# Patient Record
Sex: FEMALE | Race: WHITE | NOT HISPANIC OR LATINO | Employment: OTHER | URBAN - METROPOLITAN AREA
[De-identification: names, ages, dates, MRNs, and addresses within clinical notes are randomized per-mention and may not be internally consistent; named-entity substitution may affect disease eponyms.]

---

## 2017-02-20 DIAGNOSIS — I10 HYPERTENSION GOAL BP (BLOOD PRESSURE) < 140/90: ICD-10-CM

## 2017-02-20 NOTE — TELEPHONE ENCOUNTER
atenolol (TENORMIN) 25 MG tablet      Last Written Prescription Date: 11/17/16  Last Fill Quantity: 30, # refills: 0    Last Office Visit with [facility], [facility] or [facility] prescribing provider:  12/16/15   Future Office Visit:        BP Readings from Last 3 Encounters:   03/18/16 144/88   12/16/15 130/72   11/13/15 134/82

## 2017-02-22 RX ORDER — ATENOLOL 25 MG/1
25 TABLET ORAL DAILY
Qty: 30 TABLET | Refills: 0 | Status: CANCELLED | OUTPATIENT
Start: 2017-02-22

## 2017-02-22 NOTE — TELEPHONE ENCOUNTER
,    Pt must be seen in clinic. Has not been seen for over one year and was reminded of this when last atenolol was refilled on 11/17/16. Please call her and have her make appt.    Dawn Diaz, RN, BSN

## 2017-04-07 ENCOUNTER — OFFICE VISIT (OUTPATIENT)
Dept: FAMILY MEDICINE | Facility: CLINIC | Age: 57
End: 2017-04-07

## 2017-04-07 VITALS
HEART RATE: 85 BPM | HEIGHT: 62 IN | DIASTOLIC BLOOD PRESSURE: 90 MMHG | SYSTOLIC BLOOD PRESSURE: 142 MMHG | WEIGHT: 103 LBS | OXYGEN SATURATION: 97 % | BODY MASS INDEX: 18.95 KG/M2

## 2017-04-07 DIAGNOSIS — F17.200 CURRENT EVERY DAY SMOKER: ICD-10-CM

## 2017-04-07 DIAGNOSIS — R73.09 ELEVATED GLUCOSE LEVEL: ICD-10-CM

## 2017-04-07 DIAGNOSIS — I10 HYPERTENSION GOAL BP (BLOOD PRESSURE) < 140/90: ICD-10-CM

## 2017-04-07 DIAGNOSIS — Z86.2 HISTORY OF ANEMIA: ICD-10-CM

## 2017-04-07 DIAGNOSIS — M51.379 DEGENERATION OF LUMBAR OR LUMBOSACRAL INTERVERTEBRAL DISC: ICD-10-CM

## 2017-04-07 DIAGNOSIS — M47.812 OSTEOARTHRITIS OF CERVICAL SPINE, UNSPECIFIED SPINAL OSTEOARTHRITIS COMPLICATION STATUS: Primary | ICD-10-CM

## 2017-04-07 LAB
% GRANULOCYTES: 64 % (ref 42.2–75.2)
HCT VFR BLD AUTO: 41.1 % (ref 35–46)
HEMOGLOBIN: 13.8 G/DL (ref 11.8–15.5)
LYMPHOCYTES NFR BLD AUTO: 28.7 % (ref 20.5–51.1)
MCH RBC QN AUTO: 31.3 PG (ref 27–31)
MCHC RBC AUTO-ENTMCNC: 33.8 G/DL (ref 33–37)
MCV RBC AUTO: 92.7 FL (ref 80–100)
MONOCYTES NFR BLD AUTO: 7.3 % (ref 1.7–9.3)
PLATELET # BLD AUTO: 337 K/UL (ref 140–450)
RBC # BLD AUTO: 4.43 X10/CMM (ref 3.7–5.2)
WBC # BLD AUTO: 6.1 X10/CMM (ref 3.8–11)

## 2017-04-07 PROCEDURE — 85025 COMPLETE CBC W/AUTO DIFF WBC: CPT | Performed by: FAMILY MEDICINE

## 2017-04-07 PROCEDURE — 36415 COLL VENOUS BLD VENIPUNCTURE: CPT | Performed by: FAMILY MEDICINE

## 2017-04-07 PROCEDURE — 99204 OFFICE O/P NEW MOD 45 MIN: CPT | Performed by: FAMILY MEDICINE

## 2017-04-07 RX ORDER — ATENOLOL 25 MG/1
25 TABLET ORAL DAILY
Qty: 90 TABLET | Refills: 3 | Status: SHIPPED | OUTPATIENT
Start: 2017-04-07 | End: 2017-08-07

## 2017-04-07 RX ORDER — CARBAMAZEPINE 200 MG/1
TABLET ORAL
Refills: 10 | COMMUNITY
Start: 2017-02-07 | End: 2017-07-13

## 2017-04-07 NOTE — PROGRESS NOTES
"Problem(s) Oriented visit        SUBJECTIVE:                                                    Muriel Diaz is a 56 year old female who presents to clinic today for blood pressure evaluation. She takes tenormin 25 mg daily, but has been off this for about a month.  She denies chest pain or pressure. No AGUILAR. No ankle edema. No desire to quit smoking.    She takes carbamazepine for back and neck disease related pain (history of fusions in both areas). This does not work well and causes a lot of side effects. She also will take Soma and gets some relief typically taking it 2 tabs TID. She smokes 1/2-3/4 PPD. She has done \"years and years\" of physical therapy for her back without any relief. She has also tried Cymbalta, Lyrica, and Neurontin without any relief.     She uses amitriptyline for insomnia, not needing it every night. No am grogginess.       Problem list, Medication list, Allergies, and Medical/Social/Surgical histories reviewed in EPIC and updated as appropriate.   Additional history: as documented    ROS:  5 point ROS completed and negative except noted above, including Gen, CV, Resp, GI, MS      Histories:   Patient Active Problem List   Diagnosis     Sprain of thoracic region     Vertigo     CARDIOVASCULAR SCREENING; LDL GOAL LESS THAN 160     Hypertension goal BP (blood pressure) < 140/90     Strain of hand and finger, right     Polysubstance abuse     Degeneration of lumbar or lumbosacral intervertebral disc     DJD (degenerative joint disease) of cervical spine     Dystonia     Anxiety     Past Surgical History:   Procedure Laterality Date     BACK SURGERY  2001    lumbar fusion, cervical discectomy     FUSION CERVICAL ANTERIOR THREE+ LEVELS  5/1/2012    Procedure:FUSION CERVICAL ANTERIOR THREE+ LEVELS; Surgeon:SARAH FRANCO; Location:[facility] OR     FUSION CERVICAL POSTERIOR THREE+ LEVELS  5/1/2012    Procedure:FUSION CERVICAL POSTERIOR THREE+ LEVELS; Posterior Cervical decompression and fusion " "C4-7, Anterior Cervical decompression and fusion C4-7 (c-arm), (herb table with abraham, yina oasis, yina aviator, Vitoss foam packing strip, impulse monitoring,); Surgeon:SARAH FRANCO; Location:[facility] OR     GYN SURGERY       HYSTERECTOMY VAGINAL  1990's    Yaakov Acevesa OB Gyn specilist     HYSTERECTOMY, PAP NO LONGER INDICATED         Social History   Substance Use Topics     Smoking status: Current Every Day Smoker     Packs/day: 0.50     Types: Cigarettes     Smokeless tobacco: Never Used     Alcohol use Yes      Comment: once a week     Family History   Problem Relation Age of Onset     Pancreatitis Mother      Substance Abuse Mother      Unknown/Adopted Father            OBJECTIVE:                                                    /90  Pulse 85  Ht 1.581 m (5' 2.25\")  Wt 46.7 kg (103 lb)  SpO2 97%  BMI 18.69 kg/m2  Body mass index is 18.69 kg/(m^2).   APPEARANCE: = Relaxed and in no distress  Conj/Eyelids = noninjected and lids and lashes are without inflammation  PERRLA/Irises = Pupils Round Reactive to Light and Irisis without inflammation  Neck = No anterior or posterior adenopathy appreciated.  Thyroid = Not enlarged and no masses felt  Resp effort = Calm regular breathing  Breath Sounds = Good air movement with no rales or rhonchi in any lung fields  Heart Rate, Rythym, & sounds (no Murm)  = Regular rate and rythym with no S3, S4, or murmer appreciated.  Carotid Art's = Pulses full and equal and no bruits appreciated  Ext (edema) = No pretibial edema noted or elsewhere  SKIN = absent significant rashes or lesions   Recent/Remote Memory = Alert and Oriented x 3  Mood/Affect = Cooperative and interested   Labs Resulted Today:   Results for orders placed or performed in visit on 04/07/17   CBC with Diff/Plt ([facility])   Result Value Ref Range    WBC x10/cmm 6.1 3.8 - 11.0 x10/cmm    % Lymphocytes 28.7 20.5 - 51.1 %    % Monocytes 7.3 1.7 - 9.3 %    % Granulocytes 64.0 42.2 - " 75.2 %    RBC x10/cmm 4.43 3.7 - 5.2 x10/cmm    Hemoglobin 13.8 11.8 - 15.5 g/dl    Hematocrit 41.1 35 - 46 %    MCV 92.7 80 - 100 fL    MCH 31.3 (A) 27.0 - 31.0 pg    MCHC 33.8 33.0 - 37.0 g/dL    Platelet Count 337 140 - 450 K/uL   Basic Metabolic Panel (8) ([facility])   Result Value Ref Range    Glucose 127 (H) 65 - 99 mg/dL    Urea Nitrogen 22 6 - 24 mg/dL    Creatinine 0.62 0.57 - 1.00 mg/dL    eGFR If NonAfricn Am 102 >59 mL/min/1.73    eGFR If Africn Am 117 >59 mL/min/1.73    BUN/Creatinine Ratio 35 (H) 9 - 23    Sodium 144 134 - 144 mmol/L    Potassium 4.2 3.5 - 5.2 mmol/L    Chloride 103 96 - 106 mmol/L    Total CO2 24 18 - 28 mmol/L    Calcium 9.7 8.7 - 10.2 mg/dL    Narrative    Performed at:  01 - [facility] [address], [place], [place]  [id number]  : Trey Yu MD, Phone:  [phone]   Vitamin D  25-Hydroxy ([facility])   Result Value Ref Range    Vitamin D,25-Hydroxy 27.2 (L) 30.0 - 100.0 ng/mL    Narrative    Performed at:  01 - [facility] [address], [place], [place]  [id number]  : Trey Yu MD, Phone:  [phone]   Lipid Panel ([facility])   Result Value Ref Range    Cholesterol 274 (H) 100 - 199 mg/dL    Triglycerides 120 0 - 149 mg/dL    HDL Cholesterol 117 >39 mg/dL    VLDL Cholesterol Majnit 24 5 - 40 mg/dL    LDL Cholesterol Calculated 133 (H) 0 - 99 mg/dL    LDL/HDL Ratio 1.1 0.0 - 3.2 ratio units    Narrative    Performed at:  01 - [facility] [address], [place], [place]  [id number]  : Trey Yu MD, Phone:  [phone]     ASSESSMENT/PLAN:                                                    Tobacco Cessation:   reports that she has been smoking Cigarettes.  She has been smoking about 0.50 packs per day. She has never used smokeless tobacco.  Tobacco Cessation Action Plan: Information offered: Patient not interested at this time    BMI:   Estimated body mass index is 18.69 kg/(m^2) as calculated from the following:    Height as of this  "encounter: 1.581 m (5' 2.25\").    Weight as of this encounter: 46.7 kg (103 lb).         Mruiel was seen today for establish care and recheck medication.    Diagnoses and all orders for this visit:    Osteoarthritis of cervical spine, unspecified spinal osteoarthritis complication status  -     Vitamin D  25-Hydroxy ([facility])    Hypertension goal BP (blood pressure) < 140/90  -     atenolol (TENORMIN) 25 MG tablet; Take 1 tablet (25 mg) by mouth daily  -     Basic Metabolic Panel (8) ([facility])  -     Lipid Panel ([facility])  Restart medication. Return in 4-6 weeks to recheck blood pressure.    Degeneration of lumbar or lumbosacral intervertebral disc  -     Vitamin D  25-Hydroxy ([facility])    History of anemia  -     CBC with Diff/Plt ([facility])    Current every day smoker  Strongly recommend quitting, patient is not ready at this time.    There are no Patient Instructions on file for this visit.    The following health maintenance items are reviewed in Epic and correct as of today:  Health Maintenance   Topic Date Due     LIPID MONITORING Q1 YEAR( NO INBASKET)  11/30/1962     MICROALBUMIN Q1 YEAR( NO INBASKET)  11/30/1962     ADVANCE DIRECTIVE PLANNING Q5 YRS (NO INBASKET)  11/30/1979     HEPATITIS C SCREENING  11/30/1979     MAMMO SCREEN Q2 YR (SYSTEM ASSIGNED)  11/30/2001     BMP Q1 YR (NO INBASKET)  04/09/2013     INFLUENZA VACCINE (SYSTEM ASSIGNED)  09/01/2017     TETANUS IMMUNIZATION (SYSTEM ASSIGNED)  07/19/2020     COLON CANCER SCREEN (SYSTEM ASSIGNED)  03/18/2026       Isabel Landin MD  [facility]  Family Practice  [facility]  [phone]    For any issues my office # is [phone]        "

## 2017-04-07 NOTE — MR AVS SNAPSHOT
"              After Visit Summary   4/7/2017    Muriel Diaz    MRN: [id number]           Patient Information     Date Of Birth          1960        Visit Information        Provider Department      4/7/2017 1:15 PM Isabel Landin MD [facility]        Today's Diagnoses     Osteoarthritis of cervical spine, unspecified spinal osteoarthritis complication status    -  1    Hypertension goal BP (blood pressure) < 140/90        Degeneration of lumbar or lumbosacral intervertebral disc        History of anemia        Current every day smoker           Follow-ups after your visit        Who to contact     If you have questions or need follow up information about today's clinic visit or your schedule please contact [facility] directly at [phone].  Normal or non-critical lab and imaging results will be communicated to you by [employer], letter or phone within 4 business days after the clinic has received the results. If you do not hear from us within 7 days, please contact the clinic through [employer] or phone. If you have a critical or abnormal lab result, we will notify you by phone as soon as possible.  Submit refill requests through [employer] or call your pharmacy and they will forward the refill request to us. Please allow 3 business days for your refill to be completed.          Additional Information About Your Visit        MyChart Information     [employer] lets you send messages to your doctor, view your test results, renew your prescriptions, schedule appointments and more. To sign up, go to [email or web address] . Click on \"Log in\" on the left side of the screen, which will take you to the Welcome page. Then click on \"Sign up Now\" on the right side of the page.     You will be asked to enter the access code listed below, as well as some personal information. Please follow the directions to create your username and password.     Your access code is: [email or web address]  Expires: " "2017  1:00 PM     Your access code will  in 90 days. If you need help or a new code, please call your [facility] or [phone].        Care EveryWhere ID     This is your Care EveryWhere ID. This could be used by other organizations to access your [facility] medical records  [id number]        Your Vitals Were     Pulse Height Pulse Oximetry BMI (Body Mass Index)          85 1.581 m (5' 2.25\") 97% 18.69 kg/m2         Blood Pressure from Last 3 Encounters:   17 142/90   16 144/88   12/16/15 130/72    Weight from Last 3 Encounters:   17 46.7 kg (103 lb)   16 47.2 kg (104 lb)   12/16/15 49.4 kg (109 lb)              We Performed the Following     Basic Metabolic Panel (8) ([facility])     CBC with Diff/Plt ([facility])     Lipid Panel ([facility])     Vitamin D  25-Hydroxy ([facility])          Where to get your medicines      These medications were sent to [facility] [address] [place] [address] & [address], [place] [address]     Phone:  [phone]     atenolol 25 MG tablet          Primary Care Provider Office Phone # Fax #    Isabel Landin [phone]       [facility] [facility] [address] [place] [address]        Thank you!     Thank you for choosing [facility]  for your care. Our goal is always to provide you with excellent care. Hearing back from our patients is one way we can continue to improve our services. Please take a few minutes to complete the written survey that you may receive in the mail after your visit with us. Thank you!             Your Updated Medication List - Protect others around you: Learn how to safely use, store and throw away your medicines at www.disposemymeds.org.          This list is accurate as of: 17 11:59 PM.  Always use your most recent med list.                   Brand Name Dispense Instructions for use    amitriptyline 10 MG tablet    ELAVIL    " 90 tablet    Start at 1 tab at bedtime for 3 days, then 2 tabs at bedtime for 3 days, then 3 tabs at bedtime.       atenolol 25 MG tablet    TENORMIN    90 tablet    Take 1 tablet (25 mg) by mouth daily       carBAMazepine 200 MG tablet    TEGretol         carisoprodol 350 MG tablet    SOMA    120 tablet    Take 1 tablet (350 mg) by mouth 4 times daily as needed for muscle spasms OK to use outside pharmacy. Future prescriptions by in-person visit only. Please only use Qualitest brand

## 2017-04-08 LAB
BUN SERPL-MCNC: 22 MG/DL (ref 6–24)
BUN/CREATININE RATIO: 35 (ref 9–23)
CALCIUM SERPL-MCNC: 9.7 MG/DL (ref 8.7–10.2)
CHLORIDE SERPLBLD-SCNC: 103 MMOL/L (ref 96–106)
CHOLEST SERPL-MCNC: 274 MG/DL (ref 100–199)
CREAT SERPL-MCNC: 0.62 MG/DL (ref 0.57–1)
EGFR IF AFRICN AM: 117 ML/MIN/1.73
EGFR IF NONAFRICN AM: 102 ML/MIN/1.73
GLUCOSE SERPL-MCNC: 127 MG/DL (ref 65–99)
HDLC SERPL-MCNC: 117 MG/DL
LDL/HDL RATIO: 1.1 RATIO UNITS (ref 0–3.2)
LDLC SERPL CALC-MCNC: 133 MG/DL (ref 0–99)
POTASSIUM SERPL-SCNC: 4.2 MMOL/L (ref 3.5–5.2)
SODIUM SERPL-SCNC: 144 MMOL/L (ref 134–144)
TOTAL CO2: 24 MMOL/L (ref 18–28)
TRIGL SERPL-MCNC: 120 MG/DL (ref 0–149)
VITAMIN D, 25-HYDROXY: 27.2 NG/ML (ref 30–100)
VLDLC SERPL CALC-MCNC: 24 MG/DL (ref 5–40)

## 2017-04-14 LAB — HBA1C MFR BLD: 5.7 % (ref 4.8–5.6)

## 2017-04-25 ENCOUNTER — TELEPHONE (OUTPATIENT)
Dept: FAMILY MEDICINE | Facility: CLINIC | Age: 57
End: 2017-04-25

## 2017-04-25 DIAGNOSIS — E55.9 VITAMIN D DEFICIENCY: Primary | ICD-10-CM

## 2017-04-25 RX ORDER — CHOLECALCIFEROL (VITAMIN D3) 50 MCG
2000 TABLET ORAL DAILY
Qty: 90 TABLET | Refills: 1 | Status: SHIPPED | OUTPATIENT
Start: 2017-04-25 | End: 2018-03-26

## 2017-04-25 NOTE — TELEPHONE ENCOUNTER
----- Message from Isabel Landin MD sent at 4/12/2017  7:38 PM CDT -----  Vitamin D level is low, recommend adding (or increasing current supplement if already taking) Vitamin D3 2,000 IU daily, recheck in 6-8 weeks.  Total cholesterol is high, but the ratio of the good and bad cholesterol is excellent.  BLood counts are fine.   Glucose is elevated. Please add Hemoglobin A1c on to blood draw to r/o diabetes.

## 2017-04-25 NOTE — TELEPHONE ENCOUNTER
Patient notified of lab results per Dr. Landin directions.  Pre diabetic diet info mailed to patient.  Patient requested prescription for Vitamin D-this is sent to her pharmacy.  She is advised to return to clinic for repeat vit d level in 6-8 weeks.  Wendy Das

## 2017-04-27 ENCOUNTER — TELEPHONE (OUTPATIENT)
Dept: FAMILY MEDICINE | Facility: CLINIC | Age: 57
End: 2017-04-27

## 2017-04-27 NOTE — TELEPHONE ENCOUNTER
"4/27/2017      PHS call not required per specialty note    Sonh \"Alexia\" David  Central Scheduler    "

## 2017-07-13 ENCOUNTER — OFFICE VISIT (OUTPATIENT)
Dept: FAMILY MEDICINE | Facility: CLINIC | Age: 57
End: 2017-07-13

## 2017-07-13 VITALS
DIASTOLIC BLOOD PRESSURE: 60 MMHG | HEART RATE: 70 BPM | WEIGHT: 100 LBS | OXYGEN SATURATION: 98 % | SYSTOLIC BLOOD PRESSURE: 138 MMHG | BODY MASS INDEX: 18.14 KG/M2

## 2017-07-13 DIAGNOSIS — M53.3 SI (SACROILIAC) JOINT DYSFUNCTION: Primary | ICD-10-CM

## 2017-07-13 DIAGNOSIS — M54.2 NECK PAIN: ICD-10-CM

## 2017-07-13 DIAGNOSIS — G50.0 TRIGEMINAL NEURALGIA: ICD-10-CM

## 2017-07-13 PROCEDURE — 99213 OFFICE O/P EST LOW 20 MIN: CPT | Performed by: FAMILY MEDICINE

## 2017-07-13 RX ORDER — CARISOPRODOL 350 MG/1
350 TABLET ORAL 4 TIMES DAILY PRN
Qty: 120 TABLET | Refills: 0 | Status: SHIPPED | OUTPATIENT
Start: 2017-07-13 | End: 2018-04-13

## 2017-07-13 NOTE — PROGRESS NOTES
Problem(s) Oriented visit        SUBJECTIVE:                                                    Muriel Diaz is a 56 year old female who presents to clinic today for low back pain that occurred on 7/3/17. No particular injury. She feels it broadly across her low back. She denies any radiation of the pain into the legs. She cannot roll over in bed. Certain movements make her buckle with pain. She has been icing without any relief. No bladder or bowel issues.       Problem list, Medication list, Allergies, and Medical/Social/Surgical histories reviewed in [facility] and updated as appropriate.   Additional history: as documented    ROS:  5 point ROS completed and negative except noted above, including Gen, CV, Resp, GI, MS      Histories:   Patient Active Problem List   Diagnosis     Sprain of thoracic region     Vertigo     CARDIOVASCULAR SCREENING; LDL GOAL LESS THAN 160     Hypertension goal BP (blood pressure) < 140/90     Strain of hand and finger, right     Polysubstance abuse     Degeneration of lumbar or lumbosacral intervertebral disc     DJD (degenerative joint disease) of cervical spine     Dystonia     Anxiety     Past Surgical History:   Procedure Laterality Date     BACK SURGERY  2001    lumbar fusion, cervical discectomy     FUSION CERVICAL ANTERIOR THREE+ LEVELS  5/1/2012    Procedure:FUSION CERVICAL ANTERIOR THREE+ LEVELS; Surgeon:SARAH FRANCO; Location:[facility] OR     FUSION CERVICAL POSTERIOR THREE+ LEVELS  5/1/2012    Procedure:FUSION CERVICAL POSTERIOR THREE+ LEVELS; Posterior Cervical decompression and fusion C4-7, Anterior Cervical decompression and fusion C4-7 (c-arm), (herb table with abraham, yina oasis, yina aviator, Vitoss foam packing strip, impulse monitoring,); Surgeon:SARAH FRANCO; Location:[facility] OR     GYN SURGERY       HYSTERECTOMY VAGINAL  1990's    Judy Aceves OB Gyn specilist     HYSTERECTOMY, PAP NO LONGER INDICATED         Social History   Substance Use  Topics     Smoking status: Current Every Day Smoker     Packs/day: 0.50     Types: Cigarettes     Smokeless tobacco: Never Used     Alcohol use Yes      Comment: once a week     Family History   Problem Relation Age of Onset     Pancreatitis Mother      Substance Abuse Mother      Unknown/Adopted Father            OBJECTIVE:                                                    /60  Pulse 70  Wt 45.4 kg (100 lb)  SpO2 98%  BMI 18.14 kg/m2  Body mass index is 18.14 kg/(m^2).   GENERAL APPEARANCE ADULT: alert, uncomfortable appearing  MS: extremities normal, no peripheral edema  THere is point tenderness over the SI joints bilaterally. There is mild leg length discrepancy appreciated.She has severe pain moving from sitting to standing and getting to a laying down position.    NEURO: Alert, oriented, speech and mentation normal  PSYCH: mentation appears normal, affect and mood normal   Labs Resulted Today:   Results for orders placed or performed in visit on 04/07/17   CBC with Diff/Plt ([facility])   Result Value Ref Range    WBC x10/cmm 6.1 3.8 - 11.0 x10/cmm    % Lymphocytes 28.7 20.5 - 51.1 %    % Monocytes 7.3 1.7 - 9.3 %    % Granulocytes 64.0 42.2 - 75.2 %    RBC x10/cmm 4.43 3.7 - 5.2 x10/cmm    Hemoglobin 13.8 11.8 - 15.5 g/dl    Hematocrit 41.1 35 - 46 %    MCV 92.7 80 - 100 fL    MCH 31.3 (A) 27.0 - 31.0 pg    MCHC 33.8 33.0 - 37.0 g/dL    Platelet Count 337 140 - 450 K/uL   Basic Metabolic Panel (8) ([facility])   Result Value Ref Range    Glucose 127 (H) 65 - 99 mg/dL    Urea Nitrogen 22 6 - 24 mg/dL    Creatinine 0.62 0.57 - 1.00 mg/dL    eGFR If NonAfricn Am 102 >59 mL/min/1.73    eGFR If Africn Am 117 >59 mL/min/1.73    BUN/Creatinine Ratio 35 (H) 9 - 23    Sodium 144 134 - 144 mmol/L    Potassium 4.2 3.5 - 5.2 mmol/L    Chloride 103 96 - 106 mmol/L    Total CO2 24 18 - 28 mmol/L    Calcium 9.7 8.7 - 10.2 mg/dL    Narrative    Performed at:  01 - [facility] [address], [place], [place]   [id number]  : Trey Yu MD, Phone:  [phone]   Vitamin D  25-Hydroxy ([facility])   Result Value Ref Range    Vitamin D,25-Hydroxy 27.2 (L) 30.0 - 100.0 ng/mL    Narrative    Performed at:  01 - [facility] [address], [place], [place]  [id number]  : Trey Yu MD, Phone:  [phone]   Lipid Panel ([facility])   Result Value Ref Range    Cholesterol 274 (H) 100 - 199 mg/dL    Triglycerides 120 0 - 149 mg/dL    HDL Cholesterol 117 >39 mg/dL    VLDL Cholesterol Manjit 24 5 - 40 mg/dL    LDL Cholesterol Calculated 133 (H) 0 - 99 mg/dL    LDL/HDL Ratio 1.1 0.0 - 3.2 ratio units    Narrative    Performed at:  01 - [facility] [address], [place], [place]  [id number]  : Trey Yu MD, Phone:  [phone]   Hemoglobin A1C ([facility])   Result Value Ref Range    Hemoglobin A1C 5.7 (H) 4.8 - 5.6 %    Narrative    Performed at:  01 - [facility] [address], [place], [place]  [id number]  : Trey Yu MD, Phone:  [phone]     ASSESSMENT/PLAN:                                                        Mureil was seen today for recheck and back pain.    Diagnoses and all orders for this visit:    SI (sacroiliac) joint dysfunction  -     carisoprodol (SOMA) 350 MG tablet; Take 1 tablet (350 mg) by mouth 4 times daily as needed for muscle spasms OK to use outside pharmacy. Future prescriptions by in-person visit only.  Please only use Qualitest brand  Exercises for pelvic realignment demonstrated. She will ice and Rx for Soma is sent in to her pharmacy.    Offered referral to PT, she will call if she decides she would like to pursue this.      There are no Patient Instructions on file for this visit.    The following health maintenance items are reviewed in Epic and correct as of today:  Health Maintenance   Topic Date Due     MICROALBUMIN Q1 YEAR  11/30/1962     ADVANCE DIRECTIVE PLANNING Q5 YRS  11/30/1979     HEPATITIS C SCREENING  11/30/1979     MAMMO SCREEN  Q2 YR (SYSTEM ASSIGNED)  11/30/2001     INFLUENZA VACCINE (SYSTEM ASSIGNED)  09/01/2017     BMP Q1 YR  04/07/2018     LIPID MONITORING Q1 YEAR  04/07/2018     TETANUS IMMUNIZATION (SYSTEM ASSIGNED)  07/19/2020     COLON CANCER SCREEN (SYSTEM ASSIGNED)  03/18/2026       Isabel Landin MD  [facility]  [phone]    For any issues my office # is [phone]

## 2017-07-13 NOTE — MR AVS SNAPSHOT
"              After Visit Summary   2017    Muriel Diaz    MRN: [id number]           Patient Information     Date Of Birth          1960        Visit Information        Provider Department      2017 3:30 PM Isabel Landin MD [facility]        Today's Diagnoses     SI (sacroiliac) joint dysfunction    -  1    Neck pain        Trigeminal neuralgia           Follow-ups after your visit        Who to contact     If you have questions or need follow up information about today's clinic visit or your schedule please contact [facility] directly at [phone].  Normal or non-critical lab and imaging results will be communicated to you by Addashophart, letter or phone within 4 business days after the clinic has received the results. If you do not hear from us within 7 days, please contact the clinic through Addashophart or phone. If you have a critical or abnormal lab result, we will notify you by phone as soon as possible.  Submit refill requests through OneMorePallet or call your pharmacy and they will forward the refill request to us. Please allow 3 business days for your refill to be completed.          Additional Information About Your Visit        MyChart Information     OneMorePallet lets you send messages to your doctor, view your test results, renew your prescriptions, schedule appointments and more. To sign up, go to [email or web address] . Click on \"Log in\" on the left side of the screen, which will take you to the Welcome page. Then click on \"Sign up Now\" on the right side of the page.     You will be asked to enter the access code listed below, as well as some personal information. Please follow the directions to create your username and password.     Your access code is: [email or web address]  Expires: 10/11/2017  7:13 PM     Your access code will  in 90 days. If you need help or a new code, please call your [place] clinic or [phone].        Care EveryWhere ID     This is " your Care EveryWhere ID. This could be used by other organizations to access your [place] medical records  [id number]        Your Vitals Were     Pulse Pulse Oximetry BMI (Body Mass Index)             70 98% 18.14 kg/m2          Blood Pressure from Last 3 Encounters:   07/13/17 138/60   04/07/17 142/90   03/18/16 144/88    Weight from Last 3 Encounters:   07/13/17 45.4 kg (100 lb)   04/07/17 46.7 kg (103 lb)   03/18/16 47.2 kg (104 lb)              Today, you had the following     No orders found for display         Where to get your medicines      Some of these will need a paper prescription and others can be bought over the counter.  Ask your nurse if you have questions.     Bring a paper prescription for each of these medications     carisoprodol 350 MG tablet          Primary Care Provider Office Phone # Fax #    Isabel Vickie Landin [phone]       [facility] [address] [place] [address]        Equal Access to Services     RENETTA [facility] : Hadii aad ku hadasho Soomaali, waaxda luqadaha, qaybta kaalmada adeegyada, waxay yarelis haydaniel malcolm . So [facility] [phone].    ATENCIÓN: Si habla español, tiene a chowdary disposición servicios gratuitos de asistencia lingüística. Llame al [phone].    We comply with applicable federal civil rights laws and [place] laws. We do not discriminate on the basis of race, color, national origin, age, disability sex, sexual orientation or gender identity.            Thank you!     Thank you for choosing [facility]  for your care. Our goal is always to provide you with excellent care. Hearing back from our patients is one way we can continue to improve our services. Please take a few minutes to complete the written survey that you may receive in the mail after your visit with us. Thank you!             Your Updated Medication List - Protect others around you: Learn how to safely use, store and throw away your  medicines at www.disposemymeds.org.          This list is accurate as of: 7/13/17  7:13 PM.  Always use your most recent med list.                   Brand Name Dispense Instructions for use Diagnosis    amitriptyline 10 MG tablet    ELAVIL    90 tablet    Start at 1 tab at bedtime for 3 days, then 2 tabs at bedtime for 3 days, then 3 tabs at bedtime.    Insomnia due to medical condition       atenolol 25 MG tablet    TENORMIN    90 tablet    Take 1 tablet (25 mg) by mouth daily    Hypertension goal BP (blood pressure) < 140/90       carisoprodol 350 MG tablet    SOMA    120 tablet    Take 1 tablet (350 mg) by mouth 4 times daily as needed for muscle spasms OK to use outside pharmacy. Future prescriptions by in-person visit only. Please only use Qualitest brand    SI (sacroiliac) joint dysfunction       Vitamin D3 2000 UNITS Tabs     90 tablet    Take 2,000 Units by mouth daily    Vitamin D deficiency

## 2017-08-07 ENCOUNTER — TELEPHONE (OUTPATIENT)
Dept: FAMILY MEDICINE | Facility: CLINIC | Age: 57
End: 2017-08-07

## 2017-08-07 DIAGNOSIS — I10 BENIGN ESSENTIAL HYPERTENSION: Primary | ICD-10-CM

## 2017-08-07 RX ORDER — METOPROLOL SUCCINATE 50 MG/1
50 TABLET, EXTENDED RELEASE ORAL DAILY
Qty: 90 TABLET | Refills: 1 | Status: SHIPPED | OUTPATIENT
Start: 2017-08-07 | End: 2018-03-29

## 2018-03-01 ENCOUNTER — TELEPHONE (OUTPATIENT)
Dept: FAMILY MEDICINE | Facility: CLINIC | Age: 58
End: 2018-03-01

## 2018-03-01 DIAGNOSIS — I10 HYPERTENSION GOAL BP (BLOOD PRESSURE) < 140/90: Primary | ICD-10-CM

## 2018-03-01 RX ORDER — ATENOLOL 25 MG/1
TABLET ORAL
Qty: 30 TABLET | Refills: 1 | Status: SHIPPED | OUTPATIENT
Start: 2018-03-01 | End: 2018-05-05

## 2018-03-01 NOTE — TELEPHONE ENCOUNTER
atenolol (TENORMIN) 25 MG   LAST  Med check 4/7/17   last labs(for RX) 4/13/17  Next  appt scheduled =  none  Jailene Winter MA    BP Readings from Last 3 Encounters:   07/13/17 138/60   04/07/17 142/90   03/18/16 144/88     Last Basic Metabolic Panel:  Lab Results   Component Value Date     04/07/2017      Lab Results   Component Value Date    POTASSIUM 4.2 04/07/2017     Lab Results   Component Value Date    CHLORIDE 103 04/07/2017     Lab Results   Component Value Date    HATTIE 9.7 04/07/2017     Lab Results   Component Value Date    CO2 25 04/09/2012     Lab Results   Component Value Date    BUN 22 04/07/2017    BUN 35 04/07/2017     Lab Results   Component Value Date    CR 0.62 04/07/2017     Lab Results   Component Value Date     04/07/2017

## 2018-03-21 ENCOUNTER — OFFICE VISIT (OUTPATIENT)
Dept: FAMILY MEDICINE | Facility: CLINIC | Age: 58
End: 2018-03-21

## 2018-03-21 VITALS
WEIGHT: 104.4 LBS | HEART RATE: 60 BPM | HEIGHT: 63 IN | BODY MASS INDEX: 18.5 KG/M2 | SYSTOLIC BLOOD PRESSURE: 160 MMHG | RESPIRATION RATE: 12 BRPM | DIASTOLIC BLOOD PRESSURE: 82 MMHG

## 2018-03-21 DIAGNOSIS — I10 HYPERTENSION GOAL BP (BLOOD PRESSURE) < 140/90: Primary | ICD-10-CM

## 2018-03-21 DIAGNOSIS — Z11.59 ENCOUNTER FOR HCV SCREENING TEST FOR LOW RISK PATIENT: ICD-10-CM

## 2018-03-21 DIAGNOSIS — R73.02 GLUCOSE INTOLERANCE (IMPAIRED GLUCOSE TOLERANCE): ICD-10-CM

## 2018-03-21 DIAGNOSIS — N63.0 BREAST MASS: ICD-10-CM

## 2018-03-21 DIAGNOSIS — E55.9 VITAMIN D DEFICIENCY: ICD-10-CM

## 2018-03-21 LAB
% GRANULOCYTES: 74.4 % (ref 42.2–75.2)
HCT VFR BLD AUTO: 38.7 % (ref 35–46)
HEMOGLOBIN: 12.7 G/DL (ref 11.8–15.5)
LYMPHOCYTES NFR BLD AUTO: 19.6 % (ref 20.5–51.1)
MCH RBC QN AUTO: 31.1 PG (ref 27–31)
MCHC RBC AUTO-ENTMCNC: 33 G/DL (ref 33–37)
MCV RBC AUTO: 94.1 FL (ref 80–100)
MONOCYTES NFR BLD AUTO: 6 % (ref 1.7–9.3)
PLATELET # BLD AUTO: 329 K/UL (ref 140–450)
RBC # BLD AUTO: 4.11 X10/CMM (ref 3.7–5.2)
WBC # BLD AUTO: 8.8 X10/CMM (ref 3.8–11)

## 2018-03-21 PROCEDURE — 85025 COMPLETE CBC W/AUTO DIFF WBC: CPT | Performed by: FAMILY MEDICINE

## 2018-03-21 PROCEDURE — 36415 COLL VENOUS BLD VENIPUNCTURE: CPT | Performed by: FAMILY MEDICINE

## 2018-03-21 PROCEDURE — 99214 OFFICE O/P EST MOD 30 MIN: CPT | Performed by: FAMILY MEDICINE

## 2018-03-21 RX ORDER — PREDNISONE 20 MG/1
TABLET ORAL
Refills: 1 | COMMUNITY
Start: 2018-03-14 | End: 2018-03-29

## 2018-03-21 RX ORDER — HYDROCHLOROTHIAZIDE 12.5 MG/1
12.5 TABLET ORAL DAILY
Qty: 90 TABLET | Refills: 1 | Status: ON HOLD | OUTPATIENT
Start: 2018-03-21 | End: 2019-03-22

## 2018-03-21 NOTE — MR AVS SNAPSHOT
"              After Visit Summary   3/21/2018    Muriel Diaz    MRN: [id number]           Patient Information     Date Of Birth          1960        Visit Information        Provider Department      3/21/2018 4:15 PM Isabel Landin MD [facility]        Today's Diagnoses     Hypertension goal BP (blood pressure) < 140/90    -  1    Glucose intolerance (impaired glucose tolerance)        Vitamin D deficiency        Encounter for HCV screening test for low risk patient        Breast mass           Follow-ups after your visit        Future tests that were ordered for you today     Open Future Orders        Priority Expected Expires Ordered    US Breast Right Routine  3/22/2019 3/22/2018            Who to contact     If you have questions or need follow up information about today's clinic visit or your schedule please contact [facility] directly at [phone].  Normal or non-critical lab and imaging results will be communicated to you by Sharingforcehart, letter or phone within 4 business days after the clinic has received the results. If you do not hear from us within 7 days, please contact the clinic through Sharingforcehart or phone. If you have a critical or abnormal lab result, we will notify you by phone as soon as possible.  Submit refill requests through Materialise or call your pharmacy and they will forward the refill request to us. Please allow 3 business days for your refill to be completed.          Additional Information About Your Visit        Sharingforcehart Information     Materialise lets you send messages to your doctor, view your test results, renew your prescriptions, schedule appointments and more. To sign up, go to [email or web address] . Click on \"Log in\" on the left side of the screen, which will take you to the Welcome page. Then click on \"Sign up Now\" on the right side of the page.     You will be asked to enter the access code listed below, as well as some personal " "information. Please follow the directions to create your username and password.     Your access code is: [email or web address]  Expires: 2018  9:48 AM     Your access code will  in 90 days. If you need help or a new code, please call your [place] clinic or [phone].        Care EveryWhere ID     This is your Care EveryWhere ID. This could be used by other organizations to access your [place] medical records  [id number]        Your Vitals Were     Pulse Respirations Height BMI (Body Mass Index)          60 12 1.6 m (5' 3\") 18.49 kg/m2         Blood Pressure from Last 3 Encounters:   18 160/82   17 138/60   17 142/90    Weight from Last 3 Encounters:   18 47.4 kg (104 lb 6.4 oz)   17 45.4 kg (100 lb)   17 46.7 kg (103 lb)              We Performed the Following     Basic Metabolic Panel (8) ([facility])     CBC with Diff/Plt ([facility])     HCV Antibody ([facility])     Hemoglobin A1C ([facility])     Vitamin D  25-Hydroxy ([facility])          Today's Medication Changes          These changes are accurate as of 3/21/18 11:59 PM.  If you have any questions, ask your nurse or doctor.               Start taking these medicines.        Dose/Directions    hydrochlorothiazide 12.5 MG Tabs tablet   Used for:  Hypertension goal BP (blood pressure) < 140/90   Started by:  Isabel Landin MD        Dose:  12.5 mg   Take 1 tablet (12.5 mg) by mouth daily   Quantity:  90 tablet   Refills:  1         Stop taking these medicines if you haven't already. Please contact your care team if you have questions.     amitriptyline 10 MG tablet   Commonly known as:  ELAVIL   Stopped by:  Isabel Landin MD                Where to get your medicines      These medications were sent to [id number] [place], [place] - [address], [place] [address]     Phone:  [phone]     hydrochlorothiazide 12.5 MG Tabs tablet                Primary Care Provider Office " Phone # Fax #    Isabel Vickie Landin [phone]       [facility] [address]  [facility] [address]        Equal Access to Services     FRANCISCA HUNT : Hadii lupe ku hadsommero Soomaali, waaxda luqadaha, qaybta kaalmada adeegyada, kvng thomasdaniel gupta. So [facility] [phone].    ATENCIÓN: Si habla español, tiene a chowdary disposición servicios gratuitos de asistencia lingüística. Llame al [phone].    We comply with applicable federal civil rights laws and [place] laws. We do not discriminate on the basis of race, color, national origin, age, disability, sex, sexual orientation, or gender identity.            Thank you!     Thank you for choosing [facility]  for your care. Our goal is always to provide you with excellent care. Hearing back from our patients is one way we can continue to improve our services. Please take a few minutes to complete the written survey that you may receive in the mail after your visit with us. Thank you!             Your Updated Medication List - Protect others around you: Learn how to safely use, store and throw away your medicines at www.disposemymeds.org.          This list is accurate as of 3/21/18 11:59 PM.  Always use your most recent med list.                   Brand Name Dispense Instructions for use Diagnosis    atenolol 25 MG tablet    TENORMIN    30 tablet    TAKE ONE TABLET BY MOUTH ONE TIME DAILY    Hypertension goal BP (blood pressure) < 140/90       carisoprodol 350 MG tablet    SOMA    120 tablet    Take 1 tablet (350 mg) by mouth 4 times daily as needed for muscle spasms OK to use outside pharmacy. Future prescriptions by in-person visit only. Please only use Qualitest brand    SI (sacroiliac) joint dysfunction       hydrochlorothiazide 12.5 MG Tabs tablet     90 tablet    Take 1 tablet (12.5 mg) by mouth daily    Hypertension goal BP (blood pressure) < 140/90       metoprolol succinate 50 MG 24 hr tablet     TOPROL-XL    90 tablet    Take 1 tablet (50 mg) by mouth daily    Benign essential hypertension       omeprazole 20 MG CR capsule    priLOSEC     Take by mouth daily        predniSONE 20 MG tablet    DELTASONE          Vitamin D3 2000 UNITS Tabs     90 tablet    Take 2,000 Units by mouth daily    Vitamin D deficiency

## 2018-03-21 NOTE — PROGRESS NOTES
"Problem(s) Oriented visit        SUBJECTIVE:                                                    Muriel Diaz is a 57 year old female who presents to clinic today for medication check. She takes medication for blood pressure. She does not check home BPs. She had taken atenolol and then due to the shortage she was changed to metoprolol. She felt terrible on this and felt her heart pound on this, but went away quickly when she changed back to the atenolol. She denies chest pain or pressure. She is \"active\" but no particular exercise. She had history of vitamin D supplement, took prescription for this and then stopped when the Rx ran out. She had slightly elevated hemoglobin A1c at her last visit. She continues to smoke and is not interested in quitting.    Muriel reports having an abnormal shaped breast, noticed for \"some time\" but unable to say specifically, but estimating a year. She initially attributed it to losing weight after cervical spine surgery and then regaining weight. She has never had a mammogram. No apparent family history of breast disease.    Problem list, Medication list, Allergies, and Medical/Social/Surgical histories reviewed in EPIC and updated as appropriate.   Additional history: as documented    ROS:  5 point ROS completed and negative except noted above, including Gen, CV, Resp, GI, MS      Histories:   Patient Active Problem List   Diagnosis     Sprain of thoracic region     Vertigo     CARDIOVASCULAR SCREENING; LDL GOAL LESS THAN 160     Hypertension goal BP (blood pressure) < 140/90     Strain of hand and finger, right     Polysubstance abuse     Degeneration of lumbar or lumbosacral intervertebral disc     DJD (degenerative joint disease) of cervical spine     Dystonia     Anxiety     Past Surgical History:   Procedure Laterality Date     BACK SURGERY  2001    lumbar fusion, cervical discectomy     FUSION CERVICAL ANTERIOR THREE+ LEVELS  5/1/2012    Procedure:FUSION CERVICAL ANTERIOR " "THREE+ LEVELS; Surgeon:SARAH FRANCO; Location:[facility] OR     FUSION CERVICAL POSTERIOR THREE+ LEVELS  5/1/2012    Procedure:FUSION CERVICAL POSTERIOR THREE+ LEVELS; Posterior Cervical decompression and fusion C4-7, Anterior Cervical decompression and fusion C4-7 (c-arm), (herb table with abraham, yina oasis, yina aviator, Vitoss foam packing strip, impulse monitoring,); Surgeon:SARAH FRANCO; Location:[facility] OR     GYN SURGERY       HYSTERECTOMY VAGINAL  1990's    Judy Aceves OB Gyn specilist     HYSTERECTOMY, PAP NO LONGER INDICATED         Social History   Substance Use Topics     Smoking status: Current Every Day Smoker     Packs/day: 0.50     Types: Cigarettes     Smokeless tobacco: Never Used     Alcohol use 0.6 oz/week     1 Standard drinks or equivalent per week      Comment: once a week     Family History   Problem Relation Age of Onset     Pancreatitis Mother      Substance Abuse Mother      Unknown/Adopted Father            OBJECTIVE:                                                    /82 (BP Location: Left arm, Patient Position: Sitting, Cuff Size: Adult Regular)  Pulse 60  Resp 12  Ht 1.6 m (5' 3\")  Wt 47.4 kg (104 lb 6.4 oz)  BMI 18.49 kg/m2  Body mass index is 18.49 kg/(m^2).   GENERAL APPEARANCE: Alert, no acute distress  EYES: PERRL, EOM normal, conjunctiva and lids normal  HENT: Ears and TMs normal, oral mucosa and posterior oropharynx normal  NECK: No adenopathy,masses or thyromegaly  RESP: lungs clear to auscultation   BREAST: right breast is normal without any masses or skin changes, no appreciable axillary LAD. Left breast is abnormal to inspection with puckering of skin over obvious breast mass in the upper half of the breast. Mass is irregular in shape and is adherent to the overlying tissue, occupying the upper half of the breast. I do not appreciate axillary LAD  CV: normal rate, regular rhythm, no murmur or gallop  NEURO: Alert, oriented, speech and " mentation normal  PSYCH: mentation appears normal, affect and mood normal   Labs Resulted Today:   Results for orders placed or performed in visit on 03/21/18   Hemoglobin A1C ([facility])   Result Value Ref Range    Hemoglobin A1C 5.8 (H) 4.8 - 5.6 %    Narrative    Performed at:  01 - [facility]  [address] [place], [place]  [id number]  : Trey Yu MD, Phone:  [phone]   CBC with Diff/Plt ([facility])   Result Value Ref Range    WBC x10/cmm 8.8 3.8 - 11.0 x10/cmm    % Lymphocytes 19.6 (A) 20.5 - 51.1 %    % Monocytes 6.0 1.7 - 9.3 %    % Granulocytes 74.4 42.2 - 75.2 %    RBC x10/cmm 4.11 3.7 - 5.2 x10/cmm    Hemoglobin 12.7 11.8 - 15.5 g/dl    Hematocrit 38.7 35 - 46 %    MCV 94.1 80 - 100 fL    MCH 31.1 (A) 27.0 - 31.0 pg    MCHC 33.0 33.0 - 37.0 g/dL    Platelet Count 329 140 - 450 K/uL     ASSESSMENT/PLAN:                                                        Muriel was seen today for hypertension and refill request.    Diagnoses and all orders for this visit:    Hypertension goal BP (blood pressure) < 140/90  -     Basic Metabolic Panel (8) ([facility])  -     Microalbumin ([facility]); Future  -     hydrochlorothiazide 12.5 MG TABS tablet; Take 1 tablet (12.5 mg) by mouth daily  Continue atenolol and add HCTZ. Recheck blood pressure in one month.   Strongly recommend she stop smoking.     Glucose intolerance (impaired glucose tolerance)  -     Basic Metabolic Panel (8) ([facility])  -     Hemoglobin A1C ([facility])  -     Microalbumin ([facility]); Future  Recheck A1c today, recommend low simple sugar diet.    Vitamin D deficiency  -     Verify adequate replacement dose.   -     Vitamin D  25-Hydroxy ([facility])      Encounter for HCV screening test for low risk patient  -     HCV Antibody ([facility])    Breast mass, highly suspicious for breast cancer  -     CBC with Diff/Plt ([facility])  -     MAMMO - Diagnostic Digital Bilateral (FUTURE/[facility]); Future  Anticipate biopsy at time of imaging.       There are no  Patient Instructions on file for this visit.    The following health maintenance items are reviewed in Epic and correct as of today:  Health Maintenance   Topic Date Due     MICROALBUMIN Q1 YEAR  [birth year]30/1961     HEPATITIS C SCREENING  11/30/1979     MAMMO SCREEN Q2 YR (SYSTEM ASSIGNED)  11/30/2011     ADVANCE DIRECTIVE PLANNING Q5 YRS  11/30/2016     BMP Q1 YR  04/07/2018     LIPID MONITORING Q1 YEAR  04/07/2018     INFLUENZA VACCINE (SYSTEM ASSIGNED)  09/01/2018     TETANUS IMMUNIZATION (SYSTEM ASSIGNED)  07/19/2020     COLON CANCER SCREEN (SYSTEM ASSIGNED)  03/18/2026       Isabel Landin MD  [facility]  Family Practice  [facility]  [phone]    For any issues my office # is [phone]

## 2018-03-23 LAB
BUN SERPL-MCNC: 21 MG/DL (ref 6–24)
BUN/CREATININE RATIO: 24 (ref 9–23)
CALCIUM SERPL-MCNC: 9.5 MG/DL (ref 8.7–10.2)
CHLORIDE SERPLBLD-SCNC: 100 MMOL/L (ref 96–106)
CREAT SERPL-MCNC: 0.88 MG/DL (ref 0.57–1)
EGFR IF AFRICN AM: 85 ML/MIN/1.73
EGFR IF NONAFRICN AM: 74 ML/MIN/1.73
GLUCOSE SERPL-MCNC: 103 MG/DL (ref 65–99)
HBA1C MFR BLD: 5.8 % (ref 4.8–5.6)
HCV AB SERPL QL IA: <0.1 S/CO RATIO (ref 0–0.9)
POTASSIUM SERPL-SCNC: 4.8 MMOL/L (ref 3.5–5.2)
SODIUM SERPL-SCNC: 139 MMOL/L (ref 134–144)
TOTAL CO2: 25 MMOL/L (ref 18–28)
VITAMIN D, 25-HYDROXY: 30.3 NG/ML (ref 30–100)

## 2018-03-26 ENCOUNTER — TELEPHONE (OUTPATIENT)
Dept: FAMILY MEDICINE | Facility: CLINIC | Age: 58
End: 2018-03-26

## 2018-03-26 DIAGNOSIS — R79.89 LOW VITAMIN D LEVEL: Primary | ICD-10-CM

## 2018-03-26 RX ORDER — CHOLECALCIFEROL (VITAMIN D3) 50 MCG
2000 TABLET ORAL DAILY
Qty: 90 TABLET | Refills: 3 | Status: ON HOLD | OUTPATIENT
Start: 2018-03-26 | End: 2018-04-16

## 2018-03-26 NOTE — TELEPHONE ENCOUNTER
"Called patient with lab results.  Informed her of normal Hep C and normal blood counts.  Patient still has very mild glucose intolerance and told to watch her sugars.  Vit D level is low and patient says she has not taken Vit  D for quite a while since she ran out.  She wants called to her pharmacy as it is cheaper than OTC for her.  Sent in Rx to [facility].   Patient also needs to make an appointment for a diagnostic mammo and US.  Per Dr Landin I asked patient if she had scheduled yet and she says they did call her to schedule and she will call back to schedule. She states she is \"on top of it\".     "

## 2018-03-27 NOTE — TELEPHONE ENCOUNTER
Called  Breast center to see when patient is scheduled for diagnostic mammo and US.  Appointment is Wed March 28th at 1:00 pm.

## 2018-03-28 ENCOUNTER — HOSPITAL ENCOUNTER (OUTPATIENT)
Dept: MAMMOGRAPHY | Facility: CLINIC | Age: 58
Discharge: HOME | End: 2018-03-28
Attending: FAMILY MEDICINE | Admitting: FAMILY MEDICINE
Payer: COMMERCIAL

## 2018-03-28 ENCOUNTER — HOSPITAL ENCOUNTER (OUTPATIENT)
Dept: MAMMOGRAPHY | Facility: CLINIC | Age: 58
End: 2018-03-28
Attending: FAMILY MEDICINE
Payer: COMMERCIAL

## 2018-03-28 DIAGNOSIS — N63.0 BREAST MASS: ICD-10-CM

## 2018-03-28 PROCEDURE — 76642 ULTRASOUND BREAST LIMITED: CPT | Mod: LT

## 2018-03-28 PROCEDURE — 77066 DX MAMMO INCL CAD BI: CPT

## 2018-03-29 ENCOUNTER — HOSPITAL ENCOUNTER (OUTPATIENT)
Dept: MAMMOGRAPHY | Facility: CLINIC | Age: 58
End: 2018-03-29
Attending: FAMILY MEDICINE
Payer: COMMERCIAL

## 2018-03-29 ENCOUNTER — HOSPITAL ENCOUNTER (OUTPATIENT)
Dept: MAMMOGRAPHY | Facility: CLINIC | Age: 58
Discharge: HOME | End: 2018-03-29
Attending: FAMILY MEDICINE | Admitting: FAMILY MEDICINE
Payer: COMMERCIAL

## 2018-03-29 DIAGNOSIS — N63.0 BREAST MASS: ICD-10-CM

## 2018-03-29 PROCEDURE — 88360 TUMOR IMMUNOHISTOCHEM/MANUAL: CPT | Performed by: FAMILY MEDICINE

## 2018-03-29 PROCEDURE — 88341 IMHCHEM/IMCYTCHM EA ADD ANTB: CPT | Performed by: FAMILY MEDICINE

## 2018-03-29 PROCEDURE — 25000125 ZZHC RX 250: Performed by: FAMILY MEDICINE

## 2018-03-29 PROCEDURE — 88305 TISSUE EXAM BY PATHOLOGIST: CPT | Mod: 26 | Performed by: FAMILY MEDICINE

## 2018-03-29 PROCEDURE — 88377 M/PHMTRC ALYS ISHQUANT/SEMIQ: CPT | Performed by: PATHOLOGY

## 2018-03-29 PROCEDURE — 88360 TUMOR IMMUNOHISTOCHEM/MANUAL: CPT | Mod: 26 | Performed by: FAMILY MEDICINE

## 2018-03-29 PROCEDURE — 27210206 US BREAST BIOPSY CORE NEEDLE LEFT

## 2018-03-29 PROCEDURE — 27210206 US BIOPSY CORE LYMPH NODE ADDITIONAL NODE

## 2018-03-29 PROCEDURE — 00000158 ZZHCL STATISTIC H-FISH PROCESS B/S: Performed by: FAMILY MEDICINE

## 2018-03-29 PROCEDURE — 00000159 ZZHCL STATISTIC H-SEND OUTS PREP: Performed by: FAMILY MEDICINE

## 2018-03-29 PROCEDURE — 88342 IMHCHEM/IMCYTCHM 1ST ANTB: CPT | Mod: 26 | Performed by: FAMILY MEDICINE

## 2018-03-29 PROCEDURE — 88305 TISSUE EXAM BY PATHOLOGIST: CPT | Performed by: FAMILY MEDICINE

## 2018-03-29 PROCEDURE — 40000986 MA POST PROCEDURE LEFT

## 2018-03-29 PROCEDURE — 88341 IMHCHEM/IMCYTCHM EA ADD ANTB: CPT | Mod: 26 | Performed by: FAMILY MEDICINE

## 2018-03-29 PROCEDURE — 88342 IMHCHEM/IMCYTCHM 1ST ANTB: CPT | Performed by: FAMILY MEDICINE

## 2018-03-29 RX ADMIN — LIDOCAINE HYDROCHLORIDE 5 ML: 10 INJECTION, SOLUTION INFILTRATION; PERINEURAL at 14:15

## 2018-04-02 ENCOUNTER — TELEPHONE (OUTPATIENT)
Dept: ONCOLOGY | Facility: CLINIC | Age: 58
End: 2018-04-02

## 2018-04-02 ENCOUNTER — TELEPHONE (OUTPATIENT)
Dept: MAMMOGRAPHY | Facility: CLINIC | Age: 58
End: 2018-04-02

## 2018-04-02 LAB
COPATH REPORT: NORMAL
COPATH REPORT: NORMAL

## 2018-04-02 NOTE — TELEPHONE ENCOUNTER
Patient called regarding her appointment with Dr. Mckeon tomorrow. Patient is area of clinic location and clinic parking policy. Jessica Garcia RN,BSN,OCN

## 2018-04-02 NOTE — TELEPHONE ENCOUNTER
MALIGNANT path  Pathology report reviewed with our breast radiologist Dr. Víctor Hurd.  I phoned and informed patient of results showing Left Breast Invasive Mammary Carcinoma, grade 1 and Metastatic Carcinoma of the Left Axilla Lymph Node.  Patient states no problems with biopsy site.  Recommended follow up is Surgical Consult.  Surgical Consult has been arranged with Dr Rodney Umana on 4/5/18 at 2:45p.m., with a check in at 2:15p.m.  I also have patient scheduled to see Medical Oncologist- Dr. Patience Mckeon @ [facility] on 4/3/18 @ 1:00 p.m. For consultation.   Patient has directions and phone numbers.  Questions were answered and I explained my role as Nurse Navigator in assisting her with appointments, resources and social support. New diagnosis information packet will be available at consult. I will follow up with the patient. She has my phone number if she has further questions. Ordering provider notified.  Patient verbalized understanding and agrees with the plan of care.  RHONA StoreyN, RN

## 2018-04-03 ENCOUNTER — HOSPITAL ENCOUNTER (OUTPATIENT)
Facility: CLINIC | Age: 58
Setting detail: SPECIMEN
Discharge: HOME | End: 2018-04-03
Attending: INTERNAL MEDICINE | Admitting: INTERNAL MEDICINE
Payer: COMMERCIAL

## 2018-04-03 ENCOUNTER — ONCOLOGY VISIT (OUTPATIENT)
Dept: ONCOLOGY | Facility: CLINIC | Age: 58
End: 2018-04-03
Attending: INTERNAL MEDICINE
Payer: COMMERCIAL

## 2018-04-03 VITALS
HEART RATE: 77 BPM | BODY MASS INDEX: 18.42 KG/M2 | WEIGHT: 104 LBS | TEMPERATURE: 98.5 F | RESPIRATION RATE: 16 BRPM | DIASTOLIC BLOOD PRESSURE: 83 MMHG | OXYGEN SATURATION: 99 % | SYSTOLIC BLOOD PRESSURE: 170 MMHG

## 2018-04-03 DIAGNOSIS — C50.212 MALIGNANT NEOPLASM OF UPPER-INNER QUADRANT OF LEFT BREAST IN FEMALE, ESTROGEN RECEPTOR POSITIVE (H): Primary | ICD-10-CM

## 2018-04-03 DIAGNOSIS — Z17.0 MALIGNANT NEOPLASM OF UPPER-INNER QUADRANT OF LEFT BREAST IN FEMALE, ESTROGEN RECEPTOR POSITIVE (H): Primary | ICD-10-CM

## 2018-04-03 LAB
ALBUMIN SERPL-MCNC: 4.3 G/DL (ref 3.4–5)
ALP SERPL-CCNC: 82 U/L (ref 40–150)
ALT SERPL W P-5'-P-CCNC: 23 U/L (ref 0–50)
ANION GAP SERPL CALCULATED.3IONS-SCNC: 6 MMOL/L (ref 3–14)
AST SERPL W P-5'-P-CCNC: 22 U/L (ref 0–45)
BASOPHILS # BLD AUTO: 0 10E9/L (ref 0–0.2)
BASOPHILS NFR BLD AUTO: 0.5 %
BILIRUB SERPL-MCNC: 0.3 MG/DL (ref 0.2–1.3)
BUN SERPL-MCNC: 25 MG/DL (ref 7–30)
CALCIUM SERPL-MCNC: 9.2 MG/DL (ref 8.5–10.1)
CHLORIDE SERPL-SCNC: 103 MMOL/L (ref 94–109)
CO2 SERPL-SCNC: 27 MMOL/L (ref 20–32)
CREAT SERPL-MCNC: 0.67 MG/DL (ref 0.52–1.04)
DIFFERENTIAL METHOD BLD: NORMAL
EOSINOPHIL # BLD AUTO: 0.1 10E9/L (ref 0–0.7)
EOSINOPHIL NFR BLD AUTO: 1.2 %
ERYTHROCYTE [DISTWIDTH] IN BLOOD BY AUTOMATED COUNT: 13.4 % (ref 10–15)
GFR SERPL CREATININE-BSD FRML MDRD: >90 ML/MIN/1.7M2
GLUCOSE SERPL-MCNC: 116 MG/DL (ref 70–99)
HCT VFR BLD AUTO: 40.7 % (ref 35–47)
HGB BLD-MCNC: 14 G/DL (ref 11.7–15.7)
IMM GRANULOCYTES # BLD: 0 10E9/L (ref 0–0.4)
IMM GRANULOCYTES NFR BLD: 0.3 %
LYMPHOCYTES # BLD AUTO: 1.9 10E9/L (ref 0.8–5.3)
LYMPHOCYTES NFR BLD AUTO: 24.9 %
MCH RBC QN AUTO: 32 PG (ref 26.5–33)
MCHC RBC AUTO-ENTMCNC: 34.4 G/DL (ref 31.5–36.5)
MCV RBC AUTO: 93 FL (ref 78–100)
MONOCYTES # BLD AUTO: 0.5 10E9/L (ref 0–1.3)
MONOCYTES NFR BLD AUTO: 6.2 %
NEUTROPHILS # BLD AUTO: 5.2 10E9/L (ref 1.6–8.3)
NEUTROPHILS NFR BLD AUTO: 66.9 %
NRBC # BLD AUTO: 0 10*3/UL
NRBC BLD AUTO-RTO: 0 /100
PLATELET # BLD AUTO: 338 10E9/L (ref 150–450)
POTASSIUM SERPL-SCNC: 3.6 MMOL/L (ref 3.4–5.3)
PROT SERPL-MCNC: 7.7 G/DL (ref 6.8–8.8)
RBC # BLD AUTO: 4.38 10E12/L (ref 3.8–5.2)
SODIUM SERPL-SCNC: 136 MMOL/L (ref 133–144)
WBC # BLD AUTO: 7.8 10E9/L (ref 4–11)

## 2018-04-03 PROCEDURE — 99205 OFFICE O/P NEW HI 60 MIN: CPT | Performed by: INTERNAL MEDICINE

## 2018-04-03 PROCEDURE — G0463 HOSPITAL OUTPT CLINIC VISIT: HCPCS

## 2018-04-03 PROCEDURE — 85025 COMPLETE CBC W/AUTO DIFF WBC: CPT | Performed by: INTERNAL MEDICINE

## 2018-04-03 PROCEDURE — 36415 COLL VENOUS BLD VENIPUNCTURE: CPT

## 2018-04-03 PROCEDURE — 80053 COMPREHEN METABOLIC PANEL: CPT | Performed by: INTERNAL MEDICINE

## 2018-04-03 ASSESSMENT — PAIN SCALES - GENERAL: PAINLEVEL: NO PAIN (0)

## 2018-04-03 NOTE — PROGRESS NOTES
"Oncology Rooming Note    April 3, 2018 12:54 PM   Muriel Diaz is a 57 year old female who presents for:    Chief Complaint   Patient presents with     Oncology Clinic Visit     Initial Vitals: /83 (BP Location: Right arm, Patient Position: Chair, Cuff Size: Adult Regular)  Pulse 77  Temp 98.5  F (36.9  C) (Oral)  Resp 16  Wt 47.2 kg (104 lb)  SpO2 99%  BMI 18.42 kg/m2 Estimated body mass index is 18.42 kg/(m^2) as calculated from the following:    Height as of 3/21/18: 1.6 m (5' 3\").    Weight as of this encounter: 47.2 kg (104 lb). Body surface area is 1.45 meters squared.  No Pain (0) Comment: Data Unavailable   No LMP recorded. Patient has had a hysterectomy.  Allergies reviewed: Yes  Medications reviewed: Yes    Medications: Medication refills not needed today.  Pharmacy name entered into [employer]:    [facility] [address]  [id number] - [place], [place] - [address]    Clinical concerns: None     5 minutes for nursing intake (face to face time)     Mabel Geronimo CMA            Medical Assistant Note:  Muriel Diaz presents today for labs.    Patient seen by provider today: Yes.   present during visit today: Not Applicable.    Concerns: No Concerns.    Procedure:  Lab draw site: RAC, Needle type: BF, Gauge: 21.    Post Assessment:  Labs drawn without difficulty: Yes.    Discharge Plan:  Departure Mode: Ambulatory.    Face to Face Time: 5 minutes.    Mabel Geronimo MA            "

## 2018-04-03 NOTE — LETTER
4/3/2018         RE: Muriel Diaz  [address]  [facility] [address]        Dear Colleague,    Thank you for referring your patient, Muriel Diaz, to the [facility]. Please see a copy of my visit note below.    [facility]    Hematology/Oncology New Patient Note      Today's Date: 04/03/18    Reason for Consult: left sided breast cancer      HISTORY OF PRESENT ILLNESS: Muriel Diaz is a 57 year old female with PMHx of HTN, degenerative joint disease, history of cervical spine surgery, who presents with left-sided breast cancer.  She says that she has felt her left breast was abnormal for some time.  She is unable to quantitate how long.  She says that after her back surgery in 2012, she had lost weight and then gained weight, so she thought the breast changes were just a part of that.  She saw her PCP and was sent for mammogram and ultrasound, which found a lesion at the 11:00 position of the left breast, measuring 1.9 x 2.9 x 1.0 cm, with left nipple retraction.  Left axilla showed an enlarged lymph node of 0.7 x 0.6 cm.  Biopsy of the left breast mass showed invasive mammary carcinoma with lobular features (favor invasive lobular carcinoma), grade 1 of 3, ER positive (95%, strong), MS positive (50%, strong), HER-2 negative.  The left axilla biopsy was also positive for carcinoma, also ER positive (95%, strong) and MS positive (50%, moderate), HER-2 negative.    Muriel has never had a mammogram in the past.  She does not know much about her family history and is unaware of any cancers in her family.  She has never been pregnant.  She had a hysterectomy in the 1990's.  She still has her ovaries.  She says that she has undergone menopause but is unsure when, likely years ago.      She smokes cigarettes, half a pack a day for many years; she is unable to quantify how many.  She drinks alcohol occasionally.  She denies illicit drug use.  She is retired.  She  used to to work in  at large companies.  She lives in [place] with her boyfriend.      She says that otherwise has been feeling fine.  She has had anxiety and hard to sleep at night due to her new diagnosis.  She denies nausea/vomiting or diarrhea/constipation or pain.  She says that she stays active.  She has chronic back pain, but she doesn't feel that it is any different than her usual chronic pain.        REVIEW OF SYSTEMS:   14 point ROS was reviewed and is negative other than as noted above in HPI.       HOME MEDICATIONS:  Current Outpatient Prescriptions   Medication Sig Dispense Refill     Cholecalciferol (VITAMIN D) 2000 UNITS tablet Take 2,000 Units by mouth daily 90 tablet 3     hydrochlorothiazide 12.5 MG TABS tablet Take 1 tablet (12.5 mg) by mouth daily 90 tablet 1     atenolol (TENORMIN) 25 MG tablet TAKE ONE TABLET BY MOUTH ONE TIME DAILY  30 tablet 1     carisoprodol (SOMA) 350 MG tablet Take 1 tablet (350 mg) by mouth 4 times daily as needed for muscle spasms OK to use outside pharmacy. Future prescriptions by in-person visit only.  Please only use [employer] brand 120 tablet 0         ALLERGIES:  Allergies   Allergen Reactions     Amitriptyline Other (See Comments)     nightmares         PAST MEDICAL HISTORY:  Past Medical History:   Diagnosis Date     Degeneration of cervical intervertebral disc      Degeneration of lumbar or lumbosacral intervertebral disc      Hypertension      PONV (postoperative nausea and vomiting)          PAST SURGICAL HISTORY:  Past Surgical History:   Procedure Laterality Date     BACK SURGERY  2001    lumbar fusion, cervical discectomy     FUSION CERVICAL ANTERIOR THREE+ LEVELS  5/1/2012    Procedure:FUSION CERVICAL ANTERIOR THREE+ LEVELS; Surgeon:SARAH FRANCO; Location:[facility] OR     FUSION CERVICAL POSTERIOR THREE+ LEVELS  5/1/2012    Procedure:FUSION CERVICAL POSTERIOR THREE+ LEVELS; Posterior Cervical decompression and fusion C4-7, Anterior  Cervical decompression and fusion C4-7 (c-arm), (herb table with abraham, yina oasis, yina aviator, Vitoss foam packing strip, impulse monitoring,); Surgeon:SARAH FRANCO; Location:[facility] OR     GYN SURGERY       HYSTERECTOMY VAGINAL      Judy Aceves OB Gyn specilist     HYSTERECTOMY, PAP NO LONGER INDICATED           SOCIAL HISTORY:  Social History     Social History     Marital status: Single     Spouse name: N/A     Number of children: 0     Years of education: N/A     Occupational History      [employer]      Social History Main Topics     Smoking status: Current Every Day Smoker     Packs/day: 0.50     Types: Cigarettes     Smokeless tobacco: Never Used     Alcohol use 0.6 oz/week     1 Standard drinks or equivalent per week      Comment: once a week     Drug use: No     Sexual activity: Not Currently     Partners: Male     Other Topics Concern     Parent/Sibling W/ Cabg, Mi Or Angioplasty Before 65f 55m? No     Social History Narrative         FAMILY HISTORY:  Family History   Problem Relation Age of Onset     Pancreatitis Mother      Substance Abuse Mother      Unknown/Adopted Father          PHYSICAL EXAM:  Vital signs:  /83 (BP Location: Right arm, Patient Position: Chair, Cuff Size: Adult Regular)  Pulse 77  Temp 98.5  F (36.9  C) (Oral)  Resp 16  Wt 47.2 kg (104 lb)  SpO2 99%  BMI 18.42 kg/m2   ECO  GENERAL/CONSTITUTIONAL: No acute distress.  EYES: No scleral icterus.  LYMPH: No anterior cervical, posterior cervical, supraclavicular, or axillary adenopathy.   RESPIRATORY: Clear to auscultation bilaterally. No crackles or wheezing.   CARDIOVASCULAR: Regular rate and rhythm without murmurs, gallops, or rubs.  GASTROINTESTINAL: No tenderness. The patient has normal bowel sounds. No guarding.  No distention.  BREAST: Right-no palpable mass, discharge, rash, or axillary lymphadenopathy.  Left-palpable mass at the left upper inner quadrant, with surround bruising and edema  from recent biopsy.   MUSCULOSKELETAL: Warm and well-perfused, no cyanosis, clubbing, or edema.  NEUROLOGIC: Alert, oriented, answers questions appropriately.  INTEGUMENTARY: No rashes or jaundice.  GAIT: Steady, does not use assistive device      LABS:  Pending.      PATHOLOGY:  3/29/18:  FINAL DIAGNOSIS:   A: Breast, left, 11:00, 2.0 cm from nipple, ultrasound guided needle core   biopsy: Invasive mammary carcinoma   with lobular features (favor invasive lobular carcinoma), Lisa grade    1 (of 3).     ER: Positive; 95% nuclear staining with strong intensity.   RI: Positive; 50% nuclear staining with strong intensity.   Fluorescence in situ hybridization (FISH) studies for HER2 are pending and    will be reported separately when   available.     B: Breast, left, axilla, ultrasound guided needle core biopsy: Lymph node   positive for metastatic carcinoma.     ER: Positive; 95% nuclear staining with strong intensity.   RI: Positive; 50% nuclear staining with moderate intensity.   Fluorescence in situ hybridization (FISH) studies for HER2 are pending and    will be reported separately when   available.     INTERPRETATION:   No evidence of amplification of the HER2 gene was detected in either Block    A1 (HER2/EFREM-17 ratio 1.5) or Block   B1 (HER2/EFREM-17 ratio 1.6).      IMAGING:  Mammogram and ultrasound 3/28/18:  FINDINGS:  Standard bilateral diagnostic views were obtained,  including tomosynthesis. Marker was placed on the left upper breast to  denote the site of the palpable lump; deep to the marker there is a  mass measuring approximately 2 cm. The mass is associated with  retraction of the left nipple. There are no associated  microcalcifications. No suspicious findings in the contralateral right  breast.     Further evaluation with targeted left breast ultrasound shows a  corresponding hypoechoic lesion at the 11:00 position, 2 cm from the  nipple, measuring 1.9 x 2.9 x 1.0 cm. Survey of the axilla shows  an  enlarged lymph node measuring 0.7 x 0.6 cm.         ASSESSMENT/PLAN:  Muriel Diaz is a 57 year old female with:    1) Left breast cancer of the upper inner quadrant: Mammogram and ultrasound found a lesion measuring 1.9 x 2.9 x 1.0 cm, with left nipple retraction.  Left axilla showed an enlarged lymph node of 0.7 x 0.6 cm.  Biopsy of the left breast mass showed invasive mammary carcinoma with lobular features (favor invasive lobular carcinoma), grade 1 of 3, ER positive, CO positive, HER-2 negative.  The left axilla biopsy was also positive for carcinoma, also ER positive and CO positive, HER-2 negative.    I reviewed the imaging and pathology results with Muriel.  With the lobular pathology, and mass seems larger on exam than the ultrasound, I will order MRI breast for further evaluation.  In addition, with the lymph node involvement and history of back pain (although this is chronic and feels like her usual back pain), I will also order a PET-CT for staging.      If there is no evidence of distant metastases, she will likely go forward with surgery.  With this pathology, the tumor likely wouldn't respond well to neoadjuvant chemotherapy.  She is meeting with surgery later this week and she will discuss surgical options then.    -labs today: CBC, CMP  -schedule MRI breast  -schedule PET-CT  -will call her with results  -she has surgery consult on 4/5/18  -follow-up depending on her staging evaluation and surgery evaluation    2) Smoking: She smokes half a pack a day.    -I advised smoking cessation  -she will see her PCP    3) Hypertension: She is on medications for this.  She says that she has been anxious and nervous and has had difficulty sleeping at night.  -follows with PCP    4) Chronic back pain: s/p history of back surgeries  -she takes soma  -follows with PCP      I spent a total of 60 minutes with the patient, with over >50% of the time in counseling and/or coordination of care.       Patience  "MD Mike  Hematology/Oncology  [facility]      Oncology Rooming Note    April 3, 2018 12:54 PM   Muriel Diaz is a 57 year old female who presents for:    Chief Complaint   Patient presents with     Oncology Clinic Visit     Initial Vitals: /83 (BP Location: Right arm, Patient Position: Chair, Cuff Size: Adult Regular)  Pulse 77  Temp 98.5  F (36.9  C) (Oral)  Resp 16  Wt 47.2 kg (104 lb)  SpO2 99%  BMI 18.42 kg/m2 Estimated body mass index is 18.42 kg/(m^2) as calculated from the following:    Height as of 3/21/18: 1.6 m (5' 3\").    Weight as of this encounter: 47.2 kg (104 lb). Body surface area is 1.45 meters squared.  No Pain (0) Comment: Data Unavailable   No LMP recorded. Patient has had a hysterectomy.  Allergies reviewed: Yes  Medications reviewed: Yes    Medications: Medication refills not needed today.  Pharmacy name entered into [employer]:    [id number] - [place], [place] - [address]    Clinical concerns: None     5 minutes for nursing intake (face to face time)     Mabel Geronimo CMA            Medical Assistant Note:  Muriel Diaz presents today for labs.    Patient seen by provider today: Yes.   present during visit today: Not Applicable.    Concerns: No Concerns.    Procedure:  Lab draw site: RAC, Needle type: BF, Gauge: 21.    Post Assessment:  Labs drawn without difficulty: Yes.    Discharge Plan:  Departure Mode: Ambulatory.    Face to Face Time: 5 minutes.    Mabel Geronimo MA              Again, thank you for allowing me to participate in the care of your patient.        Sincerely,        Patience Mckeon MD    "

## 2018-04-03 NOTE — MR AVS SNAPSHOT
After Visit Summary   4/3/2018    Muriel Diaz    MRN: [id number]           Patient Information     Date Of Birth          1960        Visit Information        Provider Department      4/3/2018 1:00 PM Patience Mckeon MD [facility]        Today's Diagnoses     Malignant neoplasm of upper-inner quadrant of left breast in female, estrogen receptor positive (H)    -  1      Care Instructions    -labs today  -schedule MRI breast this week  -schedule PET-CT this week or early next week          Follow-ups after your visit        Your next 10 appointments already scheduled     Apr 05, 2018  2:45 PM CDT   New Visit with Rodney Umana MD   [facility] ([facility])    [address] [id number] [phone]            Apr 09, 2018  9:15 AM CDT   (Arrive by 9:00 AM)   PE NPET ONCOLOGY (EYES TO THIGHS) with SHPETM1   [facility] PET CT ([facility] [place] [facility])    [address] [facility] [id number] [phone]           Tell your doctor:   If there is any chance you may be pregnant or if you are breastfeeding.   If you have problems lying in small spaces (claustrophobia). If you do, your doctor may give you medicine to help you relax. If you have diabetes:   Have your exam early in the morning. Your blood glucose will go up as the day goes by.   Your glucose level must be 180 or less at the start of the exam. Please take any medicines you need to ensure this blood glucose level. 24 hours before your scan: Don t do any heavy exercise. (No jogging, aerobics or other workouts.) Exercise will make your pictures less accurate. 6 hours before your scan:   Stop all food and liquids (except water).   Do not chew gum or suck on mints.   If you need to take medicine with food, you may take it with a few crackers.  Please call your Imaging Department at your exam  site with any questions.            Apr 09, 2018 12:30 PM CDT   (Arrive by 12:15 PM)   MR BREAST BILATERAL W/O & W CONTRAST with SHMRP1   [facility] ([facility])    [address] [place] [id number] [phone]           Take your medicines as usual, unless your doctor tells you not to. Bring a list of your current medicines to your exam (including vitamins, minerals and over-the-counter drugs).  The timing of your exam may depend on the start of your last period. If you re in menopause, you may have the exam anytime.  Please bring any previous mammograms or breast MRIs from other facilities to the MRI dept. Do not mail these items to us.   You will have IV contrast for this exam.  You do not need to do anything special to prepare.  The MRI machine uses a strong magnet. Please wear clothes without metal (snaps, zippers). A sweatsuit works well, or we may give you a hospital gown.  Please remove any body piercings and hair extensions before you arrive. You will also remove watches, jewelry, hairpins, wallets, dentures, partial dental plates and hearing aids. You may wear contact lenses, and you may be able to wear your rings. We have a safe place to keep your personal items, but it is safer to leave them at home.  **IMPORTANT** THE INSTRUCTIONS BELOW ARE ONLY FOR THOSE PATIENTS WHO HAVE BEEN PRESCRIBED SEDATION OR GENERAL ANESTHESIA DURING THEIR MRI PROCEDURE:  IF YOUR DOCTOR PRESCRIBED ORAL SEDATION (take medicine to help you relax during your exam):   You must get the medicine from your doctor (oral medication) before you arrive. Bring the medicine to the exam. Do not take it at home. You ll be told when to take it upon arriving for your exam.   Arrive one hour early. Bring someone who can take you home after the test. Your medicine will make you sleepy. After the exam, you may not drive, take a bus or take a taxi by yourself.  IF YOUR DOCTOR PRESCRIBED IV SEDATION:    Arrive one hour early. Bring someone who can take you home after the test. Your medicine will make you sleepy. After the exam, you may not drive, take a bus or take a taxi by yourself.   No eating 6 hours before your exam. You may have clear liquids up until 4 hours before your exam. (Clear liquids include water, clear tea, black coffee and fruit juice without pulp.)  IF YOUR DOCTOR PRESCRIBED ANESTHESIA (be asleep for your exam):   Arrive 1 1/2 hours early. Bring someone who can take you home after the test. You may not drive, take a bus or take a taxi by yourself.   No eating 8 hours before your exam. You may have clear liquids up until 4 hours before your exam. (Clear liquids include water, clear tea, black coffee and fruit juice without pulp.)   You will spend four to five hours in the recovery room.  If you have any questions, please contact your Imaging Department exam site.              Future tests that were ordered for you today     Open Future Orders        Priority Expected Expires Ordered    PET Oncology (Eyes to Thighs) Routine  4/3/2019 4/3/2018    MR Breast Bilateral w/o & w Contrast Routine  4/3/2019 4/3/2018            Who to contact     If you have questions or need follow up information about today's clinic visit or your schedule please contact [facility] directly at [phone].  Normal or non-critical lab and imaging results will be communicated to you by MyChart, letter or phone within 4 business days after the clinic has received the results. If you do not hear from us within 7 days, please contact the clinic through [employer] or phone. If you have a critical or abnormal lab result, we will notify you by phone as soon as possible.  Submit refill requests through [employer] or call your pharmacy and they will forward the refill request to us. Please allow 3 business days for your refill to be completed.          Additional Information About Your Visit        [employer] Information      "phorus lets you send messages to your doctor, view your test results, renew your prescriptions, schedule appointments and more. To sign up, go to [email or web address] . Click on \"Log in\" on the left side of the screen, which will take you to the Welcome page. Then click on \"Sign up Now\" on the right side of the page.     You will be asked to enter the access code listed below, as well as some personal information. Please follow the directions to create your username and password.     Your access code is: [email or web address]  Expires: 2018  9:48 AM     Your access code will  in 90 days. If you need help or a new code, please call your [place] clinic or [phone].        Care EveryWhere ID     This is your Care EveryWhere ID. This could be used by other organizations to access your [place] medical records  [id number]        Your Vitals Were     Pulse Temperature Respirations Pulse Oximetry BMI (Body Mass Index)       77 98.5  F (36.9  C) (Oral) 16 99% 18.42 kg/m2        Blood Pressure from Last 3 Encounters:   18 170/83   18 160/82   17 138/60    Weight from Last 3 Encounters:   18 47.2 kg (104 lb)   18 47.4 kg (104 lb 6.4 oz)   17 45.4 kg (100 lb)              We Performed the Following     CBC with platelets differential     Comprehensive metabolic panel          Today's Medication Changes          These changes are accurate as of 4/3/18  1:47 PM.  If you have any questions, ask your nurse or doctor.               Stop taking these medicines if you haven't already. Please contact your care team if you have questions.     omeprazole 20 MG CR capsule   Commonly known as:  priLOSEC                    Primary Care Provider Office Phone # Fax #    Isabel Landin [phone] [address] [place] [address]        Equal Access to Services     FRANCISCA HUNT AH: jaclyn Higuera, ryanne aranda " kvng abernathydarlene ravinderkarla thomasaaroberto ah. [facility] [phone].    ATENCIÓN: Si ivette marshall, tiene a chowdary disposición servicios gratuitos de asistencia lingüística. Concha al [phone].    We comply with applicable federal civil rights laws and [place] laws. We do not discriminate on the basis of race, color, national origin, age, disability, sex, sexual orientation, or gender identity.            Thank you!     Thank you for choosing [facility]  for your care. Our goal is always to provide you with excellent care. Hearing back from our patients is one way we can continue to improve our services. Please take a few minutes to complete the written survey that you may receive in the mail after your visit with us. Thank you!             Your Updated Medication List - Protect others around you: Learn how to safely use, store and throw away your medicines at www.disposemymeds.org.          This list is accurate as of 4/3/18  1:47 PM.  Always use your most recent med list.                   Brand Name Dispense Instructions for use Diagnosis    atenolol 25 MG tablet    TENORMIN    30 tablet    TAKE ONE TABLET BY MOUTH ONE TIME DAILY    Hypertension goal BP (blood pressure) < 140/90       carisoprodol 350 MG tablet    SOMA    120 tablet    Take 1 tablet (350 mg) by mouth 4 times daily as needed for muscle spasms OK to use outside pharmacy. Future prescriptions by in-person visit only. Please only use [employer] brand    SI (sacroiliac) joint dysfunction       hydrochlorothiazide 12.5 MG Tabs tablet     90 tablet    Take 1 tablet (12.5 mg) by mouth daily    Hypertension goal BP (blood pressure) < 140/90       vitamin D 2000 UNITS tablet     90 tablet    Take 2,000 Units by mouth daily    Low vitamin D level

## 2018-04-03 NOTE — PATIENT INSTRUCTIONS
-labs today  -schedule MRI breast this week  Scheduled/Janice  -schedule PET-CT this week or early next week  Scheduled/janice      AVS printed & given to patient/tomas

## 2018-04-03 NOTE — PROGRESS NOTES
[facility]    Hematology/Oncology New Patient Note      Today's Date: 04/03/18    Reason for Consult: left sided breast cancer      HISTORY OF PRESENT ILLNESS: Muriel Diaz is a 57 year old female with PMHx of HTN, degenerative joint disease, history of cervical spine surgery, who presents with left-sided breast cancer.  She says that she has felt her left breast was abnormal for some time.  She is unable to quantitate how long.  She says that after her back surgery in 2012, she had lost weight and then gained weight, so she thought the breast changes were just a part of that.  She saw her PCP and was sent for mammogram and ultrasound, which found a lesion at the 11:00 position of the left breast, measuring 1.9 x 2.9 x 1.0 cm, with left nipple retraction.  Left axilla showed an enlarged lymph node of 0.7 x 0.6 cm.  Biopsy of the left breast mass showed invasive mammary carcinoma with lobular features (favor invasive lobular carcinoma), grade 1 of 3, ER positive (95%, strong), OR positive (50%, strong), HER-2 negative.  The left axilla biopsy was also positive for carcinoma, also ER positive (95%, strong) and OR positive (50%, moderate), HER-2 negative.    Muriel has never had a mammogram in the past.  She does not know much about her family history and is unaware of any cancers in her family.  She has never been pregnant.  She had a hysterectomy in the 1990's.  She still has her ovaries.  She says that she has undergone menopause but is unsure when, likely years ago.      She smokes cigarettes, half a pack a day for many years; she is unable to quantify how many.  She drinks alcohol occasionally.  She denies illicit drug use.  She is retired.  She used to to work in  at large companies.  She lives in [place] with her boyfriend.      She says that otherwise has been feeling fine.  She has had anxiety and hard to sleep at night due to her new diagnosis.  She denies  nausea/vomiting or diarrhea/constipation or pain.  She says that she stays active.  She has chronic back pain, but she doesn't feel that it is any different than her usual chronic pain.        REVIEW OF SYSTEMS:   14 point ROS was reviewed and is negative other than as noted above in HPI.       HOME MEDICATIONS:  Current Outpatient Prescriptions   Medication Sig Dispense Refill     Cholecalciferol (VITAMIN D) 2000 UNITS tablet Take 2,000 Units by mouth daily 90 tablet 3     hydrochlorothiazide 12.5 MG TABS tablet Take 1 tablet (12.5 mg) by mouth daily 90 tablet 1     atenolol (TENORMIN) 25 MG tablet TAKE ONE TABLET BY MOUTH ONE TIME DAILY  30 tablet 1     carisoprodol (SOMA) 350 MG tablet Take 1 tablet (350 mg) by mouth 4 times daily as needed for muscle spasms OK to use outside pharmacy. Future prescriptions by in-person visit only.  Please only use popchips brand 120 tablet 0         ALLERGIES:  Allergies   Allergen Reactions     Amitriptyline Other (See Comments)     nightmares         PAST MEDICAL HISTORY:  Past Medical History:   Diagnosis Date     Degeneration of cervical intervertebral disc      Degeneration of lumbar or lumbosacral intervertebral disc      Hypertension      PONV (postoperative nausea and vomiting)          PAST SURGICAL HISTORY:  Past Surgical History:   Procedure Laterality Date     BACK SURGERY  2001    lumbar fusion, cervical discectomy     FUSION CERVICAL ANTERIOR THREE+ LEVELS  5/1/2012    Procedure:FUSION CERVICAL ANTERIOR THREE+ LEVELS; Surgeon:SARAH FRANCO; Location:[facility] OR     FUSION CERVICAL POSTERIOR THREE+ LEVELS  5/1/2012    Procedure:FUSION CERVICAL POSTERIOR THREE+ LEVELS; Posterior Cervical decompression and fusion C4-7, Anterior Cervical decompression and fusion C4-7 (c-arm), (herb table with jarad, yina oasis, yina aviator, Vitoss foam packing strip, impulse monitoring,); Surgeon:SARAH FRANCO; Location: OR     GYN SURGERY       HYSTERECTOMY  VAGINAL      Judy Aceves OB Gyn specilist     HYSTERECTOMY, PAP NO LONGER INDICATED           SOCIAL HISTORY:  Social History     Social History     Marital status: Single     Spouse name: N/A     Number of children: 0     Years of education: N/A     Occupational History      [employer]      Social History Main Topics     Smoking status: Current Every Day Smoker     Packs/day: 0.50     Types: Cigarettes     Smokeless tobacco: Never Used     Alcohol use 0.6 oz/week     1 Standard drinks or equivalent per week      Comment: once a week     Drug use: No     Sexual activity: Not Currently     Partners: Male     Other Topics Concern     Parent/Sibling W/ Cabg, Mi Or Angioplasty Before 65f 55m? No     Social History Narrative         FAMILY HISTORY:  Family History   Problem Relation Age of Onset     Pancreatitis Mother      Substance Abuse Mother      Unknown/Adopted Father          PHYSICAL EXAM:  Vital signs:  /83 (BP Location: Right arm, Patient Position: Chair, Cuff Size: Adult Regular)  Pulse 77  Temp 98.5  F (36.9  C) (Oral)  Resp 16  Wt 47.2 kg (104 lb)  SpO2 99%  BMI 18.42 kg/m2   ECO  GENERAL/CONSTITUTIONAL: No acute distress.  EYES: No scleral icterus.  LYMPH: No anterior cervical, posterior cervical, supraclavicular, or axillary adenopathy.   RESPIRATORY: Clear to auscultation bilaterally. No crackles or wheezing.   CARDIOVASCULAR: Regular rate and rhythm without murmurs, gallops, or rubs.  GASTROINTESTINAL: No tenderness. The patient has normal bowel sounds. No guarding.  No distention.  BREAST: Right-no palpable mass, discharge, rash, or axillary lymphadenopathy.  Left-palpable mass at the left upper inner quadrant, with surround bruising and edema from recent biopsy.   MUSCULOSKELETAL: Warm and well-perfused, no cyanosis, clubbing, or edema.  NEUROLOGIC: Alert, oriented, answers questions appropriately.  INTEGUMENTARY: No rashes or jaundice.  GAIT: Steady, does not use assistive  device      LABS:  Pending.      PATHOLOGY:  3/29/18:  FINAL DIAGNOSIS:   A: Breast, left, 11:00, 2.0 cm from nipple, ultrasound guided needle core   biopsy: Invasive mammary carcinoma   with lobular features (favor invasive lobular carcinoma), Orange grade    1 (of 3).     ER: Positive; 95% nuclear staining with strong intensity.   NC: Positive; 50% nuclear staining with strong intensity.   Fluorescence in situ hybridization (FISH) studies for HER2 are pending and    will be reported separately when   available.     B: Breast, left, axilla, ultrasound guided needle core biopsy: Lymph node   positive for metastatic carcinoma.     ER: Positive; 95% nuclear staining with strong intensity.   NC: Positive; 50% nuclear staining with moderate intensity.   Fluorescence in situ hybridization (FISH) studies for HER2 are pending and    will be reported separately when   available.     INTERPRETATION:   No evidence of amplification of the HER2 gene was detected in either Block    A1 (HER2/EFREM-17 ratio 1.5) or Block   B1 (HER2/EFREM-17 ratio 1.6).      IMAGING:  Mammogram and ultrasound 3/28/18:  FINDINGS:  Standard bilateral diagnostic views were obtained,  including tomosynthesis. Marker was placed on the left upper breast to  denote the site of the palpable lump; deep to the marker there is a  mass measuring approximately 2 cm. The mass is associated with  retraction of the left nipple. There are no associated  microcalcifications. No suspicious findings in the contralateral right  breast.     Further evaluation with targeted left breast ultrasound shows a  corresponding hypoechoic lesion at the 11:00 position, 2 cm from the  nipple, measuring 1.9 x 2.9 x 1.0 cm. Survey of the axilla shows an  enlarged lymph node measuring 0.7 x 0.6 cm.         ASSESSMENT/PLAN:  Muriel Diaz is a 57 year old female with:    1) Left breast cancer of the upper inner quadrant: Mammogram and ultrasound found a lesion measuring 1.9 x 2.9 x  1.0 cm, with left nipple retraction.  Left axilla showed an enlarged lymph node of 0.7 x 0.6 cm.  Biopsy of the left breast mass showed invasive mammary carcinoma with lobular features (favor invasive lobular carcinoma), grade 1 of 3, ER positive, WV positive, HER-2 negative.  The left axilla biopsy was also positive for carcinoma, also ER positive and WV positive, HER-2 negative.    I reviewed the imaging and pathology results with Muriel.  With the lobular pathology, and mass seems larger on exam than the ultrasound, I will order MRI breast for further evaluation.  In addition, with the lymph node involvement and history of back pain (although this is chronic and feels like her usual back pain), I will also order a PET-CT for staging.      If there is no evidence of distant metastases, she will likely go forward with surgery.  With this pathology, the tumor likely wouldn't respond well to neoadjuvant chemotherapy.  She is meeting with surgery later this week and she will discuss surgical options then.    -labs today: CBC, CMP  -schedule MRI breast  -schedule PET-CT  -will call her with results  -she has surgery consult on 4/5/18  -follow-up depending on her staging evaluation and surgery evaluation    2) Smoking: She smokes half a pack a day.    -I advised smoking cessation  -she will see her PCP    3) Hypertension: She is on medications for this.  She says that she has been anxious and nervous and has had difficulty sleeping at night.  -follows with PCP    4) Chronic back pain: s/p history of back surgeries  -she takes soma  -follows with PCP      I spent a total of 60 minutes with the patient, with over >50% of the time in counseling and/or coordination of care.       Patience Mckeon MD  Hematology/Oncology  [facility]

## 2018-04-04 ENCOUNTER — TELEPHONE (OUTPATIENT)
Dept: FAMILY MEDICINE | Facility: CLINIC | Age: 58
End: 2018-04-04

## 2018-04-04 DIAGNOSIS — G47.00 INSOMNIA: Primary | ICD-10-CM

## 2018-04-04 RX ORDER — HYDROXYZINE PAMOATE 50 MG/1
50 CAPSULE ORAL
Qty: 30 CAPSULE | Refills: 0 | Status: SHIPPED | OUTPATIENT
Start: 2018-04-04 | End: 2018-04-06 | Stop reason: ALTCHOICE

## 2018-04-04 NOTE — TELEPHONE ENCOUNTER
Patient calls reporting anxiety regarding recent dx of breast cancer causing difficulty sleeping. Asks if Dr. Landin might prescribe something to help her sleep.   Plan: per Dr. Landin try vistaril 50mg q hs prn #30. Follow up with Dr. Landin in 1-2 weeks or sooner if med not helpful. Patient agrees and rx sent to pharmacy.  Rosalinda Constantino RN

## 2018-04-05 ENCOUNTER — OFFICE VISIT (OUTPATIENT)
Dept: SURGERY | Facility: CLINIC | Age: 58
End: 2018-04-05
Payer: COMMERCIAL

## 2018-04-05 ENCOUNTER — DOCUMENTATION ONLY (OUTPATIENT)
Dept: MAMMOGRAPHY | Facility: CLINIC | Age: 58
End: 2018-04-05

## 2018-04-05 VITALS
HEART RATE: 81 BPM | WEIGHT: 104 LBS | DIASTOLIC BLOOD PRESSURE: 96 MMHG | SYSTOLIC BLOOD PRESSURE: 135 MMHG | HEIGHT: 63 IN | BODY MASS INDEX: 18.43 KG/M2

## 2018-04-05 DIAGNOSIS — Z17.0 MALIGNANT NEOPLASM OF CENTRAL PORTION OF LEFT BREAST IN FEMALE, ESTROGEN RECEPTOR POSITIVE (H): Primary | ICD-10-CM

## 2018-04-05 DIAGNOSIS — C50.112 MALIGNANT NEOPLASM OF CENTRAL PORTION OF LEFT BREAST IN FEMALE, ESTROGEN RECEPTOR POSITIVE (H): Primary | ICD-10-CM

## 2018-04-05 PROCEDURE — 99204 OFFICE O/P NEW MOD 45 MIN: CPT | Performed by: SURGERY

## 2018-04-05 NOTE — PROGRESS NOTES
[facility]  Surgery Consultation    HPI: Patient is a 57 year old female who is here for consultation requested by Isabel Landin [phone] for left breast cancer The lesion was discovered on the patients by the patient. She noticed a mass and states it had likely been there for some time but was too nervous to get evaluation. The mammogram showed a 3 cm lesion with axillary adenopathy. A biopsy showed invasive lobular carcinoma with ER/SD positive. HER-2 negative. The patients as never had a mammogram or abnormality in the past. Her menarche was at age 15. Patients last menstrual cycle was in hysterectomy date. She denies taking hormone replacement in the past. She has had 0 pregnancies.. Her family history is significant for no breast or ovarian cancer. She denies all other complaints today. Patient denies fevers, chills, nausea, vomiting, SOB, chest pain, abdominal pain.    PMH:  Muriel Diaz  has a past medical history of Degeneration of cervical intervertebral disc; Degeneration of lumbar or lumbosacral intervertebral disc; Hypertension; and PONV (postoperative nausea and vomiting).  PSH:  Muriel Diaz  has a past surgical history that includes GYN surgery; Hysterectomy vaginal (1990's); back surgery (2001); Fusion cervical posterior three+ levels (5/1/2012); Fusion cervical anterior three+ levels (5/1/2012); and hysterectomy, pap no longer indicated.  Social History:  Muriel Diaz  reports that she has been smoking Cigarettes.  She has been smoking about 0.50 packs per day. She has never used smokeless tobacco. She reports that she drinks about 0.6 oz of alcohol per week  She reports that she does not use illicit drugs.  Family History:  Muriel Diaz family history includes Pancreatitis in her mother; Substance Abuse in her mother; Unknown/Adopted in her father.  Medications/Allergies: Home medications and allergies reviewed.    ROS:  The 10 point Review of  "Systems is negative other than noted in the HPI.    Physical Exam:  BP (!) 135/96  Pulse 81  Ht 5' 3\" (1.6 m)  Wt 104 lb (47.2 kg)  BMI 18.42 kg/m2  GENERAL: Generally appears well.  Psych: Alert and Oriented.  Normal affect  Eyes: Sclera clear  Respiratory:  Lungs clear to ausculation bilaterally with good air excursion  Cardiovascular:  Regular Rate and Rhythm with no murmurs gallops or rubs, normal peripheral pulses  Breast: A bilateral breast exam was performed in the sitting and supine position. The hands were placed at this side and above the head. Bilateral breasts were palpated in a circumferential clockwise fashion including the supraclavicular and axillary areas. Right breast-no masses palpated.  No axillary adenopathy.. Left breast-a large mass in the central portion of the breast. Nipple inversion with some fixation. Tumor is movable around the edges. Feels much larger at approximately at at least 4-5 cm. Axilla-multiple enlarged but mobile lymph nodes.  GI: Abdomen Soft Non-Tender   Lymphatic/Hematologic/Immune:  No femoral or cervical lymphadenopathy.  Integumentary:  No rashes  Neurological: grossly intact  Chaperoned by Lela WILLIAMSON    All new lab and imaging data was reviewed.     Impression and Plan:  Patient is a 57 year old female with advanced left breast cancer with axillary metastasis    PLAN:   I discussed the pathophysiology of breast cancer and surgical options. She has a invasive lobular carcinoma with axillary metastasis. This measured approximately 3 cm but feels larger. She has met with oncology and is going forward with a breast MRI and PET scan on Monday. Given her tumor dynamic she would likely not respond favorably to neoadjuvant chemotherapy. The patient also has no interest in breast conservation and has requested a mastectomy anyway. I will review her MRI and PET scan but would recommend going forward with mastectomy and axillary node dissection. We briefly discussed " reconstruction but since she is a smoker and will also likely require radiation therapy, she would likely not be a great candidate for immediate reconstruction. We also discussed bilateral mastectomies. I would only recommend a right-sided mastectomy if there are abnormalities on her MRI. I would otherwise plan to perform a left side and would like her to get started with her post-therapy. We could always come back at a later date to perform a prophylactic mastectomy. The patient is very comfortable with the above-stated plan. She will like to schedule surgery in the very near future.     I also discussed the risks, benefits, complications, including but not limited to, bleeding, infection, wound breakdown or skin necrosis, lymphedema, need for re excision, as well as postoperative MI, PE, and other anesthetic complications. The patient thoroughly understood the conversation and asked appropriate questions. She will sign consent and would like to proceed with surgery in the very near future.  Thank you very much for this consult.    Rodney Umana M.D.  [facility]  [phone]    Please route or send letter to:  Primary Care Provider (PCP) and Referring Provider

## 2018-04-05 NOTE — LETTER
2018    RE: Muriel Diaz, : 1960        [facility]  Surgery Consultation     HPI: Patient is a 57 year old female who is here for consultation requested by Isabel Landin [phone] for left breast cancer The lesion was discovered on the patients by the patient. She noticed a mass and states it had likely been there for some time but was too nervous to get evaluation. The mammogram showed a 3 cm lesion with axillary adenopathy. A biopsy showed invasive lobular carcinoma with ER/SD positive. HER-2 negative. The patients as never had a mammogram or abnormality in the past. Her menarche was at age 15. Patients last menstrual cycle was in hysterectomy date. She denies taking hormone replacement in the past. She has had 0 pregnancies.. Her family history is significant for no breast or ovarian cancer. She denies all other complaints today. Patient denies fevers, chills, nausea, vomiting, SOB, chest pain, abdominal pain.     PMH:  Muriel Diaz  has a past medical history of Degeneration of cervical intervertebral disc; Degeneration of lumbar or lumbosacral intervertebral disc; Hypertension; and PONV (postoperative nausea and vomiting).  PSH:  Muriel Diaz  has a past surgical history that includes GYN surgery; Hysterectomy vaginal (); back surgery (); Fusion cervical posterior three+ levels (2012); Fusion cervical anterior three+ levels (2012); and hysterectomy, pap no longer indicated.  Social History:  Muriel Diaz  reports that she has been smoking Cigarettes.  She has been smoking about 0.50 packs per day. She has never used smokeless tobacco. She reports that she drinks about 0.6 oz of alcohol per week  She reports that she does not use illicit drugs.  Family History:  Muriel Diaz family history includes Pancreatitis in her mother; Substance Abuse in her mother; Unknown/Adopted in her father.  Medications/Allergies: Home  "medications and allergies reviewed.     ROS:  The 10 point Review of Systems is negative other than noted in the HPI.     Physical Exam:  BP (!) 135/96  Pulse 81  Ht 5' 3\" (1.6 m)  Wt 104 lb (47.2 kg)  BMI 18.42 kg/m2  GENERAL: Generally appears well.  Psych: Alert and Oriented.  Normal affect  Eyes: Sclera clear  Respiratory:  Lungs clear to ausculation bilaterally with good air excursion  Cardiovascular:  Regular Rate and Rhythm with no murmurs gallops or rubs, normal peripheral pulses  Breast: A bilateral breast exam was performed in the sitting and supine position. The hands were placed at this side and above the head. Bilateral breasts were palpated in a circumferential clockwise fashion including the supraclavicular and axillary areas. Right breast-no masses palpated.  No axillary adenopathy.. Left breast-a large mass in the central portion of the breast. Nipple inversion with some fixation. Tumor is movable around the edges. Feels much larger at approximately at at least 4-5 cm. Axilla-multiple enlarged but mobile lymph nodes.  GI: Abdomen Soft Non-Tender   Lymphatic/Hematologic/Immune:  No femoral or cervical lymphadenopathy.  Integumentary:  No rashes  Neurological: grossly intact  Chaperoned by Lela WILLIAMSON     All new lab and imaging data was reviewed.      Impression and Plan:  Patient is a 57 year old female with advanced left breast cancer with axillary metastasis     PLAN:   I discussed the pathophysiology of breast cancer and surgical options. She has a invasive lobular carcinoma with axillary metastasis. This measured approximately 3 cm but feels larger. She has met with oncology and is going forward with a breast MRI and PET scan on Monday. Given her tumor dynamic she would likely not respond favorably to neoadjuvant chemotherapy. The patient also has no interest in breast conservation and has requested a mastectomy anyway. I will review her MRI and PET scan but would recommend going forward with " mastectomy and axillary node dissection. We briefly discussed reconstruction but since she is a smoker and will also likely require radiation therapy, she would likely not be a great candidate for immediate reconstruction. We also discussed bilateral mastectomies. I would only recommend a right-sided mastectomy if there are abnormalities on her MRI. I would otherwise plan to perform a left side and would like her to get started with her post-therapy. We could always come back at a later date to perform a prophylactic mastectomy. The patient is very comfortable with the above-stated plan. She will like to schedule surgery in the very near future.      I also discussed the risks, benefits, complications, including but not limited to, bleeding, infection, wound breakdown or skin necrosis, lymphedema, need for re excision, as well as postoperative MI, PE, and other anesthetic complications. The patient thoroughly understood the conversation and asked appropriate questions. She will sign consent and would like to proceed with surgery in the very near future.    Thank you very much for this consult.     Rodney Umana M.D.  [facility]  [phone]

## 2018-04-05 NOTE — NURSING NOTE
Breast Patients    BREAST PATIENTS (ALL)    1-Do you have any of the following symptoms? Lump(s) or Mass(es)  2-In which breast are you having the symptoms? left  3-Do you use hormones?  No  4-Have you had a Mammogram? Yes  Where: [facility]  Date: 4/2018  5-Have you ever had a breast cyst drained? No  6-Have you ever had a breast biopsy? Yes  Side: Left  Date: 4/2018  7-Have you ever had a Breast Cancer? No   8-Is there a history of Breast Cancer in your family? No  9-Have you ever had Ovarian Cancer? No  10-Is there a history of Ovarian Cancer in your family? No  11-Summarize your caffeine intake (i.e. coffee, tea, chocolate, soda etc.): A.M. coffee only    BREAST PATIENTS (FEMALE)    12-What age did your periods begin? 15  13-Date your last menstrual period began? N/A Hysterectomy  14-Number of full-term pregnancies: 0  15-Your age when your first child was born? N/A  16-Did you nurse your children? N/A  17-Are you pregnant now? No  18-Have you begun menopause? Yes  Age Menopause began:  56  19-Have you had either ovary removed?No  20-Do you have breast implants? No

## 2018-04-05 NOTE — TELEPHONE ENCOUNTER
Introduced self to patient and explained role my role of breast nurse navigator to patient. I accompanied Muriel to her surgical consultation today with Dr. Rodney Umana.  Muriel has recently been diagnosed with Left Breast Cancer.     Ade was given the new patient packet which includes educational material and support resources such as [employer], [employer], [employer], [facility] Cancer Support Group, [facility] Breast Cancer Support Group, and [employer].     Patient is scheduled to have a Breast MRI, and Pet Scan on 4/9/18, and Dr. Umana informed patient he would follow up with her results on the MRI, and discuss plans for surgery.     At the end of the consultation, we reviewed the plan of care and education. We agreed to review the preop teaching in detail by phone once her surgery plan is finalized.     Muriel has my contact information and knows to contact me in the future with any questions/concerns.  Lela Arana, RHONAN, RN

## 2018-04-05 NOTE — MR AVS SNAPSHOT
After Visit Summary   4/5/2018    Muriel Diaz    MRN: [id number]           Patient Information     Date Of Birth          1960        Visit Information        Provider Department      4/5/2018 2:45 PM Rodney Umana MD [facility] General Surgery      Today's Diagnoses     Malignant neoplasm of central portion of left breast in female, estrogen receptor positive (H)    -  1      Care Instructions    Your surgery is scheduled for 4/16/18 12:25pm at [facility]          Follow-ups after your visit        Your next 10 appointments already scheduled     Apr 09, 2018  9:15 AM CDT   (Arrive by 9:00 AM)   PE NPET ONCOLOGY (EYES TO THIGHS) with SHPETM1   [facility] PET CT ([facility])    [address] [place] [address] [phone]           Tell your doctor:   If there is any chance you may be pregnant or if you are breastfeeding.   If you have problems lying in small spaces (claustrophobia). If you do, your doctor may give you medicine to help you relax. If you have diabetes:   Have your exam early in the morning. Your blood glucose will go up as the day goes by.   Your glucose level must be 180 or less at the start of the exam. Please take any medicines you need to ensure this blood glucose level. 24 hours before your scan: Don t do any heavy exercise. (No jogging, aerobics or other workouts.) Exercise will make your pictures less accurate. 6 hours before your scan:   Stop all food and liquids (except water).   Do not chew gum or suck on mints.   If you need to take medicine with food, you may take it with a few crackers.  Please call your Imaging Department at your exam site with any questions.            Apr 09, 2018 12:30 PM CDT   (Arrive by 12:15 PM)   MR BREAST BILATERAL W/O & W CONTRAST with SHMRP1   [facility] MRI ([facility])    [address]  [facility] [address] [phone]           Take your medicines as usual, unless your doctor tells you not to. Bring a list of your current medicines to your exam (including vitamins, minerals and over-the-counter drugs).  The timing of your exam may depend on the start of your last period. If you re in menopause, you may have the exam anytime.  Please bring any previous mammograms or breast MRIs from other facilities to the MRI dept. Do not mail these items to us.   You will have IV contrast for this exam.  You do not need to do anything special to prepare.  The MRI machine uses a strong magnet. Please wear clothes without metal (snaps, zippers). A sweatsuit works well, or we may give you a hospital gown.  Please remove any body piercings and hair extensions before you arrive. You will also remove watches, jewelry, hairpins, wallets, dentures, partial dental plates and hearing aids. You may wear contact lenses, and you may be able to wear your rings. We have a safe place to keep your personal items, but it is safer to leave them at home.  **IMPORTANT** THE INSTRUCTIONS BELOW ARE ONLY FOR THOSE PATIENTS WHO HAVE BEEN PRESCRIBED SEDATION OR GENERAL ANESTHESIA DURING THEIR MRI PROCEDURE:  IF YOUR DOCTOR PRESCRIBED ORAL SEDATION (take medicine to help you relax during your exam):   You must get the medicine from your doctor (oral medication) before you arrive. Bring the medicine to the exam. Do not take it at home. You ll be told when to take it upon arriving for your exam.   Arrive one hour early. Bring someone who can take you home after the test. Your medicine will make you sleepy. After the exam, you may not drive, take a bus or take a taxi by yourself.  IF YOUR DOCTOR PRESCRIBED IV SEDATION:   Arrive one hour early. Bring someone who can take you home after the test. Your medicine will make you sleepy. After the exam, you may not drive, take a bus or take a taxi by yourself.   No eating 6 hours  "before your exam. You may have clear liquids up until 4 hours before your exam. (Clear liquids include water, clear tea, black coffee and fruit juice without pulp.)  IF YOUR DOCTOR PRESCRIBED ANESTHESIA (be asleep for your exam):   Arrive 1 1/2 hours early. Bring someone who can take you home after the test. You may not drive, take a bus or take a taxi by yourself.   No eating 8 hours before your exam. You may have clear liquids up until 4 hours before your exam. (Clear liquids include water, clear tea, black coffee and fruit juice without pulp.)   You will spend four to five hours in the recovery room.  If you have any questions, please contact your Imaging Department exam site.            Apr 16, 2018   Procedure with Rodney Umana MD   [facility] (--)    [address]  [facility] [address] [phone]              Who to contact     If you have questions or need follow up information about today's clinic visit or your schedule please contact [facility] directly at [phone].  Normal or non-critical lab and imaging results will be communicated to you by MyChart, letter or phone within 4 business days after the clinic has received the results. If you do not hear from us within 7 days, please contact the clinic through H[employer]Artt or phone. If you have a critical or abnormal lab result, we will notify you by phone as soon as possible.  Submit refill requests through Needle HR or call your pharmacy and they will forward the refill request to us. Please allow 3 business days for your refill to be completed.          Additional Information About Your Visit        [employer] Information     Needle [employer] lets you send messages to your doctor, view your test results, renew your prescriptions, schedule appointments and more. To sign up, go to [email or web address] . Click on \"Log in\" on the left side of the screen, which will take you to the Welcome " "page. Then click on \"Sign up Now\" on the right side of the page.     You will be asked to enter the access code listed below, as well as some personal information. Please follow the directions to create your username and password.     Your access code is: [email or web address]  Expires: 2018  9:48 AM     Your access code will  in 90 days. If you need help or a new code, please call your [place] clinic or [phone].        Care EveryWhere ID     This is your Care EveryWhere ID. This could be used by other organizations to access your [place] medical records  [id number]        Your Vitals Were     Pulse Height BMI (Body Mass Index)             81 5' 3\" (1.6 m) 18.42 kg/m2          Blood Pressure from Last 3 Encounters:   18 (!) 135/96   18 170/83   18 160/82    Weight from Last 3 Encounters:   18 104 lb (47.2 kg)   18 104 lb (47.2 kg)   18 104 lb 6.4 oz (47.4 kg)              Today, you had the following     No orders found for display       Primary Care Provider Office Phone # Fax #    Isabel Vickie Landin [phone] [address] [place] [address]        Equal Access to Services     RENETTA HUNT AH: Hadii lupe dennison hadasho Soestebanali, waaxda luqadaha, qaybta kaalmada josemanuel, kvng gupta. So [facility] [phone].    ATENCIÓN: Si habla español, tiene a chowdary disposición servicios gratuitos de asistencia lingüística. Concha al [phone].    We comply with applicable federal civil rights laws and [place] laws. We do not discriminate on the basis of race, color, national origin, age, disability, sex, sexual orientation, or gender identity.            Thank you!     Thank you for choosing [facility]  for your care. Our goal is always to provide you with excellent care. Hearing back from our patients is one way we can continue to improve our services. Please take a few minutes to " complete the written survey that you may receive in the mail after your visit with us. Thank you!             Your Updated Medication List - Protect others around you: Learn how to safely use, store and throw away your medicines at www.disposemymeds.org.          This list is accurate as of 4/5/18  3:14 PM.  Always use your most recent med list.                   Brand Name Dispense Instructions for use Diagnosis    atenolol 25 MG tablet    TENORMIN    30 tablet    TAKE ONE TABLET BY MOUTH ONE TIME DAILY    Hypertension goal BP (blood pressure) < 140/90       carisoprodol 350 MG tablet    SOMA    120 tablet    Take 1 tablet (350 mg) by mouth 4 times daily as needed for muscle spasms OK to use outside pharmacy. Future prescriptions by in-person visit only. Please only use Qualitest brand    SI (sacroiliac) joint dysfunction       hydrochlorothiazide 12.5 MG Tabs tablet     90 tablet    Take 1 tablet (12.5 mg) by mouth daily    Hypertension goal BP (blood pressure) < 140/90       hydrOXYzine 50 MG capsule    VISTARIL    30 capsule    Take 1 capsule (50 mg) by mouth nightly as needed    Insomnia       vitamin D 2000 UNITS tablet     90 tablet    Take 2,000 Units by mouth daily    Low vitamin D level

## 2018-04-06 ENCOUNTER — TELEPHONE (OUTPATIENT)
Dept: SURGERY | Facility: CLINIC | Age: 58
End: 2018-04-06

## 2018-04-06 ENCOUNTER — TELEPHONE (OUTPATIENT)
Dept: FAMILY MEDICINE | Facility: CLINIC | Age: 58
End: 2018-04-06

## 2018-04-06 DIAGNOSIS — F41.9 ANXIETY: Primary | ICD-10-CM

## 2018-04-06 RX ORDER — ZOLPIDEM TARTRATE 5 MG/1
5 TABLET ORAL
Qty: 30 TABLET | Refills: 1 | Status: ON HOLD | COMMUNITY
Start: 2018-04-06 | End: 2018-04-16

## 2018-04-06 NOTE — TELEPHONE ENCOUNTER
Type of surgery: left simple mastectomy and left axillary dissection  Location of surgery: [facility]  Date and time of surgery: 04/16/18 12:25 pm  Surgeon: Dr Umana  Pre-Op Appt Date: pt to schedule  Post-Op Appt Date: pt to schedule   Packet sent out: Yes  Pre-cert/Authorization completed:  Not Applicable  Date: 04/05/18    Outpatient in a bed  General anesthesia   no

## 2018-04-06 NOTE — TELEPHONE ENCOUNTER
Patient called clinic with update-hydroxyzine not effective to help with sleep, she is requesting alternative medication.  She reports that Breannaien has worked in the past.  Per Dr. Landin prescription for this called into pharmacy #30 with 1 RF.  Patient is notified.  Wendy Das

## 2018-04-09 ENCOUNTER — HOSPITAL ENCOUNTER (OUTPATIENT)
Dept: PET IMAGING | Facility: CLINIC | Age: 58
End: 2018-04-09
Attending: INTERNAL MEDICINE
Payer: COMMERCIAL

## 2018-04-09 ENCOUNTER — HOSPITAL ENCOUNTER (OUTPATIENT)
Dept: PET IMAGING | Facility: CLINIC | Age: 58
Discharge: HOME | End: 2018-04-09
Attending: INTERNAL MEDICINE | Admitting: INTERNAL MEDICINE
Payer: COMMERCIAL

## 2018-04-09 ENCOUNTER — HOSPITAL ENCOUNTER (OUTPATIENT)
Dept: MRI IMAGING | Facility: CLINIC | Age: 58
End: 2018-04-09
Attending: INTERNAL MEDICINE
Payer: COMMERCIAL

## 2018-04-09 DIAGNOSIS — C50.212 MALIGNANT NEOPLASM OF UPPER-INNER QUADRANT OF LEFT BREAST IN FEMALE, ESTROGEN RECEPTOR POSITIVE (H): ICD-10-CM

## 2018-04-09 DIAGNOSIS — Z17.0 MALIGNANT NEOPLASM OF UPPER-INNER QUADRANT OF LEFT BREAST IN FEMALE, ESTROGEN RECEPTOR POSITIVE (H): ICD-10-CM

## 2018-04-09 LAB — GLUCOSE BLDC GLUCOMTR-MCNC: 102 MG/DL (ref 70–99)

## 2018-04-09 PROCEDURE — 0159T MR BREAST BILATERAL W/O & W CONTRAST: CPT

## 2018-04-09 PROCEDURE — 25000128 H RX IP 250 OP 636: Performed by: INTERNAL MEDICINE

## 2018-04-09 PROCEDURE — 70491 CT SOFT TISSUE NECK W/DYE: CPT

## 2018-04-09 PROCEDURE — A9552 F18 FDG: HCPCS | Performed by: INTERNAL MEDICINE

## 2018-04-09 PROCEDURE — 82962 GLUCOSE BLOOD TEST: CPT

## 2018-04-09 PROCEDURE — 74177 CT ABD & PELVIS W/CONTRAST: CPT

## 2018-04-09 PROCEDURE — A9585 GADOBUTROL INJECTION: HCPCS | Performed by: INTERNAL MEDICINE

## 2018-04-09 PROCEDURE — 34300033 ZZH RX 343: Performed by: INTERNAL MEDICINE

## 2018-04-09 RX ORDER — GADOBUTROL 604.72 MG/ML
5 INJECTION INTRAVENOUS ONCE
Status: COMPLETED | OUTPATIENT
Start: 2018-04-09 | End: 2018-04-09

## 2018-04-09 RX ORDER — IOPAMIDOL 755 MG/ML
100 INJECTION, SOLUTION INTRAVASCULAR ONCE
Status: COMPLETED | OUTPATIENT
Start: 2018-04-09 | End: 2018-04-09

## 2018-04-09 RX ADMIN — FLUDEOXYGLUCOSE F-18 13.68 MCI.: 500 INJECTION, SOLUTION INTRAVENOUS at 09:28

## 2018-04-09 RX ADMIN — GADOBUTROL 5 ML: 604.72 INJECTION INTRAVENOUS at 13:31

## 2018-04-09 RX ADMIN — IOPAMIDOL 100 ML: 755 INJECTION, SOLUTION INTRAVENOUS at 09:28

## 2018-04-10 ENCOUNTER — TELEPHONE (OUTPATIENT)
Dept: MAMMOGRAPHY | Facility: CLINIC | Age: 58
End: 2018-04-10

## 2018-04-10 NOTE — TELEPHONE ENCOUNTER
I called patient this afternoon to assess her needs and check in if she has any questions after her recent surgical consult with Dr Rodney Umana on 4/5/2018.  Patient was recently diagnosed with Left Breast Cancer. Patient informed me that she was notified by Dr. Mckeon, and Dr. Umana of her Breast MRI, and PET Scan results, and is now scheduled to have a Left Simple Mastectomy with Left Axillary Node Dissection on 4/16/18.  Patient states she has her preop physical scheduled this week, and feels ready to get her surgery over with.     I answered general questions about the mastectomy & axillary node dissection surgery and the expected recovery information.  Offer to mail to patient education materials including, Mastectomy, A Patient's Guide and Exercises after Breast Surgery.  Patient accepted the offer.  I informed patient that she should not begin any exercised until she is cleared to do so by Dr. Umana.  Patient verbalized understanding.   Encouraged patient to call nurse navigator at [phone] as questions arise.   RHONA StoreyN, RN

## 2018-04-12 ENCOUNTER — TELEPHONE (OUTPATIENT)
Dept: ONCOLOGY | Facility: CLINIC | Age: 58
End: 2018-04-12

## 2018-04-12 NOTE — TELEPHONE ENCOUNTER
Called patient, she is scheduled for follow up post mastectomy 5/1/18 to discuss her pathology result. Jessica Garcia RN,BSN,OCN

## 2018-04-13 ENCOUNTER — OFFICE VISIT (OUTPATIENT)
Dept: FAMILY MEDICINE | Facility: CLINIC | Age: 58
End: 2018-04-13

## 2018-04-13 VITALS
BODY MASS INDEX: 18.21 KG/M2 | OXYGEN SATURATION: 99 % | WEIGHT: 102.8 LBS | HEART RATE: 61 BPM | SYSTOLIC BLOOD PRESSURE: 130 MMHG | DIASTOLIC BLOOD PRESSURE: 86 MMHG | RESPIRATION RATE: 16 BRPM

## 2018-04-13 DIAGNOSIS — I10 BENIGN ESSENTIAL HYPERTENSION: ICD-10-CM

## 2018-04-13 DIAGNOSIS — Z01.818 PREOP GENERAL PHYSICAL EXAM: Primary | ICD-10-CM

## 2018-04-13 DIAGNOSIS — C50.212 MALIGNANT NEOPLASM OF UPPER-INNER QUADRANT OF LEFT BREAST IN FEMALE, ESTROGEN RECEPTOR POSITIVE (H): ICD-10-CM

## 2018-04-13 DIAGNOSIS — Z17.0 MALIGNANT NEOPLASM OF UPPER-INNER QUADRANT OF LEFT BREAST IN FEMALE, ESTROGEN RECEPTOR POSITIVE (H): ICD-10-CM

## 2018-04-13 DIAGNOSIS — F17.200 SMOKER: ICD-10-CM

## 2018-04-13 LAB
% GRANULOCYTES: 62.9 % (ref 42.2–75.2)
HCT VFR BLD AUTO: 37 % (ref 35–46)
HEMOGLOBIN: 12.8 G/DL (ref 11.8–15.5)
LYMPHOCYTES NFR BLD AUTO: 29.9 % (ref 20.5–51.1)
MCH RBC QN AUTO: 32 PG (ref 27–31)
MCHC RBC AUTO-ENTMCNC: 34.6 G/DL (ref 33–37)
MCV RBC AUTO: 92.4 FL (ref 80–100)
MONOCYTES NFR BLD AUTO: 7.2 % (ref 1.7–9.3)
PLATELET # BLD AUTO: 368 K/UL (ref 140–450)
RBC # BLD AUTO: 4 X10/CMM (ref 3.7–5.2)
WBC # BLD AUTO: 7.4 X10/CMM (ref 3.8–11)

## 2018-04-13 PROCEDURE — 99214 OFFICE O/P EST MOD 30 MIN: CPT | Performed by: FAMILY MEDICINE

## 2018-04-13 PROCEDURE — 93000 ELECTROCARDIOGRAM COMPLETE: CPT | Performed by: FAMILY MEDICINE

## 2018-04-13 PROCEDURE — 85025 COMPLETE CBC W/AUTO DIFF WBC: CPT | Performed by: FAMILY MEDICINE

## 2018-04-13 PROCEDURE — 36415 COLL VENOUS BLD VENIPUNCTURE: CPT | Performed by: FAMILY MEDICINE

## 2018-04-13 NOTE — MR AVS SNAPSHOT
After Visit Summary   4/13/2018    Muriel Diaz    MRN: [id number]           Patient Information     Date Of Birth          1960        Visit Information        Provider Department      4/13/2018 9:45 AM Boy Rivera MD [facility]        Today's Diagnoses     Preop general physical exam    -  1    Malignant neoplasm of upper-inner quadrant of left breast in female, estrogen receptor positive (H)        Benign essential hypertension        Smoker          Care Instructions      Before Your Surgery      Call your surgeon if there is any change in your health. This includes signs of a cold or flu (such as a sore throat, runny nose, cough, rash or fever).    Do not smoke, drink alcohol or take over the counter medicine (unless your surgeon or primary care doctor tells you to) for the 24 hours before and after surgery.    If you take prescribed drugs: Follow your doctor s orders about which medicines to take and which to stop until after surgery.    Eating and drinking prior to surgery: follow the instructions from your surgeon    Take a shower or bath the night before surgery. Use the soap your surgeon gave you to gently clean your skin. If you do not have soap from your surgeon, use your regular soap. Do not shave or scrub the surgery site.  Wear clean pajamas and have clean sheets on your bed.           Follow-ups after your visit        Your next 10 appointments already scheduled     Apr 16, 2018 12:15 PM CDT   [facility] Same Day Surgery with Rodney Umana MD   [facility]    [facility]   [phone]            Apr 16, 2018   Procedure with Rodney Umana MD   [facility] (--)    [address]  [facility] [address]   [phone]            May 01, 2018 11:30 AM CDT   Return Visit with Patience Marlow  "MD Mike   [facility] ([facility])    [facility] [place] [facility]  [address]  [place] [address] [phone]              Who to contact     If you have questions or need follow up information about today's clinic visit or your schedule please contact [facility] directly at [phone].  Normal or non-critical lab and imaging results will be communicated to you by [place] [employer], letter or phone within 4 business days after the clinic has received the results. If you do not hear from us within 7 days, please contact the clinic through [employer] [place] [employer] or phone. If you have a critical or abnormal lab result, we will notify you by phone as soon as possible.  Submit refill requests through Intrakr or call your pharmacy and they will forward the refill request to us. Please allow 3 business days for your refill to be completed.          Additional Information About Your Visit        [place] [employer] Information     [employer] lets you send messages to your doctor, view your test results, renew your prescriptions, schedule appointments and more. To sign up, go to [email or web address] . Click on \"Log in\" on the left side of the screen, which will take you to the Welcome page. Then click on \"Sign up Now\" on the right side of the page.     You will be asked to enter the access code listed below, as well as some personal information. Please follow the directions to create your username and password.     Your access code is: [email or web address]  Expires: 2018  9:48 AM     Your access code will  in 90 days. If you need help or a new code, please call your [place] clinic or [phone].        Care EveryWhere ID     This is your Care EveryWhere ID. This could be used by other organizations to access your [place] medical records  [id number]        Your Vitals Were     Pulse Respirations Pulse Oximetry BMI (Body Mass Index)          61 16 99% 18.21 kg/m2         Blood Pressure " from Last 3 Encounters:   04/13/18 130/86   04/05/18 (!) 135/96   04/03/18 170/83    Weight from Last 3 Encounters:   04/13/18 46.6 kg (102 lb 12.8 oz)   04/05/18 47.2 kg (104 lb)   04/03/18 47.2 kg (104 lb)              We Performed the Following     CBC with Diff/Plt ([facility])     EKG 12-lead complete w/read - Clinics        Primary Care Provider Office Phone # Fax #    Isabel Vickie Landin [phone] [address] [place] [address]        Equal Access to Services     [facility] : Hadii aad ku hadasho Soomaali, waaxda luqadaha, qaybta kaalmada adeegyada, waxevaristo gupta. So [facility] [phone].    ATENCIÓN: Si habla español, tiene a chowdary disposición servicios gratuitos de asistencia lingüística. Llame al [phone].    We comply with applicable federal civil rights laws and [place] laws. We do not discriminate on the basis of race, color, national origin, age, disability, sex, sexual orientation, or gender identity.            Thank you!     Thank you for choosing [facility]  for your care. Our goal is always to provide you with excellent care. Hearing back from our patients is one way we can continue to improve our services. Please take a few minutes to complete the written survey that you may receive in the mail after your visit with us. Thank you!             Your Updated Medication List - Protect others around you: Learn how to safely use, store and throw away your medicines at www.disposemymeds.org.          This list is accurate as of 4/13/18  2:30 PM.  Always use your most recent med list.                   Brand Name Dispense Instructions for use Diagnosis    AMBIEN 5 MG tablet   Generic drug:  zolpidem     30 tablet    Take 1 tablet (5 mg) by mouth nightly as needed for sleep    Anxiety       atenolol 25 MG tablet    TENORMIN    30 tablet    TAKE ONE TABLET BY MOUTH ONE TIME DAILY    Hypertension goal BP (blood pressure) < 140/90        hydrochlorothiazide 12.5 MG Tabs tablet     90 tablet    Take 1 tablet (12.5 mg) by mouth daily    Hypertension goal BP (blood pressure) < 140/90       vitamin D 2000 units tablet     90 tablet    Take 2,000 Units by mouth daily    Low vitamin D level

## 2018-04-13 NOTE — PROGRESS NOTES
[facility]  [address] [place] [id number] [phone]  Dept: [phone]    PRE-OP EVALUATION:  Today's date: 2018    Muriel Diaz (: 1960) presents for pre-operative evaluation assessment as requested by Dr. Umana.  She requires evaluation and anesthesia risk assessment prior to undergoing surgery/procedure for treatment of Left Mastectomy .    Proposed Surgery/ Procedure: Left Simple Mastectomy, Left Axillary Dissection  Date of Surgery/ Procedure: 18  Time of Surgery/ Procedure: 1230  Hospital/Surgical Facility: [facility]  Fax number for surgical facility: [phone]  Primary Physician: Isabel Landin  Type of Anesthesia Anticipated: General    Patient has a Health Care Directive or Living Will:  NO    1. NO - Do you have a history of heart attack, stroke, stent, bypass or surgery on an artery in the head, neck, heart or legs?  2. NO - Do you ever have any pain or discomfort in your chest?  3. NO - Do you have a history of  Heart Failure?  4. NO - Are you troubled by shortness of breath when: walking on the level, up a slight hill or at night?  5. NO - Do you currently have a cold, bronchitis or other respiratory infection?  6. NO - Do you have a cough, shortness of breath or wheezing?  7. NO - Do you sometimes get pains in the calves of your legs when you walk?  8. NO - Do you or anyone in your family have previous history of blood clots?  9. NO - Do you or does anyone in your family have a serious bleeding problem such as prolonged bleeding following surgeries or cuts?  10. NO - Have you ever had problems with anemia or been told to take iron pills?  11. NO - Have you had any abnormal blood loss such as black, tarry or bloody stools, or abnormal vaginal bleeding?  12. NO - Have you ever had a blood transfusion?  13. NO - Have you or any of your relatives ever had problems with anesthesia?  14. NO - Do you have sleep apnea, excessive snoring or  daytime drowsiness?  15. NO - Do you have any prosthetic heart valves?  16. NO - Do you have prosthetic joints?        HPI:     HPI related to upcoming procedure: left breast cancer      56 yo female with history of smoking, HTN, and low back pains    MEDICAL HISTORY:     Patient Active Problem List    Diagnosis Date Noted     Smoker 04/13/2018     Priority: Medium     Malignant neoplasm of upper-inner quadrant of left breast in female, estrogen receptor positive (H) 04/03/2018     Priority: Medium     Anxiety 12/22/2015     Priority: Medium     DJD (degenerative joint disease) of cervical spine 05/30/2014     Priority: Medium     Dystonia 05/30/2014     Priority: Medium     Degeneration of lumbar or lumbosacral intervertebral disc      Priority: Medium     Polysubstance abuse 11/29/2013     Priority: Medium     See phone note 11-29-13       Strain of hand and finger, right 12/05/2011     Priority: Medium     CARDIOVASCULAR SCREENING; LDL GOAL LESS THAN 160 11/09/2011     Priority: Medium     Per provider         Hypertension goal BP (blood pressure) < 140/90 11/09/2011     Priority: Medium     Per provider         Vertigo 07/19/2010     Priority: Medium     Sprain of thoracic region 11/21/2005     Priority: Medium      Past Medical History:   Diagnosis Date     Degeneration of cervical intervertebral disc      Degeneration of lumbar or lumbosacral intervertebral disc      Hypertension      PONV (postoperative nausea and vomiting)      Past Surgical History:   Procedure Laterality Date     BACK SURGERY  2001    lumbar fusion, cervical discectomy     FUSION CERVICAL ANTERIOR THREE+ LEVELS  5/1/2012    Procedure:FUSION CERVICAL ANTERIOR THREE+ LEVELS; Surgeon:SARAH FRANCO; Location:[facility] OR     FUSION CERVICAL POSTERIOR THREE+ LEVELS  5/1/2012    Procedure:FUSION CERVICAL POSTERIOR THREE+ LEVELS; Posterior Cervical decompression and fusion C4-7, Anterior Cervical decompression and fusion C4-7 (c-arm), (herb  table with jarad, yina oasis, yina aviator, Vitoss foam packing strip, impulse monitoring,); Surgeon:SARAH FRANCO; Location:[facility] OR     GYN SURGERY       HYSTERECTOMY VAGINAL  1990's    Judy Aceves OB Gyn specilist     HYSTERECTOMY, PAP NO LONGER INDICATED       Current Outpatient Prescriptions   Medication Sig Dispense Refill     zolpidem (AMBIEN) 5 MG tablet Take 1 tablet (5 mg) by mouth nightly as needed for sleep 30 tablet 1     Cholecalciferol (VITAMIN D) 2000 UNITS tablet Take 2,000 Units by mouth daily 90 tablet 3     hydrochlorothiazide 12.5 MG TABS tablet Take 1 tablet (12.5 mg) by mouth daily 90 tablet 1     atenolol (TENORMIN) 25 MG tablet TAKE ONE TABLET BY MOUTH ONE TIME DAILY  30 tablet 1     carisoprodol (SOMA) 350 MG tablet Take 1 tablet (350 mg) by mouth 4 times daily as needed for muscle spasms OK to use outside pharmacy. Future prescriptions by in-person visit only.  Please only use Qualitest brand 120 tablet 0     OTC products: None, except as noted above    Allergies   Allergen Reactions     Amitriptyline Other (See Comments)     nightmares      Latex Allergy: NO    Social History   Substance Use Topics     Smoking status: Current Every Day Smoker     Packs/day: 0.50     Types: Cigarettes     Smokeless tobacco: Never Used     Alcohol use 0.6 oz/week     1 Standard drinks or equivalent per week      Comment: 2-3 drinks per week     History   Drug Use No       REVIEW OF SYSTEMS:   CONSTITUTIONAL: NEGATIVE for fever, chills, change in weight  INTEGUMENTARY/SKIN: NEGATIVE for worrisome rashes, moles or lesions  EYES: NEGATIVE for vision changes or irritation  ENT/MOUTH: NEGATIVE for ear, mouth and throat problems  RESP: NEGATIVE for significant cough or SOB  CV: NEGATIVE for chest pain, palpitations or peripheral edema  GI: NEGATIVE for nausea, abdominal pain, heartburn, or change in bowel habits  : NEGATIVE for frequency, dysuria, or hematuria  MUSCULOSKELETAL: low back  and neck stiffness and achiness  NEURO: NEGATIVE for weakness, dizziness or paresthesias  ENDOCRINE: NEGATIVE for temperature intolerance, skin/hair changes  HEME: NEGATIVE for bleeding problems  PSYCHIATRIC: worried about current condition    EXAM:   /86  Pulse 61  Resp 16  Wt 46.6 kg (102 lb 12.8 oz)  SpO2 99%  BMI 18.21 kg/m2    GENERAL APPEARANCE: healthy, alert and no distress     EYES: EOMI, PERRL     HENT: ear canals and TM's normal and nose and mouth without ulcers or lesions     NECK: no adenopathy, no asymmetry, masses, or scars and thyroid normal to palpation     RESP: lungs clear to auscultation - no rales, rhonchi or wheezes     CV: regular rates and rhythm, normal S1 S2, no S3 or S4 and no murmur, click or rub     ABDOMEN:  soft, nontender, no HSM or masses and bowel sounds normal     MS: extremities normal- no gross deformities noted, no evidence of inflammation in joints, FROM in all extremities.     SKIN: no suspicious lesions or rashes     NEURO: Normal strength and tone, sensory exam grossly normal, mentation intact and speech normal     PSYCH: mentation appears normal. and affect normal/bright     LYMPHATICS: No cervical adenopathy  Lab Results   Component Value Date    WBC 7.4 04/13/2018    WBC 7.8 04/03/2018     Lab Results   Component Value Date    RBC 4.00 04/13/2018    RBC 4.38 04/03/2018     Lab Results   Component Value Date    HGB 12.8 04/13/2018     Lab Results   Component Value Date    HCT 37.0 04/13/2018     No components found for: MCT  Lab Results   Component Value Date    MCV 92.4 04/13/2018     Lab Results   Component Value Date    MCH 32.0 04/13/2018     Lab Results   Component Value Date    MCHC 34.6 04/13/2018     Lab Results   Component Value Date    RDW 13.4 04/03/2018     Lab Results   Component Value Date     04/13/2018       DIAGNOSTICS:   EKG: appears normal, NSR, normal axis, normal intervals, no acute ST/T changes c/w ischemia, no LVH by voltage  criteria, unchanged from previous tracings    Recent Labs   Lab Test  04/03/18   1328  03/21/18   1715  03/21/18   1704  04/13/17   0830   05/01/12   1050   HGB  14.0   --   12.7   --    < >  14.6   PLT  338   --   329   --    < >   --    INR   --    --    --    --    --   0.83*   NA  136  139   --    --    < >   --    POTASSIUM  3.6  4.8   --    --    < >  3.8   CR  0.67  0.88   --    --    < >   --    A1C   --   5.8*   --   5.7*   --    --     < > = values in this interval not displayed.        IMPRESSION:   Reason for surgery/procedure: left breast cancer  Diagnosis/reason for consult: 56 yo female with history of HTN, smoking.    The proposed surgical procedure is considered LOW risk.    REVISED CARDIAC RISK INDEX  The patient has the following serious cardiovascular risks for perioperative complications such as (MI, PE, VFib and 3  AV Block):  No serious cardiac risks  INTERPRETATION: 0 risks: Class I (very low risk - 0.4% complication rate)    The patient has the following additional risks for perioperative complications:  No identified additional risks      ICD-10-CM    1. Preop general physical exam Z01.818 EKG 12-lead complete w/read - Clinics     CBC with Diff/Plt ([facility])   2. Malignant neoplasm of upper-inner quadrant of left breast in female, estrogen receptor positive (H) C50.212     Z17.0    3. Benign essential hypertension I10    4. Smoker F17.200        RECOMMENDATIONS:         --Patient is to take all scheduled medications on the day of surgery EXCEPT for modifications listed below.    APPROVAL GIVEN to proceed with proposed procedure, without further diagnostic evaluation       Signed Electronically by: Boy Rivera MD    Copy of this evaluation report is provided to requesting physician.

## 2018-04-16 ENCOUNTER — ANESTHESIA EVENT (OUTPATIENT)
Dept: SURGERY | Facility: CLINIC | Age: 58
End: 2018-04-16
Payer: COMMERCIAL

## 2018-04-16 ENCOUNTER — APPOINTMENT (OUTPATIENT)
Dept: SURGERY | Facility: PHYSICIAN GROUP | Age: 58
End: 2018-04-16
Payer: COMMERCIAL

## 2018-04-16 ENCOUNTER — SURGERY (OUTPATIENT)
Age: 58
End: 2018-04-16

## 2018-04-16 ENCOUNTER — HOSPITAL ENCOUNTER (OUTPATIENT)
Facility: CLINIC | Age: 58
Discharge: HOME | End: 2018-04-17
Attending: SURGERY | Admitting: SURGERY
Payer: COMMERCIAL

## 2018-04-16 ENCOUNTER — ANESTHESIA (OUTPATIENT)
Dept: SURGERY | Facility: CLINIC | Age: 58
End: 2018-04-16
Payer: COMMERCIAL

## 2018-04-16 DIAGNOSIS — Z90.12 S/P LEFT MASTECTOMY: Primary | ICD-10-CM

## 2018-04-16 PROCEDURE — 27210995 ZZH RX 272: Performed by: SURGERY

## 2018-04-16 PROCEDURE — 27210794 ZZH OR GENERAL SUPPLY STERILE: Performed by: SURGERY

## 2018-04-16 PROCEDURE — 25000125 ZZHC RX 250: Performed by: SURGERY

## 2018-04-16 PROCEDURE — 25000128 H RX IP 250 OP 636: Performed by: ANESTHESIOLOGY

## 2018-04-16 PROCEDURE — 88307 TISSUE EXAM BY PATHOLOGIST: CPT | Performed by: SURGERY

## 2018-04-16 PROCEDURE — 25000125 ZZHC RX 250: Performed by: PHYSICIAN ASSISTANT

## 2018-04-16 PROCEDURE — 19301 PARTIAL MASTECTOMY: CPT | Performed by: SURGERY

## 2018-04-16 PROCEDURE — 40000936 ZZH STATISTIC OUTPATIENT (NON-OBS) NIGHT

## 2018-04-16 PROCEDURE — 37000008 ZZH ANESTHESIA TECHNICAL FEE, 1ST 30 MIN: Performed by: SURGERY

## 2018-04-16 PROCEDURE — 71000012 ZZH RECOVERY PHASE 1 LEVEL 1 FIRST HR: Performed by: SURGERY

## 2018-04-16 PROCEDURE — 37000009 ZZH ANESTHESIA TECHNICAL FEE, EACH ADDTL 15 MIN: Performed by: SURGERY

## 2018-04-16 PROCEDURE — 25000566 ZZH SEVOFLURANE, EA 15 MIN: Performed by: SURGERY

## 2018-04-16 PROCEDURE — 25000132 ZZH RX MED GY IP 250 OP 250 PS 637: Performed by: PHYSICIAN ASSISTANT

## 2018-04-16 PROCEDURE — 40000934 ZZH STATISTIC OUTPATIENT (NON-OBS) DAY

## 2018-04-16 PROCEDURE — 36000056 ZZH SURGERY LEVEL 3 1ST 30 MIN: Performed by: SURGERY

## 2018-04-16 PROCEDURE — 36000058 ZZH SURGERY LEVEL 3 EA 15 ADDTL MIN: Performed by: SURGERY

## 2018-04-16 PROCEDURE — 38525 BIOPSY/REMOVAL LYMPH NODES: CPT | Mod: 51 | Performed by: SURGERY

## 2018-04-16 PROCEDURE — 25000128 H RX IP 250 OP 636: Performed by: NURSE ANESTHETIST, CERTIFIED REGISTERED

## 2018-04-16 PROCEDURE — 88307 TISSUE EXAM BY PATHOLOGIST: CPT | Mod: 26 | Performed by: SURGERY

## 2018-04-16 PROCEDURE — 40000170 ZZH STATISTIC PRE-PROCEDURE ASSESSMENT II: Performed by: SURGERY

## 2018-04-16 PROCEDURE — 40000935 ZZH STATISTIC OUTPATIENT (NON-OBS) EVE

## 2018-04-16 PROCEDURE — 25000125 ZZHC RX 250: Performed by: NURSE ANESTHETIST, CERTIFIED REGISTERED

## 2018-04-16 PROCEDURE — 25000128 H RX IP 250 OP 636: Performed by: SURGERY

## 2018-04-16 RX ORDER — BUPIVACAINE HYDROCHLORIDE 2.5 MG/ML
INJECTION, SOLUTION EPIDURAL; INFILTRATION; INTRACAUDAL
Status: DISCONTINUED
Start: 2018-04-16 | End: 2018-04-16 | Stop reason: HOSPADM

## 2018-04-16 RX ORDER — DEXAMETHASONE SODIUM PHOSPHATE 4 MG/ML
INJECTION, SOLUTION INTRA-ARTICULAR; INTRALESIONAL; INTRAMUSCULAR; INTRAVENOUS; SOFT TISSUE PRN
Status: DISCONTINUED | OUTPATIENT
Start: 2018-04-16 | End: 2018-04-16

## 2018-04-16 RX ORDER — HYDROMORPHONE HYDROCHLORIDE 1 MG/ML
.3-.5 INJECTION, SOLUTION INTRAMUSCULAR; INTRAVENOUS; SUBCUTANEOUS EVERY 10 MIN PRN
Status: DISCONTINUED | OUTPATIENT
Start: 2018-04-16 | End: 2018-04-16 | Stop reason: HOSPADM

## 2018-04-16 RX ORDER — METOCLOPRAMIDE HYDROCHLORIDE 5 MG/ML
10 INJECTION INTRAMUSCULAR; INTRAVENOUS EVERY 6 HOURS PRN
Status: DISCONTINUED | OUTPATIENT
Start: 2018-04-16 | End: 2018-04-17 | Stop reason: HOSPADM

## 2018-04-16 RX ORDER — FENTANYL CITRATE 50 UG/ML
INJECTION, SOLUTION INTRAMUSCULAR; INTRAVENOUS PRN
Status: DISCONTINUED | OUTPATIENT
Start: 2018-04-16 | End: 2018-04-16

## 2018-04-16 RX ORDER — ONDANSETRON 4 MG/1
4 TABLET, ORALLY DISINTEGRATING ORAL EVERY 30 MIN PRN
Status: DISCONTINUED | OUTPATIENT
Start: 2018-04-16 | End: 2018-04-16 | Stop reason: HOSPADM

## 2018-04-16 RX ORDER — PROPOFOL 10 MG/ML
INJECTION, EMULSION INTRAVENOUS CONTINUOUS PRN
Status: DISCONTINUED | OUTPATIENT
Start: 2018-04-16 | End: 2018-04-16

## 2018-04-16 RX ORDER — PROPOFOL 10 MG/ML
INJECTION, EMULSION INTRAVENOUS PRN
Status: DISCONTINUED | OUTPATIENT
Start: 2018-04-16 | End: 2018-04-16

## 2018-04-16 RX ORDER — METOCLOPRAMIDE 10 MG/1
10 TABLET ORAL EVERY 6 HOURS PRN
Status: DISCONTINUED | OUTPATIENT
Start: 2018-04-16 | End: 2018-04-17 | Stop reason: HOSPADM

## 2018-04-16 RX ORDER — MEPERIDINE HYDROCHLORIDE 25 MG/ML
12.5 INJECTION INTRAMUSCULAR; INTRAVENOUS; SUBCUTANEOUS
Status: DISCONTINUED | OUTPATIENT
Start: 2018-04-16 | End: 2018-04-16 | Stop reason: HOSPADM

## 2018-04-16 RX ORDER — LIDOCAINE HYDROCHLORIDE 20 MG/ML
INJECTION, SOLUTION INFILTRATION; PERINEURAL PRN
Status: DISCONTINUED | OUTPATIENT
Start: 2018-04-16 | End: 2018-04-16

## 2018-04-16 RX ORDER — NALOXONE HYDROCHLORIDE 0.4 MG/ML
.1-.4 INJECTION, SOLUTION INTRAMUSCULAR; INTRAVENOUS; SUBCUTANEOUS
Status: DISCONTINUED | OUTPATIENT
Start: 2018-04-16 | End: 2018-04-16 | Stop reason: HOSPADM

## 2018-04-16 RX ORDER — LIDOCAINE HYDROCHLORIDE 10 MG/ML
INJECTION, SOLUTION EPIDURAL; INFILTRATION; INTRACAUDAL; PERINEURAL
Status: DISCONTINUED
Start: 2018-04-16 | End: 2018-04-16 | Stop reason: HOSPADM

## 2018-04-16 RX ORDER — ONDANSETRON 4 MG/1
4 TABLET, ORALLY DISINTEGRATING ORAL EVERY 6 HOURS PRN
Status: DISCONTINUED | OUTPATIENT
Start: 2018-04-16 | End: 2018-04-17 | Stop reason: HOSPADM

## 2018-04-16 RX ORDER — ONDANSETRON 2 MG/ML
INJECTION INTRAMUSCULAR; INTRAVENOUS PRN
Status: DISCONTINUED | OUTPATIENT
Start: 2018-04-16 | End: 2018-04-16

## 2018-04-16 RX ORDER — LIDOCAINE 40 MG/G
CREAM TOPICAL
Status: DISCONTINUED | OUTPATIENT
Start: 2018-04-16 | End: 2018-04-17 | Stop reason: HOSPADM

## 2018-04-16 RX ORDER — ONDANSETRON 2 MG/ML
4 INJECTION INTRAMUSCULAR; INTRAVENOUS EVERY 6 HOURS PRN
Status: DISCONTINUED | OUTPATIENT
Start: 2018-04-16 | End: 2018-04-17 | Stop reason: HOSPADM

## 2018-04-16 RX ORDER — HYDROMORPHONE HYDROCHLORIDE 2 MG/1
2-4 TABLET ORAL
Status: DISCONTINUED | OUTPATIENT
Start: 2018-04-16 | End: 2018-04-17 | Stop reason: HOSPADM

## 2018-04-16 RX ORDER — PROCHLORPERAZINE MALEATE 5 MG
10 TABLET ORAL EVERY 6 HOURS PRN
Status: DISCONTINUED | OUTPATIENT
Start: 2018-04-16 | End: 2018-04-17 | Stop reason: HOSPADM

## 2018-04-16 RX ORDER — NALOXONE HYDROCHLORIDE 0.4 MG/ML
.1-.4 INJECTION, SOLUTION INTRAMUSCULAR; INTRAVENOUS; SUBCUTANEOUS
Status: DISCONTINUED | OUTPATIENT
Start: 2018-04-16 | End: 2018-04-17 | Stop reason: HOSPADM

## 2018-04-16 RX ORDER — PHYSOSTIGMINE SALICYLATE 1 MG/ML
1.2 INJECTION INTRAVENOUS
Status: DISCONTINUED | OUTPATIENT
Start: 2018-04-16 | End: 2018-04-16 | Stop reason: HOSPADM

## 2018-04-16 RX ORDER — IBUPROFEN 600 MG/1
600 TABLET, FILM COATED ORAL EVERY 6 HOURS PRN
Status: DISCONTINUED | OUTPATIENT
Start: 2018-04-16 | End: 2018-04-17 | Stop reason: HOSPADM

## 2018-04-16 RX ORDER — EPHEDRINE SULFATE 50 MG/ML
INJECTION, SOLUTION INTRAMUSCULAR; INTRAVENOUS; SUBCUTANEOUS PRN
Status: DISCONTINUED | OUTPATIENT
Start: 2018-04-16 | End: 2018-04-16

## 2018-04-16 RX ORDER — HYDROCHLOROTHIAZIDE 12.5 MG/1
12.5 TABLET ORAL DAILY
Status: DISCONTINUED | OUTPATIENT
Start: 2018-04-16 | End: 2018-04-17 | Stop reason: HOSPADM

## 2018-04-16 RX ORDER — SODIUM CHLORIDE, SODIUM LACTATE, POTASSIUM CHLORIDE, CALCIUM CHLORIDE 600; 310; 30; 20 MG/100ML; MG/100ML; MG/100ML; MG/100ML
INJECTION, SOLUTION INTRAVENOUS CONTINUOUS
Status: DISCONTINUED | OUTPATIENT
Start: 2018-04-16 | End: 2018-04-16 | Stop reason: HOSPADM

## 2018-04-16 RX ORDER — CEFAZOLIN SODIUM 1 G/3ML
1 INJECTION, POWDER, FOR SOLUTION INTRAMUSCULAR; INTRAVENOUS SEE ADMIN INSTRUCTIONS
Status: DISCONTINUED | OUTPATIENT
Start: 2018-04-16 | End: 2018-04-16 | Stop reason: HOSPADM

## 2018-04-16 RX ORDER — ACETAMINOPHEN 325 MG/1
650 TABLET ORAL EVERY 6 HOURS PRN
Status: DISCONTINUED | OUTPATIENT
Start: 2018-04-16 | End: 2018-04-17 | Stop reason: HOSPADM

## 2018-04-16 RX ORDER — DEXTROSE MONOHYDRATE, SODIUM CHLORIDE, AND POTASSIUM CHLORIDE 50; 1.49; 4.5 G/1000ML; G/1000ML; G/1000ML
INJECTION, SOLUTION INTRAVENOUS CONTINUOUS
Status: DISCONTINUED | OUTPATIENT
Start: 2018-04-16 | End: 2018-04-17 | Stop reason: HOSPADM

## 2018-04-16 RX ORDER — ONDANSETRON 2 MG/ML
4 INJECTION INTRAMUSCULAR; INTRAVENOUS EVERY 30 MIN PRN
Status: DISCONTINUED | OUTPATIENT
Start: 2018-04-16 | End: 2018-04-16 | Stop reason: HOSPADM

## 2018-04-16 RX ORDER — FENTANYL CITRATE 50 UG/ML
25-50 INJECTION, SOLUTION INTRAMUSCULAR; INTRAVENOUS
Status: DISCONTINUED | OUTPATIENT
Start: 2018-04-16 | End: 2018-04-16 | Stop reason: HOSPADM

## 2018-04-16 RX ORDER — FENTANYL CITRATE 50 UG/ML
25-50 INJECTION, SOLUTION INTRAMUSCULAR; INTRAVENOUS EVERY 5 MIN PRN
Status: DISCONTINUED | OUTPATIENT
Start: 2018-04-16 | End: 2018-04-16 | Stop reason: HOSPADM

## 2018-04-16 RX ORDER — MAGNESIUM HYDROXIDE 1200 MG/15ML
LIQUID ORAL PRN
Status: DISCONTINUED | OUTPATIENT
Start: 2018-04-16 | End: 2018-04-16 | Stop reason: HOSPADM

## 2018-04-16 RX ORDER — SODIUM CHLORIDE, SODIUM LACTATE, POTASSIUM CHLORIDE, CALCIUM CHLORIDE 600; 310; 30; 20 MG/100ML; MG/100ML; MG/100ML; MG/100ML
INJECTION, SOLUTION INTRAVENOUS CONTINUOUS PRN
Status: DISCONTINUED | OUTPATIENT
Start: 2018-04-16 | End: 2018-04-16

## 2018-04-16 RX ORDER — CEFAZOLIN SODIUM 2 G/100ML
2 INJECTION, SOLUTION INTRAVENOUS
Status: COMPLETED | OUTPATIENT
Start: 2018-04-16 | End: 2018-04-16

## 2018-04-16 RX ORDER — ATENOLOL 25 MG/1
25 TABLET ORAL DAILY
Status: DISCONTINUED | OUTPATIENT
Start: 2018-04-17 | End: 2018-04-17 | Stop reason: HOSPADM

## 2018-04-16 RX ADMIN — FENTANYL CITRATE 25 MCG: 50 INJECTION, SOLUTION INTRAMUSCULAR; INTRAVENOUS at 12:26

## 2018-04-16 RX ADMIN — ONDANSETRON 4 MG: 4 TABLET, ORALLY DISINTEGRATING ORAL at 19:20

## 2018-04-16 RX ADMIN — PROPOFOL 150 MCG/KG/MIN: 10 INJECTION, EMULSION INTRAVENOUS at 11:45

## 2018-04-16 RX ADMIN — PROCHLORPERAZINE MALEATE 10 MG: 5 TABLET, FILM COATED ORAL at 22:29

## 2018-04-16 RX ADMIN — HYDROCHLOROTHIAZIDE 12.5 MG: 12.5 TABLET ORAL at 16:08

## 2018-04-16 RX ADMIN — Medication 5 MG: at 13:15

## 2018-04-16 RX ADMIN — FENTANYL CITRATE 50 MCG: 50 INJECTION, SOLUTION INTRAMUSCULAR; INTRAVENOUS at 12:17

## 2018-04-16 RX ADMIN — Medication 5 MG: at 11:58

## 2018-04-16 RX ADMIN — HYDROMORPHONE HYDROCHLORIDE 2 MG: 2 TABLET ORAL at 16:08

## 2018-04-16 RX ADMIN — Medication 5 MG: at 12:03

## 2018-04-16 RX ADMIN — BUPIVACAINE HYDROCHLORIDE 20 ML: 2.5 INJECTION, SOLUTION EPIDURAL; INFILTRATION; INTRACAUDAL; PERINEURAL at 13:00

## 2018-04-16 RX ADMIN — SODIUM CHLORIDE, POTASSIUM CHLORIDE, SODIUM LACTATE AND CALCIUM CHLORIDE: 600; 310; 30; 20 INJECTION, SOLUTION INTRAVENOUS at 12:50

## 2018-04-16 RX ADMIN — MIDAZOLAM 2 MG: 1 INJECTION INTRAMUSCULAR; INTRAVENOUS at 11:45

## 2018-04-16 RX ADMIN — SODIUM CHLORIDE 1000 ML: 900 IRRIGANT IRRIGATION at 12:04

## 2018-04-16 RX ADMIN — HYDROMORPHONE HYDROCHLORIDE 0.5 MG: 1 INJECTION, SOLUTION INTRAMUSCULAR; INTRAVENOUS; SUBCUTANEOUS at 14:31

## 2018-04-16 RX ADMIN — CEFAZOLIN SODIUM 2 G: 2 INJECTION, SOLUTION INTRAVENOUS at 11:50

## 2018-04-16 RX ADMIN — FENTANYL CITRATE 25 MCG: 50 INJECTION, SOLUTION INTRAMUSCULAR; INTRAVENOUS at 12:22

## 2018-04-16 RX ADMIN — ONDANSETRON 4 MG: 2 INJECTION INTRAMUSCULAR; INTRAVENOUS at 12:33

## 2018-04-16 RX ADMIN — DEXAMETHASONE SODIUM PHOSPHATE 4 MG: 4 INJECTION, SOLUTION INTRA-ARTICULAR; INTRALESIONAL; INTRAMUSCULAR; INTRAVENOUS; SOFT TISSUE at 11:49

## 2018-04-16 RX ADMIN — PROPOFOL 200 MG: 10 INJECTION, EMULSION INTRAVENOUS at 11:45

## 2018-04-16 RX ADMIN — FENTANYL CITRATE 50 MCG: 50 INJECTION, SOLUTION INTRAMUSCULAR; INTRAVENOUS at 12:00

## 2018-04-16 RX ADMIN — HYDROMORPHONE HYDROCHLORIDE 2 MG: 2 TABLET ORAL at 17:52

## 2018-04-16 RX ADMIN — SODIUM CHLORIDE, POTASSIUM CHLORIDE, SODIUM LACTATE AND CALCIUM CHLORIDE: 600; 310; 30; 20 INJECTION, SOLUTION INTRAVENOUS at 11:45

## 2018-04-16 RX ADMIN — LIDOCAINE HYDROCHLORIDE 60 MG: 20 INJECTION, SOLUTION INFILTRATION; PERINEURAL at 11:45

## 2018-04-16 RX ADMIN — FENTANYL CITRATE 50 MCG: 50 INJECTION, SOLUTION INTRAMUSCULAR; INTRAVENOUS at 11:45

## 2018-04-16 ASSESSMENT — LIFESTYLE VARIABLES: TOBACCO_USE: 1

## 2018-04-16 ASSESSMENT — PAIN DESCRIPTION - DESCRIPTORS
DESCRIPTORS: ACHING
DESCRIPTORS: PRESSURE

## 2018-04-16 NOTE — H&P (VIEW-ONLY)
[facility]  [address] [place] [id number] [phone]  Dept: [phone]    PRE-OP EVALUATION:  Today's date: 2018    Muriel Diza (: 1960) presents for pre-operative evaluation assessment as requested by Dr. Umana.  She requires evaluation and anesthesia risk assessment prior to undergoing surgery/procedure for treatment of Left Mastectomy .    Proposed Surgery/ Procedure: Left Simple Mastectomy, Left Axillary Dissection  Date of Surgery/ Procedure: 18  Time of Surgery/ Procedure: 1230  Hospital/Surgical Facility: [facility]  Fax number for surgical facility: [phone]  Primary Physician: Isabel Landin  Type of Anesthesia Anticipated: General    Patient has a Health Care Directive or Living Will:  NO    1. NO - Do you have a history of heart attack, stroke, stent, bypass or surgery on an artery in the head, neck, heart or legs?  2. NO - Do you ever have any pain or discomfort in your chest?  3. NO - Do you have a history of  Heart Failure?  4. NO - Are you troubled by shortness of breath when: walking on the level, up a slight hill or at night?  5. NO - Do you currently have a cold, bronchitis or other respiratory infection?  6. NO - Do you have a cough, shortness of breath or wheezing?  7. NO - Do you sometimes get pains in the calves of your legs when you walk?  8. NO - Do you or anyone in your family have previous history of blood clots?  9. NO - Do you or does anyone in your family have a serious bleeding problem such as prolonged bleeding following surgeries or cuts?  10. NO - Have you ever had problems with anemia or been told to take iron pills?  11. NO - Have you had any abnormal blood loss such as black, tarry or bloody stools, or abnormal vaginal bleeding?  12. NO - Have you ever had a blood transfusion?  13. NO - Have you or any of your relatives ever had problems with anesthesia?  14. NO - Do you have sleep apnea, excessive snoring or  daytime drowsiness?  15. NO - Do you have any prosthetic heart valves?  16. NO - Do you have prosthetic joints?        HPI:     HPI related to upcoming procedure: left breast cancer      56 yo female with history of smoking, HTN, and low back pains    MEDICAL HISTORY:     Patient Active Problem List    Diagnosis Date Noted     Smoker 04/13/2018     Priority: Medium     Malignant neoplasm of upper-inner quadrant of left breast in female, estrogen receptor positive (H) 04/03/2018     Priority: Medium     Anxiety 12/22/2015     Priority: Medium     DJD (degenerative joint disease) of cervical spine 05/30/2014     Priority: Medium     Dystonia 05/30/2014     Priority: Medium     Degeneration of lumbar or lumbosacral intervertebral disc      Priority: Medium     Polysubstance abuse 11/29/2013     Priority: Medium     See phone note 11-29-13       Strain of hand and finger, right 12/05/2011     Priority: Medium     CARDIOVASCULAR SCREENING; LDL GOAL LESS THAN 160 11/09/2011     Priority: Medium     Per provider         Hypertension goal BP (blood pressure) < 140/90 11/09/2011     Priority: Medium     Per provider         Vertigo 07/19/2010     Priority: Medium     Sprain of thoracic region 11/21/2005     Priority: Medium      Past Medical History:   Diagnosis Date     Degeneration of cervical intervertebral disc      Degeneration of lumbar or lumbosacral intervertebral disc      Hypertension      PONV (postoperative nausea and vomiting)      Past Surgical History:   Procedure Laterality Date     BACK SURGERY  2001    lumbar fusion, cervical discectomy     FUSION CERVICAL ANTERIOR THREE+ LEVELS  5/1/2012    Procedure:FUSION CERVICAL ANTERIOR THREE+ LEVELS; Surgeon:SARAH FRANCO; Location:[facility]     FUSION CERVICAL POSTERIOR THREE+ LEVELS  5/1/2012    Procedure:FUSION CERVICAL POSTERIOR THREE+ LEVELS; Posterior Cervical decompression and fusion C4-7, Anterior Cervical decompression and fusion C4-7 (c-arm), (herb  table with jarad, yina oasis, yina aviator, Vitoss foam packing strip, impulse monitoring,); Surgeon:SARAH FRANCO; Location:[facility] OR     GYN SURGERY       HYSTERECTOMY VAGINAL  1990's    Judy Aceves OB Gyn specilist     HYSTERECTOMY, PAP NO LONGER INDICATED       Current Outpatient Prescriptions   Medication Sig Dispense Refill     zolpidem (AMBIEN) 5 MG tablet Take 1 tablet (5 mg) by mouth nightly as needed for sleep 30 tablet 1     Cholecalciferol (VITAMIN D) 2000 UNITS tablet Take 2,000 Units by mouth daily 90 tablet 3     hydrochlorothiazide 12.5 MG TABS tablet Take 1 tablet (12.5 mg) by mouth daily 90 tablet 1     atenolol (TENORMIN) 25 MG tablet TAKE ONE TABLET BY MOUTH ONE TIME DAILY  30 tablet 1     carisoprodol (SOMA) 350 MG tablet Take 1 tablet (350 mg) by mouth 4 times daily as needed for muscle spasms OK to use outside pharmacy. Future prescriptions by in-person visit only.  Please only use Qualitest brand 120 tablet 0     OTC products: None, except as noted above    Allergies   Allergen Reactions     Amitriptyline Other (See Comments)     nightmares      Latex Allergy: NO    Social History   Substance Use Topics     Smoking status: Current Every Day Smoker     Packs/day: 0.50     Types: Cigarettes     Smokeless tobacco: Never Used     Alcohol use 0.6 oz/week     1 Standard drinks or equivalent per week      Comment: 2-3 drinks per week     History   Drug Use No       REVIEW OF SYSTEMS:   CONSTITUTIONAL: NEGATIVE for fever, chills, change in weight  INTEGUMENTARY/SKIN: NEGATIVE for worrisome rashes, moles or lesions  EYES: NEGATIVE for vision changes or irritation  ENT/MOUTH: NEGATIVE for ear, mouth and throat problems  RESP: NEGATIVE for significant cough or SOB  CV: NEGATIVE for chest pain, palpitations or peripheral edema  GI: NEGATIVE for nausea, abdominal pain, heartburn, or change in bowel habits  : NEGATIVE for frequency, dysuria, or hematuria  MUSCULOSKELETAL: low back  and neck stiffness and achiness  NEURO: NEGATIVE for weakness, dizziness or paresthesias  ENDOCRINE: NEGATIVE for temperature intolerance, skin/hair changes  HEME: NEGATIVE for bleeding problems  PSYCHIATRIC: worried about current condition    EXAM:   /86  Pulse 61  Resp 16  Wt 46.6 kg (102 lb 12.8 oz)  SpO2 99%  BMI 18.21 kg/m2    GENERAL APPEARANCE: healthy, alert and no distress     EYES: EOMI, PERRL     HENT: ear canals and TM's normal and nose and mouth without ulcers or lesions     NECK: no adenopathy, no asymmetry, masses, or scars and thyroid normal to palpation     RESP: lungs clear to auscultation - no rales, rhonchi or wheezes     CV: regular rates and rhythm, normal S1 S2, no S3 or S4 and no murmur, click or rub     ABDOMEN:  soft, nontender, no HSM or masses and bowel sounds normal     MS: extremities normal- no gross deformities noted, no evidence of inflammation in joints, FROM in all extremities.     SKIN: no suspicious lesions or rashes     NEURO: Normal strength and tone, sensory exam grossly normal, mentation intact and speech normal     PSYCH: mentation appears normal. and affect normal/bright     LYMPHATICS: No cervical adenopathy  Lab Results   Component Value Date    WBC 7.4 04/13/2018    WBC 7.8 04/03/2018     Lab Results   Component Value Date    RBC 4.00 04/13/2018    RBC 4.38 04/03/2018     Lab Results   Component Value Date    HGB 12.8 04/13/2018     Lab Results   Component Value Date    HCT 37.0 04/13/2018     No components found for: MCT  Lab Results   Component Value Date    MCV 92.4 04/13/2018     Lab Results   Component Value Date    MCH 32.0 04/13/2018     Lab Results   Component Value Date    MCHC 34.6 04/13/2018     Lab Results   Component Value Date    RDW 13.4 04/03/2018     Lab Results   Component Value Date     04/13/2018       DIAGNOSTICS:   EKG: appears normal, NSR, normal axis, normal intervals, no acute ST/T changes c/w ischemia, no LVH by voltage  criteria, unchanged from previous tracings    Recent Labs   Lab Test  04/03/18   1328  03/21/18   1715  03/21/18   1704  04/13/17   0830   05/01/12   1050   HGB  14.0   --   12.7   --    < >  14.6   PLT  338   --   329   --    < >   --    INR   --    --    --    --    --   0.83*   NA  136  139   --    --    < >   --    POTASSIUM  3.6  4.8   --    --    < >  3.8   CR  0.67  0.88   --    --    < >   --    A1C   --   5.8*   --   5.7*   --    --     < > = values in this interval not displayed.        IMPRESSION:   Reason for surgery/procedure: left breast cancer  Diagnosis/reason for consult: 56 yo female with history of HTN, smoking.    The proposed surgical procedure is considered LOW risk.    REVISED CARDIAC RISK INDEX  The patient has the following serious cardiovascular risks for perioperative complications such as (MI, PE, VFib and 3  AV Block):  No serious cardiac risks  INTERPRETATION: 0 risks: Class I (very low risk - 0.4% complication rate)    The patient has the following additional risks for perioperative complications:  No identified additional risks      ICD-10-CM    1. Preop general physical exam Z01.818 EKG 12-lead complete w/read - Clinics     CBC with Diff/Plt ([facility])   2. Malignant neoplasm of upper-inner quadrant of left breast in female, estrogen receptor positive (H) C50.212     Z17.0    3. Benign essential hypertension I10    4. Smoker F17.200        RECOMMENDATIONS:         --Patient is to take all scheduled medications on the day of surgery EXCEPT for modifications listed below.    APPROVAL GIVEN to proceed with proposed procedure, without further diagnostic evaluation       Signed Electronically by: Boy Rivera MD    Copy of this evaluation report is provided to requesting physician.

## 2018-04-16 NOTE — ANESTHESIA CARE TRANSFER NOTE
Patient: Muriel Diaz    Procedure(s):  LEFT SIMPLE MASTECTOMY,LEFT AXILLARY DISSECTION - Wound Class: I-Clean   - Wound Class: I-Clean    Diagnosis: LEFT BREAST CANCER  Diagnosis Additional Information: No value filed.    Anesthesia Type:   General     Note:  Airway :Face Mask  Patient transferred to:PACU  Comments: Pt exhibits spontaneous respirations, follows commands, suctioned, LMA removed, exchanging well, transferred to pacu with O2 @ 10L via mask, all monitors and alarms on, report to RN, VSS.Handoff Report: Identifed the Patient, Identified the Reponsible Provider, Reviewed the pertinent medical history, Discussed the surgical course, Reviewed Intra-OP anesthesia mangement and issues during anesthesia, Set expectations for post-procedure period and Allowed opportunity for questions and acknowledgement of understanding      Vitals: (Last set prior to Anesthesia Care Transfer)    CRNA VITALS  4/16/2018 1316 - 4/16/2018 1352      4/16/2018             Pulse: 82    SpO2: 99 %    Resp Rate (observed): (!)  1    Resp Rate (set): 10                Electronically Signed By: CAROLYN Whittington CRNA  April 16, 2018  1:52 PM

## 2018-04-16 NOTE — PLAN OF CARE
Problem: Patient Care Overview  Goal: Plan of Care/Patient Progress Review  Outcome: Improving  Pt doing well post mastectomy. Pt is a/o. VSS. Pain controlled. Tolerating reg diet. Up SBA. Voiding. IV TKO. PCD on. Denies capno. continous pulse ox. ADE drain X2. Clamped. Will monitor.

## 2018-04-16 NOTE — IP AVS SNAPSHOT
MRN:[id number]                      After Visit Summary   4/16/2018    Muriel Diaz    MRN: [id number]           Thank you!     Thank you for choosing [facility] for your care. Our goal is always to provide you with excellent care. Hearing back from our patients is one way we can continue to improve our services. Please take a few minutes to complete the written survey that you may receive in the mail after you visit with us. Thank you!        Patient Information     Date Of Birth          1960        Designated Caregiver       Most Recent Value    Caregiver    Will someone help with your care after discharge? yes    Name of designated caregiver Pipo Matt/s.o.    Phone number of caregiver [phone]    Caregiver address [address] [place] [address]      About your hospital stay     You were admitted on:  April 16, 2018 [facility]    You were discharged on:  April 17, 2018        Reason for your hospital stay       Left mastectomy                  Who to Call     For medical emergencies, please call 911.  For non-urgent questions about your medical care, please call your primary care provider or clinic, [phone]  For questions related to your surgery, please call your surgery clinic        Attending Provider     Provider Specialty    Rodney Umana MD Surgery       Primary Care Provider Office Phone # Fax #    Isabel Vickie Landin [phone]      After Care Instructions     Supplies       List the supplies the pt needs to go home: gauze and tape            Tubes and drains       You are going home with the following tubes or drains: ADE.  Tube cares per hospital or home care instructions                  Follow-up Appointments     Follow-up and recommended labs and tests        See discharge instruction sheet.                  Your next 10 appointments already scheduled     May 01, 2018 11:30 AM CDT    Return Visit with Patience Mckeon MD   [facility] ([facility])    [facility]  [address]  [place] [address] [phone]              Further instructions from your care team       [facility]  Discharge Instructions: Post-Operative Mastectomy    ACTIVITY    Take frequent, short walks and increase your activity gradually.      Avoid strenuous physical activity or heavy lifting greater than 15-20 lbs. for 2 weeks on the side of surgery.  You may climb stairs.     Gentle rotation and stretching of your arms and shoulders will prevent joint stiffness.    You may drive without restrictions when you are not using any prescription pain medication and feel comfortable in a car.    You may return to work/school when you are comfortable without any prescription pain medication.    WOUND CARE    You may remove your outer dressing and shower 48 hours after the surgery.  Pat your incisions dry and leave them open to air.  Re-apply dressing (gauze/tape) as needed for comfort or drainage.    You may have steri-strips (looks like white tape) or staples at your incisions.  You may peel off the steri-strips 2 weeks after your surgery if they have not peeled off on their own.  If you have staples, they will be removed at your next office visit.    Do not soak your incisions in a tub or pool for 2 weeks.     Do not apply any lotions, creams, or ointments to your incisions.    A ridge under your incisions is normal and will gradually resolve.    Wear the mastectomy camisole for comfort.    You may have 1-2 drains at your surgical site, refer to your drain care instructions.  Record output totals daily.  You will need to schedule an appointment for drain removal when your output is less than 30 mL per day for 1-2 days or 2 weeks, whichever comes first.    DIET    Start with liquids, then gradually resume your regular  diet as tolerated.     Drink plenty of fluids to stay hydrated.    PAIN    Expect some tenderness and discomfort at the incision site(s).  Use the prescribed pain medication at your discretion.  Expect gradual resolution of your pain over several days.    You may take ibuprofen with food (unless you have been told not to) instead of or in addition to your prescribed pain medication.  If you are taking Norco or Percocet, do not take any additional acetaminophen/APAP/Tylenol.    Do not drink alcohol or drive while you are taking pain medications.    You may apply ice to your incisions in 20 minute intervals as needed for the next 48 hours.  After that time, consider switching to heat if you prefer.    EXPECTATIONS    Pain medications can cause constipation.  Limit use when possible.  Take over the counter stool softener/stimulant, such as Colace or Senna, 1-2 times a day with plenty of water.  You may take a mild over the counter laxative, such as Miralax or a suppository, as needed.      You may discontinue these medications once you are having regular bowel movements and/or are no longer taking your narcotic pain medication.    RETURN APPOINTMENT    Follow up with our office when your drain is ready to be removed.  You will also follow up with your surgeon in 2 weeks.  Please call our office at [phone] to schedule your appointment.    CALL OUR OFFICE -[phone] IF YOU HAVE:     Chills or fever above 101 F.    Increased redness, warmth, or drainage at your incisions.    Significant bleeding or swelling.    Pain not relieved by your pain medication or rest.    Increasing pain after the first 48 hours.    Any other concerns or questions.    Revised January 2018      Pending Results     Date and Time Order Name Status Description    4/16/2018 1228 Surgical pathology exam In process             Statement of Approval     Ordered          04/17/18 0835  I have reviewed and agree with all the recommendations and  "orders detailed in this document.  EFFECTIVE NOW     Approved and electronically signed by:  Joan Teresa PA-C             Admission Information     Date & Time Provider Department Dept. Phone    2018 Rodney Umana MD [facility] [phone]      Your Vitals Were     Blood Pressure Temperature Respirations Height Weight Pulse Oximetry    138/83 (BP Location: Right arm) 95.4  F (35.2  C) (Oral) 16 1.6 m (5' 3\") 46.4 kg (102 lb 4.8 oz) 92%    BMI (Body Mass Index)                   18.12 kg/m2           MyChart Information     [facility] lets you send messages to your doctor, view your test results, renew your prescriptions, schedule appointments and more. To sign up, go to [email or web address] . Click on \"Log in\" on the left side of the screen, which will take you to the Welcome page. Then click on \"Sign up Now\" on the right side of the page.     You will be asked to enter the access code listed below, as well as some personal information. Please follow the directions to create your username and password.     Your access code is: [email or web address]  Expires: 2018  9:48 AM     Your access code will  in 90 days. If you need help or a new code, please call your [facility] clinic or [phone].        Care EveryWhere ID     This is your Care EveryWhere ID. This could be used by other organizations to access your [facility] medical records  [id number]        Equal Access to Services     [facility] AH: Hadii lupe dennison hadasho Soomaali, waaxda luqadaha, qaybta kaalmada adeegyada, kvng gupta. So [facility] [phone].    ATENCIÓN: Si habla español, tiene a chowdary disposición servicios gratuitos de asistencia lingüística. Llame al [phone].    We comply with applicable federal civil rights laws and [place] laws. We do not discriminate on the basis of race, color, national origin, age, disability, sex, sexual orientation, or gender identity.               Review " of your medicines      START taking        Dose / Directions    HYDROcodone-acetaminophen 5-325 MG per tablet   Commonly known as:  NORCO        Dose:  1-2 tablet   Take 1-2 tablets by mouth every 6 hours as needed for moderate to severe pain   Quantity:  25 tablet   Refills:  0         CONTINUE these medicines which have NOT CHANGED        Dose / Directions    atenolol 25 MG tablet   Commonly known as:  TENORMIN   Used for:  Hypertension goal BP (blood pressure) < 140/90        TAKE ONE TABLET BY MOUTH ONE TIME DAILY   Quantity:  30 tablet   Refills:  1       hydrochlorothiazide 12.5 MG Tabs tablet   Used for:  Hypertension goal BP (blood pressure) < 140/90        Dose:  12.5 mg   Take 1 tablet (12.5 mg) by mouth daily   Quantity:  90 tablet   Refills:  1       VITAMIN D (CHOLECALCIFEROL) PO        Dose:  2000 Units   Take 2,000 Units by mouth daily (2 x 1000 units = 2000 unit dose)   Refills:  0         STOP taking     AMBIEN 5 MG tablet   Generic drug:  zolpidem                Where to get your medicines      Some of these will need a paper prescription and others can be bought over the counter. Ask your nurse if you have questions.     Bring a paper prescription for each of these medications     HYDROcodone-acetaminophen 5-325 MG per tablet                Protect others around you: Learn how to safely use, store and throw away your medicines at www.disposemymeds.org.        Information about OPIOIDS     PRESCRIPTION OPIOIDS: WHAT YOU NEED TO KNOW    Prescription opioids can be used to help relieve moderate to severe pain and are often prescribed following a surgery or injury, or for certain health conditions. These medications can be an important part of treatment but also come with serious risks. It is important to work with your health care provider to make sure you are getting the safest, most effective care.    WHAT ARE THE RISKS AND SIDE EFFECTS OF OPIOID USE?  Prescription opioids carry serious risks of  addiction and overdose, especially with prolonged use. An opioid overdose, often marked by slowed breathing can cause sudden death. The use of prescription opioids can have a number of side effects as well, even when taken as directed:      Tolerance - meaning you might need to take more of a medication for the same pain relief    Physical dependence - meaning you have symptoms of withdrawal when a medication is stopped    Increased sensitivity to pain    Constipation    Nausea, vomiting, and dry mouth    Sleepiness and dizziness    Confusion    Depression    Low levels of testosterone that can result in lower sex drive, energy, and strength    Itching and sweating    RISKS ARE GREATER WITH:    History of drug misuse, substance use disorder, or overdose    Mental health conditions (such as depression or anxiety)    Sleep apnea    Older age (65 years or older)    Pregnancy    Avoid alcohol while taking prescription opioids.   Also, unless specifically advised by your health care provider, medications to avoid include:    Benzodiazepines (such as Xanax or Valium)    Muscle relaxants (such as Soma or Flexeril)    Hypnotics (such as Ambien or Lunesta)    Other prescription opioids    KNOW YOUR OPTIONS:  Talk to your health care provider about ways to manage your pain that do not involve prescription opioids. Some of these options may actually work better and have fewer risks and side effects:    Pain relievers such as acetaminophen, ibuprofen, and naproxen    Some medications that are also used for depression or seizures    Physical therapy and exercise    Cognitive behavioral therapy, a psychological, goal-directed approach, in which patients learn how to modify physical, behavioral, and emotional triggers of pain and stress    IF YOU ARE PRESCRIBED OPIOIDS FOR PAIN:    Never take opioids in greater amounts or more often than prescribed    Follow up with your primary health care provider and work together to create a  plan on how to manage your pain.    Talk about ways to help manage your pain that do not involve prescription opioids    Talk about all concerns and side effects    Help prevent misuse and abuse    Never sell or share prescription opioids    Never use another person's prescription opioids    Store prescription opioids in a secure place and out of reach of others (this may include visitors, children, friends, and family)    Visit www.cdc.gov/drugoverdose to learn about risks of opioid abuse and overdose    If you believe you may be struggling with addiction, tell your health care provider and ask for guidance or call [facility]'s National Helpline at 4-606-149-HELP    LEARN MORE / www.cdc.gov/drugoverdose/prescribing/guideline.html    Safely dispose of unused prescription opioids: Find your local drug take-back programs and more information about the importance of safe disposal at www.doseofreality.[place].gov             Medication List: This is a list of all your medications and when to take them. Check marks below indicate your daily home schedule. Keep this list as a reference.      Medications           Morning Afternoon Evening Bedtime As Needed    atenolol 25 MG tablet   Commonly known as:  TENORMIN   TAKE ONE TABLET BY MOUTH ONE TIME DAILY   Last time this was given:  25 mg on 4/17/2018  7:38 AM                                hydrochlorothiazide 12.5 MG Tabs tablet   Take 1 tablet (12.5 mg) by mouth daily   Last time this was given:  12.5 mg on 4/16/2018  4:08 PM                                HYDROcodone-acetaminophen 5-325 MG per tablet   Commonly known as:  NORCO   Take 1-2 tablets by mouth every 6 hours as needed for moderate to severe pain   Last time this was given:  2 tablets on 4/17/2018  6:22 AM                                VITAMIN D (CHOLECALCIFEROL) PO   Take 2,000 Units by mouth daily (2 x 1000 units = 2000 unit dose)

## 2018-04-16 NOTE — OP NOTE
PREOPERATIVE DIAGNOSIS: Left-sided breast cancer.       POSTOPERATIVE DIAGNOSIS: Left-sided breast cancer.       PROCEDURE:   1. Left mastectomy.   2. Left axillary lymph node dissection      ASSISTANT: Joan Teresa PA-C      ANESTHESIA: LMA.       COMPLICATIONS: None.       BLOOD LOSS: 10 cc.       FINDINGS: Multiple enlarged axillary nodes      INDICATIONS: Mrs. Diaz presented with an advanced left sided breast cancer and metastatic axillary lymphadenopathy. She has elected to go forward with simple mastectomy and axillary dissection I explained the risks, benefits, complications including but not limited to bleeding, infection, possible postop hematoma, seroma, skin necrosis, lymphedema, axillary nerve injury. If any of these occurred, she would require additional procedures. Patient did agree and did sign consent.       DETAILS OF PROCEDURE: The patient was brought to the operating room per anesthesia. She had a preop sentinel node injection, and 5 mL of blue dye was also injected. She was then prepped and draped in the usual fashion using ChloraPrep. A surgical timeout was then performed, verifying the correct surgeon, site, procedure and patient, and all in the room were in agreement. The boundaries were marked with a pen and an elliptical incision was made.  The patient had relatively small breast so all skin that could be preserved was preserved.  I made a small incision around her nipple and spared all skin.  I then created the flaps with cautery going up to the sternal border, the clavicle, the inframammary ridge and the lateral chest wall.  There was a clear plane between the skin and the tumor did not appear to be eroding through any portions of the skin. Cautery was used for hemostasis when needed. I then took the breast off with the clavipectoral fascia. This was done without significant bleeding or other findings. The breast was marked and sent to pathology. It was then copiously irrigated with  saline until it was cleared. There was no bleeding whatsoever.  Attention was turned to the left axillary dissection.  I used the same incision and focused my dissection into the axillary fat pad.  Blunt dissection was used to get proximally up to the axillary vein.  I then located the long thoracic and thoracic dorsal nerves which were in clear view given her lack of significant fatty tissue.  Once all landmarks were located, the axillary tissue was meticulously swept down preserving all nerve and vascular structures.  There were multiple firm nodes that were adamantly adhered to the nerve structures but these were sharply dissect free without nerve injury.  Eventually all axillary contents were removed.  The nerves were left undamaged.  I palpated the area and did not palpate any additional enlarged lymph nodes.  There was no bleeding.  A drain was placed in the axilla and breast cavity.  Both were anchored with 3-0 nylon suture. The skin was approximated with multiple 3-0 Vicryls and a 4-0 Monocryl in subcuticular fashion. Steri-Strips were applied. The patient was awoken from anesthesia. She transferred to PACU in stable condition.  A physician assitant was medically necessary for prepping, patient positioning, to aid in retraction, control of bleeding,and complex closure.           NATASHA OLIVER MD     Please CC to referring provider and PCP

## 2018-04-16 NOTE — IP AVS SNAPSHOT
[facility]    [address] [place] [address]    Phone:  [phone]                                       After Visit Summary   4/16/2018    Muriel Diaz    MRN: [id number]           After Visit Summary Signature Page     I have received my discharge instructions, and my questions have been answered. I have discussed any challenges I see with this plan with the nurse or doctor.    ..........................................................................................................................................  Patient/Patient Representative Signature      ..........................................................................................................................................  Patient Representative Print Name and Relationship to Patient    ..................................................               ................................................  Date                                            Time    ..........................................................................................................................................  Reviewed by Signature/Title    ...................................................              ..............................................  Date                                                            Time

## 2018-04-16 NOTE — ANESTHESIA POSTPROCEDURE EVALUATION
Patient: Muriel Diaz    Procedure(s):  LEFT SIMPLE MASTECTOMY,LEFT AXILLARY DISSECTION - Wound Class: I-Clean   - Wound Class: I-Clean    Diagnosis:LEFT BREAST CANCER  Diagnosis Additional Information: No value filed.    Anesthesia Type:  General    Note:  Anesthesia Post Evaluation    Patient location during evaluation: PACU  Patient participation: Able to fully participate in evaluation  Level of consciousness: awake  Pain management: adequate  Airway patency: patent  Cardiovascular status: acceptable  Respiratory status: acceptable  Hydration status: acceptable  PONV: none     Anesthetic complications: None          Last vitals:  Vitals:    04/16/18 1101   BP: 178/86   Resp: 16   Temp: 36.5  C (97.7  F)   SpO2: 98%         Electronically Signed By: Beau Holman MD  April 16, 2018  1:57 PM

## 2018-04-16 NOTE — PROGRESS NOTES
Admission medication history interview status for the 4/16/2018  admission is complete. See EPIC admission navigator for prior to admission medications     Medication history source reliability:Good    Medication history interview source(s):Patient    Medication history resources (including written lists, pill bottles, clinic record):None    Primary pharmacy.[facility]    Additional medication history information not noted on PTA med list :None    Time spent in this activity: 40 minutes    Prior to Admission medications    Medication Sig Last Dose Taking? Auth Provider   VITAMIN D, CHOLECALCIFEROL, PO Take 2,000 Units by mouth daily (2 x 1000 units = 2000 unit dose) Past Week at AM Yes Reported, Patient   zolpidem (AMBIEN) 5 MG tablet Take 1 tablet (5 mg) by mouth nightly as needed for sleep 4/15/2018 at HS Yes Isabel Landin MD   hydrochlorothiazide 12.5 MG TABS tablet Take 1 tablet (12.5 mg) by mouth daily 4/15/2018 at AM Yes Isabel Landin MD   atenolol (TENORMIN) 25 MG tablet TAKE ONE TABLET BY MOUTH ONE TIME DAILY  4/16/2018 at 0700 Yes Christiano Rosas MD

## 2018-04-16 NOTE — BRIEF OP NOTE
General Surgery Brief Operative Note    Pre-operative diagnosis: LEFT BREAST CANCER   Post-operative diagnosis Same   Procedure: Procedure(s):  LEFT SIMPLE MASTECTOMY, LEFT AXILLARY DISSECTION - Wound Class: I-Clean   - Wound Class: I-Clean    Surgeon(s), Assistant(s): Surgeon(s) and Role:     * Rodney Umana MD - Primary     * Joan Teresa PA-C - Assisting   Estimated blood loss: 10 mL   Drains: Jonah-Myers x 2   Specimens:   ID Type Source Tests Collected by Time Destination   A : Left breast simple mastectomy Tissue Breast, Left SURGICAL PATHOLOGY EXAM Rodney Umana MD 4/16/2018 12:25 PM    B : Left axillary lymph nodes Tissue Axilla, Left SURGICAL PATHOLOGY EXAM Rodney Umana MD 4/16/2018  1:17 PM       Findings: See postop diagnosis   Condition: Stable   Comments:      Joan Teresa PA-C See dictated operative report for full details

## 2018-04-16 NOTE — ANESTHESIA PREPROCEDURE EVALUATION
Anesthesia Evaluation     . Pt has had prior anesthetic. Type: General    History of anesthetic complications   - PONV        ROS/MED HX    ENT/Pulmonary:     (+)tobacco use, Current use , . .    Neurologic:       Cardiovascular:     (+) hypertension----. : . . . :. .       METS/Exercise Tolerance:     Hematologic:         Musculoskeletal:   (+) , , other musculoskeletal- degenerative disc disease      GI/Hepatic:         Renal/Genitourinary:         Endo:         Psychiatric:     (+) psychiatric history anxiety      Infectious Disease:         Malignancy:   (+) Malignancy History of Breast          Other:                                    Anesthesia Plan      History & Physical Review  History and physical reviewed and following examination; no interval change.    ASA Status:  2 .        Plan for General with Intravenous induction. Maintenance will be TIVA.    PONV prophylaxis:  Ondansetron (or other 5HT-3) and Dexamethasone or Solumedrol  Propofol, Zofran, and decadron      Postoperative Care  Postoperative pain management:  IV analgesics and Oral pain medications.      Consents  Anesthetic plan, risks, benefits and alternatives discussed with:  Patient..                          .

## 2018-04-17 VITALS
RESPIRATION RATE: 16 BRPM | BODY MASS INDEX: 18.12 KG/M2 | TEMPERATURE: 95.4 F | HEIGHT: 63 IN | OXYGEN SATURATION: 92 % | WEIGHT: 102.3 LBS | DIASTOLIC BLOOD PRESSURE: 83 MMHG | SYSTOLIC BLOOD PRESSURE: 138 MMHG

## 2018-04-17 LAB — GLUCOSE BLDC GLUCOMTR-MCNC: 111 MG/DL (ref 70–99)

## 2018-04-17 PROCEDURE — 25000132 ZZH RX MED GY IP 250 OP 250 PS 637: Performed by: SURGERY

## 2018-04-17 PROCEDURE — 82962 GLUCOSE BLOOD TEST: CPT

## 2018-04-17 PROCEDURE — 40000934 ZZH STATISTIC OUTPATIENT (NON-OBS) DAY

## 2018-04-17 PROCEDURE — 25000132 ZZH RX MED GY IP 250 OP 250 PS 637: Performed by: PHYSICIAN ASSISTANT

## 2018-04-17 RX ORDER — HYDROCODONE BITARTRATE AND ACETAMINOPHEN 5; 325 MG/1; MG/1
1-2 TABLET ORAL EVERY 6 HOURS PRN
Qty: 25 TABLET | Refills: 0 | Status: SHIPPED | OUTPATIENT
Start: 2018-04-17 | End: 2018-04-20

## 2018-04-17 RX ORDER — HYDROCODONE BITARTRATE AND ACETAMINOPHEN 5; 325 MG/1; MG/1
1-2 TABLET ORAL EVERY 6 HOURS PRN
Status: DISCONTINUED | OUTPATIENT
Start: 2018-04-17 | End: 2018-04-17 | Stop reason: HOSPADM

## 2018-04-17 RX ADMIN — HYDROCODONE BITARTRATE AND ACETAMINOPHEN 2 TABLET: 5; 325 TABLET ORAL at 06:22

## 2018-04-17 RX ADMIN — HYDROCODONE BITARTRATE AND ACETAMINOPHEN 2 TABLET: 5; 325 TABLET ORAL at 00:35

## 2018-04-17 RX ADMIN — ATENOLOL 25 MG: 25 TABLET ORAL at 07:38

## 2018-04-17 NOTE — PROGRESS NOTES
"[facility]  General Surgery Progress Note    Admission Date: 2018         Assessment and Plan:   Muriel Diaz is a 57 year old female S/P Procedure(s):  Left mastectomy with axillary node dissection, POD 1    - Pain controlled with Norco  - Ambulate 4x day and encourage IS  - Home today  - ADE drain teaching  - DC instructions discussed              Interval History:   Pain controlled, tolerating diet, UO adequate, ambulating. Meds reviewed.                    Physical Exam:   Blood pressure 138/83, temperature 95.4  F (35.2  C), temperature source Oral, resp. rate 16, height 5' 3\" (1.6 m), weight 102 lb 4.8 oz (46.4 kg), SpO2 92 %.  Temperature Temp  Av.1  F (36.7  C)  Min: 95.4  F (35.2  C)  Max: 98.8  F (37.1  C)   I/O last 3 completed shifts:  In: 1200 [I.V.:1200]  Out: 2270 [Urine:0; Drains:210; Blood:10]  Constitutional:  Awake, alert, oriented, and in no apparent distress.   Lungs: No increased work of breathing, good air exchange, clear to auscultation bilaterally, and no crackles or wheezing.   Cardiovascular: Regular rate and rhythm, normal S1 and S2, and no murmur noted.   Chest Wound(s): Appropriately tender. Clean, dry, and intact. No erythema or drainage. No palpable seroma/hematoma. ADE drain intact without leakage, serosanguinous.  Minimal ecchymosis superior flap.   Extremities: Non-tender and without significant swelling to upper extremities. No edema or calf tenderness.          Data:   No new labs/imaging.     Joan Teresa PA-C  [facility]  [phone]    "

## 2018-04-17 NOTE — PLAN OF CARE
Problem: Patient Care Overview  Goal: Discharge Needs Assessment  Outcome: No Change  LEFT SIMPLE MASTECTOMY,LEFT AXILLARY DISSECTION   Patient is A & O x 4.  Dressing to Left breast is CDI.  Dressings to both ADE's are CDI/unclamped.  Taking dilaudid for pain with relief.  Plan for discharge to[place]orro and teaching of ADE drain care at home.

## 2018-04-17 NOTE — PROVIDER NOTIFICATION
MD Notification    Notified Person: MD    Notified Person Name: Bj Ludwig    Notification Date/Time: 4/17/18 0020    Purpose of Notification: currently ordered PO dilaudid making pt. Nauseous, pt. Reports PO norco has worked well in the past    Orders Received: 1-2 tabs PO Norco (5-325mg) q 6 hrs PRN for pain

## 2018-04-17 NOTE — DISCHARGE INSTRUCTIONS
[facility]  Discharge Instructions: Post-Operative Mastectomy    ACTIVITY    Take frequent, short walks and increase your activity gradually.      Avoid strenuous physical activity or heavy lifting greater than 15-20 lbs. for 2 weeks on the side of surgery.  You may climb stairs.     Gentle rotation and stretching of your arms and shoulders will prevent joint stiffness.    You may drive without restrictions when you are not using any prescription pain medication and feel comfortable in a car.    You may return to work/school when you are comfortable without any prescription pain medication.    WOUND CARE    You may remove your outer dressing and shower 48 hours after the surgery.  Pat your incisions dry and leave them open to air.  Re-apply dressing (gauze/tape) as needed for comfort or drainage.    You may have steri-strips (looks like white tape) or staples at your incisions.  You may peel off the steri-strips 2 weeks after your surgery if they have not peeled off on their own.  If you have staples, they will be removed at your next office visit.    Do not soak your incisions in a tub or pool for 2 weeks.     Do not apply any lotions, creams, or ointments to your incisions.    A ridge under your incisions is normal and will gradually resolve.    Wear the mastectomy camisole for comfort.    You may have 1-2 drains at your surgical site, refer to your drain care instructions.  Record output totals daily.  You will need to schedule an appointment for drain removal when your output is less than 30 mL per day for 1-2 days or 2 weeks, whichever comes first.    DIET    Start with liquids, then gradually resume your regular diet as tolerated.     Drink plenty of fluids to stay hydrated.    PAIN    Expect some tenderness and discomfort at the incision site(s).  Use the prescribed pain medication at your discretion.  Expect gradual resolution of your pain over several days.    You may  take ibuprofen with food (unless you have been told not to) instead of or in addition to your prescribed pain medication.  If you are taking Norco or Percocet, do not take any additional acetaminophen/APAP/Tylenol.    Do not drink alcohol or drive while you are taking pain medications.    You may apply ice to your incisions in 20 minute intervals as needed for the next 48 hours.  After that time, consider switching to heat if you prefer.    EXPECTATIONS    Pain medications can cause constipation.  Limit use when possible.  Take over the counter stool softener/stimulant, such as Colace or Senna, 1-2 times a day with plenty of water.  You may take a mild over the counter laxative, such as Miralax or a suppository, as needed.      You may discontinue these medications once you are having regular bowel movements and/or are no longer taking your narcotic pain medication.    RETURN APPOINTMENT    Follow up with our office when your drain is ready to be removed.  You will also follow up with your surgeon in 2 weeks.  Please call our office at [phone] to schedule your appointment.    CALL OUR OFFICE [phone] IF YOU HAVE:     Chills or fever above 101 F.    Increased redness, warmth, or drainage at your incisions.    Significant bleeding or swelling.    Pain not relieved by your pain medication or rest.    Increasing pain after the first 48 hours.    Any other concerns or questions.    Revised January 2018

## 2018-04-17 NOTE — PLAN OF CARE
Problem: Patient Care Overview  Goal: Plan of Care/Patient Progress Review  Outcome: Adequate for Discharge Date Met: 04/17/18  Pt a/o. VSS. Medicated for pain. Adlib. Tolerating reg diet. drsg CDI. ADE drain X2. Discharge instructions reviewed with pt. ADE drain instructions given. Handout given. Verbalizes understanding. PIV removed. Take home meds given. Verified 5 rights of med. All questions answered.

## 2018-04-17 NOTE — PLAN OF CARE
Problem: Patient Care Overview  Goal: Plan of Care/Patient Progress Review  Outcome: Improving  A&Ox4. VSS on RA. SBA. ADE sites x2 CDI. Drainage moderate amount serosanguinous. Pain controlled w/ norco. Voiding w/o difficulty. Refused capno, continuous O2 monitoring. Probable DC today.

## 2018-04-19 ENCOUNTER — TELEPHONE (OUTPATIENT)
Dept: SURGERY | Facility: CLINIC | Age: 58
End: 2018-04-19

## 2018-04-19 NOTE — TELEPHONE ENCOUNTER
Doing well. Pain controlled. Drains stable. Path showed 4.3 cm tumor with 3/5 nodes positive. Patient will return in 1 week for drain removal.

## 2018-04-20 ENCOUNTER — TELEPHONE (OUTPATIENT)
Dept: SURGERY | Facility: CLINIC | Age: 58
End: 2018-04-20

## 2018-04-20 DIAGNOSIS — Z90.12 S/P LEFT MASTECTOMY: ICD-10-CM

## 2018-04-20 DIAGNOSIS — Z90.12 STATUS POST LEFT MASTECTOMY: Primary | ICD-10-CM

## 2018-04-20 RX ORDER — HYDROCODONE BITARTRATE AND ACETAMINOPHEN 5; 325 MG/1; MG/1
1-2 TABLET ORAL EVERY 6 HOURS PRN
Qty: 25 TABLET | Refills: 0 | Status: SHIPPED | OUTPATIENT
Start: 2018-04-20 | End: 2018-04-20

## 2018-04-20 RX ORDER — HYDROCODONE BITARTRATE AND ACETAMINOPHEN 5; 325 MG/1; MG/1
1-2 TABLET ORAL EVERY 6 HOURS PRN
Qty: 25 TABLET | Refills: 0 | Status: SHIPPED | OUTPATIENT
Start: 2018-04-20 | End: 2018-04-24

## 2018-04-20 NOTE — TELEPHONE ENCOUNTER
Refill approved by covering physician, Dr. Barbosa. Patient notified paper prescription can be picked up at her convenience at [facility].    Amanda Swift RN, BSN  Nurse Navigator   [facility]  [phone]

## 2018-04-20 NOTE — TELEPHONE ENCOUNTER
Name of caller: Patient    Reason for Call:  Would like a refill of [place], please call patient  When ready    Surgeon:  Dr. Umana    Recent Surgery:  Yes.    If yes, when & what type:  4/16/18 mastectomy       Best phone number to reach pt at is: [phone]  Ok to leave a message with medical info? Yes.    Pharmacy preferred (if calling for a refill):    [id number] - [place], [place] - [address]

## 2018-04-20 NOTE — TELEPHONE ENCOUNTER
Muriel is s/p left mastectomy, left axillary LND on 4/16/2017. She was discharged to home on 4/17/2018 with Norco 5/325 mg #25 for pain relief.    Currently she takes Norco 5/325 mg, 2 tabs, every 6-8 hours continuously. She is icing the site. She is not taking ibuprofen at this time. She reports adequate pain relief with the Norco. Patient requesting refill.     Will review refill request with JDS and call patient back. Patient verbalized understanding.    Amanda Swift RN, BSN  Nurse Navigator   [facility]  [phone]

## 2018-04-24 ENCOUNTER — OFFICE VISIT (OUTPATIENT)
Dept: SURGERY | Facility: CLINIC | Age: 58
End: 2018-04-24
Payer: COMMERCIAL

## 2018-04-24 VITALS
OXYGEN SATURATION: 97 % | DIASTOLIC BLOOD PRESSURE: 80 MMHG | HEART RATE: 64 BPM | BODY MASS INDEX: 18.25 KG/M2 | WEIGHT: 103 LBS | HEIGHT: 63 IN | SYSTOLIC BLOOD PRESSURE: 130 MMHG | TEMPERATURE: 98 F

## 2018-04-24 DIAGNOSIS — Z09 FOLLOW-UP EXAMINATION: Primary | ICD-10-CM

## 2018-04-24 PROCEDURE — 99024 POSTOP FOLLOW-UP VISIT: CPT | Performed by: SURGERY

## 2018-04-24 RX ORDER — HYDROCODONE BITARTRATE AND ACETAMINOPHEN 5; 325 MG/1; MG/1
1-2 TABLET ORAL EVERY 4 HOURS PRN
Qty: 15 TABLET | Refills: 0 | Status: SHIPPED | OUTPATIENT
Start: 2018-04-24 | End: 2018-05-01

## 2018-04-24 NOTE — PROGRESS NOTES
Surgery    Doing well.  Pain mostly at night.  Drains with scant output.  No other complaints.    Breast-incision completely healed.  No ecchymosis or erythema.  Drains were removed with minimal output.    A/P  Adriana is recovering well from her mastectomy and axillary dissection.  We discussed her pathology in detail and she will follow-up with oncology next week for further management.  Her incisions have healed well without signs of infection.  I will provide her with 1 additional narcotic prescription to help her sleep at night.  I will see her in 2-4 weeks or sooner if she desires a port placed.  She can call the office to schedule at any time.    Rodney Umana M.D.  [facility]  [phone]

## 2018-04-24 NOTE — LETTER
2018    Re: Muriel Diaz, : 1960    Surgery     Doing well.  Pain mostly at night.  Drains with scant output.  No other complaints.     Breast-incision completely healed.  No ecchymosis or erythema.  Drains were removed with minimal output.     A/P  Adriana is recovering well from her mastectomy and axillary dissection.  We discussed her pathology in detail and she will follow-up with oncology next week for further management.  Her incisions have healed well without signs of infection.  I will provide her with 1 additional narcotic prescription to help her sleep at night.  I will see her in 2-4 weeks or sooner if she desires a port placed.  She can call the office to schedule at any time.     Rodney Umana M.D.  [facility]  [phone]

## 2018-04-24 NOTE — MR AVS SNAPSHOT
"              After Visit Summary   4/24/2018    Muriel Diaz    MRN: [id number]           Patient Information     Date Of Birth          1960        Visit Information        Provider Department      4/24/2018 2:15 PM Rodney Umana MD [facility] [place] [facility] General Surgery      Today's Diagnoses     Follow-up examination    -  1       Follow-ups after your visit        Your next 10 appointments already scheduled     May 01, 2018 11:30 AM CDT   Return Visit with Patience Mckeon MD   [facility] ([facility])    [facility] [place]  [address]  [place] [address] [phone]              Who to contact     If you have questions or need follow up information about today's clinic visit or your schedule please contact [facility] directly at [phone].  Normal or non-critical lab and imaging results will be communicated to you by MyChart, letter or phone within 4 business days after the clinic has received the results. If you do not hear from us within 7 days, please contact the clinic through MyChart or phone. If you have a critical or abnormal lab result, we will notify you by phone as soon as possible.  Submit refill requests through [employer] or call your pharmacy and they will forward the refill request to us. Please allow 3 business days for your refill to be completed.          Additional Information About Your Visit        [employer] Information     [employer] lets you send messages to your doctor, view your test results, renew your prescriptions, schedule appointments and more. To sign up, go to [email or web address] . Click on \"Log in\" on the left side of the screen, which will take you to the Welcome page. Then click on \"Sign up Now\" on the right side of the page.     You will be asked to enter the access code listed below, as well as some personal information. Please follow the " "directions to create your username and password.     Your access code is: [email or web address]  Expires: 2018  9:48 AM     Your access code will  in 90 days. If you need help or a new code, please call your [facility] clinic or [phone].        Care EveryWhere ID     This is your Care EveryWhere ID. This could be used by other organizations to access your [facility] medical records  [id number]        Your Vitals Were     Pulse Temperature Height Pulse Oximetry BMI (Body Mass Index)       64 98  F (36.7  C) (Oral) 5' 3\" (1.6 m) 97% 18.25 kg/m2        Blood Pressure from Last 3 Encounters:   18 130/80   18 138/83   18 130/86    Weight from Last 3 Encounters:   18 103 lb (46.7 kg)   18 102 lb 4.8 oz (46.4 kg)   18 102 lb 12.8 oz (46.6 kg)              Today, you had the following     No orders found for display         Today's Medication Changes          These changes are accurate as of 18  2:54 PM.  If you have any questions, ask your nurse or doctor.               Start taking these medicines.        Dose/Directions    HYDROcodone-acetaminophen 5-325 MG per tablet   Commonly known as:  NORCO   Used for:  Follow-up examination   Started by:  Rodney Umana MD        Dose:  1-2 tablet   Take 1-2 tablets by mouth every 4 hours as needed for pain maximum 10 tablet(s) per day   Quantity:  15 tablet   Refills:  0            Where to get your medicines      Some of these will need a paper prescription and others can be bought over the counter.  Ask your nurse if you have questions.     Bring a paper prescription for each of these medications     HYDROcodone-acetaminophen 5-325 MG per tablet               Information about OPIOIDS     PRESCRIPTION OPIOIDS: WHAT YOU NEED TO KNOW   You have a prescription for an opioid (narcotic) pain medicine. Opioids can cause addiction. If you have a history of chemical dependency of any type, you are at a higher risk of becoming addicted " to opioids. Only take this medicine after all other options have been tried. Take it for as short a time and as few doses as possible.     Do not:    Drive. If you drive while taking these medicines, you could be arrested for driving under the influence (DUI).    Operate heavy machinery    Do any other dangerous activities while taking these medicines.     Drink any alcohol while taking these medicines.      Take with any other medicines that contain acetaminophen. Read all labels carefully. Look for the word  acetaminophen  or  Tylenol.  Ask your pharmacist if you have questions or are unsure.    Store your pills in a secure place, locked if possible. We will not replace any lost or stolen medicine. If you don t finish your medicine, please throw away (dispose) as directed by your pharmacist. The [place] Pollution Control Agency has more information about safe disposal: https://www.pca.[facility].[place].us/living-green/managing-unwanted-medications    All opioids tend to cause constipation. Drink plenty of water and eat foods that have a lot of fiber, such as fruits, vegetables, prune juice, apple juice and high-fiber cereal. Take a laxative (Miralax, milk of magnesia, Colace, Senna) if you don t move your bowels at least every other day.          Primary Care Provider Office Phone # Fax #    Isabel Vickie Landin [phone] [address] [place] [address]        Equal Access to Services     FRANCISCA HUNT AH: Hadii lupe dennison hadasho Socriss, waaxda luqadaha, qaybta kaalmada adehank, kvng gupta. So [facility] [phone].    ATENCIÓN: Si habla español, tiene a chowdary disposición servicios gratuitos de asistencia lingüística. Llame al [phone].    We comply with applicable federal civil rights laws and [place] laws. We do not discriminate on the basis of race, color, national origin, age, disability, sex, sexual orientation, or gender identity.            Thank  you!     Thank you for choosing [facility]  for your care. Our goal is always to provide you with excellent care. Hearing back from our patients is one way we can continue to improve our services. Please take a few minutes to complete the written survey that you may receive in the mail after your visit with us. Thank you!             Your Updated Medication List - Protect others around you: Learn how to safely use, store and throw away your medicines at www.disposemymeds.org.          This list is accurate as of 4/24/18  2:54 PM.  Always use your most recent med list.                   Brand Name Dispense Instructions for use Diagnosis    atenolol 25 MG tablet    TENORMIN    30 tablet    TAKE ONE TABLET BY MOUTH ONE TIME DAILY    Hypertension goal BP (blood pressure) < 140/90       hydrochlorothiazide 12.5 MG Tabs tablet     90 tablet    Take 1 tablet (12.5 mg) by mouth daily    Hypertension goal BP (blood pressure) < 140/90       HYDROcodone-acetaminophen 5-325 MG per tablet    NORCO    15 tablet    Take 1-2 tablets by mouth every 4 hours as needed for pain maximum 10 tablet(s) per day    Follow-up examination       VITAMIN D (CHOLECALCIFEROL) PO      Take 2,000 Units by mouth daily (2 x 1000 units = 2000 unit dose)

## 2018-05-01 ENCOUNTER — ONCOLOGY VISIT (OUTPATIENT)
Dept: ONCOLOGY | Facility: CLINIC | Age: 58
End: 2018-05-01
Attending: INTERNAL MEDICINE
Payer: COMMERCIAL

## 2018-05-01 VITALS
HEART RATE: 67 BPM | SYSTOLIC BLOOD PRESSURE: 142 MMHG | WEIGHT: 104 LBS | RESPIRATION RATE: 16 BRPM | OXYGEN SATURATION: 97 % | DIASTOLIC BLOOD PRESSURE: 89 MMHG | HEIGHT: 63 IN | TEMPERATURE: 98.3 F | BODY MASS INDEX: 18.43 KG/M2

## 2018-05-01 DIAGNOSIS — Z17.0 MALIGNANT NEOPLASM OF UPPER-INNER QUADRANT OF LEFT BREAST IN FEMALE, ESTROGEN RECEPTOR POSITIVE (H): Primary | ICD-10-CM

## 2018-05-01 DIAGNOSIS — C50.212 MALIGNANT NEOPLASM OF UPPER-INNER QUADRANT OF LEFT BREAST IN FEMALE, ESTROGEN RECEPTOR POSITIVE (H): Primary | ICD-10-CM

## 2018-05-01 DIAGNOSIS — Z09 FOLLOW-UP EXAMINATION: ICD-10-CM

## 2018-05-01 PROCEDURE — G0463 HOSPITAL OUTPT CLINIC VISIT: HCPCS

## 2018-05-01 PROCEDURE — 99215 OFFICE O/P EST HI 40 MIN: CPT | Performed by: INTERNAL MEDICINE

## 2018-05-01 RX ORDER — ALBUTEROL SULFATE 90 UG/1
1-2 AEROSOL, METERED RESPIRATORY (INHALATION)
Status: CANCELLED
Start: 2018-05-22

## 2018-05-01 RX ORDER — PALONOSETRON 0.05 MG/ML
0.25 INJECTION, SOLUTION INTRAVENOUS ONCE
Status: CANCELLED
Start: 2018-05-22

## 2018-05-01 RX ORDER — DIPHENHYDRAMINE HYDROCHLORIDE 50 MG/ML
50 INJECTION INTRAMUSCULAR; INTRAVENOUS
Status: CANCELLED
Start: 2018-05-22

## 2018-05-01 RX ORDER — EPINEPHRINE 1 MG/ML
0.3 INJECTION, SOLUTION INTRAMUSCULAR; SUBCUTANEOUS EVERY 5 MIN PRN
Status: CANCELLED | OUTPATIENT
Start: 2018-05-22

## 2018-05-01 RX ORDER — ALBUTEROL SULFATE 0.83 MG/ML
2.5 SOLUTION RESPIRATORY (INHALATION)
Status: CANCELLED | OUTPATIENT
Start: 2018-05-22

## 2018-05-01 RX ORDER — HYDROCODONE BITARTRATE AND ACETAMINOPHEN 5; 325 MG/1; MG/1
1-2 TABLET ORAL EVERY 4 HOURS PRN
Qty: 15 TABLET | Refills: 0 | Status: ON HOLD | OUTPATIENT
Start: 2018-05-01 | End: 2018-05-14

## 2018-05-01 RX ORDER — EPINEPHRINE 0.3 MG/.3ML
0.3 INJECTION SUBCUTANEOUS EVERY 5 MIN PRN
Status: CANCELLED | OUTPATIENT
Start: 2018-05-22

## 2018-05-01 RX ORDER — SODIUM CHLORIDE 9 MG/ML
1000 INJECTION, SOLUTION INTRAVENOUS CONTINUOUS PRN
Status: CANCELLED
Start: 2018-05-22

## 2018-05-01 RX ORDER — DOXORUBICIN HYDROCHLORIDE 2 MG/ML
60 INJECTION, SOLUTION INTRAVENOUS ONCE
Status: CANCELLED | OUTPATIENT
Start: 2018-05-22

## 2018-05-01 RX ORDER — METHYLPREDNISOLONE SODIUM SUCCINATE 125 MG/2ML
125 INJECTION, POWDER, LYOPHILIZED, FOR SOLUTION INTRAMUSCULAR; INTRAVENOUS
Status: CANCELLED
Start: 2018-05-22

## 2018-05-01 RX ORDER — MEPERIDINE HYDROCHLORIDE 25 MG/ML
25 INJECTION INTRAMUSCULAR; INTRAVENOUS; SUBCUTANEOUS EVERY 30 MIN PRN
Status: CANCELLED | OUTPATIENT
Start: 2018-05-22

## 2018-05-01 RX ORDER — LORAZEPAM 2 MG/ML
0.5 INJECTION INTRAMUSCULAR EVERY 4 HOURS PRN
Status: CANCELLED
Start: 2018-05-22

## 2018-05-01 ASSESSMENT — PAIN SCALES - GENERAL: PAINLEVEL: NO PAIN (0)

## 2018-05-01 NOTE — PATIENT INSTRUCTIONS
-schedule echo in the next 1-2 weeks  Scheduled/deysi  -schedule port placement with Dr. Umana in the next 1-3 weeks  GLADIS [phone]  VM message left for Gladis/Deysi  -lymphedema therapy referral  -chemotherapy teaching on Adriamycin, Cytoxan, Taxol  -schedule chemotherapy start on 5/22/18  Scheduled/deysi  -return to clinic with Dr. Mckeon on 5/22/18 Scheduled/janice      AVS printed & given to patient/deysi

## 2018-05-01 NOTE — PROGRESS NOTES
[facility]    Hematology/Oncology New Patient Note      Today's Date: 05/01/18    Reason for Follow-up: left sided breast cancer      HISTORY OF PRESENT ILLNESS: Muriel Diaz is a 57 year old post-menopausal female with PMHx of HTN, degenerative joint disease, history of cervical spine surgery, who presents with left-sided breast cancer.  She underwent left mastectomy on 4/16/18 by Dr. Umana.  Pathology showed invasive mammary carcinoma, with lobular and ductal features, grade 2, tumor size 4.3 x 4.0 x 2.1 cm, positive margin for invasive carcinoma in the central and lateral posterior surface, DCIS and LCIS present, ER positive (95%), MN positive (50%), HER-2 negative.  Left axillary dissection showed 3 of 5 lymph nodes were positive for metastatic carcinoma (lobular type), also ER positive, MN positive, HER-2 negative. Stage [id number] (stage IIA).          INTERIM HISTORY: Muriel says that she is recovering from surgery okay.  Her incision sites have healed.  She still feels pain and tightness at the surgical site.  She is still taking Norco for the pain and asks for another prescription today.        REVIEW OF SYSTEMS:   14 point ROS was reviewed and is negative other than as noted above in HPI.       HOME MEDICATIONS:  Current Outpatient Prescriptions   Medication Sig Dispense Refill     atenolol (TENORMIN) 25 MG tablet TAKE ONE TABLET BY MOUTH ONE TIME DAILY  30 tablet 1     hydrochlorothiazide 12.5 MG TABS tablet Take 1 tablet (12.5 mg) by mouth daily 90 tablet 1     HYDROcodone-acetaminophen (NORCO) 5-325 MG per tablet Take 1-2 tablets by mouth every 4 hours as needed for pain maximum 10 tablet(s) per day 15 tablet 0     VITAMIN D, CHOLECALCIFEROL, PO Take 2,000 Units by mouth daily (2 x 1000 units = 2000 unit dose)           ALLERGIES:  Allergies   Allergen Reactions     Amitriptyline Other (See Comments)     nightmares     Oxycontin [Oxycodone] Nausea     Severe headaches          PAST MEDICAL HISTORY:  Past Medical History:   Diagnosis Date     Degeneration of cervical intervertebral disc      Degeneration of lumbar or lumbosacral intervertebral disc      Hypertension      PONV (postoperative nausea and vomiting)          PAST SURGICAL HISTORY:  Past Surgical History:   Procedure Laterality Date     BACK SURGERY  2001    lumbar fusion, cervical discectomy     DISSECT LYMPH NODE AXILLA Left 4/16/2018    Procedure: DISSECT LYMPH NODE AXILLA;;  Surgeon: Rodney Umana MD;  Location: [facility]     FUSION CERVICAL ANTERIOR THREE+ LEVELS  5/1/2012    Procedure:FUSION CERVICAL ANTERIOR THREE+ LEVELS; Surgeon:SARAH FRANCO; Location:[facility] OR     FUSION CERVICAL POSTERIOR THREE+ LEVELS  5/1/2012    Procedure:FUSION CERVICAL POSTERIOR THREE+ LEVELS; Posterior Cervical decompression and fusion C4-7, Anterior Cervical decompression and fusion C4-7 (c-arm), (herb table with abraham, yina oasis, yina aviator, Vitoss foam packing strip, impulse monitoring,); Surgeon:SARAH FRANCO; Location: OR     GYN SURGERY       HYSTERECTOMY VAGINAL  1990's    Judy Aceves OB Gyn specilist     HYSTERECTOMY, PAP NO LONGER INDICATED       MASTECTOMY SIMPLE Left 4/16/2018    Procedure: MASTECTOMY SIMPLE;  LEFT SIMPLE MASTECTOMY,LEFT AXILLARY DISSECTION;  Surgeon: Rodney Umana MD;  Location: [facility]         SOCIAL HISTORY:  Social History     Social History     Marital status: Single     Spouse name: N/A     Number of children: 0     Years of education: N/A     Occupational History      [employer]      Social History Main Topics     Smoking status: Current Every Day Smoker     Packs/day: 0.50     Types: Cigarettes     Smokeless tobacco: Never Used      Comment: 4 cigarettes per day     Alcohol use 0.6 oz/week     1 Standard drinks or equivalent per week      Comment: 2-3 drinks per week     Drug use: No     Sexual activity: Not Currently     Partners: Male     Other Topics Concern  "    Parent/Sibling W/ Cabg, Mi Or Angioplasty Before 65f 55m? No     Social History Narrative   She smokes cigarettes, half a pack a day for many years; she is unable to quantify how many.  She drinks alcohol occasionally.  She denies illicit drug use.  She is retired.  She used to to work in  at large companies.  She lives in [place] with her boyfriend.        FAMILY HISTORY:  Family History   Problem Relation Age of Onset     Pancreatitis Mother      Substance Abuse Mother      Unknown/Adopted Father      She does not know much about her family history and is unaware of any cancers in her family.  She has never been pregnant.  She had a hysterectomy in the .  She still has her ovaries.  She says that she has undergone menopause but is unsure when, likely years ago.        PHYSICAL EXAM:  Vital signs:  /89 (BP Location: Left arm, Patient Position: Chair, Cuff Size: Adult Regular)  Pulse 67  Temp 98.3  F (36.8  C)  Resp 16  Ht 1.6 m (5' 3\")  Wt 47.2 kg (104 lb)  SpO2 97%  BMI 18.42 kg/m2   ECO  GENERAL/CONSTITUTIONAL: No acute distress.  EYES: No scleral icterus.  LYMPH: No anterior cervical, posterior cervical, supraclavicular, or axillary adenopathy.   RESPIRATORY: Clear to auscultation bilaterally. No crackles or wheezing.   CARDIOVASCULAR: Regular rate and rhythm without murmurs, gallops, or rubs.  GASTROINTESTINAL: No tenderness. The patient has normal bowel sounds. No guarding.  No distention.  BREAST: Right-no palpable mass, discharge, rash, or axillary lymphadenopathy.  Left-s/p mastectomy, steri-strips still in place, wound healed well.  MUSCULOSKELETAL: Warm and well-perfused, no cyanosis, clubbing, or edema.  NEUROLOGIC: Alert, oriented, answers questions appropriately.  INTEGUMENTARY: No rashes or jaundice.  GAIT: Steady, does not use assistive device      LABS:  None today.      PATHOLOGY:  18:  FINAL DIAGNOSIS:     A: Left breast, total mastectomy   - " "Invasive mammary carcinoma, with lobular and ductal features   - Grade 2   - Tumor size: 4.3 x 4.0 x 2.1 cm   - Margins: Focally positive for invasive carcinoma in the central and   lateral posterior surface   - In situ ductal and in situ lobular carcinoma   - In situ carcinoma 1.5 mm from the deep margin   - Prior core biopsy case [id number]: Estrogen receptor positive, 95%,   progesterone receptor, 50% and HER2 non   amplified (1.5)     B: Left axillary lymph node dissection   - Three of five lymph nodes positive for metastatic carcinoma (lobular   type )   - Prior left axillary lymph node core biopsy: Estrogen receptor positive,   95%, progesterone receptor positive,   50% and HER-2 non amplified (1.6)   - Largest positive lymph node 1.2 cm   - Extranodal tumor extension present       A: Simple mastectomy, left breast     Synoptic Report:     SPECIMEN     Procedure:         - Total mastectomy     Specimen Laterality:         - Left     TUMOR     Histologic Type:         - Invasive carcinoma with ductal and lobular features (\"mixed type         carcinoma\")     Histologic Grade (Lisa Histologic Score)       Glandular (Acinar) / Tubular Differentiation:           - Score 3       Nuclear Pleomorphism:           - Score 2       Mitotic Rate:           - Score 1 (<=3 mitoses per mm2)       Overall Grade:           - Grade 2 (scores of 6 or 7)     Tumor Size: 4.3 Millimeters (mm)     Ductal Carcinoma In Situ (DCIS):         - DCIS is present in specimen     Tumor Extent       Skin Invasion:           - Invasive carcinoma directly invades into the dermis or epidermis           without skin ulceration (this does not change the T stage)     Accessory Findings       Treatment Effect:           - No known presurgical therapy     MARGINS     Invasive Carcinoma Margins:         - Positive for invasive carcinoma       Positive Margin(s):           - Posterior     DCIS Margins:         - Uninvolved by DCIS       Distance of " DCIS from Closest Margin: 1.5 Millimeters (mm)       Closest Margin:           - Posterior     LYMPH NODES     Number of Lymph Nodes with Macrometastases (> 2 mm): 3     Number of Lymph Nodes with Micrometastases (> 0.2 mm to 2 mm and / or >   200     cells): 0     Number of Lymph Nodes with Isolated Tumor Cells (<= 0.2 mm and <= 200   cells): 0     Number of Lymph Nodes Examined: 5     PATHOLOGIC STAGE CLASSIFICATION (PTNM, AJCC 8TH EDITION)     Primary Tumor (Invasive Carcinoma) (pT):         - pT1a     Regional Lymph Nodes (pN)       Category (pN):           - pN1a       IMAGING:  Mammogram and ultrasound 3/28/18:  FINDINGS:  Standard bilateral diagnostic views were obtained,  including tomosynthesis. Marker was placed on the left upper breast to  denote the site of the palpable lump; deep to the marker there is a  mass measuring approximately 2 cm. The mass is associated with  retraction of the left nipple. There are no associated  microcalcifications. No suspicious findings in the contralateral right  breast.     Further evaluation with targeted left breast ultrasound shows a  corresponding hypoechoic lesion at the 11:00 position, 2 cm from the  nipple, measuring 1.9 x 2.9 x 1.0 cm. Survey of the axilla shows an  enlarged lymph node measuring 0.7 x 0.6 cm.     MRI breast 4/9/18:  1. The known left breast malignancy measures 2.5 x 2.0 x 2.6 cm on  MRI. There is additional nonmass enhancement surrounding the mass in  the upper left breast.  2. No suspicious MRI finding in the right breast.      PET-CT 4/9/18:  1. Hypermetabolic left breast mass representing the primary  malignancy.  2. A few mildly prominent left axillary lymph nodes that are  associated with only very mild metabolism. These are felt to be  indeterminant. There is a subcentimeter lymph node with mild  hypermetabolism at the right anterior upper mediastinum between the  innominate artery and superior vena cava that is felt to also  be  indeterminant.  3. No other pathologic activity.  4. Small areas of sclerosis within the thoracic and lumbar spine.    Normal soft tissue neck CT.        ASSESSMENT/PLAN:  Muriel Diaz is a 57 year old post-menopausal female with:    1) Left breast cancer of the upper inner quadrant: s/p left mastectomy on 4/16/18; pathology showed invasive mammary carcinoma, with lobular and ductal features, grade 2, tumor size 4.3 x 4.0 x 2.1 cm, positive margin for invasive carcinoma in the central and lateral posterior surface, DCIS and LCIS present, ER positive (95%), CA positive (50%), HER-2 negative.  Left axillary dissection showed 3 of 5 lymph nodes were positive for metastatic carcinoma (lobular type), also ER positive, CA positive, HER-2 negative. Stage [id number] (stage IIA).      I discussed with Dr. Umana regarding the positive posterior margin.  He took the posterior margin, including the fascia, and there is nothing left to take.  She can proceed with chemotherapy.      I discussed with Muriel that the the 3 positive lymph nodes, she is at high risk for recurrence, and so chemotherapy would be recommended in the adjuvant setting.  After chemotherapy, I will refer her to see radiation oncology for consideration of radiation, and then plan on starting hormonal therapy after that.  Muriel is agreeable with this plan.    -chemotherapy regimen as follows:    Doxorubicin 60 mg/m2 IV on day 1  Cyclophosphamide 600 mg/m2 IV on day 1  Neulasta 6 mg SQ on day 2  Every 2 week cycles x 4 cycles    Followed by:    Paclitaxel 80 mg/m2 IV once a week x 12 weeks    -side effects may include, but not limited to: fever/chills, infection, fatigue, hair loss, pain, bleeding, anemia, myelosuppression, nausea/vomiting, diarrhea/constipation, organ failure, neuropathy, edema, rash, swelling, shortness of breath, chest pain  -I discussed the schedule, regimen, dose, possible side effects, the benefits and risks, and patient agrees  to treatment plan.  -chemotherapy teaching today  -refer back to Dr. Umana for port placement prior to starting chemotherapy  -schedule echo prior to starting chemotherapy  -refer for lymphedema therapy  -pain: she is still having post-op pain.  Surgery prescribed her Norco.  I agreed to renew her prescription one more time, but then she should stop the narcotic pain medications.  She agrees.  -plan to start C1D1 of chemotherapy on 5/22/18  -RTC on 5/22/18    2) Smoking: She smokes half a pack a day.  She says that she is trying to cut back.  -I again advised smoking cessation  -she will see her PCP for tobacco cessation.    3) Hypertension: She is on medications for this.  She says that she has been anxious and nervous and has had difficulty sleeping at night.  -follows with PCP    4) Chronic back pain: s/p history of back surgeries  -she takes soma  -follows with PCP      I spent a total of 40 minutes with the patient, with over >50% of the time in counseling and/or coordination of care.       Patience Mckeon MD  Hematology/Oncology  [facility]

## 2018-05-01 NOTE — LETTER
"    5/1/2018         RE: Muriel Diaz  [address]  [facility] [address]        Dear Colleague,    Thank you for referring your patient, Muriel Diaz, to the [facility]. Please see a copy of my visit note below.    Oncology Rooming Note    May 1, 2018 11:36 AM   Muriel Diaz is a 57 year old female who presents for:    Chief Complaint   Patient presents with     Oncology Clinic Visit     Return-follow up-Malignant neoplasm of upper-inner quadrant of left breast in female, estrogen receptor positive      Initial Vitals: /89 (BP Location: Left arm, Patient Position: Chair, Cuff Size: Adult Regular)  Pulse 67  Temp 98.3  F (36.8  C)  Resp 16  Ht 1.6 m (5' 3\")  Wt 47.2 kg (104 lb)  SpO2 97%  BMI 18.42 kg/m2 Estimated body mass index is 18.42 kg/(m^2) as calculated from the following:    Height as of this encounter: 1.6 m (5' 3\").    Weight as of this encounter: 47.2 kg (104 lb). Body surface area is 1.45 meters squared.  No Pain (0) Comment: Data Unavailable   No LMP recorded. Patient has had a hysterectomy.  Allergies reviewed: Yes  Medications reviewed: Yes    Medications: Medication refills not needed today.  Pharmacy name entered into [facility]:    [facility] [address]  [id number] [place] [phone]    Clinical concerns: none     5 minutes for nursing intake (face to face time)     Felisa Rick CMA              [facility]    Hematology/Oncology New Patient Note      Today's Date: 05/01/18    Reason for Follow-up: left sided breast cancer      HISTORY OF PRESENT ILLNESS: Muriel Diaz is a 57 year old post-menopausal female with PMHx of HTN, degenerative joint disease, history of cervical spine surgery, who presents with left-sided breast cancer.  She underwent left mastectomy on 4/16/18 by Dr. Umana.  Pathology showed invasive mammary carcinoma, with lobular and ductal features, grade 2, tumor " size 4.3 x 4.0 x 2.1 cm, positive margin for invasive carcinoma in the central and lateral posterior surface, DCIS and LCIS present, ER positive (95%), DC positive (50%), HER-2 negative.  Left axillary dissection showed 3 of 5 lymph nodes were positive for metastatic carcinoma (lobular type), also ER positive, DC positive, HER-2 negative. Stage [id number] (stage IIA).          INTERIM HISTORY: Muriel says that she is recovering from surgery okay.  Her incision sites have healed.  She still feels pain and tightness at the surgical site.  She is still taking Norco for the pain and asks for another prescription today.        REVIEW OF SYSTEMS:   14 point ROS was reviewed and is negative other than as noted above in HPI.       HOME MEDICATIONS:  Current Outpatient Prescriptions   Medication Sig Dispense Refill     atenolol (TENORMIN) 25 MG tablet TAKE ONE TABLET BY MOUTH ONE TIME DAILY  30 tablet 1     hydrochlorothiazide 12.5 MG TABS tablet Take 1 tablet (12.5 mg) by mouth daily 90 tablet 1     HYDROcodone-acetaminophen (NORCO) 5-325 MG per tablet Take 1-2 tablets by mouth every 4 hours as needed for pain maximum 10 tablet(s) per day 15 tablet 0     VITAMIN D, CHOLECALCIFEROL, PO Take 2,000 Units by mouth daily (2 x 1000 units = 2000 unit dose)           ALLERGIES:  Allergies   Allergen Reactions     Amitriptyline Other (See Comments)     nightmares     Oxycontin [Oxycodone] Nausea     Severe headaches         PAST MEDICAL HISTORY:  Past Medical History:   Diagnosis Date     Degeneration of cervical intervertebral disc      Degeneration of lumbar or lumbosacral intervertebral disc      Hypertension      PONV (postoperative nausea and vomiting)          PAST SURGICAL HISTORY:  Past Surgical History:   Procedure Laterality Date     BACK SURGERY  2001    lumbar fusion, cervical discectomy     DISSECT LYMPH NODE AXILLA Left 4/16/2018    Procedure: DISSECT LYMPH NODE AXILLA;;  Surgeon: Rodney Umana MD;  Location:   [facility]     FUSION CERVICAL ANTERIOR THREE+ LEVELS  5/1/2012    Procedure:FUSION CERVICAL ANTERIOR THREE+ LEVELS; Surgeon:SARAH FRANCO; Location:[facility]     FUSION CERVICAL POSTERIOR THREE+ LEVELS  5/1/2012    Procedure:FUSION CERVICAL POSTERIOR THREE+ LEVELS; Posterior Cervical decompression and fusion C4-7, Anterior Cervical decompression and fusion C4-7 (c-arm), (herb table with abraham, yina oasis, yina aviator, Vitoss foam packing strip, impulse monitoring,); Surgeon:SARAH FRANCO; Location: OR     GYN SURGERY       HYSTERECTOMY VAGINAL  1990's    Judy Aceves OB Gyn specilist     HYSTERECTOMY, PAP NO LONGER INDICATED       MASTECTOMY SIMPLE Left 4/16/2018    Procedure: MASTECTOMY SIMPLE;  LEFT SIMPLE MASTECTOMY,LEFT AXILLARY DISSECTION;  Surgeon: Rodney Umana MD;  Location: [facility]         SOCIAL HISTORY:  Social History     Social History     Marital status: Single     Spouse name: N/A     Number of children: 0     Years of education: N/A     Occupational History      [employer]      Social History Main Topics     Smoking status: Current Every Day Smoker     Packs/day: 0.50     Types: Cigarettes     Smokeless tobacco: Never Used      Comment: 4 cigarettes per day     Alcohol use 0.6 oz/week     1 Standard drinks or equivalent per week      Comment: 2-3 drinks per week     Drug use: No     Sexual activity: Not Currently     Partners: Male     Other Topics Concern     Parent/Sibling W/ Cabg, Mi Or Angioplasty Before 65f 55m? No     Social History Narrative   She smokes cigarettes, half a pack a day for many years; she is unable to quantify how many.  She drinks alcohol occasionally.  She denies illicit drug use.  She is retired.  She used to to work in  at large companies.  She lives in [place] with her boyfriend.        FAMILY HISTORY:  Family History   Problem Relation Age of Onset     Pancreatitis Mother      Substance Abuse Mother      Unknown/Adopted Father   "    She does not know much about her family history and is unaware of any cancers in her family.  She has never been pregnant.  She had a hysterectomy in the .  She still has her ovaries.  She says that she has undergone menopause but is unsure when, likely years ago.        PHYSICAL EXAM:  Vital signs:  /89 (BP Location: Left arm, Patient Position: Chair, Cuff Size: Adult Regular)  Pulse 67  Temp 98.3  F (36.8  C)  Resp 16  Ht 1.6 m (5' 3\")  Wt 47.2 kg (104 lb)  SpO2 97%  BMI 18.42 kg/m2   ECO  GENERAL/CONSTITUTIONAL: No acute distress.  EYES: No scleral icterus.  LYMPH: No anterior cervical, posterior cervical, supraclavicular, or axillary adenopathy.   RESPIRATORY: Clear to auscultation bilaterally. No crackles or wheezing.   CARDIOVASCULAR: Regular rate and rhythm without murmurs, gallops, or rubs.  GASTROINTESTINAL: No tenderness. The patient has normal bowel sounds. No guarding.  No distention.  BREAST: Right-no palpable mass, discharge, rash, or axillary lymphadenopathy.  Left-s/p mastectomy, steri-strips still in place, wound healed well.  MUSCULOSKELETAL: Warm and well-perfused, no cyanosis, clubbing, or edema.  NEUROLOGIC: Alert, oriented, answers questions appropriately.  INTEGUMENTARY: No rashes or jaundice.  GAIT: Steady, does not use assistive device      LABS:  None today.      PATHOLOGY:  18:  FINAL DIAGNOSIS:     A: Left breast, total mastectomy   - Invasive mammary carcinoma, with lobular and ductal features   - Grade 2   - Tumor size: 4.3 x 4.0 x 2.1 cm   - Margins: Focally positive for invasive carcinoma in the central and   lateral posterior surface   - In situ ductal and in situ lobular carcinoma   - In situ carcinoma 1.5 mm from the deep margin   - Prior core biopsy case [id number]: Estrogen receptor positive, 95%,   progesterone receptor, 50% and HER2 non   amplified (1.5)     B: Left axillary lymph node dissection   - Three of five lymph nodes positive for " "metastatic carcinoma (lobular   type )   - Prior left axillary lymph node core biopsy: Estrogen receptor positive,   95%, progesterone receptor positive,   50% and HER-2 non amplified (1.6)   - Largest positive lymph node 1.2 cm   - Extranodal tumor extension present       A: Simple mastectomy, left breast     Synoptic Report:     SPECIMEN     Procedure:         - Total mastectomy     Specimen Laterality:         - Left     TUMOR     Histologic Type:         - Invasive carcinoma with ductal and lobular features (\"mixed type         carcinoma\")     Histologic Grade ([place] Histologic Score)       Glandular (Acinar) / Tubular Differentiation:           - Score 3       Nuclear Pleomorphism:           - Score 2       Mitotic Rate:           - Score 1 (<=3 mitoses per mm2)       Overall Grade:           - Grade 2 (scores of 6 or 7)     Tumor Size: 4.3 Millimeters (mm)     Ductal Carcinoma In Situ (DCIS):         - DCIS is present in specimen     Tumor Extent       Skin Invasion:           - Invasive carcinoma directly invades into the dermis or epidermis           without skin ulceration (this does not change the T stage)     Accessory Findings       Treatment Effect:           - No known presurgical therapy     MARGINS     Invasive Carcinoma Margins:         - Positive for invasive carcinoma       Positive Margin(s):           - Posterior     DCIS Margins:         - Uninvolved by DCIS       Distance of DCIS from Closest Margin: 1.5 Millimeters (mm)       Closest Margin:           - Posterior     LYMPH NODES     Number of Lymph Nodes with Macrometastases (> 2 mm): 3     Number of Lymph Nodes with Micrometastases (> 0.2 mm to 2 mm and / or >   200     cells): 0     Number of Lymph Nodes with Isolated Tumor Cells (<= 0.2 mm and <= 200   cells): 0     Number of Lymph Nodes Examined: 5     PATHOLOGIC STAGE CLASSIFICATION (PTNM, AJCC 8TH EDITION)     Primary Tumor (Invasive Carcinoma) (pT):         - pT1a     Regional " Lymph Nodes (pN)       Category (pN):           - pN1a       IMAGING:  Mammogram and ultrasound 3/28/18:  FINDINGS:  Standard bilateral diagnostic views were obtained,  including tomosynthesis. Marker was placed on the left upper breast to  denote the site of the palpable lump; deep to the marker there is a  mass measuring approximately 2 cm. The mass is associated with  retraction of the left nipple. There are no associated  microcalcifications. No suspicious findings in the contralateral right  breast.     Further evaluation with targeted left breast ultrasound shows a  corresponding hypoechoic lesion at the 11:00 position, 2 cm from the  nipple, measuring 1.9 x 2.9 x 1.0 cm. Survey of the axilla shows an  enlarged lymph node measuring 0.7 x 0.6 cm.     MRI breast 4/9/18:  1. The known left breast malignancy measures 2.5 x 2.0 x 2.6 cm on  MRI. There is additional nonmass enhancement surrounding the mass in  the upper left breast.  2. No suspicious MRI finding in the right breast.      PET-CT 4/9/18:  1. Hypermetabolic left breast mass representing the primary  malignancy.  2. A few mildly prominent left axillary lymph nodes that are  associated with only very mild metabolism. These are felt to be  indeterminant. There is a subcentimeter lymph node with mild  hypermetabolism at the right anterior upper mediastinum between the  innominate artery and superior vena cava that is felt to also be  indeterminant.  3. No other pathologic activity.  4. Small areas of sclerosis within the thoracic and lumbar spine.    Normal soft tissue neck CT.        ASSESSMENT/PLAN:  Muriel Diaz is a 57 year old post-menopausal female with:    1) Left breast cancer of the upper inner quadrant: s/p left mastectomy on 4/16/18; pathology showed invasive mammary carcinoma, with lobular and ductal features, grade 2, tumor size 4.3 x 4.0 x 2.1 cm, positive margin for invasive carcinoma in the central and lateral posterior surface, DCIS  and LCIS present, ER positive (95%), IA positive (50%), HER-2 negative.  Left axillary dissection showed 3 of 5 lymph nodes were positive for metastatic carcinoma (lobular type), also ER positive, IA positive, HER-2 negative. Stage [id number] (stage IIA).      I discussed with Dr. Umana regarding the positive posterior margin.  He took the posterior margin, including the fascia, and there is nothing left to take.  She can proceed with chemotherapy.      I discussed with Muriel that the the 3 positive lymph nodes, she is at high risk for recurrence, and so chemotherapy would be recommended in the adjuvant setting.  After chemotherapy, I will refer her to see radiation oncology for consideration of radiation, and then plan on starting hormonal therapy after that.  Muriel is agreeable with this plan.    -chemotherapy regimen as follows:    Doxorubicin 60 mg/m2 IV on day 1  Cyclophosphamide 600 mg/m2 IV on day 1  Neulasta 6 mg SQ on day 2  Every 2 week cycles x 4 cycles    Followed by:    Paclitaxel 80 mg/m2 IV once a week x 12 weeks    -side effects may include, but not limited to: fever/chills, infection, fatigue, hair loss, pain, bleeding, anemia, myelosuppression, nausea/vomiting, diarrhea/constipation, organ failure, neuropathy, edema, rash, swelling, shortness of breath, chest pain  -I discussed the schedule, regimen, dose, possible side effects, the benefits and risks, and patient agrees to treatment plan.  -chemotherapy teaching today  -refer back to Dr. Umana for port placement prior to starting chemotherapy  -schedule echo prior to starting chemotherapy  -refer for lymphedema therapy  -pain: she is still having post-op pain.  Surgery prescribed her Norco.  I agreed to renew her prescription one more time, but then she should stop the narcotic pain medications.  She agrees.  -plan to start C1D1 of chemotherapy on 5/22/18  -RTC on 5/22/18    2) Smoking: She smokes half a pack a day.  She says that she is  trying to cut back.  -I again advised smoking cessation  -she will see her PCP for tobacco cessation.    3) Hypertension: She is on medications for this.  She says that she has been anxious and nervous and has had difficulty sleeping at night.  -follows with PCP    4) Chronic back pain: s/p history of back surgeries  -she takes soma  -follows with PCP      I spent a total of 40 minutes with the patient, with over >50% of the time in counseling and/or coordination of care.       Patience Mckeon MD  Hematology/Oncology  [facility]      Again, thank you for allowing me to participate in the care of your patient.        Sincerely,        Patience Mckeon MD

## 2018-05-01 NOTE — PROGRESS NOTES
"Oncology Rooming Note    May 1, 2018 11:36 AM   Muriel Diaz is a 57 year old female who presents for:    Chief Complaint   Patient presents with     Oncology Clinic Visit     Return-follow up-Malignant neoplasm of upper-inner quadrant of left breast in female, estrogen receptor positive      Initial Vitals: /89 (BP Location: Left arm, Patient Position: Chair, Cuff Size: Adult Regular)  Pulse 67  Temp 98.3  F (36.8  C)  Resp 16  Ht 1.6 m (5' 3\")  Wt 47.2 kg (104 lb)  SpO2 97%  BMI 18.42 kg/m2 Estimated body mass index is 18.42 kg/(m^2) as calculated from the following:    Height as of this encounter: 1.6 m (5' 3\").    Weight as of this encounter: 47.2 kg (104 lb). Body surface area is 1.45 meters squared.  No Pain (0) Comment: Data Unavailable   No LMP recorded. Patient has had a hysterectomy.  Allergies reviewed: Yes  Medications reviewed: Yes    Medications: Medication refills not needed today.  Pharmacy name entered into Buck:    [facility]  [id number] - [place], [place] - [address]    Clinical concerns: none     5 minutes for nursing intake (face to face time)     Felisa Rick CMA    Chemo Teach  Re:Adriamycin/Cytoxan/Neulasta/Taxol treatment plan for diagnosis: Breast Cancer  -See Pt Education documentation - Chemo Effects (Adult)  -Reviewed treatment schedule including cycle length, lab monitoring and take home medications.    Verbal and written instruction provided using:     [facility] Adriamycin/Cytoxan/Neulasta/Taxol Drug Information   -Reviewed What  Chemotherapy Is Used For, How  Chemotherapy is Given, Side Effects, When to Contact Your Doctor of Health Care Provider and Self Care Tips sections.      [place] literature:   -Reviewed Getting Ready for Chemotherapy: What to Expect Before, During and After Your Treatment   -Reviewed Tips for Increasing Protein and Calories  -Reviewed Vascular Access Port Implantation with Power Port teaching " book/model     -Patient given My cancer guidebook Symptom management reviewed with patient.   -Patient stated that she was not interested in support groups including ACS,[employer]'s club, patient also stated that she is not interested in a Wig or Look Good Feel Better classes    -Patient given information on [facility], and writer will have  contact patient for [facility] information.

## 2018-05-01 NOTE — MR AVS SNAPSHOT
After Visit Summary   5/1/2018    Muriel Diaz    MRN: [id number]           Patient Information     Date Of Birth          1960        Visit Information        Provider Department      5/1/2018 11:30 AM Patience Mckeon MD [facility]        Today's Diagnoses     Malignant neoplasm of upper-inner quadrant of left breast in female, estrogen receptor positive (H)    -  1    Follow-up examination          Care Instructions    -schedule echo in the next 1-2 weeks  -schedule port placement with Dr. Umana in the next 1-3 weeks  [facility] [phone]  -lymphedema therapy referral  -chemotherapy teaching on Adriamycin, Cytoxan, Taxol  -schedule chemotherapy start on 5/22/18  -return to clinic with Dr. Mckeon on 5/22/18          Follow-ups after your visit        Additional Services     GENERAL SURG ADULT REFERRAL       Your provider has referred you to: [facility]: [facility] [phone]   [email or web address]    Port placement by Dr. Umana    Please be aware that coverage of these services is subject to the terms and limitations of your health insurance plan.  Call member services at your health plan with any benefit or coverage questions.      Please bring the following with you to your appointment:    (1) Any X-Rays, CTs or MRIs which have been performed.  Contact the facility where they were done to arrange for  prior to your scheduled appointment.   (2) List of current medications   (3) This referral request   (4) Any documents/labs given to you for this referral            LYMPHEDEMA THERAPY REFERRAL       *This therapy referral will be filtered to a centralized scheduling office at [facility] and the patient will receive a call to schedule an appointment at a [place] location most convenient for them. *   If you have not heard from the scheduling office within 2  "business days, please call [phone] for all locations, with the exception of [place], please call [phone].     Treatment: PT or OT Evaluation & Treatment  Special Instructions: lymphedema therapy, s/p mastectomy and axillary node dissection  PT/OT Treatment Diagnosis: Edema, Impaired Range of Motion, Impaired Strength and Lymphedema    Please be aware that coverage of these services is subject to the terms and limitations of your health insurance plan.  Call member services at your health plan with any benefit or coverage questions.      **Note to Provider:  If you are referring outside of [facility] for the therapy appointment, please list the name of the location in the \"special instructions\" above, print the referral and give to the patient to schedule the appointment.                  Your next 10 appointments already scheduled     May 07, 2018  3:00 PM CDT   Ech Limited with [id number]   [facility] CV Echocardiography ([facility])    [address] [place] [id number] [phone]           1.  Please bring or wear a comfortable two-piece outfit. 2.  You may eat, drink and take your normal medicines. 3.  For any questions that cannot be answered, please contact the ordering physician            May 22, 2018  8:00 AM CDT   Level 4 with  INFUSION CHAIR 7   [facility] ([facility])    [facility]  [address]  [facility] [address]   [phone]            May 22, 2018  9:00 AM CDT   Return Visit with Patience Mckeon MD   [facility] ([facility])    [facility]  [address]  [facility] [address]   [phone]              Future tests that were ordered for you today     Open Future Orders        Priority Expected Expires Ordered    Echocardiogram Limited Routine  5/1/2019 5/1/2018          " "  Who to contact     If you have questions or need follow up information about today's clinic visit or your schedule please contact [facility] directly at [phone].  Normal or non-critical lab and imaging results will be communicated to you by MyChart, letter or phone within 4 business days after the clinic has received the results. If you do not hear from us within 7 days, please contact the clinic through [employer] or phone. If you have a critical or abnormal lab result, we will notify you by phone as soon as possible.  Submit refill requests through [employer] or call your pharmacy and they will forward the refill request to us. Please allow 3 business days for your refill to be completed.          Additional Information About Your Visit        [employer] Information     [employer] lets you send messages to your doctor, view your test results, renew your prescriptions, schedule appointments and more. To sign up, go to [email or web address] . Click on \"Log in\" on the left side of the screen, which will take you to the Welcome page. Then click on \"Sign up Now\" on the right side of the page.     You will be asked to enter the access code listed below, as well as some personal information. Please follow the directions to create your username and password.     Your access code is: [email or web address]  Expires: 2018  9:48 AM     Your access code will  in 90 days. If you need help or a new code, please call your [place] clinic or [phone].        Care EveryWhere ID     This is your Care EveryWhere ID. This could be used by other organizations to access your [place] medical records  [id number]        Your Vitals Were     Pulse Temperature Respirations Height Pulse Oximetry BMI (Body Mass Index)    67 98.3  F (36.8  C) 16 1.6 m (5' 3\") 97% 18.42 kg/m2       Blood Pressure from Last 3 Encounters:   18 142/89   18 130/80   18 138/83    Weight from Last 3 Encounters:   18 47.2 kg " (104 lb)   04/24/18 46.7 kg (103 lb)   04/16/18 46.4 kg (102 lb 4.8 oz)              We Performed the Following     CRANIAL PROSTHESIS     GENERAL SURG ADULT REFERRAL     LYMPHEDEMA THERAPY REFERRAL          Where to get your medicines      Some of these will need a paper prescription and others can be bought over the counter.  Ask your nurse if you have questions.     Bring a paper prescription for each of these medications     HYDROcodone-acetaminophen 5-325 MG per tablet         Information about OPIOIDS     PRESCRIPTION OPIOIDS: WHAT YOU NEED TO KNOW   You have a prescription for an opioid (narcotic) pain medicine. Opioids can cause addiction. If you have a history of chemical dependency of any type, you are at a higher risk of becoming addicted to opioids. Only take this medicine after all other options have been tried. Take it for as short a time and as few doses as possible.     Do not:    Drive. If you drive while taking these medicines, you could be arrested for driving under the influence (DUI).    Operate heavy machinery    Do any other dangerous activities while taking these medicines.     Drink any alcohol while taking these medicines.      Take with any other medicines that contain acetaminophen. Read all labels carefully. Look for the word  acetaminophen  or  Tylenol.  Ask your pharmacist if you have questions or are unsure.    Store your pills in a secure place, locked if possible. We will not replace any lost or stolen medicine. If you don t finish your medicine, please throw away (dispose) as directed by your pharmacist. The [place] Pollution Control Agency has more information about safe disposal: https://www.pca.[facility].[place]/living-green/managing-unwanted-medications    All opioids tend to cause constipation. Drink plenty of water and eat foods that have a lot of fiber, such as fruits, vegetables, prune juice, apple juice and high-fiber cereal. Take a laxative (Miralax, milk of magnesia, Colace,  Senna) if you don t move your bowels at least every other day.          Primary Care Provider Office Phone # Fax #    Isabel Landin [phone] [address] [place] [address]        Equal Access to Services     FRANCISCA HUNT : Hadii lupe dennison hadasho Soomaali, waaxda luqadaha, qaybta kaalmada adeegyada, waxevaristo santoyo radharoberto castellonlesliemadina gupta. So [place] [phone].    ATENCIÓN: Si habla español, tiene a chowdary disposición servicios gratuitos de asistencia lingüística. Llame al [phone].    We comply with applicable federal civil rights laws and [place] laws. We do not discriminate on the basis of race, color, national origin, age, disability, sex, sexual orientation, or gender identity.            Thank you!     Thank you for choosing [facility]  for your care. Our goal is always to provide you with excellent care. Hearing back from our patients is one way we can continue to improve our services. Please take a few minutes to complete the written survey that you may receive in the mail after your visit with us. Thank you!             Your Updated Medication List - Protect others around you: Learn how to safely use, store and throw away your medicines at www.disposemymeds.org.          This list is accurate as of 5/1/18 12:38 PM.  Always use your most recent med list.                   Brand Name Dispense Instructions for use Diagnosis    atenolol 25 MG tablet    TENORMIN    30 tablet    TAKE ONE TABLET BY MOUTH ONE TIME DAILY    Hypertension goal BP (blood pressure) < 140/90       hydrochlorothiazide 12.5 MG Tabs tablet     90 tablet    Take 1 tablet (12.5 mg) by mouth daily    Hypertension goal BP (blood pressure) < 140/90       HYDROcodone-acetaminophen 5-325 MG per tablet    NORCO    15 tablet    Take 1-2 tablets by mouth every 4 hours as needed for pain maximum 10 tablet(s) per day    Follow-up examination       VITAMIN D (CHOLECALCIFEROL) PO      Take  2,000 Units by mouth daily (2 x 1000 units = 2000 unit dose)

## 2018-05-02 ENCOUNTER — TELEPHONE (OUTPATIENT)
Dept: SURGERY | Facility: CLINIC | Age: 58
End: 2018-05-02

## 2018-05-02 NOTE — TELEPHONE ENCOUNTER
Type of surgery: port placement  Location of surgery: [facility]  Date and time of surgery: 05/14/18 8:40 am  Surgeon: Dr Umana  Pre-Op Appt Date: 5/1/18 Dr Mckeon  Post-Op Appt Date: pt to schedule   Packet sent out: Yes  Pre-cert/Authorization completed:  Not Applicable  Date: 05/01/18    MAC anesthesia

## 2018-05-03 ENCOUNTER — TELEPHONE (OUTPATIENT)
Dept: ONCOLOGY | Facility: CLINIC | Age: 58
End: 2018-05-03

## 2018-05-03 NOTE — TELEPHONE ENCOUNTER
"Social Work Progress Note      Data/Intervention:  Patient Name:  Muriel Diaz  /Age:  1960 (57 year old)    Reason for Follow-Up:  Muriel is a [place] 57-year-old woman who has a new diagnosis of left sided breast cancer s/p mastectomy. This clinician received referral from [facility] for emotional support and introduction of psychosocial services.      Intervention:   Muriel is a [place] woman who lives with boyfriend \"Angy,\" she is the middle of two sisters who reside in [place]. Muriel reports that her greatest joys are spending time outdoors gardening, cooking for , and catching bass fish in [facility].     Muriel reports today that she feels that she has been recovering well from surgery and feels well prepared for chemotherapy, \"I know what I got to do, now I just got to do it.\" Muriel reports that she has always been a very independent woman, and has elected to not discuss diagnosis and treatment with sisters yet as she worries about having unwanted attention from them. Muriel acknowledges that she is a private person and a \"homebody.\"     Oriented Muriel to  services and support, offering both emotional support and practical assistance. Pt acknowledged that she had no concerns at present, but is pleased to know of  support available as needed.     Resources Provided:  [id number]  SW contact information    Plan:  This clinician will plan to contact pt after 1st treatment for psychosocial check-in and support as this clinician is not in [facility] on pt's clinic day. Pt knows to reach out to this clinician prior in the case of psychosocial distress or need.     Please call or page if needs or concerns arise.     LAUREN Frances, [facility] CenterSW  Direct Phone: [phone]  Pager: [phone]  "

## 2018-05-04 DIAGNOSIS — C50.912 MALIGNANT NEOPLASM OF LEFT BREAST (H): Primary | ICD-10-CM

## 2018-05-04 RX ORDER — LIDOCAINE/PRILOCAINE 2.5 %-2.5%
CREAM (GRAM) TOPICAL PRN
Qty: 30 G | Refills: 3 | Status: ON HOLD | OUTPATIENT
Start: 2018-05-04 | End: 2019-03-22

## 2018-05-05 DIAGNOSIS — I10 HYPERTENSION GOAL BP (BLOOD PRESSURE) < 140/90: ICD-10-CM

## 2018-05-05 RX ORDER — ATENOLOL 25 MG/1
TABLET ORAL
Qty: 30 TABLET | Refills: 0 | Status: SHIPPED | OUTPATIENT
Start: 2018-05-05 | End: 2018-06-04

## 2018-05-07 ENCOUNTER — HOSPITAL ENCOUNTER (OUTPATIENT)
Dept: CARDIOLOGY | Facility: CLINIC | Age: 58
Discharge: HOME | End: 2018-05-07
Attending: INTERNAL MEDICINE | Admitting: INTERNAL MEDICINE
Payer: COMMERCIAL

## 2018-05-07 DIAGNOSIS — C50.212 MALIGNANT NEOPLASM OF UPPER-INNER QUADRANT OF LEFT BREAST IN FEMALE, ESTROGEN RECEPTOR POSITIVE (H): ICD-10-CM

## 2018-05-07 DIAGNOSIS — Z17.0 MALIGNANT NEOPLASM OF UPPER-INNER QUADRANT OF LEFT BREAST IN FEMALE, ESTROGEN RECEPTOR POSITIVE (H): ICD-10-CM

## 2018-05-07 PROCEDURE — 93306 TTE W/DOPPLER COMPLETE: CPT

## 2018-05-07 PROCEDURE — 93306 TTE W/DOPPLER COMPLETE: CPT | Mod: 26 | Performed by: INTERNAL MEDICINE

## 2018-05-10 NOTE — H&P (VIEW-ONLY)
[facility]    Hematology/Oncology New Patient Note      Today's Date: 05/01/18    Reason for Follow-up: left sided breast cancer      HISTORY OF PRESENT ILLNESS: Muriel Diaz is a 57 year old post-menopausal female with PMHx of HTN, degenerative joint disease, history of cervical spine surgery, who presents with left-sided breast cancer.  She underwent left mastectomy on 4/16/18 by Dr. Umana.  Pathology showed invasive mammary carcinoma, with lobular and ductal features, grade 2, tumor size 4.3 x 4.0 x 2.1 cm, positive margin for invasive carcinoma in the central and lateral posterior surface, DCIS and LCIS present, ER positive (95%), DE positive (50%), HER-2 negative.  Left axillary dissection showed 3 of 5 lymph nodes were positive for metastatic carcinoma (lobular type), also ER positive, DE positive, HER-2 negative. Stage [id number] (stage IIA).          INTERIM HISTORY: Muriel says that she is recovering from surgery okay.  Her incision sites have healed.  She still feels pain and tightness at the surgical site.  She is still taking Norco for the pain and asks for another prescription today.        REVIEW OF SYSTEMS:   14 point ROS was reviewed and is negative other than as noted above in HPI.       HOME MEDICATIONS:  Current Outpatient Prescriptions   Medication Sig Dispense Refill     atenolol (TENORMIN) 25 MG tablet TAKE ONE TABLET BY MOUTH ONE TIME DAILY  30 tablet 1     hydrochlorothiazide 12.5 MG TABS tablet Take 1 tablet (12.5 mg) by mouth daily 90 tablet 1     HYDROcodone-acetaminophen (NORCO) 5-325 MG per tablet Take 1-2 tablets by mouth every 4 hours as needed for pain maximum 10 tablet(s) per day 15 tablet 0     VITAMIN D, CHOLECALCIFEROL, PO Take 2,000 Units by mouth daily (2 x 1000 units = 2000 unit dose)           ALLERGIES:  Allergies   Allergen Reactions     Amitriptyline Other (See Comments)     nightmares     Oxycontin [Oxycodone] Nausea     Severe headaches          PAST MEDICAL HISTORY:  Past Medical History:   Diagnosis Date     Degeneration of cervical intervertebral disc      Degeneration of lumbar or lumbosacral intervertebral disc      Hypertension      PONV (postoperative nausea and vomiting)          PAST SURGICAL HISTORY:  Past Surgical History:   Procedure Laterality Date     BACK SURGERY  2001    lumbar fusion, cervical discectomy     DISSECT LYMPH NODE AXILLA Left 4/16/2018    Procedure: DISSECT LYMPH NODE AXILLA;;  Surgeon: Rodney Umana MD;  Location: [facility]     FUSION CERVICAL ANTERIOR THREE+ LEVELS  5/1/2012    Procedure:FUSION CERVICAL ANTERIOR THREE+ LEVELS; Surgeon:SARAH FRANCO; Location:[facility] OR     FUSION CERVICAL POSTERIOR THREE+ LEVELS  5/1/2012    Procedure:FUSION CERVICAL POSTERIOR THREE+ LEVELS; Posterior Cervical decompression and fusion C4-7, Anterior Cervical decompression and fusion C4-7 (c-arm), (herb table with abraham, yina oasis, yina aviator, Vitoss foam packing strip, impulse monitoring,); Surgeon:SARAH FRANCO; Location: OR     GYN SURGERY       HYSTERECTOMY VAGINAL  1990's    Judy Aceves OB Gyn specilist     HYSTERECTOMY, PAP NO LONGER INDICATED       MASTECTOMY SIMPLE Left 4/16/2018    Procedure: MASTECTOMY SIMPLE;  LEFT SIMPLE MASTECTOMY,LEFT AXILLARY DISSECTION;  Surgeon: Rodney Umana MD;  Location: [facility]         SOCIAL HISTORY:  Social History     Social History     Marital status: Single     Spouse name: N/A     Number of children: 0     Years of education: N/A     Occupational History      [employer]      Social History Main Topics     Smoking status: Current Every Day Smoker     Packs/day: 0.50     Types: Cigarettes     Smokeless tobacco: Never Used      Comment: 4 cigarettes per day     Alcohol use 0.6 oz/week     1 Standard drinks or equivalent per week      Comment: 2-3 drinks per week     Drug use: No     Sexual activity: Not Currently     Partners: Male     Other Topics Concern  "    Parent/Sibling W/ Cabg, Mi Or Angioplasty Before 65f 55m? No     Social History Narrative   She smokes cigarettes, half a pack a day for many years; she is unable to quantify how many.  She drinks alcohol occasionally.  She denies illicit drug use.  She is retired.  She used to to work in  at large companies.  She lives in [place] with her boyfriend.        FAMILY HISTORY:  Family History   Problem Relation Age of Onset     Pancreatitis Mother      Substance Abuse Mother      Unknown/Adopted Father      She does not know much about her family history and is unaware of any cancers in her family.  She has never been pregnant.  She had a hysterectomy in the .  She still has her ovaries.  She says that she has undergone menopause but is unsure when, likely years ago.        PHYSICAL EXAM:  Vital signs:  /89 (BP Location: Left arm, Patient Position: Chair, Cuff Size: Adult Regular)  Pulse 67  Temp 98.3  F (36.8  C)  Resp 16  Ht 1.6 m (5' 3\")  Wt 47.2 kg (104 lb)  SpO2 97%  BMI 18.42 kg/m2   ECO  GENERAL/CONSTITUTIONAL: No acute distress.  EYES: No scleral icterus.  LYMPH: No anterior cervical, posterior cervical, supraclavicular, or axillary adenopathy.   RESPIRATORY: Clear to auscultation bilaterally. No crackles or wheezing.   CARDIOVASCULAR: Regular rate and rhythm without murmurs, gallops, or rubs.  GASTROINTESTINAL: No tenderness. The patient has normal bowel sounds. No guarding.  No distention.  BREAST: Right-no palpable mass, discharge, rash, or axillary lymphadenopathy.  Left-s/p mastectomy, steri-strips still in place, wound healed well.  MUSCULOSKELETAL: Warm and well-perfused, no cyanosis, clubbing, or edema.  NEUROLOGIC: Alert, oriented, answers questions appropriately.  INTEGUMENTARY: No rashes or jaundice.  GAIT: Steady, does not use assistive device      LABS:  None today.      PATHOLOGY:  18:  FINAL DIAGNOSIS:     A: Left breast, total mastectomy   - " "Invasive mammary carcinoma, with lobular and ductal features   - Grade 2   - Tumor size: 4.3 x 4.0 x 2.1 cm   - Margins: Focally positive for invasive carcinoma in the central and   lateral posterior surface   - In situ ductal and in situ lobular carcinoma   - In situ carcinoma 1.5 mm from the deep margin   - Prior core biopsy case [id number]: Estrogen receptor positive, 95%,   progesterone receptor, 50% and HER2 non   amplified (1.5)     B: Left axillary lymph node dissection   - Three of five lymph nodes positive for metastatic carcinoma (lobular   type )   - Prior left axillary lymph node core biopsy: Estrogen receptor positive,   95%, progesterone receptor positive,   50% and HER-2 non amplified (1.6)   - Largest positive lymph node 1.2 cm   - Extranodal tumor extension present       A: Simple mastectomy, left breast     Synoptic Report:     SPECIMEN     Procedure:         - Total mastectomy     Specimen Laterality:         - Left     TUMOR     Histologic Type:         - Invasive carcinoma with ductal and lobular features (\"mixed type         carcinoma\")     Histologic Grade (Lisa Histologic Score)       Glandular (Acinar) / Tubular Differentiation:           - Score 3       Nuclear Pleomorphism:           - Score 2       Mitotic Rate:           - Score 1 (<=3 mitoses per mm2)       Overall Grade:           - Grade 2 (scores of 6 or 7)     Tumor Size: 4.3 Millimeters (mm)     Ductal Carcinoma In Situ (DCIS):         - DCIS is present in specimen     Tumor Extent       Skin Invasion:           - Invasive carcinoma directly invades into the dermis or epidermis           without skin ulceration (this does not change the T stage)     Accessory Findings       Treatment Effect:           - No known presurgical therapy     MARGINS     Invasive Carcinoma Margins:         - Positive for invasive carcinoma       Positive Margin(s):           - Posterior     DCIS Margins:         - Uninvolved by DCIS       Distance of " DCIS from Closest Margin: 1.5 Millimeters (mm)       Closest Margin:           - Posterior     LYMPH NODES     Number of Lymph Nodes with Macrometastases (> 2 mm): 3     Number of Lymph Nodes with Micrometastases (> 0.2 mm to 2 mm and / or >   200     cells): 0     Number of Lymph Nodes with Isolated Tumor Cells (<= 0.2 mm and <= 200   cells): 0     Number of Lymph Nodes Examined: 5     PATHOLOGIC STAGE CLASSIFICATION (PTNM, AJCC 8TH EDITION)     Primary Tumor (Invasive Carcinoma) (pT):         - pT1a     Regional Lymph Nodes (pN)       Category (pN):           - pN1a       IMAGING:  Mammogram and ultrasound 3/28/18:  FINDINGS:  Standard bilateral diagnostic views were obtained,  including tomosynthesis. Marker was placed on the left upper breast to  denote the site of the palpable lump; deep to the marker there is a  mass measuring approximately 2 cm. The mass is associated with  retraction of the left nipple. There are no associated  microcalcifications. No suspicious findings in the contralateral right  breast.     Further evaluation with targeted left breast ultrasound shows a  corresponding hypoechoic lesion at the 11:00 position, 2 cm from the  nipple, measuring 1.9 x 2.9 x 1.0 cm. Survey of the axilla shows an  enlarged lymph node measuring 0.7 x 0.6 cm.     MRI breast 4/9/18:  1. The known left breast malignancy measures 2.5 x 2.0 x 2.6 cm on  MRI. There is additional nonmass enhancement surrounding the mass in  the upper left breast.  2. No suspicious MRI finding in the right breast.      PET-CT 4/9/18:  1. Hypermetabolic left breast mass representing the primary  malignancy.  2. A few mildly prominent left axillary lymph nodes that are  associated with only very mild metabolism. These are felt to be  indeterminant. There is a subcentimeter lymph node with mild  hypermetabolism at the right anterior upper mediastinum between the  innominate artery and superior vena cava that is felt to also  be  indeterminant.  3. No other pathologic activity.  4. Small areas of sclerosis within the thoracic and lumbar spine.    Normal soft tissue neck CT.        ASSESSMENT/PLAN:  Muriel Diaz is a 57 year old post-menopausal female with:    1) Left breast cancer of the upper inner quadrant: s/p left mastectomy on 4/16/18; pathology showed invasive mammary carcinoma, with lobular and ductal features, grade 2, tumor size 4.3 x 4.0 x 2.1 cm, positive margin for invasive carcinoma in the central and lateral posterior surface, DCIS and LCIS present, ER positive (95%), HI positive (50%), HER-2 negative.  Left axillary dissection showed 3 of 5 lymph nodes were positive for metastatic carcinoma (lobular type), also ER positive, HI positive, HER-2 negative. Stage [id number] (stage IIA).      I discussed with Dr. Umana regarding the positive posterior margin.  He took the posterior margin, including the fascia, and there is nothing left to take.  She can proceed with chemotherapy.      I discussed with Muriel that the the 3 positive lymph nodes, she is at high risk for recurrence, and so chemotherapy would be recommended in the adjuvant setting.  After chemotherapy, I will refer her to see radiation oncology for consideration of radiation, and then plan on starting hormonal therapy after that.  Muriel is agreeable with this plan.    -chemotherapy regimen as follows:    Doxorubicin 60 mg/m2 IV on day 1  Cyclophosphamide 600 mg/m2 IV on day 1  Neulasta 6 mg SQ on day 2  Every 2 week cycles x 4 cycles    Followed by:    Paclitaxel 80 mg/m2 IV once a week x 12 weeks    -side effects may include, but not limited to: fever/chills, infection, fatigue, hair loss, pain, bleeding, anemia, myelosuppression, nausea/vomiting, diarrhea/constipation, organ failure, neuropathy, edema, rash, swelling, shortness of breath, chest pain  -I discussed the schedule, regimen, dose, possible side effects, the benefits and risks, and patient agrees  to treatment plan.  -chemotherapy teaching today  -refer back to Dr. Umana for port placement prior to starting chemotherapy  -schedule echo prior to starting chemotherapy  -refer for lymphedema therapy  -pain: she is still having post-op pain.  Surgery prescribed her Norco.  I agreed to renew her prescription one more time, but then she should stop the narcotic pain medications.  She agrees.  -plan to start C1D1 of chemotherapy on 5/22/18  -RTC on 5/22/18    2) Smoking: She smokes half a pack a day.  She says that she is trying to cut back.  -I again advised smoking cessation  -she will see her PCP for tobacco cessation.    3) Hypertension: She is on medications for this.  She says that she has been anxious and nervous and has had difficulty sleeping at night.  -follows with PCP    4) Chronic back pain: s/p history of back surgeries  -she takes soma  -follows with PCP      I spent a total of 40 minutes with the patient, with over >50% of the time in counseling and/or coordination of care.       Patience Mckeon MD  Hematology/Oncology  [facility]

## 2018-05-14 ENCOUNTER — ANESTHESIA EVENT (OUTPATIENT)
Dept: SURGERY | Facility: CLINIC | Age: 58
End: 2018-05-14
Payer: COMMERCIAL

## 2018-05-14 ENCOUNTER — HOSPITAL ENCOUNTER (OUTPATIENT)
Facility: CLINIC | Age: 58
Discharge: HOME | End: 2018-05-14
Attending: SURGERY | Admitting: SURGERY
Payer: COMMERCIAL

## 2018-05-14 ENCOUNTER — APPOINTMENT (OUTPATIENT)
Dept: GENERAL RADIOLOGY | Facility: CLINIC | Age: 58
End: 2018-05-14
Attending: SURGERY
Payer: COMMERCIAL

## 2018-05-14 ENCOUNTER — SURGERY (OUTPATIENT)
Age: 58
End: 2018-05-14

## 2018-05-14 ENCOUNTER — APPOINTMENT (OUTPATIENT)
Dept: SURGERY | Facility: PHYSICIAN GROUP | Age: 58
End: 2018-05-14
Payer: COMMERCIAL

## 2018-05-14 ENCOUNTER — ANESTHESIA (OUTPATIENT)
Dept: SURGERY | Facility: CLINIC | Age: 58
End: 2018-05-14
Payer: COMMERCIAL

## 2018-05-14 VITALS
SYSTOLIC BLOOD PRESSURE: 123 MMHG | BODY MASS INDEX: 18 KG/M2 | TEMPERATURE: 97.5 F | HEIGHT: 63 IN | WEIGHT: 101.6 LBS | OXYGEN SATURATION: 95 % | HEART RATE: 56 BPM | DIASTOLIC BLOOD PRESSURE: 79 MMHG | RESPIRATION RATE: 14 BRPM

## 2018-05-14 DIAGNOSIS — G89.18 POSTOPERATIVE PAIN: Primary | ICD-10-CM

## 2018-05-14 PROCEDURE — 25000128 H RX IP 250 OP 636: Performed by: SURGERY

## 2018-05-14 PROCEDURE — 71000013 ZZH RECOVERY PHASE 1 LEVEL 1 EA ADDTL HR: Performed by: SURGERY

## 2018-05-14 PROCEDURE — 37000009 ZZH ANESTHESIA TECHNICAL FEE, EACH ADDTL 15 MIN: Performed by: SURGERY

## 2018-05-14 PROCEDURE — 36000052 ZZH SURGERY LEVEL 2 EA 15 ADDTL MIN: Performed by: SURGERY

## 2018-05-14 PROCEDURE — 76499 UNLISTED DX RADIOGRAPHIC PX: CPT

## 2018-05-14 PROCEDURE — 27210794 ZZH OR GENERAL SUPPLY STERILE: Performed by: SURGERY

## 2018-05-14 PROCEDURE — 40000169 ZZH STATISTIC PRE-PROCEDURE ASSESSMENT I: Performed by: SURGERY

## 2018-05-14 PROCEDURE — 77001 FLUOROGUIDE FOR VEIN DEVICE: CPT | Performed by: SURGERY

## 2018-05-14 PROCEDURE — 25000125 ZZHC RX 250: Performed by: SURGERY

## 2018-05-14 PROCEDURE — 25000128 H RX IP 250 OP 636: Performed by: NURSE ANESTHETIST, CERTIFIED REGISTERED

## 2018-05-14 PROCEDURE — 76937 US GUIDE VASCULAR ACCESS: CPT | Performed by: SURGERY

## 2018-05-14 PROCEDURE — 37000008 ZZH ANESTHESIA TECHNICAL FEE, 1ST 30 MIN: Performed by: SURGERY

## 2018-05-14 PROCEDURE — 25000128 H RX IP 250 OP 636: Performed by: ANESTHESIOLOGY

## 2018-05-14 PROCEDURE — 36000050 ZZH SURGERY LEVEL 2 1ST 30 MIN: Performed by: SURGERY

## 2018-05-14 PROCEDURE — C1788 PORT, INDWELLING, IMP: HCPCS | Performed by: SURGERY

## 2018-05-14 PROCEDURE — 40000986 XR CHEST PORT 1 VW

## 2018-05-14 PROCEDURE — 25000132 ZZH RX MED GY IP 250 OP 250 PS 637: Performed by: PHYSICIAN ASSISTANT

## 2018-05-14 PROCEDURE — 36561 INSERT TUNNELED CV CATH: CPT | Performed by: SURGERY

## 2018-05-14 PROCEDURE — 71000012 ZZH RECOVERY PHASE 1 LEVEL 1 FIRST HR: Performed by: SURGERY

## 2018-05-14 PROCEDURE — 71000027 ZZH RECOVERY PHASE 2 EACH 15 MINS: Performed by: SURGERY

## 2018-05-14 PROCEDURE — 40000277 XR SURGERY CARM FLUORO LESS THAN 5 MIN W STILLS

## 2018-05-14 DEVICE — CATH PORT POWERPORT CLEARVUE ISP 8FR 5608062
Type: IMPLANTABLE DEVICE | Site: CHEST | Status: NON-FUNCTIONAL
Removed: 2019-03-22

## 2018-05-14 RX ORDER — SODIUM CHLORIDE, SODIUM LACTATE, POTASSIUM CHLORIDE, CALCIUM CHLORIDE 600; 310; 30; 20 MG/100ML; MG/100ML; MG/100ML; MG/100ML
INJECTION, SOLUTION INTRAVENOUS CONTINUOUS
Status: DISCONTINUED | OUTPATIENT
Start: 2018-05-14 | End: 2018-05-14 | Stop reason: HOSPADM

## 2018-05-14 RX ORDER — FENTANYL CITRATE 50 UG/ML
INJECTION, SOLUTION INTRAMUSCULAR; INTRAVENOUS PRN
Status: DISCONTINUED | OUTPATIENT
Start: 2018-05-14 | End: 2018-05-14

## 2018-05-14 RX ORDER — HEPARIN SODIUM (PORCINE) LOCK FLUSH IV SOLN 100 UNIT/ML 100 UNIT/ML
SOLUTION INTRAVENOUS
Status: DISCONTINUED
Start: 2018-05-14 | End: 2018-05-14 | Stop reason: HOSPADM

## 2018-05-14 RX ORDER — DEXAMETHASONE SODIUM PHOSPHATE 4 MG/ML
INJECTION, SOLUTION INTRA-ARTICULAR; INTRALESIONAL; INTRAMUSCULAR; INTRAVENOUS; SOFT TISSUE PRN
Status: DISCONTINUED | OUTPATIENT
Start: 2018-05-14 | End: 2018-05-14

## 2018-05-14 RX ORDER — HYDROCODONE BITARTRATE AND ACETAMINOPHEN 5; 325 MG/1; MG/1
1 TABLET ORAL
Status: COMPLETED | OUTPATIENT
Start: 2018-05-14 | End: 2018-05-14

## 2018-05-14 RX ORDER — ONDANSETRON 2 MG/ML
4 INJECTION INTRAMUSCULAR; INTRAVENOUS EVERY 30 MIN PRN
Status: DISCONTINUED | OUTPATIENT
Start: 2018-05-14 | End: 2018-05-14 | Stop reason: HOSPADM

## 2018-05-14 RX ORDER — NALOXONE HYDROCHLORIDE 0.4 MG/ML
.1-.4 INJECTION, SOLUTION INTRAMUSCULAR; INTRAVENOUS; SUBCUTANEOUS
Status: DISCONTINUED | OUTPATIENT
Start: 2018-05-14 | End: 2018-05-14 | Stop reason: HOSPADM

## 2018-05-14 RX ORDER — HYDROMORPHONE HYDROCHLORIDE 1 MG/ML
.3-.5 INJECTION, SOLUTION INTRAMUSCULAR; INTRAVENOUS; SUBCUTANEOUS EVERY 10 MIN PRN
Status: DISCONTINUED | OUTPATIENT
Start: 2018-05-14 | End: 2018-05-14 | Stop reason: HOSPADM

## 2018-05-14 RX ORDER — FENTANYL CITRATE 50 UG/ML
25-50 INJECTION, SOLUTION INTRAMUSCULAR; INTRAVENOUS EVERY 5 MIN PRN
Status: DISCONTINUED | OUTPATIENT
Start: 2018-05-14 | End: 2018-05-14 | Stop reason: HOSPADM

## 2018-05-14 RX ORDER — HEPARIN SODIUM 1000 [USP'U]/ML
INJECTION, SOLUTION INTRAVENOUS; SUBCUTANEOUS
Status: DISCONTINUED
Start: 2018-05-14 | End: 2018-05-14 | Stop reason: HOSPADM

## 2018-05-14 RX ORDER — HEPARIN SODIUM (PORCINE) LOCK FLUSH IV SOLN 100 UNIT/ML 100 UNIT/ML
SOLUTION INTRAVENOUS PRN
Status: DISCONTINUED | OUTPATIENT
Start: 2018-05-14 | End: 2018-05-14 | Stop reason: HOSPADM

## 2018-05-14 RX ORDER — BUPIVACAINE HYDROCHLORIDE 2.5 MG/ML
INJECTION, SOLUTION EPIDURAL; INFILTRATION; INTRACAUDAL
Status: DISCONTINUED
Start: 2018-05-14 | End: 2018-05-14 | Stop reason: HOSPADM

## 2018-05-14 RX ORDER — PROPOFOL 10 MG/ML
INJECTION, EMULSION INTRAVENOUS CONTINUOUS PRN
Status: DISCONTINUED | OUTPATIENT
Start: 2018-05-14 | End: 2018-05-14

## 2018-05-14 RX ORDER — ONDANSETRON 2 MG/ML
INJECTION INTRAMUSCULAR; INTRAVENOUS PRN
Status: DISCONTINUED | OUTPATIENT
Start: 2018-05-14 | End: 2018-05-14

## 2018-05-14 RX ORDER — EPINEPHRINE 1 MG/ML
INJECTION, SOLUTION INTRAMUSCULAR; SUBCUTANEOUS
Status: DISCONTINUED
Start: 2018-05-14 | End: 2018-05-14 | Stop reason: HOSPADM

## 2018-05-14 RX ORDER — HYDROCODONE BITARTRATE AND ACETAMINOPHEN 5; 325 MG/1; MG/1
1 TABLET ORAL EVERY 4 HOURS PRN
Qty: 10 TABLET | Refills: 0 | Status: SHIPPED | OUTPATIENT
Start: 2018-05-14 | End: 2018-05-22

## 2018-05-14 RX ORDER — BUPIVACAINE HYDROCHLORIDE AND EPINEPHRINE 5; 5 MG/ML; UG/ML
INJECTION, SOLUTION PERINEURAL PRN
Status: DISCONTINUED | OUTPATIENT
Start: 2018-05-14 | End: 2018-05-14 | Stop reason: HOSPADM

## 2018-05-14 RX ORDER — MEPERIDINE HYDROCHLORIDE 25 MG/ML
12.5 INJECTION INTRAMUSCULAR; INTRAVENOUS; SUBCUTANEOUS
Status: DISCONTINUED | OUTPATIENT
Start: 2018-05-14 | End: 2018-05-14 | Stop reason: HOSPADM

## 2018-05-14 RX ORDER — CEFAZOLIN SODIUM 2 G/100ML
2 INJECTION, SOLUTION INTRAVENOUS
Status: COMPLETED | OUTPATIENT
Start: 2018-05-14 | End: 2018-05-14

## 2018-05-14 RX ORDER — SODIUM CHLORIDE, SODIUM LACTATE, POTASSIUM CHLORIDE, CALCIUM CHLORIDE 600; 310; 30; 20 MG/100ML; MG/100ML; MG/100ML; MG/100ML
INJECTION, SOLUTION INTRAVENOUS CONTINUOUS PRN
Status: DISCONTINUED | OUTPATIENT
Start: 2018-05-14 | End: 2018-05-14

## 2018-05-14 RX ORDER — CEFAZOLIN SODIUM 1 G/3ML
1 INJECTION, POWDER, FOR SOLUTION INTRAMUSCULAR; INTRAVENOUS SEE ADMIN INSTRUCTIONS
Status: DISCONTINUED | OUTPATIENT
Start: 2018-05-14 | End: 2018-05-14 | Stop reason: HOSPADM

## 2018-05-14 RX ORDER — ONDANSETRON 4 MG/1
4 TABLET, ORALLY DISINTEGRATING ORAL EVERY 30 MIN PRN
Status: DISCONTINUED | OUTPATIENT
Start: 2018-05-14 | End: 2018-05-14 | Stop reason: HOSPADM

## 2018-05-14 RX ORDER — FENTANYL CITRATE 50 UG/ML
25-50 INJECTION, SOLUTION INTRAMUSCULAR; INTRAVENOUS
Status: DISCONTINUED | OUTPATIENT
Start: 2018-05-14 | End: 2018-05-14 | Stop reason: HOSPADM

## 2018-05-14 RX ADMIN — SODIUM CHLORIDE, POTASSIUM CHLORIDE, SODIUM LACTATE AND CALCIUM CHLORIDE: 600; 310; 30; 20 INJECTION, SOLUTION INTRAVENOUS at 08:52

## 2018-05-14 RX ADMIN — FENTANYL CITRATE 25 MCG: 50 INJECTION, SOLUTION INTRAMUSCULAR; INTRAVENOUS at 09:17

## 2018-05-14 RX ADMIN — MIDAZOLAM 2 MG: 1 INJECTION INTRAMUSCULAR; INTRAVENOUS at 08:51

## 2018-05-14 RX ADMIN — PROPOFOL 150 MCG/KG/MIN: 10 INJECTION, EMULSION INTRAVENOUS at 08:51

## 2018-05-14 RX ADMIN — SODIUM CHLORIDE, PRESERVATIVE FREE 10 ML: 5 INJECTION INTRAVENOUS at 09:54

## 2018-05-14 RX ADMIN — Medication 16 MCG: at 08:59

## 2018-05-14 RX ADMIN — Medication 4 MCG: at 09:10

## 2018-05-14 RX ADMIN — Medication 20 MCG: at 09:16

## 2018-05-14 RX ADMIN — FENTANYL CITRATE 50 MCG: 50 INJECTION, SOLUTION INTRAMUSCULAR; INTRAVENOUS at 08:51

## 2018-05-14 RX ADMIN — HYDROCODONE BITARTRATE AND ACETAMINOPHEN 1 TABLET: 5; 325 TABLET ORAL at 11:53

## 2018-05-14 RX ADMIN — Medication 20 MCG: at 09:13

## 2018-05-14 RX ADMIN — DEXAMETHASONE SODIUM PHOSPHATE 4 MG: 4 INJECTION, SOLUTION INTRA-ARTICULAR; INTRALESIONAL; INTRAMUSCULAR; INTRAVENOUS; SOFT TISSUE at 08:57

## 2018-05-14 RX ADMIN — HYDROMORPHONE HYDROCHLORIDE 0.5 MG: 1 INJECTION, SOLUTION INTRAMUSCULAR; INTRAVENOUS; SUBCUTANEOUS at 11:18

## 2018-05-14 RX ADMIN — HEPARIN SODIUM 50 ML: 1000 INJECTION, SOLUTION INTRAVENOUS; SUBCUTANEOUS at 09:54

## 2018-05-14 RX ADMIN — ONDANSETRON 4 MG: 2 INJECTION INTRAMUSCULAR; INTRAVENOUS at 09:15

## 2018-05-14 RX ADMIN — CEFAZOLIN SODIUM 2 G: 2 INJECTION, SOLUTION INTRAVENOUS at 08:55

## 2018-05-14 RX ADMIN — FENTANYL CITRATE 50 MCG: 50 INJECTION, SOLUTION INTRAMUSCULAR; INTRAVENOUS at 10:07

## 2018-05-14 RX ADMIN — FENTANYL CITRATE 25 MCG: 50 INJECTION, SOLUTION INTRAMUSCULAR; INTRAVENOUS at 09:18

## 2018-05-14 RX ADMIN — FENTANYL CITRATE 50 MCG: 50 INJECTION, SOLUTION INTRAMUSCULAR; INTRAVENOUS at 10:08

## 2018-05-14 RX ADMIN — BUPIVACAINE HYDROCHLORIDE AND EPINEPHRINE BITARTRATE 30 ML: 5; .005 INJECTION, SOLUTION PERINEURAL at 09:58

## 2018-05-14 ASSESSMENT — LIFESTYLE VARIABLES: TOBACCO_USE: 1

## 2018-05-14 NOTE — OP NOTE
PREOPERATIVE DIAGNOSIS:  Breast cancer      POSTOPERATIVE DIAGNOSIS:  Same.        PROCEDURE:  Ultrasound-guided right internal jugular Power Port-A-Cath placement with fluoroscopy and interpretation of imaging      SURGEON:  Rodney Umana MD.        ASSISTANT:   Adri Chowdhury PA-C       ANESTHESIA:  Local with MAC      COMPLICATIONS:  None.        BLOOD LOSS:  Minimal.        FINDINGS:  Difficult placement due to previous neck surgery      INDICATIONS: Mrs. Diaz Has a history of breast cancer and is presenting today for Port-A-Cath placement. I explained the risks, benefits, complications, including but not limited to bleeding, infection, postop hematoma, seroma, port malposition, pneumothorax and need for additional procedures.  The patient agreed and did sign consent.        DETAILS OF PROCEDURE:  The patient was brought to the operating per Anesthesia, placed in supine position and MAC was administered without difficulty.  A surgical timeout was then performed, verifying the correct surgeon, site, procedure, patient, and all in the room were in agreement.  The patient was prepped in the usual fashion using ChloraPrep.  1% lidocaine and 0.25% Marcaine were injected to the right neck area.  The ultrasound was used to locate the internal jugular vein. Under direct visualization the needle was attempted to be placed into the internal jugular vein but was very difficult due to her previous neck surgeries. Multiple attempts were performed but I was unable to thread the guide wire. Eventually we were able to manipulate her neck briefly to allow needle placement.  An immediate flash of venous blood was then apsirated. The guidewire was then placed through the needle using the Seldinger technique. Fluoroscopy confirmed that the guidewire was in through the SVC junction.  A port pocket was then created with cautery. The dilator was then placed over the guidewire without difficulty. An 8 [place] catheter was then  brought from the port pocket up through the neck incision. It was then placed through the dilator with ease.  Fluoroscopy was brought into the room, and confirmed the catheter was located in the SVC junction. I interpreted the fluoroscopy imaging confirming the placement and confirming no obvious pneumothorax.  I aspirated and flushed the catheter multiple times with ease.  I then placed the Power Port on the catheter and anchored it on the chest wall with Prolene suture. The final fluoroscopy revealed that it was at the SVC junction. 7 cc of concentrated heparin was then injected into the catheter. Once this was done, the incision was closed with 3-0 Vicryl and a 4-0 Monocryl in subcuticular fashion.  Dermabond was applied to the wounds.  Pateint was awoken from anesthesia, transferred to PACU in stable condition.  All sponge, instrument and needle counts were correct. The Physician Assistant was medically necessary for their expertise in retraction/exposure, suctioning, and suturing.           NATASHA OLIVER MD     Please cc note to Referring provider and PCP

## 2018-05-14 NOTE — IP AVS SNAPSHOT
[facility]    [address] [place] [address]    Phone:  [phone]    Fax:  [phone]                                       After Visit Summary   5/14/2018    Muriel Diaz    MRN: [id number]           After Visit Summary Signature Page     I have received my discharge instructions, and my questions have been answered. I have discussed any challenges I see with this plan with the nurse or doctor.    ..........................................................................................................................................  Patient/Patient Representative Signature      ..........................................................................................................................................  Patient Representative Print Name and Relationship to Patient    ..................................................               ................................................  Date                                            Time    ..........................................................................................................................................  Reviewed by Signature/Title    ...................................................              ..............................................  Date                                                            Time

## 2018-05-14 NOTE — BRIEF OP NOTE
[facility] Brief Operative Note    Pre-operative diagnosis: LEFT BREAST CANCER   Post-operative diagnosis Same   Procedure: Procedure(s):  PORT PLACEMENT - Wound Class: I-Clean   Surgeon(s): Surgeon(s) and Role:     * Rodney Umana MD - Primary     * Adri Chowdhury PA-C - Assisting   Estimated blood loss: 5ml    Specimens: * No specimens in log *   Findings: Right IJ port placement. No immediate complications. See operative report for full details.       Adri Chowdhury PA-C  [facility]  [phone]

## 2018-05-14 NOTE — ANESTHESIA PREPROCEDURE EVALUATION
Anesthesia Evaluation     . Pt has had prior anesthetic. Type: General    History of anesthetic complications   - PONV        ROS/MED HX    ENT/Pulmonary:     (+)tobacco use, Current use , . .    Neurologic:       Cardiovascular:     (+) hypertension----. : . . . :. .       METS/Exercise Tolerance:     Hematologic:         Musculoskeletal:   (+) , , other musculoskeletal- DDD of neck      GI/Hepatic:  - neg GI/hepatic ROS       Renal/Genitourinary:         Endo:         Psychiatric: Comment: Polysubstance abuse    (+) psychiatric history anxiety      Infectious Disease:         Malignancy:   (+) Malignancy History of Breast  Breast CA status post Surgery.         Other:                     Physical Exam  Normal systems: cardiovascular and pulmonary    Airway   Mallampati: II  TM distance: >3 FB  Neck ROM: full    Dental     Cardiovascular   Rhythm and rate: regular and normal      Pulmonary    breath sounds clear to auscultation                    Anesthesia Plan      History & Physical Review  History and physical reviewed and following examination; no interval change.    ASA Status:  2 .        Plan for MAC with Intravenous induction.   PONV prophylaxis:  Ondansetron (or other 5HT-3) and Dexamethasone or Solumedrol       Postoperative Care      Consents  Anesthetic plan, risks, benefits and alternatives discussed with:  Patient..                          .

## 2018-05-14 NOTE — IP AVS SNAPSHOT
MRN:[id number]                      After Visit Summary   5/14/2018    Muriel Diaz    MRN: [id number]           Thank you!     Thank you for choosing [facility] for your care. Our goal is always to provide you with excellent care. Hearing back from our patients is one way we can continue to improve our services. Please take a few minutes to complete the written survey that you may receive in the mail after you visit with us. Thank you!        Patient Information     Date Of Birth          1960        About your hospital stay     You were admitted on:  May 14, 2018 You last received care in the:  [facility]    You were discharged on:  May 14, 2018       Who to Call     For medical emergencies, please call 911.  For non-urgent questions about your medical care, please call your primary care provider or clinic, [phone]  For questions related to your surgery, please call your surgery clinic        Attending Provider     Provider Rodney Mantilla MD Surgery       Primary Care Provider Office Phone # Fax #    Isabel Vickie Landin [phone]      After Care Instructions     Diet Instructions       Resume pre-procedure diet            Discharge Instructions       You do not need to follow-up with Dr. Umana unless you have questions or concerns. If you have questions or to schedule an appointment please call our office at [phone]. Our clinic's name is [facility]. The address is [address] [place] [address], [address], [place], [address]            Discharge Instructions       CONSTIPATION PREVENTION  We recommend that you go to a pharmacy and purchase ONE of the following stool softeners/laxatives and start taking today to prevent constipation. You can stop this bowel regimen once you are no longer taking your narcotic pain medication or are having regular bowel movements:    - Senokot oral once daily  - Milk of magnesium once  daily  - Docusate Sodium 1 pill twice daily (stool softener)  - Miralax 1 scoop/packet 1-2 times daily (laxative)    In addition to the above options, if constipation continues, you can add:   - 4 oz Prune juice or Plum juice daily for constipation            Dressing       Dermabond General Care:  You have dermabond, a skin glue, over your incisions. Keep the wound clean and dry. You may shower or bathe tomorrow as usual, but do not allow your incisions to soak in water. Do not use soaps, lotions, or ointments directly on the wound area. Do not scrub the wound. After bathing, pat the wound dry with a soft towel.  Do not scratch, rub, or pick at the strips or film. Do not place tape directly over the strips or film. The skin glue will fall off naturally in 1-2 weeks.  Do not apply liquids (such as peroxide), ointments, or creams to the wound while the glue is in place.    Most  wounds heal without any problems. However, an infection sometimes occurs despite proper treatment. Therefore, watch for the following signs of infection:  - Increasing warmth or heat around incisions  - Increasing redness around incisions  - Drainage from incisions  - Increasing or uncontrolled pain around incisions  - Fever >101F with chills            No Alcohol       For 24 hours post procedure            No driving or operating machinery        until the day after procedure            Weight bearing status - As tolerated                 Your next 10 appointments already scheduled     May 22, 2018  8:00 AM CDT   Level 4 with  INFUSION CHAIR 7   [facility] ([facility])    [facility]  [address]  [facility] [id number] [phone]            May 22, 2018  9:00 AM CDT   Return Visit with Patience Mckeon MD   [facility] ([facility]    [facility]  [address]  [facility] [address]    [phone]              Further instructions from your care team       Same Day Surgery Discharge Instructions for  Sedation and General Anesthesia       It's not unusual to feel dizzy, light-headed or faint for up to 24 hours after surgery or while taking pain medication.  If you have these symptoms: sit for a few minutes before standing and have someone assist you when you get up to walk or use the bathroom.      You should rest and relax for the next 24 hours. We recommend you make arrangements to have an adult stay with you for at least 24 hours after your discharge.  Avoid hazardous and strenuous activity.      DO NOT DRIVE any vehicle or operate mechanical equipment for 24 hours following the end of your surgery.  Even though you may feel normal, your reactions may be affected by the medication you have received.      Do not drink alcoholic beverages for 24 hours following surgery.       Slowly progress to your regular diet as you feel able. It's not unusual to feel nauseated and/or vomit after receiving anesthesia.  If you develop these symptoms, drink clear liquids (apple juice, ginger ale, broth, 7-up, etc. ) until you feel better.  If your nausea and vomiting persists for 24 hours, please notify your surgeon.        All narcotic pain medications, along with inactivity and anesthesia, can cause constipation. Drinking plenty of liquids and increasing fiber intake will help.      For any questions of a medical nature, call your surgeon.      Do not make important decisions for 24 hours.      If you had general anesthesia, you may have a sore throat for a couple of days related to the breathing tube used during surgery.  You may use Cepacol lozenges to help with this discomfort.  If it worsens or if you develop a fever, contact your surgeon.       If you feel your pain is not well managed with the pain medications prescribed by your surgeon, please contact your surgeon's office to let them know so they can  "address your concerns.           **If you have questions or concerns about your procedure,   call Dr. Umana at [phone]**          [employer]aning [employer]     Fighting cancer is difficult enough, but living with it is even tougher and that's where [employer] steps in.  As a nonprofit serving the entire [place] and [place], we partner with maid services to offer professional house cleanings to help women undergoing treatment for cancer, any type of cancer. They have provided thousands of cleanings with more that 1,100 maid services.  To find out more about this organization visit their website at:   [email or web address]     Pending Results     Date and Time Order Name Status Description    2018 1014 XR Chest Port 1 View In process     2018 1002 XR Surgery PADMINI L/T 5 Min Fluoro w Stills In process             Admission Information     Date & Time Provider Department Dept. Phone    2018 Rodney Umana MD [facility] Same Day Surgery [phone]      Your Vitals Were     Blood Pressure Pulse Temperature Respirations Height Weight    118/77 56 98.1  F (36.7  C) 16 1.6 m (5' 3\") 46.1 kg (101 lb 9.6 oz)    Pulse Oximetry BMI (Body Mass Index)                93% 18 kg/m2          [employer] Information     [employer] lets you send messages to your doctor, view your test results, renew your prescriptions, schedule appointments and more. To sign up, go to [email or web address] . Click on \"Log in\" on the left side of the screen, which will take you to the Welcome page. Then click on \"Sign up Now\" on the right side of the page.     You will be asked to enter the access code listed below, as well as some personal information. Please follow the directions to create your username and password.     Your access code is: [email or web address]  Expires: 2018  9:48 AM     Your access code will  in 90 days. If you need help or a new code, please call your [facility] clinic or " [phone].        Care EveryWhere ID     This is your Care EveryWhere ID. This could be used by other organizations to access your [facility] medical records  [id number]        Equal Access to Services     FRANCISCA HUNT : Hadii aad ku hadsommerandrews Mariana, wanegritoda luqadaha, qaybta kaalmada josemanuel, kvng garciaroberto ivorydarlene moralez yun gupta. So [facility] [phone].    ATENCIÓN: Si habla español, tiene a chowdary disposición servicios gratuitos de asistencia lingüística. Llame al [phone].    We comply with applicable federal civil rights laws and [place] laws. We do not discriminate on the basis of race, color, national origin, age, disability, sex, sexual orientation, or gender identity.               Review of your medicines      START taking        Dose / Directions    HYDROcodone-acetaminophen 5-325 MG per tablet   Commonly known as:  NORCO   Used for:  Postoperative pain        Dose:  1 tablet   Take 1 tablet by mouth every 4 hours as needed for other (Moderate to Severe Pain)   Quantity:  10 tablet   Refills:  0         CONTINUE these medicines which have NOT CHANGED        Dose / Directions    atenolol 25 MG tablet   Commonly known as:  TENORMIN   Used for:  Hypertension goal BP (blood pressure) < 140/90        TAKE ONE TABLET BY MOUTH ONE TIME DAILY   Quantity:  30 tablet   Refills:  0       hydrochlorothiazide 12.5 MG Tabs tablet   Used for:  Hypertension goal BP (blood pressure) < 140/90        Dose:  12.5 mg   Take 1 tablet (12.5 mg) by mouth daily   Quantity:  90 tablet   Refills:  1       lidocaine-prilocaine cream   Commonly known as:  EMLA   Used for:  Malignant neoplasm of left breast (H)        Apply topically as needed for moderate pain Apply to port site 1 hour prior to accessing   Quantity:  30 g   Refills:  3       SOMA PO        Dose:  350 mg   Take 350 mg by mouth 3 times daily   Refills:  0       VITAMIN D (CHOLECALCIFEROL) PO        Dose:  2000 Units   Take 2,000 Units by mouth daily (2 x 1000  units = 2000 unit dose)   Refills:  0            Where to get your medicines      Some of these will need a paper prescription and others can be bought over the counter. Ask your nurse if you have questions.     Bring a paper prescription for each of these medications     HYDROcodone-acetaminophen 5-325 MG per tablet                Protect others around you: Learn how to safely use, store and throw away your medicines at www.disposemymeds.org.        Information about OPIOIDS     PRESCRIPTION OPIOIDS: WHAT YOU NEED TO KNOW   You have a prescription for an opioid (narcotic) pain medicine. Opioids can cause addiction. If you have a history of chemical dependency of any type, you are at a higher risk of becoming addicted to opioids. Only take this medicine after all other options have been tried. Take it for as short a time and as few doses as possible.     Do not:    Drive. If you drive while taking these medicines, you could be arrested for driving under the influence (DUI).    Operate heavy machinery    Do any other dangerous activities while taking these medicines.     Drink any alcohol while taking these medicines.      Take with any other medicines that contain acetaminophen. Read all labels carefully. Look for the word  acetaminophen  or  Tylenol.  Ask your pharmacist if you have questions or are unsure.    Store your pills in a secure place, locked if possible. We will not replace any lost or stolen medicine. If you don t finish your medicine, please throw away (dispose) as directed by your pharmacist. The [place] Pollution Control Agency has more information about safe disposal: https://www.pca.[facility].[place].us/living-green/managing-unwanted-medications    All opioids tend to cause constipation. Drink plenty of water and eat foods that have a lot of fiber, such as fruits, vegetables, prune juice, apple juice and high-fiber cereal. Take a laxative (Miralax, milk of magnesia, Colace, Senna) if you don t move your  bowels at least every other day.              Medication List: This is a list of all your medications and when to take them. Check marks below indicate your daily home schedule. Keep this list as a reference.      Medications           Morning Afternoon Evening Bedtime As Needed    atenolol 25 MG tablet   Commonly known as:  TENORMIN   TAKE ONE TABLET BY MOUTH ONE TIME DAILY                                hydrochlorothiazide 12.5 MG Tabs tablet   Take 1 tablet (12.5 mg) by mouth daily                                HYDROcodone-acetaminophen 5-325 MG per tablet   Commonly known as:  NORCO   Take 1 tablet by mouth every 4 hours as needed for other (Moderate to Severe Pain)                                lidocaine-prilocaine cream   Commonly known as:  EMLA   Apply topically as needed for moderate pain Apply to port site 1 hour prior to accessing                                SOMA PO   Take 350 mg by mouth 3 times daily                                VITAMIN D (CHOLECALCIFEROL) PO   Take 2,000 Units by mouth daily (2 x 1000 units = 2000 unit dose)

## 2018-05-14 NOTE — ANESTHESIA CARE TRANSFER NOTE
Patient: Muriel Diaz    Procedure(s):  PORT PLACEMENT - Wound Class: I-Clean    Diagnosis: LEFT BREAST CANCER  Diagnosis Additional Information: No value filed.    Anesthesia Type:   MAC     Note:  Airway :Face Mask  Patient transferred to:PACU  Handoff Report: Identifed the Patient, Identified the Reponsible Provider, Reviewed the pertinent medical history, Discussed the surgical course, Reviewed Intra-OP anesthesia mangement and issues during anesthesia, Set expectations for post-procedure period and Allowed opportunity for questions and acknowledgement of understanding      Vitals: (Last set prior to Anesthesia Care Transfer)    CRNA VITALS  5/14/2018 0938 - 5/14/2018 1014      5/14/2018             Resp Rate (set): 10                Electronically Signed By: CAROLYN Lim CRNA  May 14, 2018  10:14 AM

## 2018-05-14 NOTE — DISCHARGE INSTRUCTIONS
Same Day Surgery Discharge Instructions for  Sedation and General Anesthesia       It's not unusual to feel dizzy, light-headed or faint for up to 24 hours after surgery or while taking pain medication.  If you have these symptoms: sit for a few minutes before standing and have someone assist you when you get up to walk or use the bathroom.      You should rest and relax for the next 24 hours. We recommend you make arrangements to have an adult stay with you for at least 24 hours after your discharge.  Avoid hazardous and strenuous activity.      DO NOT DRIVE any vehicle or operate mechanical equipment for 24 hours following the end of your surgery.  Even though you may feel normal, your reactions may be affected by the medication you have received.      Do not drink alcoholic beverages for 24 hours following surgery.       Slowly progress to your regular diet as you feel able. It's not unusual to feel nauseated and/or vomit after receiving anesthesia.  If you develop these symptoms, drink clear liquids (apple juice, ginger ale, broth, 7-up, etc. ) until you feel better.  If your nausea and vomiting persists for 24 hours, please notify your surgeon.        All narcotic pain medications, along with inactivity and anesthesia, can cause constipation. Drinking plenty of liquids and increasing fiber intake will help.      For any questions of a medical nature, call your surgeon.      Do not make important decisions for 24 hours.      If you had general anesthesia, you may have a sore throat for a couple of days related to the breathing tube used during surgery.  You may use Cepacol lozenges to help with this discomfort.  If it worsens or if you develop a fever, contact your surgeon.       If you feel your pain is not well managed with the pain medications prescribed by your surgeon, please contact your surgeon's office to let them know so they can address your concerns.           **If you have questions or concerns about  your procedure,   call Dr. Umana at [phone]**          Cleaning for a Reason     Fighting cancer is difficult enough, but living with it is even tougher and that's where [employer] steps in.  As a nonprofit serving the entire [place] and [place], we partner with [employer] services to offer professional house cleanings to help women undergoing treatment for cancer, any type of cancer. They have provided thousands of cleanings with more that 1,100 maid services.  To find out more about this organization visit their website at:   www.[email or web address].org

## 2018-05-14 NOTE — ANESTHESIA POSTPROCEDURE EVALUATION
Patient: Muriel Diaz    Procedure(s):  PORT PLACEMENT - Wound Class: I-Clean    Diagnosis:LEFT BREAST CANCER  Diagnosis Additional Information: No value filed.    Anesthesia Type:  MAC    Note:  Anesthesia Post Evaluation    Last vitals:  Vitals:    05/14/18 1118 05/14/18 1130 05/14/18 1145   BP:  136/76 123/79   Pulse:      Resp: 16 16 14   Temp:  36.8  C (98.2  F) 36.4  C (97.5  F)   SpO2: 94% 92% 95%         Electronically Signed By: Gatito Sue MD  May 14, 2018  2:04 PM

## 2018-05-22 ENCOUNTER — INFUSION THERAPY VISIT (OUTPATIENT)
Dept: INFUSION THERAPY | Facility: CLINIC | Age: 58
End: 2018-05-22
Attending: INTERNAL MEDICINE
Payer: COMMERCIAL

## 2018-05-22 ENCOUNTER — ONCOLOGY VISIT (OUTPATIENT)
Dept: ONCOLOGY | Facility: CLINIC | Age: 58
End: 2018-05-22
Attending: INTERNAL MEDICINE
Payer: COMMERCIAL

## 2018-05-22 ENCOUNTER — HOSPITAL ENCOUNTER (OUTPATIENT)
Facility: CLINIC | Age: 58
Setting detail: SPECIMEN
Discharge: HOME | End: 2018-05-22
Attending: INTERNAL MEDICINE | Admitting: INTERNAL MEDICINE
Payer: COMMERCIAL

## 2018-05-22 VITALS
HEIGHT: 63 IN | TEMPERATURE: 98.5 F | SYSTOLIC BLOOD PRESSURE: 175 MMHG | RESPIRATION RATE: 18 BRPM | WEIGHT: 102.8 LBS | DIASTOLIC BLOOD PRESSURE: 85 MMHG | OXYGEN SATURATION: 100 % | BODY MASS INDEX: 18.21 KG/M2 | HEART RATE: 56 BPM

## 2018-05-22 VITALS
BODY MASS INDEX: 18.21 KG/M2 | HEIGHT: 63 IN | HEART RATE: 57 BPM | DIASTOLIC BLOOD PRESSURE: 88 MMHG | SYSTOLIC BLOOD PRESSURE: 172 MMHG | OXYGEN SATURATION: 100 % | TEMPERATURE: 98.5 F | WEIGHT: 102.8 LBS | RESPIRATION RATE: 20 BRPM

## 2018-05-22 DIAGNOSIS — C50.212 MALIGNANT NEOPLASM OF UPPER-INNER QUADRANT OF LEFT BREAST IN FEMALE, ESTROGEN RECEPTOR POSITIVE (H): Primary | ICD-10-CM

## 2018-05-22 DIAGNOSIS — Z17.0 MALIGNANT NEOPLASM OF UPPER-INNER QUADRANT OF LEFT BREAST IN FEMALE, ESTROGEN RECEPTOR POSITIVE (H): Primary | ICD-10-CM

## 2018-05-22 LAB
ALBUMIN SERPL-MCNC: 3.7 G/DL (ref 3.4–5)
ALP SERPL-CCNC: 99 U/L (ref 40–150)
ALT SERPL W P-5'-P-CCNC: 22 U/L (ref 0–50)
ANION GAP SERPL CALCULATED.3IONS-SCNC: 9 MMOL/L (ref 3–14)
AST SERPL W P-5'-P-CCNC: 20 U/L (ref 0–45)
BASOPHILS # BLD AUTO: 0.1 10E9/L (ref 0–0.2)
BASOPHILS NFR BLD AUTO: 0.7 %
BILIRUB SERPL-MCNC: 0.2 MG/DL (ref 0.2–1.3)
BUN SERPL-MCNC: 24 MG/DL (ref 7–30)
CALCIUM SERPL-MCNC: 8.9 MG/DL (ref 8.5–10.1)
CHLORIDE SERPL-SCNC: 105 MMOL/L (ref 94–109)
CO2 SERPL-SCNC: 28 MMOL/L (ref 20–32)
CREAT SERPL-MCNC: 0.7 MG/DL (ref 0.52–1.04)
DIFFERENTIAL METHOD BLD: NORMAL
EOSINOPHIL # BLD AUTO: 0.5 10E9/L (ref 0–0.7)
EOSINOPHIL NFR BLD AUTO: 6.8 %
ERYTHROCYTE [DISTWIDTH] IN BLOOD BY AUTOMATED COUNT: 13.4 % (ref 10–15)
GFR SERPL CREATININE-BSD FRML MDRD: 86 ML/MIN/1.7M2
GLUCOSE SERPL-MCNC: 102 MG/DL (ref 70–99)
HCT VFR BLD AUTO: 36.5 % (ref 35–47)
HGB BLD-MCNC: 12 G/DL (ref 11.7–15.7)
IMM GRANULOCYTES # BLD: 0 10E9/L (ref 0–0.4)
IMM GRANULOCYTES NFR BLD: 0.1 %
LYMPHOCYTES # BLD AUTO: 1.7 10E9/L (ref 0.8–5.3)
LYMPHOCYTES NFR BLD AUTO: 23.5 %
MCH RBC QN AUTO: 31.1 PG (ref 26.5–33)
MCHC RBC AUTO-ENTMCNC: 32.9 G/DL (ref 31.5–36.5)
MCV RBC AUTO: 95 FL (ref 78–100)
MONOCYTES # BLD AUTO: 0.4 10E9/L (ref 0–1.3)
MONOCYTES NFR BLD AUTO: 6.1 %
NEUTROPHILS # BLD AUTO: 4.6 10E9/L (ref 1.6–8.3)
NEUTROPHILS NFR BLD AUTO: 62.8 %
NRBC # BLD AUTO: 0 10*3/UL
NRBC BLD AUTO-RTO: 0 /100
PLATELET # BLD AUTO: 317 10E9/L (ref 150–450)
POTASSIUM SERPL-SCNC: 3.6 MMOL/L (ref 3.4–5.3)
PROT SERPL-MCNC: 7 G/DL (ref 6.8–8.8)
RBC # BLD AUTO: 3.86 10E12/L (ref 3.8–5.2)
SODIUM SERPL-SCNC: 142 MMOL/L (ref 133–144)
WBC # BLD AUTO: 7.2 10E9/L (ref 4–11)

## 2018-05-22 PROCEDURE — 96377 APPLICATON ON-BODY INJECTOR: CPT | Mod: 59

## 2018-05-22 PROCEDURE — 85025 COMPLETE CBC W/AUTO DIFF WBC: CPT | Performed by: INTERNAL MEDICINE

## 2018-05-22 PROCEDURE — G0463 HOSPITAL OUTPT CLINIC VISIT: HCPCS

## 2018-05-22 PROCEDURE — 25000128 H RX IP 250 OP 636: Performed by: INTERNAL MEDICINE

## 2018-05-22 PROCEDURE — 96413 CHEMO IV INFUSION 1 HR: CPT

## 2018-05-22 PROCEDURE — 80053 COMPREHEN METABOLIC PANEL: CPT | Performed by: INTERNAL MEDICINE

## 2018-05-22 PROCEDURE — 99214 OFFICE O/P EST MOD 30 MIN: CPT | Performed by: INTERNAL MEDICINE

## 2018-05-22 PROCEDURE — G0463 HOSPITAL OUTPT CLINIC VISIT: HCPCS | Mod: 25

## 2018-05-22 PROCEDURE — 96375 TX/PRO/DX INJ NEW DRUG ADDON: CPT

## 2018-05-22 PROCEDURE — 96367 TX/PROPH/DG ADDL SEQ IV INF: CPT

## 2018-05-22 RX ORDER — DEXAMETHASONE 4 MG/1
8 TABLET ORAL DAILY
Qty: 6 TABLET | Refills: 3 | Status: SHIPPED | OUTPATIENT
Start: 2018-05-22 | End: 2018-08-07

## 2018-05-22 RX ORDER — HEPARIN SODIUM (PORCINE) LOCK FLUSH IV SOLN 100 UNIT/ML 100 UNIT/ML
5 SOLUTION INTRAVENOUS EVERY 8 HOURS
Status: DISCONTINUED | OUTPATIENT
Start: 2018-05-22 | End: 2018-05-22 | Stop reason: HOSPADM

## 2018-05-22 RX ORDER — PALONOSETRON 0.05 MG/ML
0.25 INJECTION, SOLUTION INTRAVENOUS ONCE
Status: COMPLETED | OUTPATIENT
Start: 2018-05-22 | End: 2018-05-22

## 2018-05-22 RX ORDER — LORAZEPAM 0.5 MG/1
0.5 TABLET ORAL EVERY 4 HOURS PRN
Qty: 30 TABLET | Refills: 5 | Status: SHIPPED | OUTPATIENT
Start: 2018-05-22 | End: 2018-07-17

## 2018-05-22 RX ORDER — DOXORUBICIN HYDROCHLORIDE 2 MG/ML
90 INJECTION, SOLUTION INTRAVENOUS ONCE
Status: COMPLETED | OUTPATIENT
Start: 2018-05-22 | End: 2018-05-22

## 2018-05-22 RX ORDER — PROCHLORPERAZINE MALEATE 10 MG
10 TABLET ORAL EVERY 6 HOURS PRN
Qty: 30 TABLET | Refills: 5 | Status: ON HOLD | OUTPATIENT
Start: 2018-05-22 | End: 2019-03-22

## 2018-05-22 RX ADMIN — PALONOSETRON HYDROCHLORIDE 0.25 MG: 0.25 INJECTION INTRAVENOUS at 09:32

## 2018-05-22 RX ADMIN — DOXORUBICIN HYDROCHLORIDE 90 MG: 2 INJECTION, SOLUTION INTRAVENOUS at 10:31

## 2018-05-22 RX ADMIN — DEXAMETHASONE SODIUM PHOSPHATE 12 MG: 10 INJECTION, SOLUTION INTRAMUSCULAR; INTRAVENOUS at 09:34

## 2018-05-22 RX ADMIN — CYCLOPHOSPHAMIDE 900 MG: 1 INJECTION, POWDER, FOR SOLUTION INTRAVENOUS; ORAL at 10:45

## 2018-05-22 RX ADMIN — SODIUM CHLORIDE 250 ML: 9 INJECTION, SOLUTION INTRAVENOUS at 09:32

## 2018-05-22 RX ADMIN — HEPARIN 5 ML: 100 SYRINGE at 11:30

## 2018-05-22 RX ADMIN — PEGFILGRASTIM 6 MG: KIT SUBCUTANEOUS at 11:28

## 2018-05-22 RX ADMIN — SODIUM CHLORIDE 150 MG: 0.9 INJECTION, SOLUTION INTRAVENOUS at 09:47

## 2018-05-22 ASSESSMENT — PAIN SCALES - GENERAL: PAINLEVEL: NO PAIN (0)

## 2018-05-22 NOTE — PATIENT INSTRUCTIONS
Your On-body Neulasta Injector was applied to your abdomen at 11:30.  At approximately 2:30 on 5/23/18, your On-body Injector will beep to let you know your dose delivery will begin in 2 minutes.  Your medication will be delivered over the next 45 minutes.  You can remove your Injector at 3:30.  Please make sure your Injector has a solid green light or has turned off prior to removing the device.  Please contact your provider at [phone] with questions or concerns.

## 2018-05-22 NOTE — PATIENT INSTRUCTIONS
-chemotherapy today (Adriamycin+Cytoxan)  -schedule chemotherapy on 6/5/18 and 6/19/18  Scheduled/tomas  -return to clinic with Dr. Mckeon on 6/5/18 and 6/19/18  Scheduled/janice      AVS printed & given to patient/tomas

## 2018-05-22 NOTE — PROGRESS NOTES
Patient was educated on the following oral medications: Dexamethasone, Compazine and Ativan on May 22, 2018. Teaching provided to patient included indication, dose, administration, adverse effects and side effect management. Patient is still taking carisoprodol three times daily and reports that it does not cause drowsiness. I told her that using ativan and carisoprodol might cause drowsiness and should only use ativan sparingly. She will use compazine first for nausea and will let us know if it is not effective. Written materials were provided and patient was given the opportunity to ask questions. Patient verbalized understanding of the information presented.     Valerio EscaleraD.

## 2018-05-22 NOTE — MR AVS SNAPSHOT
After Visit Summary   5/22/2018    Muriel Diaz    MRN: [id number]           Patient Information     Date Of Birth          1960        Visit Information        Provider Department      5/22/2018 8:00 AM  INFUSION CHAIR 7 [facility]        Today's Diagnoses     Malignant neoplasm of upper-inner quadrant of left breast in female, estrogen receptor positive (H)    -  1      Care Instructions    Your On-body Neulasta Injector was applied to your abdomen at 11:30.  At approximately 2:30 on 5/23/18, your On-body Injector will beep to let you know your dose delivery will begin in 2 minutes.  Your medication will be delivered over the next 45 minutes.  You can remove your Injector at 3:30.  Please make sure your Injector has a solid green light or has turned off prior to removing the device.  Please contact your provider at [phone] with questions or concerns.              Follow-ups after your visit        Your next 10 appointments already scheduled     Jun 05, 2018 10:30 AM CDT   Level 4 with  INFUSION CHAIR 5   [facility] ([facility])    [facility]  [address]  [place] [address]   [phone]            Jun 05, 2018 11:30 AM CDT   Return Visit with Patience Mckeon MD   [facility] ([facility])    [facility]  [address]  [place] [address]   [phone]            Jun 19, 2018 11:30 AM CDT   Level 4 with  INFUSION CHAIR 9   [facility] ([facility])    [facility]  [address]  [place] [address]   [phone]            Jun 19, 2018  2:00 PM CDT   Return Visit with Patience Mckeon MD   [facility] ([facility])    [facility]  [address]  "[place] [id number] [phone]              Who to contact     If you have questions or need follow up information about today's clinic visit or your schedule please contact [facility] directly at [phone].  Normal or non-critical lab and imaging results will be communicated to you by MyChart, letter or phone within 4 business days after the clinic has received the results. If you do not hear from us within 7 days, please contact the clinic through MyChart or phone. If you have a critical or abnormal lab result, we will notify you by phone as soon as possible.  Submit refill requests through [employer] or call your pharmacy and they will forward the refill request to us. Please allow 3 business days for your refill to be completed.          Additional Information About Your Visit        MyChart Information     [employer] lets you send messages to your doctor, view your test results, renew your prescriptions, schedule appointments and more. To sign up, go to [email or web address] . Click on \"Log in\" on the left side of the screen, which will take you to the Welcome page. Then click on \"Sign up Now\" on the right side of the page.     You will be asked to enter the access code listed below, as well as some personal information. Please follow the directions to create your username and password.     Your access code is: [email or web address]  Expires: 2018  9:48 AM     Your access code will  in 90 days. If you need help or a new code, please call your [facility] or [phone].        Care EveryWhere ID     This is your Care EveryWhere ID. This could be used by other organizations to access your [facility] medical records  [id number]        Your Vitals Were     Pulse Temperature Respirations Height Pulse Oximetry BMI (Body Mass Index)    57 98.5  F (36.9  C) (Oral) 20 1.6 m (5' 2.99\") 100% 18.21 kg/m2       Blood Pressure from Last 3 Encounters:   18 172/88 "   05/22/18 170/88   05/14/18 123/79    Weight from Last 3 Encounters:   05/22/18 46.6 kg (102 lb 12.8 oz)   05/22/18 46.6 kg (102 lb 12.8 oz)   05/14/18 46.1 kg (101 lb 9.6 oz)              We Performed the Following     CBC with platelets differential     Comprehensive metabolic panel          Today's Medication Changes          These changes are accurate as of 5/22/18 11:33 AM.  If you have any questions, ask your nurse or doctor.               Start taking these medicines.        Dose/Directions    dexamethasone 4 MG tablet   Commonly known as:  DECADRON   Used for:  Malignant neoplasm of upper-inner quadrant of left breast in female, estrogen receptor positive (H)        Dose:  8 mg   Take 2 tablets (8 mg) by mouth daily for 3 days Start on Day 2 of Cycles 1 through 4.   Quantity:  6 tablet   Refills:  3       LORazepam 0.5 MG tablet   Commonly known as:  ATIVAN   Used for:  Malignant neoplasm of upper-inner quadrant of left breast in female, estrogen receptor positive (H)        Dose:  0.5 mg   Take 1 tablet (0.5 mg) by mouth every 4 hours as needed (Anxiety, Nausea/Vomiting or Sleep)   Quantity:  30 tablet   Refills:  5       prochlorperazine 10 MG tablet   Commonly known as:  COMPAZINE   Used for:  Malignant neoplasm of upper-inner quadrant of left breast in female, estrogen receptor positive (H)        Dose:  10 mg   Take 1 tablet (10 mg) by mouth every 6 hours as needed (Nausea/Vomiting)   Quantity:  30 tablet   Refills:  5            Where to get your medicines      These medications were sent to [facility] - [address]  [address] [place] [address] [address]     Phone:  [phone]     dexamethasone 4 MG tablet    prochlorperazine 10 MG tablet         Some of these will need a paper prescription and others can be bought over the counter.  Ask your nurse if you have questions.     Bring a paper prescription for each of these medications     LORazepam 0.5 MG tablet                 Primary Care Provider Office Phone # Fax #    Isabel Vickie Landin [phone] [address] [place] [address]        Equal Access to Services     FRANCISCA HUNT : Hadneil lupe dennison lidiao Soestebanali, waaxda luqadaha, qaybta kaalmada adehank, kvng moralez laMaribeldaniel gupta. So [facility] [phone].    ATENCIÓN: Si habla español, tiene a chowdary disposición servicios gratuitos de asistencia lingüística. Llame al [phone].    We comply with applicable federal civil rights laws and [place] laws. We do not discriminate on the basis of race, color, national origin, age, disability, sex, sexual orientation, or gender identity.            Thank you!     Thank you for choosing [facility]  for your care. Our goal is always to provide you with excellent care. Hearing back from our patients is one way we can continue to improve our services. Please take a few minutes to complete the written survey that you may receive in the mail after your visit with us. Thank you!             Your Updated Medication List - Protect others around you: Learn how to safely use, store and throw away your medicines at www.disposemymeds.org.          This list is accurate as of 5/22/18 11:33 AM.  Always use your most recent med list.                   Brand Name Dispense Instructions for use Diagnosis    atenolol 25 MG tablet    TENORMIN    30 tablet    TAKE ONE TABLET BY MOUTH ONE TIME DAILY    Hypertension goal BP (blood pressure) < 140/90       dexamethasone 4 MG tablet    DECADRON    6 tablet    Take 2 tablets (8 mg) by mouth daily for 3 days Start on Day 2 of Cycles 1 through 4.    Malignant neoplasm of upper-inner quadrant of left breast in female, estrogen receptor positive (H)       hydrochlorothiazide 12.5 MG Tabs tablet     90 tablet    Take 1 tablet (12.5 mg) by mouth daily    Hypertension goal BP (blood pressure) < 140/90       lidocaine-prilocaine  cream    EMLA    30 g    Apply topically as needed for moderate pain Apply to port site 1 hour prior to accessing    Malignant neoplasm of left breast (H)       LORazepam 0.5 MG tablet    ATIVAN    30 tablet    Take 1 tablet (0.5 mg) by mouth every 4 hours as needed (Anxiety, Nausea/Vomiting or Sleep)    Malignant neoplasm of upper-inner quadrant of left breast in female, estrogen receptor positive (H)       prochlorperazine 10 MG tablet    COMPAZINE    30 tablet    Take 1 tablet (10 mg) by mouth every 6 hours as needed (Nausea/Vomiting)    Malignant neoplasm of upper-inner quadrant of left breast in female, estrogen receptor positive (H)       SOMA PO      Take 350 mg by mouth 3 times daily        VITAMIN D (CHOLECALCIFEROL) PO      Take 2,000 Units by mouth daily (2 x 1000 units = 2000 unit dose)

## 2018-05-22 NOTE — PROGRESS NOTES
[facility]    Hematology/Oncology New Patient Note      Today's Date: 05/22/18    Reason for Follow-up: left sided breast cancer      HISTORY OF PRESENT ILLNESS: Muriel Diaz is a 57 year old post-menopausal female with PMHx of HTN, degenerative joint disease, history of cervical spine surgery, who presents with left-sided breast cancer.  She underwent left mastectomy on 4/16/18 by Dr. Umana.  Pathology showed invasive mammary carcinoma, with lobular and ductal features, grade 2, tumor size 4.3 x 4.0 x 2.1 cm, positive margin for invasive carcinoma in the central and lateral posterior surface, DCIS and LCIS present, ER positive (95%), WI positive (50%), HER-2 negative.  Left axillary dissection showed 3 of 5 lymph nodes were positive for metastatic carcinoma (lobular type), also ER positive, WI positive, HER-2 negative. Stage [id number] (stage IIA).      She had port placed on 5/14/18.    She started chemotherapy on 5/22/18:    Doxorubicin 60 mg/m2 IV on day 1  Cyclophosphamide 600 mg/m2 IV on day 1  Neulasta 6 mg SQ on day 2  Every 2 week cycles x 4 cycles    Followed by:    Paclitaxel 80 mg/m2 IV once a week x 12 weeks    C1D1: 5/22/18  C2D1: anticipated for 6/5/18        INTERIM HISTORY: Muriel is here for follow-up.  She says that she feels well.  She had her port placed on 5/22/18, and that feels fine.  She says that her breast surgery site is healed up well.        REVIEW OF SYSTEMS:   14 point ROS was reviewed and is negative other than as noted above in HPI.       HOME MEDICATIONS:  Current Outpatient Prescriptions   Medication Sig Dispense Refill     atenolol (TENORMIN) 25 MG tablet TAKE ONE TABLET BY MOUTH ONE TIME DAILY 30 tablet 0     Carisoprodol (SOMA PO) Take 350 mg by mouth 3 times daily       dexamethasone (DECADRON) 4 MG tablet Take 2 tablets (8 mg) by mouth daily for 3 days Start on Day 2 of Cycles 1 through 4. 6 tablet 3     hydrochlorothiazide 12.5 MG TABS tablet  Take 1 tablet (12.5 mg) by mouth daily 90 tablet 1     HYDROcodone-acetaminophen (NORCO) 5-325 MG per tablet Take 1 tablet by mouth every 4 hours as needed for other (Moderate to Severe Pain) 10 tablet 0     lidocaine-prilocaine (EMLA) cream Apply topically as needed for moderate pain Apply to port site 1 hour prior to accessing 30 g 3     LORazepam (ATIVAN) 0.5 MG tablet Take 1 tablet (0.5 mg) by mouth every 4 hours as needed (Anxiety, Nausea/Vomiting or Sleep) 30 tablet 5     prochlorperazine (COMPAZINE) 10 MG tablet Take 1 tablet (10 mg) by mouth every 6 hours as needed (Nausea/Vomiting) 30 tablet 5     VITAMIN D, CHOLECALCIFEROL, PO Take 2,000 Units by mouth daily (2 x 1000 units = 2000 unit dose)           ALLERGIES:  Allergies   Allergen Reactions     Amitriptyline Other (See Comments)     nightmares     Oxycontin [Oxycodone] Nausea     Severe headaches         PAST MEDICAL HISTORY:  Past Medical History:   Diagnosis Date     Cancer (H)     BREAST CANCER     Degeneration of cervical intervertebral disc      Degeneration of lumbar or lumbosacral intervertebral disc      Hypertension      PONV (postoperative nausea and vomiting)          PAST SURGICAL HISTORY:  Past Surgical History:   Procedure Laterality Date     BACK SURGERY  2001    lumbar fusion, cervical discectomy     DISSECT LYMPH NODE AXILLA Left 4/16/2018    Procedure: DISSECT LYMPH NODE AXILLA;;  Surgeon: Rodney Umana MD;  Location: [facility]     FUSION CERVICAL ANTERIOR THREE+ LEVELS  5/1/2012    Procedure:FUSION CERVICAL ANTERIOR THREE+ LEVELS; Surgeon:SARAH FRANCO; Location:[facility]     FUSION CERVICAL POSTERIOR THREE+ LEVELS  5/1/2012    Procedure:FUSION CERVICAL POSTERIOR THREE+ LEVELS; Posterior Cervical decompression and fusion C4-7, Anterior Cervical decompression and fusion C4-7 (c-arm), (herb table with abraham, yina oasis, yina aviator, Vitoss foam packing strip, impulse monitoring,); Surgeon:SARAH FRANCO;  Location: OR     GYN SURGERY       HYSTERECTOMY VAGINAL  1990's    [facility] specilist     HYSTERECTOMY, PAP NO LONGER INDICATED       INSERT PORT VASCULAR ACCESS N/A 5/14/2018    Procedure: INSERT PORT VASCULAR ACCESS;  PORT PLACEMENT;  Surgeon: Rodney Umana MD;  Location: [facility]     MASTECTOMY SIMPLE Left 4/16/2018    Procedure: MASTECTOMY SIMPLE;  LEFT SIMPLE MASTECTOMY,LEFT AXILLARY DISSECTION;  Surgeon: Rodney Umana MD;  Location: [facility]         SOCIAL HISTORY:  Social History     Social History     Marital status: Single     Spouse name: N/A     Number of children: 0     Years of education: N/A     Occupational History      [employer]      Social History Main Topics     Smoking status: Current Every Day Smoker     Packs/day: 0.50     Types: Cigarettes     Smokeless tobacco: Never Used      Comment: 4 cigarettes per day     Alcohol use 0.6 oz/week     1 Standard drinks or equivalent per week      Comment: 2-3 drinks per week     Drug use: No     Sexual activity: Not Currently     Partners: Male     Other Topics Concern     Parent/Sibling W/ Cabg, Mi Or Angioplasty Before 65f 55m? No     Social History Narrative   She smokes cigarettes, half a pack a day for many years; she is unable to quantify how many.  She drinks alcohol occasionally.  She denies illicit drug use.  She is retired.  She used to to work in  at large companies.  She lives in [place] with her boyfriend.        FAMILY HISTORY:  Family History   Problem Relation Age of Onset     Pancreatitis Mother      Substance Abuse Mother      Unknown/Adopted Father      She does not know much about her family history and is unaware of any cancers in her family.  She has never been pregnant.  She had a hysterectomy in the 1990's.  She still has her ovaries.  She says that she has undergone menopause but is unsure when, likely years ago.        PHYSICAL EXAM:  Vital signs:  /88 (BP Location: Left arm)   "Pulse 57  Temp 98.5  F (36.9  C) (Oral)  Resp 20  Ht 1.6 m (5' 2.99\")  Wt 46.6 kg (102 lb 12.8 oz)  SpO2 100%  BMI 18.21 kg/m2   ECO  GENERAL/CONSTITUTIONAL: No acute distress.  EYES: No scleral icterus.  LYMPH: No anterior cervical, posterior cervical, supraclavicular, or axillary adenopathy.   RESPIRATORY: Clear to auscultation bilaterally. No crackles or wheezing.   CARDIOVASCULAR: Regular rate and rhythm without murmurs, gallops, or rubs.  GASTROINTESTINAL: No tenderness. The patient has normal bowel sounds. No guarding.  No distention.  BREAST: Left-s/p mastectomy, wound healed well.  MUSCULOSKELETAL: Warm and well-perfused, no cyanosis, clubbing, or edema.  NEUROLOGIC: Alert, oriented, answers questions appropriately.  INTEGUMENTARY: No rashes or jaundice.  GAIT: Steady, does not use assistive device  Port in place at the right upper chest.      LABS:  CBC RESULTS:   Recent Labs   Lab Test  18   0827   WBC  7.2   RBC  3.86   HGB  12.0   HCT  36.5   MCV  95   MCH  31.1   MCHC  32.9   RDW  13.4   PLT  317     Recent Labs   Lab Test  18   0827  18   1328   NA  142  136   POTASSIUM  3.6  3.6   CHLORIDE  105  103   CO2  28  27   ANIONGAP  9  6   GLC  102*  116*   BUN  24  25   CR  0.70  0.67   HATTIE  8.9  9.2     Lab Results   Component Value Date    AST 20 2018     Lab Results   Component Value Date    ALT 2018     No results found for: BILICONJ   Lab Results   Component Value Date    BILITOTAL 0.2 2018     Lab Results   Component Value Date    ALBUMIN 3.7 2018     Lab Results   Component Value Date    PROTTOTAL 7.0 2018      Lab Results   Component Value Date    ALKPHOS 99 2018           IMAGING:  Mammogram and ultrasound 3/28/18:  FINDINGS:  Standard bilateral diagnostic views were obtained,  including tomosynthesis. Marker was placed on the left upper breast to  denote the site of the palpable lump; deep to the marker there is a  mass measuring " approximately 2 cm. The mass is associated with  retraction of the left nipple. There are no associated  microcalcifications. No suspicious findings in the contralateral right  breast.     Further evaluation with targeted left breast ultrasound shows a  corresponding hypoechoic lesion at the 11:00 position, 2 cm from the  nipple, measuring 1.9 x 2.9 x 1.0 cm. Survey of the axilla shows an  enlarged lymph node measuring 0.7 x 0.6 cm.     MRI breast 4/9/18:  1. The known left breast malignancy measures 2.5 x 2.0 x 2.6 cm on  MRI. There is additional nonmass enhancement surrounding the mass in  the upper left breast.  2. No suspicious MRI finding in the right breast.      PET-CT 4/9/18:  1. Hypermetabolic left breast mass representing the primary  malignancy.  2. A few mildly prominent left axillary lymph nodes that are  associated with only very mild metabolism. These are felt to be  indeterminant. There is a subcentimeter lymph node with mild  hypermetabolism at the right anterior upper mediastinum between the  innominate artery and superior vena cava that is felt to also be  indeterminant.  3. No other pathologic activity.  4. Small areas of sclerosis within the thoracic and lumbar spine.    Normal soft tissue neck CT.    Echo 5/7/18:  Left ventricular systolic function is normal.  The visual ejection fraction is estimated at 55-60%.  The ejection fraction is estimated at 61% by Shelton's biplane method.  Global peak LV longitudinal strain is averaged at -19.7%. This is within  reported normal limits (normal <-18%).  There is mild trileaflet aortic sclerosis.  There is mild (1+) aortic regurgitation.  Sinus rhythm was noted.  There is no comparison study available.        ASSESSMENT/PLAN:  Muriel Diaz is a 57 year old post-menopausal female with:    1) Left breast cancer of the upper inner quadrant: s/p left mastectomy on 4/16/18; pathology showed invasive mammary carcinoma, with lobular and ductal features,  grade 2, tumor size 4.3 x 4.0 x 2.1 cm, positive margin for invasive carcinoma in the central and lateral posterior surface, DCIS and LCIS present, ER positive (95%), OR positive (50%), HER-2 negative.  Left axillary dissection showed 3 of 5 lymph nodes were positive for metastatic carcinoma (lobular type), also ER positive, OR positive, HER-2 negative. Stage [id number] (stage IIA).      I discussed with Dr. Umana regarding the positive posterior margin.  He took the posterior margin, including the fascia, and there is nothing left to take.  She can proceed with chemotherapy.      After chemotherapy, I will refer her to see radiation oncology for consideration of radiation, and then plan on starting hormonal therapy after that.      -C1D1 of AC today  -prn anti-emetics written  -echo every 3 months while on doxorubicin - next one to be in August 2018  -referred for lymphedema therapy  -RTC in 2 weeks and chemotherapy same day    2) Smoking: She smokes half a pack a day.  She says that she is trying to cut back.  -I again advised smoking cessation  -she will see her PCP for tobacco cessation.    3) Hypertension: She is on medications for this.  She says that she has been anxious and nervous and has had difficulty sleeping at night.  -follows with PCP    4) Chronic back pain: s/p history of back surgeries  -she takes soma  -follows with PCP      I spent a total of 25 minutes with the patient, with over >50% of the time in counseling and/or coordination of care.       Patience Mckeon MD  Hematology/Oncology  [facility]

## 2018-05-22 NOTE — LETTER
5/22/2018         RE: Muriel Diaz  [address]  [facility] [address]        Dear Colleague,    Thank you for referring your patient, Muriel Diaz, to the [facility]. Please see a copy of my visit note below.    [facility]    Hematology/Oncology New Patient Note      Today's Date: 05/22/18    Reason for Follow-up: left sided breast cancer      HISTORY OF PRESENT ILLNESS: Muriel Diaz is a 57 year old post-menopausal female with PMHx of HTN, degenerative joint disease, history of cervical spine surgery, who presents with left-sided breast cancer.  She underwent left mastectomy on 4/16/18 by Dr. Umana.  Pathology showed invasive mammary carcinoma, with lobular and ductal features, grade 2, tumor size 4.3 x 4.0 x 2.1 cm, positive margin for invasive carcinoma in the central and lateral posterior surface, DCIS and LCIS present, ER positive (95%), AZ positive (50%), HER-2 negative.  Left axillary dissection showed 3 of 5 lymph nodes were positive for metastatic carcinoma (lobular type), also ER positive, AZ positive, HER-2 negative. Stage [id number] (stage IIA).      She had port placed on 5/14/18.    She started chemotherapy on 5/22/18:    Doxorubicin 60 mg/m2 IV on day 1  Cyclophosphamide 600 mg/m2 IV on day 1  Neulasta 6 mg SQ on day 2  Every 2 week cycles x 4 cycles    Followed by:    Paclitaxel 80 mg/m2 IV once a week x 12 weeks    C1D1: 5/22/18  C2D1: anticipated for 6/5/18        INTERIM HISTORY: Muriel is here for follow-up.  She says that she feels well.  She had her port placed on 5/22/18, and that feels fine.  She says that her breast surgery site is healed up well.        REVIEW OF SYSTEMS:   14 point ROS was reviewed and is negative other than as noted above in HPI.       HOME MEDICATIONS:  Current Outpatient Prescriptions   Medication Sig Dispense Refill     atenolol (TENORMIN) 25 MG tablet TAKE ONE TABLET BY MOUTH ONE TIME DAILY 30 tablet 0      Carisoprodol (SOMA PO) Take 350 mg by mouth 3 times daily       dexamethasone (DECADRON) 4 MG tablet Take 2 tablets (8 mg) by mouth daily for 3 days Start on Day 2 of Cycles 1 through 4. 6 tablet 3     hydrochlorothiazide 12.5 MG TABS tablet Take 1 tablet (12.5 mg) by mouth daily 90 tablet 1     HYDROcodone-acetaminophen (NORCO) 5-325 MG per tablet Take 1 tablet by mouth every 4 hours as needed for other (Moderate to Severe Pain) 10 tablet 0     lidocaine-prilocaine (EMLA) cream Apply topically as needed for moderate pain Apply to port site 1 hour prior to accessing 30 g 3     LORazepam (ATIVAN) 0.5 MG tablet Take 1 tablet (0.5 mg) by mouth every 4 hours as needed (Anxiety, Nausea/Vomiting or Sleep) 30 tablet 5     prochlorperazine (COMPAZINE) 10 MG tablet Take 1 tablet (10 mg) by mouth every 6 hours as needed (Nausea/Vomiting) 30 tablet 5     VITAMIN D, CHOLECALCIFEROL, PO Take 2,000 Units by mouth daily (2 x 1000 units = 2000 unit dose)           ALLERGIES:  Allergies   Allergen Reactions     Amitriptyline Other (See Comments)     nightmares     Oxycontin [Oxycodone] Nausea     Severe headaches         PAST MEDICAL HISTORY:  Past Medical History:   Diagnosis Date     Cancer (H)     BREAST CANCER     Degeneration of cervical intervertebral disc      Degeneration of lumbar or lumbosacral intervertebral disc      Hypertension      PONV (postoperative nausea and vomiting)          PAST SURGICAL HISTORY:  Past Surgical History:   Procedure Laterality Date     BACK SURGERY  2001    lumbar fusion, cervical discectomy     DISSECT LYMPH NODE AXILLA Left 4/16/2018    Procedure: DISSECT LYMPH NODE AXILLA;;  Surgeon: Rodney Umana MD;  Location: [facility]     FUSION CERVICAL ANTERIOR THREE+ LEVELS  5/1/2012    Procedure:FUSION CERVICAL ANTERIOR THREE+ LEVELS; Surgeon:SARAH FRANCO; Location:[facility]     FUSION CERVICAL POSTERIOR THREE+ LEVELS  5/1/2012    Procedure:FUSION CERVICAL POSTERIOR THREE+ LEVELS; Posterior  Cervical decompression and fusion C4-7, Anterior Cervical decompression and fusion C4-7 (c-arm), (herb table with abraham, yina oasis, yina aviator, Vitoss foam packing strip, impulse monitoring,); Surgeon:SARAH FRANCO; Location: OR     GYN SURGERY       HYSTERECTOMY VAGINAL  1990's    Dr. Kriss Aguilar [facility] specilist     HYSTERECTOMY, PAP NO LONGER INDICATED       INSERT PORT VASCULAR ACCESS N/A 5/14/2018    Procedure: INSERT PORT VASCULAR ACCESS;  PORT PLACEMENT;  Surgeon: Rodney Umana MD;  Location: [facility]     MASTECTOMY SIMPLE Left 4/16/2018    Procedure: MASTECTOMY SIMPLE;  LEFT SIMPLE MASTECTOMY,LEFT AXILLARY DISSECTION;  Surgeon: Rodney Umana MD;  Location: [facility]         SOCIAL HISTORY:  Social History     Social History     Marital status: Single     Spouse name: N/A     Number of children: 0     Years of education: N/A     Occupational History      [employer]      Social History Main Topics     Smoking status: Current Every Day Smoker     Packs/day: 0.50     Types: Cigarettes     Smokeless tobacco: Never Used      Comment: 4 cigarettes per day     Alcohol use 0.6 oz/week     1 Standard drinks or equivalent per week      Comment: 2-3 drinks per week     Drug use: No     Sexual activity: Not Currently     Partners: Male     Other Topics Concern     Parent/Sibling W/ Cabg, Mi Or Angioplasty Before 65f 55m? No     Social History Narrative   She smokes cigarettes, half a pack a day for many years; she is unable to quantify how many.  She drinks alcohol occasionally.  She denies illicit drug use.  She is retired.  She used to to work in  at large companies.  She lives in [place] with her boyfriend.        FAMILY HISTORY:  Family History   Problem Relation Age of Onset     Pancreatitis Mother      Substance Abuse Mother      Unknown/Adopted Father      She does not know much about her family history and is unaware of any cancers in her family.  She has never  "been pregnant.  She had a hysterectomy in the .  She still has her ovaries.  She says that she has undergone menopause but is unsure when, likely years ago.        PHYSICAL EXAM:  Vital signs:  /88 (BP Location: Left arm)  Pulse 57  Temp 98.5  F (36.9  C) (Oral)  Resp 20  Ht 1.6 m (5' 2.99\")  Wt 46.6 kg (102 lb 12.8 oz)  SpO2 100%  BMI 18.21 kg/m2   ECO  GENERAL/CONSTITUTIONAL: No acute distress.  EYES: No scleral icterus.  LYMPH: No anterior cervical, posterior cervical, supraclavicular, or axillary adenopathy.   RESPIRATORY: Clear to auscultation bilaterally. No crackles or wheezing.   CARDIOVASCULAR: Regular rate and rhythm without murmurs, gallops, or rubs.  GASTROINTESTINAL: No tenderness. The patient has normal bowel sounds. No guarding.  No distention.  BREAST: Left-s/p mastectomy, wound healed well.  MUSCULOSKELETAL: Warm and well-perfused, no cyanosis, clubbing, or edema.  NEUROLOGIC: Alert, oriented, answers questions appropriately.  INTEGUMENTARY: No rashes or jaundice.  GAIT: Steady, does not use assistive device  Port in place at the right upper chest.      LABS:  CBC RESULTS:   Recent Labs   Lab Test  18   0827   WBC  7.2   RBC  3.86   HGB  12.0   HCT  36.5   MCV  95   MCH  31.1   MCHC  32.9   RDW  13.4   PLT  317     Recent Labs   Lab Test  18   0827  18   1328   NA  142  136   POTASSIUM  3.6  3.6   CHLORIDE  105  103   CO2  28  27   ANIONGAP  9  6   GLC  102*  116*   BUN  24  25   CR  0.70  0.67   HATTIE  8.9  9.2     Lab Results   Component Value Date    AST 20 2018     Lab Results   Component Value Date    ALT 2018     No results found for: BILICONJ   Lab Results   Component Value Date    BILITOTAL 0.2 2018     Lab Results   Component Value Date    ALBUMIN 3.7 2018     Lab Results   Component Value Date    PROTTOTAL 7.0 2018      Lab Results   Component Value Date    ALKPHOS 99 2018           IMAGING:  Mammogram and " ultrasound 3/28/18:  FINDINGS:  Standard bilateral diagnostic views were obtained,  including tomosynthesis. Marker was placed on the left upper breast to  denote the site of the palpable lump; deep to the marker there is a  mass measuring approximately 2 cm. The mass is associated with  retraction of the left nipple. There are no associated  microcalcifications. No suspicious findings in the contralateral right  breast.     Further evaluation with targeted left breast ultrasound shows a  corresponding hypoechoic lesion at the 11:00 position, 2 cm from the  nipple, measuring 1.9 x 2.9 x 1.0 cm. Survey of the axilla shows an  enlarged lymph node measuring 0.7 x 0.6 cm.     MRI breast 4/9/18:  1. The known left breast malignancy measures 2.5 x 2.0 x 2.6 cm on  MRI. There is additional nonmass enhancement surrounding the mass in  the upper left breast.  2. No suspicious MRI finding in the right breast.      PET-CT 4/9/18:  1. Hypermetabolic left breast mass representing the primary  malignancy.  2. A few mildly prominent left axillary lymph nodes that are  associated with only very mild metabolism. These are felt to be  indeterminant. There is a subcentimeter lymph node with mild  hypermetabolism at the right anterior upper mediastinum between the  innominate artery and superior vena cava that is felt to also be  indeterminant.  3. No other pathologic activity.  4. Small areas of sclerosis within the thoracic and lumbar spine.    Normal soft tissue neck CT.    Echo 5/7/18:  Left ventricular systolic function is normal.  The visual ejection fraction is estimated at 55-60%.  The ejection fraction is estimated at 61% by Shelton's biplane method.  Global peak LV longitudinal strain is averaged at -19.7%. This is within  reported normal limits (normal <-18%).  There is mild trileaflet aortic sclerosis.  There is mild (1+) aortic regurgitation.  Sinus rhythm was noted.  There is no comparison study  available.        ASSESSMENT/PLAN:  Muriel Diaz is a 57 year old post-menopausal female with:    1) Left breast cancer of the upper inner quadrant: s/p left mastectomy on 4/16/18; pathology showed invasive mammary carcinoma, with lobular and ductal features, grade 2, tumor size 4.3 x 4.0 x 2.1 cm, positive margin for invasive carcinoma in the central and lateral posterior surface, DCIS and LCIS present, ER positive (95%), ME positive (50%), HER-2 negative.  Left axillary dissection showed 3 of 5 lymph nodes were positive for metastatic carcinoma (lobular type), also ER positive, ME positive, HER-2 negative. Stage [id number] (stage IIA).      I discussed with Dr. Umana regarding the positive posterior margin.  He took the posterior margin, including the fascia, and there is nothing left to take.  She can proceed with chemotherapy.      After chemotherapy, I will refer her to see radiation oncology for consideration of radiation, and then plan on starting hormonal therapy after that.      -C1D1 of AC today  -prn anti-emetics written  -echo every 3 months while on doxorubicin - next one to be in August 2018  -referred for lymphedema therapy  -RTC in 2 weeks and chemotherapy same day    2) Smoking: She smokes half a pack a day.  She says that she is trying to cut back.  -I again advised smoking cessation  -she will see her PCP for tobacco cessation.    3) Hypertension: She is on medications for this.  She says that she has been anxious and nervous and has had difficulty sleeping at night.  -follows with PCP    4) Chronic back pain: s/p history of back surgeries  -she takes soma  -follows with PCP      I spent a total of 25 minutes with the patient, with over >50% of the time in counseling and/or coordination of care.       Patience Mckeon MD  Hematology/Oncology  [facility]      Oncology Rooming Note    May 22, 2018 8:53 AM   Muriel Diaz is a 57 year old female who presents  "for:    Chief Complaint   Patient presents with     Oncology Clinic Visit     Malignant neoplasm of upper-inner quadrant of left breast in female, estrogen receptor positive      Initial Vitals: /88 (BP Location: Left arm)  Pulse 57  Temp 98.5  F (36.9  C) (Oral)  Resp 20  Ht 1.6 m (5' 2.99\")  Wt 46.6 kg (102 lb 12.8 oz)  SpO2 100%  BMI 18.21 kg/m2 Estimated body mass index is 18.21 kg/(m^2) as calculated from the following:    Height as of this encounter: 1.6 m (5' 2.99\").    Weight as of this encounter: 46.6 kg (102 lb 12.8 oz). Body surface area is 1.44 meters squared.  Data Unavailable Comment: Data Unavailable   No LMP recorded. Patient has had a hysterectomy.  Allergies reviewed: Yes  Medications reviewed: Yes    Medications: Medication refills not needed today.  Pharmacy name entered into [facility]:    [facility] [address]  [id number] [place], [place] - [address] [facility] [place], [place] - [address]    Clinical concerns: no    5 minutes for nursing intake (face to face time)          Teagan Lakhani MA                Again, thank you for allowing me to participate in the care of your patient.        Sincerely,        Patience Mckeon MD    "

## 2018-05-22 NOTE — PROGRESS NOTES
"Oncology Rooming Note    May 22, 2018 8:53 AM   Muriel Diaz is a 57 year old female who presents for:    Chief Complaint   Patient presents with     Oncology Clinic Visit     Malignant neoplasm of upper-inner quadrant of left breast in female, estrogen receptor positive      Initial Vitals: /88 (BP Location: Left arm)  Pulse 57  Temp 98.5  F (36.9  C) (Oral)  Resp 20  Ht 1.6 m (5' 2.99\")  Wt 46.6 kg (102 lb 12.8 oz)  SpO2 100%  BMI 18.21 kg/m2 Estimated body mass index is 18.21 kg/(m^2) as calculated from the following:    Height as of this encounter: 1.6 m (5' 2.99\").    Weight as of this encounter: 46.6 kg (102 lb 12.8 oz). Body surface area is 1.44 meters squared.  Data Unavailable Comment: Data Unavailable   No LMP recorded. Patient has had a hysterectomy.  Allergies reviewed: Yes  Medications reviewed: Yes    Medications: Medication refills not needed today.  Pharmacy name entered into [facility]:    [facility]  [id number] - [place], [place] - [address] [facility] [place], [place] - [address]    Clinical concerns: no    5 minutes for nursing intake (face to face time)          Teagan Lakhani MA              "

## 2018-05-22 NOTE — MR AVS SNAPSHOT
After Visit Summary   5/22/2018    Muriel Diaz    MRN: [id number]           Patient Information     Date Of Birth          1960        Visit Information        Provider Department      5/22/2018 9:00 AM Patience Mckeon MD [facility]        Today's Diagnoses     Malignant neoplasm of upper-inner quadrant of left breast in female, estrogen receptor positive (H)    -  1      Care Instructions    -chemotherapy today (Adriamycin+Cytoxan)  -schedule chemotherapy on 6/5/18 and 6/19/18  -return to clinic with Dr. Mckeon on 6/5/18 and 6/19/18          Follow-ups after your visit        Your next 10 appointments already scheduled     Jun 05, 2018 10:30 AM CDT   Level 4 with  INFUSION CHAIR 5   [facility] ([facility])    [facility] [place] [id number]  [place] [address]   [phone]            Jun 05, 2018 11:00 AM CDT   Return Visit with CAROLYN Pritchard CNP   [facility] ([facility])    [facility] [place] [id number]  [place] [address]   [phone]            Jun 19, 2018 11:30 AM CDT   Level 4 with  INFUSION CHAIR 9   [facility] ([facility])    [facility]  [address]  [place] [address]   [phone]            Jun 19, 2018  2:00 PM CDT   Return Visit with Patience Mckeon MD   [facility] ([facility])    [facility]  [address]  [place] [address]   [phone]              Who to contact     If you have questions or need follow up information about today's clinic visit or your schedule please contact [facility] directly at [phone].  Normal or non-critical lab and imaging results will be communicated to you by MyChart, letter or phone within 4 business days  "after the clinic has received the results. If you do not hear from us within 7 days, please contact the clinic through [employer] or phone. If you have a critical or abnormal lab result, we will notify you by phone as soon as possible.  Submit refill requests through [employer] or call your pharmacy and they will forward the refill request to us. Please allow 3 business days for your refill to be completed.          Additional Information About Your Visit        [employer] Information     [employer] lets you send messages to your doctor, view your test results, renew your prescriptions, schedule appointments and more. To sign up, go to [email or web address] . Click on \"Log in\" on the left side of the screen, which will take you to the Welcome page. Then click on \"Sign up Now\" on the right side of the page.     You will be asked to enter the access code listed below, as well as some personal information. Please follow the directions to create your username and password.     Your access code is: [email or web address]  Expires: 2018  9:48 AM     Your access code will  in 90 days. If you need help or a new code, please call your [facility] clinic or [phone].        Care EveryWhere ID     This is your Care EveryWhere ID. This could be used by other organizations to access your [facility] medical records  [id number]        Your Vitals Were     Pulse Temperature Respirations Height Pulse Oximetry BMI (Body Mass Index)    57 98.5  F (36.9  C) (Oral) 20 1.6 m (5' 2.99\") 100% 18.21 kg/m2       Blood Pressure from Last 3 Encounters:   18 172/88   18 170/88   18 123/79    Weight from Last 3 Encounters:   18 46.6 kg (102 lb 12.8 oz)   18 46.6 kg (102 lb 12.8 oz)   18 46.1 kg (101 lb 9.6 oz)              Today, you had the following     No orders found for display         Today's Medication Changes          These changes are accurate as of 18  9:29 AM.  If you have any questions, ask your nurse " or doctor.               Start taking these medicines.        Dose/Directions    dexamethasone 4 MG tablet   Commonly known as:  DECADRON   Used for:  Malignant neoplasm of upper-inner quadrant of left breast in female, estrogen receptor positive (H)        Dose:  8 mg   Take 2 tablets (8 mg) by mouth daily for 3 days Start on Day 2 of Cycles 1 through 4.   Quantity:  6 tablet   Refills:  3       LORazepam 0.5 MG tablet   Commonly known as:  ATIVAN   Used for:  Malignant neoplasm of upper-inner quadrant of left breast in female, estrogen receptor positive (H)        Dose:  0.5 mg   Take 1 tablet (0.5 mg) by mouth every 4 hours as needed (Anxiety, Nausea/Vomiting or Sleep)   Quantity:  30 tablet   Refills:  5       prochlorperazine 10 MG tablet   Commonly known as:  COMPAZINE   Used for:  Malignant neoplasm of upper-inner quadrant of left breast in female, estrogen receptor positive (H)        Dose:  10 mg   Take 1 tablet (10 mg) by mouth every 6 hours as needed (Nausea/Vomiting)   Quantity:  30 tablet   Refills:  5            Where to get your medicines      These medications were sent to [facility] [address]  [address] [facility] [address] [address]     Phone:  [phone]     dexamethasone 4 MG tablet    prochlorperazine 10 MG tablet         Some of these will need a paper prescription and others can be bought over the counter.  Ask your nurse if you have questions.     Bring a paper prescription for each of these medications     LORazepam 0.5 MG tablet                Primary Care Provider Office Phone # Fax #    Isabel Landin [phone] [address] [place] [address]        Equal Access to Services     [facility] AH: Angie Peña, waaxda luqadaha, qaybta kaalkvng shabazz. [facility] [phone].    ATENCIÓN: Si habla español, tiene a chowdary disposición servicios  corrie de asistencia lingüística. Concha mendoza [phone].    We comply with applicable federal civil rights laws and [place] laws. We do not discriminate on the basis of race, color, national origin, age, disability, sex, sexual orientation, or gender identity.            Thank you!     Thank you for choosing [facility]  for your care. Our goal is always to provide you with excellent care. Hearing back from our patients is one way we can continue to improve our services. Please take a few minutes to complete the written survey that you may receive in the mail after your visit with us. Thank you!             Your Updated Medication List - Protect others around you: Learn how to safely use, store and throw away your medicines at www.disposemymeds.org.          This list is accurate as of 5/22/18  9:29 AM.  Always use your most recent med list.                   Brand Name Dispense Instructions for use Diagnosis    atenolol 25 MG tablet    TENORMIN    30 tablet    TAKE ONE TABLET BY MOUTH ONE TIME DAILY    Hypertension goal BP (blood pressure) < 140/90       dexamethasone 4 MG tablet    DECADRON    6 tablet    Take 2 tablets (8 mg) by mouth daily for 3 days Start on Day 2 of Cycles 1 through 4.    Malignant neoplasm of upper-inner quadrant of left breast in female, estrogen receptor positive (H)       hydrochlorothiazide 12.5 MG Tabs tablet     90 tablet    Take 1 tablet (12.5 mg) by mouth daily    Hypertension goal BP (blood pressure) < 140/90       HYDROcodone-acetaminophen 5-325 MG per tablet    NORCO    10 tablet    Take 1 tablet by mouth every 4 hours as needed for other (Moderate to Severe Pain)    Postoperative pain       lidocaine-prilocaine cream    EMLA    30 g    Apply topically as needed for moderate pain Apply to port site 1 hour prior to accessing    Malignant neoplasm of left breast (H)       LORazepam 0.5 MG tablet    ATIVAN    30 tablet    Take 1 tablet (0.5 mg) by mouth every 4 hours  as needed (Anxiety, Nausea/Vomiting or Sleep)    Malignant neoplasm of upper-inner quadrant of left breast in female, estrogen receptor positive (H)       prochlorperazine 10 MG tablet    COMPAZINE    30 tablet    Take 1 tablet (10 mg) by mouth every 6 hours as needed (Nausea/Vomiting)    Malignant neoplasm of upper-inner quadrant of left breast in female, estrogen receptor positive (H)       SOMA PO      Take 350 mg by mouth 3 times daily        VITAMIN D (CHOLECALCIFEROL) PO      Take 2,000 Units by mouth daily (2 x 1000 units = 2000 unit dose)

## 2018-05-25 ENCOUNTER — TELEPHONE (OUTPATIENT)
Dept: ONCOLOGY | Facility: CLINIC | Age: 58
End: 2018-05-25

## 2018-05-25 NOTE — TELEPHONE ENCOUNTER
This clinician attempted to reach pt by phone for brief psychosocial check-in and emotional support after pt's first treatment. Pt previously given this clinician's contact information and knows to reach out in the case of psychosocial distress or need.     Please page in the case of psychosocial distress or concern.     LAUREN Frances, LICSW  Phone: [phone]  Pager: [phone]    [place] Reyna: M, T  *every other Thursday, 8am-4:30pm  [place] Shira: W, F, *every other Thursday, 8am-4:30pm

## 2018-05-30 ENCOUNTER — TELEPHONE (OUTPATIENT)
Dept: ONCOLOGY | Facility: CLINIC | Age: 58
End: 2018-05-30

## 2018-05-30 DIAGNOSIS — L63.9 ALOPECIA AREATA: Primary | ICD-10-CM

## 2018-05-30 NOTE — TELEPHONE ENCOUNTER
Called patient to see how she is feeling post Adriamycin/Cytoxan. She states no nausea but that she is experiencing gas which is controlled with simethicone. Patient has a history of back problems and a spinal fusion. She stated last night she experienced terrible back spasms and she was unable to sleep despite Soma and Ativan. These back spasms are new since starting the chemotherapy. Patient has had physical therapy in the past which she stated has not helped. Patient aware that writer will verify if Dr. Mckeon has any recommendations for her back spasms.

## 2018-05-30 NOTE — TELEPHONE ENCOUNTER
Patient called and she is aware no further recommendations at this time. Instructed patient to call clinic back if back spasms persist. Jessica Garcia RN,BSN,OCN

## 2018-05-31 ENCOUNTER — TELEPHONE (OUTPATIENT)
Dept: ONCOLOGY | Facility: CLINIC | Age: 58
End: 2018-05-31

## 2018-05-31 ENCOUNTER — HOSPITAL ENCOUNTER (OUTPATIENT)
Facility: CLINIC | Age: 58
Setting detail: SPECIMEN
Discharge: HOME | End: 2018-05-31
Attending: NURSE PRACTITIONER | Admitting: NURSE PRACTITIONER
Payer: COMMERCIAL

## 2018-05-31 ENCOUNTER — ONCOLOGY VISIT (OUTPATIENT)
Dept: ONCOLOGY | Facility: CLINIC | Age: 58
End: 2018-05-31
Attending: INTERNAL MEDICINE
Payer: COMMERCIAL

## 2018-05-31 VITALS
TEMPERATURE: 98.2 F | DIASTOLIC BLOOD PRESSURE: 71 MMHG | SYSTOLIC BLOOD PRESSURE: 122 MMHG | BODY MASS INDEX: 18 KG/M2 | HEART RATE: 71 BPM | WEIGHT: 101.6 LBS | RESPIRATION RATE: 12 BRPM | OXYGEN SATURATION: 98 %

## 2018-05-31 DIAGNOSIS — Z17.0 MALIGNANT NEOPLASM OF UPPER-INNER QUADRANT OF LEFT BREAST IN FEMALE, ESTROGEN RECEPTOR POSITIVE (H): Primary | ICD-10-CM

## 2018-05-31 DIAGNOSIS — C50.212 MALIGNANT NEOPLASM OF UPPER-INNER QUADRANT OF LEFT BREAST IN FEMALE, ESTROGEN RECEPTOR POSITIVE (H): Primary | ICD-10-CM

## 2018-05-31 PROCEDURE — 99213 OFFICE O/P EST LOW 20 MIN: CPT | Performed by: NURSE PRACTITIONER

## 2018-05-31 PROCEDURE — G0463 HOSPITAL OUTPT CLINIC VISIT: HCPCS

## 2018-05-31 PROCEDURE — 85025 COMPLETE CBC W/AUTO DIFF WBC: CPT | Performed by: NURSE PRACTITIONER

## 2018-05-31 PROCEDURE — 87040 BLOOD CULTURE FOR BACTERIA: CPT | Performed by: NURSE PRACTITIONER

## 2018-05-31 RX ORDER — HYDROCODONE BITARTRATE AND ACETAMINOPHEN 5; 325 MG/1; MG/1
1 TABLET ORAL EVERY 4 HOURS PRN
Qty: 18 TABLET | Refills: 0 | Status: SHIPPED | OUTPATIENT
Start: 2018-05-31 | End: 2018-07-03

## 2018-05-31 NOTE — LETTER
5/31/2018         RE: Muriel Diaz  [address]  [facility] [address]        Dear Colleague,    Thank you for referring your patient, Muriel Diaz, to the [facility]. Please see a copy of my visit note below.      S.  Patient complains of increased in Port-A-Cath pain.  She tells me Port-A-Cath was placed about 3 weeks ago she always has had mild pain around the edges of the port Port-A-Cath.  She she tells me that her pain is getting little worse today.  She describes pain as sharp needle pain that lasts only for a few seconds and goes away .Patient has dystonia and she takes muscle weakness.  She wonders if this pain might be related to her dystonia. she denies fever chills sweats denies neck pain .  Denies swelling around the port denies discharge from the port  Patient is using Tylenol .  She also uses cold and heat compresses.  With minimal pain relief    O skin around port looks intact.  Looks dry clean no signs and symptoms of infections no signs or symptoms of blood clot.     /71 (BP Location: Left arm, Patient Position: Chair, Cuff Size: Adult Regular)  Pulse 71  Temp 98.2  F (36.8  C) (Oral)  Resp 12  Wt 46.1 kg (101 lb 9.6 oz)  SpO2 98%  BMI 18 kg/m2      EXAM:  Cardiovascular: negative, PMI normal. No lifts, heaves, or thrills. RRR. No murmurs, clicks gallops or rub  Respiratory: negative, Percussion normal. Good diaphragmatic excursion. Lungs clear  Abdomen: Abdomen soft, non-tender. BS normal. No masses, organomegaly  LYMPH: Normal cervical lymph nodes  Port cath -clean , dry and intact         A/P  -Port-A-Cath pain  Port was placed about 3 weeks ago .  There is no signs and symptoms of infection or blood clot.  The pain is likely related to dystonia that is pressing the muscle.  Patient is taking Soma 1 tablet 3 times daily.  She had severe skin reaction with lidocaine gel. She wants me to prescribe Norco for her.  I am hesitant to prescribe Norco to  "her because I do not want it to mimic fever. I discussed this in detail with patient. she does not tolerate oxycodone.However patient is persistent and wants me to prescribe Norco for her.  I discussed signs and symptoms of infection also told patient total patient to check temperature frequently and in the event of fever or worsening of pain she must go to the emergency room  I will order CBC and blood cultures today      Patient is asked to go to the ER in the event of fever chills sweats cough shortness of breath increase in pain or any changes in health condition    Jorje Chilel NP       Oncology Rooming Note    May 31, 2018 2:35 PM   Muriel Diaz is a 57 year old female who presents for:    Chief Complaint   Patient presents with     Oncology Clinic Visit     Initial Vitals: /71 (BP Location: Left arm, Patient Position: Chair, Cuff Size: Adult Regular)  Pulse 71  Temp 98.2  F (36.8  C) (Oral)  Resp 12  Wt 46.1 kg (101 lb 9.6 oz)  SpO2 98%  BMI 18 kg/m2 Estimated body mass index is 18 kg/(m^2) as calculated from the following:    Height as of 5/22/18: 1.6 m (5' 2.99\").    Weight as of this encounter: 46.1 kg (101 lb 9.6 oz). Body surface area is 1.43 meters squared.  Data Unavailable Comment: Data Unavailable   No LMP recorded. Patient has had a hysterectomy.  Allergies reviewed: Yes  Medications reviewed: Yes    Medications: Medication refills not needed today.  Pharmacy name entered into [facility]:    [facility] [address]  [id number] - [place], [place] - [address] [facility] [place], [place] - [address]    Clinical concerns: None     5 minutes for nursing intake (face to face time)     Mabel Geronimo CMA                Again, thank you for allowing me to participate in the care of your patient.        Sincerely,        CAROLYN Pritchard CNP    "

## 2018-05-31 NOTE — PROGRESS NOTES
"Oncology Rooming Note    May 31, 2018 2:35 PM   Muriel Diaz is a 57 year old female who presents for:    Chief Complaint   Patient presents with     Oncology Clinic Visit     Initial Vitals: /71 (BP Location: Left arm, Patient Position: Chair, Cuff Size: Adult Regular)  Pulse 71  Temp 98.2  F (36.8  C) (Oral)  Resp 12  Wt 46.1 kg (101 lb 9.6 oz)  SpO2 98%  BMI 18 kg/m2 Estimated body mass index is 18 kg/(m^2) as calculated from the following:    Height as of 5/22/18: 1.6 m (5' 2.99\").    Weight as of this encounter: 46.1 kg (101 lb 9.6 oz). Body surface area is 1.43 meters squared.  Data Unavailable Comment: Data Unavailable   No LMP recorded. Patient has had a hysterectomy.  Allergies reviewed: Yes  Medications reviewed: Yes    Medications: Medication refills not needed today.  Pharmacy name entered into [facility]:    [facility] [address]  [id number] - [place], [place] - [address] [facility] [place], [place] - [address]    Clinical concerns: None     5 minutes for nursing intake (face to face time)     Mabel Geronimo CMA              "

## 2018-05-31 NOTE — PROGRESS NOTES
S.  Patient complains of increased in Port-A-Cath pain.  She tells me Port-A-Cath was placed about 3 weeks ago she always has had mild pain around the edges of the port Port-A-Cath.  She she tells me that her pain is getting little worse today.  She describes pain as sharp needle pain that lasts only for a few seconds and goes away .Patient has dystonia and she takes muscle weakness.  She wonders if this pain might be related to her dystonia. she denies fever chills sweats denies neck pain .  Denies swelling around the port denies discharge from the port  Patient is using Tylenol .  She also uses cold and heat compresses.  With minimal pain relief    O skin around port looks intact.  Looks dry clean no signs and symptoms of infections no signs or symptoms of blood clot.     /71 (BP Location: Left arm, Patient Position: Chair, Cuff Size: Adult Regular)  Pulse 71  Temp 98.2  F (36.8  C) (Oral)  Resp 12  Wt 46.1 kg (101 lb 9.6 oz)  SpO2 98%  BMI 18 kg/m2      EXAM:  Cardiovascular: negative, PMI normal. No lifts, heaves, or thrills. RRR. No murmurs, clicks gallops or rub  Respiratory: negative, Percussion normal. Good diaphragmatic excursion. Lungs clear  Abdomen: Abdomen soft, non-tender. BS normal. No masses, organomegaly  LYMPH: Normal cervical lymph nodes  Port cath -clean , dry and intact         A/P  -Port-A-Cath pain  Port was placed about 3 weeks ago .  There is no signs and symptoms of infection or blood clot.  The pain is likely related to dystonia that is pressing the muscle.  Patient is taking Soma 1 tablet 3 times daily.  She had severe skin reaction with lidocaine gel. She wants me to prescribe Norco for her.  I am hesitant to prescribe Norco to her because I do not want it to mimic fever. I discussed this in detail with patient. she does not tolerate oxycodone.However patient is persistent and wants me to prescribe Norco for her.  I discussed signs and symptoms of infection also told  patient total patient to check temperature frequently and in the event of fever or worsening of pain she must go to the emergency room  I will order CBC and blood cultures today      Patient is asked to go to the ER in the event of fever chills sweats cough shortness of breath increase in pain or any changes in health condition    Jorje Chilel, NP

## 2018-05-31 NOTE — MR AVS SNAPSHOT
After Visit Summary   5/31/2018    Muriel Diaz    MRN: [id number]           Patient Information     Date Of Birth          1960        Visit Information        Provider Department      5/31/2018 2:30 PM Jorje Chilel APRN CNP [facility]        Today's Diagnoses     Malignant neoplasm of upper-inner quadrant of left breast in female, estrogen receptor positive (H)    -  1      Care Instructions        CBC and blood cultures today    Flush poor    Norco 1 tablet every 4 hours as needed          Follow-ups after your visit        Your next 10 appointments already scheduled     Jun 05, 2018 10:30 AM CDT   Level 4 with SH INFUSION CHAIR 5   [facility] Infusion Center ([facility])    [facility] [place] [id number]  [place] [address]   [phone]            Jun 05, 2018 11:30 AM CDT   Return Visit with Patience Mckeon MD   [facility] ([facility])    [facility]  [address]  [place] [address]   [phone]            Jun 19, 2018 11:30 AM CDT   Level 4 with SH INFUSION CHAIR 9   [facility] and Infusion Center ([facility])    [facility]  [address]  [place] [address]   [phone]            Jun 19, 2018  2:00 PM CDT   Return Visit with Patience Mckeon MD   [facility] ([facility])    [facility] [place] [id number]  [place] [address]   [phone]              Future tests that were ordered for you today     Open Standing Orders        Priority Remaining Interval Expires Ordered    Blood culture Routine 1/1 5/31/2019 5/31/2018          Open Future Orders        Priority Expected Expires Ordered    CBC with platelets differential Routine 5/31/2018 5/31/2019 5/31/2018            Who to contact     If you have  "questions or need follow up information about today's clinic visit or your schedule please contact [facility] directly at [phone].  Normal or non-critical lab and imaging results will be communicated to you by [employer], letter or phone within 4 business days after the clinic has received the results. If you do not hear from us within 7 days, please contact the clinic through [employer] or phone. If you have a critical or abnormal lab result, we will notify you by phone as soon as possible.  Submit refill requests through [employer] or call your pharmacy and they will forward the refill request to us. Please allow 3 business days for your refill to be completed.          Additional Information About Your Visit        [employer] Information     [employer] lets you send messages to your doctor, view your test results, renew your prescriptions, schedule appointments and more. To sign up, go to [email or web address] . Click on \"Log in\" on the left side of the screen, which will take you to the Welcome page. Then click on \"Sign up Now\" on the right side of the page.     You will be asked to enter the access code listed below, as well as some personal information. Please follow the directions to create your username and password.     Your access code is: [email or web address]  Expires: 2018  9:48 AM     Your access code will  in 90 days. If you need help or a new code, please call your [place] clinic or [phone].        Care EveryWhere ID     This is your Care EveryWhere ID. This could be used by other organizations to access your [place] medical records  [id number]        Your Vitals Were     Pulse Temperature Respirations Pulse Oximetry BMI (Body Mass Index)       71 98.2  F (36.8  C) (Oral) 12 98% 18 kg/m2        Blood Pressure from Last 3 Encounters:   18 122/71   18 172/88   18 175/85    Weight from Last 3 Encounters:   18 46.1 kg (101 lb 9.6 oz)   18 46.6 kg (102 lb 12.8 " oz)   05/22/18 46.6 kg (102 lb 12.8 oz)                 Today's Medication Changes          These changes are accurate as of 5/31/18  3:36 PM.  If you have any questions, ask your nurse or doctor.               Start taking these medicines.        Dose/Directions    HYDROcodone-acetaminophen 5-325 MG per tablet   Commonly known as:  NORCO   Used for:  Malignant neoplasm of upper-inner quadrant of left breast in female, estrogen receptor positive (H)   Started by:  Jorje Chilel APRN CNP        Dose:  1 tablet   Take 1 tablet by mouth every 4 hours as needed for pain   Quantity:  18 tablet   Refills:  0            Where to get your medicines      Some of these will need a paper prescription and others can be bought over the counter.  Ask your nurse if you have questions.     Bring a paper prescription for each of these medications     HYDROcodone-acetaminophen 5-325 MG per tablet               Information about OPIOIDS     PRESCRIPTION OPIOIDS: WHAT YOU NEED TO KNOW   You have a prescription for an opioid (narcotic) pain medicine. Opioids can cause addiction. If you have a history of chemical dependency of any type, you are at a higher risk of becoming addicted to opioids. Only take this medicine after all other options have been tried. Take it for as short a time and as few doses as possible.     Do not:    Drive. If you drive while taking these medicines, you could be arrested for driving under the influence (DUI).    Operate heavy machinery    Do any other dangerous activities while taking these medicines.     Drink any alcohol while taking these medicines.      Take with any other medicines that contain acetaminophen. Read all labels carefully. Look for the word  acetaminophen  or  Tylenol.  Ask your pharmacist if you have questions or are unsure.    Store your pills in a secure place, locked if possible. We will not replace any lost or stolen medicine. If you don t finish your medicine, please throw away  (dispose) as directed by your pharmacist. The [place] Pollution Control Agency has more information about safe disposal: https://www.pca.[facility].[place].us/living-green/managing-unwanted-medications    All opioids tend to cause constipation. Drink plenty of water and eat foods that have a lot of fiber, such as fruits, vegetables, prune juice, apple juice and high-fiber cereal. Take a laxative (Miralax, milk of magnesia, Colace, Senna) if you don t move your bowels at least every other day.          Primary Care Provider Office Phone # Fax #    Isabel Vickie Landin [phone] [address] [place] [address]        Equal Access to Services     FRANCISCA HUNT : Hadii aad ku hadasho Socriss, waaxda luqadaha, qaybta kaalmada adeegyada, kvng gupta. So [facility] [phone].    ATENCIÓN: Si habla español, tiene a chowdary disposición servicios gratuitos de asistencia lingüística. [facility] [phone].    We comply with applicable federal civil rights laws and [place] laws. We do not discriminate on the basis of race, color, national origin, age, disability, sex, sexual orientation, or gender identity.            Thank you!     Thank you for choosing [facility]  for your care. Our goal is always to provide you with excellent care. Hearing back from our patients is one way we can continue to improve our services. Please take a few minutes to complete the written survey that you may receive in the mail after your visit with us. Thank you!             Your Updated Medication List - Protect others around you: Learn how to safely use, store and throw away your medicines at www.disposemymeds.org.          This list is accurate as of 5/31/18  3:36 PM.  Always use your most recent med list.                   Brand Name Dispense Instructions for use Diagnosis    atenolol 25 MG tablet    TENORMIN    30 tablet    TAKE ONE TABLET BY MOUTH ONE TIME DAILY     Hypertension goal BP (blood pressure) < 140/90       dexamethasone 4 MG tablet    DECADRON    6 tablet    Take 2 tablets (8 mg) by mouth daily for 3 days Start on Day 2 of Cycles 1 through 4.    Malignant neoplasm of upper-inner quadrant of left breast in female, estrogen receptor positive (H)       hydrochlorothiazide 12.5 MG Tabs tablet     90 tablet    Take 1 tablet (12.5 mg) by mouth daily    Hypertension goal BP (blood pressure) < 140/90       HYDROcodone-acetaminophen 5-325 MG per tablet    NORCO    18 tablet    Take 1 tablet by mouth every 4 hours as needed for pain    Malignant neoplasm of upper-inner quadrant of left breast in female, estrogen receptor positive (H)       lidocaine-prilocaine cream    EMLA    30 g    Apply topically as needed for moderate pain Apply to port site 1 hour prior to accessing    Malignant neoplasm of left breast (H)       LORazepam 0.5 MG tablet    ATIVAN    30 tablet    Take 1 tablet (0.5 mg) by mouth every 4 hours as needed (Anxiety, Nausea/Vomiting or Sleep)    Malignant neoplasm of upper-inner quadrant of left breast in female, estrogen receptor positive (H)       prochlorperazine 10 MG tablet    COMPAZINE    30 tablet    Take 1 tablet (10 mg) by mouth every 6 hours as needed (Nausea/Vomiting)    Malignant neoplasm of upper-inner quadrant of left breast in female, estrogen receptor positive (H)       SOMA PO      Take 350 mg by mouth 3 times daily        VITAMIN D (CHOLECALCIFEROL) PO      Take 2,000 Units by mouth daily (2 x 1000 units = 2000 unit dose)

## 2018-05-31 NOTE — TELEPHONE ENCOUNTER
Lauryn are is VERY SORE all the time today. She states she has had a sharp pain in the past whenever she moved her arm in a certain way.  Today it is all the time  In certain spots around where the port is  She denies fever.  She states visually the area on/around her port look fine.    Will add her on to Jorje's schedule at 230 and route to Dr. Mckeon.    Jorje would also like CBC and Blood cultures from port.

## 2018-06-01 ENCOUNTER — TELEPHONE (OUTPATIENT)
Dept: ONCOLOGY | Facility: CLINIC | Age: 58
End: 2018-06-01

## 2018-06-01 LAB
BASOPHILS # BLD AUTO: 0.3 10E9/L (ref 0–0.2)
BASOPHILS NFR BLD AUTO: 2 %
BLASTS # BLD: 0.3 10E9/L
BLASTS BLD QL SMEAR: 2 %
DIFFERENTIAL METHOD BLD: ABNORMAL
EOSINOPHIL # BLD AUTO: 0.7 10E9/L (ref 0–0.7)
EOSINOPHIL NFR BLD AUTO: 5 %
ERYTHROCYTE [DISTWIDTH] IN BLOOD BY AUTOMATED COUNT: 13.2 % (ref 10–15)
HCT VFR BLD AUTO: 34.2 % (ref 35–47)
HGB BLD-MCNC: 11.4 G/DL (ref 11.7–15.7)
LYMPHOCYTES # BLD AUTO: 3.7 10E9/L (ref 0.8–5.3)
LYMPHOCYTES NFR BLD AUTO: 25 %
MCH RBC QN AUTO: 31.4 PG (ref 26.5–33)
MCHC RBC AUTO-ENTMCNC: 33.3 G/DL (ref 31.5–36.5)
MCV RBC AUTO: 94 FL (ref 78–100)
METAMYELOCYTES # BLD: 0.3 10E9/L
METAMYELOCYTES NFR BLD MANUAL: 2 %
MONOCYTES # BLD AUTO: 0.6 10E9/L (ref 0–1.3)
MONOCYTES NFR BLD AUTO: 4 %
MYELOCYTES # BLD: 0.1 10E9/L
MYELOCYTES NFR BLD MANUAL: 1 %
NEUTROPHILS # BLD AUTO: 8.4 10E9/L (ref 1.6–8.3)
NEUTROPHILS NFR BLD AUTO: 57 %
PLATELET # BLD AUTO: 223 10E9/L (ref 150–450)
PLATELET # BLD EST: ABNORMAL 10*3/UL
PROMYELOCYTES # BLD MANUAL: 0.3 10E9/L
PROMYELOCYTES NFR BLD MANUAL: 2 %
RBC # BLD AUTO: 3.63 10E12/L (ref 3.8–5.2)
RBC MORPH BLD: ABNORMAL
WBC # BLD AUTO: 14.7 10E9/L (ref 4–11)

## 2018-06-01 NOTE — TELEPHONE ENCOUNTER
Called patient to see how she is feeling with her port pain and muscle spasms. Noted that Jorje NP did prescribe Norco and left message to see how that is helping her pain. Message was also left that her Blood cultures were negative and to call clinic if she needs anything prior to the weekend. Jessica Garcia RN,BSN,OCN

## 2018-06-04 DIAGNOSIS — I10 HYPERTENSION GOAL BP (BLOOD PRESSURE) < 140/90: ICD-10-CM

## 2018-06-04 RX ORDER — ATENOLOL 25 MG/1
TABLET ORAL
Qty: 30 TABLET | Refills: 3 | Status: SHIPPED | OUTPATIENT
Start: 2018-06-04 | End: 2018-09-28

## 2018-06-04 NOTE — TELEPHONE ENCOUNTER
Last OV (PreOp) 4/13/18  Last Labs 5/22/18    BP Readings from Last 3 Encounters:   05/31/18 122/71   05/22/18 172/88   05/22/18 175/85     Last Basic Metabolic Panel:  Lab Results   Component Value Date     05/22/2018      Lab Results   Component Value Date    POTASSIUM 3.6 05/22/2018     Lab Results   Component Value Date    CHLORIDE 105 05/22/2018     Lab Results   Component Value Date    HATTIE 8.9 05/22/2018     Lab Results   Component Value Date    CO2 28 05/22/2018     Lab Results   Component Value Date    BUN 24 05/22/2018     Lab Results   Component Value Date    CR 0.70 05/22/2018     Lab Results   Component Value Date     05/22/2018

## 2018-06-05 ENCOUNTER — HOSPITAL ENCOUNTER (OUTPATIENT)
Facility: CLINIC | Age: 58
Setting detail: SPECIMEN
Discharge: HOME | End: 2018-06-05
Attending: INTERNAL MEDICINE | Admitting: INTERNAL MEDICINE
Payer: COMMERCIAL

## 2018-06-05 ENCOUNTER — ONCOLOGY VISIT (OUTPATIENT)
Dept: ONCOLOGY | Facility: CLINIC | Age: 58
End: 2018-06-05
Attending: INTERNAL MEDICINE
Payer: COMMERCIAL

## 2018-06-05 ENCOUNTER — INFUSION THERAPY VISIT (OUTPATIENT)
Dept: INFUSION THERAPY | Facility: CLINIC | Age: 58
End: 2018-06-05
Attending: INTERNAL MEDICINE
Payer: COMMERCIAL

## 2018-06-05 VITALS
BODY MASS INDEX: 18.32 KG/M2 | TEMPERATURE: 98.8 F | HEART RATE: 68 BPM | DIASTOLIC BLOOD PRESSURE: 88 MMHG | RESPIRATION RATE: 16 BRPM | WEIGHT: 103.4 LBS | SYSTOLIC BLOOD PRESSURE: 145 MMHG

## 2018-06-05 VITALS
HEART RATE: 68 BPM | RESPIRATION RATE: 16 BRPM | BODY MASS INDEX: 18.32 KG/M2 | SYSTOLIC BLOOD PRESSURE: 145 MMHG | TEMPERATURE: 98.8 F | HEIGHT: 63 IN | DIASTOLIC BLOOD PRESSURE: 88 MMHG | WEIGHT: 103.4 LBS

## 2018-06-05 DIAGNOSIS — G47.01 INSOMNIA DUE TO MEDICAL CONDITION: Primary | ICD-10-CM

## 2018-06-05 DIAGNOSIS — Z17.0 MALIGNANT NEOPLASM OF UPPER-INNER QUADRANT OF LEFT BREAST IN FEMALE, ESTROGEN RECEPTOR POSITIVE (H): Primary | ICD-10-CM

## 2018-06-05 DIAGNOSIS — C50.212 MALIGNANT NEOPLASM OF UPPER-INNER QUADRANT OF LEFT BREAST IN FEMALE, ESTROGEN RECEPTOR POSITIVE (H): Primary | ICD-10-CM

## 2018-06-05 LAB
BASOPHILS # BLD AUTO: 0.1 10E9/L (ref 0–0.2)
BASOPHILS NFR BLD AUTO: 1 %
DIFFERENTIAL METHOD BLD: ABNORMAL
EOSINOPHIL # BLD AUTO: 0.1 10E9/L (ref 0–0.7)
EOSINOPHIL NFR BLD AUTO: 1 %
ERYTHROCYTE [DISTWIDTH] IN BLOOD BY AUTOMATED COUNT: 13.7 % (ref 10–15)
HCT VFR BLD AUTO: 34.3 % (ref 35–47)
HGB BLD-MCNC: 11.4 G/DL (ref 11.7–15.7)
LYMPHOCYTES # BLD AUTO: 2.1 10E9/L (ref 0.8–5.3)
LYMPHOCYTES NFR BLD AUTO: 20 %
MCH RBC QN AUTO: 31.3 PG (ref 26.5–33)
MCHC RBC AUTO-ENTMCNC: 33.2 G/DL (ref 31.5–36.5)
MCV RBC AUTO: 94 FL (ref 78–100)
METAMYELOCYTES # BLD: 0.2 10E9/L
METAMYELOCYTES NFR BLD MANUAL: 2 %
MONOCYTES # BLD AUTO: 0.3 10E9/L (ref 0–1.3)
MONOCYTES NFR BLD AUTO: 3 %
MYELOCYTES # BLD: 0.1 10E9/L
MYELOCYTES NFR BLD MANUAL: 1 %
NEUTROPHILS # BLD AUTO: 7.4 10E9/L (ref 1.6–8.3)
NEUTROPHILS NFR BLD AUTO: 72 %
PLATELET # BLD AUTO: 320 10E9/L (ref 150–450)
PLATELET # BLD EST: ABNORMAL 10*3/UL
RBC # BLD AUTO: 3.64 10E12/L (ref 3.8–5.2)
RBC MORPH BLD: NORMAL
WBC # BLD AUTO: 10.3 10E9/L (ref 4–11)

## 2018-06-05 PROCEDURE — 96367 TX/PROPH/DG ADDL SEQ IV INF: CPT

## 2018-06-05 PROCEDURE — 96413 CHEMO IV INFUSION 1 HR: CPT

## 2018-06-05 PROCEDURE — 96377 APPLICATON ON-BODY INJECTOR: CPT

## 2018-06-05 PROCEDURE — 96375 TX/PRO/DX INJ NEW DRUG ADDON: CPT

## 2018-06-05 PROCEDURE — 85025 COMPLETE CBC W/AUTO DIFF WBC: CPT | Performed by: INTERNAL MEDICINE

## 2018-06-05 PROCEDURE — 25000128 H RX IP 250 OP 636: Performed by: INTERNAL MEDICINE

## 2018-06-05 PROCEDURE — G0463 HOSPITAL OUTPT CLINIC VISIT: HCPCS | Mod: 25

## 2018-06-05 PROCEDURE — G0463 HOSPITAL OUTPT CLINIC VISIT: HCPCS

## 2018-06-05 PROCEDURE — 99214 OFFICE O/P EST MOD 30 MIN: CPT | Performed by: INTERNAL MEDICINE

## 2018-06-05 RX ORDER — EPINEPHRINE 1 MG/ML
0.3 INJECTION, SOLUTION INTRAMUSCULAR; SUBCUTANEOUS EVERY 5 MIN PRN
Status: CANCELLED | OUTPATIENT
Start: 2018-06-05

## 2018-06-05 RX ORDER — ALBUTEROL SULFATE 0.83 MG/ML
2.5 SOLUTION RESPIRATORY (INHALATION)
Status: CANCELLED | OUTPATIENT
Start: 2018-06-05

## 2018-06-05 RX ORDER — DOXORUBICIN HYDROCHLORIDE 2 MG/ML
60 INJECTION, SOLUTION INTRAVENOUS ONCE
Status: CANCELLED | OUTPATIENT
Start: 2018-06-05

## 2018-06-05 RX ORDER — ALBUTEROL SULFATE 90 UG/1
1-2 AEROSOL, METERED RESPIRATORY (INHALATION)
Status: CANCELLED
Start: 2018-06-05

## 2018-06-05 RX ORDER — MEPERIDINE HYDROCHLORIDE 25 MG/ML
25 INJECTION INTRAMUSCULAR; INTRAVENOUS; SUBCUTANEOUS EVERY 30 MIN PRN
Status: CANCELLED | OUTPATIENT
Start: 2018-06-05

## 2018-06-05 RX ORDER — DOXORUBICIN HYDROCHLORIDE 2 MG/ML
90 INJECTION, SOLUTION INTRAVENOUS ONCE
Status: COMPLETED | OUTPATIENT
Start: 2018-06-05 | End: 2018-06-05

## 2018-06-05 RX ORDER — DIPHENHYDRAMINE HYDROCHLORIDE 50 MG/ML
50 INJECTION INTRAMUSCULAR; INTRAVENOUS
Status: CANCELLED
Start: 2018-06-05

## 2018-06-05 RX ORDER — SODIUM CHLORIDE 9 MG/ML
1000 INJECTION, SOLUTION INTRAVENOUS CONTINUOUS PRN
Status: CANCELLED
Start: 2018-06-05

## 2018-06-05 RX ORDER — METHYLPREDNISOLONE SODIUM SUCCINATE 125 MG/2ML
125 INJECTION, POWDER, LYOPHILIZED, FOR SOLUTION INTRAMUSCULAR; INTRAVENOUS
Status: CANCELLED
Start: 2018-06-05

## 2018-06-05 RX ORDER — LORAZEPAM 2 MG/ML
0.5 INJECTION INTRAMUSCULAR EVERY 4 HOURS PRN
Status: CANCELLED
Start: 2018-06-05

## 2018-06-05 RX ORDER — EPINEPHRINE 0.3 MG/.3ML
0.3 INJECTION SUBCUTANEOUS EVERY 5 MIN PRN
Status: CANCELLED | OUTPATIENT
Start: 2018-06-05

## 2018-06-05 RX ORDER — HEPARIN SODIUM (PORCINE) LOCK FLUSH IV SOLN 100 UNIT/ML 100 UNIT/ML
5 SOLUTION INTRAVENOUS EVERY 8 HOURS
Status: DISCONTINUED | OUTPATIENT
Start: 2018-06-05 | End: 2018-06-05 | Stop reason: HOSPADM

## 2018-06-05 RX ORDER — PALONOSETRON 0.05 MG/ML
0.25 INJECTION, SOLUTION INTRAVENOUS ONCE
Status: COMPLETED | OUTPATIENT
Start: 2018-06-05 | End: 2018-06-05

## 2018-06-05 RX ORDER — HEPARIN SODIUM (PORCINE) LOCK FLUSH IV SOLN 100 UNIT/ML 100 UNIT/ML
5 SOLUTION INTRAVENOUS EVERY 8 HOURS
Status: CANCELLED
Start: 2018-06-05

## 2018-06-05 RX ORDER — PALONOSETRON 0.05 MG/ML
0.25 INJECTION, SOLUTION INTRAVENOUS ONCE
Status: CANCELLED
Start: 2018-06-05

## 2018-06-05 RX ORDER — ZOLPIDEM TARTRATE 5 MG/1
TABLET ORAL
Qty: 30 TABLET | Refills: 0 | Status: SHIPPED | OUTPATIENT
Start: 2018-06-05 | End: 2018-07-03

## 2018-06-05 RX ADMIN — SODIUM CHLORIDE 150 MG: 0.9 INJECTION, SOLUTION INTRAVENOUS at 12:50

## 2018-06-05 RX ADMIN — SODIUM CHLORIDE 500 ML: 9 INJECTION, SOLUTION INTRAVENOUS at 12:27

## 2018-06-05 RX ADMIN — DOXORUBICIN HYDROCHLORIDE 90 MG: 2 INJECTION, SOLUTION INTRAVENOUS at 13:13

## 2018-06-05 RX ADMIN — HEPARIN 5 ML: 100 SYRINGE at 13:58

## 2018-06-05 RX ADMIN — DEXAMETHASONE SODIUM PHOSPHATE 12 MG: 10 INJECTION, SOLUTION INTRAMUSCULAR; INTRAVENOUS at 12:30

## 2018-06-05 RX ADMIN — CYCLOPHOSPHAMIDE 900 MG: 1 INJECTION, POWDER, FOR SOLUTION INTRAVENOUS; ORAL at 13:27

## 2018-06-05 RX ADMIN — PEGFILGRASTIM 6 MG: KIT SUBCUTANEOUS at 13:45

## 2018-06-05 RX ADMIN — PALONOSETRON HYDROCHLORIDE 0.25 MG: 0.25 INJECTION INTRAVENOUS at 12:28

## 2018-06-05 ASSESSMENT — PAIN SCALES - GENERAL
PAINLEVEL: NO PAIN (0)
PAINLEVEL: NO PAIN (0)

## 2018-06-05 NOTE — PROGRESS NOTES
Infusion Nursing Note:  Muriel Diaz presents today for C2D1 Adriamycin/Cytoxan.    Patient seen by provider today: Yes: exam with Dr. Mckeon today   present during visit today: Not Applicable.    Note: pt states she tolerated cycle 1 fairly well, main c/o is fatigue. She reports no nausea and is eating and drinking. .    Intravenous Access:  Implanted Port.    Treatment Conditions:  Lab Results   Component Value Date    HGB 11.4 06/05/2018     Lab Results   Component Value Date    WBC 10.3 06/05/2018      Lab Results   Component Value Date    ANEU 7.4 06/05/2018     Lab Results   Component Value Date     06/05/2018      Lab Results   Component Value Date     05/22/2018                   Lab Results   Component Value Date    POTASSIUM 3.6 05/22/2018           No results found for: MAG         Lab Results   Component Value Date    CR 0.70 05/22/2018                   Lab Results   Component Value Date    HATTIE 8.9 05/22/2018                Lab Results   Component Value Date    BILITOTAL 0.2 05/22/2018           Lab Results   Component Value Date    ALBUMIN 3.7 05/22/2018                    Lab Results   Component Value Date    ALT 22 05/22/2018           Lab Results   Component Value Date    AST 20 05/22/2018       Results reviewed, labs MET treatment parameters, ok to proceed with treatment.    ONPRO  Was placed on patient's: left side of abdomen.    Was placed at 1:45 PM    ONPRO injector device Lot number: [id number]    Patient education included: what patient can expect after application, what colored lights mean on the device, when to remove device, when and where to call with questions or issues, all patients questions answered and that Neulasta administration will occur at 4:45pm on 6/6/18.    Patient tolerated administration well.    Post Infusion Assessment:  Patient tolerated infusion without incident.  Blood return noted pre and post infusion.  Blood return noted during administration  every 5 cc.  Site patent and intact, free from redness, edema or discomfort.  No evidence of extravasations.  Access discontinued per protocol.    Discharge Plan:   Discharge instructions reviewed with: Patient.  Patient and/or family verbalized understanding of discharge instructions and all questions answered.  Copy of AVS reviewed with patient and/or family.    Patient discharged in stable condition accompanied by: self.  Departure Mode: Ambulatory.    Vickie Peck RN

## 2018-06-05 NOTE — LETTER
6/5/2018         RE: Muriel Diaz  [address]  [facility] [address]        Dear Colleague,    Thank you for referring your patient, Muriel Diaz, to the [facility]. Please see a copy of my visit note below.    [facility]    Hematology/Oncology Established Patient Note      Today's Date: 06/05/18    Reason for Follow-up: left sided breast cancer      HISTORY OF PRESENT ILLNESS: Muriel Diaz is a 57 year old post-menopausal female with PMHx of HTN, degenerative joint disease, history of cervical spine surgery, who presents with left-sided breast cancer.  She underwent left mastectomy on 4/16/18 by Dr. Umana.  Pathology showed invasive mammary carcinoma, with lobular and ductal features, grade 2, tumor size 4.3 x 4.0 x 2.1 cm, positive margin for invasive carcinoma in the central and lateral posterior surface, DCIS and LCIS present, ER positive (95%), SC positive (50%), HER-2 negative.  Left axillary dissection showed 3 of 5 lymph nodes were positive for metastatic carcinoma (lobular type), also ER positive, SC positive, HER-2 negative. Stage [id number] (stage IIA).      She had port placed on 5/14/18.    She started chemotherapy on 5/22/18:    Doxorubicin 60 mg/m2 IV on day 1  Cyclophosphamide 600 mg/m2 IV on day 1  Neulasta 6 mg SQ on day 2  Every 2 week cycles x 4 cycles    Followed by:    Paclitaxel 80 mg/m2 IV once a week x 12 weeks    C1D1: 5/22/18  C2D1: 6/5/18  C3D1: anticipated for 6/19/18        INTERIM HISTORY: Muriel is here for follow-up.  She says that she did pretty well with cycle 1 of chemotherapy. She denies having nausea/vomiting or diarrhea/constipation.  She notes feeling tired.  Her hair has thinned and she is looking at getting a wig.  She saw Jorje last week because she felt pain around her port.  There was no surrounding erythema or drainage.  Blood culture was negative.  She got a small prescription for Norco.  She says that  the pain is much better now.  She attributes it to her dystonia and chronic back pain.        REVIEW OF SYSTEMS:   14 point ROS was reviewed and is negative other than as noted above in HPI.       HOME MEDICATIONS:  Current Outpatient Prescriptions   Medication Sig Dispense Refill     atenolol (TENORMIN) 25 MG tablet TAKE ONE TABLET BY MOUTH ONE TIME DAILY 30 tablet 3     Carisoprodol (SOMA PO) Take 350 mg by mouth 3 times daily       dexamethasone (DECADRON) 4 MG tablet Take 2 tablets (8 mg) by mouth daily for 3 days Start on Day 2 of Cycles 1 through 4. 6 tablet 3     hydrochlorothiazide 12.5 MG TABS tablet Take 1 tablet (12.5 mg) by mouth daily 90 tablet 1     HYDROcodone-acetaminophen (NORCO) 5-325 MG per tablet Take 1 tablet by mouth every 4 hours as needed for pain 18 tablet 0     lidocaine-prilocaine (EMLA) cream Apply topically as needed for moderate pain Apply to port site 1 hour prior to accessing 30 g 3     LORazepam (ATIVAN) 0.5 MG tablet Take 1 tablet (0.5 mg) by mouth every 4 hours as needed (Anxiety, Nausea/Vomiting or Sleep) 30 tablet 5     prochlorperazine (COMPAZINE) 10 MG tablet Take 1 tablet (10 mg) by mouth every 6 hours as needed (Nausea/Vomiting) 30 tablet 5     VITAMIN D, CHOLECALCIFEROL, PO Take 2,000 Units by mouth daily (2 x 1000 units = 2000 unit dose)           ALLERGIES:  Allergies   Allergen Reactions     Amitriptyline Other (See Comments)     nightmares     Oxycontin [Oxycodone] Nausea     Severe headaches         PAST MEDICAL HISTORY:  Past Medical History:   Diagnosis Date     Cancer (H)     BREAST CANCER     Degeneration of cervical intervertebral disc      Degeneration of lumbar or lumbosacral intervertebral disc      Hypertension      PONV (postoperative nausea and vomiting)          PAST SURGICAL HISTORY:  Past Surgical History:   Procedure Laterality Date     BACK SURGERY  2001    lumbar fusion, cervical discectomy     DISSECT LYMPH NODE AXILLA Left 4/16/2018    Procedure:  DISSECT LYMPH NODE AXILLA;;  Surgeon: Rodney Umana MD;  Location: [facility]     FUSION CERVICAL ANTERIOR THREE+ LEVELS  5/1/2012    Procedure:FUSION CERVICAL ANTERIOR THREE+ LEVELS; Surgeon:SARAH FRANCO; Location:[facility]     FUSION CERVICAL POSTERIOR THREE+ LEVELS  5/1/2012    Procedure:FUSION CERVICAL POSTERIOR THREE+ LEVELS; Posterior Cervical decompression and fusion C4-7, Anterior Cervical decompression and fusion C4-7 (c-arm), (herb table with abraham, yina oasis, yina aviator, Vitoss foam packing strip, impulse monitoring,); Surgeon:SARAH FRANCO; Location: OR     GYN SURGERY       HYSTERECTOMY VAGINAL  1990's    Judy Aceves OB Gyn specilist     HYSTERECTOMY, PAP NO LONGER INDICATED       INSERT PORT VASCULAR ACCESS N/A 5/14/2018    Procedure: INSERT PORT VASCULAR ACCESS;  PORT PLACEMENT;  Surgeon: Rodney Umana MD;  Location: [facility]     MASTECTOMY SIMPLE Left 4/16/2018    Procedure: MASTECTOMY SIMPLE;  LEFT SIMPLE MASTECTOMY,LEFT AXILLARY DISSECTION;  Surgeon: Rodney Umana MD;  Location: [facility]         SOCIAL HISTORY:  Social History     Social History     Marital status: Single     Spouse name: N/A     Number of children: 0     Years of education: N/A     Occupational History      [employer]      Social History Main Topics     Smoking status: Current Every Day Smoker     Packs/day: 0.50     Types: Cigarettes     Smokeless tobacco: Never Used      Comment: 4 cigarettes per day     Alcohol use 0.6 oz/week     1 Standard drinks or equivalent per week      Comment: 2-3 drinks per week     Drug use: No     Sexual activity: Not Currently     Partners: Male     Other Topics Concern     Parent/Sibling W/ Cabg, Mi Or Angioplasty Before 65f 55m? No     Social History Narrative   She smokes cigarettes, half a pack a day for many years; she is unable to quantify how many.  She drinks alcohol occasionally.  She denies illicit drug use.  She is retired.  She used to to work  in  at large companies.  She lives in [place] with her boyfriend.        FAMILY HISTORY:  Family History   Problem Relation Age of Onset     Pancreatitis Mother      Substance Abuse Mother      Unknown/Adopted Father      She does not know much about her family history and is unaware of any cancers in her family.  She has never been pregnant.  She had a hysterectomy in the .  She still has her ovaries.  She says that she has undergone menopause but is unsure when, likely years ago.        PHYSICAL EXAM:  Vital signs:  /88  Pulse 68  Temp 98.8  F (37.1  C) (Oral)  Resp 16  Wt 46.9 kg (103 lb 6.4 oz)  BMI 18.32 kg/m2   ECO  GENERAL/CONSTITUTIONAL: No acute distress.  EYES: No scleral icterus.  LYMPH: No anterior cervical, posterior cervical, supraclavicular, or axillary adenopathy.   RESPIRATORY: Clear to auscultation bilaterally. No crackles or wheezing.   CARDIOVASCULAR: Regular rate and rhythm without murmurs, gallops, or rubs.  GASTROINTESTINAL: No tenderness. The patient has normal bowel sounds. No guarding.  No distention.  MUSCULOSKELETAL: Warm and well-perfused, no cyanosis, clubbing, or edema.  NEUROLOGIC: Alert, oriented, answers questions appropriately.  INTEGUMENTARY: No rashes or jaundice.  GAIT: Steady, does not use assistive device  Port in place at the right upper chest.      LABS:  CBC RESULTS:   Recent Labs   Lab Test  18   1045   WBC  10.3   RBC  3.64*   HGB  11.4*   HCT  34.3*   MCV  94   MCH  31.3   MCHC  33.2   RDW  13.7   PLT  320           IMAGING:  Mammogram and ultrasound 3/28/18:  FINDINGS:  Standard bilateral diagnostic views were obtained,  including tomosynthesis. Marker was placed on the left upper breast to  denote the site of the palpable lump; deep to the marker there is a  mass measuring approximately 2 cm. The mass is associated with  retraction of the left nipple. There are no associated  microcalcifications. No suspicious findings in  the contralateral right  breast.     Further evaluation with targeted left breast ultrasound shows a  corresponding hypoechoic lesion at the 11:00 position, 2 cm from the  nipple, measuring 1.9 x 2.9 x 1.0 cm. Survey of the axilla shows an  enlarged lymph node measuring 0.7 x 0.6 cm.     MRI breast 4/9/18:  1. The known left breast malignancy measures 2.5 x 2.0 x 2.6 cm on  MRI. There is additional nonmass enhancement surrounding the mass in  the upper left breast.  2. No suspicious MRI finding in the right breast.      PET-CT 4/9/18:  1. Hypermetabolic left breast mass representing the primary  malignancy.  2. A few mildly prominent left axillary lymph nodes that are  associated with only very mild metabolism. These are felt to be  indeterminant. There is a subcentimeter lymph node with mild  hypermetabolism at the right anterior upper mediastinum between the  innominate artery and superior vena cava that is felt to also be  indeterminant.  3. No other pathologic activity.  4. Small areas of sclerosis within the thoracic and lumbar spine.    Normal soft tissue neck CT.    Echo 5/7/18:  Left ventricular systolic function is normal.  The visual ejection fraction is estimated at 55-60%.  The ejection fraction is estimated at 61% by Shelton's biplane method.  Global peak LV longitudinal strain is averaged at -19.7%. This is within  reported normal limits (normal <-18%).  There is mild trileaflet aortic sclerosis.  There is mild (1+) aortic regurgitation.  Sinus rhythm was noted.  There is no comparison study available.        ASSESSMENT/PLAN:  Muriel Diaz is a 57 year old post-menopausal female with:    1) Left breast cancer of the upper inner quadrant: s/p left mastectomy on 4/16/18; pathology showed invasive mammary carcinoma, with lobular and ductal features, grade 2, tumor size 4.3 x 4.0 x 2.1 cm, positive margin for invasive carcinoma in the central and lateral posterior surface, DCIS and LCIS present, ER  "positive (95%), AR positive (50%), HER-2 negative.  Left axillary dissection showed 3 of 5 lymph nodes were positive for metastatic carcinoma (lobular type), also ER positive, AR positive, HER-2 negative. Stage [id number] (stage IIA).      I discussed with Dr. Umana regarding the positive posterior margin.  He took the posterior margin, including the fascia, and there is nothing left to take.  She can proceed with chemotherapy.      After chemotherapy, I will refer her to see radiation oncology for consideration of radiation, and then plan on starting hormonal therapy after that.      She has completed 1 cycle of AC and tolerating well.    -C2D1 of AC today  -prn anti-emetics written, but she has not needed it.  -echo every 3 months while on doxorubicin - next one to be in August 2018  -referred for lymphedema therapy  -RTC in 2 weeks and chemotherapy same day    2) Smoking: She smokes half a pack a day.  She says that she is trying to cut back.  -I again advised smoking cessation  -she will see her PCP for tobacco cessation.    3) Hypertension: She is on medications for this.  She says that she has been anxious and nervous and has had difficulty sleeping at night.  -follows with PCP    4) Chronic back pain: s/p history of back surgeries  -she takes soma  -follows with PCP      I spent a total of 25 minutes with the patient, with over >50% of the time in counseling and/or coordination of care.       Patience Mckeon MD  Hematology/Oncology  [facility]      Oncology Rooming Note    June 5, 2018 11:23 AM   Muriel Diaz is a 57 year old female who presents for:    Chief Complaint   Patient presents with     Oncology Clinic Visit     Initial Vitals: /88  Pulse 68  Temp 98.8  F (37.1  C) (Oral)  Resp 16  Wt 46.9 kg (103 lb 6.4 oz)  BMI 18.32 kg/m2 Estimated body mass index is 18.32 kg/(m^2) as calculated from the following:    Height as of 5/22/18: 1.6 m (5' 2.99\").    Weight as " of this encounter: 46.9 kg (103 lb 6.4 oz). Body surface area is 1.44 meters squared.  No Pain (0) Comment: Data Unavailable   No LMP recorded. Patient has had a hysterectomy.  Allergies reviewed: Yes  Medications reviewed: Yes    Medications: Medication refills not needed today.  Pharmacy name entered into [employer]:    [facility] [address]  [id number] [place], [place] - [address] [facility] [place], [place] - [address]    Clinical concerns: None     2 minutes for nursing intake (face to face time)     Mabel Geronimo CMA                Again, thank you for allowing me to participate in the care of your patient.        Sincerely,        Patience Mckeon MD

## 2018-06-05 NOTE — PROGRESS NOTES
[facility]    Hematology/Oncology Established Patient Note      Today's Date: 06/05/18    Reason for Follow-up: left sided breast cancer      HISTORY OF PRESENT ILLNESS: Muriel Diaz is a 57 year old post-menopausal female with PMHx of HTN, degenerative joint disease, history of cervical spine surgery, who presents with left-sided breast cancer.  She underwent left mastectomy on 4/16/18 by Dr. Umana.  Pathology showed invasive mammary carcinoma, with lobular and ductal features, grade 2, tumor size 4.3 x 4.0 x 2.1 cm, positive margin for invasive carcinoma in the central and lateral posterior surface, DCIS and LCIS present, ER positive (95%), ME positive (50%), HER-2 negative.  Left axillary dissection showed 3 of 5 lymph nodes were positive for metastatic carcinoma (lobular type), also ER positive, ME positive, HER-2 negative. Stage [id number] (stage IIA).      She had port placed on 5/14/18.    She started chemotherapy on 5/22/18:    Doxorubicin 60 mg/m2 IV on day 1  Cyclophosphamide 600 mg/m2 IV on day 1  Neulasta 6 mg SQ on day 2  Every 2 week cycles x 4 cycles    Followed by:    Paclitaxel 80 mg/m2 IV once a week x 12 weeks    C1D1: 5/22/18  C2D1: 6/5/18  C3D1: anticipated for 6/19/18        INTERIM HISTORY: Muriel is here for follow-up.  She says that she did pretty well with cycle 1 of chemotherapy. She denies having nausea/vomiting or diarrhea/constipation.  She notes feeling tired.  Her hair has thinned and she is looking at getting a wig.  She saw Jorje last week because she felt pain around her port.  There was no surrounding erythema or drainage.  Blood culture was negative.  She got a small prescription for Norco.  She says that the pain is much better now.  She attributes it to her dystonia and chronic back pain.        REVIEW OF SYSTEMS:   14 point ROS was reviewed and is negative other than as noted above in HPI.       HOME MEDICATIONS:  Current Outpatient Prescriptions    Medication Sig Dispense Refill     atenolol (TENORMIN) 25 MG tablet TAKE ONE TABLET BY MOUTH ONE TIME DAILY 30 tablet 3     Carisoprodol (SOMA PO) Take 350 mg by mouth 3 times daily       dexamethasone (DECADRON) 4 MG tablet Take 2 tablets (8 mg) by mouth daily for 3 days Start on Day 2 of Cycles 1 through 4. 6 tablet 3     hydrochlorothiazide 12.5 MG TABS tablet Take 1 tablet (12.5 mg) by mouth daily 90 tablet 1     HYDROcodone-acetaminophen (NORCO) 5-325 MG per tablet Take 1 tablet by mouth every 4 hours as needed for pain 18 tablet 0     lidocaine-prilocaine (EMLA) cream Apply topically as needed for moderate pain Apply to port site 1 hour prior to accessing 30 g 3     LORazepam (ATIVAN) 0.5 MG tablet Take 1 tablet (0.5 mg) by mouth every 4 hours as needed (Anxiety, Nausea/Vomiting or Sleep) 30 tablet 5     prochlorperazine (COMPAZINE) 10 MG tablet Take 1 tablet (10 mg) by mouth every 6 hours as needed (Nausea/Vomiting) 30 tablet 5     VITAMIN D, CHOLECALCIFEROL, PO Take 2,000 Units by mouth daily (2 x 1000 units = 2000 unit dose)           ALLERGIES:  Allergies   Allergen Reactions     Amitriptyline Other (See Comments)     nightmares     Oxycontin [Oxycodone] Nausea     Severe headaches         PAST MEDICAL HISTORY:  Past Medical History:   Diagnosis Date     Cancer (H)     BREAST CANCER     Degeneration of cervical intervertebral disc      Degeneration of lumbar or lumbosacral intervertebral disc      Hypertension      PONV (postoperative nausea and vomiting)          PAST SURGICAL HISTORY:  Past Surgical History:   Procedure Laterality Date     BACK SURGERY  2001    lumbar fusion, cervical discectomy     DISSECT LYMPH NODE AXILLA Left 4/16/2018    Procedure: DISSECT LYMPH NODE AXILLA;;  Surgeon: Rodney Umana MD;  Location: [facility]     FUSION CERVICAL ANTERIOR THREE+ LEVELS  5/1/2012    Procedure:FUSION CERVICAL ANTERIOR THREE+ LEVELS; Surgeon:SARAH FRANCO; Location:[facility] OR     FUSION CERVICAL  POSTERIOR THREE+ LEVELS  5/1/2012    Procedure:FUSION CERVICAL POSTERIOR THREE+ LEVELS; Posterior Cervical decompression and fusion C4-7, Anterior Cervical decompression and fusion C4-7 (c-arm), (herb table with abraham, yina oasis, yina aviator, Vitoss foam packing strip, impulse monitoring,); Surgeon:SARAH FRANCO; Location: OR     GYN SURGERY       HYSTERECTOMY VAGINAL  1990's    Judy Aceves OB Gyn specilist     HYSTERECTOMY, PAP NO LONGER INDICATED       INSERT PORT VASCULAR ACCESS N/A 5/14/2018    Procedure: INSERT PORT VASCULAR ACCESS;  PORT PLACEMENT;  Surgeon: Rodney Umana MD;  Location: [facility]     MASTECTOMY SIMPLE Left 4/16/2018    Procedure: MASTECTOMY SIMPLE;  LEFT SIMPLE MASTECTOMY,LEFT AXILLARY DISSECTION;  Surgeon: Rodney Umana MD;  Location: [facility]         SOCIAL HISTORY:  Social History     Social History     Marital status: Single     Spouse name: N/A     Number of children: 0     Years of education: N/A     Occupational History      [employer]      Social History Main Topics     Smoking status: Current Every Day Smoker     Packs/day: 0.50     Types: Cigarettes     Smokeless tobacco: Never Used      Comment: 4 cigarettes per day     Alcohol use 0.6 oz/week     1 Standard drinks or equivalent per week      Comment: 2-3 drinks per week     Drug use: No     Sexual activity: Not Currently     Partners: Male     Other Topics Concern     Parent/Sibling W/ Cabg, Mi Or Angioplasty Before 65f 55m? No     Social History Narrative   She smokes cigarettes, half a pack a day for many years; she is unable to quantify how many.  She drinks alcohol occasionally.  She denies illicit drug use.  She is retired.  She used to to work in  at large companies.  She lives in [place] with her boyfriend.        FAMILY HISTORY:  Family History   Problem Relation Age of Onset     Pancreatitis Mother      Substance Abuse Mother      Unknown/Adopted Father      She does  not know much about her family history and is unaware of any cancers in her family.  She has never been pregnant.  She had a hysterectomy in the .  She still has her ovaries.  She says that she has undergone menopause but is unsure when, likely years ago.        PHYSICAL EXAM:  Vital signs:  /88  Pulse 68  Temp 98.8  F (37.1  C) (Oral)  Resp 16  Wt 46.9 kg (103 lb 6.4 oz)  BMI 18.32 kg/m2   ECO  GENERAL/CONSTITUTIONAL: No acute distress.  EYES: No scleral icterus.  LYMPH: No anterior cervical, posterior cervical, supraclavicular, or axillary adenopathy.   RESPIRATORY: Clear to auscultation bilaterally. No crackles or wheezing.   CARDIOVASCULAR: Regular rate and rhythm without murmurs, gallops, or rubs.  GASTROINTESTINAL: No tenderness. The patient has normal bowel sounds. No guarding.  No distention.  MUSCULOSKELETAL: Warm and well-perfused, no cyanosis, clubbing, or edema.  NEUROLOGIC: Alert, oriented, answers questions appropriately.  INTEGUMENTARY: No rashes or jaundice.  GAIT: Steady, does not use assistive device  Port in place at the right upper chest.      LABS:  CBC RESULTS:   Recent Labs   Lab Test  18   1045   WBC  10.3   RBC  3.64*   HGB  11.4*   HCT  34.3*   MCV  94   MCH  31.3   MCHC  33.2   RDW  13.7   PLT  320           IMAGING:  Mammogram and ultrasound 3/28/18:  FINDINGS:  Standard bilateral diagnostic views were obtained,  including tomosynthesis. Marker was placed on the left upper breast to  denote the site of the palpable lump; deep to the marker there is a  mass measuring approximately 2 cm. The mass is associated with  retraction of the left nipple. There are no associated  microcalcifications. No suspicious findings in the contralateral right  breast.     Further evaluation with targeted left breast ultrasound shows a  corresponding hypoechoic lesion at the 11:00 position, 2 cm from the  nipple, measuring 1.9 x 2.9 x 1.0 cm. Survey of the axilla shows an  enlarged  lymph node measuring 0.7 x 0.6 cm.     MRI breast 4/9/18:  1. The known left breast malignancy measures 2.5 x 2.0 x 2.6 cm on  MRI. There is additional nonmass enhancement surrounding the mass in  the upper left breast.  2. No suspicious MRI finding in the right breast.      PET-CT 4/9/18:  1. Hypermetabolic left breast mass representing the primary  malignancy.  2. A few mildly prominent left axillary lymph nodes that are  associated with only very mild metabolism. These are felt to be  indeterminant. There is a subcentimeter lymph node with mild  hypermetabolism at the right anterior upper mediastinum between the  innominate artery and superior vena cava that is felt to also be  indeterminant.  3. No other pathologic activity.  4. Small areas of sclerosis within the thoracic and lumbar spine.    Normal soft tissue neck CT.    Echo 5/7/18:  Left ventricular systolic function is normal.  The visual ejection fraction is estimated at 55-60%.  The ejection fraction is estimated at 61% by Shelton's biplane method.  Global peak LV longitudinal strain is averaged at -19.7%. This is within  reported normal limits (normal <-18%).  There is mild trileaflet aortic sclerosis.  There is mild (1+) aortic regurgitation.  Sinus rhythm was noted.  There is no comparison study available.        ASSESSMENT/PLAN:  Muriel Diaz is a 57 year old post-menopausal female with:    1) Left breast cancer of the upper inner quadrant: s/p left mastectomy on 4/16/18; pathology showed invasive mammary carcinoma, with lobular and ductal features, grade 2, tumor size 4.3 x 4.0 x 2.1 cm, positive margin for invasive carcinoma in the central and lateral posterior surface, DCIS and LCIS present, ER positive (95%), IA positive (50%), HER-2 negative.  Left axillary dissection showed 3 of 5 lymph nodes were positive for metastatic carcinoma (lobular type), also ER positive, IA positive, HER-2 negative. Stage [id number] (stage IIA).      I discussed  with Dr. Umana regarding the positive posterior margin.  He took the posterior margin, including the fascia, and there is nothing left to take.  She can proceed with chemotherapy.      After chemotherapy, I will refer her to see radiation oncology for consideration of radiation, and then plan on starting hormonal therapy after that.      She has completed 1 cycle of AC and tolerating well.    -C2D1 of AC today  -prn anti-emetics written, but she has not needed it.  -echo every 3 months while on doxorubicin - next one to be in August 2018  -referred for lymphedema therapy  -RTC in 2 weeks and chemotherapy same day    2) Smoking: She smokes half a pack a day.  She says that she is trying to cut back.  -I again advised smoking cessation  -she will see her PCP for tobacco cessation.    3) Hypertension: She is on medications for this.  She says that she has been anxious and nervous and has had difficulty sleeping at night.  -follows with PCP    4) Chronic back pain: s/p history of back surgeries  -she takes soma  -follows with PCP      I spent a total of 25 minutes with the patient, with over >50% of the time in counseling and/or coordination of care.       Patience Mckeon MD  Hematology/Oncology  [facility]

## 2018-06-05 NOTE — PATIENT INSTRUCTIONS
-give information on wigs -Done per Jessica  -handicap sticker form-Done per Jessica  -proceed with chemotherapy today (adriamycin+cyclophosphamide)  -return to clinic as already scheduled on 6/19/18 with cycle 3 of chemo same day    -schedule cycle 4 of chemo on 7/3/18; clinic appointment with Jorje same day  Scheduled/janice  -schedule chemo on 7/17/18 (paclitaxel); clinic appointment with Dr. Mckeon same day  Scheduled/janice      AVS printed & given to patient/tomas

## 2018-06-05 NOTE — MR AVS SNAPSHOT
After Visit Summary   6/5/2018    Muriel Diaz    MRN: [id number]           Patient Information     Date Of Birth          1960        Visit Information        Provider Department      6/5/2018 10:30 AM SH INFUSION CHAIR 5 [facility]        Today's Diagnoses     Malignant neoplasm of upper-inner quadrant of left breast in female, estrogen receptor positive (H)    -  1       Follow-ups after your visit        Your next 10 appointments already scheduled     Jun 19, 2018 11:30 AM CDT   Level 4 with SH INFUSION CHAIR 9   [facility] ([facility])    [facility]  [address]  [place] [address]   [phone]            Jun 19, 2018  2:00 PM CDT   Return Visit with Patience Mckeon MD   [facility] ([facility])    [facility]  [address]  [place] [address]   [phone]            Jul 03, 2018 10:00 AM CDT   Level 4 with  INFUSION CHAIR 7   [facility] ([facility])    [facility]  [address]  [place] [address]   [phone]            Jul 03, 2018 11:00 AM CDT   Return Visit with CAROLYN Pritchard CNP   [facility] ([facility])    [facility]  [address]  [place] [address]   [phone]            Jul 17, 2018 11:30 AM CDT   Level 4 with  INFUSION CHAIR 19   [facility] ([facility])    [facility]  [address]  [place] [address]   [phone]            Jul 17, 2018  2:30 PM CDT   Return Visit with Patience Mckeno MD   [facility] ([facility])    [facility]  [address]  [place] [address]   [phone]  "             Who to contact     If you have questions or need follow up information about today's clinic visit or your schedule please contact [facility] directly at [phone].  Normal or non-critical lab and imaging results will be communicated to you by MyChart, letter or phone within 4 business days after the clinic has received the results. If you do not hear from us within 7 days, please contact the clinic through MyChart or phone. If you have a critical or abnormal lab result, we will notify you by phone as soon as possible.  Submit refill requests through [employer] or call your pharmacy and they will forward the refill request to us. Please allow 3 business days for your refill to be completed.          Additional Information About Your Visit        Care EveryWhere ID     This is your Care EveryWhere ID. This could be used by other organizations to access your [place] medical records  [id number]        Your Vitals Were     Pulse Temperature Respirations Height BMI (Body Mass Index)       68 98.8  F (37.1  C) (Oral) 16 1.6 m (5' 2.99\") 18.32 kg/m2        Blood Pressure from Last 3 Encounters:   06/05/18 145/88   06/05/18 145/88   05/31/18 122/71    Weight from Last 3 Encounters:   06/05/18 46.9 kg (103 lb 6.4 oz)   06/05/18 46.9 kg (103 lb 6.4 oz)   05/31/18 46.1 kg (101 lb 9.6 oz)              We Performed the Following     CBC with platelets differential        Primary Care Provider Office Phone # Fax #    Isabel Vickie Landin [phone] [address] [place] [address]        Equal Access to Services     [facility] Hospital[facility] : Hadneil Peña, jaclyn rey, qaybta kaalkvng shabazz. So [facility] [phone].    ATENCIÓN: Si habla español, tiene a chowdary disposición servicios gratuitos de asistencia lingüística. Concha al [phone].    We comply with applicable federal civil rights laws " and [place] laws. We do not discriminate on the basis of race, color, national origin, age, disability, sex, sexual orientation, or gender identity.            Thank you!     Thank you for choosing [facility]  for your care. Our goal is always to provide you with excellent care. Hearing back from our patients is one way we can continue to improve our services. Please take a few minutes to complete the written survey that you may receive in the mail after your visit with us. Thank you!             Your Updated Medication List - Protect others around you: Learn how to safely use, store and throw away your medicines at www.disposemymeds.org.          This list is accurate as of 6/5/18  4:18 PM.  Always use your most recent med list.                   Brand Name Dispense Instructions for use Diagnosis    atenolol 25 MG tablet    TENORMIN    30 tablet    TAKE ONE TABLET BY MOUTH ONE TIME DAILY    Hypertension goal BP (blood pressure) < 140/90       dexamethasone 4 MG tablet    DECADRON    6 tablet    Take 2 tablets (8 mg) by mouth daily for 3 days Start on Day 2 of Cycles 1 through 4.    Malignant neoplasm of upper-inner quadrant of left breast in female, estrogen receptor positive (H)       hydrochlorothiazide 12.5 MG Tabs tablet     90 tablet    Take 1 tablet (12.5 mg) by mouth daily    Hypertension goal BP (blood pressure) < 140/90       HYDROcodone-acetaminophen 5-325 MG per tablet    NORCO    18 tablet    Take 1 tablet by mouth every 4 hours as needed for pain    Malignant neoplasm of upper-inner quadrant of left breast in female, estrogen receptor positive (H)       lidocaine-prilocaine cream    EMLA    30 g    Apply topically as needed for moderate pain Apply to port site 1 hour prior to accessing    Malignant neoplasm of left breast (H)       LORazepam 0.5 MG tablet    ATIVAN    30 tablet    Take 1 tablet (0.5 mg) by mouth every 4 hours as needed (Anxiety, Nausea/Vomiting or  Sleep)    Malignant neoplasm of upper-inner quadrant of left breast in female, estrogen receptor positive (H)       prochlorperazine 10 MG tablet    COMPAZINE    30 tablet    Take 1 tablet (10 mg) by mouth every 6 hours as needed (Nausea/Vomiting)    Malignant neoplasm of upper-inner quadrant of left breast in female, estrogen receptor positive (H)       SOMA PO      Take 350 mg by mouth 3 times daily        VITAMIN D (CHOLECALCIFEROL) PO      Take 2,000 Units by mouth daily (2 x 1000 units = 2000 unit dose)

## 2018-06-05 NOTE — PROGRESS NOTES
"Oncology Rooming Note    June 5, 2018 11:23 AM   Muriel Diaz is a 57 year old female who presents for:    Chief Complaint   Patient presents with     Oncology Clinic Visit     Initial Vitals: /88  Pulse 68  Temp 98.8  F (37.1  C) (Oral)  Resp 16  Wt 46.9 kg (103 lb 6.4 oz)  BMI 18.32 kg/m2 Estimated body mass index is 18.32 kg/(m^2) as calculated from the following:    Height as of 5/22/18: 1.6 m (5' 2.99\").    Weight as of this encounter: 46.9 kg (103 lb 6.4 oz). Body surface area is 1.44 meters squared.  No Pain (0) Comment: Data Unavailable   No LMP recorded. Patient has had a hysterectomy.  Allergies reviewed: Yes  Medications reviewed: Yes    Medications: Medication refills not needed today.  Pharmacy name entered into [employer]:    [facility] [address]  [id number] [place], [place] - [address] [facility] [place], [place] - [address]    Clinical concerns: None     2 minutes for nursing intake (face to face time)     Mabel Geronimo CMA              "

## 2018-06-05 NOTE — MR AVS SNAPSHOT
After Visit Summary   6/5/2018    Muriel Diaz    MRN: [id number]           Patient Information     Date Of Birth          1960        Visit Information        Provider Department      6/5/2018 11:30 AM Patience Mckeon MD [facility]        Today's Diagnoses     Malignant neoplasm of upper-inner quadrant of left breast in female, estrogen receptor positive (H)    -  1      Care Instructions    -give information on wigs -Done per Jessica  -handicap sticker form-Done per Jessica  -proceed with chemotherapy today (adriamycin+cyclophosphamide)  -return to clinic as already scheduled on 6/19/18 with cycle 3 of chemo same day  -schedule cycle 4 of chemo on 7/3/18; clinic appointment with Jorje same day  -schedule chemo on 7/17/18 (paclitaxel); clinic appointment with Dr. Mckeon same day          Follow-ups after your visit        Your next 10 appointments already scheduled     Jun 19, 2018 11:30 AM CDT   Level 4 with  INFUSION CHAIR 9   [facility] ([facility])    [facility]  [address]  [place] [address]   [phone]            Jun 19, 2018  2:00 PM CDT   Return Visit with Patience Mckeon MD   [facility] ([facility])    [facility]  [address]  [place] [address]   [phone]            Jul 03, 2018 10:00 AM CDT   Level 4 with  INFUSION CHAIR 7   [facility] ([facility])    [facility]  [address]  [place] [address]   [phone]            Jul 03, 2018 11:00 AM CDT   Return Visit with CAROLYN Pritchard CNP   [facility] ([facility])    [facility]  [address]  [place] [address]   [phone]            Jul 17, 2018 11:30 AM CDT   Level 4 with  INFUSION CHAIR 19    [facility] ([facility])    [facility] [place]  [address]  [place] [id number] [phone]            Jul 17, 2018  2:30 PM CDT   Return Visit with Patience Mckeon MD   [facility] ([facility])    [facility] [place]  [address]  [place] [address] [phone]              Who to contact     If you have questions or need follow up information about today's clinic visit or your schedule please contact [facility] directly at [phone].  Normal or non-critical lab and imaging results will be communicated to you by MyChart, letter or phone within 4 business days after the clinic has received the results. If you do not hear from us within 7 days, please contact the clinic through MyChart or phone. If you have a critical or abnormal lab result, we will notify you by phone as soon as possible.  Submit refill requests through [employer]er or call your pharmacy and they will forward the refill request to us. Please allow 3 business days for your refill to be completed.          Additional Information About Your Visit        Care EveryWhere ID     This is your Care EveryWhere ID. This could be used by other organizations to access your [place] medical records  [id number]        Your Vitals Were     Pulse Temperature Respirations BMI (Body Mass Index)          68 98.8  F (37.1  C) (Oral) 16 18.32 kg/m2         Blood Pressure from Last 3 Encounters:   06/05/18 145/88   06/05/18 145/88   05/31/18 122/71    Weight from Last 3 Encounters:   06/05/18 46.9 kg (103 lb 6.4 oz)   06/05/18 46.9 kg (103 lb 6.4 oz)   05/31/18 46.1 kg (101 lb 9.6 oz)              Today, you had the following     No orders found for display       Primary Care Provider Office Phone # Fax #    Isabel Landin [phone] [address] [place] [address]         Equal Access to Services     [facility] MARILEE : Hadii aad ku hadsommerandrews Saharacriss, wanegritoda luqadaha, qaybta kalee annkvng hare. So [facility] [phone].    ATENCIÓN: Si habla español, tiene a chowdary disposición servicios gratuitos de asistencia lingüística. Llame al [phone].    We comply with applicable federal civil rights laws and [place] laws. We do not discriminate on the basis of race, color, national origin, age, disability, sex, sexual orientation, or gender identity.            Thank you!     Thank you for choosing [facility]  for your care. Our goal is always to provide you with excellent care. Hearing back from our patients is one way we can continue to improve our services. Please take a few minutes to complete the written survey that you may receive in the mail after your visit with us. Thank you!             Your Updated Medication List - Protect others around you: Learn how to safely use, store and throw away your medicines at www.disposemymeds.org.          This list is accurate as of 6/5/18 12:54 PM.  Always use your most recent med list.                   Brand Name Dispense Instructions for use Diagnosis    atenolol 25 MG tablet    TENORMIN    30 tablet    TAKE ONE TABLET BY MOUTH ONE TIME DAILY    Hypertension goal BP (blood pressure) < 140/90       dexamethasone 4 MG tablet    DECADRON    6 tablet    Take 2 tablets (8 mg) by mouth daily for 3 days Start on Day 2 of Cycles 1 through 4.    Malignant neoplasm of upper-inner quadrant of left breast in female, estrogen receptor positive (H)       hydrochlorothiazide 12.5 MG Tabs tablet     90 tablet    Take 1 tablet (12.5 mg) by mouth daily    Hypertension goal BP (blood pressure) < 140/90       HYDROcodone-acetaminophen 5-325 MG per tablet    NORCO    18 tablet    Take 1 tablet by mouth every 4 hours as needed for pain    Malignant neoplasm of upper-inner quadrant of left breast in female, estrogen  receptor positive (H)       lidocaine-prilocaine cream    EMLA    30 g    Apply topically as needed for moderate pain Apply to port site 1 hour prior to accessing    Malignant neoplasm of left breast (H)       LORazepam 0.5 MG tablet    ATIVAN    30 tablet    Take 1 tablet (0.5 mg) by mouth every 4 hours as needed (Anxiety, Nausea/Vomiting or Sleep)    Malignant neoplasm of upper-inner quadrant of left breast in female, estrogen receptor positive (H)       prochlorperazine 10 MG tablet    COMPAZINE    30 tablet    Take 1 tablet (10 mg) by mouth every 6 hours as needed (Nausea/Vomiting)    Malignant neoplasm of upper-inner quadrant of left breast in female, estrogen receptor positive (H)       SOMA PO      Take 350 mg by mouth 3 times daily        VITAMIN D (CHOLECALCIFEROL) PO      Take 2,000 Units by mouth daily (2 x 1000 units = 2000 unit dose)

## 2018-06-06 LAB
BACTERIA SPEC CULT: NO GROWTH
Lab: NORMAL
SPECIMEN SOURCE: NORMAL

## 2018-06-13 ENCOUNTER — TELEPHONE (OUTPATIENT)
Dept: ONCOLOGY | Facility: CLINIC | Age: 58
End: 2018-06-13

## 2018-06-13 NOTE — TELEPHONE ENCOUNTER
Message left that she needs an appt for a continuing issue of painful port accessing.  PLease call her at [phone]      Called patient back, she stated that her port started to become painful again yesterday. She says that the incision site feels like something is poking out of it. Patient stated that she can come in tomorrow and see ARTEMIO Recinos at 1230 PM to have him assess the port. Jessica Garcia RN,BSN,OCN

## 2018-06-19 ENCOUNTER — INFUSION THERAPY VISIT (OUTPATIENT)
Dept: INFUSION THERAPY | Facility: CLINIC | Age: 58
End: 2018-06-19
Attending: INTERNAL MEDICINE
Payer: COMMERCIAL

## 2018-06-19 ENCOUNTER — HOSPITAL ENCOUNTER (OUTPATIENT)
Facility: CLINIC | Age: 58
Setting detail: SPECIMEN
Discharge: HOME | End: 2018-06-19
Attending: INTERNAL MEDICINE | Admitting: INTERNAL MEDICINE
Payer: COMMERCIAL

## 2018-06-19 ENCOUNTER — ONCOLOGY VISIT (OUTPATIENT)
Dept: ONCOLOGY | Facility: CLINIC | Age: 58
End: 2018-06-19
Attending: INTERNAL MEDICINE
Payer: COMMERCIAL

## 2018-06-19 VITALS
TEMPERATURE: 98.5 F | HEIGHT: 63 IN | WEIGHT: 100 LBS | HEART RATE: 68 BPM | OXYGEN SATURATION: 98 % | RESPIRATION RATE: 18 BRPM | BODY MASS INDEX: 17.72 KG/M2 | SYSTOLIC BLOOD PRESSURE: 141 MMHG | DIASTOLIC BLOOD PRESSURE: 95 MMHG

## 2018-06-19 VITALS
HEIGHT: 63 IN | BODY MASS INDEX: 17.72 KG/M2 | RESPIRATION RATE: 18 BRPM | SYSTOLIC BLOOD PRESSURE: 147 MMHG | TEMPERATURE: 98.2 F | DIASTOLIC BLOOD PRESSURE: 94 MMHG | WEIGHT: 100 LBS | OXYGEN SATURATION: 98 % | HEART RATE: 63 BPM

## 2018-06-19 DIAGNOSIS — C50.212 MALIGNANT NEOPLASM OF UPPER-INNER QUADRANT OF LEFT BREAST IN FEMALE, ESTROGEN RECEPTOR POSITIVE (H): Primary | ICD-10-CM

## 2018-06-19 DIAGNOSIS — Z17.0 MALIGNANT NEOPLASM OF UPPER-INNER QUADRANT OF LEFT BREAST IN FEMALE, ESTROGEN RECEPTOR POSITIVE (H): ICD-10-CM

## 2018-06-19 DIAGNOSIS — C50.212 MALIGNANT NEOPLASM OF UPPER-INNER QUADRANT OF LEFT BREAST IN FEMALE, ESTROGEN RECEPTOR POSITIVE (H): ICD-10-CM

## 2018-06-19 DIAGNOSIS — Z17.0 MALIGNANT NEOPLASM OF UPPER-INNER QUADRANT OF LEFT BREAST IN FEMALE, ESTROGEN RECEPTOR POSITIVE (H): Primary | ICD-10-CM

## 2018-06-19 LAB
ALBUMIN SERPL-MCNC: 3.5 G/DL (ref 3.4–5)
ALP SERPL-CCNC: 111 U/L (ref 40–150)
ALT SERPL W P-5'-P-CCNC: 19 U/L (ref 0–50)
ANION GAP SERPL CALCULATED.3IONS-SCNC: 6 MMOL/L (ref 3–14)
AST SERPL W P-5'-P-CCNC: 15 U/L (ref 0–45)
BASOPHILS # BLD AUTO: 0.1 10E9/L (ref 0–0.2)
BASOPHILS NFR BLD AUTO: 1.5 %
BILIRUB SERPL-MCNC: 0.2 MG/DL (ref 0.2–1.3)
BUN SERPL-MCNC: 16 MG/DL (ref 7–30)
CALCIUM SERPL-MCNC: 8.4 MG/DL (ref 8.5–10.1)
CHLORIDE SERPL-SCNC: 110 MMOL/L (ref 94–109)
CO2 SERPL-SCNC: 27 MMOL/L (ref 20–32)
CREAT SERPL-MCNC: 0.55 MG/DL (ref 0.52–1.04)
DIFFERENTIAL METHOD BLD: ABNORMAL
EOSINOPHIL # BLD AUTO: 0.1 10E9/L (ref 0–0.7)
EOSINOPHIL NFR BLD AUTO: 1.1 %
ERYTHROCYTE [DISTWIDTH] IN BLOOD BY AUTOMATED COUNT: 14.4 % (ref 10–15)
GFR SERPL CREATININE-BSD FRML MDRD: >90 ML/MIN/1.7M2
GLUCOSE SERPL-MCNC: 83 MG/DL (ref 70–99)
HCT VFR BLD AUTO: 32.5 % (ref 35–47)
HGB BLD-MCNC: 10.8 G/DL (ref 11.7–15.7)
IMM GRANULOCYTES # BLD: 0.3 10E9/L (ref 0–0.4)
IMM GRANULOCYTES NFR BLD: 3.8 %
LYMPHOCYTES # BLD AUTO: 1.9 10E9/L (ref 0.8–5.3)
LYMPHOCYTES NFR BLD AUTO: 25.5 %
MCH RBC QN AUTO: 31.7 PG (ref 26.5–33)
MCHC RBC AUTO-ENTMCNC: 33.2 G/DL (ref 31.5–36.5)
MCV RBC AUTO: 95 FL (ref 78–100)
MONOCYTES # BLD AUTO: 0.6 10E9/L (ref 0–1.3)
MONOCYTES NFR BLD AUTO: 7.9 %
NEUTROPHILS # BLD AUTO: 4.4 10E9/L (ref 1.6–8.3)
NEUTROPHILS NFR BLD AUTO: 60.2 %
NRBC # BLD AUTO: 0 10*3/UL
NRBC BLD AUTO-RTO: 0 /100
PLATELET # BLD AUTO: 253 10E9/L (ref 150–450)
PLATELET # BLD EST: ABNORMAL 10*3/UL
POTASSIUM SERPL-SCNC: 3.8 MMOL/L (ref 3.4–5.3)
PROT SERPL-MCNC: 6.5 G/DL (ref 6.8–8.8)
RBC # BLD AUTO: 3.41 10E12/L (ref 3.8–5.2)
RBC MORPH BLD: ABNORMAL
SODIUM SERPL-SCNC: 143 MMOL/L (ref 133–144)
WBC # BLD AUTO: 7.3 10E9/L (ref 4–11)

## 2018-06-19 PROCEDURE — 80053 COMPREHEN METABOLIC PANEL: CPT | Performed by: INTERNAL MEDICINE

## 2018-06-19 PROCEDURE — 99214 OFFICE O/P EST MOD 30 MIN: CPT | Performed by: INTERNAL MEDICINE

## 2018-06-19 PROCEDURE — G0463 HOSPITAL OUTPT CLINIC VISIT: HCPCS | Mod: 25

## 2018-06-19 PROCEDURE — 25000128 H RX IP 250 OP 636: Performed by: INTERNAL MEDICINE

## 2018-06-19 PROCEDURE — 96413 CHEMO IV INFUSION 1 HR: CPT

## 2018-06-19 PROCEDURE — 85025 COMPLETE CBC W/AUTO DIFF WBC: CPT | Performed by: INTERNAL MEDICINE

## 2018-06-19 PROCEDURE — G0463 HOSPITAL OUTPT CLINIC VISIT: HCPCS

## 2018-06-19 PROCEDURE — 96375 TX/PRO/DX INJ NEW DRUG ADDON: CPT

## 2018-06-19 PROCEDURE — 96367 TX/PROPH/DG ADDL SEQ IV INF: CPT

## 2018-06-19 RX ORDER — MEPERIDINE HYDROCHLORIDE 25 MG/ML
25 INJECTION INTRAMUSCULAR; INTRAVENOUS; SUBCUTANEOUS EVERY 30 MIN PRN
Status: CANCELLED | OUTPATIENT
Start: 2018-06-19

## 2018-06-19 RX ORDER — DOXORUBICIN HYDROCHLORIDE 2 MG/ML
90 INJECTION, SOLUTION INTRAVENOUS ONCE
Status: COMPLETED | OUTPATIENT
Start: 2018-06-19 | End: 2018-06-19

## 2018-06-19 RX ORDER — DOXORUBICIN HYDROCHLORIDE 2 MG/ML
60 INJECTION, SOLUTION INTRAVENOUS ONCE
Status: CANCELLED | OUTPATIENT
Start: 2018-06-19

## 2018-06-19 RX ORDER — METHYLPREDNISOLONE SODIUM SUCCINATE 125 MG/2ML
125 INJECTION, POWDER, LYOPHILIZED, FOR SOLUTION INTRAMUSCULAR; INTRAVENOUS
Status: CANCELLED
Start: 2018-06-19

## 2018-06-19 RX ORDER — HEPARIN SODIUM (PORCINE) LOCK FLUSH IV SOLN 100 UNIT/ML 100 UNIT/ML
5 SOLUTION INTRAVENOUS EVERY 8 HOURS
Status: DISCONTINUED | OUTPATIENT
Start: 2018-06-19 | End: 2018-06-19 | Stop reason: HOSPADM

## 2018-06-19 RX ORDER — DIPHENHYDRAMINE HYDROCHLORIDE 50 MG/ML
50 INJECTION INTRAMUSCULAR; INTRAVENOUS
Status: CANCELLED
Start: 2018-06-19

## 2018-06-19 RX ORDER — LORAZEPAM 2 MG/ML
0.5 INJECTION INTRAMUSCULAR EVERY 4 HOURS PRN
Status: CANCELLED
Start: 2018-06-19

## 2018-06-19 RX ORDER — EPINEPHRINE 0.3 MG/.3ML
0.3 INJECTION SUBCUTANEOUS EVERY 5 MIN PRN
Status: CANCELLED | OUTPATIENT
Start: 2018-06-19

## 2018-06-19 RX ORDER — ALBUTEROL SULFATE 0.83 MG/ML
2.5 SOLUTION RESPIRATORY (INHALATION)
Status: CANCELLED | OUTPATIENT
Start: 2018-06-19

## 2018-06-19 RX ORDER — PALONOSETRON 0.05 MG/ML
0.25 INJECTION, SOLUTION INTRAVENOUS ONCE
Status: COMPLETED | OUTPATIENT
Start: 2018-06-19 | End: 2018-06-19

## 2018-06-19 RX ORDER — SODIUM CHLORIDE 9 MG/ML
1000 INJECTION, SOLUTION INTRAVENOUS CONTINUOUS PRN
Status: CANCELLED
Start: 2018-06-19

## 2018-06-19 RX ORDER — ALBUTEROL SULFATE 90 UG/1
1-2 AEROSOL, METERED RESPIRATORY (INHALATION)
Status: CANCELLED
Start: 2018-06-19

## 2018-06-19 RX ORDER — EPINEPHRINE 1 MG/ML
0.3 INJECTION, SOLUTION INTRAMUSCULAR; SUBCUTANEOUS EVERY 5 MIN PRN
Status: CANCELLED | OUTPATIENT
Start: 2018-06-19

## 2018-06-19 RX ORDER — PALONOSETRON 0.05 MG/ML
0.25 INJECTION, SOLUTION INTRAVENOUS ONCE
Status: CANCELLED
Start: 2018-06-19

## 2018-06-19 RX ADMIN — DEXAMETHASONE SODIUM PHOSPHATE 150 MG: 10 INJECTION, SOLUTION INTRAMUSCULAR; INTRAVENOUS at 13:45

## 2018-06-19 RX ADMIN — SODIUM CHLORIDE 250 ML: 9 INJECTION, SOLUTION INTRAVENOUS at 13:44

## 2018-06-19 RX ADMIN — CYCLOPHOSPHAMIDE 900 MG: 1 INJECTION, POWDER, FOR SOLUTION INTRAVENOUS; ORAL at 14:43

## 2018-06-19 RX ADMIN — SODIUM CHLORIDE, PRESERVATIVE FREE 5 ML: 5 INJECTION INTRAVENOUS at 15:19

## 2018-06-19 RX ADMIN — DOXORUBICIN HYDROCHLORIDE 90 MG: 2 INJECTION, SOLUTION INTRAVENOUS at 14:30

## 2018-06-19 RX ADMIN — PALONOSETRON HYDROCHLORIDE 0.25 MG: 0.25 INJECTION INTRAVENOUS at 13:44

## 2018-06-19 RX ADMIN — PEGFILGRASTIM 6 MG: KIT SUBCUTANEOUS at 15:00

## 2018-06-19 ASSESSMENT — PAIN SCALES - GENERAL
PAINLEVEL: NO PAIN (0)
PAINLEVEL: NO PAIN (0)

## 2018-06-19 NOTE — PROGRESS NOTES
[facility]    Hematology/Oncology Established Patient Note      Today's Date: 06/19/18    Reason for Follow-up: left sided breast cancer      HISTORY OF PRESENT ILLNESS: Muriel Diaz is a 57 year old post-menopausal female with PMHx of HTN, degenerative joint disease, history of cervical spine surgery, who presents with left-sided breast cancer.  She underwent left mastectomy on 4/16/18 by Dr. Umana.  Pathology showed invasive mammary carcinoma, with lobular and ductal features, grade 2, tumor size 4.3 x 4.0 x 2.1 cm, positive margin for invasive carcinoma in the central and lateral posterior surface, DCIS and LCIS present, ER positive (95%), OR positive (50%), HER-2 negative.  Left axillary dissection showed 3 of 5 lymph nodes were positive for metastatic carcinoma (lobular type), also ER positive, OR positive, HER-2 negative. Stage [id number] (stage IIA).      She had port placed on 5/14/18.    She started chemotherapy on 5/22/18:    Doxorubicin 60 mg/m2 IV on day 1  Cyclophosphamide 600 mg/m2 IV on day 1  Neulasta 6 mg SQ on day 2  Every 2 week cycles x 4 cycles    Followed by:    Paclitaxel 80 mg/m2 IV once a week x 12 weeks    AC  C1D1: 5/22/18  C2D1: 6/5/18  C3D1: 6/19/18  C4D1: anticipated for 7/3/18        INTERIM HISTORY: Muriel is here for follow-up.    She says that she has been doing well.  She denies nausea from the chemotherapy.  She denies diarrhea or constipation.  She notes that the port bothers her once in a while, usually when she raises her arms.  She can feel it poking when she moves her right arm.  The port has been functional.        REVIEW OF SYSTEMS:   14 point ROS was reviewed and is negative other than as noted above in HPI.       HOME MEDICATIONS:  Current Outpatient Prescriptions   Medication Sig Dispense Refill     atenolol (TENORMIN) 25 MG tablet TAKE ONE TABLET BY MOUTH ONE TIME DAILY 30 tablet 3     Carisoprodol (SOMA PO) Take 350 mg by mouth 3 times  daily       dexamethasone (DECADRON) 4 MG tablet Take 2 tablets (8 mg) by mouth daily for 3 days Start on Day 2 of Cycles 1 through 4. 6 tablet 3     hydrochlorothiazide 12.5 MG TABS tablet Take 1 tablet (12.5 mg) by mouth daily 90 tablet 1     HYDROcodone-acetaminophen (NORCO) 5-325 MG per tablet Take 1 tablet by mouth every 4 hours as needed for pain 18 tablet 0     lidocaine-prilocaine (EMLA) cream Apply topically as needed for moderate pain Apply to port site 1 hour prior to accessing 30 g 3     LORazepam (ATIVAN) 0.5 MG tablet Take 1 tablet (0.5 mg) by mouth every 4 hours as needed (Anxiety, Nausea/Vomiting or Sleep) 30 tablet 5     prochlorperazine (COMPAZINE) 10 MG tablet Take 1 tablet (10 mg) by mouth every 6 hours as needed (Nausea/Vomiting) 30 tablet 5     VITAMIN D, CHOLECALCIFEROL, PO Take 2,000 Units by mouth daily (2 x 1000 units = 2000 unit dose)       zolpidem (AMBIEN) 5 MG tablet TAKE ONE TABLET BY MOUTH AT BEDTIME AS NEEDED FOR SLEEP  30 tablet 0         ALLERGIES:  Allergies   Allergen Reactions     Amitriptyline Other (See Comments)     nightmares     Oxycontin [Oxycodone] Nausea     Severe headaches         PAST MEDICAL HISTORY:  Past Medical History:   Diagnosis Date     Cancer (H)     BREAST CANCER     Degeneration of cervical intervertebral disc      Degeneration of lumbar or lumbosacral intervertebral disc      Hypertension      PONV (postoperative nausea and vomiting)          PAST SURGICAL HISTORY:  Past Surgical History:   Procedure Laterality Date     BACK SURGERY  2001    lumbar fusion, cervical discectomy     DISSECT LYMPH NODE AXILLA Left 4/16/2018    Procedure: DISSECT LYMPH NODE AXILLA;;  Surgeon: Rodney Umana MD;  Location: [facility]     FUSION CERVICAL ANTERIOR THREE+ LEVELS  5/1/2012    Procedure:FUSION CERVICAL ANTERIOR THREE+ LEVELS; Surgeon:SARAH FRANCO; Location:[facility]     FUSION CERVICAL POSTERIOR THREE+ LEVELS  5/1/2012    Procedure:FUSION CERVICAL POSTERIOR  THREE+ LEVELS; Posterior Cervical decompression and fusion C4-7, Anterior Cervical decompression and fusion C4-7 (c-arm), (herb table with abraham, yina oasis, yina aviator, Vitoss foam packing strip, impulse monitoring,); Surgeon:SARAH FRANCO; Location: OR     GYN SURGERY       HYSTERECTOMY VAGINAL  1990's    Judy Ace[facility] OB Gyn specilist     HYSTERECTOMY, PAP NO LONGER INDICATED       INSERT PORT VASCULAR ACCESS N/A 5/14/2018    Procedure: INSERT PORT VASCULAR ACCESS;  PORT PLACEMENT;  Surgeon: Rodney Umana MD;  Location: [facility]     MASTECTOMY SIMPLE Left 4/16/2018    Procedure: MASTECTOMY SIMPLE;  LEFT SIMPLE MASTECTOMY,LEFT AXILLARY DISSECTION;  Surgeon: Rodney Umana MD;  Location: [facility]         SOCIAL HISTORY:  Social History     Social History     Marital status: Single     Spouse name: N/A     Number of children: 0     Years of education: N/A     Occupational History      [employer]      Social History Main Topics     Smoking status: Current Every Day Smoker     Packs/day: 0.50     Types: Cigarettes     Smokeless tobacco: Never Used      Comment: 4 cigarettes per day     Alcohol use 0.6 oz/week     1 Standard drinks or equivalent per week      Comment: 2-3 drinks per week     Drug use: No     Sexual activity: Not Currently     Partners: Male     Other Topics Concern     Parent/Sibling W/ Cabg, Mi Or Angioplasty Before 65f 55m? No     Social History Narrative   She smokes cigarettes, half a pack a day for many years; she is unable to quantify how many.  She drinks alcohol occasionally.  She denies illicit drug use.  She is retired.  She used to to work in  at large companies.  She lives in [place] with her boyfriend.        FAMILY HISTORY:  Family History   Problem Relation Age of Onset     Pancreatitis Mother      Substance Abuse Mother      Unknown/Adopted Father      She does not know much about her family history and is unaware of any cancers in  "her family.  She has never been pregnant.  She had a hysterectomy in the .  She still has her ovaries.  She says that she has undergone menopause but is unsure when, likely years ago.        PHYSICAL EXAM:  Vital signs:  BP (!) 147/94  Pulse 63  Temp 98.2  F (36.8  C) (Oral)  Resp 18  Ht 1.6 m (5' 2.99\")  Wt 45.4 kg (100 lb)  SpO2 98%  BMI 17.72 kg/m2   ECO  GENERAL/CONSTITUTIONAL: No acute distress.  EYES: No scleral icterus.  RESPIRATORY: Clear to auscultation bilaterally. No crackles or wheezing.   CARDIOVASCULAR: Regular rate and rhythm without murmurs, gallops, or rubs.  GASTROINTESTINAL: No tenderness. The patient has normal bowel sounds. No guarding.  No distention.  MUSCULOSKELETAL: Warm and well-perfused.  NEUROLOGIC: Alert, oriented, answers questions appropriately.  INTEGUMENTARY: No rashes or jaundice.  GAIT: Steady, does not use assistive device  Port in place at the right upper chest.      LABS:  CBC RESULTS:   Recent Labs   Lab Test  18   1210   WBC  7.3   RBC  3.41*   HGB  10.8*   HCT  32.5*   MCV  95   MCH  31.7   MCHC  33.2   RDW  14.4   PLT  253     Recent Labs   Lab Test  18   1210  18   0827   NA  143  142   POTASSIUM  3.8  3.6   CHLORIDE  110*  105   CO2  27  28   ANIONGAP  6  9   GLC  83  102*   BUN  16  24   CR  0.55  0.70   HATTIE  8.4*  8.9     Lab Results   Component Value Date    AST 15 2018     Lab Results   Component Value Date    ALT 19 2018     No results found for: BILICONJ   Lab Results   Component Value Date    BILITOTAL 0.2 2018     Lab Results   Component Value Date    ALBUMIN 3.5 2018     Lab Results   Component Value Date    PROTTOTAL 6.5 2018      Lab Results   Component Value Date    ALKPHOS 111 2018           IMAGING:  Mammogram and ultrasound 3/28/18:  FINDINGS:  Standard bilateral diagnostic views were obtained,  including tomosynthesis. Marker was placed on the left upper breast to  denote the site of the " palpable lump; deep to the marker there is a  mass measuring approximately 2 cm. The mass is associated with  retraction of the left nipple. There are no associated  microcalcifications. No suspicious findings in the contralateral right  breast.     Further evaluation with targeted left breast ultrasound shows a  corresponding hypoechoic lesion at the 11:00 position, 2 cm from the  nipple, measuring 1.9 x 2.9 x 1.0 cm. Survey of the axilla shows an  enlarged lymph node measuring 0.7 x 0.6 cm.     MRI breast 4/9/18:  1. The known left breast malignancy measures 2.5 x 2.0 x 2.6 cm on  MRI. There is additional nonmass enhancement surrounding the mass in  the upper left breast.  2. No suspicious MRI finding in the right breast.      PET-CT 4/9/18:  1. Hypermetabolic left breast mass representing the primary  malignancy.  2. A few mildly prominent left axillary lymph nodes that are  associated with only very mild metabolism. These are felt to be  indeterminant. There is a subcentimeter lymph node with mild  hypermetabolism at the right anterior upper mediastinum between the  innominate artery and superior vena cava that is felt to also be  indeterminant.  3. No other pathologic activity.  4. Small areas of sclerosis within the thoracic and lumbar spine.    Normal soft tissue neck CT.    Echo 5/7/18:  Left ventricular systolic function is normal.  The visual ejection fraction is estimated at 55-60%.  The ejection fraction is estimated at 61% by Shelton's biplane method.  Global peak LV longitudinal strain is averaged at -19.7%. This is within  reported normal limits (normal <-18%).  There is mild trileaflet aortic sclerosis.  There is mild (1+) aortic regurgitation.  Sinus rhythm was noted.  There is no comparison study available.        ASSESSMENT/PLAN:  Muriel Diaz is a 57 year old post-menopausal female with:    1) Left breast cancer of the upper inner quadrant: s/p left mastectomy on 4/16/18; pathology showed  invasive mammary carcinoma, with lobular and ductal features, grade 2, tumor size 4.3 x 4.0 x 2.1 cm, positive margin for invasive carcinoma in the central and lateral posterior surface, DCIS and LCIS present, ER positive (95%), NM positive (50%), HER-2 negative.  Left axillary dissection showed 3 of 5 lymph nodes were positive for metastatic carcinoma (lobular type), also ER positive, NM positive, HER-2 negative. Stage [id number] (stage IIA).      I discussed with Dr. Umana regarding the positive posterior margin.  He took the posterior margin, including the fascia, and there is nothing left to take.  She can proceed with chemotherapy.      After chemotherapy, I will refer her to see radiation oncology for consideration of radiation, and then plan on starting hormonal therapy after that.      She has completed 2 cycles of AC and tolerating well.    -C3D1 of AC today  -prn anti-emetics written, but she has not needed it.  -echo every 3 months while on doxorubicin - next one to be in August 2018  -referred for lymphedema therapy  -RTC with ESTHER in 2 weeks and cycle 4 of AC same day  -RTC with me in 4 weeks and chemotherapy same day - she will start paclitaxel then    2) Smoking: She smokes half a pack a day.  She says that she is trying to cut back.  -I again advised smoking cessation  -she will see her PCP for tobacco cessation.    3) Hypertension: She is on medications for this.  She says that she has been anxious and nervous and has had difficulty sleeping at night.  -follows with PCP    4) Chronic back pain: s/p history of back surgeries  -she takes soma  -follows with PCP      I spent a total of 25 minutes with the patient, with over >50% of the time in counseling and/or coordination of care.       Patience Mckeon MD  Hematology/Oncology  [facility]

## 2018-06-19 NOTE — LETTER
6/19/2018         RE: Muriel Diaz  [address]  [facility] [address]        Dear Colleague,    Thank you for referring your patient, Muriel Diaz, to the [facility]. Please see a copy of my visit note below.    [facility]    Hematology/Oncology Established Patient Note      Today's Date: 06/19/18    Reason for Follow-up: left sided breast cancer      HISTORY OF PRESENT ILLNESS: Muriel Diaz is a 57 year old post-menopausal female with PMHx of HTN, degenerative joint disease, history of cervical spine surgery, who presents with left-sided breast cancer.  She underwent left mastectomy on 4/16/18 by Dr. Umana.  Pathology showed invasive mammary carcinoma, with lobular and ductal features, grade 2, tumor size 4.3 x 4.0 x 2.1 cm, positive margin for invasive carcinoma in the central and lateral posterior surface, DCIS and LCIS present, ER positive (95%), IN positive (50%), HER-2 negative.  Left axillary dissection showed 3 of 5 lymph nodes were positive for metastatic carcinoma (lobular type), also ER positive, IN positive, HER-2 negative. Stage [id number] (stage IIA).      She had port placed on 5/14/18.    She started chemotherapy on 5/22/18:    Doxorubicin 60 mg/m2 IV on day 1  Cyclophosphamide 600 mg/m2 IV on day 1  Neulasta 6 mg SQ on day 2  Every 2 week cycles x 4 cycles    Followed by:    Paclitaxel 80 mg/m2 IV once a week x 12 weeks    AC  C1D1: 5/22/18  C2D1: 6/5/18  C3D1: 6/19/18  C4D1: anticipated for 7/3/18        INTERIM HISTORY: Muriel is here for follow-up.    She says that she has been doing well.  She denies nausea from the chemotherapy.  She denies diarrhea or constipation.  She notes that the port bothers her once in a while, usually when she raises her arms.  She can feel it poking when she moves her right arm.  The port has been functional.        REVIEW OF SYSTEMS:   14 point ROS was reviewed and is negative other than as noted above  in HPI.       HOME MEDICATIONS:  Current Outpatient Prescriptions   Medication Sig Dispense Refill     atenolol (TENORMIN) 25 MG tablet TAKE ONE TABLET BY MOUTH ONE TIME DAILY 30 tablet 3     Carisoprodol (SOMA PO) Take 350 mg by mouth 3 times daily       dexamethasone (DECADRON) 4 MG tablet Take 2 tablets (8 mg) by mouth daily for 3 days Start on Day 2 of Cycles 1 through 4. 6 tablet 3     hydrochlorothiazide 12.5 MG TABS tablet Take 1 tablet (12.5 mg) by mouth daily 90 tablet 1     HYDROcodone-acetaminophen (NORCO) 5-325 MG per tablet Take 1 tablet by mouth every 4 hours as needed for pain 18 tablet 0     lidocaine-prilocaine (EMLA) cream Apply topically as needed for moderate pain Apply to port site 1 hour prior to accessing 30 g 3     LORazepam (ATIVAN) 0.5 MG tablet Take 1 tablet (0.5 mg) by mouth every 4 hours as needed (Anxiety, Nausea/Vomiting or Sleep) 30 tablet 5     prochlorperazine (COMPAZINE) 10 MG tablet Take 1 tablet (10 mg) by mouth every 6 hours as needed (Nausea/Vomiting) 30 tablet 5     VITAMIN D, CHOLECALCIFEROL, PO Take 2,000 Units by mouth daily (2 x 1000 units = 2000 unit dose)       zolpidem (AMBIEN) 5 MG tablet TAKE ONE TABLET BY MOUTH AT BEDTIME AS NEEDED FOR SLEEP  30 tablet 0         ALLERGIES:  Allergies   Allergen Reactions     Amitriptyline Other (See Comments)     nightmares     Oxycontin [Oxycodone] Nausea     Severe headaches         PAST MEDICAL HISTORY:  Past Medical History:   Diagnosis Date     Cancer (H)     BREAST CANCER     Degeneration of cervical intervertebral disc      Degeneration of lumbar or lumbosacral intervertebral disc      Hypertension      PONV (postoperative nausea and vomiting)          PAST SURGICAL HISTORY:  Past Surgical History:   Procedure Laterality Date     BACK SURGERY  2001    lumbar fusion, cervical discectomy     DISSECT LYMPH NODE AXILLA Left 4/16/2018    Procedure: DISSECT LYMPH NODE AXILLA;;  Surgeon: Rodney Umana MD;  Location: [facility]      FUSION CERVICAL ANTERIOR THREE+ LEVELS  5/1/2012    Procedure:FUSION CERVICAL ANTERIOR THREE+ LEVELS; Surgeon:SARAH FRANCO; Location:[facility] OR     FUSION CERVICAL POSTERIOR THREE+ LEVELS  5/1/2012    Procedure:FUSION CERVICAL POSTERIOR THREE+ LEVELS; Posterior Cervical decompression and fusion C4-7, Anterior Cervical decompression and fusion C4-7 (c-arm), (herb table with abraham, yina oasis, yina aviator, Vitoss foam packing strip, impulse monitoring,); Surgeon:SARAH FRANCO; Location: OR     GYN SURGERY       HYSTERECTOMY VAGINAL  1990's    [facility] specilist     HYSTERECTOMY, PAP NO LONGER INDICATED       INSERT PORT VASCULAR ACCESS N/A 5/14/2018    Procedure: INSERT PORT VASCULAR ACCESS;  PORT PLACEMENT;  Surgeon: Rodney Umana MD;  Location: [facility]     MASTECTOMY SIMPLE Left 4/16/2018    Procedure: MASTECTOMY SIMPLE;  LEFT SIMPLE MASTECTOMY,LEFT AXILLARY DISSECTION;  Surgeon: Rodney Umana MD;  Location: [facility]         SOCIAL HISTORY:  Social History     Social History     Marital status: Single     Spouse name: N/A     Number of children: 0     Years of education: N/A     Occupational History      [employer]      Social History Main Topics     Smoking status: Current Every Day Smoker     Packs/day: 0.50     Types: Cigarettes     Smokeless tobacco: Never Used      Comment: 4 cigarettes per day     Alcohol use 0.6 oz/week     1 Standard drinks or equivalent per week      Comment: 2-3 drinks per week     Drug use: No     Sexual activity: Not Currently     Partners: Male     Other Topics Concern     Parent/Sibling W/ Cabg, Mi Or Angioplasty Before 65f 55m? No     Social History Narrative   She smokes cigarettes, half a pack a day for many years; she is unable to quantify how many.  She drinks alcohol occasionally.  She denies illicit drug use.  She is retired.  She used to to work in  at large companies.  She lives in [place] with her boyfriend.  "       FAMILY HISTORY:  Family History   Problem Relation Age of Onset     Pancreatitis Mother      Substance Abuse Mother      Unknown/Adopted Father      She does not know much about her family history and is unaware of any cancers in her family.  She has never been pregnant.  She had a hysterectomy in the .  She still has her ovaries.  She says that she has undergone menopause but is unsure when, likely years ago.        PHYSICAL EXAM:  Vital signs:  BP (!) 147/94  Pulse 63  Temp 98.2  F (36.8  C) (Oral)  Resp 18  Ht 1.6 m (5' 2.99\")  Wt 45.4 kg (100 lb)  SpO2 98%  BMI 17.72 kg/m2   ECO  GENERAL/CONSTITUTIONAL: No acute distress.  EYES: No scleral icterus.  RESPIRATORY: Clear to auscultation bilaterally. No crackles or wheezing.   CARDIOVASCULAR: Regular rate and rhythm without murmurs, gallops, or rubs.  GASTROINTESTINAL: No tenderness. The patient has normal bowel sounds. No guarding.  No distention.  MUSCULOSKELETAL: Warm and well-perfused.  NEUROLOGIC: Alert, oriented, answers questions appropriately.  INTEGUMENTARY: No rashes or jaundice.  GAIT: Steady, does not use assistive device  Port in place at the right upper chest.      LABS:  CBC RESULTS:   Recent Labs   Lab Test  18   1210   WBC  7.3   RBC  3.41*   HGB  10.8*   HCT  32.5*   MCV  95   MCH  31.7   MCHC  33.2   RDW  14.4   PLT  253     Recent Labs   Lab Test  18   1210  18   0827   NA  143  142   POTASSIUM  3.8  3.6   CHLORIDE  110*  105   CO2  27  28   ANIONGAP  6  9   GLC  83  102*   BUN  16  24   CR  0.55  0.70   HATTIE  8.4*  8.9     Lab Results   Component Value Date    AST 15 2018     Lab Results   Component Value Date    ALT 19 2018     No results found for: BILICONJ   Lab Results   Component Value Date    BILITOTAL 0.2 2018     Lab Results   Component Value Date    ALBUMIN 3.5 2018     Lab Results   Component Value Date    PROTTOTAL 6.5 2018      Lab Results   Component Value Date "    ALKPHOS 111 06/19/2018           IMAGING:  Mammogram and ultrasound 3/28/18:  FINDINGS:  Standard bilateral diagnostic views were obtained,  including tomosynthesis. Marker was placed on the left upper breast to  denote the site of the palpable lump; deep to the marker there is a  mass measuring approximately 2 cm. The mass is associated with  retraction of the left nipple. There are no associated  microcalcifications. No suspicious findings in the contralateral right  breast.     Further evaluation with targeted left breast ultrasound shows a  corresponding hypoechoic lesion at the 11:00 position, 2 cm from the  nipple, measuring 1.9 x 2.9 x 1.0 cm. Survey of the axilla shows an  enlarged lymph node measuring 0.7 x 0.6 cm.     MRI breast 4/9/18:  1. The known left breast malignancy measures 2.5 x 2.0 x 2.6 cm on  MRI. There is additional nonmass enhancement surrounding the mass in  the upper left breast.  2. No suspicious MRI finding in the right breast.      PET-CT 4/9/18:  1. Hypermetabolic left breast mass representing the primary  malignancy.  2. A few mildly prominent left axillary lymph nodes that are  associated with only very mild metabolism. These are felt to be  indeterminant. There is a subcentimeter lymph node with mild  hypermetabolism at the right anterior upper mediastinum between the  innominate artery and superior vena cava that is felt to also be  indeterminant.  3. No other pathologic activity.  4. Small areas of sclerosis within the thoracic and lumbar spine.    Normal soft tissue neck CT.    Echo 5/7/18:  Left ventricular systolic function is normal.  The visual ejection fraction is estimated at 55-60%.  The ejection fraction is estimated at 61% by Shelton's biplane method.  Global peak LV longitudinal strain is averaged at -19.7%. This is within  reported normal limits (normal <-18%).  There is mild trileaflet aortic sclerosis.  There is mild (1+) aortic regurgitation.  Sinus rhythm was  noted.  There is no comparison study available.        ASSESSMENT/PLAN:  Muriel Diaz is a 57 year old post-menopausal female with:    1) Left breast cancer of the upper inner quadrant: s/p left mastectomy on 4/16/18; pathology showed invasive mammary carcinoma, with lobular and ductal features, grade 2, tumor size 4.3 x 4.0 x 2.1 cm, positive margin for invasive carcinoma in the central and lateral posterior surface, DCIS and LCIS present, ER positive (95%), NV positive (50%), HER-2 negative.  Left axillary dissection showed 3 of 5 lymph nodes were positive for metastatic carcinoma (lobular type), also ER positive, NV positive, HER-2 negative. Stage [id number] (stage IIA).      I discussed with Dr. Umana regarding the positive posterior margin.  He took the posterior margin, including the fascia, and there is nothing left to take.  She can proceed with chemotherapy.      After chemotherapy, I will refer her to see radiation oncology for consideration of radiation, and then plan on starting hormonal therapy after that.      She has completed 2 cycles of AC and tolerating well.    -C3D1 of AC today  -prn anti-emetics written, but she has not needed it.  -echo every 3 months while on doxorubicin - next one to be in August 2018  -referred for lymphedema therapy  -RTC with ESTHER in 2 weeks and cycle 4 of AC same day  -RTC with me in 4 weeks and chemotherapy same day - she will start paclitaxel then    2) Smoking: She smokes half a pack a day.  She says that she is trying to cut back.  -I again advised smoking cessation  -she will see her PCP for tobacco cessation.    3) Hypertension: She is on medications for this.  She says that she has been anxious and nervous and has had difficulty sleeping at night.  -follows with PCP    4) Chronic back pain: s/p history of back surgeries  -she takes soma  -follows with PCP      I spent a total of 25 minutes with the patient, with over >50% of the time in counseling and/or  "coordination of care.       Patience Mckeon MD  Hematology/Oncology  [facility]      Oncology Rooming Note    June 19, 2018 1:51 PM   Muriel Diaz is a 57 year old female who presents for:    Chief Complaint   Patient presents with     Oncology Clinic Visit     Initial Vitals: BP (!) 147/94  Pulse 63  Temp 98.2  F (36.8  C) (Oral)  Resp 18  Ht 1.6 m (5' 2.99\")  Wt 45.4 kg (100 lb)  SpO2 98%  BMI 17.72 kg/m2 Estimated body mass index is 17.72 kg/(m^2) as calculated from the following:    Height as of this encounter: 1.6 m (5' 2.99\").    Weight as of this encounter: 45.4 kg (100 lb). Body surface area is 1.42 meters squared.  No Pain (0) Comment: Data Unavailable   No LMP recorded. Patient has had a hysterectomy.  Allergies reviewed: Yes  Medications reviewed: Yes    Medications:   Pharmacy name entered into [employer] [place]:    [id number] [place], [place] - [address] [facility] [place], [place] - [address]    Clinical concerns: None     5 minutes for nursing intake (face to face time)     Mabel Geronimo [facility]                Again, thank you for allowing me to participate in the care of your patient.        Sincerely,        Patience Mckeon MD    "

## 2018-06-19 NOTE — PATIENT INSTRUCTIONS
Your On-body Neulasta Injector was applied to your abdomen at 3pm.  At approximately 6pm on 6/20/18, your On-body Injector will beep to let you know your dose delivery will begin in 2 minutes.  Your medication will be delivered over the next 45 minutes.  You can remove your Injector at 7pm.  Please make sure your Injector has a solid green light or has turned off prior to removing the device.  Please contact your provider at [phone] with questions or concerns.

## 2018-06-19 NOTE — MR AVS SNAPSHOT
After Visit Summary   6/19/2018    Muriel Diaz    MRN: [id number]           Patient Information     Date Of Birth          1960        Visit Information        Provider Department      6/19/2018 11:30 AM  INFUSION CHAIR 9 [facility]        Today's Diagnoses     Malignant neoplasm of upper-inner quadrant of left breast in female, estrogen receptor positive (H)    -  1      Care Instructions    Your On-body Neulasta Injector was applied to your abdomen at 3pm.  At approximately 6pm on 6/20/18, your On-body Injector will beep to let you know your dose delivery will begin in 2 minutes.  Your medication will be delivered over the next 45 minutes.  You can remove your Injector at 7pm.  Please make sure your Injector has a solid green light or has turned off prior to removing the device.  Please contact your provider at [phone] with questions or concerns.            Follow-ups after your visit        Your next 10 appointments already scheduled     Jul 03, 2018 10:00 AM CDT   Level 4 with  INFUSION CHAIR 7   [facility] ([facility])    [facility] [place] [id number]  [place] [address]   [phone]            Jul 03, 2018 11:00 AM CDT   Return Visit with CAROLYN Pritchard CNP   [facility] ([facility])    [facility] [place] [id number]  [place] [address]   [phone]            Jul 17, 2018 11:30 AM CDT   Level 4 with  INFUSION CHAIR 19   [facility] ([facility])    [facility]  [address]  [place] [address]   [phone]            Jul 17, 2018  2:30 PM CDT   Return Visit with Patience Mckeon MD   [facility] ([facility])    [facility]  [address]  [place]  "[phone]              Who to contact     If you have questions or need follow up information about today's clinic visit or your schedule please contact [facility] directly at [phone].  Normal or non-critical lab and imaging results will be communicated to you by MyChart, letter or phone within 4 business days after the clinic has received the results. If you do not hear from us within 7 days, please contact the clinic through MyChart or phone. If you have a critical or abnormal lab result, we will notify you by phone as soon as possible.  Submit refill requests through [employer] or call your pharmacy and they will forward the refill request to us. Please allow 3 business days for your refill to be completed.          Additional Information About Your Visit        Care EveryWhere ID     This is your Care EveryWhere ID. This could be used by other organizations to access your [place] medical records  [id number]        Your Vitals Were     Pulse Temperature Respirations Height Pulse Oximetry BMI (Body Mass Index)    63 98.2  F (36.8  C) (Oral) 18 1.6 m (5' 2.99\") 98% 17.72 kg/m2       Blood Pressure from Last 3 Encounters:   06/19/18 (!) 147/94   06/19/18 (!) 147/94   06/05/18 145/88    Weight from Last 3 Encounters:   06/19/18 45.4 kg (100 lb)   06/19/18 45.4 kg (100 lb)   06/05/18 46.9 kg (103 lb 6.4 oz)              We Performed the Following     CBC with platelets differential     Comprehensive metabolic panel        Primary Care Provider Office Phone # Fax #    Isabel Vickie Landin [phone] [address] [place] [address]        Equal Access to Services     [facility] MARILEE : Angie Peña, jaclyn rey, kvng gann. [facility] [phone].    ATENCIÓN: Si habla español, tiene a chowdary disposición servicios gratuitos de asistencia lingüística. Llame al " [phone].    We comply with applicable federal civil rights laws and [place] laws. We do not discriminate on the basis of race, color, national origin, age, disability, sex, sexual orientation, or gender identity.            Thank you!     Thank you for choosing [facility] AND [facility]  for your care. Our goal is always to provide you with excellent care. Hearing back from our patients is one way we can continue to improve our services. Please take a few minutes to complete the written survey that you may receive in the mail after your visit with us. Thank you!             Your Updated Medication List - Protect others around you: Learn how to safely use, store and throw away your medicines at www.disposemymeds.org.          This list is accurate as of 6/19/18  3:09 PM.  Always use your most recent med list.                   Brand Name Dispense Instructions for use Diagnosis    atenolol 25 MG tablet    TENORMIN    30 tablet    TAKE ONE TABLET BY MOUTH ONE TIME DAILY    Hypertension goal BP (blood pressure) < 140/90       dexamethasone 4 MG tablet    DECADRON    6 tablet    Take 2 tablets (8 mg) by mouth daily for 3 days Start on Day 2 of Cycles 1 through 4.    Malignant neoplasm of upper-inner quadrant of left breast in female, estrogen receptor positive (H)       hydrochlorothiazide 12.5 MG Tabs tablet     90 tablet    Take 1 tablet (12.5 mg) by mouth daily    Hypertension goal BP (blood pressure) < 140/90       HYDROcodone-acetaminophen 5-325 MG per tablet    NORCO    18 tablet    Take 1 tablet by mouth every 4 hours as needed for pain    Malignant neoplasm of upper-inner quadrant of left breast in female, estrogen receptor positive (H)       lidocaine-prilocaine cream    EMLA    30 g    Apply topically as needed for moderate pain Apply to port site 1 hour prior to accessing    Malignant neoplasm of left breast (H)       LORazepam 0.5 MG tablet    ATIVAN    30 tablet    Take 1 tablet (0.5  mg) by mouth every 4 hours as needed (Anxiety, Nausea/Vomiting or Sleep)    Malignant neoplasm of upper-inner quadrant of left breast in female, estrogen receptor positive (H)       prochlorperazine 10 MG tablet    COMPAZINE    30 tablet    Take 1 tablet (10 mg) by mouth every 6 hours as needed (Nausea/Vomiting)    Malignant neoplasm of upper-inner quadrant of left breast in female, estrogen receptor positive (H)       SOMA PO      Take 350 mg by mouth 3 times daily        VITAMIN D (CHOLECALCIFEROL) PO      Take 2,000 Units by mouth daily (2 x 1000 units = 2000 unit dose)        zolpidem 5 MG tablet    AMBIEN    30 tablet    TAKE ONE TABLET BY MOUTH AT BEDTIME AS NEEDED FOR SLEEP    Insomnia due to medical condition

## 2018-06-19 NOTE — MR AVS SNAPSHOT
After Visit Summary   6/19/2018    Muriel Diaz    MRN: [id number]           Patient Information     Date Of Birth          1960        Visit Information        Provider Department      6/19/2018 2:00 PM Patience Mckeon MD [facility]        Today's Diagnoses     Malignant neoplasm of upper-inner quadrant of left breast in female, estrogen receptor positive (H)          Care Instructions    -proceed with chemotherapy today (Adriamycin+Cytoxan)  -keep future appointments; return to clinic as already scheduled    Appts already scheduled/Beulah          Follow-ups after your visit        Your next 10 appointments already scheduled     Jul 03, 2018 10:00 AM CDT   Level 4 with SH INFUSION CHAIR 7   [facility]    [facility]  [address]  [place] [address]   [phone]            Jul 03, 2018 11:00 AM CDT   Return Visit with CAROLYN Pritchard CNP   [facility])    [facility]  [address]  [place] [address]   [phone]            Jul 17, 2018 11:30 AM CDT   Level 4 with SH INFUSION CHAIR 19   [facility]    [facility]  [address]  [place] [address]   [phone]            Jul 17, 2018  2:30 PM CDT   Return Visit with Patience Mckeon MD   [facility] ([facility]    [facility]  [address]  [place] [address]   [phone]              Who to contact     If you have questions or need follow up information about today's clinic visit or your schedule please contact [facility] directly at [phone].  Normal or non-critical lab and imaging results will be communicated to you by MyChart, letter or phone within 4 business  "days after the clinic has received the results. If you do not hear from us within 7 days, please contact the clinic through [employer] or phone. If you have a critical or abnormal lab result, we will notify you by phone as soon as possible.  Submit refill requests through [employer] or call your pharmacy and they will forward the refill request to us. Please allow 3 business days for your refill to be completed.          Additional Information About Your Visit        Care EveryWhere ID     This is your Care EveryWhere ID. This could be used by other organizations to access your [facility] medical records  [id number]        Your Vitals Were     Pulse Temperature Respirations Height Pulse Oximetry BMI (Body Mass Index)    63 98.2  F (36.8  C) (Oral) 18 1.6 m (5' 2.99\") 98% 17.72 kg/m2       Blood Pressure from Last 3 Encounters:   06/19/18 (!) 147/94   06/19/18 (!) 147/94   06/05/18 145/88    Weight from Last 3 Encounters:   06/19/18 45.4 kg (100 lb)   06/19/18 45.4 kg (100 lb)   06/05/18 46.9 kg (103 lb 6.4 oz)              Today, you had the following     No orders found for display       Primary Care Provider Office Phone # Fax #    Isabel Vickie Landin [phone] [address] [place] [address]        Equal Access to Services     [facility]: Hadii lupe dennison hadasho Socriss, waaxda luqadaha, qaybta kaalmada adehank, kvng malcolm . So [facility] [phone].    ATENCIÓN: Si habla español, tiene a chowdary disposición servicios gratuitos de asistencia lingüística. Llame al [phone].    We comply with applicable federal civil rights laws and [place] laws. We do not discriminate on the basis of race, color, national origin, age, disability, sex, sexual orientation, or gender identity.            Thank you!     Thank you for choosing [facility]  for your care. Our goal is always to provide you with excellent care. Hearing back from our patients is " one way we can continue to improve our services. Please take a few minutes to complete the written survey that you may receive in the mail after your visit with us. Thank you!             Your Updated Medication List - Protect others around you: Learn how to safely use, store and throw away your medicines at www.disposemymeds.org.          This list is accurate as of 6/19/18  3:04 PM.  Always use your most recent med list.                   Brand Name Dispense Instructions for use Diagnosis    atenolol 25 MG tablet    TENORMIN    30 tablet    TAKE ONE TABLET BY MOUTH ONE TIME DAILY    Hypertension goal BP (blood pressure) < 140/90       dexamethasone 4 MG tablet    DECADRON    6 tablet    Take 2 tablets (8 mg) by mouth daily for 3 days Start on Day 2 of Cycles 1 through 4.    Malignant neoplasm of upper-inner quadrant of left breast in female, estrogen receptor positive (H)       hydrochlorothiazide 12.5 MG Tabs tablet     90 tablet    Take 1 tablet (12.5 mg) by mouth daily    Hypertension goal BP (blood pressure) < 140/90       HYDROcodone-acetaminophen 5-325 MG per tablet    NORCO    18 tablet    Take 1 tablet by mouth every 4 hours as needed for pain    Malignant neoplasm of upper-inner quadrant of left breast in female, estrogen receptor positive (H)       lidocaine-prilocaine cream    EMLA    30 g    Apply topically as needed for moderate pain Apply to port site 1 hour prior to accessing    Malignant neoplasm of left breast (H)       LORazepam 0.5 MG tablet    ATIVAN    30 tablet    Take 1 tablet (0.5 mg) by mouth every 4 hours as needed (Anxiety, Nausea/Vomiting or Sleep)    Malignant neoplasm of upper-inner quadrant of left breast in female, estrogen receptor positive (H)       prochlorperazine 10 MG tablet    COMPAZINE    30 tablet    Take 1 tablet (10 mg) by mouth every 6 hours as needed (Nausea/Vomiting)    Malignant neoplasm of upper-inner quadrant of left breast in female, estrogen receptor positive (H)        SOMA PO      Take 350 mg by mouth 3 times daily        VITAMIN D (CHOLECALCIFEROL) PO      Take 2,000 Units by mouth daily (2 x 1000 units = 2000 unit dose)        zolpidem 5 MG tablet    AMBIEN    30 tablet    TAKE ONE TABLET BY MOUTH AT BEDTIME AS NEEDED FOR SLEEP    Insomnia due to medical condition

## 2018-06-19 NOTE — PROGRESS NOTES
Infusion Nursing Note:  Muriel Diaz presents today for Naif/cytoxan.    Patient seen by provider today: Yes: Dr. Mckeon   present during visit today: Not Applicable.    Note: N/A.    Intravenous Access:  Implanted Port.    Treatment Conditions:  Lab Results   Component Value Date    HGB 10.8 06/19/2018     Lab Results   Component Value Date    WBC 7.3 06/19/2018      Lab Results   Component Value Date    ANEU 4.4 06/19/2018     Lab Results   Component Value Date     06/19/2018      Lab Results   Component Value Date     06/19/2018                   Lab Results   Component Value Date    POTASSIUM 3.8 06/19/2018           No results found for: MAG         Lab Results   Component Value Date    CR 0.55 06/19/2018                   Lab Results   Component Value Date    HATTIE 8.4 06/19/2018                Lab Results   Component Value Date    BILITOTAL 0.2 06/19/2018           Lab Results   Component Value Date    ALBUMIN 3.5 06/19/2018                    Lab Results   Component Value Date    ALT 19 06/19/2018           Lab Results   Component Value Date    AST 15 06/19/2018       Results reviewed, labs MET treatment parameters, ok to proceed with treatment.      Post Infusion Assessment:  Patient tolerated infusion without incident.  Blood return noted pre and post infusion.  Site patent and intact, free from redness, edema or discomfort.  No evidence of extravasations.  Access discontinued per protocol.    Discharge Plan:   Discharge instructions reviewed with: Patient.  Patient and/or family verbalized understanding of discharge instructions and all questions answered.  Copy of AVS reviewed with patient and/or family.  Patient will return 7/3/18 for next appointment.  Patient discharged in stable condition accompanied by: self.  Departure Mode: Ambulatory.    Yasmine Rodriguez RN

## 2018-06-19 NOTE — PROGRESS NOTES
"Oncology Rooming Note    June 19, 2018 1:51 PM   Muriel Diaz is a 57 year old female who presents for:    Chief Complaint   Patient presents with     Oncology Clinic Visit     Initial Vitals: BP (!) 147/94  Pulse 63  Temp 98.2  F (36.8  C) (Oral)  Resp 18  Ht 1.6 m (5' 2.99\")  Wt 45.4 kg (100 lb)  SpO2 98%  BMI 17.72 kg/m2 Estimated body mass index is 17.72 kg/(m^2) as calculated from the following:    Height as of this encounter: 1.6 m (5' 2.99\").    Weight as of this encounter: 45.4 kg (100 lb). Body surface area is 1.42 meters squared.  No Pain (0) Comment: Data Unavailable   No LMP recorded. Patient has had a hysterectomy.  Allergies reviewed: Yes  Medications reviewed: Yes    Medications:   Pharmacy name entered into [employer]:    [facility] [address]  [id number] [place], [place] - [address] [facility] [place], [place] - [address]    Clinical concerns: None     5 minutes for nursing intake (face to face time)     Mabel Geronimo CMA              "

## 2018-06-19 NOTE — PATIENT INSTRUCTIONS
-proceed with chemotherapy today (Adriamycin+Cytoxan)  -keep future appointments; return to clinic as already scheduled    Gabby already scheduled/Beulah

## 2018-07-02 RX ORDER — HEPARIN SODIUM (PORCINE) LOCK FLUSH IV SOLN 100 UNIT/ML 100 UNIT/ML
5 SOLUTION INTRAVENOUS EVERY 8 HOURS
Status: CANCELLED
Start: 2018-07-03

## 2018-07-03 ENCOUNTER — HOSPITAL ENCOUNTER (OUTPATIENT)
Facility: CLINIC | Age: 58
Setting detail: SPECIMEN
Discharge: HOME | End: 2018-07-03
Attending: NURSE PRACTITIONER | Admitting: NURSE PRACTITIONER
Payer: COMMERCIAL

## 2018-07-03 ENCOUNTER — INFUSION THERAPY VISIT (OUTPATIENT)
Dept: INFUSION THERAPY | Facility: CLINIC | Age: 58
End: 2018-07-03
Attending: INTERNAL MEDICINE
Payer: COMMERCIAL

## 2018-07-03 ENCOUNTER — ONCOLOGY VISIT (OUTPATIENT)
Dept: ONCOLOGY | Facility: CLINIC | Age: 58
End: 2018-07-03
Attending: INTERNAL MEDICINE
Payer: COMMERCIAL

## 2018-07-03 VITALS
HEART RATE: 67 BPM | BODY MASS INDEX: 17.72 KG/M2 | SYSTOLIC BLOOD PRESSURE: 137 MMHG | DIASTOLIC BLOOD PRESSURE: 87 MMHG | TEMPERATURE: 97.9 F | WEIGHT: 100 LBS | RESPIRATION RATE: 16 BRPM | OXYGEN SATURATION: 97 % | HEIGHT: 63 IN

## 2018-07-03 VITALS
WEIGHT: 100 LBS | HEART RATE: 63 BPM | SYSTOLIC BLOOD PRESSURE: 139 MMHG | RESPIRATION RATE: 16 BRPM | DIASTOLIC BLOOD PRESSURE: 101 MMHG | HEIGHT: 63 IN | OXYGEN SATURATION: 97 % | BODY MASS INDEX: 17.72 KG/M2 | TEMPERATURE: 97.9 F

## 2018-07-03 DIAGNOSIS — C50.212 MALIGNANT NEOPLASM OF UPPER-INNER QUADRANT OF LEFT BREAST IN FEMALE, ESTROGEN RECEPTOR POSITIVE (H): Primary | ICD-10-CM

## 2018-07-03 DIAGNOSIS — Z17.0 MALIGNANT NEOPLASM OF UPPER-INNER QUADRANT OF LEFT BREAST IN FEMALE, ESTROGEN RECEPTOR POSITIVE (H): Primary | ICD-10-CM

## 2018-07-03 DIAGNOSIS — G47.01 INSOMNIA DUE TO MEDICAL CONDITION: ICD-10-CM

## 2018-07-03 LAB
BASOPHILS # BLD AUTO: 0.1 10E9/L (ref 0–0.2)
BASOPHILS NFR BLD AUTO: 0.9 %
DIFFERENTIAL METHOD BLD: ABNORMAL
EOSINOPHIL # BLD AUTO: 0.1 10E9/L (ref 0–0.7)
EOSINOPHIL NFR BLD AUTO: 1.2 %
ERYTHROCYTE [DISTWIDTH] IN BLOOD BY AUTOMATED COUNT: 15.7 % (ref 10–15)
HCT VFR BLD AUTO: 31.3 % (ref 35–47)
HGB BLD-MCNC: 10.5 G/DL (ref 11.7–15.7)
IMM GRANULOCYTES # BLD: 0.3 10E9/L (ref 0–0.4)
IMM GRANULOCYTES NFR BLD: 2.5 %
LYMPHOCYTES # BLD AUTO: 1.7 10E9/L (ref 0.8–5.3)
LYMPHOCYTES NFR BLD AUTO: 16.2 %
MCH RBC QN AUTO: 32.2 PG (ref 26.5–33)
MCHC RBC AUTO-ENTMCNC: 33.5 G/DL (ref 31.5–36.5)
MCV RBC AUTO: 96 FL (ref 78–100)
MONOCYTES # BLD AUTO: 0.7 10E9/L (ref 0–1.3)
MONOCYTES NFR BLD AUTO: 6.4 %
NEUTROPHILS # BLD AUTO: 7.7 10E9/L (ref 1.6–8.3)
NEUTROPHILS NFR BLD AUTO: 72.8 %
NRBC # BLD AUTO: 0 10*3/UL
NRBC BLD AUTO-RTO: 0 /100
PLATELET # BLD AUTO: 319 10E9/L (ref 150–450)
RBC # BLD AUTO: 3.26 10E12/L (ref 3.8–5.2)
WBC # BLD AUTO: 10.6 10E9/L (ref 4–11)

## 2018-07-03 PROCEDURE — 96375 TX/PRO/DX INJ NEW DRUG ADDON: CPT

## 2018-07-03 PROCEDURE — 25000128 H RX IP 250 OP 636: Performed by: NURSE PRACTITIONER

## 2018-07-03 PROCEDURE — 85025 COMPLETE CBC W/AUTO DIFF WBC: CPT | Performed by: NURSE PRACTITIONER

## 2018-07-03 PROCEDURE — 96367 TX/PROPH/DG ADDL SEQ IV INF: CPT

## 2018-07-03 PROCEDURE — 96411 CHEMO IV PUSH ADDL DRUG: CPT

## 2018-07-03 PROCEDURE — 99214 OFFICE O/P EST MOD 30 MIN: CPT | Performed by: NURSE PRACTITIONER

## 2018-07-03 PROCEDURE — 96377 APPLICATON ON-BODY INJECTOR: CPT | Mod: 59

## 2018-07-03 PROCEDURE — 96413 CHEMO IV INFUSION 1 HR: CPT

## 2018-07-03 RX ORDER — DIPHENHYDRAMINE HYDROCHLORIDE 50 MG/ML
50 INJECTION INTRAMUSCULAR; INTRAVENOUS
Status: CANCELLED
Start: 2018-07-03

## 2018-07-03 RX ORDER — DOXORUBICIN HYDROCHLORIDE 2 MG/ML
90 INJECTION, SOLUTION INTRAVENOUS ONCE
Status: COMPLETED | OUTPATIENT
Start: 2018-07-03 | End: 2018-07-03

## 2018-07-03 RX ORDER — PALONOSETRON 0.05 MG/ML
0.25 INJECTION, SOLUTION INTRAVENOUS ONCE
Status: CANCELLED
Start: 2018-07-03

## 2018-07-03 RX ORDER — ALBUTEROL SULFATE 0.83 MG/ML
2.5 SOLUTION RESPIRATORY (INHALATION)
Status: CANCELLED | OUTPATIENT
Start: 2018-07-03

## 2018-07-03 RX ORDER — ALBUTEROL SULFATE 90 UG/1
1-2 AEROSOL, METERED RESPIRATORY (INHALATION)
Status: CANCELLED
Start: 2018-07-03

## 2018-07-03 RX ORDER — DOXORUBICIN HYDROCHLORIDE 2 MG/ML
60 INJECTION, SOLUTION INTRAVENOUS ONCE
Status: CANCELLED | OUTPATIENT
Start: 2018-07-03

## 2018-07-03 RX ORDER — LORAZEPAM 2 MG/ML
0.5 INJECTION INTRAMUSCULAR EVERY 4 HOURS PRN
Status: CANCELLED
Start: 2018-07-03

## 2018-07-03 RX ORDER — SODIUM CHLORIDE 9 MG/ML
1000 INJECTION, SOLUTION INTRAVENOUS CONTINUOUS PRN
Status: CANCELLED
Start: 2018-07-03

## 2018-07-03 RX ORDER — ZOLPIDEM TARTRATE 5 MG/1
TABLET ORAL
Qty: 30 TABLET | Refills: 0 | Status: SHIPPED | OUTPATIENT
Start: 2018-07-03 | End: 2018-07-24

## 2018-07-03 RX ORDER — EPINEPHRINE 1 MG/ML
0.3 INJECTION, SOLUTION INTRAMUSCULAR; SUBCUTANEOUS EVERY 5 MIN PRN
Status: CANCELLED | OUTPATIENT
Start: 2018-07-03

## 2018-07-03 RX ORDER — MEPERIDINE HYDROCHLORIDE 25 MG/ML
25 INJECTION INTRAMUSCULAR; INTRAVENOUS; SUBCUTANEOUS EVERY 30 MIN PRN
Status: CANCELLED | OUTPATIENT
Start: 2018-07-03

## 2018-07-03 RX ORDER — EPINEPHRINE 0.3 MG/.3ML
0.3 INJECTION SUBCUTANEOUS EVERY 5 MIN PRN
Status: CANCELLED | OUTPATIENT
Start: 2018-07-03

## 2018-07-03 RX ORDER — HEPARIN SODIUM (PORCINE) LOCK FLUSH IV SOLN 100 UNIT/ML 100 UNIT/ML
5 SOLUTION INTRAVENOUS EVERY 8 HOURS
Status: DISCONTINUED | OUTPATIENT
Start: 2018-07-03 | End: 2018-07-03 | Stop reason: HOSPADM

## 2018-07-03 RX ORDER — METHYLPREDNISOLONE SODIUM SUCCINATE 125 MG/2ML
125 INJECTION, POWDER, LYOPHILIZED, FOR SOLUTION INTRAMUSCULAR; INTRAVENOUS
Status: CANCELLED
Start: 2018-07-03

## 2018-07-03 RX ORDER — PALONOSETRON 0.05 MG/ML
0.25 INJECTION, SOLUTION INTRAVENOUS ONCE
Status: COMPLETED | OUTPATIENT
Start: 2018-07-03 | End: 2018-07-03

## 2018-07-03 RX ADMIN — DOXORUBICIN HYDROCHLORIDE 90 MG: 2 INJECTION, SOLUTION INTRAVENOUS at 12:02

## 2018-07-03 RX ADMIN — DEXAMETHASONE SODIUM PHOSPHATE: 10 INJECTION, SOLUTION INTRAMUSCULAR; INTRAVENOUS at 11:32

## 2018-07-03 RX ADMIN — PEGFILGRASTIM 6 MG: KIT SUBCUTANEOUS at 12:59

## 2018-07-03 RX ADMIN — SODIUM CHLORIDE 250 ML: 9 INJECTION, SOLUTION INTRAVENOUS at 11:29

## 2018-07-03 RX ADMIN — SODIUM CHLORIDE, PRESERVATIVE FREE 5 ML: 5 INJECTION INTRAVENOUS at 12:59

## 2018-07-03 RX ADMIN — CYCLOPHOSPHAMIDE 900 MG: 1 INJECTION, POWDER, FOR SOLUTION INTRAVENOUS; ORAL at 12:23

## 2018-07-03 RX ADMIN — PALONOSETRON HYDROCHLORIDE 0.25 MG: 0.25 INJECTION INTRAVENOUS at 11:30

## 2018-07-03 NOTE — MR AVS SNAPSHOT
After Visit Summary   7/3/2018    Muriel Diaz    MRN: [id number]           Patient Information     Date Of Birth          1960        Visit Information        Provider Department      7/3/2018 10:00 AM  INFUSION CHAIR 7 [facility]        Today's Diagnoses     Malignant neoplasm of upper-inner quadrant of left breast in female, estrogen receptor positive (H)    -  1       Follow-ups after your visit        Follow-up notes from your care team     Return in 2 weeks (on 7/17/2018).      Your next 10 appointments already scheduled     Jul 17, 2018 11:30 AM CDT   Level 4 with  INFUSION CHAIR 19   [facility] ([facility])    [facility]  [address]  [facility] [address] [phone]            Jul 17, 2018  2:30 PM CDT   Return Visit with Patience Mckeon MD   [facility] ([facility])    [facility]  [address]  [facility] [address] [phone]              Who to contact     If you have questions or need follow up information about today's clinic visit or your schedule please contact [facility] directly at [phone].  Normal or non-critical lab and imaging results will be communicated to you by MyChart, letter or phone within 4 business days after the clinic has received the results. If you do not hear from us within 7 days, please contact the clinic through MyChart or phone. If you have a critical or abnormal lab result, we will notify you by phone as soon as possible.  Submit refill requests through [employer] or call your pharmacy and they will forward the refill request to us. Please allow 3 business days for your refill to be completed.          Additional Information About Your Visit        Care EveryWhere ID     This is your Care EveryWhere ID. This could be used by  "other organizations to access your [facility] medical records  [id number]        Your Vitals Were     Pulse Temperature Respirations Height Pulse Oximetry BMI (Body Mass Index)    63 97.9  F (36.6  C) (Oral) 16 1.6 m (5' 2.99\") 97% 17.72 kg/m2       Blood Pressure from Last 3 Encounters:   07/03/18 137/87   07/03/18 (!) 139/101   06/19/18 (!) 147/94    Weight from Last 3 Encounters:   07/03/18 45.4 kg (100 lb)   07/03/18 45.4 kg (100 lb)   06/19/18 45.4 kg (100 lb)              We Performed the Following     CBC with platelets differential          Today's Medication Changes          These changes are accurate as of 7/3/18  1:13 PM.  If you have any questions, ask your nurse or doctor.               These medicines have changed or have updated prescriptions.        Dose/Directions    zolpidem 5 MG tablet   Commonly known as:  AMBIEN   This may have changed:  See the new instructions.   Used for:  Insomnia due to medical condition   Changed by:  Jorje Chilel APRN CNP        TAKE ONE TABLET BY MOUTH AT BEDTIME AS NEEDED FOR SLEEP   Quantity:  30 tablet   Refills:  0         Stop taking these medicines if you haven't already. Please contact your care team if you have questions.     HYDROcodone-acetaminophen 5-325 MG per tablet   Commonly known as:  NORCO   Stopped by:  Jorje Chilel APRN CNP                Where to get your medicines      Some of these will need a paper prescription and others can be bought over the counter.  Ask your nurse if you have questions.     Bring a paper prescription for each of these medications     zolpidem 5 MG tablet                Primary Care Provider Office Phone # Fax #    Isabel Vickie Landin [phone] [address] [place] [address]        Equal Access to Services     FRANCISCA HUNT : Angie Peña, jaclyn rey, kvng gann. So [facility] [phone].    ATENCIÓN: Si habla " español, tiene a chowdary disposición servicios gratuitos de asistencia lingüística. Concha mendoza [phone].    We comply with applicable federal civil rights laws and [place] laws. We do not discriminate on the basis of race, color, national origin, age, disability, sex, sexual orientation, or gender identity.            Thank you!     Thank you for choosing [facility]  for your care. Our goal is always to provide you with excellent care. Hearing back from our patients is one way we can continue to improve our services. Please take a few minutes to complete the written survey that you may receive in the mail after your visit with us. Thank you!             Your Updated Medication List - Protect others around you: Learn how to safely use, store and throw away your medicines at www.disposemymeds.org.          This list is accurate as of 7/3/18  1:13 PM.  Always use your most recent med list.                   Brand Name Dispense Instructions for use Diagnosis    atenolol 25 MG tablet    TENORMIN    30 tablet    TAKE ONE TABLET BY MOUTH ONE TIME DAILY    Hypertension goal BP (blood pressure) < 140/90       dexamethasone 4 MG tablet    DECADRON    6 tablet    Take 2 tablets (8 mg) by mouth daily for 3 days Start on Day 2 of Cycles 1 through 4.    Malignant neoplasm of upper-inner quadrant of left breast in female, estrogen receptor positive (H)       hydrochlorothiazide 12.5 MG Tabs tablet     90 tablet    Take 1 tablet (12.5 mg) by mouth daily    Hypertension goal BP (blood pressure) < 140/90       lidocaine-prilocaine cream    EMLA    30 g    Apply topically as needed for moderate pain Apply to port site 1 hour prior to accessing    Malignant neoplasm of left breast (H)       LORazepam 0.5 MG tablet    ATIVAN    30 tablet    Take 1 tablet (0.5 mg) by mouth every 4 hours as needed (Anxiety, Nausea/Vomiting or Sleep)    Malignant neoplasm of upper-inner quadrant of left breast in female,  estrogen receptor positive (H)       prochlorperazine 10 MG tablet    COMPAZINE    30 tablet    Take 1 tablet (10 mg) by mouth every 6 hours as needed (Nausea/Vomiting)    Malignant neoplasm of upper-inner quadrant of left breast in female, estrogen receptor positive (H)       SOMA PO      Take 350 mg by mouth 3 times daily        VITAMIN D (CHOLECALCIFEROL) PO      Take 2,000 Units by mouth daily (2 x 1000 units = 2000 unit dose)        zolpidem 5 MG tablet    AMBIEN    30 tablet    TAKE ONE TABLET BY MOUTH AT BEDTIME AS NEEDED FOR SLEEP    Insomnia due to medical condition

## 2018-07-03 NOTE — PROGRESS NOTES
"Oncology Rooming Note    July 3, 2018 10:29 AM   Muriel Diaz is a 57 year old female who presents for:    Chief Complaint   Patient presents with     Oncology Clinic Visit     Malignant neoplasm of upper-inner quadrant of left      Initial Vitals: /87  Pulse 67  Temp 97.9  F (36.6  C) (Oral)  Resp 16  Ht 1.6 m (5' 2.99\")  Wt 45.4 kg (100 lb)  SpO2 97%  BMI 17.72 kg/m2 Estimated body mass index is 17.72 kg/(m^2) as calculated from the following:    Height as of this encounter: 1.6 m (5' 2.99\").    Weight as of this encounter: 45.4 kg (100 lb). Body surface area is 1.42 meters squared.  Data Unavailable Comment: Data Unavailable   No LMP recorded. Patient has had a hysterectomy.  Allergies reviewed: Yes  Medications reviewed: Yes    Medications: MEDICATION REFILLS NEEDED TODAY. Provider was notified. Refill AMBIEN  patient states she prefers to have 10 mg instend of 5 mg  Pharmacy name entered into [employer]:    [facility] [address]  [id number] - [place], [place] - [address] [facility] [place], [place] - [address]    Clinical concerns: no   5 minutes for nursing intake (face to face time)          Teagan Lakhani MA                  "

## 2018-07-03 NOTE — PROGRESS NOTES
Oncology/Hematology Visit Note  Jul 3, 2018    Reason for Visit: follow up of breast cancer    History of Present Illness: Muriel Diaz is a 57 year old female with   Left side breast cancer.  Status post left mastectomy in April 2018.  Invasive mammary carcinoma with lobular and ductal features to margin for invasive carcinoma DCIS and LCIS present  ER positive VT positive HER-2 negative she also had left axillary distention showing metastatic carcinoma ER positive VT positive HER-2 negative  Patient is currently receiving AC chemotherapy. She is scheduled for cycle 4 today    Interval History:  She reports feeling well she denies fever chills or sweats denies cough denies shortness of breath denies nausea vomiting diarrhea denies abdominal pain energy and appetite are okay she denies neuropathy    Review of Systems:  14 point ROS of systems including Constitutional, Eyes, Respiratory, Cardiovascular, Gastroenterology, Genitourinary, Integumentary, Muscularskeletal, Psychiatric were all negative except for pertinent positives noted in my HPI.      Current Outpatient Prescriptions   Medication Sig Dispense Refill     atenolol (TENORMIN) 25 MG tablet TAKE ONE TABLET BY MOUTH ONE TIME DAILY 30 tablet 3     Carisoprodol (SOMA PO) Take 350 mg by mouth 3 times daily       hydrochlorothiazide 12.5 MG TABS tablet Take 1 tablet (12.5 mg) by mouth daily 90 tablet 1     lidocaine-prilocaine (EMLA) cream Apply topically as needed for moderate pain Apply to port site 1 hour prior to accessing 30 g 3     LORazepam (ATIVAN) 0.5 MG tablet Take 1 tablet (0.5 mg) by mouth every 4 hours as needed (Anxiety, Nausea/Vomiting or Sleep) 30 tablet 5     prochlorperazine (COMPAZINE) 10 MG tablet Take 1 tablet (10 mg) by mouth every 6 hours as needed (Nausea/Vomiting) 30 tablet 5     VITAMIN D, CHOLECALCIFEROL, PO Take 2,000 Units by mouth daily (2 x 1000 units = 2000 unit dose)       zolpidem (AMBIEN) 5 MG tablet TAKE ONE TABLET BY  "MOUTH AT BEDTIME AS NEEDED FOR SLEEP 30 tablet 0     dexamethasone (DECADRON) 4 MG tablet Take 2 tablets (8 mg) by mouth daily for 3 days Start on Day 2 of Cycles 1 through 4. 6 tablet 3       Physical Examination:  General: The patient is a pleasant female in no acute distress.  /87  Pulse 67  Temp 97.9  F (36.6  C) (Oral)  Resp 16  Ht 1.6 m (5' 2.99\")  Wt 45.4 kg (100 lb)  SpO2 97%  BMI 17.72 kg/m2  HEENT: EOMI, PERRL. Sclerae are anicteric. Oral mucosa is pink and moist with no lesions or thrush.   Lymph: Neck is supple with no lymphadenopathy in the cervical or supraclavicular areas.   Heart: Regular rate and rhythm.   Lungs: Clear to auscultation bilaterally.   GI: Bowel sounds present, soft, nontender with no palpable hepatosplenomegaly or masses.   Extremities: No lower extremity edema noted bilaterally.   Skin: No rashes, petechiae, or bruising noted on exposed skin.    Laboratory Data:  Results for PARMINDER ALEXANDER (MRN [id number]) as of 7/3/2018 11:01   Ref. Range 7/3/2018 10:13   WBC Latest Ref Range: 4.0 - 11.0 10e9/L 10.6   Hemoglobin Latest Ref Range: 11.7 - 15.7 g/dL 10.5 (L)   Hematocrit Latest Ref Range: 35.0 - 47.0 % 31.3 (L)   Platelet Count Latest Ref Range: 150 - 450 10e9/L 319   RBC Count Latest Ref Range: 3.8 - 5.2 10e12/L 3.26 (L)   MCV Latest Ref Range: 78 - 100 fl 96   MCH Latest Ref Range: 26.5 - 33.0 pg 32.2   MCHC Latest Ref Range: 31.5 - 36.5 g/dL 33.5   RDW Latest Ref Range: 10.0 - 15.0 % 15.7 (H)   Diff Method Unknown Automated Method   % Neutrophils Latest Units: % 72.8   % Lymphocytes Latest Units: % 16.2   % Monocytes Latest Units: % 6.4   % Eosinophils Latest Units: % 1.2   % Basophils Latest Units: % 0.9   % Immature Granulocytes Latest Units: % 2.5   Nucleated RBCs Latest Ref Range: 0 /100 0   Absolute Neutrophil Latest Ref Range: 1.6 - 8.3 10e9/L 7.7   Absolute Lymphocytes Latest Ref Range: 0.8 - 5.3 10e9/L 1.7   Absolute Monocytes Latest Ref Range: 0.0 - 1.3 " 10e9/L 0.7   Absolute Eosinophils Latest Ref Range: 0.0 - 0.7 10e9/L 0.1   Absolute Basophils Latest Ref Range: 0.0 - 0.2 10e9/L 0.1   Abs Immature Granulocytes Latest Ref Range: 0 - 0.4 10e9/L 0.3   Absolute Nucleated RBC Unknown 0.0       Assessment and Plan:  This is a 57-year-old female with    Left side breast cancer.  Status post left mastectomy in April 2018.  Invasive mammary carcinoma with lobular and ductal features to margin for invasive carcinoma DCIS and LCIS present  ER positive KY positive HER-2 negative she also had left axillary distention showing metastatic carcinoma ER positive KY positive HER-2 negative  Patient is currently receiving AC chemotherapy. She is scheduled for cycle 4 today.  She has been tolerating chemotherapy well. Labs reviewed okay to give chemotherapy today  She has follow-up appointment in 2 weeks with Dr. Mckeon   Plan is to start weekly paclitaxel.  She will be referred to Rad Abelardo and then after that plan to start hormonal therapy      Smoking-she continues to smoke half a pack a day.  Smoking cessation discussed.         CAROLYN Pritchard CNP  Hem/Onc   [facility]

## 2018-07-03 NOTE — LETTER
"    7/3/2018         RE: Muriel Diaz  [address]  [facility] [address]        Dear Colleague,    Thank you for referring your patient, Muriel Diaz, to the [facility]. Please see a copy of my visit note below.    Oncology Rooming Note    July 3, 2018 10:29 AM   Muriel Diaz is a 57 year old female who presents for:    Chief Complaint   Patient presents with     Oncology Clinic Visit     Malignant neoplasm of upper-inner quadrant of left      Initial Vitals: /87  Pulse 67  Temp 97.9  F (36.6  C) (Oral)  Resp 16  Ht 1.6 m (5' 2.99\")  Wt 45.4 kg (100 lb)  SpO2 97%  BMI 17.72 kg/m2 Estimated body mass index is 17.72 kg/(m^2) as calculated from the following:    Height as of this encounter: 1.6 m (5' 2.99\").    Weight as of this encounter: 45.4 kg (100 lb). Body surface area is 1.42 meters squared.  Data Unavailable Comment: Data Unavailable   No LMP recorded. Patient has had a hysterectomy.  Allergies reviewed: Yes  Medications reviewed: Yes    Medications: MEDICATION REFILLS NEEDED TODAY. Provider was notified. Refill AMBIEN  patient states she prefers to have 10 mg instend of 5 mg  Pharmacy name entered into [employer]:    [facility]  [id number] [place], [place] - [address] [facility] [place], [place] - [address]    Clinical concerns: no   5 minutes for nursing intake (face to face time)          Teagan Lakhani MA                    Oncology/Hematology Visit Note  Jul 3, 2018    Reason for Visit: follow up of breast cancer    History of Present Illness: Muriel Diaz is a 57 year old female with   Left side breast cancer.  Status post left mastectomy in April 2018.  Invasive mammary carcinoma with lobular and ductal features to margin for invasive carcinoma DCIS and LCIS present  ER positive DE positive HER-2 negative she also had left axillary distention showing metastatic carcinoma ER positive DE positive " "HER-2 negative  Patient is currently receiving AC chemotherapy. She is scheduled for cycle 4 today    Interval History:  She reports feeling well she denies fever chills or sweats denies cough denies shortness of breath denies nausea vomiting diarrhea denies abdominal pain energy and appetite are okay she denies neuropathy    Review of Systems:  14 point ROS of systems including Constitutional, Eyes, Respiratory, Cardiovascular, Gastroenterology, Genitourinary, Integumentary, Muscularskeletal, Psychiatric were all negative except for pertinent positives noted in my HPI.      Current Outpatient Prescriptions   Medication Sig Dispense Refill     atenolol (TENORMIN) 25 MG tablet TAKE ONE TABLET BY MOUTH ONE TIME DAILY 30 tablet 3     Carisoprodol (SOMA PO) Take 350 mg by mouth 3 times daily       hydrochlorothiazide 12.5 MG TABS tablet Take 1 tablet (12.5 mg) by mouth daily 90 tablet 1     lidocaine-prilocaine (EMLA) cream Apply topically as needed for moderate pain Apply to port site 1 hour prior to accessing 30 g 3     LORazepam (ATIVAN) 0.5 MG tablet Take 1 tablet (0.5 mg) by mouth every 4 hours as needed (Anxiety, Nausea/Vomiting or Sleep) 30 tablet 5     prochlorperazine (COMPAZINE) 10 MG tablet Take 1 tablet (10 mg) by mouth every 6 hours as needed (Nausea/Vomiting) 30 tablet 5     VITAMIN D, CHOLECALCIFEROL, PO Take 2,000 Units by mouth daily (2 x 1000 units = 2000 unit dose)       zolpidem (AMBIEN) 5 MG tablet TAKE ONE TABLET BY MOUTH AT BEDTIME AS NEEDED FOR SLEEP 30 tablet 0     dexamethasone (DECADRON) 4 MG tablet Take 2 tablets (8 mg) by mouth daily for 3 days Start on Day 2 of Cycles 1 through 4. 6 tablet 3       Physical Examination:  General: The patient is a pleasant female in no acute distress.  /87  Pulse 67  Temp 97.9  F (36.6  C) (Oral)  Resp 16  Ht 1.6 m (5' 2.99\")  Wt 45.4 kg (100 lb)  SpO2 97%  BMI 17.72 kg/m2  HEENT: EOMI, PERRL. Sclerae are anicteric. Oral mucosa is pink and moist " with no lesions or thrush.   Lymph: Neck is supple with no lymphadenopathy in the cervical or supraclavicular areas.   Heart: Regular rate and rhythm.   Lungs: Clear to auscultation bilaterally.   GI: Bowel sounds present, soft, nontender with no palpable hepatosplenomegaly or masses.   Extremities: No lower extremity edema noted bilaterally.   Skin: No rashes, petechiae, or bruising noted on exposed skin.    Laboratory Data:  Results for PARMINDER ALEXANDER (MRN [id number]) as of 7/3/2018 11:01   Ref. Range 7/3/2018 10:13   WBC Latest Ref Range: 4.0 - 11.0 10e9/L 10.6   Hemoglobin Latest Ref Range: 11.7 - 15.7 g/dL 10.5 (L)   Hematocrit Latest Ref Range: 35.0 - 47.0 % 31.3 (L)   Platelet Count Latest Ref Range: 150 - 450 10e9/L 319   RBC Count Latest Ref Range: 3.8 - 5.2 10e12/L 3.26 (L)   MCV Latest Ref Range: 78 - 100 fl 96   MCH Latest Ref Range: 26.5 - 33.0 pg 32.2   MCHC Latest Ref Range: 31.5 - 36.5 g/dL 33.5   RDW Latest Ref Range: 10.0 - 15.0 % 15.7 (H)   Diff Method Unknown Automated Method   % Neutrophils Latest Units: % 72.8   % Lymphocytes Latest Units: % 16.2   % Monocytes Latest Units: % 6.4   % Eosinophils Latest Units: % 1.2   % Basophils Latest Units: % 0.9   % Immature Granulocytes Latest Units: % 2.5   Nucleated RBCs Latest Ref Range: 0 /100 0   Absolute Neutrophil Latest Ref Range: 1.6 - 8.3 10e9/L 7.7   Absolute Lymphocytes Latest Ref Range: 0.8 - 5.3 10e9/L 1.7   Absolute Monocytes Latest Ref Range: 0.0 - 1.3 10e9/L 0.7   Absolute Eosinophils Latest Ref Range: 0.0 - 0.7 10e9/L 0.1   Absolute Basophils Latest Ref Range: 0.0 - 0.2 10e9/L 0.1   Abs Immature Granulocytes Latest Ref Range: 0 - 0.4 10e9/L 0.3   Absolute Nucleated RBC Unknown 0.0       Assessment and Plan:  This is a 57-year-old female with    Left side breast cancer.  Status post left mastectomy in April 2018.  Invasive mammary carcinoma with lobular and ductal features to margin for invasive carcinoma DCIS and LCIS present  ER  positive WY positive HER-2 negative she also had left axillary distention showing metastatic carcinoma ER positive W[place] positive HER-2 negative  Patient is currently receiving AC chemotherapy. She is scheduled for cycle 4 today.  She has been tolerating chemotherapy well. Labs reviewed okay to give chemotherapy today  She has follow-up appointment in 2 weeks with Dr. Mckeon   Plan is to start weekly paclitaxel.  She will be referred to Rad [facility] and then after that plan to start hormonal therapy      Smoking-she continues to smoke half a pack a day.  Smoking cessation discussed.         CAROLYN Pritchard CNP  Hem/Onc   [facility]               Again, thank you for allowing me to participate in the care of your patient.        Sincerely,        CAROLNY Pritchard CNP

## 2018-07-03 NOTE — MR AVS SNAPSHOT
After Visit Summary   7/3/2018    Muriel Diaz    MRN: [id number]           Patient Information     Date Of Birth          1960        Visit Information        Provider Department      7/3/2018 11:00 AM Jorje Chilel APRN CNP [facility]        Today's Diagnoses     Insomnia due to medical condition          Care Instructions        Okay to give chemo today    Keep future appointments           Follow-ups after your visit        Your next 10 appointments already scheduled     Jul 17, 2018 11:30 AM CDT   Level 4 with  INFUSION CHAIR 19   [facility])    [facility]  [address]  [place] [address]   [phone]            Jul 17, 2018  2:30 PM CDT   Return Visit with Patience Mckeon MD   [facility] ([facility])    [facility]  [address]  [place] [id number] [phone]              Who to contact     If you have questions or need follow up information about today's clinic visit or your schedule please contact [facility] directly at [phone].  Normal or non-critical lab and imaging results will be communicated to you by MyChart, letter or phone within 4 business days after the clinic has received the results. If you do not hear from us within 7 days, please contact the clinic through MyChart or phone. If you have a critical or abnormal lab result, we will notify you by phone as soon as possible.  Submit refill requests through [employer] or call your pharmacy and they will forward the refill request to us. Please allow 3 business days for your refill to be completed.          Additional Information About Your Visit        Care EveryWhere ID     This is your Care EveryWhere ID. This could be used by other organizations to access your [place] medical records  [id number]        Your Vitals Were   "   Pulse Temperature Respirations Height Pulse Oximetry BMI (Body Mass Index)    67 97.9  F (36.6  C) (Oral) 16 1.6 m (5' 2.99\") 97% 17.72 kg/m2       Blood Pressure from Last 3 Encounters:   07/03/18 137/87   07/03/18 137/87   06/19/18 (!) 147/94    Weight from Last 3 Encounters:   07/03/18 45.4 kg (100 lb)   07/03/18 45.4 kg (100 lb)   06/19/18 45.4 kg (100 lb)              Today, you had the following     No orders found for display         Today's Medication Changes          These changes are accurate as of 7/3/18 11:04 AM.  If you have any questions, ask your nurse or doctor.               These medicines have changed or have updated prescriptions.        Dose/Directions    zolpidem 5 MG tablet   Commonly known as:  AMBIEN   This may have changed:  See the new instructions.   Used for:  Insomnia due to medical condition   Changed by:  Jorje Chilel APRN CNP        TAKE ONE TABLET BY MOUTH AT BEDTIME AS NEEDED FOR SLEEP   Quantity:  30 tablet   Refills:  0         Stop taking these medicines if you haven't already. Please contact your care team if you have questions.     HYDROcodone-acetaminophen 5-325 MG per tablet   Commonly known as:  NORCO   Stopped by:  Jorje Chilel APRN CNP                Where to get your medicines      Some of these will need a paper prescription and others can be bought over the counter.  Ask your nurse if you have questions.     Bring a paper prescription for each of these medications     zolpidem 5 MG tablet                Primary Care Provider Office Phone # Fax #    Isabel Vickie Landin [phone] [address] [place] [address]        Equal Access to Services     [facility]: Hadii lupe leone Socriss, waaxda luqadaha, qaybta kaalmada josemanuel, kvng gupta. So [facility] [phone].    ATENCIÓN: Si habla español, tiene a chowdary disposición servicios gratuitos de asistencia lingüística. Llame al [phone].    We " comply with applicable federal civil rights laws and [place] laws. We do not discriminate on the basis of race, color, national origin, age, disability, sex, sexual orientation, or gender identity.            Thank you!     Thank you for choosing [facility]  for your care. Our goal is always to provide you with excellent care. Hearing back from our patients is one way we can continue to improve our services. Please take a few minutes to complete the written survey that you may receive in the mail after your visit with us. Thank you!             Your Updated Medication List - Protect others around you: Learn how to safely use, store and throw away your medicines at www.disposemymeds.org.          This list is accurate as of 7/3/18 11:04 AM.  Always use your most recent med list.                   Brand Name Dispense Instructions for use Diagnosis    atenolol 25 MG tablet    TENORMIN    30 tablet    TAKE ONE TABLET BY MOUTH ONE TIME DAILY    Hypertension goal BP (blood pressure) < 140/90       dexamethasone 4 MG tablet    DECADRON    6 tablet    Take 2 tablets (8 mg) by mouth daily for 3 days Start on Day 2 of Cycles 1 through 4.    Malignant neoplasm of upper-inner quadrant of left breast in female, estrogen receptor positive (H)       hydrochlorothiazide 12.5 MG Tabs tablet     90 tablet    Take 1 tablet (12.5 mg) by mouth daily    Hypertension goal BP (blood pressure) < 140/90       lidocaine-prilocaine cream    EMLA    30 g    Apply topically as needed for moderate pain Apply to port site 1 hour prior to accessing    Malignant neoplasm of left breast (H)       LORazepam 0.5 MG tablet    ATIVAN    30 tablet    Take 1 tablet (0.5 mg) by mouth every 4 hours as needed (Anxiety, Nausea/Vomiting or Sleep)    Malignant neoplasm of upper-inner quadrant of left breast in female, estrogen receptor positive (H)       prochlorperazine 10 MG tablet    COMPAZINE    30 tablet    Take 1 tablet (10 mg) by mouth  every 6 hours as needed (Nausea/Vomiting)    Malignant neoplasm of upper-inner quadrant of left breast in female, estrogen receptor positive (H)       SOMA PO      Take 350 mg by mouth 3 times daily        VITAMIN D (CHOLECALCIFEROL) PO      Take 2,000 Units by mouth daily (2 x 1000 units = 2000 unit dose)        zolpidem 5 MG tablet    AMBIEN    30 tablet    TAKE ONE TABLET BY MOUTH AT BEDTIME AS NEEDED FOR SLEEP    Insomnia due to medical condition

## 2018-07-03 NOTE — PROGRESS NOTES
Infusion Nursing Note:  Muriel Diaz presents today for C4D1 AC.    Patient seen by provider today: Yes: ARTEMIO Recinos   present during visit today: Not Applicable.    Note: N/A.    Intravenous Access:  Labs drawn without difficulty.  Implanted Port.    Treatment Conditions:  Lab Results   Component Value Date    HGB 10.5 07/03/2018     Lab Results   Component Value Date    WBC 10.6 07/03/2018      Lab Results   Component Value Date    ANEU 7.7 07/03/2018     Lab Results   Component Value Date     07/03/2018      Results reviewed, labs MET treatment parameters, ok to proceed with treatment.    ONPRO  Was placed on patient's: left side of abdomen.    Was placed at 1:05 PM    ONPRO injector device Lot number: [id number]    Patient education included: that Neulasta administration will occur at 7/4 at 4:05pm.    Patient tolerated administration well.        Post Infusion Assessment:  Patient tolerated infusion without incident.  Blood return noted pre and post infusion.  Site patent and intact, free from redness, edema or discomfort.  No evidence of extravasations.  Access discontinued per protocol.    Discharge Plan:   Prescription refills given for decadron, ambien emla, ativan.  AVS to patient via [employer].  Patient will return 7/17 for next appointment.   Patient discharged in stable condition accompanied by: self.  Departure Mode: Ambulatory.    Earl Feldman RN

## 2018-07-17 ENCOUNTER — HOSPITAL ENCOUNTER (OUTPATIENT)
Facility: CLINIC | Age: 58
Setting detail: SPECIMEN
Discharge: HOME | End: 2018-07-17
Attending: INTERNAL MEDICINE | Admitting: INTERNAL MEDICINE
Payer: COMMERCIAL

## 2018-07-17 ENCOUNTER — TELEPHONE (OUTPATIENT)
Dept: PHARMACY | Facility: CLINIC | Age: 58
End: 2018-07-17

## 2018-07-17 ENCOUNTER — INFUSION THERAPY VISIT (OUTPATIENT)
Dept: INFUSION THERAPY | Facility: CLINIC | Age: 58
End: 2018-07-17
Attending: INTERNAL MEDICINE
Payer: COMMERCIAL

## 2018-07-17 ENCOUNTER — ONCOLOGY VISIT (OUTPATIENT)
Dept: ONCOLOGY | Facility: CLINIC | Age: 58
End: 2018-07-17
Attending: INTERNAL MEDICINE
Payer: COMMERCIAL

## 2018-07-17 VITALS
HEART RATE: 66 BPM | DIASTOLIC BLOOD PRESSURE: 90 MMHG | SYSTOLIC BLOOD PRESSURE: 149 MMHG | TEMPERATURE: 97.7 F | RESPIRATION RATE: 16 BRPM

## 2018-07-17 VITALS
WEIGHT: 101.2 LBS | RESPIRATION RATE: 16 BRPM | HEIGHT: 63 IN | SYSTOLIC BLOOD PRESSURE: 152 MMHG | DIASTOLIC BLOOD PRESSURE: 87 MMHG | HEART RATE: 68 BPM | OXYGEN SATURATION: 98 % | TEMPERATURE: 99 F | BODY MASS INDEX: 17.93 KG/M2

## 2018-07-17 DIAGNOSIS — C50.212 MALIGNANT NEOPLASM OF UPPER-INNER QUADRANT OF LEFT BREAST IN FEMALE, ESTROGEN RECEPTOR POSITIVE (H): ICD-10-CM

## 2018-07-17 DIAGNOSIS — Z17.0 MALIGNANT NEOPLASM OF UPPER-INNER QUADRANT OF LEFT BREAST IN FEMALE, ESTROGEN RECEPTOR POSITIVE (H): ICD-10-CM

## 2018-07-17 DIAGNOSIS — Z17.0 MALIGNANT NEOPLASM OF UPPER-INNER QUADRANT OF LEFT BREAST IN FEMALE, ESTROGEN RECEPTOR POSITIVE (H): Primary | ICD-10-CM

## 2018-07-17 DIAGNOSIS — C50.212 MALIGNANT NEOPLASM OF UPPER-INNER QUADRANT OF LEFT BREAST IN FEMALE, ESTROGEN RECEPTOR POSITIVE (H): Primary | ICD-10-CM

## 2018-07-17 LAB
ALBUMIN SERPL-MCNC: 3.4 G/DL (ref 3.4–5)
ALP SERPL-CCNC: 115 U/L (ref 40–150)
ALT SERPL W P-5'-P-CCNC: 18 U/L (ref 0–50)
AST SERPL W P-5'-P-CCNC: 18 U/L (ref 0–45)
BASOPHILS # BLD AUTO: 0.1 10E9/L (ref 0–0.2)
BASOPHILS NFR BLD AUTO: 0.9 %
BILIRUB DIRECT SERPL-MCNC: <0.1 MG/DL (ref 0–0.2)
BILIRUB SERPL-MCNC: 0.2 MG/DL (ref 0.2–1.3)
DIFFERENTIAL METHOD BLD: ABNORMAL
EOSINOPHIL # BLD AUTO: 0.1 10E9/L (ref 0–0.7)
EOSINOPHIL NFR BLD AUTO: 1.3 %
ERYTHROCYTE [DISTWIDTH] IN BLOOD BY AUTOMATED COUNT: 16.8 % (ref 10–15)
HCT VFR BLD AUTO: 28.8 % (ref 35–47)
HGB BLD-MCNC: 9.6 G/DL (ref 11.7–15.7)
IMM GRANULOCYTES # BLD: 0.2 10E9/L (ref 0–0.4)
IMM GRANULOCYTES NFR BLD: 1.8 %
LYMPHOCYTES # BLD AUTO: 1.2 10E9/L (ref 0.8–5.3)
LYMPHOCYTES NFR BLD AUTO: 10.6 %
MCH RBC QN AUTO: 32.3 PG (ref 26.5–33)
MCHC RBC AUTO-ENTMCNC: 33.3 G/DL (ref 31.5–36.5)
MCV RBC AUTO: 97 FL (ref 78–100)
MONOCYTES # BLD AUTO: 1 10E9/L (ref 0–1.3)
MONOCYTES NFR BLD AUTO: 9 %
NEUTROPHILS # BLD AUTO: 8.3 10E9/L (ref 1.6–8.3)
NEUTROPHILS NFR BLD AUTO: 76.4 %
NRBC # BLD AUTO: 0 10*3/UL
NRBC BLD AUTO-RTO: 0 /100
PLATELET # BLD AUTO: 255 10E9/L (ref 150–450)
PROT SERPL-MCNC: 6.7 G/DL (ref 6.8–8.8)
RBC # BLD AUTO: 2.97 10E12/L (ref 3.8–5.2)
WBC # BLD AUTO: 10.8 10E9/L (ref 4–11)

## 2018-07-17 PROCEDURE — 85025 COMPLETE CBC W/AUTO DIFF WBC: CPT | Performed by: INTERNAL MEDICINE

## 2018-07-17 PROCEDURE — 25000128 H RX IP 250 OP 636: Performed by: INTERNAL MEDICINE

## 2018-07-17 PROCEDURE — 96413 CHEMO IV INFUSION 1 HR: CPT

## 2018-07-17 PROCEDURE — 80076 HEPATIC FUNCTION PANEL: CPT | Performed by: INTERNAL MEDICINE

## 2018-07-17 PROCEDURE — 96375 TX/PRO/DX INJ NEW DRUG ADDON: CPT

## 2018-07-17 PROCEDURE — 25000125 ZZHC RX 250: Performed by: INTERNAL MEDICINE

## 2018-07-17 PROCEDURE — 96367 TX/PROPH/DG ADDL SEQ IV INF: CPT

## 2018-07-17 PROCEDURE — 99214 OFFICE O/P EST MOD 30 MIN: CPT | Performed by: INTERNAL MEDICINE

## 2018-07-17 RX ORDER — EPINEPHRINE 1 MG/ML
0.3 INJECTION, SOLUTION INTRAMUSCULAR; SUBCUTANEOUS EVERY 5 MIN PRN
Status: CANCELLED | OUTPATIENT
Start: 2018-07-17

## 2018-07-17 RX ORDER — DIPHENHYDRAMINE HYDROCHLORIDE 50 MG/ML
50 INJECTION INTRAMUSCULAR; INTRAVENOUS
Status: CANCELLED
Start: 2018-07-17

## 2018-07-17 RX ORDER — LORAZEPAM 2 MG/ML
0.5 INJECTION INTRAMUSCULAR EVERY 4 HOURS PRN
Status: CANCELLED
Start: 2018-07-17

## 2018-07-17 RX ORDER — ALBUTEROL SULFATE 0.83 MG/ML
2.5 SOLUTION RESPIRATORY (INHALATION)
Status: CANCELLED | OUTPATIENT
Start: 2018-07-17

## 2018-07-17 RX ORDER — HEPARIN SODIUM (PORCINE) LOCK FLUSH IV SOLN 100 UNIT/ML 100 UNIT/ML
5 SOLUTION INTRAVENOUS EVERY 8 HOURS
Status: DISCONTINUED | OUTPATIENT
Start: 2018-07-17 | End: 2018-07-17 | Stop reason: HOSPADM

## 2018-07-17 RX ORDER — METHYLPREDNISOLONE SODIUM SUCCINATE 125 MG/2ML
125 INJECTION, POWDER, LYOPHILIZED, FOR SOLUTION INTRAMUSCULAR; INTRAVENOUS
Status: CANCELLED
Start: 2018-07-17

## 2018-07-17 RX ORDER — DIPHENHYDRAMINE HCL 25 MG
50 CAPSULE ORAL ONCE
Status: CANCELLED
Start: 2018-07-17

## 2018-07-17 RX ORDER — HEPARIN SODIUM (PORCINE) LOCK FLUSH IV SOLN 100 UNIT/ML 100 UNIT/ML
5 SOLUTION INTRAVENOUS EVERY 8 HOURS
Status: CANCELLED
Start: 2018-07-17

## 2018-07-17 RX ORDER — ALBUTEROL SULFATE 90 UG/1
1-2 AEROSOL, METERED RESPIRATORY (INHALATION)
Status: CANCELLED
Start: 2018-07-17

## 2018-07-17 RX ORDER — MEPERIDINE HYDROCHLORIDE 25 MG/ML
25 INJECTION INTRAMUSCULAR; INTRAVENOUS; SUBCUTANEOUS EVERY 30 MIN PRN
Status: CANCELLED | OUTPATIENT
Start: 2018-07-17

## 2018-07-17 RX ORDER — SODIUM CHLORIDE 9 MG/ML
1000 INJECTION, SOLUTION INTRAVENOUS CONTINUOUS PRN
Status: CANCELLED
Start: 2018-07-17

## 2018-07-17 RX ORDER — DIPHENHYDRAMINE HCL 25 MG
25 CAPSULE ORAL ONCE
Status: CANCELLED
Start: 2018-07-17

## 2018-07-17 RX ORDER — EPINEPHRINE 0.3 MG/.3ML
0.3 INJECTION SUBCUTANEOUS EVERY 5 MIN PRN
Status: CANCELLED | OUTPATIENT
Start: 2018-07-17

## 2018-07-17 RX ORDER — LORAZEPAM 0.5 MG/1
0.5 TABLET ORAL EVERY 4 HOURS PRN
Qty: 30 TABLET | Refills: 5 | Status: SHIPPED | OUTPATIENT
Start: 2018-07-17 | End: 2018-09-18

## 2018-07-17 RX ADMIN — PACLITAXEL 116 MG: 6 INJECTION, SOLUTION INTRAVENOUS at 14:22

## 2018-07-17 RX ADMIN — DEXAMETHASONE SODIUM PHOSPHATE 20 MG: 10 INJECTION, SOLUTION INTRAMUSCULAR; INTRAVENOUS at 13:35

## 2018-07-17 RX ADMIN — SODIUM CHLORIDE 250 ML: 9 INJECTION, SOLUTION INTRAVENOUS at 13:36

## 2018-07-17 RX ADMIN — FAMOTIDINE 20 MG: 20 INJECTION, SOLUTION INTRAVENOUS at 14:05

## 2018-07-17 RX ADMIN — DIPHENHYDRAMINE HYDROCHLORIDE 50 MG: 50 INJECTION, SOLUTION INTRAMUSCULAR; INTRAVENOUS at 13:52

## 2018-07-17 ASSESSMENT — PAIN SCALES - GENERAL: PAINLEVEL: NO PAIN (0)

## 2018-07-17 NOTE — PROGRESS NOTES
Infusion Nursing Note:  Muriel Diaz presents today for Cycle 1 Day 1 Taxol.    Patient seen by provider today: Yes: Dr. Mckeon   present during visit today: Not Applicable.    Note: N/A.    Intravenous Access:  Implanted Port.    Treatment Conditions:  Lab Results   Component Value Date    HGB 9.6 07/17/2018     Lab Results   Component Value Date    WBC 10.8 07/17/2018      Lab Results   Component Value Date    ANEU 8.3 07/17/2018     Lab Results   Component Value Date     07/17/2018      Lab Results   Component Value Date     06/19/2018                   Lab Results   Component Value Date    POTASSIUM 3.8 06/19/2018           No results found for: MAG         Lab Results   Component Value Date    CR 0.55 06/19/2018                   Lab Results   Component Value Date    HATTIE 8.4 06/19/2018                Lab Results   Component Value Date    BILITOTAL 0.2 07/17/2018           Lab Results   Component Value Date    ALBUMIN 3.4 07/17/2018                    Lab Results   Component Value Date    ALT 18 07/17/2018           Lab Results   Component Value Date    AST 18 07/17/2018       Results reviewed, labs MET treatment parameters, ok to proceed with treatment.      Post Infusion Assessment:  Patient tolerated infusion without incident.  Blood return noted pre and post infusion.  Site patent and intact, free from redness, edema or discomfort.  No evidence of extravasations.  Access discontinued per protocol.    Discharge Plan:   Prescription refills given for Ativan.  Discharge instructions reviewed with: Patient.  Patient verbalized understanding of discharge instructions and all questions answered.  Copy of AVS reviewed with patient.  Patient will return 7/24/18 for next appointment.  Patient discharged in stable condition accompanied by: self.  Departure Mode: Ambulatory.    Susy Phillips RN

## 2018-07-17 NOTE — MR AVS SNAPSHOT
After Visit Summary   7/17/2018    Muriel Diaz    MRN: [id number]           Patient Information     Date Of Birth          1960        Visit Information        Provider Department      7/17/2018 11:30 AM  INFUSION CHAIR 19 [facility]        Today's Diagnoses     Malignant neoplasm of upper-inner quadrant of left breast in female, estrogen receptor positive (H)    -  1       Follow-ups after your visit        Your next 10 appointments already scheduled     Jul 24, 2018 10:30 AM CDT   Level 4 with  INFUSION CHAIR 5   [facility] ([facility])    [facility]  [address]  [place] [address]   [phone]            Jul 31, 2018 10:30 AM CDT   Level 4 with  INFUSION CHAIR 10   [facility] ([facility])    [facility]  [address]  [place] [address]   [phone]            Aug 02, 2018 12:45 PM CDT   Ech Limited with [id number]   [facility] CV Echocardiography (Cardiovascular Imaging at [facility])    [address] [address] [phone]           1.  Please bring or wear a comfortable two-piece outfit. 2.  You may eat, drink and take your normal medicines. 3.  For any questions that cannot be answered, please contact the ordering physician 4.  Please do not wear perfumes or scented lotions on the day of your exam.            Aug 07, 2018 11:30 AM CDT   Level 4 with  INFUSION CHAIR 13   [facility] ([facility])    [facility]  [address]  [place] [address]   [phone]            Aug 07, 2018  1:30 PM CDT   Return Visit with Patience Mckeon MD   [facility] ([facility])    [facility]  [address]  [place] [address] [phone]              Future tests that were ordered for you today     Open Future Orders        Priority Expected Expires Ordered    Echocardiogram Limited Routine 8/7/2018 7/17/2019 7/17/2018            Who to contact     If you have questions or need follow up information about today's clinic visit or your schedule please contact [facility] directly at [phone].  Normal or non-critical lab and imaging results will be communicated to you by MyChart, letter or phone within 4 business days after the clinic has received the results. If you do not hear from us within 7 days, please contact the clinic through MyChart or phone. If you have a critical or abnormal lab result, we will notify you by phone as soon as possible.  Submit refill requests through [employer] or call your pharmacy and they will forward the refill request to us. Please allow 3 business days for your refill to be completed.          Additional Information About Your Visit        Care EveryWhere ID     This is your Care EveryWhere ID. This could be used by other organizations to access your [place] medical records  [id number]        Your Vitals Were     Pulse Temperature Respirations Height Pulse Oximetry BMI (Body Mass Index)    68 99  F (37.2  C) (Oral) 16 1.6 m (5' 2.99\") 98% 17.93 kg/m2       Blood Pressure from Last 3 Encounters:   07/17/18 149/90   07/17/18 152/87   07/03/18 137/87    Weight from Last 3 Encounters:   07/17/18 45.9 kg (101 lb 3.2 oz)   07/03/18 45.4 kg (100 lb)   07/03/18 45.4 kg (100 lb)              We Performed the Following     CBC with platelets differential     Hepatic panel          Where to get your medicines      Some of these will need a paper prescription and others can be bought over the counter.  Ask your nurse if you have questions.     Bring a paper prescription for each of these medications     LORazepam 0.5 MG tablet          " Primary Care Provider Office Phone # Fax #    Isabel Vickie Landin [phone] [address] [place] [address]        Equal Access to Services     FRANCISCA HUNT : Angie lupe dennison lidiao Soestebanali, waaxda luqadaha, qaybta kaalmada adehank, kvng moralez laMaribeldaniel gupta. So [facility] [phone].    ATENCIÓN: Si habla español, tiene a chowdary disposición servicios gratuitos de asistencia lingüística. Llame al [phone].    We comply with applicable federal civil rights laws and [place] laws. We do not discriminate on the basis of race, color, national origin, age, disability, sex, sexual orientation, or gender identity.            Thank you!     Thank you for choosing [facility]  for your care. Our goal is always to provide you with excellent care. Hearing back from our patients is one way we can continue to improve our services. Please take a few minutes to complete the written survey that you may receive in the mail after your visit with us. Thank you!             Your Updated Medication List - Protect others around you: Learn how to safely use, store and throw away your medicines at www.disposemymeds.org.          This list is accurate as of 7/17/18  3:26 PM.  Always use your most recent med list.                   Brand Name Dispense Instructions for use Diagnosis    atenolol 25 MG tablet    TENORMIN    30 tablet    TAKE ONE TABLET BY MOUTH ONE TIME DAILY    Hypertension goal BP (blood pressure) < 140/90       dexamethasone 4 MG tablet    DECADRON    6 tablet    Take 2 tablets (8 mg) by mouth daily for 3 days Start on Day 2 of Cycles 1 through 4.    Malignant neoplasm of upper-inner quadrant of left breast in female, estrogen receptor positive (H)       hydrochlorothiazide 12.5 MG Tabs tablet     90 tablet    Take 1 tablet (12.5 mg) by mouth daily    Hypertension goal BP (blood pressure) < 140/90       lidocaine-prilocaine cream    EMLA     30 g    Apply topically as needed for moderate pain Apply to port site 1 hour prior to accessing    Malignant neoplasm of left breast (H)       LORazepam 0.5 MG tablet    ATIVAN    30 tablet    Take 1 tablet (0.5 mg) by mouth every 4 hours as needed (Anxiety, Nausea/Vomiting or Sleep)    Malignant neoplasm of upper-inner quadrant of left breast in female, estrogen receptor positive (H)       prochlorperazine 10 MG tablet    COMPAZINE    30 tablet    Take 1 tablet (10 mg) by mouth every 6 hours as needed (Nausea/Vomiting)    Malignant neoplasm of upper-inner quadrant of left breast in female, estrogen receptor positive (H)       SOMA PO      Take 350 mg by mouth 3 times daily        VITAMIN D (CHOLECALCIFEROL) PO      Take 2,000 Units by mouth daily (2 x 1000 units = 2000 unit dose)        zolpidem 5 MG tablet    AMBIEN    30 tablet    TAKE ONE TABLET BY MOUTH AT BEDTIME AS NEEDED FOR SLEEP    Insomnia due to medical condition

## 2018-07-17 NOTE — LETTER
7/17/2018         RE: Muriel Diaz  [address]  [place] [address]        Dear Colleague,    Thank you for referring your patient, Muriel Diaz, to the [facility]CER [facility]. Please see a copy of my visit note below.    [facility]    Hematology/Oncology Established Patient Note      Today's Date: 07/17/18    Reason for Follow-up: left sided breast cancer      HISTORY OF PRESENT ILLNESS: Muriel Diaz is a 57 year old post-menopausal female with PMHx of HTN, degenerative joint disease, history of cervical spine surgery, who presents with left-sided breast cancer.  She underwent left mastectomy on 4/16/18 by Dr. Umana.  Pathology showed invasive mammary carcinoma, with lobular and ductal features, grade 2, tumor size 4.3 x 4.0 x 2.1 cm, positive margin for invasive carcinoma in the central and lateral posterior surface, DCIS and LCIS present, ER positive (95%), NM positive (50%), HER-2 negative.  Left axillary dissection showed 3 of 5 lymph nodes were positive for metastatic carcinoma (lobular type), also ER positive, NM positive, HER-2 negative. Stage [id number] (stage IIA).      She had port placed on 5/14/18.    She started chemotherapy on 5/22/18:    Doxorubicin 60 mg/m2 IV on day 1  Cyclophosphamide 600 mg/m2 IV on day 1  Neulasta 6 mg SQ on day 2  Every 2 week cycles x 4 cycles    Followed by:    Paclitaxel 80 mg/m2 IV once a week x 12 weeks    AC  C1D1: 5/22/18  C2D1: 6/5/18  C3D1: 6/19/18  C4D1: 7/3/18    Paclitaxel:  Cycle 1: 7/17/18:  Cycle 2: anticipated for 7/24/18  Cycle 3: anticipated 7/31/18  Cycle 4: anticipated for 8/7/18        INTERIM HISTORY: Muriel is here for follow-up.   She says that the Benadryl she received prior to paclitaxel today gave her restless leg and she doesn't like it.  She otherwise has been doing well.  She has finished 4 cycles of AC.  She notes having nausea in the morning, and takes one nausea pill, which takes care of  it.  Otherwise, she denies other side effects.  She denies diarrhea/constipation or mouth sores.          REVIEW OF SYSTEMS:   14 point ROS was reviewed and is negative other than as noted above in HPI.       HOME MEDICATIONS:  Current Outpatient Prescriptions   Medication Sig Dispense Refill     LORazepam (ATIVAN) 0.5 MG tablet Take 1 tablet (0.5 mg) by mouth every 4 hours as needed (Anxiety, Nausea/Vomiting or Sleep) 30 tablet 5     atenolol (TENORMIN) 25 MG tablet TAKE ONE TABLET BY MOUTH ONE TIME DAILY 30 tablet 3     Carisoprodol (SOMA PO) Take 350 mg by mouth 3 times daily       dexamethasone (DECADRON) 4 MG tablet Take 2 tablets (8 mg) by mouth daily for 3 days Start on Day 2 of Cycles 1 through 4. 6 tablet 3     hydrochlorothiazide 12.5 MG TABS tablet Take 1 tablet (12.5 mg) by mouth daily 90 tablet 1     lidocaine-prilocaine (EMLA) cream Apply topically as needed for moderate pain Apply to port site 1 hour prior to accessing 30 g 3     prochlorperazine (COMPAZINE) 10 MG tablet Take 1 tablet (10 mg) by mouth every 6 hours as needed (Nausea/Vomiting) 30 tablet 5     VITAMIN D, CHOLECALCIFEROL, PO Take 2,000 Units by mouth daily (2 x 1000 units = 2000 unit dose)       zolpidem (AMBIEN) 5 MG tablet TAKE ONE TABLET BY MOUTH AT BEDTIME AS NEEDED FOR SLEEP 30 tablet 0         ALLERGIES:  Allergies   Allergen Reactions     Amitriptyline Other (See Comments)     nightmares     Oxycontin [Oxycodone] Nausea     Severe headaches         PAST MEDICAL HISTORY:  Past Medical History:   Diagnosis Date     Cancer (H)     BREAST CANCER     Degeneration of cervical intervertebral disc      Degeneration of lumbar or lumbosacral intervertebral disc      Hypertension      PONV (postoperative nausea and vomiting)          PAST SURGICAL HISTORY:  Past Surgical History:   Procedure Laterality Date     BACK SURGERY  2001    lumbar fusion, cervical discectomy     DISSECT LYMPH NODE AXILLA Left 4/16/2018    Procedure: DISSECT LYMPH  NODE AXILLA;;  Surgeon: Rodney Umana MD;  Location: [facility]     FUSION CERVICAL ANTERIOR THREE+ LEVELS  5/1/2012    Procedure:FUSION CERVICAL ANTERIOR THREE+ LEVELS; Surgeon:SARAH FRANCO; Location:[facility] OR     FUSION CERVICAL POSTERIOR THREE+ LEVELS  5/1/2012    Procedure:FUSION CERVICAL POSTERIOR THREE+ LEVELS; Posterior Cervical decompression and fusion C4-7, Anterior Cervical decompression and fusion C4-7 (c-arm), (herb table with abraham, yina oasis, yina aviator, Vitoss foam packing strip, impulse monitoring,); Surgeon:SARAH FRANCO; Location: OR     GYN SURGERY       HYSTERECTOMY VAGINAL  1990's    Dr. Kriss Aguilar, [facility] specilist     HYSTERECTOMY, PAP NO LONGER INDICATED       INSERT PORT VASCULAR ACCESS N/A 5/14/2018    Procedure: INSERT PORT VASCULAR ACCESS;  PORT PLACEMENT;  Surgeon: Rodney Umana MD;  Location: [facility]     MASTECTOMY SIMPLE Left 4/16/2018    Procedure: MASTECTOMY SIMPLE;  LEFT SIMPLE MASTECTOMY,LEFT AXILLARY DISSECTION;  Surgeon: Rodney Umana MD;  Location: [facility]         SOCIAL HISTORY:  Social History     Social History     Marital status: Single     Spouse name: N/A     Number of children: 0     Years of education: N/A     Occupational History      [employer]      Social History Main Topics     Smoking status: Current Every Day Smoker     Packs/day: 0.50     Types: Cigarettes     Smokeless tobacco: Never Used      Comment: 4 cigarettes per day     Alcohol use 0.6 oz/week     1 Standard drinks or equivalent per week      Comment: 2-3 drinks per week     Drug use: No     Sexual activity: Not Currently     Partners: Male     Other Topics Concern     Parent/Sibling W/ Cabg, Mi Or Angioplasty Before 65f 55m? No     Social History Narrative   She smokes cigarettes, half a pack a day for many years; she is unable to quantify how many.  She drinks alcohol occasionally.  She denies illicit drug use.  She is retired.  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  She lives in [place] with her boyfriend.        FAMILY HISTORY:  Family History   Problem Relation Age of Onset     Pancreatitis Mother      Substance Abuse Mother      Unknown/Adopted Father      She does not know much about her family history and is unaware of any cancers in her family.  She has never been pregnant.  She had a hysterectomy in the .  She still has her ovaries.  She says that she has undergone menopause but is unsure when, likely years ago.        PHYSICAL EXAM:  Vital signs:  /90  Pulse 66  Temp 97.7  F (36.5  C)  Resp 16   ECO  GENERAL/CONSTITUTIONAL: No acute distress.  EYES: No scleral icterus.  RESPIRATORY: Clear to auscultation bilaterally. No crackles or wheezing.   CARDIOVASCULAR: Regular rate and rhythm without murmurs, gallops, or rubs.  GASTROINTESTINAL: No tenderness. The patient has normal bowel sounds. No guarding.  No distention.  MUSCULOSKELETAL: Warm and well-perfused.  NEUROLOGIC: Alert, oriented, answers questions appropriately.  INTEGUMENTARY: No rashes or jaundice.  Port in place at the right upper chest.      LABS:  CBC RESULTS:   Recent Labs   Lab Test  18   1149   WBC  10.8   RBC  2.97*   HGB  9.6*   HCT  28.8*   MCV  97   MCH  32.3   MCHC  33.3   RDW  16.8*   PLT  255       Lab Results   Component Value Date    AST 18 2018     Lab Results   Component Value Date    ALT 18 2018     No results found for: BILICONJ   Lab Results   Component Value Date    BILITOTAL 0.2 2018     Lab Results   Component Value Date    ALBUMIN 3.4 2018     Lab Results   Component Value Date    PROTTOTAL 6.7 2018      Lab Results   Component Value Date    ALKPHOS 115 2018         IMAGING:  Mammogram and ultrasound 3/28/18:  FINDINGS:  Standard bilateral diagnostic views were obtained,  including tomosynthesis. Marker was placed on the left upper breast to  denote the site of the palpable lump; deep to the marker  there is a  mass measuring approximately 2 cm. The mass is associated with  retraction of the left nipple. There are no associated  microcalcifications. No suspicious findings in the contralateral right  breast.     Further evaluation with targeted left breast ultrasound shows a  corresponding hypoechoic lesion at the 11:00 position, 2 cm from the  nipple, measuring 1.9 x 2.9 x 1.0 cm. Survey of the axilla shows an  enlarged lymph node measuring 0.7 x 0.6 cm.     MRI breast 4/9/18:  1. The known left breast malignancy measures 2.5 x 2.0 x 2.6 cm on  MRI. There is additional nonmass enhancement surrounding the mass in  the upper left breast.  2. No suspicious MRI finding in the right breast.      PET-CT 4/9/18:  1. Hypermetabolic left breast mass representing the primary  malignancy.  2. A few mildly prominent left axillary lymph nodes that are  associated with only very mild metabolism. These are felt to be  indeterminant. There is a subcentimeter lymph node with mild  hypermetabolism at the right anterior upper mediastinum between the  innominate artery and superior vena cava that is felt to also be  indeterminant.  3. No other pathologic activity.  4. Small areas of sclerosis within the thoracic and lumbar spine.    Normal soft tissue neck CT.    Echo 5/7/18:  Left ventricular systolic function is normal.  The visual ejection fraction is estimated at 55-60%.  The ejection fraction is estimated at 61% by Shelton's biplane method.  Global peak LV longitudinal strain is averaged at -19.7%. This is within  reported normal limits (normal <-18%).  There is mild trileaflet aortic sclerosis.  There is mild (1+) aortic regurgitation.  Sinus rhythm was noted.  There is no comparison study available.        ASSESSMENT/PLAN:  Muriel Diaz is a 57 year old post-menopausal female with:    1) Left breast cancer of the upper inner quadrant: s/p left mastectomy on 4/16/18; pathology showed invasive mammary carcinoma, with  lobular and ductal features, grade 2, tumor size 4.3 x 4.0 x 2.1 cm, positive margin for invasive carcinoma in the central and lateral posterior surface, DCIS and LCIS present, ER positive (95%), CO positive (50%), HER-2 negative.  Left axillary dissection showed 3 of 5 lymph nodes were positive for metastatic carcinoma (lobular type), also ER positive, CO positive, HER-2 negative. Stage [id number] (stage IIA).      I discussed with Dr. Umana regarding the positive posterior margin.  He took the posterior margin, including the fascia, and there is nothing left to take.  She can proceed with chemotherapy.      After chemotherapy, I will refer her to see radiation oncology for consideration of radiation, and then plan on starting hormonal therapy after that.      She has completed 4 cycles of AC and tolerated well.  She will start on weekly paclitaxel today.    -Cycle 1 of paclitaxel today and continue weekly  -prn anti-emetics written, but she has not needed it.  -echo in August 2018  -referred for lymphedema therapy  -RTC in 3 weeks    2) Smoking: She smokes half a pack a day.  She says that she is trying to cut back.  -I again advised smoking cessation  -she will see her PCP for tobacco cessation.    3) Hypertension: She is on medications for this.  She says that she has been anxious and nervous and has had difficulty sleeping at night.  -follows with PCP    4) Chronic back pain: s/p history of back surgeries  -she takes soma  -follows with PCP    5) Chemotherapy-induced nausea: mild.  Controlled with home nausea medications.    6) Restless leg syndrome: She felt it this morning when she got 50 mg of Benadryl and it did not feel good.  -will cut back pre-med Benadryl dose to 25 mg with future cycles of chemotherapy      I spent a total of 25 minutes with the patient, with over >50% of the time in counseling and/or coordination of care.       Patience Mckeon MD  Hematology/Oncology  [facility]      Again, thank you for allowing me to participate in the care of your patient.        Sincerely,        Patience Mckeon MD

## 2018-07-17 NOTE — MR AVS SNAPSHOT
After Visit Summary   7/17/2018    Muriel Diaz    MRN: [id number]           Patient Information     Date Of Birth          1960        Visit Information        Provider Department      7/17/2018 2:30 PM Patience Mckeon MD [facility]        Today's Diagnoses     Malignant neoplasm of upper-inner quadrant of left breast in female, estrogen receptor positive (H)          Care Instructions    -lorazepam prescription printed  -schedule echo in August 2018    Scheduled/ Jennifer 8/2/18  12:45pm  -paclitaxel today  -schedule paclitaxel for 7/24/18, 7/31/18, 8/7/18  Scheduled/Jennifer  -return to clinic with Dr. Mckeon in 3 weeks  Scheduled/Jennifer    AVS printed and given to patient/ Jennifer          Follow-ups after your visit        Your next 10 appointments already scheduled     Jul 24, 2018 10:30 AM CDT   Level 4 with  INFUSION CHAIR 5   [facility] ([facility])    [facility]  [address]  [facility] [address]   [phone]            Jul 31, 2018 10:30 AM CDT   Level 4 with  INFUSION CHAIR 10   [facility] ([facility])    [facility]  [address]  [facility] [address]   [phone]            Aug 02, 2018 12:45 PM CDT   Ech Limited with [id number]   [facility] CV Echocardiography ([facility])    [address] [place] [address]   [phone]           1.  Please bring or wear a comfortable two-piece outfit. 2.  You may eat, drink and take your normal medicines. 3.  For any questions that cannot be answered, please contact the ordering physician 4.  Please do not wear perfumes or scented lotions on the day of your exam.            Aug 07, 2018 11:30 AM CDT   Level 4 with  INFUSION CHAIR 13   [facility] ([facility])    [facility]  [address]  [place] [address]   [phone]            Aug 07, 2018  1:30 PM CDT   Return Visit with Patience Mckeon MD   [facility] ([facility])    [facility]  [address]  [place] [address] [phone]              Future tests that were ordered for you today     Open Future Orders        Priority Expected Expires Ordered    Echocardiogram Limited Routine 8/7/2018 7/17/2019 7/17/2018            Who to contact     If you have questions or need follow up information about today's clinic visit or your schedule please contact [facility] directly at [phone].  Normal or non-critical lab and imaging results will be communicated to you by MyChart, letter or phone within 4 business days after the clinic has received the results. If you do not hear from us within 7 days, please contact the clinic through MyChart or phone. If you have a critical or abnormal lab result, we will notify you by phone as soon as possible.  Submit refill requests through [employer] or call your pharmacy and they will forward the refill request to us. Please allow 3 business days for your refill to be completed.          Additional Information About Your Visit        Care EveryWhere ID     This is your Care EveryWhere ID. This could be used by other organizations to access your [facility] medical records  [id number]        Your Vitals Were     Pulse Temperature Respirations             66 97.7  F (36.5  C) 16          Blood Pressure from Last 3 Encounters:   07/17/18 149/90   07/17/18 124/78   07/03/18 137/87    Weight from Last 3 Encounters:   07/17/18 45.9 kg (101 lb 3.2 oz)   07/03/18 45.4 kg (100 lb)   07/03/18 45.4 kg (100 lb)                 Where to get your medicines      Some of these will need a paper prescription and others can be bought over the counter.  Ask your nurse if  you have questions.     Bring a paper prescription for each of these medications     LORazepam 0.5 MG tablet          Primary Care Provider Office Phone # Fax #    Isabel Vickie Landin [phone] [address] [place] [address]        Equal Access to Services     FRANCISCA HUNT : Angie dennison hadasho Soomaali, waaxda luqadaha, qaybta kaalmada adeegyada, waxevaristo castellonlesliemadina gupta. So [facility] [phone].    ATENCIÓN: Si habla español, tiene a chowdary disposición servicios gratuitos de asistencia lingüística. Jesúsame al [phone].    We comply with applicable federal civil rights laws and [place] laws. We do not discriminate on the basis of race, color, national origin, age, disability, sex, sexual orientation, or gender identity.            Thank you!     Thank you for choosing [facility]  for your care. Our goal is always to provide you with excellent care. Hearing back from our patients is one way we can continue to improve our services. Please take a few minutes to complete the written survey that you may receive in the mail after your visit with us. Thank you!             Your Updated Medication List - Protect others around you: Learn how to safely use, store and throw away your medicines at www.disposemymeds.org.          This list is accurate as of 7/17/18  3:23 PM.  Always use your most recent med list.                   Brand Name Dispense Instructions for use Diagnosis    atenolol 25 MG tablet    TENORMIN    30 tablet    TAKE ONE TABLET BY MOUTH ONE TIME DAILY    Hypertension goal BP (blood pressure) < 140/90       dexamethasone 4 MG tablet    DECADRON    6 tablet    Take 2 tablets (8 mg) by mouth daily for 3 days Start on Day 2 of Cycles 1 through 4.    Malignant neoplasm of upper-inner quadrant of left breast in female, estrogen receptor positive (H)       hydrochlorothiazide 12.5 MG Tabs tablet     90 tablet    Take 1 tablet (12.5 mg) by mouth  daily    Hypertension goal BP (blood pressure) < 140/90       lidocaine-prilocaine cream    EMLA    30 g    Apply topically as needed for moderate pain Apply to port site 1 hour prior to accessing    Malignant neoplasm of left breast (H)       LORazepam 0.5 MG tablet    ATIVAN    30 tablet    Take 1 tablet (0.5 mg) by mouth every 4 hours as needed (Anxiety, Nausea/Vomiting or Sleep)    Malignant neoplasm of upper-inner quadrant of left breast in female, estrogen receptor positive (H)       prochlorperazine 10 MG tablet    COMPAZINE    30 tablet    Take 1 tablet (10 mg) by mouth every 6 hours as needed (Nausea/Vomiting)    Malignant neoplasm of upper-inner quadrant of left breast in female, estrogen receptor positive (H)       SOMA PO      Take 350 mg by mouth 3 times daily        VITAMIN D (CHOLECALCIFEROL) PO      Take 2,000 Units by mouth daily (2 x 1000 units = 2000 unit dose)        zolpidem 5 MG tablet    AMBIEN    30 tablet    TAKE ONE TABLET BY MOUTH AT BEDTIME AS NEEDED FOR SLEEP    Insomnia due to medical condition

## 2018-07-17 NOTE — PROGRESS NOTES
[facility]    Hematology/Oncology Established Patient Note      Today's Date: 07/17/18    Reason for Follow-up: left sided breast cancer      HISTORY OF PRESENT ILLNESS: Muriel Diaz is a 57 year old post-menopausal female with PMHx of HTN, degenerative joint disease, history of cervical spine surgery, who presents with left-sided breast cancer.  She underwent left mastectomy on 4/16/18 by Dr. Umana.  Pathology showed invasive mammary carcinoma, with lobular and ductal features, grade 2, tumor size 4.3 x 4.0 x 2.1 cm, positive margin for invasive carcinoma in the central and lateral posterior surface, DCIS and LCIS present, ER positive (95%), FL positive (50%), HER-2 negative.  Left axillary dissection showed 3 of 5 lymph nodes were positive for metastatic carcinoma (lobular type), also ER positive, FL positive, HER-2 negative. Stage [id number] (stage IIA).      She had port placed on 5/14/18.    She started chemotherapy on 5/22/18:    Doxorubicin 60 mg/m2 IV on day 1  Cyclophosphamide 600 mg/m2 IV on day 1  Neulasta 6 mg SQ on day 2  Every 2 week cycles x 4 cycles    Followed by:    Paclitaxel 80 mg/m2 IV once a week x 12 weeks    AC  C1D1: 5/22/18  C2D1: 6/5/18  C3D1: 6/19/18  C4D1: 7/3/18    Paclitaxel:  Cycle 1: 7/17/18:  Cycle 2: anticipated for 7/24/18  Cycle 3: anticipated 7/31/18  Cycle 4: anticipated for 8/7/18        INTERIM HISTORY: Muriel is here for follow-up.   She says that the Benadryl she received prior to paclitaxel today gave her restless leg and she doesn't like it.  She otherwise has been doing well.  She has finished 4 cycles of AC.  She notes having nausea in the morning, and takes one nausea pill, which takes care of it.  Otherwise, she denies other side effects.  She denies diarrhea/constipation or mouth sores.          REVIEW OF SYSTEMS:   14 point ROS was reviewed and is negative other than as noted above in HPI.       HOME MEDICATIONS:  Current Outpatient  Prescriptions   Medication Sig Dispense Refill     LORazepam (ATIVAN) 0.5 MG tablet Take 1 tablet (0.5 mg) by mouth every 4 hours as needed (Anxiety, Nausea/Vomiting or Sleep) 30 tablet 5     atenolol (TENORMIN) 25 MG tablet TAKE ONE TABLET BY MOUTH ONE TIME DAILY 30 tablet 3     Carisoprodol (SOMA PO) Take 350 mg by mouth 3 times daily       dexamethasone (DECADRON) 4 MG tablet Take 2 tablets (8 mg) by mouth daily for 3 days Start on Day 2 of Cycles 1 through 4. 6 tablet 3     hydrochlorothiazide 12.5 MG TABS tablet Take 1 tablet (12.5 mg) by mouth daily 90 tablet 1     lidocaine-prilocaine (EMLA) cream Apply topically as needed for moderate pain Apply to port site 1 hour prior to accessing 30 g 3     prochlorperazine (COMPAZINE) 10 MG tablet Take 1 tablet (10 mg) by mouth every 6 hours as needed (Nausea/Vomiting) 30 tablet 5     VITAMIN D, CHOLECALCIFEROL, PO Take 2,000 Units by mouth daily (2 x 1000 units = 2000 unit dose)       zolpidem (AMBIEN) 5 MG tablet TAKE ONE TABLET BY MOUTH AT BEDTIME AS NEEDED FOR SLEEP 30 tablet 0         ALLERGIES:  Allergies   Allergen Reactions     Amitriptyline Other (See Comments)     nightmares     Oxycontin [Oxycodone] Nausea     Severe headaches         PAST MEDICAL HISTORY:  Past Medical History:   Diagnosis Date     Cancer (H)     BREAST CANCER     Degeneration of cervical intervertebral disc      Degeneration of lumbar or lumbosacral intervertebral disc      Hypertension      PONV (postoperative nausea and vomiting)          PAST SURGICAL HISTORY:  Past Surgical History:   Procedure Laterality Date     BACK SURGERY  2001    lumbar fusion, cervical discectomy     DISSECT LYMPH NODE AXILLA Left 4/16/2018    Procedure: DISSECT LYMPH NODE AXILLA;;  Surgeon: Rodney Umana MD;  Location:  [facility]     FUSION CERVICAL ANTERIOR THREE+ LEVELS  5/1/2012    Procedure:FUSION CERVICAL ANTERIOR THREE+ LEVELS; Surgeon:SARAH FRANCO; Location:[facility] OR     FUSION CERVICAL POSTERIOR  THREE+ LEVELS  5/1/2012    Procedure:FUSION CERVICAL POSTERIOR THREE+ LEVELS; Posterior Cervical decompression and fusion C4-7, Anterior Cervical decompression and fusion C4-7 (c-arm), (herb table with abraham, yina oasis, yina aviator, Vitoss foam packing strip, impulse monitoring,); Surgeon:SARAH FRANCO; Location: OR     GYN SURGERY       HYSTERECTOMY VAGINAL  1990's    [facility] specilist     HYSTERECTOMY, PAP NO LONGER INDICATED       INSERT PORT VASCULAR ACCESS N/A 5/14/2018    Procedure: INSERT PORT VASCULAR ACCESS;  PORT PLACEMENT;  Surgeon: Rodney Umana MD;  Location: [facility]     MASTECTOMY SIMPLE Left 4/16/2018    Procedure: MASTECTOMY SIMPLE;  LEFT SIMPLE MASTECTOMY,LEFT AXILLARY DISSECTION;  Surgeon: Rodney Umana MD;  Location: [facility]         SOCIAL HISTORY:  Social History     Social History     Marital status: Single     Spouse name: N/A     Number of children: 0     Years of education: N/A     Occupational History      [employer]      Social History Main Topics     Smoking status: Current Every Day Smoker     Packs/day: 0.50     Types: Cigarettes     Smokeless tobacco: Never Used      Comment: 4 cigarettes per day     Alcohol use 0.6 oz/week     1 Standard drinks or equivalent per week      Comment: 2-3 drinks per week     Drug use: No     Sexual activity: Not Currently     Partners: Male     Other Topics Concern     Parent/Sibling W/ Cabg, Mi Or Angioplasty Before 65f 55m? No     Social History Narrative   She smokes cigarettes, half a pack a day for many years; she is unable to quantify how many.  She drinks alcohol occasionally.  She denies illicit drug use.  She is retired.  She used to to work in  at large companies.  She lives in [place] with her boyfriend.        FAMILY HISTORY:  Family History   Problem Relation Age of Onset     Pancreatitis Mother      Substance Abuse Mother      Unknown/Adopted Father      She does not know  much about her family history and is unaware of any cancers in her family.  She has never been pregnant.  She had a hysterectomy in the .  She still has her ovaries.  She says that she has undergone menopause but is unsure when, likely years ago.        PHYSICAL EXAM:  Vital signs:  /90  Pulse 66  Temp 97.7  F (36.5  C)  Resp 16   ECO  GENERAL/CONSTITUTIONAL: No acute distress.  EYES: No scleral icterus.  RESPIRATORY: Clear to auscultation bilaterally. No crackles or wheezing.   CARDIOVASCULAR: Regular rate and rhythm without murmurs, gallops, or rubs.  GASTROINTESTINAL: No tenderness. The patient has normal bowel sounds. No guarding.  No distention.  MUSCULOSKELETAL: Warm and well-perfused.  NEUROLOGIC: Alert, oriented, answers questions appropriately.  INTEGUMENTARY: No rashes or jaundice.  Port in place at the right upper chest.      LABS:  CBC RESULTS:   Recent Labs   Lab Test  18   1149   WBC  10.8   RBC  2.97*   HGB  9.6*   HCT  28.8*   MCV  97   MCH  32.3   MCHC  33.3   RDW  16.8*   PLT  255       Lab Results   Component Value Date    AST 18 2018     Lab Results   Component Value Date    ALT 18 2018     No results found for: BILICONJ   Lab Results   Component Value Date    BILITOTAL 0.2 2018     Lab Results   Component Value Date    ALBUMIN 3.4 2018     Lab Results   Component Value Date    PROTTOTAL 6.7 2018      Lab Results   Component Value Date    ALKPHOS 115 2018         IMAGING:  Mammogram and ultrasound 3/28/18:  FINDINGS:  Standard bilateral diagnostic views were obtained,  including tomosynthesis. Marker was placed on the left upper breast to  denote the site of the palpable lump; deep to the marker there is a  mass measuring approximately 2 cm. The mass is associated with  retraction of the left nipple. There are no associated  microcalcifications. No suspicious findings in the contralateral right  breast.     Further evaluation with  targeted left breast ultrasound shows a  corresponding hypoechoic lesion at the 11:00 position, 2 cm from the  nipple, measuring 1.9 x 2.9 x 1.0 cm. Survey of the axilla shows an  enlarged lymph node measuring 0.7 x 0.6 cm.     MRI breast 4/9/18:  1. The known left breast malignancy measures 2.5 x 2.0 x 2.6 cm on  MRI. There is additional nonmass enhancement surrounding the mass in  the upper left breast.  2. No suspicious MRI finding in the right breast.      PET-CT 4/9/18:  1. Hypermetabolic left breast mass representing the primary  malignancy.  2. A few mildly prominent left axillary lymph nodes that are  associated with only very mild metabolism. These are felt to be  indeterminant. There is a subcentimeter lymph node with mild  hypermetabolism at the right anterior upper mediastinum between the  innominate artery and superior vena cava that is felt to also be  indeterminant.  3. No other pathologic activity.  4. Small areas of sclerosis within the thoracic and lumbar spine.    Normal soft tissue neck CT.    Echo 5/7/18:  Left ventricular systolic function is normal.  The visual ejection fraction is estimated at 55-60%.  The ejection fraction is estimated at 61% by Shelton's biplane method.  Global peak LV longitudinal strain is averaged at -19.7%. This is within  reported normal limits (normal <-18%).  There is mild trileaflet aortic sclerosis.  There is mild (1+) aortic regurgitation.  Sinus rhythm was noted.  There is no comparison study available.        ASSESSMENT/PLAN:  Muriel Diaz is a 57 year old post-menopausal female with:    1) Left breast cancer of the upper inner quadrant: s/p left mastectomy on 4/16/18; pathology showed invasive mammary carcinoma, with lobular and ductal features, grade 2, tumor size 4.3 x 4.0 x 2.1 cm, positive margin for invasive carcinoma in the central and lateral posterior surface, DCIS and LCIS present, ER positive (95%), TN positive (50%), HER-2 negative.  Left  axillary dissection showed 3 of 5 lymph nodes were positive for metastatic carcinoma (lobular type), also ER positive, AZ positive, HER-2 negative. Stage [id number] (stage IIA).      I discussed with Dr. Umana regarding the positive posterior margin.  He took the posterior margin, including the fascia, and there is nothing left to take.  She can proceed with chemotherapy.      After chemotherapy, I will refer her to see radiation oncology for consideration of radiation, and then plan on starting hormonal therapy after that.      She has completed 4 cycles of AC and tolerated well.  She will start on weekly paclitaxel today.    -Cycle 1 of paclitaxel today and continue weekly  -prn anti-emetics written, but she has not needed it.  -echo in August 2018  -referred for lymphedema therapy  -RTC in 3 weeks    2) Smoking: She smokes half a pack a day.  She says that she is trying to cut back.  -I again advised smoking cessation  -she will see her PCP for tobacco cessation.    3) Hypertension: She is on medications for this.  She says that she has been anxious and nervous and has had difficulty sleeping at night.  -follows with PCP    4) Chronic back pain: s/p history of back surgeries  -she takes soma  -follows with PCP    5) Chemotherapy-induced nausea: mild.  Controlled with home nausea medications.    6) Restless leg syndrome: She felt it this morning when she got 50 mg of Benadryl and it did not feel good.  -will cut back pre-med Benadryl dose to 25 mg with future cycles of chemotherapy      I spent a total of 25 minutes with the patient, with over >50% of the time in counseling and/or coordination of care.       Patience Mckeon MD  Hematology/Oncology  [facility]

## 2018-07-17 NOTE — PATIENT INSTRUCTIONS
-lorazepam prescription printed  -schedule echo in August 2018    Scheduled/ Jennifer 8/2/18  12:45pm  -paclitaxel today  -schedule paclitaxel for 7/24/18, 7/31/18, 8/7/18  Scheduled/Jennifer  -return to clinic with Dr. Mckeon in 3 weeks  Scheduled/Jennifer    AVS printed and given to patient/ Jennifer

## 2018-07-17 NOTE — TELEPHONE ENCOUNTER
Prior Authorization Retail Medication Request--QTY OVERRIDE.    Medication/Dose: lorazepam 0.5mg tablets (PENDING)--insurance will only cover up to 3 tablets per day, rx is written for up to 6 tabs per day    Insurance Name: [facility] Medicaid Electronic PA Form (CB)  Insurance ID: [id number]    Submitted via [email or web address], Key: [id number]

## 2018-07-20 RX ORDER — SODIUM CHLORIDE 9 MG/ML
1000 INJECTION, SOLUTION INTRAVENOUS CONTINUOUS PRN
Status: CANCELLED
Start: 2018-07-24

## 2018-07-20 RX ORDER — DIPHENHYDRAMINE HYDROCHLORIDE 50 MG/ML
50 INJECTION INTRAMUSCULAR; INTRAVENOUS
Status: CANCELLED
Start: 2018-07-24

## 2018-07-20 RX ORDER — ALBUTEROL SULFATE 90 UG/1
1-2 AEROSOL, METERED RESPIRATORY (INHALATION)
Status: CANCELLED
Start: 2018-07-24

## 2018-07-20 RX ORDER — ALBUTEROL SULFATE 0.83 MG/ML
2.5 SOLUTION RESPIRATORY (INHALATION)
Status: CANCELLED | OUTPATIENT
Start: 2018-07-24

## 2018-07-20 RX ORDER — EPINEPHRINE 1 MG/ML
0.3 INJECTION, SOLUTION INTRAMUSCULAR; SUBCUTANEOUS EVERY 5 MIN PRN
Status: CANCELLED | OUTPATIENT
Start: 2018-07-24

## 2018-07-20 RX ORDER — DIPHENHYDRAMINE HCL 25 MG
25 CAPSULE ORAL ONCE
Status: CANCELLED
Start: 2018-07-24

## 2018-07-20 RX ORDER — LORAZEPAM 2 MG/ML
0.5 INJECTION INTRAMUSCULAR EVERY 4 HOURS PRN
Status: CANCELLED
Start: 2018-07-24

## 2018-07-20 RX ORDER — MEPERIDINE HYDROCHLORIDE 25 MG/ML
25 INJECTION INTRAMUSCULAR; INTRAVENOUS; SUBCUTANEOUS EVERY 30 MIN PRN
Status: CANCELLED | OUTPATIENT
Start: 2018-07-24

## 2018-07-20 RX ORDER — METHYLPREDNISOLONE SODIUM SUCCINATE 125 MG/2ML
125 INJECTION, POWDER, LYOPHILIZED, FOR SOLUTION INTRAMUSCULAR; INTRAVENOUS
Status: CANCELLED
Start: 2018-07-24

## 2018-07-20 RX ORDER — EPINEPHRINE 0.3 MG/.3ML
0.3 INJECTION SUBCUTANEOUS EVERY 5 MIN PRN
Status: CANCELLED | OUTPATIENT
Start: 2018-07-24

## 2018-07-20 RX ORDER — HEPARIN SODIUM (PORCINE) LOCK FLUSH IV SOLN 100 UNIT/ML 100 UNIT/ML
5 SOLUTION INTRAVENOUS EVERY 8 HOURS
Status: CANCELLED
Start: 2018-07-24

## 2018-07-20 NOTE — TELEPHONE ENCOUNTER
Prior Authorization Approval    Authorization Effective Date: 7/18/2018  Authorization Expiration Date: 7/18/2019  Medication: lorazepam 0.5mg tablets (PENDING)  Approved Dose/Quantity: 30 tablets for 5 days  Reference #:  Key: [id number]   Insurance Company: [place] Medicaid ([facility]) - Phone [phone] Fax [phone]  Expected CoPay: 1.00     CoPay Card Available: No   Foundation Assistance Needed: n/a  Which Pharmacy is filling the prescription (Not needed for infusion/clinic administered): [facility] - [address]  Pharmacy Notified: Yes  Patient Notified: Yes

## 2018-07-24 ENCOUNTER — HOSPITAL ENCOUNTER (OUTPATIENT)
Facility: CLINIC | Age: 58
Setting detail: SPECIMEN
Discharge: HOME | End: 2018-07-24
Attending: INTERNAL MEDICINE | Admitting: INTERNAL MEDICINE
Payer: COMMERCIAL

## 2018-07-24 ENCOUNTER — INFUSION THERAPY VISIT (OUTPATIENT)
Dept: INFUSION THERAPY | Facility: CLINIC | Age: 58
End: 2018-07-24
Attending: INTERNAL MEDICINE
Payer: COMMERCIAL

## 2018-07-24 VITALS
SYSTOLIC BLOOD PRESSURE: 128 MMHG | RESPIRATION RATE: 16 BRPM | WEIGHT: 100.4 LBS | TEMPERATURE: 98.6 F | DIASTOLIC BLOOD PRESSURE: 85 MMHG | HEART RATE: 60 BPM | BODY MASS INDEX: 17.79 KG/M2

## 2018-07-24 DIAGNOSIS — C50.212 MALIGNANT NEOPLASM OF UPPER-INNER QUADRANT OF LEFT BREAST IN FEMALE, ESTROGEN RECEPTOR POSITIVE (H): Primary | ICD-10-CM

## 2018-07-24 DIAGNOSIS — G47.01 INSOMNIA DUE TO MEDICAL CONDITION: ICD-10-CM

## 2018-07-24 DIAGNOSIS — Z17.0 MALIGNANT NEOPLASM OF UPPER-INNER QUADRANT OF LEFT BREAST IN FEMALE, ESTROGEN RECEPTOR POSITIVE (H): Primary | ICD-10-CM

## 2018-07-24 LAB
BASOPHILS # BLD AUTO: 0.2 10E9/L (ref 0–0.2)
BASOPHILS NFR BLD AUTO: 2.1 %
DIFFERENTIAL METHOD BLD: ABNORMAL
EOSINOPHIL # BLD AUTO: 0.1 10E9/L (ref 0–0.7)
EOSINOPHIL NFR BLD AUTO: 1.8 %
ERYTHROCYTE [DISTWIDTH] IN BLOOD BY AUTOMATED COUNT: 16.9 % (ref 10–15)
HCT VFR BLD AUTO: 29.3 % (ref 35–47)
HGB BLD-MCNC: 9.9 G/DL (ref 11.7–15.7)
IMM GRANULOCYTES # BLD: 0.1 10E9/L (ref 0–0.4)
IMM GRANULOCYTES NFR BLD: 0.7 %
LYMPHOCYTES # BLD AUTO: 0.9 10E9/L (ref 0.8–5.3)
LYMPHOCYTES NFR BLD AUTO: 12.2 %
MCH RBC QN AUTO: 32.6 PG (ref 26.5–33)
MCHC RBC AUTO-ENTMCNC: 33.8 G/DL (ref 31.5–36.5)
MCV RBC AUTO: 96 FL (ref 78–100)
MONOCYTES # BLD AUTO: 0.9 10E9/L (ref 0–1.3)
MONOCYTES NFR BLD AUTO: 12.2 %
NEUTROPHILS # BLD AUTO: 5.1 10E9/L (ref 1.6–8.3)
NEUTROPHILS NFR BLD AUTO: 71 %
NRBC # BLD AUTO: 0 10*3/UL
NRBC BLD AUTO-RTO: 0 /100
PLATELET # BLD AUTO: 401 10E9/L (ref 150–450)
RBC # BLD AUTO: 3.04 10E12/L (ref 3.8–5.2)
WBC # BLD AUTO: 7.1 10E9/L (ref 4–11)

## 2018-07-24 PROCEDURE — 25000125 ZZHC RX 250: Performed by: INTERNAL MEDICINE

## 2018-07-24 PROCEDURE — 25000132 ZZH RX MED GY IP 250 OP 250 PS 637: Performed by: INTERNAL MEDICINE

## 2018-07-24 PROCEDURE — 25000128 H RX IP 250 OP 636: Performed by: INTERNAL MEDICINE

## 2018-07-24 PROCEDURE — 96413 CHEMO IV INFUSION 1 HR: CPT

## 2018-07-24 PROCEDURE — 96367 TX/PROPH/DG ADDL SEQ IV INF: CPT

## 2018-07-24 PROCEDURE — 85025 COMPLETE CBC W/AUTO DIFF WBC: CPT | Performed by: INTERNAL MEDICINE

## 2018-07-24 RX ORDER — HEPARIN SODIUM (PORCINE) LOCK FLUSH IV SOLN 100 UNIT/ML 100 UNIT/ML
5 SOLUTION INTRAVENOUS EVERY 8 HOURS
Status: DISCONTINUED | OUTPATIENT
Start: 2018-07-24 | End: 2018-07-24 | Stop reason: HOSPADM

## 2018-07-24 RX ORDER — ZOLPIDEM TARTRATE 5 MG/1
TABLET ORAL
Qty: 30 TABLET | Refills: 0 | Status: SHIPPED | OUTPATIENT
Start: 2018-07-24 | End: 2018-07-31

## 2018-07-24 RX ORDER — DIPHENHYDRAMINE HCL 25 MG
25 CAPSULE ORAL ONCE
Status: COMPLETED | OUTPATIENT
Start: 2018-07-24 | End: 2018-07-24

## 2018-07-24 RX ADMIN — SODIUM CHLORIDE 250 ML: 9 INJECTION, SOLUTION INTRAVENOUS at 11:03

## 2018-07-24 RX ADMIN — FAMOTIDINE 20 MG: 20 INJECTION, SOLUTION INTRAVENOUS at 11:23

## 2018-07-24 RX ADMIN — DIPHENHYDRAMINE HYDROCHLORIDE 25 MG: 25 CAPSULE ORAL at 11:02

## 2018-07-24 RX ADMIN — PACLITAXEL 116 MG: 6 INJECTION, SOLUTION INTRAVENOUS at 11:45

## 2018-07-24 RX ADMIN — HEPARIN 5 ML: 100 SYRINGE at 12:45

## 2018-07-24 RX ADMIN — DEXAMETHASONE SODIUM PHOSPHATE 20 MG: 10 INJECTION, SOLUTION INTRAMUSCULAR; INTRAVENOUS at 11:02

## 2018-07-24 ASSESSMENT — PAIN SCALES - GENERAL: PAINLEVEL: NO PAIN (0)

## 2018-07-24 NOTE — MR AVS SNAPSHOT
After Visit Summary   7/24/2018    Muriel Diaz    MRN: [id number]           Patient Information     Date Of Birth          1960        Visit Information        Provider Department      7/24/2018 10:30 AM  INFUSION CHAIR 5 [facility]        Today's Diagnoses     Malignant neoplasm of upper-inner quadrant of left breast in female, estrogen receptor positive (H)    -  1       Follow-ups after your visit        Your next 10 appointments already scheduled     Jul 31, 2018 10:30 AM CDT   Level 4 with  INFUSION CHAIR 10   [facility] ([facility])    [facility]  [address]  [place] [address]   [phone]            Aug 02, 2018 12:45 PM CDT   Ech Limited with [id number]   [facility] CV Echocardiography ([facility])    [address] [address] [phone]           1.  Please bring or wear a comfortable two-piece outfit. 2.  You may eat, drink and take your normal medicines. 3.  For any questions that cannot be answered, please contact the ordering physician 4.  Please do not wear perfumes or scented lotions on the day of your exam.            Aug 07, 2018 11:30 AM CDT   Level 4 with  INFUSION CHAIR 13   [facility]    [facility]  [address]  [place] [address]   [phone]            Aug 07, 2018  1:30 PM CDT   Return Visit with Patience Mckeon MD   [facility]    [facility]  [address]  [facility] [address]   [phone]              Who to contact     If you have questions or need follow up information about today's clinic visit or your schedule please contact [facility]  directly at [phone].  Normal or non-critical lab and imaging results will be communicated to you by MyChart, letter or phone within 4 business days after the clinic has received the results. If you do not hear from us within 7 days, please contact the clinic through MyChart or phone. If you have a critical or abnormal lab result, we will notify you by phone as soon as possible.  Submit refill requests through [employer] or call your pharmacy and they will forward the refill request to us. Please allow 3 business days for your refill to be completed.          Additional Information About Your Visit        Care EveryWhere ID     This is your Care EveryWhere ID. This could be used by other organizations to access your [facility] medical records  [id number]        Your Vitals Were     Pulse Temperature Respirations BMI (Body Mass Index)          60 98.6  F (37  C) (Oral) 16 17.79 kg/m2         Blood Pressure from Last 3 Encounters:   07/24/18 128/85   07/17/18 149/90   07/17/18 152/87    Weight from Last 3 Encounters:   07/24/18 45.5 kg (100 lb 6.4 oz)   07/17/18 45.9 kg (101 lb 3.2 oz)   07/03/18 45.4 kg (100 lb)              We Performed the Following     CBC with platelets diff          Where to get your medicines      Some of these will need a paper prescription and others can be bought over the counter.  Ask your nurse if you have questions.     Bring a paper prescription for each of these medications     zolpidem 5 MG tablet          Primary Care Provider Office Phone # Fax #    Isabel Vickie Landin [phone] [address] [place] [address]        Equal Access to Services     [facility] AH: Hadii lupe Peña, wanegritoda skyqadaha, qaybta kaalkvng shabazz. So [facility] [phone].    ATENCIÓN: Si habla español, tiene a chowdary disposición servicios gratuitos de asistencia lingüística. Llame al [phone].    We comply with  applicable federal civil rights laws and [place] laws. We do not discriminate on the basis of race, color, national origin, age, disability, sex, sexual orientation, or gender identity.            Thank you!     Thank you for choosing [facility] AND [facility]  for your care. Our goal is always to provide you with excellent care. Hearing back from our patients is one way we can continue to improve our services. Please take a few minutes to complete the written survey that you may receive in the mail after your visit with us. Thank you!             Your Updated Medication List - Protect others around you: Learn how to safely use, store and throw away your medicines at www.disposemymeds.org.          This list is accurate as of 7/24/18 12:51 PM.  Always use your most recent med list.                   Brand Name Dispense Instructions for use Diagnosis    atenolol 25 MG tablet    TENORMIN    30 tablet    TAKE ONE TABLET BY MOUTH ONE TIME DAILY    Hypertension goal BP (blood pressure) < 140/90       dexamethasone 4 MG tablet    DECADRON    6 tablet    Take 2 tablets (8 mg) by mouth daily for 3 days Start on Day 2 of Cycles 1 through 4.    Malignant neoplasm of upper-inner quadrant of left breast in female, estrogen receptor positive (H)       hydrochlorothiazide 12.5 MG Tabs tablet     90 tablet    Take 1 tablet (12.5 mg) by mouth daily    Hypertension goal BP (blood pressure) < 140/90       lidocaine-prilocaine cream    EMLA    30 g    Apply topically as needed for moderate pain Apply to port site 1 hour prior to accessing    Malignant neoplasm of left breast (H)       LORazepam 0.5 MG tablet    ATIVAN    30 tablet    Take 1 tablet (0.5 mg) by mouth every 4 hours as needed (Anxiety, Nausea/Vomiting or Sleep)    Malignant neoplasm of upper-inner quadrant of left breast in female, estrogen receptor positive (H)       prochlorperazine 10 MG tablet    COMPAZINE    30 tablet    Take 1 tablet (10 mg) by  mouth every 6 hours as needed (Nausea/Vomiting)    Malignant neoplasm of upper-inner quadrant of left breast in female, estrogen receptor positive (H)       SOMA PO      Take 350 mg by mouth 3 times daily        VITAMIN D (CHOLECALCIFEROL) PO      Take 2,000 Units by mouth daily (2 x 1000 units = 2000 unit dose)        zolpidem 5 MG tablet    AMBIEN    30 tablet    TAKE ONE TABLET BY MOUTH AT BEDTIME AS NEEDED FOR SLEEP    Insomnia due to medical condition

## 2018-07-24 NOTE — PROGRESS NOTES
Infusion Nursing Note:  Muriel Diaz presents today for Taxol C6D1.    Patient seen by provider today: No   present during visit today: Not Applicable.    Note: Denies any new medical concerns.    Intravenous Access:  Implanted Port.    Treatment Conditions:  Lab Results   Component Value Date    HGB 9.9 07/24/2018     Lab Results   Component Value Date    WBC 7.1 07/24/2018      Lab Results   Component Value Date    ANEU 5.1 07/24/2018     Lab Results   Component Value Date     07/24/2018   Results reviewed, labs MET treatment parameters, ok to proceed with treatment.    Post Infusion Assessment:  Patient tolerated infusion without incident.  Blood return noted pre and post infusion.  Site patent and intact, free from redness, edema or discomfort.  No evidence of extravasations.  Access discontinued per protocol.    Discharge Plan:   Discharge instructions reviewed with: Patient.  Patient and/or family verbalized understanding of discharge instructions and all questions answered.  Copy of AVS reviewed with patient and/or family.  Patient will return 7/31/2018 for next appointment.  Patient discharged in stable condition accompanied by: self.  Departure Mode: Ambulatory.    Alexia Loaiza RN

## 2018-07-30 RX ORDER — DIPHENHYDRAMINE HCL 25 MG
25 CAPSULE ORAL ONCE
Status: CANCELLED
Start: 2018-07-31

## 2018-07-30 RX ORDER — METHYLPREDNISOLONE SODIUM SUCCINATE 125 MG/2ML
125 INJECTION, POWDER, LYOPHILIZED, FOR SOLUTION INTRAMUSCULAR; INTRAVENOUS
Status: CANCELLED
Start: 2018-07-31

## 2018-07-30 RX ORDER — ALBUTEROL SULFATE 0.83 MG/ML
2.5 SOLUTION RESPIRATORY (INHALATION)
Status: CANCELLED | OUTPATIENT
Start: 2018-07-31

## 2018-07-30 RX ORDER — HEPARIN SODIUM (PORCINE) LOCK FLUSH IV SOLN 100 UNIT/ML 100 UNIT/ML
5 SOLUTION INTRAVENOUS EVERY 8 HOURS
Status: CANCELLED
Start: 2018-07-31

## 2018-07-30 RX ORDER — EPINEPHRINE 1 MG/ML
0.3 INJECTION, SOLUTION INTRAMUSCULAR; SUBCUTANEOUS EVERY 5 MIN PRN
Status: CANCELLED | OUTPATIENT
Start: 2018-07-31

## 2018-07-30 RX ORDER — DIPHENHYDRAMINE HYDROCHLORIDE 50 MG/ML
50 INJECTION INTRAMUSCULAR; INTRAVENOUS
Status: CANCELLED
Start: 2018-07-31

## 2018-07-30 RX ORDER — LORAZEPAM 2 MG/ML
0.5 INJECTION INTRAMUSCULAR EVERY 4 HOURS PRN
Status: CANCELLED
Start: 2018-07-31

## 2018-07-30 RX ORDER — SODIUM CHLORIDE 9 MG/ML
1000 INJECTION, SOLUTION INTRAVENOUS CONTINUOUS PRN
Status: CANCELLED
Start: 2018-07-31

## 2018-07-30 RX ORDER — MEPERIDINE HYDROCHLORIDE 25 MG/ML
25 INJECTION INTRAMUSCULAR; INTRAVENOUS; SUBCUTANEOUS EVERY 30 MIN PRN
Status: CANCELLED | OUTPATIENT
Start: 2018-07-31

## 2018-07-30 RX ORDER — EPINEPHRINE 0.3 MG/.3ML
0.3 INJECTION SUBCUTANEOUS EVERY 5 MIN PRN
Status: CANCELLED | OUTPATIENT
Start: 2018-07-31

## 2018-07-30 RX ORDER — ALBUTEROL SULFATE 90 UG/1
1-2 AEROSOL, METERED RESPIRATORY (INHALATION)
Status: CANCELLED
Start: 2018-07-31

## 2018-07-31 ENCOUNTER — HOSPITAL ENCOUNTER (OUTPATIENT)
Facility: CLINIC | Age: 58
Setting detail: SPECIMEN
Discharge: HOME | End: 2018-07-31
Attending: INTERNAL MEDICINE | Admitting: INTERNAL MEDICINE
Payer: COMMERCIAL

## 2018-07-31 ENCOUNTER — INFUSION THERAPY VISIT (OUTPATIENT)
Dept: INFUSION THERAPY | Facility: CLINIC | Age: 58
End: 2018-07-31
Attending: INTERNAL MEDICINE
Payer: COMMERCIAL

## 2018-07-31 VITALS
HEIGHT: 63 IN | WEIGHT: 101 LBS | HEART RATE: 59 BPM | BODY MASS INDEX: 17.89 KG/M2 | RESPIRATION RATE: 14 BRPM | TEMPERATURE: 97.6 F | SYSTOLIC BLOOD PRESSURE: 131 MMHG | DIASTOLIC BLOOD PRESSURE: 89 MMHG

## 2018-07-31 DIAGNOSIS — G47.01 INSOMNIA DUE TO MEDICAL CONDITION: ICD-10-CM

## 2018-07-31 DIAGNOSIS — Z17.0 MALIGNANT NEOPLASM OF UPPER-INNER QUADRANT OF LEFT BREAST IN FEMALE, ESTROGEN RECEPTOR POSITIVE (H): Primary | ICD-10-CM

## 2018-07-31 DIAGNOSIS — C50.212 MALIGNANT NEOPLASM OF UPPER-INNER QUADRANT OF LEFT BREAST IN FEMALE, ESTROGEN RECEPTOR POSITIVE (H): Primary | ICD-10-CM

## 2018-07-31 LAB
BASOPHILS # BLD AUTO: 0.1 10E9/L (ref 0–0.2)
BASOPHILS NFR BLD AUTO: 2.2 %
DIFFERENTIAL METHOD BLD: ABNORMAL
EOSINOPHIL # BLD AUTO: 0.3 10E9/L (ref 0–0.7)
EOSINOPHIL NFR BLD AUTO: 8 %
ERYTHROCYTE [DISTWIDTH] IN BLOOD BY AUTOMATED COUNT: 16.9 % (ref 10–15)
HCT VFR BLD AUTO: 28.9 % (ref 35–47)
HGB BLD-MCNC: 9.5 G/DL (ref 11.7–15.7)
IMM GRANULOCYTES # BLD: 0 10E9/L (ref 0–0.4)
IMM GRANULOCYTES NFR BLD: 0.5 %
LYMPHOCYTES # BLD AUTO: 1 10E9/L (ref 0.8–5.3)
LYMPHOCYTES NFR BLD AUTO: 24.2 %
MCH RBC QN AUTO: 31.8 PG (ref 26.5–33)
MCHC RBC AUTO-ENTMCNC: 32.9 G/DL (ref 31.5–36.5)
MCV RBC AUTO: 97 FL (ref 78–100)
MONOCYTES # BLD AUTO: 0.4 10E9/L (ref 0–1.3)
MONOCYTES NFR BLD AUTO: 9 %
NEUTROPHILS # BLD AUTO: 2.3 10E9/L (ref 1.6–8.3)
NEUTROPHILS NFR BLD AUTO: 56.1 %
NRBC # BLD AUTO: 0 10*3/UL
NRBC BLD AUTO-RTO: 0 /100
PLATELET # BLD AUTO: 98 10E9/L (ref 150–450)
RBC # BLD AUTO: 2.99 10E12/L (ref 3.8–5.2)
WBC # BLD AUTO: 4.1 10E9/L (ref 4–11)

## 2018-07-31 PROCEDURE — 85025 COMPLETE CBC W/AUTO DIFF WBC: CPT | Performed by: INTERNAL MEDICINE

## 2018-07-31 PROCEDURE — 25000125 ZZHC RX 250: Performed by: INTERNAL MEDICINE

## 2018-07-31 PROCEDURE — 96367 TX/PROPH/DG ADDL SEQ IV INF: CPT

## 2018-07-31 PROCEDURE — 25000132 ZZH RX MED GY IP 250 OP 250 PS 637: Performed by: INTERNAL MEDICINE

## 2018-07-31 PROCEDURE — 25000128 H RX IP 250 OP 636: Performed by: INTERNAL MEDICINE

## 2018-07-31 PROCEDURE — 96413 CHEMO IV INFUSION 1 HR: CPT

## 2018-07-31 RX ORDER — HEPARIN SODIUM (PORCINE) LOCK FLUSH IV SOLN 100 UNIT/ML 100 UNIT/ML
5 SOLUTION INTRAVENOUS EVERY 8 HOURS
Status: DISCONTINUED | OUTPATIENT
Start: 2018-07-31 | End: 2018-07-31 | Stop reason: HOSPADM

## 2018-07-31 RX ORDER — DIPHENHYDRAMINE HCL 25 MG
25 CAPSULE ORAL ONCE
Status: COMPLETED | OUTPATIENT
Start: 2018-07-31 | End: 2018-07-31

## 2018-07-31 RX ORDER — ZOLPIDEM TARTRATE 5 MG/1
TABLET ORAL
Qty: 45 TABLET | Refills: 0 | Status: SHIPPED | OUTPATIENT
Start: 2018-07-31 | End: 2018-09-04

## 2018-07-31 RX ADMIN — SODIUM CHLORIDE, PRESERVATIVE FREE 5 ML: 5 INJECTION INTRAVENOUS at 13:44

## 2018-07-31 RX ADMIN — DEXAMETHASONE SODIUM PHOSPHATE 20 MG: 10 INJECTION, SOLUTION INTRAMUSCULAR; INTRAVENOUS at 11:53

## 2018-07-31 RX ADMIN — SODIUM CHLORIDE 250 ML: 9 INJECTION, SOLUTION INTRAVENOUS at 11:53

## 2018-07-31 RX ADMIN — DIPHENHYDRAMINE HYDROCHLORIDE 25 MG: 25 CAPSULE ORAL at 11:53

## 2018-07-31 RX ADMIN — PACLITAXEL 116 MG: 6 INJECTION, SOLUTION INTRAVENOUS at 12:40

## 2018-07-31 RX ADMIN — FAMOTIDINE 20 MG: 20 INJECTION, SOLUTION INTRAVENOUS at 12:12

## 2018-07-31 ASSESSMENT — PAIN SCALES - GENERAL: PAINLEVEL: NO PAIN (0)

## 2018-07-31 NOTE — PROGRESS NOTES
Infusion Nursing Note:  Muriel Diaz presents today for chemotherapy.    Patient seen by provider today: No   present during visit today: Not Applicable.    Note: N/A.    Intravenous Access:  Labs drawn without difficulty.  Implanted Port.    Treatment Conditions:  Lab Results   Component Value Date    HGB 9.5 07/31/2018     Lab Results   Component Value Date    WBC 4.1 07/31/2018      Lab Results   Component Value Date    ANEU 2.3 07/31/2018     Lab Results   Component Value Date    PLT 98 07/31/2018      Results reviewed, labs MET treatment parameters, ok to proceed with treatment.      Post Infusion Assessment:  Patient tolerated infusion without incident.  Blood return noted pre and post infusion.  Site patent and intact, free from redness, edema or discomfort.  No evidence of extravasations.  Access discontinued per protocol.    Discharge Plan:   Prescription refills given for Lisa Recinos.  Discharge instructions reviewed with: Patient.  Patient and/or family verbalized understanding of discharge instructions and all questions answered.  Copy of AVS reviewed with patient and/or family.  Patient will return 8/7/2018 for next appointment.  Patient discharged in stable condition accompanied by: self.  Departure Mode: Ambulatory.    Miracle Clark RN

## 2018-07-31 NOTE — MR AVS SNAPSHOT
After Visit Summary   7/31/2018    Muriel Diaz    MRN: [id number]           Patient Information     Date Of Birth          1960        Visit Information        Provider Department      7/31/2018 10:30 AM  INFUSION CHAIR 10 [facility]        Today's Diagnoses     Malignant neoplasm of upper-inner quadrant of left breast in female, estrogen receptor positive (H)    -  1    Insomnia due to medical condition           Follow-ups after your visit        Follow-up notes from your care team     Return in 7 days (on 8/7/2018).      Your next 10 appointments already scheduled     Aug 02, 2018 12:45 PM CDT   Ech Limited with [id number]   [facility] CV Echocardiography ([facility])    [address]  [facility] [address] [phone]           1.  Please bring or wear a comfortable two-piece outfit. 2.  You may eat, drink and take your normal medicines. 3.  For any questions that cannot be answered, please contact the ordering physician 4.  Please do not wear perfumes or scented lotions on the day of your exam.            Aug 07, 2018 11:30 AM CDT   Level 4 with  INFUSION CHAIR 13   [facility]    [facility] [place] [id number]  [facility] [address]   [phone]            Aug 07, 2018  1:30 PM CDT   Return Visit with Patience Mckeon MD   [facility]    [facility]  [address]  [facility] [address] [phone]              Who to contact     If you have questions or need follow up information about today's clinic visit or your schedule please contact [facility] directly at [phone].  Normal or non-critical lab and imaging results will be communicated to you by MyChart, letter or phone  "within 4 business days after the clinic has received the results. If you do not hear from us within 7 days, please contact the clinic through [employer] or phone. If you have a critical or abnormal lab result, we will notify you by phone as soon as possible.  Submit refill requests through [employer] or call your pharmacy and they will forward the refill request to us. Please allow 3 business days for your refill to be completed.          Additional Information About Your Visit        Care EveryWhere ID     This is your Care EveryWhere ID. This could be used by other organizations to access your [place] medical records  [id number]        Your Vitals Were     Pulse Temperature Respirations Height BMI (Body Mass Index)       59 97.6  F (36.4  C) (Oral) 14 1.6 m (5' 2.99\") 17.9 kg/m2        Blood Pressure from Last 3 Encounters:   07/31/18 131/89   07/24/18 128/85   07/17/18 149/90    Weight from Last 3 Encounters:   07/31/18 45.8 kg (101 lb)   07/24/18 45.5 kg (100 lb 6.4 oz)   07/17/18 45.9 kg (101 lb 3.2 oz)              We Performed the Following     CBC with platelets diff          Today's Medication Changes          These changes are accurate as of 7/31/18  2:15 PM.  If you have any questions, ask your nurse or doctor.               These medicines have changed or have updated prescriptions.        Dose/Directions    zolpidem 5 MG tablet   Commonly known as:  AMBIEN   This may have changed:  additional instructions   Used for:  Insomnia due to medical condition        TAKE ONE TABLET BY MOUTH AT BEDTIME AS NEEDED FOR SLEEP . May repeat x 1 PRN   Quantity:  45 tablet   Refills:  0            Where to get your medicines      Some of these will need a paper prescription and others can be bought over the counter.  Ask your nurse if you have questions.     Bring a paper prescription for each of these medications     zolpidem 5 MG tablet                Primary Care Provider Office Phone # Fax #    Isabel Landin MD " [phone]       [address] [place] [address]        Equal Access to Services     FRANCISCA HUNT : Hadii aad ku hadanna marie Peña, wanegritoda luqsamantha, qablaineta kaalmada josemanuel, kvng spencer dianelysdarlene moralez lagiftyroberto gupta. So [facility] [phone].    ATENCIÓN: Si habla español, tiene a chowdary disposición servicios gratuitos de asistencia lingüística. Llame al [phone].    We comply with applicable federal civil rights laws and [place] laws. We do not discriminate on the basis of race, color, national origin, age, disability, sex, sexual orientation, or gender identity.            Thank you!     Thank you for choosing [facility] AND [facility]  for your care. Our goal is always to provide you with excellent care. Hearing back from our patients is one way we can continue to improve our services. Please take a few minutes to complete the written survey that you may receive in the mail after your visit with us. Thank you!             Your Updated Medication List - Protect others around you: Learn how to safely use, store and throw away your medicines at www.disposemymeds.org.          This list is accurate as of 7/31/18  2:15 PM.  Always use your most recent med list.                   Brand Name Dispense Instructions for use Diagnosis    atenolol 25 MG tablet    TENORMIN    30 tablet    TAKE ONE TABLET BY MOUTH ONE TIME DAILY    Hypertension goal BP (blood pressure) < 140/90       dexamethasone 4 MG tablet    DECADRON    6 tablet    Take 2 tablets (8 mg) by mouth daily for 3 days Start on Day 2 of Cycles 1 through 4.    Malignant neoplasm of upper-inner quadrant of left breast in female, estrogen receptor positive (H)       hydrochlorothiazide 12.5 MG Tabs tablet     90 tablet    Take 1 tablet (12.5 mg) by mouth daily    Hypertension goal BP (blood pressure) < 140/90       lidocaine-prilocaine cream    EMLA    30 g    Apply topically as needed for moderate pain Apply to port  site 1 hour prior to accessing    Malignant neoplasm of left breast (H)       LORazepam 0.5 MG tablet    ATIVAN    30 tablet    Take 1 tablet (0.5 mg) by mouth every 4 hours as needed (Anxiety, Nausea/Vomiting or Sleep)    Malignant neoplasm of upper-inner quadrant of left breast in female, estrogen receptor positive (H)       prochlorperazine 10 MG tablet    COMPAZINE    30 tablet    Take 1 tablet (10 mg) by mouth every 6 hours as needed (Nausea/Vomiting)    Malignant neoplasm of upper-inner quadrant of left breast in female, estrogen receptor positive (H)       SOMA PO      Take 350 mg by mouth 3 times daily        VITAMIN D (CHOLECALCIFEROL) PO      Take 2,000 Units by mouth daily (2 x 1000 units = 2000 unit dose)        zolpidem 5 MG tablet    AMBIEN    45 tablet    TAKE ONE TABLET BY MOUTH AT BEDTIME AS NEEDED FOR SLEEP . May repeat x 1 PRN    Insomnia due to medical condition

## 2018-08-01 ENCOUNTER — TELEPHONE (OUTPATIENT)
Dept: ONCOLOGY | Facility: CLINIC | Age: 58
End: 2018-08-01

## 2018-08-01 DIAGNOSIS — R52 PAIN: Primary | ICD-10-CM

## 2018-08-01 RX ORDER — HYDROCODONE BITARTRATE AND ACETAMINOPHEN 5; 325 MG/1; MG/1
1 TABLET ORAL EVERY 4 HOURS PRN
Qty: 10 TABLET | Refills: 0 | Status: SHIPPED | OUTPATIENT
Start: 2018-08-01 | End: 2018-08-07

## 2018-08-01 RX ORDER — HYDROCODONE BITARTRATE AND ACETAMINOPHEN 5; 325 MG/1; MG/1
1 TABLET ORAL EVERY 4 HOURS PRN
Qty: 10 TABLET | Refills: 0 | Status: SHIPPED | OUTPATIENT
Start: 2018-08-01 | End: 2018-08-01

## 2018-08-01 NOTE — TELEPHONE ENCOUNTER
"Pt called stating that she has a sharp needle-like pain in her port especially if she reaches her arm forward or up.    Sometimes it happens when she is just standing still.  \"It feels like something's poking me from the inside/out.\"    Pt has had her port for at least 3 months & said that actually having the port accessed with a needle does not cause this pain.  (Pt is undergoing chemo for breast cancer.)    Pt said that it is too deep for the EMLA cream to get to reach the area.  Pt also reports that she didn't even try Advil, Tylenol, or Aleve as they haven't helped with this in the past.    Pt requested [facility].    Ok per  to have  sign Rx for qty: 10 [facility]co.     agreed & signed the [facility] Rx.    Writer handcarried signed [facility] Rx to [facility] per Pt's request.  "

## 2018-08-02 ENCOUNTER — HOSPITAL ENCOUNTER (OUTPATIENT)
Dept: CARDIOLOGY | Facility: CLINIC | Age: 58
Discharge: HOME | End: 2018-08-02
Attending: INTERNAL MEDICINE | Admitting: INTERNAL MEDICINE
Payer: COMMERCIAL

## 2018-08-02 DIAGNOSIS — Z17.0 MALIGNANT NEOPLASM OF UPPER-INNER QUADRANT OF LEFT BREAST IN FEMALE, ESTROGEN RECEPTOR POSITIVE (H): ICD-10-CM

## 2018-08-02 DIAGNOSIS — C50.212 MALIGNANT NEOPLASM OF UPPER-INNER QUADRANT OF LEFT BREAST IN FEMALE, ESTROGEN RECEPTOR POSITIVE (H): ICD-10-CM

## 2018-08-02 PROCEDURE — 93321 DOPPLER ECHO F-UP/LMTD STD: CPT

## 2018-08-02 PROCEDURE — 93308 TTE F-UP OR LMTD: CPT | Mod: 26 | Performed by: INTERNAL MEDICINE

## 2018-08-02 PROCEDURE — 93325 DOPPLER ECHO COLOR FLOW MAPG: CPT | Mod: 26 | Performed by: INTERNAL MEDICINE

## 2018-08-02 PROCEDURE — 93321 DOPPLER ECHO F-UP/LMTD STD: CPT | Mod: 26 | Performed by: INTERNAL MEDICINE

## 2018-08-07 ENCOUNTER — INFUSION THERAPY VISIT (OUTPATIENT)
Dept: INFUSION THERAPY | Facility: CLINIC | Age: 58
End: 2018-08-07
Attending: INTERNAL MEDICINE
Payer: COMMERCIAL

## 2018-08-07 ENCOUNTER — ONCOLOGY VISIT (OUTPATIENT)
Dept: ONCOLOGY | Facility: CLINIC | Age: 58
End: 2018-08-07
Attending: INTERNAL MEDICINE
Payer: COMMERCIAL

## 2018-08-07 ENCOUNTER — HOSPITAL ENCOUNTER (OUTPATIENT)
Facility: CLINIC | Age: 58
Setting detail: SPECIMEN
Discharge: HOME | End: 2018-08-07
Attending: INTERNAL MEDICINE | Admitting: INTERNAL MEDICINE
Payer: COMMERCIAL

## 2018-08-07 VITALS
HEIGHT: 63 IN | SYSTOLIC BLOOD PRESSURE: 158 MMHG | BODY MASS INDEX: 17.86 KG/M2 | TEMPERATURE: 98.3 F | WEIGHT: 100.8 LBS | RESPIRATION RATE: 16 BRPM | DIASTOLIC BLOOD PRESSURE: 98 MMHG | HEART RATE: 64 BPM | OXYGEN SATURATION: 98 %

## 2018-08-07 VITALS
RESPIRATION RATE: 16 BRPM | SYSTOLIC BLOOD PRESSURE: 149 MMHG | BODY MASS INDEX: 17.86 KG/M2 | TEMPERATURE: 98.3 F | HEIGHT: 63 IN | DIASTOLIC BLOOD PRESSURE: 94 MMHG | OXYGEN SATURATION: 98 % | HEART RATE: 62 BPM | WEIGHT: 100.8 LBS

## 2018-08-07 DIAGNOSIS — C50.212 MALIGNANT NEOPLASM OF UPPER-INNER QUADRANT OF LEFT BREAST IN FEMALE, ESTROGEN RECEPTOR POSITIVE (H): Primary | ICD-10-CM

## 2018-08-07 DIAGNOSIS — Z17.0 MALIGNANT NEOPLASM OF UPPER-INNER QUADRANT OF LEFT BREAST IN FEMALE, ESTROGEN RECEPTOR POSITIVE (H): ICD-10-CM

## 2018-08-07 DIAGNOSIS — C50.212 MALIGNANT NEOPLASM OF UPPER-INNER QUADRANT OF LEFT BREAST IN FEMALE, ESTROGEN RECEPTOR POSITIVE (H): ICD-10-CM

## 2018-08-07 DIAGNOSIS — Z17.0 MALIGNANT NEOPLASM OF UPPER-INNER QUADRANT OF LEFT BREAST IN FEMALE, ESTROGEN RECEPTOR POSITIVE (H): Primary | ICD-10-CM

## 2018-08-07 LAB
BASOPHILS # BLD AUTO: 0.1 10E9/L (ref 0–0.2)
BASOPHILS NFR BLD AUTO: 1.7 %
DIFFERENTIAL METHOD BLD: ABNORMAL
EOSINOPHIL # BLD AUTO: 0.4 10E9/L (ref 0–0.7)
EOSINOPHIL NFR BLD AUTO: 5.8 %
ERYTHROCYTE [DISTWIDTH] IN BLOOD BY AUTOMATED COUNT: 17.1 % (ref 10–15)
HCT VFR BLD AUTO: 32 % (ref 35–47)
HGB BLD-MCNC: 10.5 G/DL (ref 11.7–15.7)
IMM GRANULOCYTES # BLD: 0 10E9/L (ref 0–0.4)
IMM GRANULOCYTES NFR BLD: 0.6 %
LYMPHOCYTES # BLD AUTO: 1.5 10E9/L (ref 0.8–5.3)
LYMPHOCYTES NFR BLD AUTO: 23.2 %
MCH RBC QN AUTO: 32.1 PG (ref 26.5–33)
MCHC RBC AUTO-ENTMCNC: 32.8 G/DL (ref 31.5–36.5)
MCV RBC AUTO: 98 FL (ref 78–100)
MONOCYTES # BLD AUTO: 0.5 10E9/L (ref 0–1.3)
MONOCYTES NFR BLD AUTO: 7.3 %
NEUTROPHILS # BLD AUTO: 3.9 10E9/L (ref 1.6–8.3)
NEUTROPHILS NFR BLD AUTO: 61.4 %
NRBC # BLD AUTO: 0 10*3/UL
NRBC BLD AUTO-RTO: 0 /100
PLATELET # BLD AUTO: 385 10E9/L (ref 150–450)
RBC # BLD AUTO: 3.27 10E12/L (ref 3.8–5.2)
WBC # BLD AUTO: 6.3 10E9/L (ref 4–11)

## 2018-08-07 PROCEDURE — 99214 OFFICE O/P EST MOD 30 MIN: CPT | Performed by: INTERNAL MEDICINE

## 2018-08-07 PROCEDURE — 25000128 H RX IP 250 OP 636: Performed by: INTERNAL MEDICINE

## 2018-08-07 PROCEDURE — 25000125 ZZHC RX 250: Performed by: INTERNAL MEDICINE

## 2018-08-07 PROCEDURE — 85025 COMPLETE CBC W/AUTO DIFF WBC: CPT | Performed by: INTERNAL MEDICINE

## 2018-08-07 PROCEDURE — G0463 HOSPITAL OUTPT CLINIC VISIT: HCPCS | Mod: 25

## 2018-08-07 PROCEDURE — 96413 CHEMO IV INFUSION 1 HR: CPT

## 2018-08-07 PROCEDURE — 96367 TX/PROPH/DG ADDL SEQ IV INF: CPT

## 2018-08-07 RX ORDER — METHYLPREDNISOLONE SODIUM SUCCINATE 125 MG/2ML
125 INJECTION, POWDER, LYOPHILIZED, FOR SOLUTION INTRAMUSCULAR; INTRAVENOUS
Status: CANCELLED
Start: 2018-08-07

## 2018-08-07 RX ORDER — ALBUTEROL SULFATE 90 UG/1
1-2 AEROSOL, METERED RESPIRATORY (INHALATION)
Status: CANCELLED
Start: 2018-08-07

## 2018-08-07 RX ORDER — ALBUTEROL SULFATE 0.83 MG/ML
2.5 SOLUTION RESPIRATORY (INHALATION)
Status: CANCELLED | OUTPATIENT
Start: 2018-08-07

## 2018-08-07 RX ORDER — HEPARIN SODIUM (PORCINE) LOCK FLUSH IV SOLN 100 UNIT/ML 100 UNIT/ML
5 SOLUTION INTRAVENOUS EVERY 8 HOURS
Status: CANCELLED
Start: 2018-08-07

## 2018-08-07 RX ORDER — DIPHENHYDRAMINE HCL 25 MG
25 CAPSULE ORAL ONCE
Status: CANCELLED
Start: 2018-08-07

## 2018-08-07 RX ORDER — SODIUM CHLORIDE 9 MG/ML
1000 INJECTION, SOLUTION INTRAVENOUS CONTINUOUS PRN
Status: CANCELLED
Start: 2018-08-07

## 2018-08-07 RX ORDER — MEPERIDINE HYDROCHLORIDE 25 MG/ML
25 INJECTION INTRAMUSCULAR; INTRAVENOUS; SUBCUTANEOUS EVERY 30 MIN PRN
Status: CANCELLED | OUTPATIENT
Start: 2018-08-07

## 2018-08-07 RX ORDER — EPINEPHRINE 1 MG/ML
0.3 INJECTION, SOLUTION INTRAMUSCULAR; SUBCUTANEOUS EVERY 5 MIN PRN
Status: CANCELLED | OUTPATIENT
Start: 2018-08-07

## 2018-08-07 RX ORDER — LORAZEPAM 2 MG/ML
0.5 INJECTION INTRAMUSCULAR EVERY 4 HOURS PRN
Status: CANCELLED
Start: 2018-08-07

## 2018-08-07 RX ORDER — DIPHENHYDRAMINE HYDROCHLORIDE 50 MG/ML
50 INJECTION INTRAMUSCULAR; INTRAVENOUS
Status: CANCELLED
Start: 2018-08-07

## 2018-08-07 RX ORDER — EPINEPHRINE 0.3 MG/.3ML
0.3 INJECTION SUBCUTANEOUS EVERY 5 MIN PRN
Status: CANCELLED | OUTPATIENT
Start: 2018-08-07

## 2018-08-07 RX ORDER — HEPARIN SODIUM (PORCINE) LOCK FLUSH IV SOLN 100 UNIT/ML 100 UNIT/ML
5 SOLUTION INTRAVENOUS EVERY 8 HOURS
Status: DISCONTINUED | OUTPATIENT
Start: 2018-08-07 | End: 2018-08-07 | Stop reason: HOSPADM

## 2018-08-07 RX ADMIN — SODIUM CHLORIDE 250 ML: 9 INJECTION, SOLUTION INTRAVENOUS at 13:43

## 2018-08-07 RX ADMIN — SODIUM CHLORIDE, PRESERVATIVE FREE 5 ML: 5 INJECTION INTRAVENOUS at 15:35

## 2018-08-07 RX ADMIN — PACLITAXEL 116 MG: 6 INJECTION, SOLUTION INTRAVENOUS at 14:34

## 2018-08-07 RX ADMIN — DIPHENHYDRAMINE HYDROCHLORIDE 25 MG: 50 INJECTION, SOLUTION INTRAMUSCULAR; INTRAVENOUS at 14:18

## 2018-08-07 RX ADMIN — FAMOTIDINE 20 MG: 20 INJECTION, SOLUTION INTRAVENOUS at 14:00

## 2018-08-07 RX ADMIN — DEXAMETHASONE SODIUM PHOSPHATE 20 MG: 10 INJECTION, SOLUTION INTRAMUSCULAR; INTRAVENOUS at 13:43

## 2018-08-07 ASSESSMENT — PAIN SCALES - GENERAL: PAINLEVEL: WORST PAIN (10)

## 2018-08-07 NOTE — PROGRESS NOTES
[facility]    Hematology/Oncology Established Patient Note      Today's Date: 08/07/18    Reason for Follow-up: left sided breast cancer      HISTORY OF PRESENT ILLNESS: Muriel Diaz is a 57 year old post-menopausal female with PMHx of HTN, degenerative joint disease, history of cervical spine surgery, who presents with left-sided breast cancer.  She underwent left mastectomy on 4/16/18 by Dr. Umana.  Pathology showed invasive mammary carcinoma, with lobular and ductal features, grade 2, tumor size 4.3 x 4.0 x 2.1 cm, positive margin for invasive carcinoma in the central and lateral posterior surface, DCIS and LCIS present, ER positive (95%), MN positive (50%), HER-2 negative.  Left axillary dissection showed 3 of 5 lymph nodes were positive for metastatic carcinoma (lobular type), also ER positive, MN positive, HER-2 negative. Stage [id number] (stage IIA).      She had port placed on 5/14/18.    She started chemotherapy on 5/22/18:    Doxorubicin 60 mg/m2 IV on day 1  Cyclophosphamide 600 mg/m2 IV on day 1  Neulasta 6 mg SQ on day 2  Every 2 week cycles x 4 cycles    Followed by:    Paclitaxel 80 mg/m2 IV once a week x 12 weeks    AC  C1D1: 5/22/18  C2D1: 6/5/18  C3D1: 6/19/18  C4D1: 7/3/18    Paclitaxel:  Cycle 1: 7/17/18:  Cycle 2: 7/24/18  Cycle 3: 7/31/18  Cycle 4: 8/7/18  Cycle 5: anticipated for 8/14/18  Cycle 6: anticipated for 8/21/18  Cycle 7: anticipated for 8/28/18        INTERIM HISTORY: Muriel is here for follow-up.   She has neck pain, which she says that she has had for the past 18 years.  She gets flares of it occasionally.  She says that she has seen a neurologist and has had botox injections in the past that were not helpful.  She has tried physical therapy.  She says that she has tried everything and nothing has worked.  She says that it usually lasts 3-4 days and then it gets better.  She is feeling better today than yesterday.  She is tolerating chemotherapy fine.   She denies neuropathy or nausea/vomiting.      REVIEW OF SYSTEMS:   14 point ROS was reviewed and is negative other than as noted above in HPI.       HOME MEDICATIONS:  Current Outpatient Prescriptions   Medication Sig Dispense Refill     atenolol (TENORMIN) 25 MG tablet TAKE ONE TABLET BY MOUTH ONE TIME DAILY 30 tablet 3     Carisoprodol (SOMA PO) Take 350 mg by mouth 3 times daily       hydrochlorothiazide 12.5 MG TABS tablet Take 1 tablet (12.5 mg) by mouth daily 90 tablet 1     lidocaine-prilocaine (EMLA) cream Apply topically as needed for moderate pain Apply to port site 1 hour prior to accessing 30 g 3     LORazepam (ATIVAN) 0.5 MG tablet Take 1 tablet (0.5 mg) by mouth every 4 hours as needed (Anxiety, Nausea/Vomiting or Sleep) 30 tablet 5     prochlorperazine (COMPAZINE) 10 MG tablet Take 1 tablet (10 mg) by mouth every 6 hours as needed (Nausea/Vomiting) 30 tablet 5     VITAMIN D, CHOLECALCIFEROL, PO Take 2,000 Units by mouth daily (2 x 1000 units = 2000 unit dose)       zolpidem (AMBIEN) 5 MG tablet TAKE ONE TABLET BY MOUTH AT BEDTIME AS NEEDED FOR SLEEP . May repeat x 1 PRN 45 tablet 0         ALLERGIES:  Allergies   Allergen Reactions     Amitriptyline Other (See Comments)     nightmares     Oxycontin [Oxycodone] Nausea     Severe headaches         PAST MEDICAL HISTORY:  Past Medical History:   Diagnosis Date     Cancer (H)     BREAST CANCER     Degeneration of cervical intervertebral disc      Degeneration of lumbar or lumbosacral intervertebral disc      Hypertension      PONV (postoperative nausea and vomiting)          PAST SURGICAL HISTORY:  Past Surgical History:   Procedure Laterality Date     BACK SURGERY  2001    lumbar fusion, cervical discectomy     DISSECT LYMPH NODE AXILLA Left 4/16/2018    Procedure: DISSECT LYMPH NODE AXILLA;;  Surgeon: Rodney Umana MD;  Location: [facility]     FUSION CERVICAL ANTERIOR THREE+ LEVELS  5/1/2012    Procedure:FUSION CERVICAL ANTERIOR THREE+ LEVELS;  Surgeon:SARAH FRANCO; Location:[facility] OR     FUSION CERVICAL POSTERIOR THREE+ LEVELS  5/1/2012    Procedure:FUSION CERVICAL POSTERIOR THREE+ LEVELS; Posterior Cervical decompression and fusion C4-7, Anterior Cervical decompression and fusion C4-7 (c-arm), (herb table with abraham, yina oasis, yina aviator, Vitoss foam packing strip, impulse monitoring,); Surgeon:SARAH FRANCO; Location: OR     GYN SURGERY       HYSTERECTOMY VAGINAL  1990's    Judy Aceves OB Gyn specilist     HYSTERECTOMY, PAP NO LONGER INDICATED       INSERT PORT VASCULAR ACCESS N/A 5/14/2018    Procedure: INSERT PORT VASCULAR ACCESS;  PORT PLACEMENT;  Surgeon: Rodney Umana MD;  Location: [facility]     MASTECTOMY SIMPLE Left 4/16/2018    Procedure: MASTECTOMY SIMPLE;  LEFT SIMPLE MASTECTOMY,LEFT AXILLARY DISSECTION;  Surgeon: Rodney Umana MD;  Location: [facility]         SOCIAL HISTORY:  Social History     Social History     Marital status: Single     Spouse name: N/A     Number of children: 0     Years of education: N/A     Occupational History      [employer]      Social History Main Topics     Smoking status: Current Every Day Smoker     Packs/day: 0.50     Types: Cigarettes     Smokeless tobacco: Never Used      Comment: 4 cigarettes per day     Alcohol use 0.6 oz/week     1 Standard drinks or equivalent per week      Comment: 2-3 drinks per week     Drug use: No     Sexual activity: Not Currently     Partners: Male     Other Topics Concern     Parent/Sibling W/ Cabg, Mi Or Angioplasty Before 65f 55m? No     Social History Narrative   She smokes cigarettes, half a pack a day for many years; she is unable to quantify how many.  She drinks alcohol occasionally.  She denies illicit drug use.  She is retired.  She used to to work in  at large companies.  She lives in [place] with her boyfriend.        FAMILY HISTORY:  Family History   Problem Relation Age of Onset     Pancreatitis Mother       "Substance Abuse Mother      Unknown/Adopted Father      She does not know much about her family history and is unaware of any cancers in her family.  She has never been pregnant.  She had a hysterectomy in the .  She still has her ovaries.  She says that she has undergone menopause but is unsure when, likely years ago.        PHYSICAL EXAM:  Vital signs:  BP (!) 158/98  Pulse 64  Temp 98.3  F (36.8  C) (Oral)  Resp 16  Ht 1.6 m (5' 2.99\")  Wt 45.7 kg (100 lb 12.8 oz)  SpO2 98%  BMI 17.86 kg/m2   ECO  GENERAL/CONSTITUTIONAL: No acute distress.  EYES: No scleral icterus.  RESPIRATORY: Clear to auscultation bilaterally. No crackles or wheezing.   CARDIOVASCULAR: Regular rate and rhythm without murmurs, gallops, or rubs.  GASTROINTESTINAL: No tenderness. The patient has normal bowel sounds. No guarding.  No distention.  MUSCULOSKELETAL: Warm and well-perfused.  NEUROLOGIC: Alert, oriented, answers questions appropriately.  INTEGUMENTARY: No rashes or jaundice.  Port in place at the right upper chest.      LABS:  CBC RESULTS:   Recent Labs   Lab Test  18   1156   WBC  6.3   RBC  3.27*   HGB  10.5*   HCT  32.0*   MCV  98   MCH  32.1   MCHC  32.8   RDW  17.1*   PLT  385           IMAGING:  Mammogram and ultrasound 3/28/18:  FINDINGS:  Standard bilateral diagnostic views were obtained,  including tomosynthesis. Marker was placed on the left upper breast to  denote the site of the palpable lump; deep to the marker there is a  mass measuring approximately 2 cm. The mass is associated with  retraction of the left nipple. There are no associated  microcalcifications. No suspicious findings in the contralateral right  breast.     Further evaluation with targeted left breast ultrasound shows a  corresponding hypoechoic lesion at the 11:00 position, 2 cm from the  nipple, measuring 1.9 x 2.9 x 1.0 cm. Survey of the axilla shows an  enlarged lymph node measuring 0.7 x 0.6 cm.     MRI breast 18:  1. The " known left breast malignancy measures 2.5 x 2.0 x 2.6 cm on  MRI. There is additional nonmass enhancement surrounding the mass in  the upper left breast.  2. No suspicious MRI finding in the right breast.      PET-CT 4/9/18:  1. Hypermetabolic left breast mass representing the primary  malignancy.  2. A few mildly prominent left axillary lymph nodes that are  associated with only very mild metabolism. These are felt to be  indeterminant. There is a subcentimeter lymph node with mild  hypermetabolism at the right anterior upper mediastinum between the  innominate artery and superior vena cava that is felt to also be  indeterminant.  3. No other pathologic activity.  4. Small areas of sclerosis within the thoracic and lumbar spine.    Normal soft tissue neck CT.    Echo 8/2/18:  The visual ejection fraction is estimated at 55-60%.  Global peak LV longitudinal strain is averaged at -19.7%. This is within  reported normal limits (normal <-18%).  The right ventricle is normal in size and function.  Sinus rhythm was noted.      ASSESSMENT/PLAN:  Muriel Diaz is a 57 year old post-menopausal female with:    1) Left breast cancer of the upper inner quadrant: s/p left mastectomy on 4/16/18; pathology showed invasive mammary carcinoma, with lobular and ductal features, grade 2, tumor size 4.3 x 4.0 x 2.1 cm, positive margin for invasive carcinoma in the central and lateral posterior surface, DCIS and LCIS present, ER positive (95%), SD positive (50%), HER-2 negative.  Left axillary dissection showed 3 of 5 lymph nodes were positive for metastatic carcinoma (lobular type), also ER positive, SD positive, HER-2 negative. Stage [id number] (stage IIA).      I discussed with Dr. Umana regarding the positive posterior margin.  He took the posterior margin, including the fascia, and there is nothing left to take.  She can proceed with chemotherapy.      After chemotherapy, I will refer her to see radiation oncology for  consideration of radiation, and then plan on starting hormonal therapy after that.      She has completed 4 cycles of AC and tolerated well.  She is now on weekly paclitaxel.    -Cycle 4 of paclitaxel today and continue weekly  -prn anti-emetics written, but she has not needed it.  -referred for lymphedema therapy  -RTC in 3 weeks    2) Smoking: She smokes half a pack a day.  She says that she is trying to cut back.  -I again advised smoking cessation  -she will see her PCP for tobacco cessation.    3) Hypertension: She is on medications for this.  She says that she has been anxious and nervous and has had difficulty sleeping at night.  -follows with PCP    4) Chronic back pain: s/p history of back surgeries  -she takes soma  -follows with PCP    5) Restless leg syndrome: She felt it when she got 50 mg of Benadryl and it did not feel good.  -will cut back pre-med Benadryl dose to 25 mg with future cycles of chemotherapy    6) Anemia: mild  -monitor CBC      I spent a total of 25 minutes with the patient, with over >50% of the time in counseling and/or coordination of care.       Patience Mckeon MD  Hematology/Oncology  [facility]

## 2018-08-07 NOTE — PROGRESS NOTES
"Oncology Rooming Note    August 7, 2018 1:05 PM   Muriel Diaz is a 57 year old female who presents for:    Chief Complaint   Patient presents with     Oncology Clinic Visit     Malignant neoplasm of upper-inner quadrant of left breast in female, estrogen receptor positive      Initial Vitals: BP (!) 158/98  Pulse 64  Temp 98.3  F (36.8  C) (Oral)  Resp 16  Ht 1.6 m (5' 2.99\")  Wt 45.7 kg (100 lb 12.8 oz)  SpO2 98%  BMI 17.86 kg/m2 Estimated body mass index is 17.86 kg/(m^2) as calculated from the following:    Height as of this encounter: 1.6 m (5' 2.99\").    Weight as of this encounter: 45.7 kg (100 lb 12.8 oz). Body surface area is 1.43 meters squared.  Worst Pain (10) Comment: Data Unavailable   No LMP recorded. Patient has had a hysterectomy.  Allergies reviewed: Yes  Medications reviewed: Yes    Medications: Medication refills not needed today.  Pharmacy name entered into [facility]:    [facility] [address]  [id number] - [place], [place] - [address] [facility] [place], [place] - [address]    Clinical concerns: None                         4 minutes for nursing intake (face to face time)     Keiko Taylor MA            "

## 2018-08-07 NOTE — PATIENT INSTRUCTIONS
-proceed with paclitaxel today  -schedule paclitaxel on 8/14/18, 8/21/18, 8/28/18  Scheduled/ruiz  -return to clinic with Dr. Mckeon on 8/28/18  Scheduled/ruiz      Patient is in [facility]    AVS given to patient/ruiz

## 2018-08-07 NOTE — LETTER
"    8/7/2018         RE: Muriel Diaz  [address]  [facility] [address]        Dear Colleague,    Thank you for referring your patient, Muriel Diaz, to the [facility]. Please see a copy of my visit note below.    Oncology Rooming Note    August 7, 2018 1:05 PM   Muriel Diaz is a 57 year old female who presents for:    Chief Complaint   Patient presents with     Oncology Clinic Visit     Malignant neoplasm of upper-inner quadrant of left breast in female, estrogen receptor positive      Initial Vitals: BP (!) 158/98  Pulse 64  Temp 98.3  F (36.8  C) (Oral)  Resp 16  Ht 1.6 m (5' 2.99\")  Wt 45.7 kg (100 lb 12.8 oz)  SpO2 98%  BMI 17.86 kg/m2 Estimated body mass index is 17.86 kg/(m^2) as calculated from the following:    Height as of this encounter: 1.6 m (5' 2.99\").    Weight as of this encounter: 45.7 kg (100 lb 12.8 oz). Body surface area is 1.43 meters squared.  Worst Pain (10) Comment: Data Unavailable   No LMP recorded. Patient has had a hysterectomy.  Allergies reviewed: Yes  Medications reviewed: Yes    Medications: Medication refills not needed today.  Pharmacy name entered into Netli:    [facility]  [id number] [place], [place] - [address] [facility] [place], [place] - [address]    Clinical concerns: None                         4 minutes for nursing intake (face to face time)     Keiko Taylor MA              [facility]    Hematology/Oncology Established Patient Note      Today's Date: 08/07/18    Reason for Follow-up: left sided breast cancer      HISTORY OF PRESENT ILLNESS: Muriel Diaz is a 57 year old post-menopausal female with PMHx of HTN, degenerative joint disease, history of cervical spine surgery, who presents with left-sided breast cancer.  She underwent left mastectomy on 4/16/18 by Dr. Umana.  Pathology showed invasive mammary carcinoma, with lobular and " ductal features, grade 2, tumor size 4.3 x 4.0 x 2.1 cm, positive margin for invasive carcinoma in the central and lateral posterior surface, DCIS and LCIS present, ER positive (95%), TN positive (50%), HER-2 negative.  Left axillary dissection showed 3 of 5 lymph nodes were positive for metastatic carcinoma (lobular type), also ER positive, TN positive, HER-2 negative. Stage [id number] (stage IIA).      She had port placed on 5/14/18.    She started chemotherapy on 5/22/18:    Doxorubicin 60 mg/m2 IV on day 1  Cyclophosphamide 600 mg/m2 IV on day 1  Neulasta 6 mg SQ on day 2  Every 2 week cycles x 4 cycles    Followed by:    Paclitaxel 80 mg/m2 IV once a week x 12 weeks    AC  C1D1: 5/22/18  C2D1: 6/5/18  C3D1: 6/19/18  C4D1: 7/3/18    Paclitaxel:  Cycle 1: 7/17/18:  Cycle 2: 7/24/18  Cycle 3: 7/31/18  Cycle 4: 8/7/18  Cycle 5: anticipated for 8/14/18  Cycle 6: anticipated for 8/21/18  Cycle 7: anticipated for 8/28/18        INTERIM HISTORY: Muriel is here for follow-up.   She has neck pain, which she says that she has had for the past 18 years.  She gets flares of it occasionally.  She says that she has seen a neurologist and has had botox injections in the past that were not helpful.  She has tried physical therapy.  She says that she has tried everything and nothing has worked.  She says that it usually lasts 3-4 days and then it gets better.  She is feeling better today than yesterday.  She is tolerating chemotherapy fine.  She denies neuropathy or nausea/vomiting.      REVIEW OF SYSTEMS:   14 point ROS was reviewed and is negative other than as noted above in HPI.       HOME MEDICATIONS:  Current Outpatient Prescriptions   Medication Sig Dispense Refill     atenolol (TENORMIN) 25 MG tablet TAKE ONE TABLET BY MOUTH ONE TIME DAILY 30 tablet 3     Carisoprodol (SOMA PO) Take 350 mg by mouth 3 times daily       hydrochlorothiazide 12.5 MG TABS tablet Take 1 tablet (12.5 mg) by mouth daily 90 tablet 1      lidocaine-prilocaine (EMLA) cream Apply topically as needed for moderate pain Apply to port site 1 hour prior to accessing 30 g 3     LORazepam (ATIVAN) 0.5 MG tablet Take 1 tablet (0.5 mg) by mouth every 4 hours as needed (Anxiety, Nausea/Vomiting or Sleep) 30 tablet 5     prochlorperazine (COMPAZINE) 10 MG tablet Take 1 tablet (10 mg) by mouth every 6 hours as needed (Nausea/Vomiting) 30 tablet 5     VITAMIN D, CHOLECALCIFEROL, PO Take 2,000 Units by mouth daily (2 x 1000 units = 2000 unit dose)       zolpidem (AMBIEN) 5 MG tablet TAKE ONE TABLET BY MOUTH AT BEDTIME AS NEEDED FOR SLEEP . May repeat x 1 PRN 45 tablet 0         ALLERGIES:  Allergies   Allergen Reactions     Amitriptyline Other (See Comments)     nightmares     Oxycontin [Oxycodone] Nausea     Severe headaches         PAST MEDICAL HISTORY:  Past Medical History:   Diagnosis Date     Cancer (H)     BREAST CANCER     Degeneration of cervical intervertebral disc      Degeneration of lumbar or lumbosacral intervertebral disc      Hypertension      PONV (postoperative nausea and vomiting)          PAST SURGICAL HISTORY:  Past Surgical History:   Procedure Laterality Date     BACK SURGERY  2001    lumbar fusion, cervical discectomy     DISSECT LYMPH NODE AXILLA Left 4/16/2018    Procedure: DISSECT LYMPH NODE AXILLA;;  Surgeon: Rodney Umana MD;  Location:  [facility]     FUSION CERVICAL ANTERIOR THREE+ LEVELS  5/1/2012    Procedure:FUSION CERVICAL ANTERIOR THREE+ LEVELS; Surgeon:SARAH FRANCO; Location:[facility] OR     FUSION CERVICAL POSTERIOR THREE+ LEVELS  5/1/2012    Procedure:FUSION CERVICAL POSTERIOR THREE+ LEVELS; Posterior Cervical decompression and fusion C4-7, Anterior Cervical decompression and fusion C4-7 (c-arm), (herb table with abraham, yina oasis, yina aviator, Vitoss foam packing strip, impulse monitoring,); Surgeon:SARAH FRANCO; Location: OR     GYN SURGERY       HYSTERECTOMY VAGINAL  1990's    Judy Aceves  "OB Gyn specilist     HYSTERECTOMY, PAP NO LONGER INDICATED       INSERT PORT VASCULAR ACCESS N/A 5/14/2018    Procedure: INSERT PORT VASCULAR ACCESS;  PORT PLACEMENT;  Surgeon: Rodney Umana MD;  Location: [facility]     MASTECTOMY SIMPLE Left 4/16/2018    Procedure: MASTECTOMY SIMPLE;  LEFT SIMPLE MASTECTOMY,LEFT AXILLARY DISSECTION;  Surgeon: Rodney Umana MD;  Location: [facility]         SOCIAL HISTORY:  Social History     Social History     Marital status: Single     Spouse name: N/A     Number of children: 0     Years of education: N/A     Occupational History      [employer]      Social History Main Topics     Smoking status: Current Every Day Smoker     Packs/day: 0.50     Types: Cigarettes     Smokeless tobacco: Never Used      Comment: 4 cigarettes per day     Alcohol use 0.6 oz/week     1 Standard drinks or equivalent per week      Comment: 2-3 drinks per week     Drug use: No     Sexual activity: Not Currently     Partners: Male     Other Topics Concern     Parent/Sibling W/ Cabg, Mi Or Angioplasty Before 65f 55m? No     Social History Narrative   She smokes cigarettes, half a pack a day for many years; she is unable to quantify how many.  She drinks alcohol occasionally.  She denies illicit drug use.  She is retired.  She used to to work in  at large companies.  She lives in [place] with her boyfriend.        FAMILY HISTORY:  Family History   Problem Relation Age of Onset     Pancreatitis Mother      Substance Abuse Mother      Unknown/Adopted Father      She does not know much about her family history and is unaware of any cancers in her family.  She has never been pregnant.  She had a hysterectomy in the 1990's.  She still has her ovaries.  She says that she has undergone menopause but is unsure when, likely years ago.        PHYSICAL EXAM:  Vital signs:  BP (!) 158/98  Pulse 64  Temp 98.3  F (36.8  C) (Oral)  Resp 16  Ht 1.6 m (5' 2.99\")  Wt 45.7 kg (100 lb 12.8 oz)  SpO2 98% "  BMI 17.86 kg/m2   ECO  GENERAL/CONSTITUTIONAL: No acute distress.  EYES: No scleral icterus.  RESPIRATORY: Clear to auscultation bilaterally. No crackles or wheezing.   CARDIOVASCULAR: Regular rate and rhythm without murmurs, gallops, or rubs.  GASTROINTESTINAL: No tenderness. The patient has normal bowel sounds. No guarding.  No distention.  MUSCULOSKELETAL: Warm and well-perfused.  NEUROLOGIC: Alert, oriented, answers questions appropriately.  INTEGUMENTARY: No rashes or jaundice.  Port in place at the right upper chest.      LABS:  CBC RESULTS:   Recent Labs   Lab Test  18   1156   WBC  6.3   RBC  3.27*   HGB  10.5*   HCT  32.0*   MCV  98   MCH  32.1   MCHC  32.8   RDW  17.1*   PLT  385           IMAGING:  Mammogram and ultrasound 3/28/18:  FINDINGS:  Standard bilateral diagnostic views were obtained,  including tomosynthesis. Marker was placed on the left upper breast to  denote the site of the palpable lump; deep to the marker there is a  mass measuring approximately 2 cm. The mass is associated with  retraction of the left nipple. There are no associated  microcalcifications. No suspicious findings in the contralateral right  breast.     Further evaluation with targeted left breast ultrasound shows a  corresponding hypoechoic lesion at the 11:00 position, 2 cm from the  nipple, measuring 1.9 x 2.9 x 1.0 cm. Survey of the axilla shows an  enlarged lymph node measuring 0.7 x 0.6 cm.     MRI breast 18:  1. The known left breast malignancy measures 2.5 x 2.0 x 2.6 cm on  MRI. There is additional nonmass enhancement surrounding the mass in  the upper left breast.  2. No suspicious MRI finding in the right breast.      PET-CT 18:  1. Hypermetabolic left breast mass representing the primary  malignancy.  2. A few mildly prominent left axillary lymph nodes that are  associated with only very mild metabolism. These are felt to be  indeterminant. There is a subcentimeter lymph node with  mild  hypermetabolism at the right anterior upper mediastinum between the  innominate artery and superior vena cava that is felt to also be  indeterminant.  3. No other pathologic activity.  4. Small areas of sclerosis within the thoracic and lumbar spine.    Normal soft tissue neck CT.    Echo 8/2/18:  The visual ejection fraction is estimated at 55-60%.  Global peak LV longitudinal strain is averaged at -19.7%. This is within  reported normal limits (normal <-18%).  The right ventricle is normal in size and function.  Sinus rhythm was noted.      ASSESSMENT/PLAN:  Muriel Diaz is a 57 year old post-menopausal female with:    1) Left breast cancer of the upper inner quadrant: s/p left mastectomy on 4/16/18; pathology showed invasive mammary carcinoma, with lobular and ductal features, grade 2, tumor size 4.3 x 4.0 x 2.1 cm, positive margin for invasive carcinoma in the central and lateral posterior surface, DCIS and LCIS present, ER positive (95%), VT positive (50%), HER-2 negative.  Left axillary dissection showed 3 of 5 lymph nodes were positive for metastatic carcinoma (lobular type), also ER positive, VT positive, HER-2 negative. Stage [id number] (stage IIA).      I discussed with Dr. Umana regarding the positive posterior margin.  He took the posterior margin, including the fascia, and there is nothing left to take.  She can proceed with chemotherapy.      After chemotherapy, I will refer her to see radiation oncology for consideration of radiation, and then plan on starting hormonal therapy after that.      She has completed 4 cycles of AC and tolerated well.  She is now on weekly paclitaxel.    -Cycle 4 of paclitaxel today and continue weekly  -prn anti-emetics written, but she has not needed it.  -referred for lymphedema therapy  -RTC in 3 weeks    2) Smoking: She smokes half a pack a day.  She says that she is trying to cut back.  -I again advised smoking cessation  -she will see her PCP for tobacco  cessation.    3) Hypertension: She is on medications for this.  She says that she has been anxious and nervous and has had difficulty sleeping at night.  -follows with PCP    4) Chronic back pain: s/p history of back surgeries  -she takes soma  -follows with PCP    5) Restless leg syndrome: She felt it when she got 50 mg of Benadryl and it did not feel good.  -will cut back pre-med Benadryl dose to 25 mg with future cycles of chemotherapy    6) Anemia: mild  -monitor CBC      I spent a total of 25 minutes with the patient, with over >50% of the time in counseling and/or coordination of care.       Patience Mckeon MD  Hematology/Oncology  [facility]      Again, thank you for allowing me to participate in the care of your patient.        Sincerely,        Patience Mckeon MD

## 2018-08-07 NOTE — MR AVS SNAPSHOT
After Visit Summary   8/7/2018    Muriel Diaz    MRN: [id number]           Patient Information     Date Of Birth          1960        Visit Information        Provider Department      8/7/2018 1:30 PM Patience Mckeon MD [facility]        Care Instructions    -proceed with paclitaxel today  -schedule paclitaxel on 8/14/18, 8/21/18, 8/28/18  -return to clinic with Dr. Mckeon on 8/28/18      Patient is in [facility]          Follow-ups after your visit        Your next 10 appointments already scheduled     Aug 14, 2018  9:00 AM CDT   Level 4 with [facility] INFUSION CHAIR 19   [facility] ([facility])    [facility]  [address]  [place] [address] [phone]            Aug 21, 2018 11:30 AM CDT   Level 4 with SH INFUSION CHAIR 11   [facility])    [facility]  [address]  [place] [address] [phone]            Aug 28, 2018  1:00 PM CDT   Level 4 with SH INFUSION CHAIR 6   [facility])    [facility]  [address]  [place] [address]   [phone]            Aug 28, 2018  1:30 PM CDT   Return Visit with Patience Mckeon MD   [facility]    [facility]  [address]  [place] [address] [phone]              Who to contact     If you have questions or need follow up information about today's clinic visit or your schedule please contact [facility] directly at [phone].  Normal or non-critical lab and imaging results will be communicated to you by MyChart, letter or phone within 4 business days after the clinic has received the results. If you do not hear from us within 7 days, please contact the  "clinic through OPHTHONIXhart or phone. If you have a critical or abnormal lab result, we will notify you by phone as soon as possible.  Submit refill requests through Kublaxt or call your pharmacy and they will forward the refill request to us. Please allow 3 business days for your refill to be completed.          Additional Information About Your Visit        Care EveryWhere ID     This is your Care EveryWhere ID. This could be used by other organizations to access your [place] medical records  [id number]        Your Vitals Were     Pulse Temperature Respirations Height Pulse Oximetry BMI (Body Mass Index)    64 98.3  F (36.8  C) (Oral) 16 1.6 m (5' 2.99\") 98% 17.86 kg/m2       Blood Pressure from Last 3 Encounters:   08/07/18 (!) 158/98   08/07/18 (!) 158/98   07/31/18 131/89    Weight from Last 3 Encounters:   08/07/18 45.7 kg (100 lb 12.8 oz)   08/07/18 45.7 kg (100 lb 12.8 oz)   07/31/18 45.8 kg (101 lb)              Today, you had the following     No orders found for display         Today's Medication Changes          These changes are accurate as of 8/7/18  2:16 PM.  If you have any questions, ask your nurse or doctor.               Stop taking these medicines if you haven't already. Please contact your care team if you have questions.     dexamethasone 4 MG tablet   Commonly known as:  DECADRON   Stopped by:  Patience Mckeon MD                    Primary Care Provider Office Phone # Fax #    [place] Vickie Landin [phone] [address] [place] [address]        Equal Access to Services     [facility] AH: Angie Peña, jaclyn rey, kvng gann. So [facility] [phone].    ATENCIÓN: Si habla español, tiene a chowdary disposición servicios gratuitos de asistencia lingüística. Concha al [phone].    We comply with applicable federal civil rights laws and [place] laws. We do not discriminate " on the basis of race, color, national origin, age, disability, sex, sexual orientation, or gender identity.            Thank you!     Thank you for choosing [facility]  for your care. Our goal is always to provide you with excellent care. Hearing back from our patients is one way we can continue to improve our services. Please take a few minutes to complete the written survey that you may receive in the mail after your visit with us. Thank you!             Your Updated Medication List - Protect others around you: Learn how to safely use, store and throw away your medicines at www.disposemymeds.org.          This list is accurate as of 8/7/18  2:16 PM.  Always use your most recent med list.                   Brand Name Dispense Instructions for use Diagnosis    atenolol 25 MG tablet    TENORMIN    30 tablet    TAKE ONE TABLET BY MOUTH ONE TIME DAILY    Hypertension goal BP (blood pressure) < 140/90       hydrochlorothiazide 12.5 MG Tabs tablet     90 tablet    Take 1 tablet (12.5 mg) by mouth daily    Hypertension goal BP (blood pressure) < 140/90       lidocaine-prilocaine cream    EMLA    30 g    Apply topically as needed for moderate pain Apply to port site 1 hour prior to accessing    Malignant neoplasm of left breast (H)       LORazepam 0.5 MG tablet    ATIVAN    30 tablet    Take 1 tablet (0.5 mg) by mouth every 4 hours as needed (Anxiety, Nausea/Vomiting or Sleep)    Malignant neoplasm of upper-inner quadrant of left breast in female, estrogen receptor positive (H)       prochlorperazine 10 MG tablet    COMPAZINE    30 tablet    Take 1 tablet (10 mg) by mouth every 6 hours as needed (Nausea/Vomiting)    Malignant neoplasm of upper-inner quadrant of left breast in female, estrogen receptor positive (H)       SOMA PO      Take 350 mg by mouth 3 times daily        VITAMIN D (CHOLECALCIFEROL) PO      Take 2,000 Units by mouth daily (2 x 1000 units = 2000 unit dose)        zolpidem 5 MG tablet     AMBIEN    45 tablet    TAKE ONE TABLET BY MOUTH AT BEDTIME AS NEEDED FOR SLEEP . May repeat x 1 PRN    Insomnia due to medical condition

## 2018-08-07 NOTE — MR AVS SNAPSHOT
After Visit Summary   8/7/2018    Muriel Diaz    MRN: [id number]           Patient Information     Date Of Birth          1960        Visit Information        Provider Department      8/7/2018 11:30 AM  INFUSION CHAIR 13 [facility]        Today's Diagnoses     Malignant neoplasm of upper-inner quadrant of left breast in female, estrogen receptor positive (H)    -  1       Follow-ups after your visit        Your next 10 appointments already scheduled     Aug 14, 2018  9:00 AM CDT   Level 4 with SH INFUSION CHAIR 19   [facility] ([facility])    [facility]  [address]  [place] [address]   [phone]            Aug 21, 2018 11:30 AM CDT   Level 4 with SH INFUSION CHAIR 11   [facility] ([facility])    [facility]  [address]  [place] [address]   [phone]            Aug 28, 2018  1:00 PM CDT   Level 4 with SH INFUSION CHAIR 6   [facility] ([facility])    [facility]  [address]  [place] [address]   [phone]            Aug 28, 2018  1:30 PM CDT   Return Visit with Patience Mckeon MD   [facility])    [facility]  [address]  [place] [address] [phone]              Who to contact     If you have questions or need follow up information about today's clinic visit or your schedule please contact [facility] directly at [phone].  Normal or non-critical lab and imaging results will be communicated to you by MyChart, letter or phone within 4 business days after the clinic has received the results. If you do not hear from us within 7 days, please contact the clinic through [employer]  "or phone. If you have a critical or abnormal lab result, we will notify you by phone as soon as possible.  Submit refill requests through [employer] or call your pharmacy and they will forward the refill request to us. Please allow 3 business days for your refill to be completed.          Additional Information About Your Visit        Care EveryWhere ID     This is your Care EveryWhere ID. This could be used by other organizations to access your [place] medical records  [id number]        Your Vitals Were     Pulse Temperature Respirations Height Pulse Oximetry BMI (Body Mass Index)    64 98.3  F (36.8  C) (Oral) 16 1.6 m (5' 3\") 98% 17.86 kg/m2       Blood Pressure from Last 3 Encounters:   08/07/18 (!) 158/98   08/07/18 (!) 158/98   07/31/18 131/89    Weight from Last 3 Encounters:   08/07/18 45.7 kg (100 lb 12.8 oz)   08/07/18 45.7 kg (100 lb 12.8 oz)   07/31/18 45.8 kg (101 lb)              We Performed the Following     CBC with platelets diff          Today's Medication Changes          These changes are accurate as of 8/7/18  3:34 PM.  If you have any questions, ask your nurse or doctor.               Stop taking these medicines if you haven't already. Please contact your care team if you have questions.     dexamethasone 4 MG tablet   Commonly known as:  DECADRON   Stopped by:  Patience Mckeon MD                    Primary Care Provider Office Phone # Fax #    Hekei Vikcie Landin [phone] [address] [place] [address]        Equal Access to Services     [facility]: Hadii lupe dennison hadasho Socriss, waaxda luqadaha, qaybta kaalmada kvng abernathy. So [facility] [phone].    ATENCIÓN: Si habla español, tiene a chowdary disposición servicios gratuitos de asistencia lingüística. Llame al [phone].    We comply with applicable federal civil rights laws and [place] laws. We do not discriminate on the basis of race, color, " national origin, age, disability, sex, sexual orientation, or gender identity.            Thank you!     Thank you for choosing [facility] AND [facility]  for your care. Our goal is always to provide you with excellent care. Hearing back from our patients is one way we can continue to improve our services. Please take a few minutes to complete the written survey that you may receive in the mail after your visit with us. Thank you!             Your Updated Medication List - Protect others around you: Learn how to safely use, store and throw away your medicines at www.disposemymeds.org.          This list is accurate as of 8/7/18  3:34 PM.  Always use your most recent med list.                   Brand Name Dispense Instructions for use Diagnosis    atenolol 25 MG tablet    TENORMIN    30 tablet    TAKE ONE TABLET BY MOUTH ONE TIME DAILY    Hypertension goal BP (blood pressure) < 140/90       hydrochlorothiazide 12.5 MG Tabs tablet     90 tablet    Take 1 tablet (12.5 mg) by mouth daily    Hypertension goal BP (blood pressure) < 140/90       lidocaine-prilocaine cream    EMLA    30 g    Apply topically as needed for moderate pain Apply to port site 1 hour prior to accessing    Malignant neoplasm of left breast (H)       LORazepam 0.5 MG tablet    ATIVAN    30 tablet    Take 1 tablet (0.5 mg) by mouth every 4 hours as needed (Anxiety, Nausea/Vomiting or Sleep)    Malignant neoplasm of upper-inner quadrant of left breast in female, estrogen receptor positive (H)       prochlorperazine 10 MG tablet    COMPAZINE    30 tablet    Take 1 tablet (10 mg) by mouth every 6 hours as needed (Nausea/Vomiting)    Malignant neoplasm of upper-inner quadrant of left breast in female, estrogen receptor positive (H)       SOMA PO      Take 350 mg by mouth 3 times daily        VITAMIN D (CHOLECALCIFEROL) PO      Take 2,000 Units by mouth daily (2 x 1000 units = 2000 unit dose)        zolpidem 5 MG tablet    AMBIEN    45  tablet    TAKE ONE TABLET BY MOUTH AT BEDTIME AS NEEDED FOR SLEEP . May repeat x 1 PRN    Insomnia due to medical condition
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
## 2018-08-10 RX ORDER — DIPHENHYDRAMINE HYDROCHLORIDE 50 MG/ML
50 INJECTION INTRAMUSCULAR; INTRAVENOUS
Status: CANCELLED
Start: 2018-08-14

## 2018-08-10 RX ORDER — ALBUTEROL SULFATE 0.83 MG/ML
2.5 SOLUTION RESPIRATORY (INHALATION)
Status: CANCELLED | OUTPATIENT
Start: 2018-08-14

## 2018-08-10 RX ORDER — ALBUTEROL SULFATE 90 UG/1
1-2 AEROSOL, METERED RESPIRATORY (INHALATION)
Status: CANCELLED
Start: 2018-08-14

## 2018-08-10 RX ORDER — SODIUM CHLORIDE 9 MG/ML
1000 INJECTION, SOLUTION INTRAVENOUS CONTINUOUS PRN
Status: CANCELLED
Start: 2018-08-14

## 2018-08-10 RX ORDER — DIPHENHYDRAMINE HCL 25 MG
25 CAPSULE ORAL ONCE
Status: CANCELLED
Start: 2018-08-14

## 2018-08-10 RX ORDER — HEPARIN SODIUM (PORCINE) LOCK FLUSH IV SOLN 100 UNIT/ML 100 UNIT/ML
5 SOLUTION INTRAVENOUS EVERY 8 HOURS
Status: CANCELLED
Start: 2018-08-14

## 2018-08-10 RX ORDER — MEPERIDINE HYDROCHLORIDE 25 MG/ML
25 INJECTION INTRAMUSCULAR; INTRAVENOUS; SUBCUTANEOUS EVERY 30 MIN PRN
Status: CANCELLED | OUTPATIENT
Start: 2018-08-14

## 2018-08-10 RX ORDER — LORAZEPAM 2 MG/ML
0.5 INJECTION INTRAMUSCULAR EVERY 4 HOURS PRN
Status: CANCELLED
Start: 2018-08-14

## 2018-08-10 RX ORDER — EPINEPHRINE 0.3 MG/.3ML
0.3 INJECTION SUBCUTANEOUS EVERY 5 MIN PRN
Status: CANCELLED | OUTPATIENT
Start: 2018-08-14

## 2018-08-10 RX ORDER — EPINEPHRINE 1 MG/ML
0.3 INJECTION, SOLUTION INTRAMUSCULAR; SUBCUTANEOUS EVERY 5 MIN PRN
Status: CANCELLED | OUTPATIENT
Start: 2018-08-14

## 2018-08-10 RX ORDER — METHYLPREDNISOLONE SODIUM SUCCINATE 125 MG/2ML
125 INJECTION, POWDER, LYOPHILIZED, FOR SOLUTION INTRAMUSCULAR; INTRAVENOUS
Status: CANCELLED
Start: 2018-08-14

## 2018-08-14 ENCOUNTER — HOSPITAL ENCOUNTER (OUTPATIENT)
Facility: CLINIC | Age: 58
Setting detail: SPECIMEN
Discharge: HOME | End: 2018-08-14
Attending: INTERNAL MEDICINE | Admitting: INTERNAL MEDICINE
Payer: COMMERCIAL

## 2018-08-14 ENCOUNTER — INFUSION THERAPY VISIT (OUTPATIENT)
Dept: INFUSION THERAPY | Facility: CLINIC | Age: 58
End: 2018-08-14
Attending: INTERNAL MEDICINE
Payer: COMMERCIAL

## 2018-08-14 VITALS
RESPIRATION RATE: 16 BRPM | BODY MASS INDEX: 18.18 KG/M2 | DIASTOLIC BLOOD PRESSURE: 96 MMHG | TEMPERATURE: 98.2 F | SYSTOLIC BLOOD PRESSURE: 175 MMHG | WEIGHT: 102.6 LBS | HEART RATE: 71 BPM | OXYGEN SATURATION: 98 %

## 2018-08-14 DIAGNOSIS — Z17.0 MALIGNANT NEOPLASM OF UPPER-INNER QUADRANT OF LEFT BREAST IN FEMALE, ESTROGEN RECEPTOR POSITIVE (H): Primary | ICD-10-CM

## 2018-08-14 DIAGNOSIS — C50.212 MALIGNANT NEOPLASM OF UPPER-INNER QUADRANT OF LEFT BREAST IN FEMALE, ESTROGEN RECEPTOR POSITIVE (H): Primary | ICD-10-CM

## 2018-08-14 LAB
ALBUMIN SERPL-MCNC: 3.5 G/DL (ref 3.4–5)
ALP SERPL-CCNC: 78 U/L (ref 40–150)
ALT SERPL W P-5'-P-CCNC: 33 U/L (ref 0–50)
AST SERPL W P-5'-P-CCNC: 22 U/L (ref 0–45)
BASOPHILS # BLD AUTO: 0.1 10E9/L (ref 0–0.2)
BASOPHILS NFR BLD AUTO: 1.3 %
BILIRUB DIRECT SERPL-MCNC: <0.1 MG/DL (ref 0–0.2)
BILIRUB SERPL-MCNC: 0.2 MG/DL (ref 0.2–1.3)
DIFFERENTIAL METHOD BLD: ABNORMAL
EOSINOPHIL # BLD AUTO: 0.2 10E9/L (ref 0–0.7)
EOSINOPHIL NFR BLD AUTO: 4.5 %
ERYTHROCYTE [DISTWIDTH] IN BLOOD BY AUTOMATED COUNT: 17 % (ref 10–15)
HCT VFR BLD AUTO: 32.9 % (ref 35–47)
HGB BLD-MCNC: 10.7 G/DL (ref 11.7–15.7)
IMM GRANULOCYTES # BLD: 0.1 10E9/L (ref 0–0.4)
IMM GRANULOCYTES NFR BLD: 0.9 %
LYMPHOCYTES # BLD AUTO: 1.6 10E9/L (ref 0.8–5.3)
LYMPHOCYTES NFR BLD AUTO: 29.2 %
MCH RBC QN AUTO: 32.1 PG (ref 26.5–33)
MCHC RBC AUTO-ENTMCNC: 32.5 G/DL (ref 31.5–36.5)
MCV RBC AUTO: 99 FL (ref 78–100)
MONOCYTES # BLD AUTO: 0.3 10E9/L (ref 0–1.3)
MONOCYTES NFR BLD AUTO: 4.9 %
NEUTROPHILS # BLD AUTO: 3.1 10E9/L (ref 1.6–8.3)
NEUTROPHILS NFR BLD AUTO: 59.2 %
NRBC # BLD AUTO: 0 10*3/UL
NRBC BLD AUTO-RTO: 0 /100
PLATELET # BLD AUTO: 336 10E9/L (ref 150–450)
PROT SERPL-MCNC: 6.5 G/DL (ref 6.8–8.8)
RBC # BLD AUTO: 3.33 10E12/L (ref 3.8–5.2)
WBC # BLD AUTO: 5.3 10E9/L (ref 4–11)

## 2018-08-14 PROCEDURE — 85025 COMPLETE CBC W/AUTO DIFF WBC: CPT | Performed by: INTERNAL MEDICINE

## 2018-08-14 PROCEDURE — 80076 HEPATIC FUNCTION PANEL: CPT | Performed by: INTERNAL MEDICINE

## 2018-08-14 PROCEDURE — 25000125 ZZHC RX 250: Performed by: INTERNAL MEDICINE

## 2018-08-14 PROCEDURE — 96413 CHEMO IV INFUSION 1 HR: CPT

## 2018-08-14 PROCEDURE — 25000132 ZZH RX MED GY IP 250 OP 250 PS 637: Performed by: INTERNAL MEDICINE

## 2018-08-14 PROCEDURE — 96367 TX/PROPH/DG ADDL SEQ IV INF: CPT

## 2018-08-14 PROCEDURE — 25000128 H RX IP 250 OP 636: Performed by: INTERNAL MEDICINE

## 2018-08-14 RX ORDER — DIPHENHYDRAMINE HCL 25 MG
25 CAPSULE ORAL ONCE
Status: COMPLETED | OUTPATIENT
Start: 2018-08-14 | End: 2018-08-14

## 2018-08-14 RX ORDER — HEPARIN SODIUM (PORCINE) LOCK FLUSH IV SOLN 100 UNIT/ML 100 UNIT/ML
5 SOLUTION INTRAVENOUS EVERY 8 HOURS
Status: DISCONTINUED | OUTPATIENT
Start: 2018-08-14 | End: 2018-08-14 | Stop reason: HOSPADM

## 2018-08-14 RX ADMIN — DEXAMETHASONE SODIUM PHOSPHATE 20 MG: 10 INJECTION, SOLUTION INTRAMUSCULAR; INTRAVENOUS at 09:42

## 2018-08-14 RX ADMIN — SODIUM CHLORIDE, PRESERVATIVE FREE 5 ML: 5 INJECTION INTRAVENOUS at 11:19

## 2018-08-14 RX ADMIN — DIPHENHYDRAMINE HYDROCHLORIDE 25 MG: 25 CAPSULE ORAL at 09:30

## 2018-08-14 RX ADMIN — PACLITAXEL 116 MG: 6 INJECTION, SOLUTION, CONCENTRATE INTRAVENOUS at 10:19

## 2018-08-14 RX ADMIN — SODIUM CHLORIDE 250 ML: 9 INJECTION, SOLUTION INTRAVENOUS at 09:30

## 2018-08-14 RX ADMIN — FAMOTIDINE 20 MG: 20 INJECTION, SOLUTION INTRAVENOUS at 09:59

## 2018-08-14 ASSESSMENT — PAIN SCALES - GENERAL: PAINLEVEL: MILD PAIN (3)

## 2018-08-14 NOTE — PROGRESS NOTES
Infusion Nursing Note:  Murile Diaz presents today for Taxol C9D1.    Patient seen by provider today: No   present during visit today: Not Applicable.    Note: Denies any new medical concerns since yesterday.    Intravenous Access:  Implanted Port.    Treatment Conditions:  Lab Results   Component Value Date    HGB 10.7 08/14/2018     Lab Results   Component Value Date    WBC 5.3 08/14/2018      Lab Results   Component Value Date    ANEU 3.1 08/14/2018     Lab Results   Component Value Date     08/14/2018   Results reviewed, labs MET treatment parameters, ok to proceed with treatment.    Post Infusion Assessment:  Patient tolerated infusion without incident.  Blood return noted pre and post infusion.  Site patent and intact, free from redness, edema or discomfort.  No evidence of extravasations.  Access discontinued per protocol.    Discharge Plan:   Discharge instructions reviewed with: Patient.  Patient and/or family verbalized understanding of discharge instructions and all questions answered.  AVS to patient via [employer].  Patient will return 8/21/2018 for next appointment.   Patient discharged in stable condition accompanied by: self.  Departure Mode: Ambulatory.    Alexia Loaiza RN

## 2018-08-14 NOTE — MR AVS SNAPSHOT
After Visit Summary   8/14/2018    Muriel Diaz    MRN: [id number]           Patient Information     Date Of Birth          1960        Visit Information        Provider Department      8/14/2018 9:00 AM  INFUSION CHAIR 19 [facility]        Today's Diagnoses     Malignant neoplasm of upper-inner quadrant of left breast in female, estrogen receptor positive (H)    -  1       Follow-ups after your visit        Your next 10 appointments already scheduled     Aug 21, 2018  7:30 AM CDT   Level 4 with  INFUSION CHAIR 2   [facility] [place] ([facility])    [facility]  [address]  [place] [address]   [phone]            Aug 28, 2018  1:00 PM CDT   Level 4 with  INFUSION CHAIR 6   [facility] [place] ([facility])    [facility]  [address]  [place] [address] [phone]            Aug 28, 2018  1:30 PM CDT   Return Visit with Patience Mckeon MD   [facility] ([facility])    [facility]  [address]  [place] [address] [phone]              Who to contact     If you have questions or need follow up information about today's clinic visit or your schedule please contact [facility] [place] directly at [phone].  Normal or non-critical lab and imaging results will be communicated to you by MyChart, letter or phone within 4 business days after the clinic has received the results. If you do not hear from us within 7 days, please contact the clinic through MyChart or phone. If you have a critical or abnormal lab result, we will notify you by phone as soon as possible.  Submit refill requests through [employer] or call your pharmacy and they will forward the refill request to us. Please allow 3 business days  for your refill to be completed.          Additional Information About Your Visit        Care EveryWhere ID     This is your Care EveryWhere ID. This could be used by other organizations to access your [place] medical records  [id number]        Your Vitals Were     Pulse Temperature Respirations Pulse Oximetry BMI (Body Mass Index)       71 98.2  F (36.8  C) (Oral) 16 98% 18.18 kg/m2        Blood Pressure from Last 3 Encounters:   08/14/18 (!) 175/96   08/07/18 (!) 158/98   08/07/18 (!) 149/94    Weight from Last 3 Encounters:   08/14/18 46.5 kg (102 lb 9.6 oz)   08/07/18 45.7 kg (100 lb 12.8 oz)   08/07/18 45.7 kg (100 lb 12.8 oz)              We Performed the Following     CBC with platelets differential     Hepatic panel        Primary Care Provider Office Phone # Fax #    Isabel Vickie Landin [phone] [address] [place] [address]        Equal Access to Services     [facility]: Hadii aad ku hadasho Soomaali, waaxda luqadaha, qaybta kaalmada adeegyada, waxay yarelis haydaniel malcolm . So [facility] [phone].    ATENCIÓN: Si habla español, tiene a chowdary disposición servicios gratuitos de asistencia lingüística. [facility] [phone].    We comply with applicable federal civil rights laws and [place] laws. We do not discriminate on the basis of race, color, national origin, age, disability, sex, sexual orientation, or gender identity.            Thank you!     Thank you for choosing [facility] AND [facility]  for your care. Our goal is always to provide you with excellent care. Hearing back from our patients is one way we can continue to improve our services. Please take a few minutes to complete the written survey that you may receive in the mail after your visit with us. Thank you!             Your Updated Medication List - Protect others around you: Learn how to safely use, store and throw away your medicines at www.disposemymeds.org.           This list is accurate as of 8/14/18 11:22 AM.  Always use your most recent med list.                   Brand Name Dispense Instructions for use Diagnosis    atenolol 25 MG tablet    TENORMIN    30 tablet    TAKE ONE TABLET BY MOUTH ONE TIME DAILY    Hypertension goal BP (blood pressure) < 140/90       hydrochlorothiazide 12.5 MG Tabs tablet     90 tablet    Take 1 tablet (12.5 mg) by mouth daily    Hypertension goal BP (blood pressure) < 140/90       lidocaine-prilocaine cream    EMLA    30 g    Apply topically as needed for moderate pain Apply to port site 1 hour prior to accessing    Malignant neoplasm of left breast (H)       LORazepam 0.5 MG tablet    ATIVAN    30 tablet    Take 1 tablet (0.5 mg) by mouth every 4 hours as needed (Anxiety, Nausea/Vomiting or Sleep)    Malignant neoplasm of upper-inner quadrant of left breast in female, estrogen receptor positive (H)       prochlorperazine 10 MG tablet    COMPAZINE    30 tablet    Take 1 tablet (10 mg) by mouth every 6 hours as needed (Nausea/Vomiting)    Malignant neoplasm of upper-inner quadrant of left breast in female, estrogen receptor positive (H)       SOMA PO      Take 350 mg by mouth 3 times daily        VITAMIN D (CHOLECALCIFEROL) PO      Take 2,000 Units by mouth daily (2 x 1000 units = 2000 unit dose)        zolpidem 5 MG tablet    AMBIEN    45 tablet    TAKE ONE TABLET BY MOUTH AT BEDTIME AS NEEDED FOR SLEEP . May repeat x 1 PRN    Insomnia due to medical condition

## 2018-08-17 RX ORDER — SODIUM CHLORIDE 9 MG/ML
1000 INJECTION, SOLUTION INTRAVENOUS CONTINUOUS PRN
Status: CANCELLED
Start: 2018-08-21

## 2018-08-17 RX ORDER — EPINEPHRINE 1 MG/ML
0.3 INJECTION, SOLUTION INTRAMUSCULAR; SUBCUTANEOUS EVERY 5 MIN PRN
Status: CANCELLED | OUTPATIENT
Start: 2018-08-21

## 2018-08-17 RX ORDER — ALBUTEROL SULFATE 0.83 MG/ML
2.5 SOLUTION RESPIRATORY (INHALATION)
Status: CANCELLED | OUTPATIENT
Start: 2018-08-21

## 2018-08-17 RX ORDER — MEPERIDINE HYDROCHLORIDE 25 MG/ML
25 INJECTION INTRAMUSCULAR; INTRAVENOUS; SUBCUTANEOUS EVERY 30 MIN PRN
Status: CANCELLED | OUTPATIENT
Start: 2018-08-21

## 2018-08-17 RX ORDER — DIPHENHYDRAMINE HCL 25 MG
25 CAPSULE ORAL ONCE
Status: CANCELLED
Start: 2018-08-21

## 2018-08-17 RX ORDER — METHYLPREDNISOLONE SODIUM SUCCINATE 125 MG/2ML
125 INJECTION, POWDER, LYOPHILIZED, FOR SOLUTION INTRAMUSCULAR; INTRAVENOUS
Status: CANCELLED
Start: 2018-08-21

## 2018-08-17 RX ORDER — DIPHENHYDRAMINE HYDROCHLORIDE 50 MG/ML
50 INJECTION INTRAMUSCULAR; INTRAVENOUS
Status: CANCELLED
Start: 2018-08-21

## 2018-08-17 RX ORDER — EPINEPHRINE 0.3 MG/.3ML
0.3 INJECTION SUBCUTANEOUS EVERY 5 MIN PRN
Status: CANCELLED | OUTPATIENT
Start: 2018-08-21

## 2018-08-17 RX ORDER — LORAZEPAM 2 MG/ML
0.5 INJECTION INTRAMUSCULAR EVERY 4 HOURS PRN
Status: CANCELLED
Start: 2018-08-21

## 2018-08-17 RX ORDER — HEPARIN SODIUM (PORCINE) LOCK FLUSH IV SOLN 100 UNIT/ML 100 UNIT/ML
5 SOLUTION INTRAVENOUS EVERY 8 HOURS
Status: CANCELLED
Start: 2018-08-21

## 2018-08-17 RX ORDER — ALBUTEROL SULFATE 90 UG/1
1-2 AEROSOL, METERED RESPIRATORY (INHALATION)
Status: CANCELLED
Start: 2018-08-21

## 2018-08-21 ENCOUNTER — INFUSION THERAPY VISIT (OUTPATIENT)
Dept: INFUSION THERAPY | Facility: CLINIC | Age: 58
End: 2018-08-21
Attending: INTERNAL MEDICINE
Payer: COMMERCIAL

## 2018-08-21 ENCOUNTER — HOSPITAL ENCOUNTER (OUTPATIENT)
Facility: CLINIC | Age: 58
Setting detail: SPECIMEN
Discharge: HOME | End: 2018-08-21
Attending: INTERNAL MEDICINE | Admitting: INTERNAL MEDICINE
Payer: COMMERCIAL

## 2018-08-21 VITALS
DIASTOLIC BLOOD PRESSURE: 87 MMHG | WEIGHT: 101.4 LBS | SYSTOLIC BLOOD PRESSURE: 135 MMHG | TEMPERATURE: 98 F | RESPIRATION RATE: 16 BRPM | HEART RATE: 62 BPM | BODY MASS INDEX: 17.96 KG/M2 | HEIGHT: 63 IN

## 2018-08-21 DIAGNOSIS — Z17.0 MALIGNANT NEOPLASM OF UPPER-INNER QUADRANT OF LEFT BREAST IN FEMALE, ESTROGEN RECEPTOR POSITIVE (H): Primary | ICD-10-CM

## 2018-08-21 DIAGNOSIS — C50.212 MALIGNANT NEOPLASM OF UPPER-INNER QUADRANT OF LEFT BREAST IN FEMALE, ESTROGEN RECEPTOR POSITIVE (H): Primary | ICD-10-CM

## 2018-08-21 LAB
BASOPHILS # BLD AUTO: 0.1 10E9/L (ref 0–0.2)
BASOPHILS NFR BLD AUTO: 1.3 %
DIFFERENTIAL METHOD BLD: ABNORMAL
EOSINOPHIL # BLD AUTO: 0.2 10E9/L (ref 0–0.7)
EOSINOPHIL NFR BLD AUTO: 3.6 %
ERYTHROCYTE [DISTWIDTH] IN BLOOD BY AUTOMATED COUNT: 16.5 % (ref 10–15)
HCT VFR BLD AUTO: 33.1 % (ref 35–47)
HGB BLD-MCNC: 10.9 G/DL (ref 11.7–15.7)
IMM GRANULOCYTES # BLD: 0 10E9/L (ref 0–0.4)
IMM GRANULOCYTES NFR BLD: 0.4 %
LYMPHOCYTES # BLD AUTO: 1.4 10E9/L (ref 0.8–5.3)
LYMPHOCYTES NFR BLD AUTO: 25.6 %
MCH RBC QN AUTO: 32.3 PG (ref 26.5–33)
MCHC RBC AUTO-ENTMCNC: 32.9 G/DL (ref 31.5–36.5)
MCV RBC AUTO: 98 FL (ref 78–100)
MONOCYTES # BLD AUTO: 0.4 10E9/L (ref 0–1.3)
MONOCYTES NFR BLD AUTO: 6.4 %
NEUTROPHILS # BLD AUTO: 3.5 10E9/L (ref 1.6–8.3)
NEUTROPHILS NFR BLD AUTO: 62.7 %
NRBC # BLD AUTO: 0 10*3/UL
NRBC BLD AUTO-RTO: 0 /100
PLATELET # BLD AUTO: 299 10E9/L (ref 150–450)
RBC # BLD AUTO: 3.37 10E12/L (ref 3.8–5.2)
WBC # BLD AUTO: 5.6 10E9/L (ref 4–11)

## 2018-08-21 PROCEDURE — 25000128 H RX IP 250 OP 636: Performed by: INTERNAL MEDICINE

## 2018-08-21 PROCEDURE — 25000132 ZZH RX MED GY IP 250 OP 250 PS 637: Performed by: INTERNAL MEDICINE

## 2018-08-21 PROCEDURE — 96413 CHEMO IV INFUSION 1 HR: CPT

## 2018-08-21 PROCEDURE — 85025 COMPLETE CBC W/AUTO DIFF WBC: CPT | Performed by: INTERNAL MEDICINE

## 2018-08-21 PROCEDURE — 25000125 ZZHC RX 250: Performed by: INTERNAL MEDICINE

## 2018-08-21 PROCEDURE — 96375 TX/PRO/DX INJ NEW DRUG ADDON: CPT

## 2018-08-21 RX ORDER — DIPHENHYDRAMINE HCL 25 MG
25 CAPSULE ORAL ONCE
Status: COMPLETED | OUTPATIENT
Start: 2018-08-21 | End: 2018-08-21

## 2018-08-21 RX ORDER — HEPARIN SODIUM (PORCINE) LOCK FLUSH IV SOLN 100 UNIT/ML 100 UNIT/ML
5 SOLUTION INTRAVENOUS EVERY 8 HOURS
Status: DISCONTINUED | OUTPATIENT
Start: 2018-08-21 | End: 2018-08-21 | Stop reason: HOSPADM

## 2018-08-21 RX ADMIN — DEXAMETHASONE SODIUM PHOSPHATE 20 MG: 10 INJECTION, SOLUTION INTRAMUSCULAR; INTRAVENOUS at 08:15

## 2018-08-21 RX ADMIN — SODIUM CHLORIDE, PRESERVATIVE FREE 5 ML: 5 INJECTION INTRAVENOUS at 09:56

## 2018-08-21 RX ADMIN — FAMOTIDINE 20 MG: 20 INJECTION, SOLUTION INTRAVENOUS at 08:30

## 2018-08-21 RX ADMIN — PACLITAXEL 116 MG: 6 INJECTION, SOLUTION INTRAVENOUS at 08:52

## 2018-08-21 RX ADMIN — DIPHENHYDRAMINE HYDROCHLORIDE 25 MG: 25 CAPSULE ORAL at 08:19

## 2018-08-21 RX ADMIN — SODIUM CHLORIDE 250 ML: 9 INJECTION, SOLUTION INTRAVENOUS at 08:15

## 2018-08-21 ASSESSMENT — PAIN SCALES - GENERAL: PAINLEVEL: NO PAIN (0)

## 2018-08-21 NOTE — PROGRESS NOTES
Infusion Nursing Note:  Muriel Diaz presents today for C10D1 taxol.    Patient seen by provider today: No   present during visit today: Not Applicable.    Note: N/A.    Intravenous Access:  Implanted Port.    Treatment Conditions:  Lab Results   Component Value Date    HGB 10.9 08/21/2018     Lab Results   Component Value Date    WBC 5.6 08/21/2018      Lab Results   Component Value Date    ANEU 3.5 08/21/2018     Lab Results   Component Value Date     08/21/2018      Lab Results   Component Value Date     06/19/2018                   Lab Results   Component Value Date    POTASSIUM 3.8 06/19/2018           No results found for: MAG         Lab Results   Component Value Date    CR 0.55 06/19/2018                   Lab Results   Component Value Date    HATTIE 8.4 06/19/2018                Lab Results   Component Value Date    BILITOTAL 0.2 08/14/2018           Lab Results   Component Value Date    ALBUMIN 3.5 08/14/2018                    Lab Results   Component Value Date    ALT 33 08/14/2018           Lab Results   Component Value Date    AST 22 08/14/2018       Results reviewed, labs MET treatment parameters, ok to proceed with treatment.      Post Infusion Assessment:  Patient tolerated infusion without incident.  Site patent and intact, free from redness, edema or discomfort.  No evidence of extravasations.  Access discontinued per protocol.    Discharge Plan:   Patient and/or family verbalized understanding of discharge instructions and all questions answered.  Copy of AVS reviewed with patient and/or family.  Patient will return 8/28 for next appointment.  Patient discharged in stable condition accompanied by: self.  Departure Mode: Ambulatory.    Ludy Fuller RN

## 2018-08-21 NOTE — MR AVS SNAPSHOT
After Visit Summary   8/21/2018    Muriel Diaz    MRN: [id number]           Patient Information     Date Of Birth          1960        Visit Information        Provider Department      8/21/2018 7:30 AM  INFUSION CHAIR 2 [facility]        Today's Diagnoses     Malignant neoplasm of upper-inner quadrant of left breast in female, estrogen receptor positive (H)    -  1       Follow-ups after your visit        Your next 10 appointments already scheduled     Aug 28, 2018  1:00 PM CDT   Level 4 with  INFUSION CHAIR 6   [facility]    [facility]  [address]  [place] [address] [phone]            Aug 28, 2018  1:30 PM CDT   Return Visit with Patience Mckeon MD   [facility]    [facility]  [address]  [place] [address] [phone]              Who to contact     If you have questions or need follow up information about today's clinic visit or your schedule please contact [facility] directly at [phone].  Normal or non-critical lab and imaging results will be communicated to you by MyChart, letter or phone within 4 business days after the clinic has received the results. If you do not hear from us within 7 days, please contact the clinic through MyChart or phone. If you have a critical or abnormal lab result, we will notify you by phone as soon as possible.  Submit refill requests through [employer] or call your pharmacy and they will forward the refill request to us. Please allow 3 business days for your refill to be completed.          Additional Information About Your Visit        Care EveryWhere ID     This is your Care EveryWhere ID. This could be used by other organizations to access your [place] medical records  [id number]       "  Your Vitals Were     Pulse Temperature Respirations Height BMI (Body Mass Index)       62 98  F (36.7  C) (Oral) 16 1.6 m (5' 2.99\") 17.97 kg/m2        Blood Pressure from Last 3 Encounters:   08/21/18 135/87   08/14/18 (!) 175/96   08/07/18 (!) 158/98    Weight from Last 3 Encounters:   08/21/18 46 kg (101 lb 6.4 oz)   08/14/18 46.5 kg (102 lb 9.6 oz)   08/07/18 45.7 kg (100 lb 12.8 oz)              We Performed the Following     CBC with platelets diff        Primary Care Provider Office Phone # Fax #    Isabel Vickie Landin [phone] [address] [place] [address]        Equal Access to Services     : Hadii lupe dennison hadasho Soomaali, waaxda luqadaha, qaybta kaalmada adeegyada, kvng malcolm . So [facility] [phone].    ATENCIÓN: Si habla español, tiene a chowdary disposición servicios gratuitos de asistencia lingüística. Concha al [phone].    We comply with applicable federal civil rights laws and [place] laws. We do not discriminate on the basis of race, color, national origin, age, disability, sex, sexual orientation, or gender identity.            Thank you!     Thank you for choosing [facility] AND [facility]  for your care. Our goal is always to provide you with excellent care. Hearing back from our patients is one way we can continue to improve our services. Please take a few minutes to complete the written survey that you may receive in the mail after your visit with us. Thank you!             Your Updated Medication List - Protect others around you: Learn how to safely use, store and throw away your medicines at www.disposemymeds.org.          This list is accurate as of 8/21/18  3:29 PM.  Always use your most recent med list.                   Brand Name Dispense Instructions for use Diagnosis    atenolol 25 MG tablet    TENORMIN    30 tablet    TAKE ONE TABLET BY MOUTH ONE TIME DAILY    Hypertension goal BP " (blood pressure) < 140/90       hydrochlorothiazide 12.5 MG Tabs tablet     90 tablet    Take 1 tablet (12.5 mg) by mouth daily    Hypertension goal BP (blood pressure) < 140/90       lidocaine-prilocaine cream    EMLA    30 g    Apply topically as needed for moderate pain Apply to port site 1 hour prior to accessing    Malignant neoplasm of left breast (H)       LORazepam 0.5 MG tablet    ATIVAN    30 tablet    Take 1 tablet (0.5 mg) by mouth every 4 hours as needed (Anxiety, Nausea/Vomiting or Sleep)    Malignant neoplasm of upper-inner quadrant of left breast in female, estrogen receptor positive (H)       prochlorperazine 10 MG tablet    COMPAZINE    30 tablet    Take 1 tablet (10 mg) by mouth every 6 hours as needed (Nausea/Vomiting)    Malignant neoplasm of upper-inner quadrant of left breast in female, estrogen receptor positive (H)       SOMA PO      Take 350 mg by mouth 3 times daily        VITAMIN D (CHOLECALCIFEROL) PO      Take 2,000 Units by mouth daily (2 x 1000 units = 2000 unit dose)        zolpidem 5 MG tablet    AMBIEN    45 tablet    TAKE ONE TABLET BY MOUTH AT BEDTIME AS NEEDED FOR SLEEP . May repeat x 1 PRN    Insomnia due to medical condition

## 2018-08-24 RX ORDER — MEPERIDINE HYDROCHLORIDE 25 MG/ML
25 INJECTION INTRAMUSCULAR; INTRAVENOUS; SUBCUTANEOUS EVERY 30 MIN PRN
Status: CANCELLED | OUTPATIENT
Start: 2018-08-28

## 2018-08-24 RX ORDER — HEPARIN SODIUM (PORCINE) LOCK FLUSH IV SOLN 100 UNIT/ML 100 UNIT/ML
5 SOLUTION INTRAVENOUS EVERY 8 HOURS
Status: CANCELLED
Start: 2018-08-28

## 2018-08-24 RX ORDER — DIPHENHYDRAMINE HCL 25 MG
25 CAPSULE ORAL ONCE
Status: CANCELLED
Start: 2018-08-28

## 2018-08-24 RX ORDER — EPINEPHRINE 0.3 MG/.3ML
0.3 INJECTION SUBCUTANEOUS EVERY 5 MIN PRN
Status: CANCELLED | OUTPATIENT
Start: 2018-08-28

## 2018-08-24 RX ORDER — EPINEPHRINE 1 MG/ML
0.3 INJECTION, SOLUTION INTRAMUSCULAR; SUBCUTANEOUS EVERY 5 MIN PRN
Status: CANCELLED | OUTPATIENT
Start: 2018-08-28

## 2018-08-24 RX ORDER — LORAZEPAM 2 MG/ML
0.5 INJECTION INTRAMUSCULAR EVERY 4 HOURS PRN
Status: CANCELLED
Start: 2018-08-28

## 2018-08-24 RX ORDER — METHYLPREDNISOLONE SODIUM SUCCINATE 125 MG/2ML
125 INJECTION, POWDER, LYOPHILIZED, FOR SOLUTION INTRAMUSCULAR; INTRAVENOUS
Status: CANCELLED
Start: 2018-08-28

## 2018-08-24 RX ORDER — ALBUTEROL SULFATE 90 UG/1
1-2 AEROSOL, METERED RESPIRATORY (INHALATION)
Status: CANCELLED
Start: 2018-08-28

## 2018-08-24 RX ORDER — SODIUM CHLORIDE 9 MG/ML
1000 INJECTION, SOLUTION INTRAVENOUS CONTINUOUS PRN
Status: CANCELLED
Start: 2018-08-28

## 2018-08-24 RX ORDER — ALBUTEROL SULFATE 0.83 MG/ML
2.5 SOLUTION RESPIRATORY (INHALATION)
Status: CANCELLED | OUTPATIENT
Start: 2018-08-28

## 2018-08-24 RX ORDER — DIPHENHYDRAMINE HYDROCHLORIDE 50 MG/ML
50 INJECTION INTRAMUSCULAR; INTRAVENOUS
Status: CANCELLED
Start: 2018-08-28

## 2018-08-28 ENCOUNTER — ONCOLOGY VISIT (OUTPATIENT)
Dept: ONCOLOGY | Facility: CLINIC | Age: 58
End: 2018-08-28
Attending: INTERNAL MEDICINE
Payer: COMMERCIAL

## 2018-08-28 ENCOUNTER — INFUSION THERAPY VISIT (OUTPATIENT)
Dept: INFUSION THERAPY | Facility: CLINIC | Age: 58
End: 2018-08-28
Attending: INTERNAL MEDICINE
Payer: COMMERCIAL

## 2018-08-28 ENCOUNTER — HOSPITAL ENCOUNTER (OUTPATIENT)
Facility: CLINIC | Age: 58
Setting detail: SPECIMEN
Discharge: HOME | End: 2018-08-28
Attending: INTERNAL MEDICINE | Admitting: INTERNAL MEDICINE
Payer: COMMERCIAL

## 2018-08-28 VITALS
OXYGEN SATURATION: 97 % | RESPIRATION RATE: 20 BRPM | SYSTOLIC BLOOD PRESSURE: 142 MMHG | BODY MASS INDEX: 18.21 KG/M2 | TEMPERATURE: 98.3 F | HEIGHT: 63 IN | HEART RATE: 59 BPM | DIASTOLIC BLOOD PRESSURE: 88 MMHG | WEIGHT: 102.8 LBS

## 2018-08-28 VITALS
DIASTOLIC BLOOD PRESSURE: 88 MMHG | OXYGEN SATURATION: 97 % | RESPIRATION RATE: 20 BRPM | BODY MASS INDEX: 18.21 KG/M2 | TEMPERATURE: 98.3 F | WEIGHT: 102.8 LBS | SYSTOLIC BLOOD PRESSURE: 142 MMHG | HEART RATE: 59 BPM

## 2018-08-28 DIAGNOSIS — Z17.0 MALIGNANT NEOPLASM OF UPPER-INNER QUADRANT OF LEFT BREAST IN FEMALE, ESTROGEN RECEPTOR POSITIVE (H): Primary | ICD-10-CM

## 2018-08-28 DIAGNOSIS — C50.212 MALIGNANT NEOPLASM OF UPPER-INNER QUADRANT OF LEFT BREAST IN FEMALE, ESTROGEN RECEPTOR POSITIVE (H): Primary | ICD-10-CM

## 2018-08-28 LAB
BASOPHILS # BLD AUTO: 0.1 10E9/L (ref 0–0.2)
BASOPHILS NFR BLD AUTO: 0.9 %
DIFFERENTIAL METHOD BLD: ABNORMAL
EOSINOPHIL # BLD AUTO: 0.2 10E9/L (ref 0–0.7)
EOSINOPHIL NFR BLD AUTO: 3.8 %
ERYTHROCYTE [DISTWIDTH] IN BLOOD BY AUTOMATED COUNT: 15.9 % (ref 10–15)
HCT VFR BLD AUTO: 35 % (ref 35–47)
HGB BLD-MCNC: 11.7 G/DL (ref 11.7–15.7)
IMM GRANULOCYTES # BLD: 0 10E9/L (ref 0–0.4)
IMM GRANULOCYTES NFR BLD: 0.4 %
LYMPHOCYTES # BLD AUTO: 1.1 10E9/L (ref 0.8–5.3)
LYMPHOCYTES NFR BLD AUTO: 20.2 %
MCH RBC QN AUTO: 33.1 PG (ref 26.5–33)
MCHC RBC AUTO-ENTMCNC: 33.4 G/DL (ref 31.5–36.5)
MCV RBC AUTO: 99 FL (ref 78–100)
MONOCYTES # BLD AUTO: 0.4 10E9/L (ref 0–1.3)
MONOCYTES NFR BLD AUTO: 7 %
NEUTROPHILS # BLD AUTO: 3.8 10E9/L (ref 1.6–8.3)
NEUTROPHILS NFR BLD AUTO: 67.7 %
PLATELET # BLD AUTO: 338 10E9/L (ref 150–450)
RBC # BLD AUTO: 3.53 10E12/L (ref 3.8–5.2)
WBC # BLD AUTO: 5.5 10E9/L (ref 4–11)

## 2018-08-28 PROCEDURE — 85025 COMPLETE CBC W/AUTO DIFF WBC: CPT | Performed by: INTERNAL MEDICINE

## 2018-08-28 PROCEDURE — 99214 OFFICE O/P EST MOD 30 MIN: CPT | Performed by: INTERNAL MEDICINE

## 2018-08-28 PROCEDURE — G0463 HOSPITAL OUTPT CLINIC VISIT: HCPCS | Mod: 25

## 2018-08-28 PROCEDURE — 25000128 H RX IP 250 OP 636: Performed by: INTERNAL MEDICINE

## 2018-08-28 PROCEDURE — 25000132 ZZH RX MED GY IP 250 OP 250 PS 637: Performed by: INTERNAL MEDICINE

## 2018-08-28 PROCEDURE — 96413 CHEMO IV INFUSION 1 HR: CPT

## 2018-08-28 PROCEDURE — 96375 TX/PRO/DX INJ NEW DRUG ADDON: CPT

## 2018-08-28 PROCEDURE — 25000125 ZZHC RX 250: Performed by: INTERNAL MEDICINE

## 2018-08-28 PROCEDURE — 96367 TX/PROPH/DG ADDL SEQ IV INF: CPT

## 2018-08-28 RX ORDER — DIPHENHYDRAMINE HCL 25 MG
25 CAPSULE ORAL ONCE
Status: COMPLETED | OUTPATIENT
Start: 2018-08-28 | End: 2018-08-28

## 2018-08-28 RX ORDER — HEPARIN SODIUM (PORCINE) LOCK FLUSH IV SOLN 100 UNIT/ML 100 UNIT/ML
5 SOLUTION INTRAVENOUS EVERY 8 HOURS
Status: DISCONTINUED | OUTPATIENT
Start: 2018-08-28 | End: 2018-08-28 | Stop reason: HOSPADM

## 2018-08-28 RX ADMIN — DEXAMETHASONE SODIUM PHOSPHATE 20 MG: 10 INJECTION, SOLUTION INTRAMUSCULAR; INTRAVENOUS at 14:01

## 2018-08-28 RX ADMIN — PACLITAXEL 116 MG: 6 INJECTION, SOLUTION INTRAVENOUS at 14:34

## 2018-08-28 RX ADMIN — FAMOTIDINE 20 MG: 20 INJECTION, SOLUTION INTRAVENOUS at 14:20

## 2018-08-28 RX ADMIN — SODIUM CHLORIDE 250 ML: 9 INJECTION, SOLUTION INTRAVENOUS at 14:01

## 2018-08-28 RX ADMIN — DIPHENHYDRAMINE HYDROCHLORIDE 25 MG: 25 CAPSULE ORAL at 14:01

## 2018-08-28 RX ADMIN — SODIUM CHLORIDE, PRESERVATIVE FREE 5 ML: 5 INJECTION INTRAVENOUS at 15:37

## 2018-08-28 ASSESSMENT — PAIN SCALES - GENERAL: PAINLEVEL: NO PAIN (0)

## 2018-08-28 NOTE — MR AVS SNAPSHOT
After Visit Summary   8/28/2018    Muriel Diaz    MRN: [id number]           Patient Information     Date Of Birth          1960        Visit Information        Provider Department      8/28/2018 1:30 PM Patience Mckeon MD [facility]        Today's Diagnoses     Malignant neoplasm of upper-inner quadrant of left breast in female, estrogen receptor positive (H)    -  1      Care Instructions    -paclitaxel today, pending labs  -schedule paclitaxel for 9/4/18, 9/11/18, 9/18/18  Scheduled/beulah  -return to clinic in 3 weeks with paclitaxel infusion same day scheduled/Beulah      Patient is in [facility]          Follow-ups after your visit        Your next 10 appointments already scheduled     Sep 04, 2018  8:00 AM CDT   Level 4 with SH INFUSION CHAIR 4   [facility] ([facility])    [facility]  [address]  [place] [address]   [phone]            Sep 11, 2018 10:30 AM CDT   Level 4 with SH INFUSION CHAIR 10   [facility] ([facility])    [facility]  [address]  [place] [address]   [phone]            Sep 18, 2018  9:00 AM CDT   Level 4 with SH INFUSION CHAIR 18   [facility] ([facility])    [facility]  [address]  [place] [address]   [phone]            Sep 18, 2018  9:30 AM CDT   Return Visit with Patience Mckeon MD   [facility] ([facility])    [facility]  [address]  [place] [address] [phone]              Who to contact     If you have questions or need follow up information about today's clinic visit or your schedule please contact [facility] directly at [phone].  Normal or non-critical lab and  imaging results will be communicated to you by MyChart, letter or phone within 4 business days after the clinic has received the results. If you do not hear from us within 7 days, please contact the clinic through MyChart or phone. If you have a critical or abnormal lab result, we will notify you by phone as soon as possible.  Submit refill requests through [employer] or call your pharmacy and they will forward the refill request to us. Please allow 3 business days for your refill to be completed.          Additional Information About Your Visit        Care EveryWhere ID     This is your Care EveryWhere ID. This could be used by other organizations to access your [facility] medical records  [id number]        Your Vitals Were     Pulse Temperature Respirations Pulse Oximetry BMI (Body Mass Index)       59 98.3  F (36.8  C) (Oral) 20 97% 18.21 kg/m2        Blood Pressure from Last 3 Encounters:   08/28/18 142/88   08/28/18 142/88   08/21/18 135/87    Weight from Last 3 Encounters:   08/28/18 46.6 kg (102 lb 12.8 oz)   08/28/18 46.6 kg (102 lb 12.8 oz)   08/21/18 46 kg (101 lb 6.4 oz)              Today, you had the following     No orders found for display       Primary Care Provider Office Phone # Fax #    Isabel Vickie Landin [phone] [address] [place] [address]        Equal Access to Services     [facility] : Hadii aad ku hadasho Socriss, waaxda luqadaha, qaybta kaalmada jenniferyada, kvng malcolm . So [facility] [phone].    ATENCIÓN: Si habla español, tiene a chowdary disposición servicios gratuitos de asistencia lingüística. Llame al [phone].    We comply with applicable federal civil rights laws and [place] laws. We do not discriminate on the basis of race, color, national origin, age, disability, sex, sexual orientation, or gender identity.            Thank you!     Thank you for choosing [facility]  for your care. Our goal is  always to provide you with excellent care. Hearing back from our patients is one way we can continue to improve our services. Please take a few minutes to complete the written survey that you may receive in the mail after your visit with us. Thank you!             Your Updated Medication List - Protect others around you: Learn how to safely use, store and throw away your medicines at www.disposemymeds.org.          This list is accurate as of 8/28/18  1:59 PM.  Always use your most recent med list.                   Brand Name Dispense Instructions for use Diagnosis    atenolol 25 MG tablet    TENORMIN    30 tablet    TAKE ONE TABLET BY MOUTH ONE TIME DAILY    Hypertension goal BP (blood pressure) < 140/90       hydrochlorothiazide 12.5 MG Tabs tablet     90 tablet    Take 1 tablet (12.5 mg) by mouth daily    Hypertension goal BP (blood pressure) < 140/90       lidocaine-prilocaine cream    EMLA    30 g    Apply topically as needed for moderate pain Apply to port site 1 hour prior to accessing    Malignant neoplasm of left breast (H)       LORazepam 0.5 MG tablet    ATIVAN    30 tablet    Take 1 tablet (0.5 mg) by mouth every 4 hours as needed (Anxiety, Nausea/Vomiting or Sleep)    Malignant neoplasm of upper-inner quadrant of left breast in female, estrogen receptor positive (H)       prochlorperazine 10 MG tablet    COMPAZINE    30 tablet    Take 1 tablet (10 mg) by mouth every 6 hours as needed (Nausea/Vomiting)    Malignant neoplasm of upper-inner quadrant of left breast in female, estrogen receptor positive (H)       SOMA PO      Take 350 mg by mouth 3 times daily        VITAMIN D (CHOLECALCIFEROL) PO      Take 2,000 Units by mouth daily (2 x 1000 units = 2000 unit dose)        zolpidem 5 MG tablet    AMBIEN    45 tablet    TAKE ONE TABLET BY MOUTH AT BEDTIME AS NEEDED FOR SLEEP . May repeat x 1 PRN    Insomnia due to medical condition

## 2018-08-28 NOTE — PROGRESS NOTES
[facility]    Hematology/Oncology Established Patient Note      Today's Date: 08/28/18    Reason for Follow-up: left sided breast cancer      HISTORY OF PRESENT ILLNESS: Muriel Diaz is a 57 year old post-menopausal female with PMHx of HTN, degenerative joint disease, history of cervical spine surgery, who presents with left-sided breast cancer.  She underwent left mastectomy on 4/16/18 by Dr. Umana.  Pathology showed invasive mammary carcinoma, with lobular and ductal features, grade 2, tumor size 4.3 x 4.0 x 2.1 cm, positive margin for invasive carcinoma in the central and lateral posterior surface, DCIS and LCIS present, ER positive (95%), OH positive (50%), HER-2 negative.  Left axillary dissection showed 3 of 5 lymph nodes were positive for metastatic carcinoma (lobular type), also ER positive, OH positive, HER-2 negative. Stage [id number] (stage IIA).      She had port placed on 5/14/18.    She started chemotherapy on 5/22/18:    Doxorubicin 60 mg/m2 IV on day 1  Cyclophosphamide 600 mg/m2 IV on day 1  Neulasta 6 mg SQ on day 2  Every 2 week cycles x 4 cycles    Followed by:    Paclitaxel 80 mg/m2 IV once a week x 12 weeks    AC  C1D1: 5/22/18  C2D1: 6/5/18  C3D1: 6/19/18  C4D1: 7/3/18    Paclitaxel:  Cycle 1: 7/17/18:  Cycle 2: 7/24/18  Cycle 3: 7/31/18  Cycle 4: 8/7/18  Cycle 5: 8/14/18  Cycle 6: 8/21/18  Cycle 7: 8/28/18  Cycle 8: anticipated for 9/4/18  Cycle 9: anticipated for 9/11/18  Cycle 10: anticipated for 9/18/18        INTERIM HISTORY: Muriel is here for follow-up.   She says that she is doing well.  Her neck pain has resolved on its own.  She is tolerating chemotherapy well.  She denies any problems with paclitaxel.  She denies neuropathy or rash.  She notes that she bit the inside of her mouth earlier by accident.      REVIEW OF SYSTEMS:   14 point ROS was reviewed and is negative other than as noted above in HPI.       HOME MEDICATIONS:  Current Outpatient  Prescriptions   Medication Sig Dispense Refill     atenolol (TENORMIN) 25 MG tablet TAKE ONE TABLET BY MOUTH ONE TIME DAILY 30 tablet 3     Carisoprodol (SOMA PO) Take 350 mg by mouth 3 times daily       hydrochlorothiazide 12.5 MG TABS tablet Take 1 tablet (12.5 mg) by mouth daily 90 tablet 1     lidocaine-prilocaine (EMLA) cream Apply topically as needed for moderate pain Apply to port site 1 hour prior to accessing 30 g 3     LORazepam (ATIVAN) 0.5 MG tablet Take 1 tablet (0.5 mg) by mouth every 4 hours as needed (Anxiety, Nausea/Vomiting or Sleep) 30 tablet 5     prochlorperazine (COMPAZINE) 10 MG tablet Take 1 tablet (10 mg) by mouth every 6 hours as needed (Nausea/Vomiting) 30 tablet 5     VITAMIN D, CHOLECALCIFEROL, PO Take 2,000 Units by mouth daily (2 x 1000 units = 2000 unit dose)       zolpidem (AMBIEN) 5 MG tablet TAKE ONE TABLET BY MOUTH AT BEDTIME AS NEEDED FOR SLEEP . May repeat x 1 PRN 45 tablet 0         ALLERGIES:  Allergies   Allergen Reactions     Amitriptyline Other (See Comments)     nightmares     Oxycontin [Oxycodone] Nausea     Severe headaches         PAST MEDICAL HISTORY:  Past Medical History:   Diagnosis Date     Cancer (H)     BREAST CANCER     Degeneration of cervical intervertebral disc      Degeneration of lumbar or lumbosacral intervertebral disc      Hypertension      PONV (postoperative nausea and vomiting)          PAST SURGICAL HISTORY:  Past Surgical History:   Procedure Laterality Date     BACK SURGERY  2001    lumbar fusion, cervical discectomy     DISSECT LYMPH NODE AXILLA Left 4/16/2018    Procedure: DISSECT LYMPH NODE AXILLA;;  Surgeon: Rodney Umana MD;  Location: [facility]     FUSION CERVICAL ANTERIOR THREE+ LEVELS  5/1/2012    Procedure:FUSION CERVICAL ANTERIOR THREE+ LEVELS; Surgeon:SARAH FRANCO; Location:[facility]     FUSION CERVICAL POSTERIOR THREE+ LEVELS  5/1/2012    Procedure:FUSION CERVICAL POSTERIOR THREE+ LEVELS; Posterior Cervical decompression and  fusion C4-7, Anterior Cervical decompression and fusion C4-7 (c-arm), (herb table with abraham, yina oasis, yina aviator, Vitoss foam packing strip, impulse monitoring,); Surgeon:SARAH FRANCO; Location: OR     GYN SURGERY       HYSTERECTOMY VAGINAL  1990's    [facility] specilist     HYSTERECTOMY, PAP NO LONGER INDICATED       INSERT PORT VASCULAR ACCESS N/A 5/14/2018    Procedure: INSERT PORT VASCULAR ACCESS;  PORT PLACEMENT;  Surgeon: Rodney Umana MD;  Location: [facility]     MASTECTOMY SIMPLE Left 4/16/2018    Procedure: MASTECTOMY SIMPLE;  LEFT SIMPLE MASTECTOMY,LEFT AXILLARY DISSECTION;  Surgeon: Rodney Umana MD;  Location: [facility]         SOCIAL HISTORY:  Social History     Social History     Marital status: Single     Spouse name: N/A     Number of children: 0     Years of education: N/A     Occupational History      [employer]      Social History Main Topics     Smoking status: Current Every Day Smoker     Packs/day: 0.50     Types: Cigarettes     Smokeless tobacco: Never Used      Comment: 4 cigarettes per day     Alcohol use 0.6 oz/week     1 Standard drinks or equivalent per week      Comment: 2-3 drinks per week     Drug use: No     Sexual activity: Not Currently     Partners: Male     Other Topics Concern     Parent/Sibling W/ Cabg, Mi Or Angioplasty Before 65f 55m? No     Social History Narrative   She smokes cigarettes, half a pack a day for many years; she is unable to quantify how many.  She drinks alcohol occasionally.  She denies illicit drug use.  She is retired.  She used to to work in  at large companies.  She lives in [place] with her boyfriend.        FAMILY HISTORY:  Family History   Problem Relation Age of Onset     Pancreatitis Mother      Substance Abuse Mother      Unknown/Adopted Father      She does not know much about her family history and is unaware of any cancers in her family.  She has never been pregnant.  She had a  hysterectomy in the .  She still has her ovaries.  She says that she has undergone menopause but is unsure when, likely years ago.        PHYSICAL EXAM:  Vital signs:  /88 (BP Location: Right arm)  Pulse 59  Temp 98.3  F (36.8  C) (Oral)  Resp 20  Wt 46.6 kg (102 lb 12.8 oz)  SpO2 97%  BMI 18.21 kg/m2   ECO  GENERAL/CONSTITUTIONAL: No acute distress.  EYES: No scleral icterus.  RESPIRATORY: Clear to auscultation bilaterally. No crackles or wheezing.   CARDIOVASCULAR: Regular rate and rhythm without murmurs, gallops, or rubs.  GASTROINTESTINAL: No tenderness. The patient has normal bowel sounds. No guarding.  No distention.  MUSCULOSKELETAL: Warm and well-perfused.  NEUROLOGIC: Alert, oriented, answers questions appropriately.  INTEGUMENTARY: No rashes or jaundice.  Port in place at the right upper chest.      LABS:  CBC RESULTS:   Recent Labs   Lab Test  18   1316   WBC  5.5   RBC  3.53*   HGB  11.7   HCT  35.0   MCV  99   MCH  33.1*   MCHC  33.4   RDW  15.9*   PLT  338         IMAGING:  Mammogram and ultrasound 3/28/18:  FINDINGS:  Standard bilateral diagnostic views were obtained,  including tomosynthesis. Marker was placed on the left upper breast to  denote the site of the palpable lump; deep to the marker there is a  mass measuring approximately 2 cm. The mass is associated with  retraction of the left nipple. There are no associated  microcalcifications. No suspicious findings in the contralateral right  breast.     Further evaluation with targeted left breast ultrasound shows a  corresponding hypoechoic lesion at the 11:00 position, 2 cm from the  nipple, measuring 1.9 x 2.9 x 1.0 cm. Survey of the axilla shows an  enlarged lymph node measuring 0.7 x 0.6 cm.     MRI breast 18:  1. The known left breast malignancy measures 2.5 x 2.0 x 2.6 cm on  MRI. There is additional nonmass enhancement surrounding the mass in  the upper left breast.  2. No suspicious MRI finding in the right  breast.      PET-CT 4/9/18:  1. Hypermetabolic left breast mass representing the primary  malignancy.  2. A few mildly prominent left axillary lymph nodes that are  associated with only very mild metabolism. These are felt to be  indeterminant. There is a subcentimeter lymph node with mild  hypermetabolism at the right anterior upper mediastinum between the  innominate artery and superior vena cava that is felt to also be  indeterminant.  3. No other pathologic activity.  4. Small areas of sclerosis within the thoracic and lumbar spine.    Normal soft tissue neck CT.    Echo 8/2/18:  The visual ejection fraction is estimated at 55-60%.  Global peak LV longitudinal strain is averaged at -19.7%. This is within  reported normal limits (normal <-18%).  The right ventricle is normal in size and function.  Sinus rhythm was noted.      ASSESSMENT/PLAN:  Muriel Diaz is a 57 year old post-menopausal female with:    1) Left breast cancer of the upper inner quadrant: s/p left mastectomy on 4/16/18; pathology showed invasive mammary carcinoma, with lobular and ductal features, grade 2, tumor size 4.3 x 4.0 x 2.1 cm, positive margin for invasive carcinoma in the central and lateral posterior surface, DCIS and LCIS present, ER positive (95%), IL positive (50%), HER-2 negative.  Left axillary dissection showed 3 of 5 lymph nodes were positive for metastatic carcinoma (lobular type), also ER positive, IL positive, HER-2 negative. Stage [id number] (stage IIA).      I discussed with Dr. Umana regarding the positive posterior margin.  He took the posterior margin, including the fascia, and there is nothing left to take.  She can proceed with chemotherapy.      After chemotherapy, I will refer her to see radiation oncology for consideration of radiation, and then plan on starting hormonal therapy after that.      She has completed 4 cycles of AC and tolerated well.  She is now on weekly paclitaxel and tolerating well.    -Cycle  7 of paclitaxel today and continue weekly  -prn anti-emetics written, but she has not needed it.  -referred for lymphedema therapy  -RTC in 3 weeks    2) Smoking: She smokes half a pack a day.  She says that she is trying to cut back.  -I again advised smoking cessation  -she will see her PCP for tobacco cessation.    3) Hypertension: She is on medications for this.  She says that she has been anxious and nervous and has had difficulty sleeping at night.  -follows with PCP    4) Chronic back pain: s/p history of back surgeries  -she takes soma  -follows with PCP    5) Restless leg syndrome: She felt it when she got 50 mg of Benadryl and it did not feel good.  -will cut back pre-med Benadryl dose to 25 mg with future cycles of chemotherapy    6) Anemia: mild.  Hemoglobin is borderline normal today.  -monitor CBC      I spent a total of 25 minutes with the patient, with over >50% of the time in counseling and/or coordination of care.       Patience Mckeon MD  Hematology/Oncology  [facility]

## 2018-08-28 NOTE — PROGRESS NOTES
Infusion Nursing Note:  Muriel WEI Diaz presents today for Cycle 11 Day 1 Taxol.    Patient seen by provider today: Yes: Dr. cMkeon   present during visit today: Not Applicable.    Note: N/A.    Intravenous Access:  Implanted Port.    Treatment Conditions:  Lab Results   Component Value Date    HGB 11.7 08/28/2018     Lab Results   Component Value Date    WBC 5.5 08/28/2018      Lab Results   Component Value Date    ANEU 3.8 08/28/2018     Lab Results   Component Value Date     08/28/2018      Results reviewed, labs MET treatment parameters, ok to proceed with treatment.      Post Infusion Assessment:  Patient tolerated infusion without incident.  Blood return noted pre and post infusion.  Site patent and intact, free from redness, edema or discomfort.  No evidence of extravasations.  Access discontinued per protocol.    Discharge Plan:   Prescription refills given for Ativan.  Discharge instructions reviewed with: Patient.  Patient verbalized understanding of discharge instructions and all questions answered.  Copy of AVS reviewed with patient.  Patient will return 9/4/18 for next appointment.  Patient discharged in stable condition accompanied by: self.  Departure Mode: Ambulatory.    Susy Phillips RN

## 2018-08-28 NOTE — PATIENT INSTRUCTIONS
-paclitaxel today, pending labs  -schedule paclitaxel for 9/4/18, 9/11/18, 9/18/18  Scheduled/ruiz  -return to clinic in 3 weeks with paclitaxel infusion same day scheduled/Ruiz      Patient is in [facility]    AVS given to patient/Ruiz

## 2018-08-28 NOTE — PROGRESS NOTES
"Oncology Rooming Note    August 28, 2018 1:18 PM   Muriel Diaz is a 57 year old female who presents for:    Chief Complaint   Patient presents with     Oncology Clinic Visit     Malignant neoplasm of upper-inner quadrant of left breast in female, estrogen receptor positive (H)     Initial Vitals: /88 (BP Location: Right arm)  Pulse 59  Temp 98.3  F (36.8  C) (Oral)  Resp 20  Wt 46.6 kg (102 lb 12.8 oz)  SpO2 97%  BMI 18.21 kg/m2 Estimated body mass index is 18.21 kg/(m^2) as calculated from the following:    Height as of 8/21/18: 1.6 m (5' 2.99\").    Weight as of this encounter: 46.6 kg (102 lb 12.8 oz). Body surface area is 1.44 meters squared.  No Pain (0) Comment: Data Unavailable   No LMP recorded. Patient has had a hysterectomy.  Allergies reviewed: Yes  Medications reviewed: Yes    Medications: Medication refills not needed today.  Pharmacy name entered into [facility]:    [id number] - [place], [place] - [address] [facility] [place], [place] - [address]    Clinical concerns: no   5 minutes for nursing intake (face to face time)          Teagan Lakhani MA              "

## 2018-08-28 NOTE — LETTER
"    8/28/2018         RE: Muriel Diaz  [address]  [facility] [address]        Dear Colleague,    Thank you for referring your patient, Muriel Diaz, to the [facility]. Please see a copy of my visit note below.    Oncology Rooming Note    August 28, 2018 1:18 PM   Muriel Diaz is a 57 year old female who presents for:    Chief Complaint   Patient presents with     Oncology Clinic Visit     Malignant neoplasm of upper-inner quadrant of left breast in female, estrogen receptor positive (H)     Initial Vitals: /88 (BP Location: Right arm)  Pulse 59  Temp 98.3  F (36.8  C) (Oral)  Resp 20  Wt 46.6 kg (102 lb 12.8 oz)  SpO2 97%  BMI 18.21 kg/m2 Estimated body mass index is 18.21 kg/(m^2) as calculated from the following:    Height as of 8/21/18: 1.6 m (5' 2.99\").    Weight as of this encounter: 46.6 kg (102 lb 12.8 oz). Body surface area is 1.44 meters squared.  No Pain (0) Comment: Data Unavailable   No LMP recorded. Patient has had a hysterectomy.  Allergies reviewed: Yes  Medications reviewed: Yes    Medications: Medication refills not needed today.  Pharmacy name entered into [facility]:    [facility] [address]  [id number] [place], [place] - [address] [facility] [place], [place] - [address]    Clinical concerns: no   5 minutes for nursing intake (face to face time)          Teagan Lakhani MA                [facility]    Hematology/Oncology Established Patient Note      Today's Date: 08/28/18    Reason for Follow-up: left sided breast cancer      HISTORY OF PRESENT ILLNESS: Muriel Diaz is a 57 year old post-menopausal female with PMHx of HTN, degenerative joint disease, history of cervical spine surgery, who presents with left-sided breast cancer.  She underwent left mastectomy on 4/16/18 by Dr. Umana.  Pathology showed invasive mammary carcinoma, with lobular and ductal features, grade 2, " tumor size 4.3 x 4.0 x 2.1 cm, positive margin for invasive carcinoma in the central and lateral posterior surface, DCIS and LCIS present, ER positive (95%), RI positive (50%), HER-2 negative.  Left axillary dissection showed 3 of 5 lymph nodes were positive for metastatic carcinoma (lobular type), also ER positive, RI positive, HER-2 negative. Stage [id number] (stage IIA).      She had port placed on 5/14/18.    She started chemotherapy on 5/22/18:    Doxorubicin 60 mg/m2 IV on day 1  Cyclophosphamide 600 mg/m2 IV on day 1  Neulasta 6 mg SQ on day 2  Every 2 week cycles x 4 cycles    Followed by:    Paclitaxel 80 mg/m2 IV once a week x 12 weeks    AC  C1D1: 5/22/18  C2D1: 6/5/18  C3D1: 6/19/18  C4D1: 7/3/18    Paclitaxel:  Cycle 1: 7/17/18:  Cycle 2: 7/24/18  Cycle 3: 7/31/18  Cycle 4: 8/7/18  Cycle 5: 8/14/18  Cycle 6: 8/21/18  Cycle 7: 8/28/18  Cycle 8: anticipated for 9/4/18  Cycle 9: anticipated for 9/11/18  Cycle 10: anticipated for 9/18/18        INTERIM HISTORY: Muriel is here for follow-up.   She says that she is doing well.  Her neck pain has resolved on its own.  She is tolerating chemotherapy well.  She denies any problems with paclitaxel.  She denies neuropathy or rash.  She notes that she bit the inside of her mouth earlier by accident.      REVIEW OF SYSTEMS:   14 point ROS was reviewed and is negative other than as noted above in HPI.       HOME MEDICATIONS:  Current Outpatient Prescriptions   Medication Sig Dispense Refill     atenolol (TENORMIN) 25 MG tablet TAKE ONE TABLET BY MOUTH ONE TIME DAILY 30 tablet 3     Carisoprodol (SOMA PO) Take 350 mg by mouth 3 times daily       hydrochlorothiazide 12.5 MG TABS tablet Take 1 tablet (12.5 mg) by mouth daily 90 tablet 1     lidocaine-prilocaine (EMLA) cream Apply topically as needed for moderate pain Apply to port site 1 hour prior to accessing 30 g 3     LORazepam (ATIVAN) 0.5 MG tablet Take 1 tablet (0.5 mg) by mouth every 4 hours as needed (Anxiety,  Nausea/Vomiting or Sleep) 30 tablet 5     prochlorperazine (COMPAZINE) 10 MG tablet Take 1 tablet (10 mg) by mouth every 6 hours as needed (Nausea/Vomiting) 30 tablet 5     VITAMIN D, CHOLECALCIFEROL, PO Take 2,000 Units by mouth daily (2 x 1000 units = 2000 unit dose)       zolpidem (AMBIEN) 5 MG tablet TAKE ONE TABLET BY MOUTH AT BEDTIME AS NEEDED FOR SLEEP . May repeat x 1 PRN 45 tablet 0         ALLERGIES:  Allergies   Allergen Reactions     Amitriptyline Other (See Comments)     nightmares     Oxycontin [Oxycodone] Nausea     Severe headaches         PAST MEDICAL HISTORY:  Past Medical History:   Diagnosis Date     Cancer (H)     BREAST CANCER     Degeneration of cervical intervertebral disc      Degeneration of lumbar or lumbosacral intervertebral disc      Hypertension      PONV (postoperative nausea and vomiting)          PAST SURGICAL HISTORY:  Past Surgical History:   Procedure Laterality Date     BACK SURGERY  2001    lumbar fusion, cervical discectomy     DISSECT LYMPH NODE AXILLA Left 4/16/2018    Procedure: DISSECT LYMPH NODE AXILLA;;  Surgeon: Rodney Umana MD;  Location: [facility]     FUSION CERVICAL ANTERIOR THREE+ LEVELS  5/1/2012    Procedure:FUSION CERVICAL ANTERIOR THREE+ LEVELS; Surgeon:SARAH FRANCO; Location:[facility]     FUSION CERVICAL POSTERIOR THREE+ LEVELS  5/1/2012    Procedure:FUSION CERVICAL POSTERIOR THREE+ LEVELS; Posterior Cervical decompression and fusion C4-7, Anterior Cervical decompression and fusion C4-7 (c-arm), (herb table with abraham, yina oasis, yina aviator, Vitoss foam packing strip, impulse monitoring,); Surgeon:SARAH FRANCO; Location: OR     GYN SURGERY       HYSTERECTOMY VAGINAL  1990's    [facility] specilist     HYSTERECTOMY, PAP NO LONGER INDICATED       INSERT PORT VASCULAR ACCESS N/A 5/14/2018    Procedure: INSERT PORT VASCULAR ACCESS;  PORT PLACEMENT;  Surgeon: Rodney Umana MD;  Location: [facility]      MASTECTOMY SIMPLE Left 2018    Procedure: MASTECTOMY SIMPLE;  LEFT SIMPLE MASTECTOMY,LEFT AXILLARY DISSECTION;  Surgeon: Rodney Umana MD;  Location: [facility]         SOCIAL HISTORY:  Social History     Social History     Marital status: Single     Spouse name: N/A     Number of children: 0     Years of education: N/A     Occupational History      [employer]      Social History Main Topics     Smoking status: Current Every Day Smoker     Packs/day: 0.50     Types: Cigarettes     Smokeless tobacco: Never Used      Comment: 4 cigarettes per day     Alcohol use 0.6 oz/week     1 Standard drinks or equivalent per week      Comment: 2-3 drinks per week     Drug use: No     Sexual activity: Not Currently     Partners: Male     Other Topics Concern     Parent/Sibling W/ Cabg, Mi Or Angioplasty Before 65f 55m? No     Social History Narrative   She smokes cigarettes, half a pack a day for many years; she is unable to quantify how many.  She drinks alcohol occasionally.  She denies illicit drug use.  She is retired.  She used to to work in  at large companies.  She lives in [place] with her boyfriend.        FAMILY HISTORY:  Family History   Problem Relation Age of Onset     Pancreatitis Mother      Substance Abuse Mother      Unknown/Adopted Father      She does not know much about her family history and is unaware of any cancers in her family.  She has never been pregnant.  She had a hysterectomy in the .  She still has her ovaries.  She says that she has undergone menopause but is unsure when, likely years ago.        PHYSICAL EXAM:  Vital signs:  /88 (BP Location: Right arm)  Pulse 59  Temp 98.3  F (36.8  C) (Oral)  Resp 20  Wt 46.6 kg (102 lb 12.8 oz)  SpO2 97%  BMI 18.21 kg/m2   ECO  GENERAL/CONSTITUTIONAL: No acute distress.  EYES: No scleral icterus.  RESPIRATORY: Clear to auscultation bilaterally. No crackles or wheezing.   CARDIOVASCULAR: Regular rate and rhythm without  murmurs, gallops, or rubs.  GASTROINTESTINAL: No tenderness. The patient has normal bowel sounds. No guarding.  No distention.  MUSCULOSKELETAL: Warm and well-perfused.  NEUROLOGIC: Alert, oriented, answers questions appropriately.  INTEGUMENTARY: No rashes or jaundice.  Port in place at the right upper chest.      LABS:  CBC RESULTS:   Recent Labs   Lab Test  08/28/18   1316   WBC  5.5   RBC  3.53*   HGB  11.7   HCT  35.0   MCV  99   MCH  33.1*   MCHC  33.4   RDW  15.9*   PLT  338         IMAGING:  Mammogram and ultrasound 3/28/18:  FINDINGS:  Standard bilateral diagnostic views were obtained,  including tomosynthesis. Marker was placed on the left upper breast to  denote the site of the palpable lump; deep to the marker there is a  mass measuring approximately 2 cm. The mass is associated with  retraction of the left nipple. There are no associated  microcalcifications. No suspicious findings in the contralateral right  breast.     Further evaluation with targeted left breast ultrasound shows a  corresponding hypoechoic lesion at the 11:00 position, 2 cm from the  nipple, measuring 1.9 x 2.9 x 1.0 cm. Survey of the axilla shows an  enlarged lymph node measuring 0.7 x 0.6 cm.     MRI breast 4/9/18:  1. The known left breast malignancy measures 2.5 x 2.0 x 2.6 cm on  MRI. There is additional nonmass enhancement surrounding the mass in  the upper left breast.  2. No suspicious MRI finding in the right breast.      PET-CT 4/9/18:  1. Hypermetabolic left breast mass representing the primary  malignancy.  2. A few mildly prominent left axillary lymph nodes that are  associated with only very mild metabolism. These are felt to be  indeterminant. There is a subcentimeter lymph node with mild  hypermetabolism at the right anterior upper mediastinum between the  innominate artery and superior vena cava that is felt to also be  indeterminant.  3. No other pathologic activity.  4. Small areas of sclerosis within the thoracic  and lumbar spine.    Normal soft tissue neck CT.    Echo 8/2/18:  The visual ejection fraction is estimated at 55-60%.  Global peak LV longitudinal strain is averaged at -19.7%. This is within  reported normal limits (normal <-18%).  The right ventricle is normal in size and function.  Sinus rhythm was noted.      ASSESSMENT/PLAN:  Muriel Diaz is a 57 year old post-menopausal female with:    1) Left breast cancer of the upper inner quadrant: s/p left mastectomy on 4/16/18; pathology showed invasive mammary carcinoma, with lobular and ductal features, grade 2, tumor size 4.3 x 4.0 x 2.1 cm, positive margin for invasive carcinoma in the central and lateral posterior surface, DCIS and LCIS present, ER positive (95%), AZ positive (50%), HER-2 negative.  Left axillary dissection showed 3 of 5 lymph nodes were positive for metastatic carcinoma (lobular type), also ER positive, AZ positive, HER-2 negative. Stage [id number] (stage IIA).      I discussed with Dr. Umana regarding the positive posterior margin.  He took the posterior margin, including the fascia, and there is nothing left to take.  She can proceed with chemotherapy.      After chemotherapy, I will refer her to see radiation oncology for consideration of radiation, and then plan on starting hormonal therapy after that.      She has completed 4 cycles of AC and tolerated well.  She is now on weekly paclitaxel and tolerating well.    -Cycle 7 of paclitaxel today and continue weekly  -prn anti-emetics written, but she has not needed it.  -referred for lymphedema therapy  -RTC in 3 weeks    2) Smoking: She smokes half a pack a day.  She says that she is trying to cut back.  -I again advised smoking cessation  -she will see her PCP for tobacco cessation.    3) Hypertension: She is on medications for this.  She says that she has been anxious and nervous and has had difficulty sleeping at night.  -follows with PCP    4) Chronic back pain: s/p history of back  surgeries  -she takes soma  -follows with PCP    5) Restless leg syndrome: She felt it when she got 50 mg of Benadryl and it did not feel good.  -will cut back pre-med Benadryl dose to 25 mg with future cycles of chemotherapy    6) Anemia: mild.  Hemoglobin is borderline normal today.  -monitor CBC      I spent a total of 25 minutes with the patient, with over >50% of the time in counseling and/or coordination of care.       Patience Mckeon MD  Hematology/Oncology  [facility]      Again, thank you for allowing me to participate in the care of your patient.        Sincerely,        Patience Mckeon MD

## 2018-08-31 RX ORDER — EPINEPHRINE 1 MG/ML
0.3 INJECTION, SOLUTION INTRAMUSCULAR; SUBCUTANEOUS EVERY 5 MIN PRN
Status: CANCELLED | OUTPATIENT
Start: 2018-09-04

## 2018-08-31 RX ORDER — ALBUTEROL SULFATE 90 UG/1
1-2 AEROSOL, METERED RESPIRATORY (INHALATION)
Status: CANCELLED
Start: 2018-09-04

## 2018-08-31 RX ORDER — SODIUM CHLORIDE 9 MG/ML
1000 INJECTION, SOLUTION INTRAVENOUS CONTINUOUS PRN
Status: CANCELLED
Start: 2018-09-04

## 2018-08-31 RX ORDER — HEPARIN SODIUM (PORCINE) LOCK FLUSH IV SOLN 100 UNIT/ML 100 UNIT/ML
5 SOLUTION INTRAVENOUS EVERY 8 HOURS
Status: CANCELLED
Start: 2018-09-04

## 2018-08-31 RX ORDER — LORAZEPAM 2 MG/ML
0.5 INJECTION INTRAMUSCULAR EVERY 4 HOURS PRN
Status: CANCELLED
Start: 2018-09-04

## 2018-08-31 RX ORDER — DIPHENHYDRAMINE HYDROCHLORIDE 50 MG/ML
50 INJECTION INTRAMUSCULAR; INTRAVENOUS
Status: CANCELLED
Start: 2018-09-04

## 2018-08-31 RX ORDER — ALBUTEROL SULFATE 0.83 MG/ML
2.5 SOLUTION RESPIRATORY (INHALATION)
Status: CANCELLED | OUTPATIENT
Start: 2018-09-04

## 2018-08-31 RX ORDER — METHYLPREDNISOLONE SODIUM SUCCINATE 125 MG/2ML
125 INJECTION, POWDER, LYOPHILIZED, FOR SOLUTION INTRAMUSCULAR; INTRAVENOUS
Status: CANCELLED
Start: 2018-09-04

## 2018-08-31 RX ORDER — EPINEPHRINE 0.3 MG/.3ML
0.3 INJECTION SUBCUTANEOUS EVERY 5 MIN PRN
Status: CANCELLED | OUTPATIENT
Start: 2018-09-04

## 2018-08-31 RX ORDER — MEPERIDINE HYDROCHLORIDE 25 MG/ML
25 INJECTION INTRAMUSCULAR; INTRAVENOUS; SUBCUTANEOUS EVERY 30 MIN PRN
Status: CANCELLED | OUTPATIENT
Start: 2018-09-04

## 2018-08-31 RX ORDER — DIPHENHYDRAMINE HCL 25 MG
25 CAPSULE ORAL ONCE
Status: CANCELLED
Start: 2018-09-04

## 2018-09-04 ENCOUNTER — INFUSION THERAPY VISIT (OUTPATIENT)
Dept: INFUSION THERAPY | Facility: CLINIC | Age: 58
End: 2018-09-04
Attending: INTERNAL MEDICINE
Payer: COMMERCIAL

## 2018-09-04 ENCOUNTER — HOSPITAL ENCOUNTER (OUTPATIENT)
Facility: CLINIC | Age: 58
Setting detail: SPECIMEN
Discharge: HOME | End: 2018-09-04
Attending: INTERNAL MEDICINE | Admitting: INTERNAL MEDICINE
Payer: COMMERCIAL

## 2018-09-04 VITALS
RESPIRATION RATE: 20 BRPM | SYSTOLIC BLOOD PRESSURE: 173 MMHG | HEART RATE: 65 BPM | BODY MASS INDEX: 18.21 KG/M2 | TEMPERATURE: 98.3 F | WEIGHT: 102.8 LBS | OXYGEN SATURATION: 99 % | DIASTOLIC BLOOD PRESSURE: 106 MMHG | HEIGHT: 63 IN

## 2018-09-04 DIAGNOSIS — G47.01 INSOMNIA DUE TO MEDICAL CONDITION: ICD-10-CM

## 2018-09-04 DIAGNOSIS — Z17.0 MALIGNANT NEOPLASM OF UPPER-INNER QUADRANT OF LEFT BREAST IN FEMALE, ESTROGEN RECEPTOR POSITIVE (H): Primary | ICD-10-CM

## 2018-09-04 DIAGNOSIS — C50.212 MALIGNANT NEOPLASM OF UPPER-INNER QUADRANT OF LEFT BREAST IN FEMALE, ESTROGEN RECEPTOR POSITIVE (H): Primary | ICD-10-CM

## 2018-09-04 LAB
BASOPHILS # BLD AUTO: 0 10E9/L (ref 0–0.2)
BASOPHILS NFR BLD AUTO: 0.8 %
DIFFERENTIAL METHOD BLD: ABNORMAL
EOSINOPHIL # BLD AUTO: 0.2 10E9/L (ref 0–0.7)
EOSINOPHIL NFR BLD AUTO: 3 %
ERYTHROCYTE [DISTWIDTH] IN BLOOD BY AUTOMATED COUNT: 15.3 % (ref 10–15)
HCT VFR BLD AUTO: 34.6 % (ref 35–47)
HGB BLD-MCNC: 11.3 G/DL (ref 11.7–15.7)
IMM GRANULOCYTES # BLD: 0 10E9/L (ref 0–0.4)
IMM GRANULOCYTES NFR BLD: 0.6 %
LYMPHOCYTES # BLD AUTO: 1.5 10E9/L (ref 0.8–5.3)
LYMPHOCYTES NFR BLD AUTO: 28.7 %
MCH RBC QN AUTO: 32.3 PG (ref 26.5–33)
MCHC RBC AUTO-ENTMCNC: 32.7 G/DL (ref 31.5–36.5)
MCV RBC AUTO: 99 FL (ref 78–100)
MONOCYTES # BLD AUTO: 0.4 10E9/L (ref 0–1.3)
MONOCYTES NFR BLD AUTO: 7.2 %
NEUTROPHILS # BLD AUTO: 3.2 10E9/L (ref 1.6–8.3)
NEUTROPHILS NFR BLD AUTO: 59.7 %
NRBC # BLD AUTO: 0 10*3/UL
NRBC BLD AUTO-RTO: 0 /100
PLATELET # BLD AUTO: 283 10E9/L (ref 150–450)
RBC # BLD AUTO: 3.5 10E12/L (ref 3.8–5.2)
WBC # BLD AUTO: 5.3 10E9/L (ref 4–11)

## 2018-09-04 PROCEDURE — 85025 COMPLETE CBC W/AUTO DIFF WBC: CPT | Performed by: INTERNAL MEDICINE

## 2018-09-04 PROCEDURE — 25000125 ZZHC RX 250: Performed by: INTERNAL MEDICINE

## 2018-09-04 PROCEDURE — 25000128 H RX IP 250 OP 636: Performed by: INTERNAL MEDICINE

## 2018-09-04 PROCEDURE — 96413 CHEMO IV INFUSION 1 HR: CPT

## 2018-09-04 PROCEDURE — 25000132 ZZH RX MED GY IP 250 OP 250 PS 637: Performed by: INTERNAL MEDICINE

## 2018-09-04 PROCEDURE — 96367 TX/PROPH/DG ADDL SEQ IV INF: CPT

## 2018-09-04 RX ORDER — HEPARIN SODIUM (PORCINE) LOCK FLUSH IV SOLN 100 UNIT/ML 100 UNIT/ML
5 SOLUTION INTRAVENOUS EVERY 8 HOURS
Status: DISCONTINUED | OUTPATIENT
Start: 2018-09-04 | End: 2018-09-04 | Stop reason: HOSPADM

## 2018-09-04 RX ORDER — ZOLPIDEM TARTRATE 5 MG/1
TABLET ORAL
Qty: 45 TABLET | Refills: 0 | Status: ON HOLD | OUTPATIENT
Start: 2018-09-04 | End: 2019-03-22

## 2018-09-04 RX ORDER — DIPHENHYDRAMINE HCL 25 MG
25 CAPSULE ORAL ONCE
Status: COMPLETED | OUTPATIENT
Start: 2018-09-04 | End: 2018-09-04

## 2018-09-04 RX ADMIN — DIPHENHYDRAMINE HYDROCHLORIDE 25 MG: 25 CAPSULE ORAL at 08:34

## 2018-09-04 RX ADMIN — FAMOTIDINE 20 MG: 20 INJECTION, SOLUTION INTRAVENOUS at 09:00

## 2018-09-04 RX ADMIN — SODIUM CHLORIDE 250 ML: 9 INJECTION, SOLUTION INTRAVENOUS at 08:35

## 2018-09-04 RX ADMIN — DEXAMETHASONE SODIUM PHOSPHATE 20 MG: 10 INJECTION, SOLUTION INTRAMUSCULAR; INTRAVENOUS at 08:42

## 2018-09-04 RX ADMIN — SODIUM CHLORIDE, PRESERVATIVE FREE 5 ML: 5 INJECTION INTRAVENOUS at 10:24

## 2018-09-04 RX ADMIN — PACLITAXEL 116 MG: 6 INJECTION, SOLUTION INTRAVENOUS at 09:18

## 2018-09-04 NOTE — MR AVS SNAPSHOT
After Visit Summary   9/4/2018    Muriel Diaz    MRN: [id number]           Patient Information     Date Of Birth          1960        Visit Information        Provider Department      9/4/2018 8:00 AM [facility] INFUSION CHAIR 4 [facility]        Today's Diagnoses     Malignant neoplasm of upper-inner quadrant of left breast in female, estrogen receptor positive (H)    -  1    Insomnia due to medical condition           Follow-ups after your visit        Follow-up notes from your care team     Return in 7 days (on 9/11/2018).      Your next 10 appointments already scheduled     Sep 11, 2018 10:30 AM CDT   Level 4 with  INFUSION CHAIR 10   [facility] ([facility])    [facility]  [address]  [place] [address]   [phone]            Sep 18, 2018  9:00 AM CDT   Level 4 with  INFUSION CHAIR 18   [facility] ([facility])    [facility]  [address]  [place] [address]   [phone]            Sep 18, 2018  9:30 AM CDT   Return Visit with Patience Mckeon MD   [facility]    [facility]  [address]  [place] [address]   [phone]              Who to contact     If you have questions or need follow up information about today's clinic visit or your schedule please contact [facility] directly at [phone].  Normal or non-critical lab and imaging results will be communicated to you by MyChart, letter or phone within 4 business days after the clinic has received the results. If you do not hear from us within 7 days, please contact the clinic through MyChart or phone. If you have a critical or abnormal lab result, we will notify you by phone as soon as possible.  Submit refill  "requests through [employer] or call your pharmacy and they will forward the refill request to us. Please allow 3 business days for your refill to be completed.          Additional Information About Your Visit        Care EveryWhere ID     This is your Care EveryWhere ID. This could be used by other organizations to access your [facility] medical records  [id number]        Your Vitals Were     Pulse Temperature Respirations Height Pulse Oximetry BMI (Body Mass Index)    65 98.3  F (36.8  C) (Oral) 20 1.6 m (5' 2.99\") 99% 18.21 kg/m2       Blood Pressure from Last 3 Encounters:   09/04/18 (!) 173/106   08/28/18 142/88   08/28/18 142/88    Weight from Last 3 Encounters:   09/04/18 46.6 kg (102 lb 12.8 oz)   08/28/18 46.6 kg (102 lb 12.8 oz)   08/28/18 46.6 kg (102 lb 12.8 oz)              We Performed the Following     CBC with platelets diff          Where to get your medicines      Some of these will need a paper prescription and others can be bought over the counter.  Ask your nurse if you have questions.     Bring a paper prescription for each of these medications     zolpidem 5 MG tablet          Primary Care Provider Office Phone # Fax #    Isabel Vickie Landin [phone] [address] [place] [address]        Equal Access to Services     FRANCISCA HUNT : Hadii aad ku hadasho Socriss, waaxda luqadaha, qaybta kaalmada kvng abernathy. So [facility] [place] [phone].    ATENCIÓN: Si habla español, tiene a chowdary disposición servicios gratuitos de asistencia lingüística. Concha al [phone].    We comply with applicable federal civil rights laws and [place] laws. We do not discriminate on the basis of race, color, national origin, age, disability, sex, sexual orientation, or gender identity.            Thank you!     Thank you for choosing [facility]  for your care. Our goal is always to provide you with excellent care. " Hearing back from our patients is one way we can continue to improve our services. Please take a few minutes to complete the written survey that you may receive in the mail after your visit with us. Thank you!             Your Updated Medication List - Protect others around you: Learn how to safely use, store and throw away your medicines at www.disposemymeds.org.          This list is accurate as of 9/4/18 11:12 AM.  Always use your most recent med list.                   Brand Name Dispense Instructions for use Diagnosis    atenolol 25 MG tablet    TENORMIN    30 tablet    TAKE ONE TABLET BY MOUTH ONE TIME DAILY    Hypertension goal BP (blood pressure) < 140/90       hydrochlorothiazide 12.5 MG Tabs tablet     90 tablet    Take 1 tablet (12.5 mg) by mouth daily    Hypertension goal BP (blood pressure) < 140/90       lidocaine-prilocaine cream    EMLA    30 g    Apply topically as needed for moderate pain Apply to port site 1 hour prior to accessing    Malignant neoplasm of left breast (H)       LORazepam 0.5 MG tablet    ATIVAN    30 tablet    Take 1 tablet (0.5 mg) by mouth every 4 hours as needed (Anxiety, Nausea/Vomiting or Sleep)    Malignant neoplasm of upper-inner quadrant of left breast in female, estrogen receptor positive (H)       prochlorperazine 10 MG tablet    COMPAZINE    30 tablet    Take 1 tablet (10 mg) by mouth every 6 hours as needed (Nausea/Vomiting)    Malignant neoplasm of upper-inner quadrant of left breast in female, estrogen receptor positive (H)       SOMA PO      Take 350 mg by mouth 3 times daily        VITAMIN D (CHOLECALCIFEROL) PO      Take 2,000 Units by mouth daily (2 x 1000 units = 2000 unit dose)        zolpidem 5 MG tablet    AMBIEN    45 tablet    TAKE ONE TABLET BY MOUTH AT BEDTIME AS NEEDED FOR SLEEP . May repeat x 1 PRN    Insomnia due to medical condition

## 2018-09-04 NOTE — PROGRESS NOTES
Infusion Nursing Note:  Muriel Diaz presents today for C12D1 taxol  Patient seen by provider today: No   present during visit today: Not Applicable.    Note: Hypertensive. Patient out of BP meds and picking them up today and will start them again.    Intravenous Access:  Labs drawn without difficulty.  Implanted Port.    Treatment Conditions:  Lab Results   Component Value Date    HGB 11.3 09/04/2018     Lab Results   Component Value Date    WBC 5.3 09/04/2018      Lab Results   Component Value Date    ANEU 3.2 09/04/2018     Lab Results   Component Value Date     09/04/2018      Results reviewed, labs MET treatment parameters, ok to proceed with treatment.      Post Infusion Assessment:  Patient tolerated infusion without incident.  Blood return noted pre and post infusion.  Site patent and intact, free from redness, edema or discomfort.  No evidence of extravasations.  Access discontinued per protocol.    Discharge Plan:   Prescription refills given for prasanth Recinos NP.  AVS to patient via [employer].  Patient will return 9/11  for next appointment.   Patient discharged in stable condition accompanied by: self.  Departure Mode: Ambulatory.    Earl Feldman RN

## 2018-09-07 RX ORDER — DIPHENHYDRAMINE HCL 25 MG
25 CAPSULE ORAL ONCE
Status: CANCELLED
Start: 2018-09-11

## 2018-09-07 RX ORDER — EPINEPHRINE 1 MG/ML
0.3 INJECTION, SOLUTION INTRAMUSCULAR; SUBCUTANEOUS EVERY 5 MIN PRN
Status: CANCELLED | OUTPATIENT
Start: 2018-09-11

## 2018-09-07 RX ORDER — SODIUM CHLORIDE 9 MG/ML
1000 INJECTION, SOLUTION INTRAVENOUS CONTINUOUS PRN
Status: CANCELLED
Start: 2018-09-11

## 2018-09-07 RX ORDER — MEPERIDINE HYDROCHLORIDE 25 MG/ML
25 INJECTION INTRAMUSCULAR; INTRAVENOUS; SUBCUTANEOUS EVERY 30 MIN PRN
Status: CANCELLED | OUTPATIENT
Start: 2018-09-11

## 2018-09-07 RX ORDER — DIPHENHYDRAMINE HYDROCHLORIDE 50 MG/ML
50 INJECTION INTRAMUSCULAR; INTRAVENOUS
Status: CANCELLED
Start: 2018-09-11

## 2018-09-07 RX ORDER — ALBUTEROL SULFATE 90 UG/1
1-2 AEROSOL, METERED RESPIRATORY (INHALATION)
Status: CANCELLED
Start: 2018-09-11

## 2018-09-07 RX ORDER — LORAZEPAM 2 MG/ML
0.5 INJECTION INTRAMUSCULAR EVERY 4 HOURS PRN
Status: CANCELLED
Start: 2018-09-11

## 2018-09-07 RX ORDER — HEPARIN SODIUM (PORCINE) LOCK FLUSH IV SOLN 100 UNIT/ML 100 UNIT/ML
5 SOLUTION INTRAVENOUS EVERY 8 HOURS
Status: CANCELLED
Start: 2018-09-11

## 2018-09-07 RX ORDER — METHYLPREDNISOLONE SODIUM SUCCINATE 125 MG/2ML
125 INJECTION, POWDER, LYOPHILIZED, FOR SOLUTION INTRAMUSCULAR; INTRAVENOUS
Status: CANCELLED
Start: 2018-09-11

## 2018-09-07 RX ORDER — EPINEPHRINE 0.3 MG/.3ML
0.3 INJECTION SUBCUTANEOUS EVERY 5 MIN PRN
Status: CANCELLED | OUTPATIENT
Start: 2018-09-11

## 2018-09-07 RX ORDER — ALBUTEROL SULFATE 0.83 MG/ML
2.5 SOLUTION RESPIRATORY (INHALATION)
Status: CANCELLED | OUTPATIENT
Start: 2018-09-11

## 2018-09-11 ENCOUNTER — INFUSION THERAPY VISIT (OUTPATIENT)
Dept: INFUSION THERAPY | Facility: CLINIC | Age: 58
End: 2018-09-11
Attending: INTERNAL MEDICINE
Payer: COMMERCIAL

## 2018-09-11 ENCOUNTER — HOSPITAL ENCOUNTER (OUTPATIENT)
Facility: CLINIC | Age: 58
Setting detail: SPECIMEN
Discharge: HOME | End: 2018-09-11
Attending: INTERNAL MEDICINE | Admitting: INTERNAL MEDICINE
Payer: COMMERCIAL

## 2018-09-11 VITALS
BODY MASS INDEX: 18.46 KG/M2 | HEIGHT: 63 IN | SYSTOLIC BLOOD PRESSURE: 139 MMHG | RESPIRATION RATE: 16 BRPM | DIASTOLIC BLOOD PRESSURE: 98 MMHG | HEART RATE: 65 BPM | WEIGHT: 104.2 LBS | TEMPERATURE: 98.4 F

## 2018-09-11 DIAGNOSIS — Z17.0 MALIGNANT NEOPLASM OF UPPER-INNER QUADRANT OF LEFT BREAST IN FEMALE, ESTROGEN RECEPTOR POSITIVE (H): Primary | ICD-10-CM

## 2018-09-11 DIAGNOSIS — C50.212 MALIGNANT NEOPLASM OF UPPER-INNER QUADRANT OF LEFT BREAST IN FEMALE, ESTROGEN RECEPTOR POSITIVE (H): Primary | ICD-10-CM

## 2018-09-11 LAB
ALBUMIN SERPL-MCNC: 3.6 G/DL (ref 3.4–5)
ALP SERPL-CCNC: 85 U/L (ref 40–150)
ALT SERPL W P-5'-P-CCNC: 24 U/L (ref 0–50)
AST SERPL W P-5'-P-CCNC: 22 U/L (ref 0–45)
BASOPHILS # BLD AUTO: 0 10E9/L (ref 0–0.2)
BASOPHILS NFR BLD AUTO: 0.5 %
BILIRUB DIRECT SERPL-MCNC: <0.1 MG/DL (ref 0–0.2)
BILIRUB SERPL-MCNC: 0.1 MG/DL (ref 0.2–1.3)
DIFFERENTIAL METHOD BLD: ABNORMAL
EOSINOPHIL # BLD AUTO: 0.2 10E9/L (ref 0–0.7)
EOSINOPHIL NFR BLD AUTO: 2.9 %
ERYTHROCYTE [DISTWIDTH] IN BLOOD BY AUTOMATED COUNT: 14.8 % (ref 10–15)
HCT VFR BLD AUTO: 33.8 % (ref 35–47)
HGB BLD-MCNC: 11.3 G/DL (ref 11.7–15.7)
IMM GRANULOCYTES # BLD: 0 10E9/L (ref 0–0.4)
IMM GRANULOCYTES NFR BLD: 0.5 %
LYMPHOCYTES # BLD AUTO: 1.3 10E9/L (ref 0.8–5.3)
LYMPHOCYTES NFR BLD AUTO: 21.3 %
MCH RBC QN AUTO: 33.2 PG (ref 26.5–33)
MCHC RBC AUTO-ENTMCNC: 33.4 G/DL (ref 31.5–36.5)
MCV RBC AUTO: 99 FL (ref 78–100)
MONOCYTES # BLD AUTO: 0.3 10E9/L (ref 0–1.3)
MONOCYTES NFR BLD AUTO: 5.2 %
NEUTROPHILS # BLD AUTO: 4.1 10E9/L (ref 1.6–8.3)
NEUTROPHILS NFR BLD AUTO: 69.6 %
NRBC # BLD AUTO: 0 10*3/UL
NRBC BLD AUTO-RTO: 0 /100
PLATELET # BLD AUTO: 328 10E9/L (ref 150–450)
PROT SERPL-MCNC: 6.6 G/DL (ref 6.8–8.8)
RBC # BLD AUTO: 3.4 10E12/L (ref 3.8–5.2)
WBC # BLD AUTO: 5.9 10E9/L (ref 4–11)

## 2018-09-11 PROCEDURE — 96413 CHEMO IV INFUSION 1 HR: CPT

## 2018-09-11 PROCEDURE — 25000125 ZZHC RX 250: Performed by: INTERNAL MEDICINE

## 2018-09-11 PROCEDURE — 25000132 ZZH RX MED GY IP 250 OP 250 PS 637: Performed by: INTERNAL MEDICINE

## 2018-09-11 PROCEDURE — 25000128 H RX IP 250 OP 636: Performed by: INTERNAL MEDICINE

## 2018-09-11 PROCEDURE — 80076 HEPATIC FUNCTION PANEL: CPT | Performed by: INTERNAL MEDICINE

## 2018-09-11 PROCEDURE — 96367 TX/PROPH/DG ADDL SEQ IV INF: CPT

## 2018-09-11 PROCEDURE — 85025 COMPLETE CBC W/AUTO DIFF WBC: CPT | Performed by: INTERNAL MEDICINE

## 2018-09-11 RX ORDER — DIPHENHYDRAMINE HCL 25 MG
25 CAPSULE ORAL ONCE
Status: COMPLETED | OUTPATIENT
Start: 2018-09-11 | End: 2018-09-11

## 2018-09-11 RX ORDER — HEPARIN SODIUM (PORCINE) LOCK FLUSH IV SOLN 100 UNIT/ML 100 UNIT/ML
5 SOLUTION INTRAVENOUS EVERY 8 HOURS
Status: DISCONTINUED | OUTPATIENT
Start: 2018-09-11 | End: 2018-09-11 | Stop reason: HOSPADM

## 2018-09-11 RX ADMIN — PACLITAXEL 116 MG: 6 INJECTION, SOLUTION INTRAVENOUS at 13:30

## 2018-09-11 RX ADMIN — ALTEPLASE 2 MG: 2.2 INJECTION, POWDER, LYOPHILIZED, FOR SOLUTION INTRAVENOUS at 11:00

## 2018-09-11 RX ADMIN — DEXAMETHASONE SODIUM PHOSPHATE 20 MG: 10 INJECTION, SOLUTION INTRAMUSCULAR; INTRAVENOUS at 12:43

## 2018-09-11 RX ADMIN — SODIUM CHLORIDE, PRESERVATIVE FREE 5 ML: 5 INJECTION INTRAVENOUS at 14:36

## 2018-09-11 RX ADMIN — FAMOTIDINE 20 MG: 20 INJECTION, SOLUTION INTRAVENOUS at 13:05

## 2018-09-11 RX ADMIN — SODIUM CHLORIDE 250 ML: 9 INJECTION, SOLUTION INTRAVENOUS at 12:43

## 2018-09-11 RX ADMIN — DIPHENHYDRAMINE HYDROCHLORIDE 25 MG: 25 CAPSULE ORAL at 12:42

## 2018-09-11 NOTE — PROGRESS NOTES
Infusion Nursing Note:  Muriel Diaz presents today for C13D1 Taxol.    Patient seen by provider today: No   present during visit today: Not Applicable.    Note: N/A.    Intravenous Access:  Implanted Port.    Treatment Conditions:  Lab Results   Component Value Date    HGB 11.3 09/11/2018     Lab Results   Component Value Date    WBC 5.9 09/11/2018      Lab Results   Component Value Date    ANEU 4.1 09/11/2018     Lab Results   Component Value Date     09/11/2018      Lab Results   Component Value Date     06/19/2018                   Lab Results   Component Value Date    POTASSIUM 3.8 06/19/2018           No results found for: MAG         Lab Results   Component Value Date    CR 0.55 06/19/2018                   Lab Results   Component Value Date    HATTIE 8.4 06/19/2018                Lab Results   Component Value Date    BILITOTAL 0.1 09/11/2018           Lab Results   Component Value Date    ALBUMIN 3.6 09/11/2018                    Lab Results   Component Value Date    ALT 24 09/11/2018           Lab Results   Component Value Date    AST 22 09/11/2018       Results reviewed, labs MET treatment parameters, ok to proceed with treatment.      Post Infusion Assessment:  Patient tolerated infusion without incident.  Blood return noted pre and post infusion.  Site patent and intact, free from redness, edema or discomfort.  No evidence of extravasations.  Access discontinued per protocol.    Discharge Plan:   Discharge instructions reviewed with: Patient.  Patient and/or family verbalized understanding of discharge instructions and all questions answered.  Copy of AVS reviewed with patient and/or family.  Patient will return 9/18/18 for next appointment.  Patient discharged in stable condition accompanied by: self.  Departure Mode: Ambulatory.    Vickie Peck RN

## 2018-09-11 NOTE — MR AVS SNAPSHOT
After Visit Summary   9/11/2018    Muriel Diaz    MRN: [id number]           Patient Information     Date Of Birth          1960        Visit Information        Provider Department      9/11/2018 10:30 AM  INFUSION CHAIR 10 [facility]        Today's Diagnoses     Malignant neoplasm of upper-inner quadrant of left breast in female, estrogen receptor positive (H)    -  1       Follow-ups after your visit        Your next 10 appointments already scheduled     Sep 18, 2018  9:00 AM CDT   Level 4 with  INFUSION CHAIR 18   [facility] ([facility])    [facility]  [address]  [place] [address] [phone]            Sep 18, 2018  9:30 AM CDT   Return Visit with Patience Mckeon MD   [facility] ([facility])    [facility]  [address]  [place] [id number] [phone]              Who to contact     If you have questions or need follow up information about today's clinic visit or your schedule please contact [facility] [place] directly at [phone].  Normal or non-critical lab and imaging results will be communicated to you by MyChart, letter or phone within 4 business days after the clinic has received the results. If you do not hear from us within 7 days, please contact the clinic through MyChart or phone. If you have a critical or abnormal lab result, we will notify you by phone as soon as possible.  Submit refill requests through [employer] or call your pharmacy and they will forward the refill request to us. Please allow 3 business days for your refill to be completed.          Additional Information About Your Visit        Care EveryWhere ID     This is your Care EveryWhere ID. This could be used by other organizations to access your [place] medical records  [id number]    "     Your Vitals Were     Pulse Temperature Respirations Height BMI (Body Mass Index)       65 98.4  F (36.9  C) (Oral) 16 1.6 m (5' 2.99\") 18.46 kg/m2        Blood Pressure from Last 3 Encounters:   09/11/18 (!) 139/98   09/04/18 (!) 173/106   08/28/18 142/88    Weight from Last 3 Encounters:   09/11/18 47.3 kg (104 lb 3.2 oz)   09/04/18 46.6 kg (102 lb 12.8 oz)   08/28/18 46.6 kg (102 lb 12.8 oz)              We Performed the Following     CBC with platelets differential     Hepatic panel        Primary Care Provider Office Phone # Fax #    Isabel Vickie Landin [phone] [address] [place] [address]        Equal Access to Services     [facility]: Hadii aad ku hadasho Soomaali, waaxda luqadaha, qaybta kaalmada adeegyada, waxay idiin hayaan jennifer malcolm . So [facility] [phone].    ATENCIÓN: Si habla español, tiene a chowdary disposición servicios gratuitos de asistencia lingüística. Jesúsame al [phone].    We comply with applicable federal civil rights laws and [place] laws. We do not discriminate on the basis of race, color, national origin, age, disability, sex, sexual orientation, or gender identity.            Thank you!     Thank you for choosing [facility] AND [facility]ENTER  for your care. Our goal is always to provide you with excellent care. Hearing back from our patients is one way we can continue to improve our services. Please take a few minutes to complete the written survey that you may receive in the mail after your visit with us. Thank you!             Your Updated Medication List - Protect others around you: Learn how to safely use, store and throw away your medicines at www.disposemymeds.org.          This list is accurate as of 9/11/18  2:52 PM.  Always use your most recent med list.                   Brand Name Dispense Instructions for use Diagnosis    atenolol 25 MG tablet    TENORMIN    30 tablet    TAKE ONE TABLET BY MOUTH ONE " TIME DAILY    Hypertension goal BP (blood pressure) < 140/90       hydrochlorothiazide 12.5 MG Tabs tablet     90 tablet    Take 1 tablet (12.5 mg) by mouth daily    Hypertension goal BP (blood pressure) < 140/90       lidocaine-prilocaine cream    EMLA    30 g    Apply topically as needed for moderate pain Apply to port site 1 hour prior to accessing    Malignant neoplasm of left breast (H)       LORazepam 0.5 MG tablet    ATIVAN    30 tablet    Take 1 tablet (0.5 mg) by mouth every 4 hours as needed (Anxiety, Nausea/Vomiting or Sleep)    Malignant neoplasm of upper-inner quadrant of left breast in female, estrogen receptor positive (H)       prochlorperazine 10 MG tablet    COMPAZINE    30 tablet    Take 1 tablet (10 mg) by mouth every 6 hours as needed (Nausea/Vomiting)    Malignant neoplasm of upper-inner quadrant of left breast in female, estrogen receptor positive (H)       SOMA PO      Take 350 mg by mouth 3 times daily        VITAMIN D (CHOLECALCIFEROL) PO      Take 2,000 Units by mouth daily (2 x 1000 units = 2000 unit dose)        zolpidem 5 MG tablet    AMBIEN    45 tablet    TAKE ONE TABLET BY MOUTH AT BEDTIME AS NEEDED FOR SLEEP . May repeat x 1 PRN    Insomnia due to medical condition

## 2018-09-18 ENCOUNTER — ONCOLOGY VISIT (OUTPATIENT)
Dept: ONCOLOGY | Facility: CLINIC | Age: 58
End: 2018-09-18
Attending: INTERNAL MEDICINE
Payer: COMMERCIAL

## 2018-09-18 ENCOUNTER — INFUSION THERAPY VISIT (OUTPATIENT)
Dept: INFUSION THERAPY | Facility: CLINIC | Age: 58
End: 2018-09-18
Attending: INTERNAL MEDICINE
Payer: COMMERCIAL

## 2018-09-18 ENCOUNTER — HOSPITAL ENCOUNTER (OUTPATIENT)
Facility: CLINIC | Age: 58
Setting detail: SPECIMEN
Discharge: HOME | End: 2018-09-18
Attending: INTERNAL MEDICINE | Admitting: INTERNAL MEDICINE
Payer: COMMERCIAL

## 2018-09-18 VITALS
OXYGEN SATURATION: 97 % | DIASTOLIC BLOOD PRESSURE: 94 MMHG | SYSTOLIC BLOOD PRESSURE: 182 MMHG | TEMPERATURE: 97.8 F | HEIGHT: 63 IN | WEIGHT: 105.8 LBS | HEART RATE: 69 BPM | RESPIRATION RATE: 16 BRPM | BODY MASS INDEX: 18.75 KG/M2

## 2018-09-18 VITALS
BODY MASS INDEX: 18.75 KG/M2 | TEMPERATURE: 97.8 F | RESPIRATION RATE: 16 BRPM | HEIGHT: 63 IN | SYSTOLIC BLOOD PRESSURE: 156 MMHG | WEIGHT: 105.82 LBS | HEART RATE: 69 BPM | OXYGEN SATURATION: 97 % | DIASTOLIC BLOOD PRESSURE: 95 MMHG

## 2018-09-18 DIAGNOSIS — Z17.0 MALIGNANT NEOPLASM OF UPPER-INNER QUADRANT OF LEFT BREAST IN FEMALE, ESTROGEN RECEPTOR POSITIVE (H): Primary | ICD-10-CM

## 2018-09-18 DIAGNOSIS — Z17.0 MALIGNANT NEOPLASM OF UPPER-INNER QUADRANT OF LEFT BREAST IN FEMALE, ESTROGEN RECEPTOR POSITIVE (H): ICD-10-CM

## 2018-09-18 DIAGNOSIS — C50.212 MALIGNANT NEOPLASM OF UPPER-INNER QUADRANT OF LEFT BREAST IN FEMALE, ESTROGEN RECEPTOR POSITIVE (H): Primary | ICD-10-CM

## 2018-09-18 DIAGNOSIS — C50.212 MALIGNANT NEOPLASM OF UPPER-INNER QUADRANT OF LEFT BREAST IN FEMALE, ESTROGEN RECEPTOR POSITIVE (H): ICD-10-CM

## 2018-09-18 LAB
BASOPHILS # BLD AUTO: 0 10E9/L (ref 0–0.2)
BASOPHILS NFR BLD AUTO: 0.8 %
DIFFERENTIAL METHOD BLD: ABNORMAL
EOSINOPHIL # BLD AUTO: 0.2 10E9/L (ref 0–0.7)
EOSINOPHIL NFR BLD AUTO: 3 %
ERYTHROCYTE [DISTWIDTH] IN BLOOD BY AUTOMATED COUNT: 14.6 % (ref 10–15)
HCT VFR BLD AUTO: 33.7 % (ref 35–47)
HGB BLD-MCNC: 11.1 G/DL (ref 11.7–15.7)
IMM GRANULOCYTES # BLD: 0 10E9/L (ref 0–0.4)
IMM GRANULOCYTES NFR BLD: 0.4 %
LYMPHOCYTES # BLD AUTO: 1.4 10E9/L (ref 0.8–5.3)
LYMPHOCYTES NFR BLD AUTO: 28 %
MCH RBC QN AUTO: 33 PG (ref 26.5–33)
MCHC RBC AUTO-ENTMCNC: 32.9 G/DL (ref 31.5–36.5)
MCV RBC AUTO: 100 FL (ref 78–100)
MONOCYTES # BLD AUTO: 0.4 10E9/L (ref 0–1.3)
MONOCYTES NFR BLD AUTO: 7.8 %
NEUTROPHILS # BLD AUTO: 3 10E9/L (ref 1.6–8.3)
NEUTROPHILS NFR BLD AUTO: 60 %
NRBC # BLD AUTO: 0 10*3/UL
NRBC BLD AUTO-RTO: 0 /100
PLATELET # BLD AUTO: 347 10E9/L (ref 150–450)
RBC # BLD AUTO: 3.36 10E12/L (ref 3.8–5.2)
WBC # BLD AUTO: 5 10E9/L (ref 4–11)

## 2018-09-18 PROCEDURE — 25000128 H RX IP 250 OP 636: Performed by: INTERNAL MEDICINE

## 2018-09-18 PROCEDURE — 99214 OFFICE O/P EST MOD 30 MIN: CPT | Performed by: INTERNAL MEDICINE

## 2018-09-18 PROCEDURE — 85025 COMPLETE CBC W/AUTO DIFF WBC: CPT | Performed by: INTERNAL MEDICINE

## 2018-09-18 PROCEDURE — G0463 HOSPITAL OUTPT CLINIC VISIT: HCPCS | Mod: 25

## 2018-09-18 PROCEDURE — 25000125 ZZHC RX 250: Performed by: INTERNAL MEDICINE

## 2018-09-18 PROCEDURE — 96375 TX/PRO/DX INJ NEW DRUG ADDON: CPT

## 2018-09-18 PROCEDURE — 96413 CHEMO IV INFUSION 1 HR: CPT

## 2018-09-18 PROCEDURE — 25000132 ZZH RX MED GY IP 250 OP 250 PS 637: Performed by: INTERNAL MEDICINE

## 2018-09-18 RX ORDER — MEPERIDINE HYDROCHLORIDE 25 MG/ML
25 INJECTION INTRAMUSCULAR; INTRAVENOUS; SUBCUTANEOUS EVERY 30 MIN PRN
Status: CANCELLED | OUTPATIENT
Start: 2018-09-18

## 2018-09-18 RX ORDER — HEPARIN SODIUM (PORCINE) LOCK FLUSH IV SOLN 100 UNIT/ML 100 UNIT/ML
5 SOLUTION INTRAVENOUS EVERY 8 HOURS
Status: DISCONTINUED | OUTPATIENT
Start: 2018-09-18 | End: 2018-09-18 | Stop reason: HOSPADM

## 2018-09-18 RX ORDER — EPINEPHRINE 1 MG/ML
0.3 INJECTION, SOLUTION INTRAMUSCULAR; SUBCUTANEOUS EVERY 5 MIN PRN
Status: CANCELLED | OUTPATIENT
Start: 2018-09-18

## 2018-09-18 RX ORDER — METHYLPREDNISOLONE SODIUM SUCCINATE 125 MG/2ML
125 INJECTION, POWDER, LYOPHILIZED, FOR SOLUTION INTRAMUSCULAR; INTRAVENOUS
Status: CANCELLED
Start: 2018-09-18

## 2018-09-18 RX ORDER — DIPHENHYDRAMINE HCL 25 MG
25 CAPSULE ORAL ONCE
Status: COMPLETED | OUTPATIENT
Start: 2018-09-18 | End: 2018-09-18

## 2018-09-18 RX ORDER — DIPHENHYDRAMINE HCL 25 MG
25 CAPSULE ORAL ONCE
Status: CANCELLED
Start: 2018-09-18

## 2018-09-18 RX ORDER — SODIUM CHLORIDE 9 MG/ML
1000 INJECTION, SOLUTION INTRAVENOUS CONTINUOUS PRN
Status: CANCELLED
Start: 2018-09-18

## 2018-09-18 RX ORDER — DIPHENHYDRAMINE HYDROCHLORIDE 50 MG/ML
50 INJECTION INTRAMUSCULAR; INTRAVENOUS
Status: CANCELLED
Start: 2018-09-18

## 2018-09-18 RX ORDER — LORAZEPAM 0.5 MG/1
0.5 TABLET ORAL EVERY 4 HOURS PRN
Qty: 30 TABLET | Refills: 5 | Status: SHIPPED | OUTPATIENT
Start: 2018-09-18 | End: 2018-11-19

## 2018-09-18 RX ORDER — ALBUTEROL SULFATE 0.83 MG/ML
2.5 SOLUTION RESPIRATORY (INHALATION)
Status: CANCELLED | OUTPATIENT
Start: 2018-09-18

## 2018-09-18 RX ORDER — LORAZEPAM 2 MG/ML
0.5 INJECTION INTRAMUSCULAR EVERY 4 HOURS PRN
Status: CANCELLED
Start: 2018-09-18

## 2018-09-18 RX ORDER — EPINEPHRINE 0.3 MG/.3ML
0.3 INJECTION SUBCUTANEOUS EVERY 5 MIN PRN
Status: CANCELLED | OUTPATIENT
Start: 2018-09-18

## 2018-09-18 RX ORDER — ALBUTEROL SULFATE 90 UG/1
1-2 AEROSOL, METERED RESPIRATORY (INHALATION)
Status: CANCELLED
Start: 2018-09-18

## 2018-09-18 RX ADMIN — SODIUM CHLORIDE 250 ML: 9 INJECTION, SOLUTION INTRAVENOUS at 10:21

## 2018-09-18 RX ADMIN — PACLITAXEL 116 MG: 6 INJECTION, SOLUTION INTRAVENOUS at 10:51

## 2018-09-18 RX ADMIN — DEXAMETHASONE SODIUM PHOSPHATE 20 MG: 10 INJECTION, SOLUTION INTRAMUSCULAR; INTRAVENOUS at 10:21

## 2018-09-18 RX ADMIN — FAMOTIDINE 20 MG: 20 INJECTION, SOLUTION INTRAVENOUS at 10:35

## 2018-09-18 RX ADMIN — DIPHENHYDRAMINE HYDROCHLORIDE 25 MG: 25 CAPSULE ORAL at 10:20

## 2018-09-18 RX ADMIN — SODIUM CHLORIDE, PRESERVATIVE FREE 5 ML: 5 INJECTION INTRAVENOUS at 12:34

## 2018-09-18 ASSESSMENT — PAIN SCALES - GENERAL
PAINLEVEL: NO PAIN (0)
PAINLEVEL: NO PAIN (0)

## 2018-09-18 NOTE — PROGRESS NOTES
Infusion Nursing Note:  Muriel Diaz presents today for C14 D1 Taxol.    Patient seen by provider today: Yes: Dr. Mckeon   present during visit today: Not Applicable.    Note: Ok to proceed with chemo today per Dr. Mckeon. Elevated BP prior to discharge, refer to vitals flowsheet. Patient states she is on atenolol and taking her HCTZ about every other day due to side effects. Confirmed she did take her atenolol today. Patient is asymptomatic. Reviewed with Dr. Mckeon, orders received to have patient wait 10 minutes and recheck. 10 minute recheck: 182/94. 1233: Orders as follows per Dr. Mckeon:    Ok to discharge home  Patient to take additional atenolol when she gets home as previously directed by her PCP    Patient agreeable to above. Also instructed her to follow up with primary care provider for ongoing hypertension, patient verbalized understanding.    Intravenous Access:  Labs drawn without difficulty.  Implanted Port.    Treatment Conditions:  Lab Results   Component Value Date    HGB 11.1 09/18/2018     Lab Results   Component Value Date    WBC 5.0 09/18/2018      Lab Results   Component Value Date    ANEU 3.0 09/18/2018     Lab Results   Component Value Date     09/18/2018      Results reviewed, labs MET treatment parameters, ok to proceed with treatment.      Post Infusion Assessment:  Patient tolerated infusion without incident.  Blood return noted pre and post infusion.  Site patent and intact, free from redness, edema or discomfort.  No evidence of extravasations.  Access discontinued per protocol.    Discharge Plan:   Discharge instructions reviewed with: Patient.  Patient and/or family verbalized understanding of discharge instructions and all questions answered.  Copy of AVS given in clinic.  Patient will return 9/25 for next appointment.  Patient discharged in stable condition accompanied by: self.  Departure Mode: Ambulatory.  Nursing face to face time: 25 minutes    Allie MALLORY  , RN

## 2018-09-18 NOTE — LETTER
"    9/18/2018         RE: Muriel Diaz  [address]  [facility] [address]        Dear Colleague,    Thank you for referring your patient, Muriel Diaz, to the [facility]. Please see a copy of my visit note below.    Oncology Rooming Note    September 18, 2018 9:18 AM   Muriel Diaz is a 57 year old female who presents for:    Chief Complaint   Patient presents with     Oncology Clinic Visit     Initial Vitals: BP (!) 156/95  Pulse 69  Temp 97.8  F (36.6  C) (Oral)  Resp 16  Ht 1.6 m (5' 2.99\")  Wt 48 kg (105 lb 13.1 oz)  SpO2 97%  BMI 18.75 kg/m2 Estimated body mass index is 18.75 kg/(m^2) as calculated from the following:    Height as of this encounter: 1.6 m (5' 2.99\").    Weight as of this encounter: 48 kg (105 lb 13.1 oz). Body surface area is 1.46 meters squared.  No Pain (0) Comment: Data Unavailable   No LMP recorded. Patient has had a hysterectomy.  Allergies reviewed: Yes  Medications reviewed: Yes    Medications: MEDICATION REFILLS NEEDED TODAY. Provider was notified. LORAZEPAM REFILL NEEDED TODAY.  Pharmacy name entered into [facility]:    [facility] [place], [place] - [address]    Clinical concerns: None    2 minutes for nursing intake (face to face time)     Mabel Geronimo [facility]    Hematology/Oncology Established Patient Note      Today's Date: 09/18/18    Reason for Follow-up: left sided breast cancer      HISTORY OF PRESENT ILLNESS: Muriel Diaz is a 57 year old post-menopausal female with PMHx of HTN, degenerative joint disease, history of cervical spine surgery, who presents with left-sided breast cancer.  She underwent left mastectomy on 4/16/18 by Dr. Umana.  Pathology showed invasive mammary carcinoma, with lobular and ductal features, grade 2, tumor size 4.3 x 4.0 x 2.1 cm, positive margin for invasive carcinoma in the central and lateral posterior surface, DCIS and LCIS " present, ER positive (95%), MI positive (50%), HER-2 negative.  Left axillary dissection showed 3 of 5 lymph nodes were positive for metastatic carcinoma (lobular type), also ER positive, MI positive, HER-2 negative. Stage [id number] (stage IIA).      She had port placed on 5/14/18.    She started chemotherapy on 5/22/18:    Doxorubicin 60 mg/m2 IV on day 1  Cyclophosphamide 600 mg/m2 IV on day 1  Neulasta 6 mg SQ on day 2  Every 2 week cycles x 4 cycles    Followed by:    Paclitaxel 80 mg/m2 IV once a week x 12 weeks    AC  C1D1: 5/22/18  C2D1: 6/5/18  C3D1: 6/19/18  C4D1: 7/3/18    Paclitaxel:  Cycle 1: 7/17/18:  Cycle 2: 7/24/18  Cycle 3: 7/31/18  Cycle 4: 8/7/18  Cycle 5: 8/14/18  Cycle 6: 8/21/18  Cycle 7: 8/28/18  Cycle 8: 9/4/18  Cycle 9: 9/11/18  Cycle 10: 9/18/18  Cycle 11: anticipated for 9/25/18  Cycle 12: anticipated for 10/2/18        INTERIM HISTORY: Muriel is here for follow-up.   She says that she continues to feel fine on chemotherapy.  She denies pain or neuropathy or nausea.  She asked for lorazepam prescription renewal today.  She also asked about reconstruction.      REVIEW OF SYSTEMS:   14 point ROS was reviewed and is negative other than as noted above in HPI.       HOME MEDICATIONS:  Current Outpatient Prescriptions   Medication Sig Dispense Refill     atenolol (TENORMIN) 25 MG tablet TAKE ONE TABLET BY MOUTH ONE TIME DAILY 30 tablet 3     Carisoprodol (SOMA PO) Take 350 mg by mouth 3 times daily       hydrochlorothiazide 12.5 MG TABS tablet Take 1 tablet (12.5 mg) by mouth daily 90 tablet 1     lidocaine-prilocaine (EMLA) cream Apply topically as needed for moderate pain Apply to port site 1 hour prior to accessing 30 g 3     LORazepam (ATIVAN) 0.5 MG tablet Take 1 tablet (0.5 mg) by mouth every 4 hours as needed (Anxiety, Nausea/Vomiting or Sleep) 30 tablet 5     prochlorperazine (COMPAZINE) 10 MG tablet Take 1 tablet (10 mg) by mouth every 6 hours as needed (Nausea/Vomiting) 30 tablet 5      VITAMIN D, CHOLECALCIFEROL, PO Take 2,000 Units by mouth daily (2 x 1000 units = 2000 unit dose)       zolpidem (AMBIEN) 5 MG tablet TAKE ONE TABLET BY MOUTH AT BEDTIME AS NEEDED FOR SLEEP . May repeat x 1 PRN 45 tablet 0         ALLERGIES:  Allergies   Allergen Reactions     Amitriptyline Other (See Comments)     nightmares     Oxycontin [Oxycodone] Nausea     Severe headaches         PAST MEDICAL HISTORY:  Past Medical History:   Diagnosis Date     Cancer (H)     BREAST CANCER     Degeneration of cervical intervertebral disc      Degeneration of lumbar or lumbosacral intervertebral disc      Hypertension      PONV (postoperative nausea and vomiting)          PAST SURGICAL HISTORY:  Past Surgical History:   Procedure Laterality Date     BACK SURGERY  2001    lumbar fusion, cervical discectomy     DISSECT LYMPH NODE AXILLA Left 4/16/2018    Procedure: DISSECT LYMPH NODE AXILLA;;  Surgeon: Rodney Umana MD;  Location: [facility]     FUSION CERVICAL ANTERIOR THREE+ LEVELS  5/1/2012    Procedure:FUSION CERVICAL ANTERIOR THREE+ LEVELS; Surgeon:SARAH FRANCO; Location: OR     FUSION CERVICAL POSTERIOR THREE+ LEVELS  5/1/2012    Procedure:FUSION CERVICAL POSTERIOR THREE+ LEVELS; Posterior Cervical decompression and fusion C4-7, Anterior Cervical decompression and fusion C4-7 (c-arm), (herb table with abraham, yina oasis, yina aviator, Vitoss foam packing strip, impulse monitoring,); Surgeon:SARAH FRANCO; Location: OR     GYN SURGERY       HYSTERECTOMY VAGINAL  1990's    Judy Aceves OB Gyn specilist     HYSTERECTOMY, PAP NO LONGER INDICATED       INSERT PORT VASCULAR ACCESS N/A 5/14/2018    Procedure: INSERT PORT VASCULAR ACCESS;  PORT PLACEMENT;  Surgeon: Rodney Umana MD;  Location: [facility]     MASTECTOMY SIMPLE Left 4/16/2018    Procedure: MASTECTOMY SIMPLE;  LEFT SIMPLE MASTECTOMY,LEFT AXILLARY DISSECTION;  Surgeon: Rodney Umana MD;  Location: [facility]  "        SOCIAL HISTORY:  Social History     Social History     Marital status: Single     Spouse name: N/A     Number of children: 0     Years of education: N/A     Occupational History      [employer]      Social History Main Topics     Smoking status: Current Every Day Smoker     Packs/day: 0.50     Types: Cigarettes     Smokeless tobacco: Never Used      Comment: 4 cigarettes per day     Alcohol use 0.6 oz/week     1 Standard drinks or equivalent per week      Comment: 2-3 drinks per week     Drug use: No     Sexual activity: Not Currently     Partners: Male     Other Topics Concern     Parent/Sibling W/ Cabg, Mi Or Angioplasty Before 65f 55m? No     Social History Narrative   She smokes cigarettes, half a pack a day for many years; she is unable to quantify how many.  She drinks alcohol occasionally.  She denies illicit drug use.  She is retired.  She used to to work in  at large companies.  She lives in [place] with her boyfriend.        FAMILY HISTORY:  Family History   Problem Relation Age of Onset     Pancreatitis Mother      Substance Abuse Mother      Unknown/Adopted Father      She does not know much about her family history and is unaware of any cancers in her family.  She has never been pregnant.  She had a hysterectomy in the .  She still has her ovaries.  She says that she has undergone menopause but is unsure when, likely years ago.        PHYSICAL EXAM:  Vital signs:  BP (!) 156/95  Pulse 69  Temp 97.8  F (36.6  C) (Oral)  Resp 16  Ht 1.6 m (5' 2.99\")  Wt 48 kg (105 lb 13.1 oz)  SpO2 97%  BMI 18.75 kg/m2   ECO  GENERAL/CONSTITUTIONAL: No acute distress.  EYES: No scleral icterus.  RESPIRATORY: Clear to auscultation bilaterally. No crackles or wheezing.   CARDIOVASCULAR: Regular rate and rhythm without murmurs, gallops, or rubs.  GASTROINTESTINAL: No tenderness. The patient has normal bowel sounds. No guarding.  No distention.  MUSCULOSKELETAL: Warm and " well-perfused.  NEUROLOGIC: Alert, oriented, answers questions appropriately.  INTEGUMENTARY: No rashes or jaundice.  Port in place at the right upper chest.      LABS:  CBC RESULTS:   Recent Labs   Lab Test  09/18/18   0911   WBC  5.0   RBC  3.36*   HGB  11.1*   HCT  33.7*   MCV  100   MCH  33.0   MCHC  32.9   RDW  14.6   PLT  347         IMAGING:  Mammogram and ultrasound 3/28/18:  FINDINGS:  Standard bilateral diagnostic views were obtained,  including tomosynthesis. Marker was placed on the left upper breast to  denote the site of the palpable lump; deep to the marker there is a  mass measuring approximately 2 cm. The mass is associated with  retraction of the left nipple. There are no associated  microcalcifications. No suspicious findings in the contralateral right  breast.     Further evaluation with targeted left breast ultrasound shows a  corresponding hypoechoic lesion at the 11:00 position, 2 cm from the  nipple, measuring 1.9 x 2.9 x 1.0 cm. Survey of the axilla shows an  enlarged lymph node measuring 0.7 x 0.6 cm.     MRI breast 4/9/18:  1. The known left breast malignancy measures 2.5 x 2.0 x 2.6 cm on  MRI. There is additional nonmass enhancement surrounding the mass in  the upper left breast.  2. No suspicious MRI finding in the right breast.      PET-CT 4/9/18:  1. Hypermetabolic left breast mass representing the primary  malignancy.  2. A few mildly prominent left axillary lymph nodes that are  associated with only very mild metabolism. These are felt to be  indeterminant. There is a subcentimeter lymph node with mild  hypermetabolism at the right anterior upper mediastinum between the  innominate artery and superior vena cava that is felt to also be  indeterminant.  3. No other pathologic activity.  4. Small areas of sclerosis within the thoracic and lumbar spine.    Normal soft tissue neck CT.    Echo 8/2/18:  The visual ejection fraction is estimated at 55-60%.  Global peak LV longitudinal  strain is averaged at -19.7%. This is within  reported normal limits (normal <-18%).  The right ventricle is normal in size and function.  Sinus rhythm was noted.      ASSESSMENT/PLAN:  Muriel Diaz is a 57 year old post-menopausal female with:    1) Left breast cancer of the upper inner quadrant: s/p left mastectomy on 4/16/18; pathology showed invasive mammary carcinoma, with lobular and ductal features, grade 2, tumor size 4.3 x 4.0 x 2.1 cm, positive margin for invasive carcinoma in the central and lateral posterior surface, DCIS and LCIS present, ER positive (95%), DE positive (50%), HER-2 negative.  Left axillary dissection showed 3 of 5 lymph nodes were positive for metastatic carcinoma (lobular type), also ER positive, DE positive, HER-2 negative. Stage [id number] (stage IIA).      I discussed with Dr. Umana regarding the positive posterior margin.  He took the posterior margin, including the fascia, and there is nothing left to take.  She can proceed with chemotherapy.      After chemotherapy, I will refer her to see radiation oncology for consideration of radiation, and then plan on starting hormonal therapy after that.      She has completed 4 cycles of AC and tolerated well.  She is now on weekly paclitaxel and tolerating well.    -Cycle 10 of paclitaxel today and continue weekly.  Cycle 11 is on 9/25/18.  Cycle 12 is on 10/2/18.  -prn anti-emetics written, but she has not needed it.  -referred for lymphedema therapy  -referral to see radiation oncology for consultation appointment  -Muriel asked about reconstruction today.  She did discuss it with surgery prior to her mastectomy.  It was felt that since she is a smoker and will also likely require radiation, she was felt to not be a great candidate for immediate reconstruction.  I will refer her to see plastic surgery for evaluation, per her request.  -she asked for more Ativan today.  I did renew this for her.  We did discuss that she will need  to wean off of this once she has competed chemotherapy.  She should also not take this together with Ambien.  She says that she rarely takes the Ambien, and that she does not take them together.    -RTC in 3 weeks with labs    2) Smoking: She smokes half a pack a day.  She says that she is trying to cut back.  -I again advised smoking cessation  -she will see her PCP for tobacco cessation.    3) Hypertension: She is on medications for this.  She says that she has been anxious and nervous and has had difficulty sleeping at night.  -follows with PCP    4) Chronic back pain: s/p history of back surgeries  -she takes soma  -follows with PCP    5) Restless leg syndrome: She felt it when she got 50 mg of Benadryl and it did not feel good.  -will cut back pre-med Benadryl dose to 25 mg with future cycles of chemotherapy    6) Anemia: mild.    -monitor CBC        I spent a total of 25 minutes with the patient, with over >50% of the time in counseling and/or coordination of care.       Patience Mckeon MD  Hematology/Oncology  [facility]      Again, thank you for allowing me to participate in the care of your patient.        Sincerely,        Patience Mckeon MD

## 2018-09-18 NOTE — MR AVS SNAPSHOT
After Visit Summary   9/18/2018    Muriel Diaz    MRN: [id number]           Patient Information     Date Of Birth          1960        Visit Information        Provider Department      9/18/2018 9:00 AM  INFUSION CHAIR 18 [facility]        Today's Diagnoses     Malignant neoplasm of upper-inner quadrant of left breast in female, estrogen receptor positive (H)    -  1       Follow-ups after your visit        Your next 10 appointments already scheduled     Sep 25, 2018 10:30 AM CDT   Level 4 with  INFUSION CHAIR 10   [facility] ([facility])    [facility]  [address]  [place] [address]   [phone]            Oct 02, 2018 11:30 AM CDT   Level 4 with  INFUSION CHAIR 15   [facility] ([facility])    [facility]  [address]  [place] [address]   [phone]            Oct 19, 2018  1:00 PM CDT   Level 1 with  INFUSION CHAIR 12   [facility] ([facility])    [facility] [place]  [address]  [place] [address]   [phone]            Oct 19, 2018  2:00 PM CDT   Return Visit with Patience Mckeon MD   [facility] ([facility])    [facility]  [address]  [place] [address]   [phone]              Future tests that were ordered for you today     Open Future Orders        Priority Expected Expires Ordered    CBC with platelets differential Routine 10/9/2018 9/18/2019 9/18/2018    Comprehensive metabolic panel Routine 10/9/2018 9/18/2019 9/18/2018            Who to contact     If you have questions or need follow up information about today's clinic visit or your schedule please contact [facility] directly at  "[phone].  Normal or non-critical lab and imaging results will be communicated to you by MyChart, letter or phone within 4 business days after the clinic has received the results. If you do not hear from us within 7 days, please contact the clinic through MyChart or phone. If you have a critical or abnormal lab result, we will notify you by phone as soon as possible.  Submit refill requests through [employer] or call your pharmacy and they will forward the refill request to us. Please allow 3 business days for your refill to be completed.          Additional Information About Your Visit        Care EveryWhere ID     This is your Care EveryWhere ID. This could be used by other organizations to access your [facility] medical records  [id number]        Your Vitals Were     Pulse Temperature Respirations Height Pulse Oximetry BMI (Body Mass Index)    69 97.8  F (36.6  C) (Oral) 16 1.6 m (5' 2.99\") 97% 18.75 kg/m2       Blood Pressure from Last 3 Encounters:   09/18/18 (!) 156/95   09/18/18 (!) 182/94   09/11/18 (!) 139/98    Weight from Last 3 Encounters:   09/18/18 48 kg (105 lb 13.1 oz)   09/18/18 48 kg (105 lb 12.8 oz)   09/11/18 47.3 kg (104 lb 3.2 oz)              We Performed the Following     CBC with platelets diff          Where to get your medicines      Some of these will need a paper prescription and others can be bought over the counter.  Ask your nurse if you have questions.     Bring a paper prescription for each of these medications     LORazepam 0.5 MG tablet          Primary Care Provider Office Phone # Fax #    Isabel Vickie Landin [phone] [address] [place] [address]        Equal Access to Services     RENETTA HUNT AH: Angie Peña, jaclyn rey, kvng gann. [facility] [phone].    ATENCIÓN: Si habla español, tiene a chowdary disposición servicios gratuitos de asistencia lingüística. " Concha mendoza [phone].    We comply with applicable federal civil rights laws and [place] laws. We do not discriminate on the basis of race, color, national origin, age, disability, sex, sexual orientation, or gender identity.            Thank you!     Thank you for choosing [facility]  for your care. Our goal is always to provide you with excellent care. Hearing back from our patients is one way we can continue to improve our services. Please take a few minutes to complete the written survey that you may receive in the mail after your visit with us. Thank you!             Your Updated Medication List - Protect others around you: Learn how to safely use, store and throw away your medicines at www.disposemymeds.org.          This list is accurate as of 9/18/18 12:42 PM.  Always use your most recent med list.                   Brand Name Dispense Instructions for use Diagnosis    atenolol 25 MG tablet    TENORMIN    30 tablet    TAKE ONE TABLET BY MOUTH ONE TIME DAILY    Hypertension goal BP (blood pressure) < 140/90       hydrochlorothiazide 12.5 MG Tabs tablet     90 tablet    Take 1 tablet (12.5 mg) by mouth daily    Hypertension goal BP (blood pressure) < 140/90       lidocaine-prilocaine cream    EMLA    30 g    Apply topically as needed for moderate pain Apply to port site 1 hour prior to accessing    Malignant neoplasm of left breast (H)       LORazepam 0.5 MG tablet    ATIVAN    30 tablet    Take 1 tablet (0.5 mg) by mouth every 4 hours as needed (Anxiety, Nausea/Vomiting or Sleep)    Malignant neoplasm of upper-inner quadrant of left breast in female, estrogen receptor positive (H)       prochlorperazine 10 MG tablet    COMPAZINE    30 tablet    Take 1 tablet (10 mg) by mouth every 6 hours as needed (Nausea/Vomiting)    Malignant neoplasm of upper-inner quadrant of left breast in female, estrogen receptor positive (H)       SOMA PO      Take 350 mg by mouth 3 times daily         VITAMIN D (CHOLECALCIFEROL) PO      Take 2,000 Units by mouth daily (2 x 1000 units = 2000 unit dose)        zolpidem 5 MG tablet    AMBIEN    45 tablet    TAKE ONE TABLET BY MOUTH AT BEDTIME AS NEEDED FOR SLEEP . May repeat x 1 PRN    Insomnia due to medical condition

## 2018-09-18 NOTE — PATIENT INSTRUCTIONS
-Ativan prescription printed  -referral to plastic surgery scheduled/janice  -referral to radiation oncology  Scheduled/janice  -proceed with chemotherapy today (paclitaxel)  -schedule chemotherapy on 9/25/18 and 10/2/18  Scheduled/janice  -return to clinic in 3 weeks with labs scheduled/janice      Patient in [facility]    You are scheduled for a Radiation Consult with Dr Zuniga on 9/25/18 @ 8:30am  Please check in at 8:15am report to [facility]   Phone: [phone]     You are scheduled for a Plastic Surgery Consult with Dr Crook on 9/24/18 @ 1:45pm  [place] location: [address] [place] [address], [address]       AVS printed & given to patient/janice

## 2018-09-18 NOTE — PROGRESS NOTES
[facility]    Hematology/Oncology Established Patient Note      Today's Date: 09/18/18    Reason for Follow-up: left sided breast cancer      HISTORY OF PRESENT ILLNESS: Muriel Diaz is a 57 year old post-menopausal female with PMHx of HTN, degenerative joint disease, history of cervical spine surgery, who presents with left-sided breast cancer.  She underwent left mastectomy on 4/16/18 by Dr. Umana.  Pathology showed invasive mammary carcinoma, with lobular and ductal features, grade 2, tumor size 4.3 x 4.0 x 2.1 cm, positive margin for invasive carcinoma in the central and lateral posterior surface, DCIS and LCIS present, ER positive (95%), MA positive (50%), HER-2 negative.  Left axillary dissection showed 3 of 5 lymph nodes were positive for metastatic carcinoma (lobular type), also ER positive, MA positive, HER-2 negative. Stage [id number] (stage IIA).      She had port placed on 5/14/18.    She started chemotherapy on 5/22/18:    Doxorubicin 60 mg/m2 IV on day 1  Cyclophosphamide 600 mg/m2 IV on day 1  Neulasta 6 mg SQ on day 2  Every 2 week cycles x 4 cycles    Followed by:    Paclitaxel 80 mg/m2 IV once a week x 12 weeks    AC  C1D1: 5/22/18  C2D1: 6/5/18  C3D1: 6/19/18  C4D1: 7/3/18    Paclitaxel:  Cycle 1: 7/17/18:  Cycle 2: 7/24/18  Cycle 3: 7/31/18  Cycle 4: 8/7/18  Cycle 5: 8/14/18  Cycle 6: 8/21/18  Cycle 7: 8/28/18  Cycle 8: 9/4/18  Cycle 9: 9/11/18  Cycle 10: 9/18/18  Cycle 11: anticipated for 9/25/18  Cycle 12: anticipated for 10/2/18        INTERIM HISTORY: Muriel is here for follow-up.   She says that she continues to feel fine on chemotherapy.  She denies pain or neuropathy or nausea.  She asked for lorazepam prescription renewal today.  She also asked about reconstruction.      REVIEW OF SYSTEMS:   14 point ROS was reviewed and is negative other than as noted above in HPI.       HOME MEDICATIONS:  Current Outpatient Prescriptions   Medication Sig Dispense Refill      atenolol (TENORMIN) 25 MG tablet TAKE ONE TABLET BY MOUTH ONE TIME DAILY 30 tablet 3     Carisoprodol (SOMA PO) Take 350 mg by mouth 3 times daily       hydrochlorothiazide 12.5 MG TABS tablet Take 1 tablet (12.5 mg) by mouth daily 90 tablet 1     lidocaine-prilocaine (EMLA) cream Apply topically as needed for moderate pain Apply to port site 1 hour prior to accessing 30 g 3     LORazepam (ATIVAN) 0.5 MG tablet Take 1 tablet (0.5 mg) by mouth every 4 hours as needed (Anxiety, Nausea/Vomiting or Sleep) 30 tablet 5     prochlorperazine (COMPAZINE) 10 MG tablet Take 1 tablet (10 mg) by mouth every 6 hours as needed (Nausea/Vomiting) 30 tablet 5     VITAMIN D, CHOLECALCIFEROL, PO Take 2,000 Units by mouth daily (2 x 1000 units = 2000 unit dose)       zolpidem (AMBIEN) 5 MG tablet TAKE ONE TABLET BY MOUTH AT BEDTIME AS NEEDED FOR SLEEP . May repeat x 1 PRN 45 tablet 0         ALLERGIES:  Allergies   Allergen Reactions     Amitriptyline Other (See Comments)     nightmares     Oxycontin [Oxycodone] Nausea     Severe headaches         PAST MEDICAL HISTORY:  Past Medical History:   Diagnosis Date     Cancer (H)     BREAST CANCER     Degeneration of cervical intervertebral disc      Degeneration of lumbar or lumbosacral intervertebral disc      Hypertension      PONV (postoperative nausea and vomiting)          PAST SURGICAL HISTORY:  Past Surgical History:   Procedure Laterality Date     BACK SURGERY  2001    lumbar fusion, cervical discectomy     DISSECT LYMPH NODE AXILLA Left 4/16/2018    Procedure: DISSECT LYMPH NODE AXILLA;;  Surgeon: Rodney Umana MD;  Location: [facility]     FUSION CERVICAL ANTERIOR THREE+ LEVELS  5/1/2012    Procedure:FUSION CERVICAL ANTERIOR THREE+ LEVELS; Surgeon:SARAH FRANCO; Location:[facility]     FUSION CERVICAL POSTERIOR THREE+ LEVELS  5/1/2012    Procedure:FUSION CERVICAL POSTERIOR THREE+ LEVELS; Posterior Cervical decompression and fusion C4-7, Anterior Cervical decompression and  fusion C4-7 (c-arm), (herb table with abraham, yina oasis, yina aviator, Vitoss foam packing strip, impulse monitoring,); Surgeon:SARAH FRANCO; Location: OR     GYN SURGERY       HYSTERECTOMY VAGINAL  1990's    [facility] specilist     HYSTERECTOMY, PAP NO LONGER INDICATED       INSERT PORT VASCULAR ACCESS N/A 5/14/2018    Procedure: INSERT PORT VASCULAR ACCESS;  PORT PLACEMENT;  Surgeon: Rodney Umana MD;  Location: [facility]     MASTECTOMY SIMPLE Left 4/16/2018    Procedure: MASTECTOMY SIMPLE;  LEFT SIMPLE MASTECTOMY,LEFT AXILLARY DISSECTION;  Surgeon: Rodney Umana MD;  Location: [facility]         SOCIAL HISTORY:  Social History     Social History     Marital status: Single     Spouse name: N/A     Number of children: 0     Years of education: N/A     Occupational History      [employer]      Social History Main Topics     Smoking status: Current Every Day Smoker     Packs/day: 0.50     Types: Cigarettes     Smokeless tobacco: Never Used      Comment: 4 cigarettes per day     Alcohol use 0.6 oz/week     1 Standard drinks or equivalent per week      Comment: 2-3 drinks per week     Drug use: No     Sexual activity: Not Currently     Partners: Male     Other Topics Concern     Parent/Sibling W/ Cabg, Mi Or Angioplasty Before 65f 55m? No     Social History Narrative   She smokes cigarettes, half a pack a day for many years; she is unable to quantify how many.  She drinks alcohol occasionally.  She denies illicit drug use.  She is retired.  She used to to work in  at large companies.  She lives in [place] with her boyfriend.        FAMILY HISTORY:  Family History   Problem Relation Age of Onset     Pancreatitis Mother      Substance Abuse Mother      Unknown/Adopted Father      She does not know much about her family history and is unaware of any cancers in her family.  She has never been pregnant.  She had a hysterectomy in the 1990's.  She still has her  "ovaries.  She says that she has undergone menopause but is unsure when, likely years ago.        PHYSICAL EXAM:  Vital signs:  BP (!) 156/95  Pulse 69  Temp 97.8  F (36.6  C) (Oral)  Resp 16  Ht 1.6 m (5' 2.99\")  Wt 48 kg (105 lb 13.1 oz)  SpO2 97%  BMI 18.75 kg/m2   ECO  GENERAL/CONSTITUTIONAL: No acute distress.  EYES: No scleral icterus.  RESPIRATORY: Clear to auscultation bilaterally. No crackles or wheezing.   CARDIOVASCULAR: Regular rate and rhythm without murmurs, gallops, or rubs.  GASTROINTESTINAL: No tenderness. The patient has normal bowel sounds. No guarding.  No distention.  MUSCULOSKELETAL: Warm and well-perfused.  NEUROLOGIC: Alert, oriented, answers questions appropriately.  INTEGUMENTARY: No rashes or jaundice.  Port in place at the right upper chest.      LABS:  CBC RESULTS:   Recent Labs   Lab Test  18   0911   WBC  5.0   RBC  3.36*   HGB  11.1*   HCT  33.7*   MCV  100   MCH  33.0   MCHC  32.9   RDW  14.6   PLT  347         IMAGING:  Mammogram and ultrasound 3/28/18:  FINDINGS:  Standard bilateral diagnostic views were obtained,  including tomosynthesis. Marker was placed on the left upper breast to  denote the site of the palpable lump; deep to the marker there is a  mass measuring approximately 2 cm. The mass is associated with  retraction of the left nipple. There are no associated  microcalcifications. No suspicious findings in the contralateral right  breast.     Further evaluation with targeted left breast ultrasound shows a  corresponding hypoechoic lesion at the 11:00 position, 2 cm from the  nipple, measuring 1.9 x 2.9 x 1.0 cm. Survey of the axilla shows an  enlarged lymph node measuring 0.7 x 0.6 cm.     MRI breast 18:  1. The known left breast malignancy measures 2.5 x 2.0 x 2.6 cm on  MRI. There is additional nonmass enhancement surrounding the mass in  the upper left breast.  2. No suspicious MRI finding in the right breast.      PET-CT 18:  1. Hypermetabolic " left breast mass representing the primary  malignancy.  2. A few mildly prominent left axillary lymph nodes that are  associated with only very mild metabolism. These are felt to be  indeterminant. There is a subcentimeter lymph node with mild  hypermetabolism at the right anterior upper mediastinum between the  innominate artery and superior vena cava that is felt to also be  indeterminant.  3. No other pathologic activity.  4. Small areas of sclerosis within the thoracic and lumbar spine.    Normal soft tissue neck CT.    Echo 8/2/18:  The visual ejection fraction is estimated at 55-60%.  Global peak LV longitudinal strain is averaged at -19.7%. This is within  reported normal limits (normal <-18%).  The right ventricle is normal in size and function.  Sinus rhythm was noted.      ASSESSMENT/PLAN:  Muriel Diaz is a 57 year old post-menopausal female with:    1) Left breast cancer of the upper inner quadrant: s/p left mastectomy on 4/16/18; pathology showed invasive mammary carcinoma, with lobular and ductal features, grade 2, tumor size 4.3 x 4.0 x 2.1 cm, positive margin for invasive carcinoma in the central and lateral posterior surface, DCIS and LCIS present, ER positive (95%), PA positive (50%), HER-2 negative.  Left axillary dissection showed 3 of 5 lymph nodes were positive for metastatic carcinoma (lobular type), also ER positive, PA positive, HER-2 negative. Stage [id number] (stage IIA).      I discussed with Dr. Umana regarding the positive posterior margin.  He took the posterior margin, including the fascia, and there is nothing left to take.  She can proceed with chemotherapy.      After chemotherapy, I will refer her to see radiation oncology for consideration of radiation, and then plan on starting hormonal therapy after that.      She has completed 4 cycles of AC and tolerated well.  She is now on weekly paclitaxel and tolerating well.    -Cycle 10 of paclitaxel today and continue weekly.   Cycle 11 is on 9/25/18.  Cycle 12 is on 10/2/18.  -prn anti-emetics written, but she has not needed it.  -referred for lymphedema therapy  -referral to see radiation oncology for consultation appointment  -Muriel asked about reconstruction today.  She did discuss it with surgery prior to her mastectomy.  It was felt that since she is a smoker and will also likely require radiation, she was felt to not be a great candidate for immediate reconstruction.  I will refer her to see plastic surgery for evaluation, per her request.  -she asked for more Ativan today.  I did renew this for her.  We did discuss that she will need to wean off of this once she has competed chemotherapy.  She should also not take this together with Ambien.  She says that she rarely takes the Ambien, and that she does not take them together.    -RTC in 3 weeks with labs    2) Smoking: She smokes half a pack a day.  She says that she is trying to cut back.  -I again advised smoking cessation  -she will see her PCP for tobacco cessation.    3) Hypertension: She is on medications for this.  She says that she has been anxious and nervous and has had difficulty sleeping at night.  -follows with PCP    4) Chronic back pain: s/p history of back surgeries  -she takes soma  -follows with PCP    5) Restless leg syndrome: She felt it when she got 50 mg of Benadryl and it did not feel good.  -will cut back pre-med Benadryl dose to 25 mg with future cycles of chemotherapy    6) Anemia: mild.    -monitor CBC        I spent a total of 25 minutes with the patient, with over >50% of the time in counseling and/or coordination of care.       Patience Mckeon MD  Hematology/Oncology  [facility]

## 2018-09-18 NOTE — PROGRESS NOTES
"Oncology Rooming Note    September 18, 2018 9:18 AM   Muriel Diaz is a 57 year old female who presents for:    Chief Complaint   Patient presents with     Oncology Clinic Visit     Initial Vitals: BP (!) 156/95  Pulse 69  Temp 97.8  F (36.6  C) (Oral)  Resp 16  Ht 1.6 m (5' 2.99\")  Wt 48 kg (105 lb 13.1 oz)  SpO2 97%  BMI 18.75 kg/m2 Estimated body mass index is 18.75 kg/(m^2) as calculated from the following:    Height as of this encounter: 1.6 m (5' 2.99\").    Weight as of this encounter: 48 kg (105 lb 13.1 oz). Body surface area is 1.46 meters squared.  No Pain (0) Comment: Data Unavailable   No LMP recorded. Patient has had a hysterectomy.  Allergies reviewed: Yes  Medications reviewed: Yes    Medications: MEDICATION REFILLS NEEDED TODAY. Provider was notified. LORAZEPAM REFILL NEEDED TODAY.  Pharmacy name entered into [facility]:    [facility]  [place] - [address]    Clinical concerns: None    2 minutes for nursing intake (face to face time)     Mabel Geronimo CMA              "

## 2018-09-18 NOTE — MR AVS SNAPSHOT
After Visit Summary   9/18/2018    Muriel Diaz    MRN: [id number]           Patient Information     Date Of Birth          1960        Visit Information        Provider Department      9/18/2018 9:30 AM Patience Mckeon MD [facility]        Today's Diagnoses     Malignant neoplasm of upper-inner quadrant of left breast in female, estrogen receptor positive (H)          Care Instructions    -Ativan prescription printed  -referral to plastic surgery  -referral to radiation oncology  -proceed with chemotherapy today (paclitaxel)  -schedule chemotherapy on 9/25/18 and 10/2/18  -return to clinic in 3 weeks with labs      Patient in [facility]    You are scheduled for a Radiation Consult with Dr Zuniga on 9/25/18 @ 8:30am  Please check in at 8:15am report to [facility]   Phone: [phone]     You are scheduled for a Plastic Surgery Consult with Dr Crook on 9/24/18 @ 1:45pm  [place] location: [address] [place] [address], [address]           Follow-ups after your visit        Additional Services     PLASTIC SURGERY REFERRAL       Your provider has referred you to: [facility] [place] [phone]   [email or web address]    Please be aware that coverage of these services is subject to the terms and limitations of your health insurance plan.  Call member services at your health plan with any benefit or coverage questions.      Please bring the following with you to your appointment:    (1) Any X-Rays, CTs or MRIs which have been performed.  Contact the facility where they were done to arrange for  prior to your scheduled appointment.    (2) List of current medications  (3) This referral request   (4) Any documents/labs given to you for this referral            RADIATION THERAPY REFERRAL       Your provider has referred you to: [facility] [place] [phone]    [email or web address]    Please be aware that coverage of these services is subject to the terms and limitations of your health insurance plan.  Call member services at your health plan with any benefit or coverage questions.      Please bring the following to your appointment:    >>   Any x-rays, CTs or MRIs which have been performed.  Contact the facility where they were done to arrange for  prior to your scheduled appointment.   >>   List of current medications   >>   This referral request   >>   Any documents/labs given to you for this referral                  Your next 10 appointments already scheduled     Sep 25, 2018 10:30 AM CDT   Level 4 with  INFUSION CHAIR 10   [facility] ([facility])    [facility]  [address]  [place] [address]   [phone]            Oct 02, 2018 11:30 AM CDT   Level 4 with SH INFUSION CHAIR 15   [facility] ([facility])    [facility]  [address]  [place] [address]   [phone]            Oct 19, 2018  1:00 PM CDT   Level 1 with SH INFUSION CHAIR 12   [facility] ([facility])    [facility]  [address]  [place] [address]   [phone]            Oct 19, 2018  2:00 PM CDT   Return Visit with Patience Mckeon MD   [facility])    [facility]  [address]  [place] [address]   [phone]              Future tests that were ordered for you today     Open Future Orders        Priority Expected Expires Ordered    CBC with platelets differential Routine 10/9/2018 9/18/2019 9/18/2018    Comprehensive metabolic panel Routine 10/9/2018 9/18/2019 9/18/2018            Who to contact     If you have questions or need follow up information about  "today's clinic visit or your schedule please contact [facility] directly at [phone].  Normal or non-critical lab and imaging results will be communicated to you by MyChart, letter or phone within 4 business days after the clinic has received the results. If you do not hear from us within 7 days, please contact the clinic through MyChart or phone. If you have a critical or abnormal lab result, we will notify you by phone as soon as possible.  Submit refill requests through [employer] or call your pharmacy and they will forward the refill request to us. Please allow 3 business days for your refill to be completed.          Additional Information About Your Visit        Care EveryWhere ID     This is your Care EveryWhere ID. This could be used by other organizations to access your [place] medical records  [id number]        Your Vitals Were     Pulse Temperature Respirations Height Pulse Oximetry BMI (Body Mass Index)    69 97.8  F (36.6  C) (Oral) 16 1.6 m (5' 2.99\") 97% 18.75 kg/m2       Blood Pressure from Last 3 Encounters:   09/18/18 (!) 156/95   09/18/18 (!) 156/95   09/11/18 (!) 139/98    Weight from Last 3 Encounters:   09/18/18 48 kg (105 lb 13.1 oz)   09/18/18 48 kg (105 lb 12.8 oz)   09/11/18 47.3 kg (104 lb 3.2 oz)              We Performed the Following     PLASTIC SURGERY REFERRAL     RADIATION THERAPY REFERRAL          Where to get your medicines      Some of these will need a paper prescription and others can be bought over the counter.  Ask your nurse if you have questions.     Bring a paper prescription for each of these medications     LORazepam 0.5 MG tablet          Primary Care Provider Office Phone # Fax #    Isabel Vickie Landin [phone] [address] [place] [address]        Equal Access to Services     FRANCISCA HUNT AH: Angie murilloo Socriss, waaxda luqadaha, qaybta kaalmada josemanuel, kvng gupta. " So [facility] [phone].    ATENCIÓN: Si ivette marshall, tiene a chowdary disposición servicios gratuitos de asistencia lingüística. Concha mendoza [phone].    We comply with applicable federal civil rights laws and [place] laws. We do not discriminate on the basis of race, color, national origin, age, disability, sex, sexual orientation, or gender identity.            Thank you!     Thank you for choosing [facility]  for your care. Our goal is always to provide you with excellent care. Hearing back from our patients is one way we can continue to improve our services. Please take a few minutes to complete the written survey that you may receive in the mail after your visit with us. Thank you!             Your Updated Medication List - Protect others around you: Learn how to safely use, store and throw away your medicines at www.disposemymeds.org.          This list is accurate as of 9/18/18 10:47 AM.  Always use your most recent med list.                   Brand Name Dispense Instructions for use Diagnosis    atenolol 25 MG tablet    TENORMIN    30 tablet    TAKE ONE TABLET BY MOUTH ONE TIME DAILY    Hypertension goal BP (blood pressure) < 140/90       hydrochlorothiazide 12.5 MG Tabs tablet     90 tablet    Take 1 tablet (12.5 mg) by mouth daily    Hypertension goal BP (blood pressure) < 140/90       lidocaine-prilocaine cream    EMLA    30 g    Apply topically as needed for moderate pain Apply to port site 1 hour prior to accessing    Malignant neoplasm of left breast (H)       LORazepam 0.5 MG tablet    ATIVAN    30 tablet    Take 1 tablet (0.5 mg) by mouth every 4 hours as needed (Anxiety, Nausea/Vomiting or Sleep)    Malignant neoplasm of upper-inner quadrant of left breast in female, estrogen receptor positive (H)       prochlorperazine 10 MG tablet    COMPAZINE    30 tablet    Take 1 tablet (10 mg) by mouth every 6 hours as needed (Nausea/Vomiting)    Malignant neoplasm of upper-inner quadrant of left  breast in female, estrogen receptor positive (H)       SOMA PO      Take 350 mg by mouth 3 times daily        VITAMIN D (CHOLECALCIFEROL) PO      Take 2,000 Units by mouth daily (2 x 1000 units = 2000 unit dose)        zolpidem 5 MG tablet    AMBIEN    45 tablet    TAKE ONE TABLET BY MOUTH AT BEDTIME AS NEEDED FOR SLEEP . May repeat x 1 PRN    Insomnia due to medical condition

## 2018-09-21 RX ORDER — EPINEPHRINE 0.3 MG/.3ML
0.3 INJECTION SUBCUTANEOUS EVERY 5 MIN PRN
Status: CANCELLED | OUTPATIENT
Start: 2018-09-25

## 2018-09-21 RX ORDER — SODIUM CHLORIDE 9 MG/ML
1000 INJECTION, SOLUTION INTRAVENOUS CONTINUOUS PRN
Status: CANCELLED
Start: 2018-09-25

## 2018-09-21 RX ORDER — METHYLPREDNISOLONE SODIUM SUCCINATE 125 MG/2ML
125 INJECTION, POWDER, LYOPHILIZED, FOR SOLUTION INTRAMUSCULAR; INTRAVENOUS
Status: CANCELLED
Start: 2018-09-25

## 2018-09-21 RX ORDER — LORAZEPAM 2 MG/ML
0.5 INJECTION INTRAMUSCULAR EVERY 4 HOURS PRN
Status: CANCELLED
Start: 2018-09-25

## 2018-09-21 RX ORDER — ALBUTEROL SULFATE 90 UG/1
1-2 AEROSOL, METERED RESPIRATORY (INHALATION)
Status: CANCELLED
Start: 2018-09-25

## 2018-09-21 RX ORDER — DIPHENHYDRAMINE HYDROCHLORIDE 50 MG/ML
50 INJECTION INTRAMUSCULAR; INTRAVENOUS
Status: CANCELLED
Start: 2018-09-25

## 2018-09-21 RX ORDER — EPINEPHRINE 1 MG/ML
0.3 INJECTION, SOLUTION INTRAMUSCULAR; SUBCUTANEOUS EVERY 5 MIN PRN
Status: CANCELLED | OUTPATIENT
Start: 2018-09-25

## 2018-09-21 RX ORDER — MEPERIDINE HYDROCHLORIDE 25 MG/ML
25 INJECTION INTRAMUSCULAR; INTRAVENOUS; SUBCUTANEOUS EVERY 30 MIN PRN
Status: CANCELLED | OUTPATIENT
Start: 2018-09-25

## 2018-09-21 RX ORDER — ALBUTEROL SULFATE 0.83 MG/ML
2.5 SOLUTION RESPIRATORY (INHALATION)
Status: CANCELLED | OUTPATIENT
Start: 2018-09-25

## 2018-09-21 RX ORDER — HEPARIN SODIUM (PORCINE) LOCK FLUSH IV SOLN 100 UNIT/ML 100 UNIT/ML
5 SOLUTION INTRAVENOUS EVERY 8 HOURS
Status: CANCELLED
Start: 2018-09-25

## 2018-09-21 RX ORDER — DIPHENHYDRAMINE HCL 25 MG
25 CAPSULE ORAL ONCE
Status: CANCELLED
Start: 2018-09-25

## 2018-09-24 ENCOUNTER — TRANSFERRED RECORDS (OUTPATIENT)
Dept: HEALTH INFORMATION MANAGEMENT | Facility: CLINIC | Age: 58
End: 2018-09-24

## 2018-09-25 ENCOUNTER — TRANSFERRED RECORDS (OUTPATIENT)
Dept: HEALTH INFORMATION MANAGEMENT | Facility: CLINIC | Age: 58
End: 2018-09-25

## 2018-09-25 ENCOUNTER — INFUSION THERAPY VISIT (OUTPATIENT)
Dept: INFUSION THERAPY | Facility: CLINIC | Age: 58
End: 2018-09-25
Attending: INTERNAL MEDICINE
Payer: COMMERCIAL

## 2018-09-25 ENCOUNTER — HOSPITAL ENCOUNTER (OUTPATIENT)
Facility: CLINIC | Age: 58
Setting detail: SPECIMEN
Discharge: HOME | End: 2018-09-25
Attending: INTERNAL MEDICINE | Admitting: INTERNAL MEDICINE
Payer: COMMERCIAL

## 2018-09-25 ENCOUNTER — TRANSFERRED RECORDS (OUTPATIENT)
Dept: FAMILY MEDICINE | Facility: CLINIC | Age: 58
End: 2018-09-25

## 2018-09-25 VITALS
DIASTOLIC BLOOD PRESSURE: 85 MMHG | TEMPERATURE: 98.2 F | RESPIRATION RATE: 16 BRPM | SYSTOLIC BLOOD PRESSURE: 145 MMHG | BODY MASS INDEX: 18.57 KG/M2 | HEART RATE: 55 BPM | WEIGHT: 104.8 LBS

## 2018-09-25 DIAGNOSIS — C50.212 MALIGNANT NEOPLASM OF UPPER-INNER QUADRANT OF LEFT BREAST IN FEMALE, ESTROGEN RECEPTOR POSITIVE (H): Primary | ICD-10-CM

## 2018-09-25 DIAGNOSIS — Z17.0 MALIGNANT NEOPLASM OF UPPER-INNER QUADRANT OF LEFT BREAST IN FEMALE, ESTROGEN RECEPTOR POSITIVE (H): Primary | ICD-10-CM

## 2018-09-25 LAB
BASOPHILS # BLD AUTO: 0.1 10E9/L (ref 0–0.2)
BASOPHILS NFR BLD AUTO: 1.7 %
DIFFERENTIAL METHOD BLD: ABNORMAL
EOSINOPHIL # BLD AUTO: 0.2 10E9/L (ref 0–0.7)
EOSINOPHIL NFR BLD AUTO: 3 %
ERYTHROCYTE [DISTWIDTH] IN BLOOD BY AUTOMATED COUNT: 13.9 % (ref 10–15)
HCT VFR BLD AUTO: 36.9 % (ref 35–47)
HGB BLD-MCNC: 12 G/DL (ref 11.7–15.7)
IMM GRANULOCYTES # BLD: 0 10E9/L (ref 0–0.4)
IMM GRANULOCYTES NFR BLD: 0.4 %
LYMPHOCYTES # BLD AUTO: 1.6 10E9/L (ref 0.8–5.3)
LYMPHOCYTES NFR BLD AUTO: 29.4 %
MCH RBC QN AUTO: 32.5 PG (ref 26.5–33)
MCHC RBC AUTO-ENTMCNC: 32.5 G/DL (ref 31.5–36.5)
MCV RBC AUTO: 100 FL (ref 78–100)
MONOCYTES # BLD AUTO: 0.3 10E9/L (ref 0–1.3)
MONOCYTES NFR BLD AUTO: 5.8 %
NEUTROPHILS # BLD AUTO: 3.2 10E9/L (ref 1.6–8.3)
NEUTROPHILS NFR BLD AUTO: 59.7 %
NRBC # BLD AUTO: 0 10*3/UL
NRBC BLD AUTO-RTO: 0 /100
PLATELET # BLD AUTO: 338 10E9/L (ref 150–450)
RBC # BLD AUTO: 3.69 10E12/L (ref 3.8–5.2)
WBC # BLD AUTO: 5.4 10E9/L (ref 4–11)

## 2018-09-25 PROCEDURE — 96367 TX/PROPH/DG ADDL SEQ IV INF: CPT

## 2018-09-25 PROCEDURE — 85025 COMPLETE CBC W/AUTO DIFF WBC: CPT | Performed by: INTERNAL MEDICINE

## 2018-09-25 PROCEDURE — 96413 CHEMO IV INFUSION 1 HR: CPT

## 2018-09-25 PROCEDURE — 25000132 ZZH RX MED GY IP 250 OP 250 PS 637: Performed by: INTERNAL MEDICINE

## 2018-09-25 PROCEDURE — 25000125 ZZHC RX 250: Performed by: INTERNAL MEDICINE

## 2018-09-25 PROCEDURE — 96375 TX/PRO/DX INJ NEW DRUG ADDON: CPT

## 2018-09-25 PROCEDURE — 25000128 H RX IP 250 OP 636: Performed by: INTERNAL MEDICINE

## 2018-09-25 RX ORDER — HEPARIN SODIUM (PORCINE) LOCK FLUSH IV SOLN 100 UNIT/ML 100 UNIT/ML
5 SOLUTION INTRAVENOUS EVERY 8 HOURS
Status: DISCONTINUED | OUTPATIENT
Start: 2018-09-25 | End: 2018-09-25 | Stop reason: HOSPADM

## 2018-09-25 RX ORDER — DIPHENHYDRAMINE HCL 25 MG
25 CAPSULE ORAL ONCE
Status: COMPLETED | OUTPATIENT
Start: 2018-09-25 | End: 2018-09-25

## 2018-09-25 RX ADMIN — DIPHENHYDRAMINE HYDROCHLORIDE 25 MG: 25 CAPSULE ORAL at 11:48

## 2018-09-25 RX ADMIN — FAMOTIDINE 20 MG: 20 INJECTION, SOLUTION INTRAVENOUS at 12:00

## 2018-09-25 RX ADMIN — PACLITAXEL 116 MG: 6 INJECTION, SOLUTION INTRAVENOUS at 12:26

## 2018-09-25 RX ADMIN — ALTEPLASE 2 MG: 2.2 INJECTION, POWDER, LYOPHILIZED, FOR SOLUTION INTRAVENOUS at 11:30

## 2018-09-25 RX ADMIN — SODIUM CHLORIDE 250 ML: 9 INJECTION, SOLUTION INTRAVENOUS at 11:43

## 2018-09-25 RX ADMIN — SODIUM CHLORIDE, PRESERVATIVE FREE 5 ML: 5 INJECTION INTRAVENOUS at 13:55

## 2018-09-25 RX ADMIN — DEXAMETHASONE SODIUM PHOSPHATE 20 MG: 10 INJECTION, SOLUTION INTRAMUSCULAR; INTRAVENOUS at 11:43

## 2018-09-25 ASSESSMENT — PAIN SCALES - GENERAL: PAINLEVEL: NO PAIN (0)

## 2018-09-25 NOTE — MR AVS SNAPSHOT
After Visit Summary   9/25/2018    Muriel Diaz    MRN: [id number]           Patient Information     Date Of Birth          1960        Visit Information        Provider Department      9/25/2018 10:30 AM  INFUSION CHAIR 10 [facility]        Today's Diagnoses     Malignant neoplasm of upper-inner quadrant of left breast in female, estrogen receptor positive (H)    -  1       Follow-ups after your visit        Your next 10 appointments already scheduled     Oct 02, 2018 11:30 AM CDT   Level 4 with  INFUSION CHAIR 15   [facility] ([facility])    [facility] [place]  [address]  [place] [address]   [phone]            Oct 19, 2018  1:00 PM CDT   Level 1 with  INFUSION CHAIR 12   [facility] ([facility])    Critical access hospital [facility]  [address]  [place] [address] [phone]            Oct 19, 2018  2:00 PM CDT   Return Visit with Patience Mckeon MD   [facility])    Critical access hospital [facility]  [address]  [place] [address] [phone]              Who to contact     If you have questions or need follow up information about today's clinic visit or your schedule please contact [facility] directly at [phone].  Normal or non-critical lab and imaging results will be communicated to you by MyChart, letter or phone within 4 business days after the clinic has received the results. If you do not hear from us within 7 days, please contact the clinic through MyChart or phone. If you have a critical or abnormal lab result, we will notify you by phone as soon as possible.  Submit refill requests through [employer] or call your pharmacy and they will forward the refill request to us. Please allow 3 business  days for your refill to be completed.          Additional Information About Your Visit        Care EveryWhere ID     This is your Care EveryWhere ID. This could be used by other organizations to access your [place] medical records  [id number]        Your Vitals Were     Pulse Temperature Respirations BMI (Body Mass Index)          55 98.2  F (36.8  C) (Oral) 16 18.57 kg/m2         Blood Pressure from Last 3 Encounters:   09/25/18 145/85   09/18/18 (!) 156/95   09/18/18 (!) 182/94    Weight from Last 3 Encounters:   09/25/18 47.5 kg (104 lb 12.8 oz)   09/18/18 48 kg (105 lb 13.1 oz)   09/18/18 48 kg (105 lb 12.8 oz)              We Performed the Following     CBC with platelets diff        Primary Care Provider Office Phone # Fax #    Isabel Vickie Landin [phone] [address] [place] [address]        Equal Access to Services     [facility] : Hadii aad ku hadasho Soomaali, waaxda luqadaha, qaybta kaalmada adeegyada, waxay idiin haylupen jennifer mlacolm . So [facility] [phone].    ATENCIÓN: Si habla español, tiene a chowdary disposición servicios gratuitos de asistencia lingüística. [facility] [phone].    We comply with applicable federal civil rights laws and [place] laws. We do not discriminate on the basis of race, color, national origin, age, disability, sex, sexual orientation, or gender identity.            Thank you!     Thank you for choosing [facility]  for your care. Our goal is always to provide you with excellent care. Hearing back from our patients is one way we can continue to improve our services. Please take a few minutes to complete the written survey that you may receive in the mail after your visit with us. Thank you!             Your Updated Medication List - Protect others around you: Learn how to safely use, store and throw away your medicines at www.disposemymeds.org.          This list is accurate as of 9/25/18   2:54 PM.  Always use your most recent med list.                   Brand Name Dispense Instructions for use Diagnosis    atenolol 25 MG tablet    TENORMIN    30 tablet    TAKE ONE TABLET BY MOUTH ONE TIME DAILY    Hypertension goal BP (blood pressure) < 140/90       hydrochlorothiazide 12.5 MG Tabs tablet     90 tablet    Take 1 tablet (12.5 mg) by mouth daily    Hypertension goal BP (blood pressure) < 140/90       lidocaine-prilocaine cream    EMLA    30 g    Apply topically as needed for moderate pain Apply to port site 1 hour prior to accessing    Malignant neoplasm of left breast (H)       LORazepam 0.5 MG tablet    ATIVAN    30 tablet    Take 1 tablet (0.5 mg) by mouth every 4 hours as needed (Anxiety, Nausea/Vomiting or Sleep)    Malignant neoplasm of upper-inner quadrant of left breast in female, estrogen receptor positive (H)       prochlorperazine 10 MG tablet    COMPAZINE    30 tablet    Take 1 tablet (10 mg) by mouth every 6 hours as needed (Nausea/Vomiting)    Malignant neoplasm of upper-inner quadrant of left breast in female, estrogen receptor positive (H)       SOMA PO      Take 350 mg by mouth 3 times daily        VITAMIN D (CHOLECALCIFEROL) PO      Take 2,000 Units by mouth daily (2 x 1000 units = 2000 unit dose)        zolpidem 5 MG tablet    AMBIEN    45 tablet    TAKE ONE TABLET BY MOUTH AT BEDTIME AS NEEDED FOR SLEEP . May repeat x 1 PRN    Insomnia due to medical condition

## 2018-09-25 NOTE — PROGRESS NOTES
Infusion Nursing Note:  Muriel Diaz presents today for C15D1 taxol.    Patient seen by provider today: No   present during visit today: Not Applicable.    Note: N/A.    Intravenous Access:  Peripheral IV placed.  Implanted Port.    Treatment Conditions:  Lab Results   Component Value Date    HGB 12.0 09/25/2018     Lab Results   Component Value Date    WBC 5.4 09/25/2018      Lab Results   Component Value Date    ANEU 3.2 09/25/2018     Lab Results   Component Value Date     09/25/2018      Results reviewed, labs MET treatment parameters, ok to proceed with treatment.      Post Infusion Assessment:  Patient tolerated infusion without incident.  Site patent and intact, free from redness, edema or discomfort.  No evidence of extravasations.  Access discontinued per protocol.    Discharge Plan:   Patient and/or family verbalized understanding of discharge instructions and all questions answered.  Copy of AVS reviewed with patient and/or family.  Patient will return 10/2 for next appointment.  Patient discharged in stable condition accompanied by: self.  Departure Mode: Ambulatory.    Ludy Fuller RN

## 2018-09-28 DIAGNOSIS — I10 HYPERTENSION GOAL BP (BLOOD PRESSURE) < 140/90: ICD-10-CM

## 2018-09-28 RX ORDER — SODIUM CHLORIDE 9 MG/ML
1000 INJECTION, SOLUTION INTRAVENOUS CONTINUOUS PRN
Status: CANCELLED
Start: 2018-10-02

## 2018-09-28 RX ORDER — DIPHENHYDRAMINE HYDROCHLORIDE 50 MG/ML
50 INJECTION INTRAMUSCULAR; INTRAVENOUS
Status: CANCELLED
Start: 2018-10-02

## 2018-09-28 RX ORDER — DIPHENHYDRAMINE HCL 25 MG
25 CAPSULE ORAL ONCE
Status: CANCELLED
Start: 2018-10-02

## 2018-09-28 RX ORDER — EPINEPHRINE 1 MG/ML
0.3 INJECTION, SOLUTION INTRAMUSCULAR; SUBCUTANEOUS EVERY 5 MIN PRN
Status: CANCELLED | OUTPATIENT
Start: 2018-10-02

## 2018-09-28 RX ORDER — MEPERIDINE HYDROCHLORIDE 25 MG/ML
25 INJECTION INTRAMUSCULAR; INTRAVENOUS; SUBCUTANEOUS EVERY 30 MIN PRN
Status: CANCELLED | OUTPATIENT
Start: 2018-10-02

## 2018-09-28 RX ORDER — ALBUTEROL SULFATE 90 UG/1
1-2 AEROSOL, METERED RESPIRATORY (INHALATION)
Status: CANCELLED
Start: 2018-10-02

## 2018-09-28 RX ORDER — HEPARIN SODIUM (PORCINE) LOCK FLUSH IV SOLN 100 UNIT/ML 100 UNIT/ML
5 SOLUTION INTRAVENOUS EVERY 8 HOURS
Status: CANCELLED
Start: 2018-10-02

## 2018-09-28 RX ORDER — ALBUTEROL SULFATE 0.83 MG/ML
2.5 SOLUTION RESPIRATORY (INHALATION)
Status: CANCELLED | OUTPATIENT
Start: 2018-10-02

## 2018-09-28 RX ORDER — EPINEPHRINE 0.3 MG/.3ML
0.3 INJECTION SUBCUTANEOUS EVERY 5 MIN PRN
Status: CANCELLED | OUTPATIENT
Start: 2018-10-02

## 2018-09-28 RX ORDER — METHYLPREDNISOLONE SODIUM SUCCINATE 125 MG/2ML
125 INJECTION, POWDER, LYOPHILIZED, FOR SOLUTION INTRAMUSCULAR; INTRAVENOUS
Status: CANCELLED
Start: 2018-10-02

## 2018-09-28 RX ORDER — LORAZEPAM 2 MG/ML
0.5 INJECTION INTRAMUSCULAR EVERY 4 HOURS PRN
Status: CANCELLED
Start: 2018-10-02

## 2018-09-28 NOTE — TELEPHONE ENCOUNTER
Last OV (PreOp) 4/13/18  Last Labs 6/19/18    BP Readings from Last 3 Encounters:   09/25/18 145/85   09/18/18 (!) 156/95   09/18/18 (!) 182/94     Last Comprehensive Metabolic Panel:  Sodium   Date Value Ref Range Status   06/19/2018 143 133 - 144 mmol/L Final     Potassium   Date Value Ref Range Status   06/19/2018 3.8 3.4 - 5.3 mmol/L Final     Chloride   Date Value Ref Range Status   06/19/2018 110 (H) 94 - 109 mmol/L Final     Carbon Dioxide   Date Value Ref Range Status   06/19/2018 27 20 - 32 mmol/L Final     Anion Gap   Date Value Ref Range Status   06/19/2018 6 3 - 14 mmol/L Final     Glucose   Date Value Ref Range Status   06/19/2018 83 70 - 99 mg/dL Final     Urea Nitrogen   Date Value Ref Range Status   06/19/2018 16 7 - 30 mg/dL Final     Creatinine   Date Value Ref Range Status   06/19/2018 0.55 0.52 - 1.04 mg/dL Final     GFR Estimate   Date Value Ref Range Status   06/19/2018 >90 >60 mL/min/1.7m2 Final     Comment:     Non  GFR Calc     Calcium   Date Value Ref Range Status   06/19/2018 8.4 (L) 8.5 - 10.1 mg/dL Final

## 2018-09-29 RX ORDER — ATENOLOL 25 MG/1
TABLET ORAL
Qty: 30 TABLET | Refills: 0 | Status: SHIPPED | OUTPATIENT
Start: 2018-09-29 | End: 2018-10-09

## 2018-10-01 LAB — COPATH REPORT: NORMAL

## 2018-10-02 ENCOUNTER — HOSPITAL ENCOUNTER (OUTPATIENT)
Facility: CLINIC | Age: 58
Setting detail: SPECIMEN
Discharge: HOME | End: 2018-10-02
Attending: INTERNAL MEDICINE | Admitting: INTERNAL MEDICINE
Payer: COMMERCIAL

## 2018-10-02 ENCOUNTER — INFUSION THERAPY VISIT (OUTPATIENT)
Dept: INFUSION THERAPY | Facility: CLINIC | Age: 58
End: 2018-10-02
Attending: INTERNAL MEDICINE
Payer: COMMERCIAL

## 2018-10-02 VITALS
RESPIRATION RATE: 14 BRPM | SYSTOLIC BLOOD PRESSURE: 142 MMHG | DIASTOLIC BLOOD PRESSURE: 70 MMHG | TEMPERATURE: 98.3 F | HEIGHT: 63 IN | BODY MASS INDEX: 19.1 KG/M2 | WEIGHT: 107.8 LBS | HEART RATE: 62 BPM

## 2018-10-02 DIAGNOSIS — C50.212 MALIGNANT NEOPLASM OF UPPER-INNER QUADRANT OF LEFT BREAST IN FEMALE, ESTROGEN RECEPTOR POSITIVE (H): Primary | ICD-10-CM

## 2018-10-02 DIAGNOSIS — Z17.0 MALIGNANT NEOPLASM OF UPPER-INNER QUADRANT OF LEFT BREAST IN FEMALE, ESTROGEN RECEPTOR POSITIVE (H): Primary | ICD-10-CM

## 2018-10-02 LAB
BASOPHILS # BLD AUTO: 0.1 10E9/L (ref 0–0.2)
BASOPHILS NFR BLD AUTO: 1.3 %
DIFFERENTIAL METHOD BLD: ABNORMAL
EOSINOPHIL # BLD AUTO: 0.2 10E9/L (ref 0–0.7)
EOSINOPHIL NFR BLD AUTO: 3.4 %
ERYTHROCYTE [DISTWIDTH] IN BLOOD BY AUTOMATED COUNT: 13.8 % (ref 10–15)
HCT VFR BLD AUTO: 34 % (ref 35–47)
HGB BLD-MCNC: 11.3 G/DL (ref 11.7–15.7)
IMM GRANULOCYTES # BLD: 0 10E9/L (ref 0–0.4)
IMM GRANULOCYTES NFR BLD: 0.6 %
LYMPHOCYTES # BLD AUTO: 1.2 10E9/L (ref 0.8–5.3)
LYMPHOCYTES NFR BLD AUTO: 22.1 %
MCH RBC QN AUTO: 33 PG (ref 26.5–33)
MCHC RBC AUTO-ENTMCNC: 33.2 G/DL (ref 31.5–36.5)
MCV RBC AUTO: 99 FL (ref 78–100)
MONOCYTES # BLD AUTO: 0.4 10E9/L (ref 0–1.3)
MONOCYTES NFR BLD AUTO: 7.5 %
NEUTROPHILS # BLD AUTO: 3.5 10E9/L (ref 1.6–8.3)
NEUTROPHILS NFR BLD AUTO: 65.1 %
NRBC # BLD AUTO: 0 10*3/UL
NRBC BLD AUTO-RTO: 0 /100
PLATELET # BLD AUTO: 315 10E9/L (ref 150–450)
RBC # BLD AUTO: 3.42 10E12/L (ref 3.8–5.2)
WBC # BLD AUTO: 5.3 10E9/L (ref 4–11)

## 2018-10-02 PROCEDURE — 96367 TX/PROPH/DG ADDL SEQ IV INF: CPT

## 2018-10-02 PROCEDURE — 25000128 H RX IP 250 OP 636: Performed by: INTERNAL MEDICINE

## 2018-10-02 PROCEDURE — 85025 COMPLETE CBC W/AUTO DIFF WBC: CPT | Performed by: INTERNAL MEDICINE

## 2018-10-02 PROCEDURE — 96413 CHEMO IV INFUSION 1 HR: CPT

## 2018-10-02 PROCEDURE — 25000132 ZZH RX MED GY IP 250 OP 250 PS 637: Performed by: INTERNAL MEDICINE

## 2018-10-02 RX ORDER — HEPARIN SODIUM (PORCINE) LOCK FLUSH IV SOLN 100 UNIT/ML 100 UNIT/ML
5 SOLUTION INTRAVENOUS EVERY 8 HOURS
Status: DISCONTINUED | OUTPATIENT
Start: 2018-10-02 | End: 2018-10-02 | Stop reason: HOSPADM

## 2018-10-02 RX ORDER — DIPHENHYDRAMINE HCL 25 MG
25 CAPSULE ORAL ONCE
Status: COMPLETED | OUTPATIENT
Start: 2018-10-02 | End: 2018-10-02

## 2018-10-02 RX ADMIN — FAMOTIDINE 20 MG: 20 INJECTION, SOLUTION INTRAVENOUS at 13:49

## 2018-10-02 RX ADMIN — DIPHENHYDRAMINE HYDROCHLORIDE 25 MG: 25 CAPSULE ORAL at 13:08

## 2018-10-02 RX ADMIN — PACLITAXEL 116 MG: 6 INJECTION, SOLUTION INTRAVENOUS at 14:09

## 2018-10-02 RX ADMIN — SODIUM CHLORIDE 250 ML: 9 INJECTION, SOLUTION INTRAVENOUS at 13:09

## 2018-10-02 RX ADMIN — SODIUM CHLORIDE, PRESERVATIVE FREE 5 ML: 5 INJECTION INTRAVENOUS at 15:19

## 2018-10-02 RX ADMIN — DEXAMETHASONE SODIUM PHOSPHATE 20 MG: 10 INJECTION, SOLUTION INTRAMUSCULAR; INTRAVENOUS at 13:32

## 2018-10-02 ASSESSMENT — PAIN SCALES - GENERAL: PAINLEVEL: NO PAIN (0)

## 2018-10-02 NOTE — MR AVS SNAPSHOT
After Visit Summary   10/2/2018    Muriel Diaz    MRN: [id number]           Patient Information     Date Of Birth          1960        Visit Information        Provider Department      10/2/2018 11:30 AM  INFUSION CHAIR 15 [facility]        Today's Diagnoses     Malignant neoplasm of upper-inner quadrant of left breast in female, estrogen receptor positive (H)    -  1       Follow-ups after your visit        Additional Services     ONC/HEME ADULT REFERRAL       Your provider has referred you to: [facility]: Cancer Survivor Program [phone]   [email or web address]    Please be aware that coverage of these services is subject to the terms and limitations of your health insurance plan.  Call member services at your health plan with any benefit or coverage questions.      Please bring the following with you to your appointment:    (1) Any X-Rays, CTs or MRIs which have been performed.  Contact the facility where they were done to arrange for  prior to your scheduled appointment.   (2) List of current medications  (3) This referral request   (4) Any documents/labs given to you for this referral                  Follow-up notes from your care team     Return in 2 weeks (on 10/19/2018).      Your next 10 appointments already scheduled     Oct 19, 2018  1:00 PM CDT   Level 1 with  INFUSION CHAIR 12   [facility] ([facility])    [facility]  [address]  [place] [address]   [phone]            Oct 19, 2018  2:00 PM CDT   Return Visit with Patience Mckeon MD   [facility])    [facility]  [address]  [facility] [address]   [phone]              Who to contact     If you have questions or need follow up information about today's clinic visit  "or your schedule please contact [facility] directly at [phone].  Normal or non-critical lab and imaging results will be communicated to you by MyChart, letter or phone within 4 business days after the clinic has received the results. If you do not hear from us within 7 days, please contact the clinic through MyChart or phone. If you have a critical or abnormal lab result, we will notify you by phone as soon as possible.  Submit refill requests through [employer] or call your pharmacy and they will forward the refill request to us. Please allow 3 business days for your refill to be completed.          Additional Information About Your Visit        Care EveryWhere ID     This is your Care EveryWhere ID. This could be used by other organizations to access your [place] medical records  [id number]        Your Vitals Were     Pulse Temperature Respirations Height BMI (Body Mass Index)       62 98.3  F (36.8  C) (Oral) 14 1.6 m (5' 2.99\") 19.1 kg/m2        Blood Pressure from Last 3 Encounters:   10/02/18 142/70   09/25/18 145/85   09/18/18 (!) 156/95    Weight from Last 3 Encounters:   10/02/18 48.9 kg (107 lb 12.8 oz)   09/25/18 47.5 kg (104 lb 12.8 oz)   09/18/18 48 kg (105 lb 13.1 oz)              We Performed the Following     CBC with platelets diff     ONC/HEME ADULT REFERRAL        Primary Care Provider Office Phone # Fax #    Isabel Vickie Landin [phone] [address] [place] [address]        Equal Access to Services     [facility]: Hadii lupe dennison hadasho Soomaali, waaxda luqadaha, qaybta kaalmada josemanuel, kvng gupta. So [facility] [phone].    ATENCIÓN: Si habla español, tiene a chowdary disposición servicios gratuitos de asistencia lingüística. Llame al [phone].    We comply with applicable federal civil rights laws and [place] laws. We do not discriminate on the basis of race, color, national " origin, age, disability, sex, sexual orientation, or gender identity.            Thank you!     Thank you for choosing [facility] AND [facility]  for your care. Our goal is always to provide you with excellent care. Hearing back from our patients is one way we can continue to improve our services. Please take a few minutes to complete the written survey that you may receive in the mail after your visit with us. Thank you!             Your Updated Medication List - Protect others around you: Learn how to safely use, store and throw away your medicines at www.disposemymeds.org.          This list is accurate as of 10/2/18  3:29 PM.  Always use your most recent med list.                   Brand Name Dispense Instructions for use Diagnosis    atenolol 25 MG tablet    TENORMIN    30 tablet    TAKE ONE TABLET BY MOUTH ONE TIME DAILY    Hypertension goal BP (blood pressure) < 140/90       hydrochlorothiazide 12.5 MG Tabs tablet     90 tablet    Take 1 tablet (12.5 mg) by mouth daily    Hypertension goal BP (blood pressure) < 140/90       lidocaine-prilocaine cream    EMLA    30 g    Apply topically as needed for moderate pain Apply to port site 1 hour prior to accessing    Malignant neoplasm of left breast (H)       LORazepam 0.5 MG tablet    ATIVAN    30 tablet    Take 1 tablet (0.5 mg) by mouth every 4 hours as needed (Anxiety, Nausea/Vomiting or Sleep)    Malignant neoplasm of upper-inner quadrant of left breast in female, estrogen receptor positive (H)       prochlorperazine 10 MG tablet    COMPAZINE    30 tablet    Take 1 tablet (10 mg) by mouth every 6 hours as needed (Nausea/Vomiting)    Malignant neoplasm of upper-inner quadrant of left breast in female, estrogen receptor positive (H)       SOMA PO      Take 350 mg by mouth 3 times daily        VITAMIN D (CHOLECALCIFEROL) PO      Take 2,000 Units by mouth daily (2 x 1000 units = 2000 unit dose)        zolpidem 5 MG tablet    AMBIEN    45 tablet     TAKE ONE TABLET BY MOUTH AT BEDTIME AS NEEDED FOR SLEEP . May repeat x 1 PRN    Insomnia due to medical condition

## 2018-10-02 NOTE — PROGRESS NOTES
Infusion Nursing Note:  Muriel Diaz presents today for Taxol.    Patient seen by provider today: No   present during visit today: Not Applicable.    Note: Offering no specific complaints .    Intravenous Access:  Labs drawn without difficulty.  Implanted Port.    Treatment Conditions:  Lab Results   Component Value Date    HGB 11.3 10/02/2018     Lab Results   Component Value Date    WBC 5.3 10/02/2018      Lab Results   Component Value Date    ANEU 3.5 10/02/2018     Lab Results   Component Value Date     10/02/2018      Results reviewed, labs MET treatment parameters, ok to proceed with treatment.      Post Infusion Assessment:  Patient tolerated infusion without incident.  Blood return noted pre and post infusion.  Site patent and intact, free from redness, edema or discomfort.  No evidence of extravasations.  Access discontinued per protocol.    Discharge Plan:   Discharge instructions reviewed with: Patient.  Patient and/or family verbalized understanding of discharge instructions and all questions answered.  Copy of AVS reviewed with patient and/or family.  Patient will return 11/19/2018 for next appointment.  Patient discharged in stable condition accompanied by: self.  Departure Mode: Ambulatory.    Miracle Clark RN

## 2018-10-09 RX ORDER — ATENOLOL 25 MG/1
25 TABLET ORAL DAILY
Qty: 90 TABLET | Refills: 0 | COMMUNITY
Start: 2018-09-29 | End: 2019-01-24

## 2018-10-09 NOTE — TELEPHONE ENCOUNTER
Received call from the patient on 10/1/18.  Asking for a 90 day supply on her atenolol.  She is going through radiation and chemo this next month.  She is hoping you will approve and she will come in to see you after her appointments.    Tre Webber,   [facility]  [phone]

## 2018-10-19 ENCOUNTER — TRANSFERRED RECORDS (OUTPATIENT)
Dept: HEALTH INFORMATION MANAGEMENT | Facility: CLINIC | Age: 58
End: 2018-10-19

## 2018-10-19 ENCOUNTER — HOSPITAL ENCOUNTER (OUTPATIENT)
Facility: CLINIC | Age: 58
Setting detail: SPECIMEN
Discharge: HOME | End: 2018-10-19
Attending: INTERNAL MEDICINE | Admitting: INTERNAL MEDICINE
Payer: COMMERCIAL

## 2018-10-19 ENCOUNTER — TRANSFERRED RECORDS (OUTPATIENT)
Dept: FAMILY MEDICINE | Facility: CLINIC | Age: 58
End: 2018-10-19

## 2018-10-19 ENCOUNTER — ONCOLOGY VISIT (OUTPATIENT)
Dept: ONCOLOGY | Facility: CLINIC | Age: 58
End: 2018-10-19
Attending: INTERNAL MEDICINE
Payer: COMMERCIAL

## 2018-10-19 ENCOUNTER — INFUSION THERAPY VISIT (OUTPATIENT)
Dept: INFUSION THERAPY | Facility: CLINIC | Age: 58
End: 2018-10-19
Attending: INTERNAL MEDICINE
Payer: COMMERCIAL

## 2018-10-19 VITALS
OXYGEN SATURATION: 100 % | SYSTOLIC BLOOD PRESSURE: 164 MMHG | HEART RATE: 64 BPM | TEMPERATURE: 98.1 F | RESPIRATION RATE: 16 BRPM | DIASTOLIC BLOOD PRESSURE: 96 MMHG

## 2018-10-19 DIAGNOSIS — Z17.0 MALIGNANT NEOPLASM OF UPPER-INNER QUADRANT OF LEFT BREAST IN FEMALE, ESTROGEN RECEPTOR POSITIVE (H): ICD-10-CM

## 2018-10-19 DIAGNOSIS — C50.212 MALIGNANT NEOPLASM OF UPPER-INNER QUADRANT OF LEFT BREAST IN FEMALE, ESTROGEN RECEPTOR POSITIVE (H): Primary | ICD-10-CM

## 2018-10-19 DIAGNOSIS — Z17.0 MALIGNANT NEOPLASM OF UPPER-INNER QUADRANT OF LEFT BREAST IN FEMALE, ESTROGEN RECEPTOR POSITIVE (H): Primary | ICD-10-CM

## 2018-10-19 DIAGNOSIS — C50.212 MALIGNANT NEOPLASM OF UPPER-INNER QUADRANT OF LEFT BREAST IN FEMALE, ESTROGEN RECEPTOR POSITIVE (H): ICD-10-CM

## 2018-10-19 LAB
ALBUMIN SERPL-MCNC: 3.9 G/DL (ref 3.4–5)
ALP SERPL-CCNC: 73 U/L (ref 40–150)
ALT SERPL W P-5'-P-CCNC: 20 U/L (ref 0–50)
ANION GAP SERPL CALCULATED.3IONS-SCNC: 7 MMOL/L (ref 3–14)
AST SERPL W P-5'-P-CCNC: 20 U/L (ref 0–45)
BASOPHILS # BLD AUTO: 0.1 10E9/L (ref 0–0.2)
BASOPHILS NFR BLD AUTO: 0.6 %
BILIRUB SERPL-MCNC: 0.3 MG/DL (ref 0.2–1.3)
BUN SERPL-MCNC: 21 MG/DL (ref 7–30)
CALCIUM SERPL-MCNC: 9 MG/DL (ref 8.5–10.1)
CHLORIDE SERPL-SCNC: 106 MMOL/L (ref 94–109)
CO2 SERPL-SCNC: 27 MMOL/L (ref 20–32)
CREAT SERPL-MCNC: 0.64 MG/DL (ref 0.52–1.04)
DIFFERENTIAL METHOD BLD: ABNORMAL
EOSINOPHIL # BLD AUTO: 0.1 10E9/L (ref 0–0.7)
EOSINOPHIL NFR BLD AUTO: 1.6 %
ERYTHROCYTE [DISTWIDTH] IN BLOOD BY AUTOMATED COUNT: 13.6 % (ref 10–15)
GFR SERPL CREATININE-BSD FRML MDRD: >90 ML/MIN/1.7M2
GLUCOSE SERPL-MCNC: 107 MG/DL (ref 70–99)
HCT VFR BLD AUTO: 35.7 % (ref 35–47)
HGB BLD-MCNC: 12 G/DL (ref 11.7–15.7)
IMM GRANULOCYTES # BLD: 0 10E9/L (ref 0–0.4)
IMM GRANULOCYTES NFR BLD: 0.3 %
LYMPHOCYTES # BLD AUTO: 1.3 10E9/L (ref 0.8–5.3)
LYMPHOCYTES NFR BLD AUTO: 16.8 %
MCH RBC QN AUTO: 32.6 PG (ref 26.5–33)
MCHC RBC AUTO-ENTMCNC: 33.6 G/DL (ref 31.5–36.5)
MCV RBC AUTO: 97 FL (ref 78–100)
MONOCYTES # BLD AUTO: 0.5 10E9/L (ref 0–1.3)
MONOCYTES NFR BLD AUTO: 6.3 %
NEUTROPHILS # BLD AUTO: 5.9 10E9/L (ref 1.6–8.3)
NEUTROPHILS NFR BLD AUTO: 74.4 %
NRBC # BLD AUTO: 0 10*3/UL
NRBC BLD AUTO-RTO: 0 /100
PLATELET # BLD AUTO: 296 10E9/L (ref 150–450)
POTASSIUM SERPL-SCNC: 3.9 MMOL/L (ref 3.4–5.3)
PROT SERPL-MCNC: 6.7 G/DL (ref 6.8–8.8)
RBC # BLD AUTO: 3.68 10E12/L (ref 3.8–5.2)
SODIUM SERPL-SCNC: 140 MMOL/L (ref 133–144)
WBC # BLD AUTO: 7.9 10E9/L (ref 4–11)

## 2018-10-19 PROCEDURE — G0463 HOSPITAL OUTPT CLINIC VISIT: HCPCS

## 2018-10-19 PROCEDURE — 25000128 H RX IP 250 OP 636: Performed by: INTERNAL MEDICINE

## 2018-10-19 PROCEDURE — 80053 COMPREHEN METABOLIC PANEL: CPT | Performed by: INTERNAL MEDICINE

## 2018-10-19 PROCEDURE — 90686 IIV4 VACC NO PRSV 0.5 ML IM: CPT | Performed by: INTERNAL MEDICINE

## 2018-10-19 PROCEDURE — 99214 OFFICE O/P EST MOD 30 MIN: CPT | Performed by: INTERNAL MEDICINE

## 2018-10-19 PROCEDURE — 85025 COMPLETE CBC W/AUTO DIFF WBC: CPT | Performed by: INTERNAL MEDICINE

## 2018-10-19 PROCEDURE — G0008 ADMIN INFLUENZA VIRUS VAC: HCPCS

## 2018-10-19 RX ORDER — HEPARIN SODIUM (PORCINE) LOCK FLUSH IV SOLN 100 UNIT/ML 100 UNIT/ML
500 SOLUTION INTRAVENOUS ONCE
Status: COMPLETED | OUTPATIENT
Start: 2018-10-19 | End: 2018-10-19

## 2018-10-19 RX ADMIN — SODIUM CHLORIDE, PRESERVATIVE FREE 500 UNITS: 5 INJECTION INTRAVENOUS at 13:23

## 2018-10-19 RX ADMIN — INFLUENZA A VIRUS A/MICHIGAN/45/2015 X-275 (H1N1) ANTIGEN (FORMALDEHYDE INACTIVATED), INFLUENZA A VIRUS A/SINGAPORE/INFIMH-16-0019/2016 IVR-186 (H3N2) ANTIGEN (FORMALDEHYDE INACTIVATED), INFLUENZA B VIRUS B/PHUKET/3073/2013 ANTIGEN (FORMALDEHYDE INACTIVATED), AND INFLUENZA B VIRUS B/MARYLAND/15/2016 BX-69A ANTIGEN (FORMALDEHYDE INACTIVATED) 0.5 ML: 15; 15; 15; 15 INJECTION, SUSPENSION INTRAMUSCULAR at 14:17

## 2018-10-19 ASSESSMENT — PAIN SCALES - GENERAL: PAINLEVEL: NO PAIN (0)

## 2018-10-19 NOTE — MR AVS SNAPSHOT
After Visit Summary   10/19/2018    Muriel Diaz    MRN: [id number]           Patient Information     Date Of Birth          1960        Visit Information        Provider Department      10/19/2018 1:00 PM  INFUSION CHAIR 12 [facility]        Today's Diagnoses     Malignant neoplasm of upper-inner quadrant of left breast in female, estrogen receptor positive (H)           Follow-ups after your visit        Your next 10 appointments already scheduled     Oct 19, 2018  2:00 PM CDT   Return Visit with Patience Mckeon MD   [facility] ([facility])    [facility] [place]  [address]  [place] [address] [phone]              Who to contact     If you have questions or need follow up information about today's clinic visit or your schedule please contact [facility] directly at [phone].  Normal or non-critical lab and imaging results will be communicated to you by MyChart, letter or phone within 4 business days after the clinic has received the results. If you do not hear from us within 7 days, please contact the clinic through MyChart or phone. If you have a critical or abnormal lab result, we will notify you by phone as soon as possible.  Submit refill requests through [employer] or call your pharmacy and they will forward the refill request to us. Please allow 3 business days for your refill to be completed.          Additional Information About Your Visit        Care EveryWhere ID     This is your Care EveryWhere ID. This could be used by other organizations to access your [place] medical records  [id number]         Blood Pressure from Last 3 Encounters:   10/02/18 142/70   09/25/18 145/85   09/18/18 (!) 156/95    Weight from Last 3 Encounters:   10/02/18 48.9 kg (107 lb 12.8 oz)   09/25/18 47.5 kg (104 lb 12.8 oz)   09/18/18 48 kg (105 lb 13.1 oz)               We Performed the Following     CBC with platelets differential     Comprehensive metabolic panel        Primary Care Provider Office Phone # Fax #    Isabel Vickie Landin [phone] [address] [place] [address]        Equal Access to Services     FRANCISCA MARILEE : Hadii aad ku hadsommero Soomaali, waaxda luqadaha, qaybta kaalmada adeegyada, waxevaristo santoyo haylupen adedarlene moralez lagiftyroberto gupta. So [facility] [phone].    ATENCIÓN: Si habla español, tiene a chowdary disposición servicios gratuitos de asistencia lingüística. Llame al [phone].    We comply with applicable federal civil rights laws and [place] laws. We do not discriminate on the basis of race, color, national origin, age, disability, sex, sexual orientation, or gender identity.            Thank you!     Thank you for choosing [facility]  for your care. Our goal is always to provide you with excellent care. Hearing back from our patients is one way we can continue to improve our services. Please take a few minutes to complete the written survey that you may receive in the mail after your visit with us. Thank you!             Your Updated Medication List - Protect others around you: Learn how to safely use, store and throw away your medicines at www.disposemymeds.org.          This list is accurate as of 10/19/18  1:24 PM.  Always use your most recent med list.                   Brand Name Dispense Instructions for use Diagnosis    atenolol 25 MG tablet    TENORMIN    90 tablet    Take 1 tablet (25 mg) by mouth daily    Hypertension goal BP (blood pressure) < 140/90       hydrochlorothiazide 12.5 MG Tabs tablet     90 tablet    Take 1 tablet (12.5 mg) by mouth daily    Hypertension goal BP (blood pressure) < 140/90       lidocaine-prilocaine cream    EMLA    30 g    Apply topically as needed for moderate pain Apply to port site 1 hour prior to accessing    Malignant neoplasm of left breast  (H)       LORazepam 0.5 MG tablet    ATIVAN    30 tablet    Take 1 tablet (0.5 mg) by mouth every 4 hours as needed (Anxiety, Nausea/Vomiting or Sleep)    Malignant neoplasm of upper-inner quadrant of left breast in female, estrogen receptor positive (H)       prochlorperazine 10 MG tablet    COMPAZINE    30 tablet    Take 1 tablet (10 mg) by mouth every 6 hours as needed (Nausea/Vomiting)    Malignant neoplasm of upper-inner quadrant of left breast in female, estrogen receptor positive (H)       SOMA PO      Take 350 mg by mouth 3 times daily        VITAMIN D (CHOLECALCIFEROL) PO      Take 2,000 Units by mouth daily (2 x 1000 units = 2000 unit dose)        zolpidem 5 MG tablet    AMBIEN    45 tablet    TAKE ONE TABLET BY MOUTH AT BEDTIME AS NEEDED FOR SLEEP . May repeat x 1 PRN    Insomnia due to medical condition

## 2018-10-19 NOTE — PROGRESS NOTES
"Oncology Rooming Note    October 19, 2018 1:53 PM   Muriel Diaz is a 57 year old female who presents for:    Chief Complaint   Patient presents with     Oncology Clinic Visit     Initial Vitals: BP (!) 164/96 (BP Location: Right arm, Patient Position: Chair, Cuff Size: Adult Regular)  Pulse 64  Temp 98.1  F (36.7  C) (Oral)  Resp 16  SpO2 100% Estimated body mass index is 19.1 kg/(m^2) as calculated from the following:    Height as of 10/2/18: 1.6 m (5' 2.99\").    Weight as of 10/2/18: 48.9 kg (107 lb 12.8 oz). There is no height or weight on file to calculate BSA.  No Pain (0) Comment: Data Unavailable   No LMP recorded. Patient has had a hysterectomy.  Allergies reviewed: Yes  Medications reviewed: Yes    Medications: Medication refills not needed today.  Pharmacy name entered into [facility]:    [facility] [address]  [facility] [place], [place] - [address]  [id number] - [place], [place] - [address]    Clinical concerns: None     5 minutes for nursing intake (face to face time)     Mabel Geronimo CMA              "

## 2018-10-19 NOTE — PROGRESS NOTES
"Patient here for port labs  Labs drawn:  CMP, CBC/diff/PLT,   Port right chest gauge 20 needle type gripper size 3/4\"  NS flush per protocol    Denies concerns or issues that need to be addressed prior to appt with MD Yasmine Rodriguez      "

## 2018-10-19 NOTE — MR AVS SNAPSHOT
After Visit Summary   10/19/2018    Muriel Diaz    MRN: [id number]           Patient Information     Date Of Birth          1960        Visit Information        Provider Department      10/19/2018 2:00 PM Patience Mckeon MD [facility]        Today's Diagnoses     Malignant neoplasm of upper-inner quadrant of left breast in female, estrogen receptor positive (H)    -  1      Care Instructions    -flu shot today Done/MC  -schedule port flush in ~4-6 weeks  -return to clinic with Dr. Mckeon on 12/28/18      Patient will call to schedule above appointments.          Follow-ups after your visit        Who to contact     If you have questions or need follow up information about today's clinic visit or your schedule please contact [facility] directly at [phone].  Normal or non-critical lab and imaging results will be communicated to you by MyChart, letter or phone within 4 business days after the clinic has received the results. If you do not hear from us within 7 days, please contact the clinic through MyChart or phone. If you have a critical or abnormal lab result, we will notify you by phone as soon as possible.  Submit refill requests through [employer] or call your pharmacy and they will forward the refill request to us. Please allow 3 business days for your refill to be completed.          Additional Information About Your Visit        Care EveryWhere ID     This is your Care EveryWhere ID. This could be used by other organizations to access your [place] medical records  [id number]        Your Vitals Were     Pulse Temperature Respirations Pulse Oximetry          64 98.1  F (36.7  C) (Oral) 16 100%         Blood Pressure from Last 3 Encounters:   10/19/18 (!) 164/96   10/02/18 142/70   09/25/18 145/85    Weight from Last 3 Encounters:   10/02/18 48.9 kg (107 lb 12.8 oz)   09/25/18 47.5 kg (104 lb 12.8 oz)   09/18/18 48 kg (105 lb 13.1 oz)               Today, you had the following     No orders found for display       Primary Care Provider Office Phone # Fax #    Isabel Vickie Landin [phone] [address] [place] [address]        Equal Access to Services     FRANCISCA MARILEE : Hadii aad ku hadsommero Soomaali, waaxda luqadaha, qaybta kaalmada adeegyada, kvng idiin haylupen adedarlene moralez laMaribeldaniel gupta. So [facility] [phone].    ATENCIÓN: Si habla español, tiene a chowdary disposición servicios gratuitos de asistencia lingüística. Llame al [phone].    We comply with applicable federal civil rights laws and [place] laws. We do not discriminate on the basis of race, color, national origin, age, disability, sex, sexual orientation, or gender identity.            Thank you!     Thank you for choosing [facility]  for your care. Our goal is always to provide you with excellent care. Hearing back from our patients is one way we can continue to improve our services. Please take a few minutes to complete the written survey that you may receive in the mail after your visit with us. Thank you!             Your Updated Medication List - Protect others around you: Learn how to safely use, store and throw away your medicines at www.disposemymeds.org.          This list is accurate as of 10/19/18  2:34 PM.  Always use your most recent med list.                   Brand Name Dispense Instructions for use Diagnosis    atenolol 25 MG tablet    TENORMIN    90 tablet    Take 1 tablet (25 mg) by mouth daily    Hypertension goal BP (blood pressure) < 140/90       hydrochlorothiazide 12.5 MG Tabs tablet     90 tablet    Take 1 tablet (12.5 mg) by mouth daily    Hypertension goal BP (blood pressure) < 140/90       lidocaine-prilocaine cream    EMLA    30 g    Apply topically as needed for moderate pain Apply to port site 1 hour prior to accessing    Malignant neoplasm of left breast (H)       LORazepam 0.5 MG tablet    ATIVAN    30 tablet    Take  1 tablet (0.5 mg) by mouth every 4 hours as needed (Anxiety, Nausea/Vomiting or Sleep)    Malignant neoplasm of upper-inner quadrant of left breast in female, estrogen receptor positive (H)       prochlorperazine 10 MG tablet    COMPAZINE    30 tablet    Take 1 tablet (10 mg) by mouth every 6 hours as needed (Nausea/Vomiting)    Malignant neoplasm of upper-inner quadrant of left breast in female, estrogen receptor positive (H)       SOMA PO      Take 350 mg by mouth 3 times daily        VITAMIN D (CHOLECALCIFEROL) PO      Take 2,000 Units by mouth daily (2 x 1000 units = 2000 unit dose)        zolpidem 5 MG tablet    AMBIEN    45 tablet    TAKE ONE TABLET BY MOUTH AT BEDTIME AS NEEDED FOR SLEEP . May repeat x 1 PRN    Insomnia due to medical condition

## 2018-10-19 NOTE — PROGRESS NOTES
[facility]    Hematology/Oncology Established Patient Note      Today's Date: 10/19/18    Reason for Follow-up: left sided breast cancer      HISTORY OF PRESENT ILLNESS: Muriel Diaz is a 57 year old post-menopausal female with PMHx of HTN, degenerative joint disease, history of cervical spine surgery, who presents with left-sided breast cancer.  She underwent left mastectomy on 4/16/18 by Dr. Umana.  Pathology showed invasive mammary carcinoma, with lobular and ductal features, grade 2, tumor size 4.3 x 4.0 x 2.1 cm, positive margin for invasive carcinoma in the central and lateral posterior surface, DCIS and LCIS present, ER positive (95%), AR positive (50%), HER-2 negative.  Left axillary dissection showed 3 of 5 lymph nodes were positive for metastatic carcinoma (lobular type), also ER positive, AR positive, HER-2 negative. Stage [id number] (stage IIA).      She had port placed on 5/14/18.    She started chemotherapy on 5/22/18, and completed dose-dense doxorubicin+cyclophosphamide x 4 cycles, followed by weekly paclitaxel x 12, completed on 10/2/18.        INTERIM HISTORY: Muriel is here for follow-up.   She is doing well.  She has completed chemotherapy.  She has met with radiation oncology and is set to start treatment next week.  She says that her last radiation will be in mid-December 2018.      REVIEW OF SYSTEMS:   14 point ROS was reviewed and is negative other than as noted above in HPI.       HOME MEDICATIONS:  Current Outpatient Prescriptions   Medication Sig Dispense Refill     atenolol (TENORMIN) 25 MG tablet Take 1 tablet (25 mg) by mouth daily 90 tablet 0     Carisoprodol (SOMA PO) Take 350 mg by mouth 3 times daily       hydrochlorothiazide 12.5 MG TABS tablet Take 1 tablet (12.5 mg) by mouth daily 90 tablet 1     lidocaine-prilocaine (EMLA) cream Apply topically as needed for moderate pain Apply to port site 1 hour prior to accessing 30 g 3     LORazepam (ATIVAN) 0.5  MG tablet Take 1 tablet (0.5 mg) by mouth every 4 hours as needed (Anxiety, Nausea/Vomiting or Sleep) 30 tablet 5     prochlorperazine (COMPAZINE) 10 MG tablet Take 1 tablet (10 mg) by mouth every 6 hours as needed (Nausea/Vomiting) 30 tablet 5     VITAMIN D, CHOLECALCIFEROL, PO Take 2,000 Units by mouth daily (2 x 1000 units = 2000 unit dose)       zolpidem (AMBIEN) 5 MG tablet TAKE ONE TABLET BY MOUTH AT BEDTIME AS NEEDED FOR SLEEP . May repeat x 1 PRN 45 tablet 0         ALLERGIES:  Allergies   Allergen Reactions     Amitriptyline Other (See Comments)     nightmares     Oxycontin [Oxycodone] Nausea     Severe headaches         PAST MEDICAL HISTORY:  Past Medical History:   Diagnosis Date     Cancer (H)     BREAST CANCER     Degeneration of cervical intervertebral disc      Degeneration of lumbar or lumbosacral intervertebral disc      Hypertension      PONV (postoperative nausea and vomiting)          PAST SURGICAL HISTORY:  Past Surgical History:   Procedure Laterality Date     BACK SURGERY  2001    lumbar fusion, cervical discectomy     DISSECT LYMPH NODE AXILLA Left 4/16/2018    Procedure: DISSECT LYMPH NODE AXILLA;;  Surgeon: Rodney Umana MD;  Location:  [facility]     FUSION CERVICAL ANTERIOR THREE+ LEVELS  5/1/2012    Procedure:FUSION CERVICAL ANTERIOR THREE+ LEVELS; Surgeon:SARAH FRANCO; Location: OR     FUSION CERVICAL POSTERIOR THREE+ LEVELS  5/1/2012    Procedure:FUSION CERVICAL POSTERIOR THREE+ LEVELS; Posterior Cervical decompression and fusion C4-7, Anterior Cervical decompression and fusion C4-7 (c-arm), (herb table with abraham, yina oasis, yina aviator, Vitoss foam packing strip, impulse monitoring,); Surgeon:SARAH FRANCO; Location: OR     GYN SURGERY       HYSTERECTOMY VAGINAL  1990's    [facility] specilist     HYSTERECTOMY, PAP NO LONGER INDICATED       INSERT PORT VASCULAR ACCESS N/A 5/14/2018    Procedure: INSERT PORT VASCULAR ACCESS;  PORT  PLACEMENT;  Surgeon: Rodney Umana MD;  Location: [facility]     MASTECTOMY SIMPLE Left 2018    Procedure: MASTECTOMY SIMPLE;  LEFT SIMPLE MASTECTOMY,LEFT AXILLARY DISSECTION;  Surgeon: Rodney Umana MD;  Location: [facility]         SOCIAL HISTORY:  Social History     Social History     Marital status: Single     Spouse name: N/A     Number of children: 0     Years of education: N/A     Occupational History      [employer]      Social History Main Topics     Smoking status: Current Every Day Smoker     Packs/day: 0.50     Types: Cigarettes     Smokeless tobacco: Never Used      Comment: 4 cigarettes per day     Alcohol use 0.6 oz/week     1 Standard drinks or equivalent per week      Comment: 2-3 drinks per week     Drug use: No     Sexual activity: Not Currently     Partners: Male     Other Topics Concern     Parent/Sibling W/ Cabg, Mi Or Angioplasty Before 65f 55m? No     Social History Narrative   She smokes cigarettes, half a pack a day for many years; she is unable to quantify how many.  She drinks alcohol occasionally.  She denies illicit drug use.  She is retired.  She used to to work in  at large companies.  She lives in [place] with her boyfriend.        FAMILY HISTORY:  Family History   Problem Relation Age of Onset     Pancreatitis Mother      Substance Abuse Mother      Unknown/Adopted Father      She does not know much about her family history and is unaware of any cancers in her family.  She has never been pregnant.  She had a hysterectomy in the .  She still has her ovaries.  She says that she has undergone menopause but is unsure when, likely years ago.        PHYSICAL EXAM:  Vital signs:  BP (!) 164/96 (BP Location: Right arm, Patient Position: Chair, Cuff Size: Adult Regular)  Pulse 64  Temp 98.1  F (36.7  C) (Oral)  Resp 16  SpO2 100%   ECO  GENERAL/CONSTITUTIONAL: No acute distress.  EYES: No scleral icterus.  NEUROLOGIC: Alert, oriented, answers questions  appropriately.  INTEGUMENTARY: No jaundice.  Port in place at the right upper chest.      LABS:  CBC RESULTS:   Recent Labs   Lab Test  10/19/18   1325   WBC  7.9   RBC  3.68*   HGB  12.0   HCT  35.7   MCV  97   MCH  32.6   MCHC  33.6   RDW  13.6   PLT  296     Recent Labs   Lab Test  10/19/18   1325  06/19/18   1210   NA  140  143   POTASSIUM  3.9  3.8   CHLORIDE  106  110*   CO2  27  27   ANIONGAP  7  6   GLC  107*  83   BUN  21  16   CR  0.64  0.55   HATTIE  9.0  8.4*     Lab Results   Component Value Date    AST 20 10/19/2018     Lab Results   Component Value Date    ALT 20 10/19/2018     No results found for: BILICONJ   Lab Results   Component Value Date    BILITOTAL 0.3 10/19/2018     Lab Results   Component Value Date    ALBUMIN 3.9 10/19/2018     Lab Results   Component Value Date    PROTTOTAL 6.7 10/19/2018      Lab Results   Component Value Date    ALKPHOS 73 10/19/2018         IMAGING:  Mammogram and ultrasound 3/28/18:  FINDINGS:  Standard bilateral diagnostic views were obtained,  including tomosynthesis. Marker was placed on the left upper breast to  denote the site of the palpable lump; deep to the marker there is a  mass measuring approximately 2 cm. The mass is associated with  retraction of the left nipple. There are no associated  microcalcifications. No suspicious findings in the contralateral right  breast.     Further evaluation with targeted left breast ultrasound shows a  corresponding hypoechoic lesion at the 11:00 position, 2 cm from the  nipple, measuring 1.9 x 2.9 x 1.0 cm. Survey of the axilla shows an  enlarged lymph node measuring 0.7 x 0.6 cm.     MRI breast 4/9/18:  1. The known left breast malignancy measures 2.5 x 2.0 x 2.6 cm on  MRI. There is additional nonmass enhancement surrounding the mass in  the upper left breast.  2. No suspicious MRI finding in the right breast.      PET-CT 4/9/18:  1. Hypermetabolic left breast mass representing the primary  malignancy.  2. A few mildly  prominent left axillary lymph nodes that are  associated with only very mild metabolism. These are felt to be  indeterminant. There is a subcentimeter lymph node with mild  hypermetabolism at the right anterior upper mediastinum between the  innominate artery and superior vena cava that is felt to also be  indeterminant.  3. No other pathologic activity.  4. Small areas of sclerosis within the thoracic and lumbar spine.    Normal soft tissue neck CT.    Echo 8/2/18:  The visual ejection fraction is estimated at 55-60%.  Global peak LV longitudinal strain is averaged at -19.7%. This is within  reported normal limits (normal <-18%).  The right ventricle is normal in size and function.  Sinus rhythm was noted.      ASSESSMENT/PLAN:  Muriel Diaz is a 57 year old post-menopausal female with:    1) Left breast cancer of the upper inner quadrant: s/p left mastectomy on 4/16/18; pathology showed invasive mammary carcinoma, with lobular and ductal features, grade 2, tumor size 4.3 x 4.0 x 2.1 cm, positive margin for invasive carcinoma in the central and lateral posterior surface, DCIS and LCIS present, ER positive (95%), ID positive (50%), HER-2 negative.  Left axillary dissection showed 3 of 5 lymph nodes were positive for metastatic carcinoma (lobular type), also ER positive, ID positive, HER-2 negative. Stage [id number] (stage IIB).      She has completed adjuvant chemotherapy.  She will start radiation next week, and should finish around mid-December 2018.  I    -referred for lymphedema therapy  -she has met with radiation oncology and starting treatment next week.    -Muriel has met with plastic surgery, Dr. Crook.  It was recommended that reconstruction is delayed until radiation is completed.  -she wants to keep port for now, but will likely want it removed after her radiation is done.  We'll discuss at her next appointment here, and schedule port removal, if she wishes.  - I will see her back in late December  2018, after radiation is completed, and discuss hormonal therapy with aromatase inhibitor at that time.    2) Smoking: She smokes half a pack a day.  She says that she is trying to cut back.  -I again advised smoking cessation  -she will see her PCP for tobacco cessation.    3) Hypertension: She is on medications for this.  She says that she has been anxious and nervous and has had difficulty sleeping at night.  -follows with PCP    4) Chronic back pain: s/p history of back surgeries  -she takes soma  -follows with PCP    5) Anemia: Resolved on today's CBC.    6) She will get flu shot today.      I spent a total of 25 minutes with the patient, with over >50% of the time in counseling and/or coordination of care.       Patience cMkeon MD  Hematology/Oncology  [facility]

## 2018-10-19 NOTE — PATIENT INSTRUCTIONS
-flu shot today Done/  -schedule port flush in ~4-6 weeks  -return to clinic with Dr. Mckeon on 12/28/18      Patient will call to schedule above appointments.

## 2018-10-19 NOTE — LETTER
"    10/19/2018         RE: Muriel Diaz  [address] [place] [id number]        Dear Colleague,    Thank you for referring your patient, Muriel Diaz, to the [facility]. Please see a copy of my visit note below.    Oncology Rooming Note    October 19, 2018 1:53 PM   Muriel Diaz is a 57 year old female who presents for:    Chief Complaint   Patient presents with     Oncology Clinic Visit     Initial Vitals: BP (!) 164/96 (BP Location: Right arm, Patient Position: Chair, Cuff Size: Adult Regular)  Pulse 64  Temp 98.1  F (36.7  C) (Oral)  Resp 16  SpO2 100% Estimated body mass index is 19.1 kg/(m^2) as calculated from the following:    Height as of 10/2/18: 1.6 m (5' 2.99\").    Weight as of 10/2/18: 48.9 kg (107 lb 12.8 oz). There is no height or weight on file to calculate BSA.  No Pain (0) Comment: Data Unavailable   No LMP recorded. Patient has had a hysterectomy.  Allergies reviewed: Yes  Medications reviewed: Yes    Medications: Medication refills not needed today.  Pharmacy name entered into [employer]:    [facility] [place], [place] - [address] [id number] [place], [place] - [address]    Clinical concerns: None     5 minutes for nursing intake (face to face time)     Mabel Geronimo [facility]    Hematology/Oncology Established Patient Note      Today's Date: 10/19/18    Reason for Follow-up: left sided breast cancer      HISTORY OF PRESENT ILLNESS: Muriel Diaz is a 57 year old post-menopausal female with PMHx of HTN, degenerative joint disease, history of cervical spine surgery, who presents with left-sided breast cancer.  She underwent left mastectomy on 4/16/18 by Dr. Umana.  Pathology showed invasive mammary carcinoma, with lobular and ductal features, grade 2, tumor size 4.3 x 4.0 x 2.1 cm, positive margin for invasive carcinoma in the " central and lateral posterior surface, DCIS and LCIS present, ER positive (95%), MT positive (50%), HER-2 negative.  Left axillary dissection showed 3 of 5 lymph nodes were positive for metastatic carcinoma (lobular type), also ER positive, MT positive, HER-2 negative. Stage [id number] (stage IIA).      She had port placed on 5/14/18.    She started chemotherapy on 5/22/18, and completed dose-dense doxorubicin+cyclophosphamide x 4 cycles, followed by weekly paclitaxel x 12, completed on 10/2/18.        INTERIM HISTORY: Mureil is here for follow-up.   She is doing well.  She has completed chemotherapy.  She has met with radiation oncology and is set to start treatment next week.  She says that her last radiation will be in mid-December 2018.      REVIEW OF SYSTEMS:   14 point ROS was reviewed and is negative other than as noted above in HPI.       HOME MEDICATIONS:  Current Outpatient Prescriptions   Medication Sig Dispense Refill     atenolol (TENORMIN) 25 MG tablet Take 1 tablet (25 mg) by mouth daily 90 tablet 0     Carisoprodol (SOMA PO) Take 350 mg by mouth 3 times daily       hydrochlorothiazide 12.5 MG TABS tablet Take 1 tablet (12.5 mg) by mouth daily 90 tablet 1     lidocaine-prilocaine (EMLA) cream Apply topically as needed for moderate pain Apply to port site 1 hour prior to accessing 30 g 3     LORazepam (ATIVAN) 0.5 MG tablet Take 1 tablet (0.5 mg) by mouth every 4 hours as needed (Anxiety, Nausea/Vomiting or Sleep) 30 tablet 5     prochlorperazine (COMPAZINE) 10 MG tablet Take 1 tablet (10 mg) by mouth every 6 hours as needed (Nausea/Vomiting) 30 tablet 5     VITAMIN D, CHOLECALCIFEROL, PO Take 2,000 Units by mouth daily (2 x 1000 units = 2000 unit dose)       zolpidem (AMBIEN) 5 MG tablet TAKE ONE TABLET BY MOUTH AT BEDTIME AS NEEDED FOR SLEEP . May repeat x 1 PRN 45 tablet 0         ALLERGIES:  Allergies   Allergen Reactions     Amitriptyline Other (See Comments)     nightmares     Oxycontin  [Oxycodone] Nausea     Severe headaches         PAST MEDICAL HISTORY:  Past Medical History:   Diagnosis Date     Cancer (H)     BREAST CANCER     Degeneration of cervical intervertebral disc      Degeneration of lumbar or lumbosacral intervertebral disc      Hypertension      PONV (postoperative nausea and vomiting)          PAST SURGICAL HISTORY:  Past Surgical History:   Procedure Laterality Date     BACK SURGERY  2001    lumbar fusion, cervical discectomy     DISSECT LYMPH NODE AXILLA Left 4/16/2018    Procedure: DISSECT LYMPH NODE AXILLA;;  Surgeon: Rodney Umana MD;  Location: [facility]     FUSION CERVICAL ANTERIOR THREE+ LEVELS  5/1/2012    Procedure:FUSION CERVICAL ANTERIOR THREE+ LEVELS; Surgeon:SARAH FRANCO; Location:[facility] OR     FUSION CERVICAL POSTERIOR THREE+ LEVELS  5/1/2012    Procedure:FUSION CERVICAL POSTERIOR THREE+ LEVELS; Posterior Cervical decompression and fusion C4-7, Anterior Cervical decompression and fusion C4-7 (c-arm), (herb table with abraham, yina oasis, yina aviator, Vitoss foam packing strip, impulse monitoring,); Surgeon:SARAH FRANCO; Location: OR     GYN SURGERY       HYSTERECTOMY VAGINAL  1990's    Judy Aceves  Gyn specilist     HYSTERECTOMY, PAP NO LONGER INDICATED       INSERT PORT VASCULAR ACCESS N/A 5/14/2018    Procedure: INSERT PORT VASCULAR ACCESS;  PORT PLACEMENT;  Surgeon: Rodney Umana MD;  Location: [facility]     MASTECTOMY SIMPLE Left 4/16/2018    Procedure: MASTECTOMY SIMPLE;  LEFT SIMPLE MASTECTOMY,LEFT AXILLARY DISSECTION;  Surgeon: Rodney Umana MD;  Location: [facility]         SOCIAL HISTORY:  Social History     Social History     Marital status: Single     Spouse name: N/A     Number of children: 0     Years of education: N/A     Occupational History      [employer]      Social History Main Topics     Smoking status: Current Every Day Smoker     Packs/day: 0.50     Types: Cigarettes     Smokeless tobacco: Never Used       Comment: 4 cigarettes per day     Alcohol use 0.6 oz/week     1 Standard drinks or equivalent per week      Comment: 2-3 drinks per week     Drug use: No     Sexual activity: Not Currently     Partners: Male     Other Topics Concern     Parent/Sibling W/ Cabg, Mi Or Angioplasty Before 65f 55m? No     Social History Narrative   She smokes cigarettes, half a pack a day for many years; she is unable to quantify how many.  She drinks alcohol occasionally.  She denies illicit drug use.  She is retired.  She used to to work in  at large companies.  She lives in [place] with her boyfriend.        FAMILY HISTORY:  Family History   Problem Relation Age of Onset     Pancreatitis Mother      Substance Abuse Mother      Unknown/Adopted Father      She does not know much about her family history and is unaware of any cancers in her family.  She has never been pregnant.  She had a hysterectomy in the .  She still has her ovaries.  She says that she has undergone menopause but is unsure when, likely years ago.        PHYSICAL EXAM:  Vital signs:  BP (!) 164/96 (BP Location: Right arm, Patient Position: Chair, Cuff Size: Adult Regular)  Pulse 64  Temp 98.1  F (36.7  C) (Oral)  Resp 16  SpO2 100%   ECO  GENERAL/CONSTITUTIONAL: No acute distress.  EYES: No scleral icterus.  NEUROLOGIC: Alert, oriented, answers questions appropriately.  INTEGUMENTARY: No jaundice.  Port in place at the right upper chest.      LABS:  CBC RESULTS:   Recent Labs   Lab Test  10/19/18   1325   WBC  7.9   RBC  3.68*   HGB  12.0   HCT  35.7   MCV  97   MCH  32.6   MCHC  33.6   RDW  13.6   PLT  296     Recent Labs   Lab Test  10/19/18   1325  18   1210   NA  140  143   POTASSIUM  3.9  3.8   CHLORIDE  106  110*   CO2  27  27   ANIONGAP  7  6   GLC  107*  83   BUN  21  16   CR  0.64  0.55   HATTIE  9.0  8.4*     Lab Results   Component Value Date    AST 20 10/19/2018     Lab Results   Component Value Date    ALT 20 10/19/2018      No results found for: BILICONJ   Lab Results   Component Value Date    BILITOTAL 0.3 10/19/2018     Lab Results   Component Value Date    ALBUMIN 3.9 10/19/2018     Lab Results   Component Value Date    PROTTOTAL 6.7 10/19/2018      Lab Results   Component Value Date    ALKPHOS 73 10/19/2018         IMAGING:  Mammogram and ultrasound 3/28/18:  FINDINGS:  Standard bilateral diagnostic views were obtained,  including tomosynthesis. Marker was placed on the left upper breast to  denote the site of the palpable lump; deep to the marker there is a  mass measuring approximately 2 cm. The mass is associated with  retraction of the left nipple. There are no associated  microcalcifications. No suspicious findings in the contralateral right  breast.     Further evaluation with targeted left breast ultrasound shows a  corresponding hypoechoic lesion at the 11:00 position, 2 cm from the  nipple, measuring 1.9 x 2.9 x 1.0 cm. Survey of the axilla shows an  enlarged lymph node measuring 0.7 x 0.6 cm.     MRI breast 4/9/18:  1. The known left breast malignancy measures 2.5 x 2.0 x 2.6 cm on  MRI. There is additional nonmass enhancement surrounding the mass in  the upper left breast.  2. No suspicious MRI finding in the right breast.      PET-CT 4/9/18:  1. Hypermetabolic left breast mass representing the primary  malignancy.  2. A few mildly prominent left axillary lymph nodes that are  associated with only very mild metabolism. These are felt to be  indeterminant. There is a subcentimeter lymph node with mild  hypermetabolism at the right anterior upper mediastinum between the  innominate artery and superior vena cava that is felt to also be  indeterminant.  3. No other pathologic activity.  4. Small areas of sclerosis within the thoracic and lumbar spine.    Normal soft tissue neck CT.    Echo 8/2/18:  The visual ejection fraction is estimated at 55-60%.  Global peak LV longitudinal strain is averaged at -19.7%. This is  within  reported normal limits (normal <-18%).  The right ventricle is normal in size and function.  Sinus rhythm was noted.      ASSESSMENT/PLAN:  Muriel Diaz is a 57 year old post-menopausal female with:    1) Left breast cancer of the upper inner quadrant: s/p left mastectomy on 4/16/18; pathology showed invasive mammary carcinoma, with lobular and ductal features, grade 2, tumor size 4.3 x 4.0 x 2.1 cm, positive margin for invasive carcinoma in the central and lateral posterior surface, DCIS and LCIS present, ER positive (95%), RI positive (50%), HER-2 negative.  Left axillary dissection showed 3 of 5 lymph nodes were positive for metastatic carcinoma (lobular type), also ER positive, RI positive, HER-2 negative. Stage [id number] (stage IIB).      She has completed adjuvant chemotherapy.  She will start radiation next week, and should finish around mid-December 2018.  I    -referred for lymphedema therapy  -she has met with radiation oncology and starting treatment next week.    -Muriel has met with plastic surgery, Dr. Crook.  It was recommended that reconstruction is delayed until radiation is completed.  -she wants to keep port for now, but will likely want it removed after her radiation is done.  We'll discuss at her next appointment here, and schedule port removal, if she wishes.  - I will see her back in late December 2018, after radiation is completed, and discuss hormonal therapy with aromatase inhibitor at that time.    2) Smoking: She smokes half a pack a day.  She says that she is trying to cut back.  -I again advised smoking cessation  -she will see her PCP for tobacco cessation.    3) Hypertension: She is on medications for this.  She says that she has been anxious and nervous and has had difficulty sleeping at night.  -follows with PCP    4) Chronic back pain: s/p history of back surgeries  -she takes soma  -follows with PCP    5) Anemia: Resolved on today's CBC.    6) She will get flu shot  today.      I spent a total of 25 minutes with the patient, with over >50% of the time in counseling and/or coordination of care.       Patience Mckeon MD  Hematology/Oncology  [facility]      Again, thank you for allowing me to participate in the care of your patient.        Sincerely,        Patience Mckeon MD

## 2018-11-16 DIAGNOSIS — Z95.828 PORT-A-CATH IN PLACE: ICD-10-CM

## 2018-11-16 RX ORDER — HEPARIN SODIUM (PORCINE) LOCK FLUSH IV SOLN 100 UNIT/ML 100 UNIT/ML
500 SOLUTION INTRAVENOUS EVERY 8 HOURS
Status: CANCELLED
Start: 2018-11-19

## 2018-11-19 ENCOUNTER — INFUSION THERAPY VISIT (OUTPATIENT)
Dept: INFUSION THERAPY | Facility: CLINIC | Age: 58
End: 2018-11-19
Attending: INTERNAL MEDICINE
Payer: COMMERCIAL

## 2018-11-19 ENCOUNTER — TELEPHONE (OUTPATIENT)
Dept: ONCOLOGY | Facility: CLINIC | Age: 58
End: 2018-11-19

## 2018-11-19 VITALS
OXYGEN SATURATION: 98 % | RESPIRATION RATE: 16 BRPM | HEART RATE: 55 BPM | DIASTOLIC BLOOD PRESSURE: 77 MMHG | TEMPERATURE: 97.9 F | SYSTOLIC BLOOD PRESSURE: 129 MMHG

## 2018-11-19 DIAGNOSIS — C50.212 MALIGNANT NEOPLASM OF UPPER-INNER QUADRANT OF LEFT BREAST IN FEMALE, ESTROGEN RECEPTOR POSITIVE (H): ICD-10-CM

## 2018-11-19 DIAGNOSIS — Z95.828 PORT-A-CATH IN PLACE: Primary | ICD-10-CM

## 2018-11-19 DIAGNOSIS — Z17.0 MALIGNANT NEOPLASM OF UPPER-INNER QUADRANT OF LEFT BREAST IN FEMALE, ESTROGEN RECEPTOR POSITIVE (H): ICD-10-CM

## 2018-11-19 PROCEDURE — 25000128 H RX IP 250 OP 636: Performed by: INTERNAL MEDICINE

## 2018-11-19 PROCEDURE — 96523 IRRIG DRUG DELIVERY DEVICE: CPT

## 2018-11-19 RX ORDER — HEPARIN SODIUM (PORCINE) LOCK FLUSH IV SOLN 100 UNIT/ML 100 UNIT/ML
500 SOLUTION INTRAVENOUS EVERY 8 HOURS
Status: CANCELLED
Start: 2018-11-19

## 2018-11-19 RX ORDER — LORAZEPAM 0.5 MG/1
0.5 TABLET ORAL EVERY 4 HOURS PRN
Qty: 30 TABLET | Refills: 0 | Status: ON HOLD | OUTPATIENT
Start: 2018-11-19 | End: 2019-03-22

## 2018-11-19 RX ORDER — HEPARIN SODIUM (PORCINE) LOCK FLUSH IV SOLN 100 UNIT/ML 100 UNIT/ML
500 SOLUTION INTRAVENOUS EVERY 8 HOURS
Status: DISCONTINUED | OUTPATIENT
Start: 2018-11-19 | End: 2018-11-19 | Stop reason: HOSPADM

## 2018-11-19 RX ADMIN — SODIUM CHLORIDE, PRESERVATIVE FREE 500 UNITS: 5 INJECTION INTRAVENOUS at 11:13

## 2018-11-19 ASSESSMENT — PAIN SCALES - GENERAL: PAINLEVEL: NO PAIN (0)

## 2018-11-19 NOTE — MR AVS SNAPSHOT
After Visit Summary   11/19/2018    Muriel Diaz    MRN: [id number]           Patient Information     Date Of Birth          1960        Visit Information        Provider Department      11/19/2018 11:00 AM  INFUSION CHAIR 13 [facility]        Today's Diagnoses     Port-A-Cath in place    -  1       Follow-ups after your visit        Your next 10 appointments already scheduled     Dec 28, 2018  1:30 PM CST   Return Visit with Patience Mckeon MD   [facility]    [facility]  [address]  [place] [address] [phone]            Dec 28, 2018  2:00 PM CST   Level 1 with  INFUSION CHAIR 7   [facility] ([facility])    [facility]  [address]  [place] [id number] [phone]              Who to contact     If you have questions or need follow up information about today's clinic visit or your schedule please contact [facility] directly at [phone].  Normal or non-critical lab and imaging results will be communicated to you by MyChart, letter or phone within 4 business days after the clinic has received the results. If you do not hear from us within 7 days, please contact the clinic through MyChart or phone. If you have a critical or abnormal lab result, we will notify you by phone as soon as possible.  Submit refill requests through [employer] or call your pharmacy and they will forward the refill request to us. Please allow 3 business days for your refill to be completed.          Additional Information About Your Visit        Care EveryWhere ID     This is your Care EveryWhere ID. This could be used by other organizations to access your [place] medical records  [id number]        Your Vitals Were     Pulse Temperature Respirations Pulse Oximetry           55 97.9  F (36.6  C) (Oral) 16 98%         Blood Pressure from Last 3 Encounters:   11/19/18 129/77   10/19/18 (!) 164/96   10/02/18 142/70    Weight from Last 3 Encounters:   10/02/18 48.9 kg (107 lb 12.8 oz)   09/25/18 47.5 kg (104 lb 12.8 oz)   09/18/18 48 kg (105 lb 13.1 oz)              Today, you had the following     No orders found for display       Primary Care Provider Office Phone # Fax #    Isabel Vickie Landin [phone] [address] [place] [address]        Equal Access to Services     FRANCISCA HUNT : Hadii lupe murilloo Socriss, waaxda luqadaha, qaybta kaalmada adeegyada, kvng gupta. [facility] [phone].    ATENCIÓN: Si habla español, tiene a chowdary disposición servicios gratuitos de asistencia lingüística. [facility] [phone].    We comply with applicable federal civil rights laws and [place] laws. We do not discriminate on the basis of race, color, national origin, age, disability, sex, sexual orientation, or gender identity.            Thank you!     Thank you for choosing [facility]  for your care. Our goal is always to provide you with excellent care. Hearing back from our patients is one way we can continue to improve our services. Please take a few minutes to complete the written survey that you may receive in the mail after your visit with us. Thank you!             Your Updated Medication List - Protect others around you: Learn how to safely use, store and throw away your medicines at www.disposemymeds.org.          This list is accurate as of 11/19/18 11:24 AM.  Always use your most recent med list.                   Brand Name Dispense Instructions for use Diagnosis    atenolol 25 MG tablet    TENORMIN    90 tablet    Take 1 tablet (25 mg) by mouth daily    Hypertension goal BP (blood pressure) < 140/90       hydrochlorothiazide 12.5 MG Tabs tablet     90 tablet    Take 1 tablet  (12.5 mg) by mouth daily    Hypertension goal BP (blood pressure) < 140/90       lidocaine-prilocaine cream    EMLA    30 g    Apply topically as needed for moderate pain Apply to port site 1 hour prior to accessing    Malignant neoplasm of left breast (H)       LORazepam 0.5 MG tablet    ATIVAN    30 tablet    Take 1 tablet (0.5 mg) by mouth every 4 hours as needed (Anxiety, Nausea/Vomiting or Sleep)    Malignant neoplasm of upper-inner quadrant of left breast in female, estrogen receptor positive (H)       prochlorperazine 10 MG tablet    COMPAZINE    30 tablet    Take 1 tablet (10 mg) by mouth every 6 hours as needed (Nausea/Vomiting)    Malignant neoplasm of upper-inner quadrant of left breast in female, estrogen receptor positive (H)       SOMA PO      Take 350 mg by mouth 3 times daily        VITAMIN D (CHOLECALCIFEROL) PO      Take 2,000 Units by mouth daily (2 x 1000 units = 2000 unit dose)        zolpidem 5 MG tablet    AMBIEN    45 tablet    TAKE ONE TABLET BY MOUTH AT BEDTIME AS NEEDED FOR SLEEP . May repeat x 1 PRN    Insomnia due to medical condition

## 2018-11-19 NOTE — TELEPHONE ENCOUNTER
Patient requested an Ativan refill today in infusion while getting her port flushed. Questioned Dr. Mckeon who stated that patient can have 1 more refill of Ativan than she will need to go to her primary care physician thereafter. Left message on patient's voice mail that Ativan script will be sent to [facility]  [place]. So that she can pick it up. Jessica Garcia RN,BSN,OCN

## 2018-11-19 NOTE — PROGRESS NOTES
Infusion Nursing Note:  Muriel Diaz presents today for monthly port flush.    Patient seen by provider today: No   present during visit today: Not Applicable.    Note: Getting daily radiation. States she wakes up gaggy some mornings and requests refill on Ativan. Message sent to Dr.Changs Jessica clinic nurse,  to follow up on this refill..    Intravenous Access:  Implanted Port.    Treatment Conditions:  Not Applicable.      Post Infusion Assessment:  Blood return noted pre and post infusion.  Site patent and intact, free from redness, edema or discomfort.  No evidence of extravasations.  Access discontinued per protocol.    Discharge Plan:   Discharge instructions reviewed with: Patient.  Patient and/or family verbalized understanding of discharge instructions and all questions answered.  Copy of AVS reviewed with patient and/or family.  Patient will return 12/28 for next appointment.  Patient discharged in stable condition accompanied by: self.  Departure Mode: Ambulatory.    Kiana Cohn RN

## 2018-12-17 ENCOUNTER — TRANSFERRED RECORDS (OUTPATIENT)
Dept: FAMILY MEDICINE | Facility: CLINIC | Age: 58
End: 2018-12-17

## 2018-12-17 ENCOUNTER — TRANSFERRED RECORDS (OUTPATIENT)
Dept: HEALTH INFORMATION MANAGEMENT | Facility: CLINIC | Age: 58
End: 2018-12-17

## 2018-12-28 ENCOUNTER — HOSPITAL ENCOUNTER (OUTPATIENT)
Facility: CLINIC | Age: 58
Setting detail: SPECIMEN
Discharge: HOME | End: 2018-12-28
Attending: INTERNAL MEDICINE | Admitting: INTERNAL MEDICINE
Payer: COMMERCIAL

## 2018-12-28 ENCOUNTER — INFUSION THERAPY VISIT (OUTPATIENT)
Dept: INFUSION THERAPY | Facility: CLINIC | Age: 58
End: 2018-12-28
Attending: INTERNAL MEDICINE
Payer: COMMERCIAL

## 2018-12-28 ENCOUNTER — ONCOLOGY VISIT (OUTPATIENT)
Dept: ONCOLOGY | Facility: CLINIC | Age: 58
End: 2018-12-28
Attending: INTERNAL MEDICINE
Payer: COMMERCIAL

## 2018-12-28 VITALS
WEIGHT: 107 LBS | OXYGEN SATURATION: 97 % | HEART RATE: 68 BPM | DIASTOLIC BLOOD PRESSURE: 89 MMHG | RESPIRATION RATE: 16 BRPM | TEMPERATURE: 98.4 F | SYSTOLIC BLOOD PRESSURE: 164 MMHG | HEIGHT: 63 IN | BODY MASS INDEX: 18.96 KG/M2

## 2018-12-28 VITALS — BODY MASS INDEX: 18.99 KG/M2 | WEIGHT: 107.2 LBS

## 2018-12-28 DIAGNOSIS — C50.212 MALIGNANT NEOPLASM OF UPPER-INNER QUADRANT OF LEFT BREAST IN FEMALE, ESTROGEN RECEPTOR POSITIVE (H): Primary | ICD-10-CM

## 2018-12-28 DIAGNOSIS — Z95.828 PORT-A-CATH IN PLACE: Primary | ICD-10-CM

## 2018-12-28 DIAGNOSIS — Z17.0 MALIGNANT NEOPLASM OF UPPER-INNER QUADRANT OF LEFT BREAST IN FEMALE, ESTROGEN RECEPTOR POSITIVE (H): Primary | ICD-10-CM

## 2018-12-28 LAB
ALBUMIN SERPL-MCNC: 3.5 G/DL (ref 3.4–5)
ALP SERPL-CCNC: 81 U/L (ref 40–150)
ALT SERPL W P-5'-P-CCNC: 19 U/L (ref 0–50)
ANION GAP SERPL CALCULATED.3IONS-SCNC: 5 MMOL/L (ref 3–14)
AST SERPL W P-5'-P-CCNC: 16 U/L (ref 0–45)
BASOPHILS # BLD AUTO: 0 10E9/L (ref 0–0.2)
BASOPHILS NFR BLD AUTO: 0.5 %
BILIRUB SERPL-MCNC: 0.2 MG/DL (ref 0.2–1.3)
BUN SERPL-MCNC: 20 MG/DL (ref 7–30)
CALCIUM SERPL-MCNC: 8.8 MG/DL (ref 8.5–10.1)
CHLORIDE SERPL-SCNC: 110 MMOL/L (ref 94–109)
CO2 SERPL-SCNC: 28 MMOL/L (ref 20–32)
CREAT SERPL-MCNC: 0.79 MG/DL (ref 0.52–1.04)
DIFFERENTIAL METHOD BLD: NORMAL
EOSINOPHIL # BLD AUTO: 0.1 10E9/L (ref 0–0.7)
EOSINOPHIL NFR BLD AUTO: 2.3 %
ERYTHROCYTE [DISTWIDTH] IN BLOOD BY AUTOMATED COUNT: 13.4 % (ref 10–15)
GFR SERPL CREATININE-BSD FRML MDRD: 83 ML/MIN/{1.73_M2}
GLUCOSE SERPL-MCNC: 94 MG/DL (ref 70–99)
HCT VFR BLD AUTO: 35.9 % (ref 35–47)
HGB BLD-MCNC: 12 G/DL (ref 11.7–15.7)
IMM GRANULOCYTES # BLD: 0 10E9/L (ref 0–0.4)
IMM GRANULOCYTES NFR BLD: 0.2 %
LYMPHOCYTES # BLD AUTO: 1.2 10E9/L (ref 0.8–5.3)
LYMPHOCYTES NFR BLD AUTO: 20.3 %
MCH RBC QN AUTO: 31.5 PG (ref 26.5–33)
MCHC RBC AUTO-ENTMCNC: 33.4 G/DL (ref 31.5–36.5)
MCV RBC AUTO: 94 FL (ref 78–100)
MONOCYTES # BLD AUTO: 0.5 10E9/L (ref 0–1.3)
MONOCYTES NFR BLD AUTO: 7.7 %
NEUTROPHILS # BLD AUTO: 4.2 10E9/L (ref 1.6–8.3)
NEUTROPHILS NFR BLD AUTO: 69 %
NRBC # BLD AUTO: 0 10*3/UL
NRBC BLD AUTO-RTO: 0 /100
PLATELET # BLD AUTO: 259 10E9/L (ref 150–450)
POTASSIUM SERPL-SCNC: 3.9 MMOL/L (ref 3.4–5.3)
PROT SERPL-MCNC: 6.6 G/DL (ref 6.8–8.8)
RBC # BLD AUTO: 3.81 10E12/L (ref 3.8–5.2)
SODIUM SERPL-SCNC: 143 MMOL/L (ref 133–144)
WBC # BLD AUTO: 6.1 10E9/L (ref 4–11)

## 2018-12-28 PROCEDURE — 80053 COMPREHEN METABOLIC PANEL: CPT | Performed by: INTERNAL MEDICINE

## 2018-12-28 PROCEDURE — 85025 COMPLETE CBC W/AUTO DIFF WBC: CPT | Performed by: INTERNAL MEDICINE

## 2018-12-28 PROCEDURE — 99214 OFFICE O/P EST MOD 30 MIN: CPT | Performed by: INTERNAL MEDICINE

## 2018-12-28 PROCEDURE — 96523 IRRIG DRUG DELIVERY DEVICE: CPT | Mod: XE

## 2018-12-28 PROCEDURE — G0463 HOSPITAL OUTPT CLINIC VISIT: HCPCS

## 2018-12-28 PROCEDURE — 25000128 H RX IP 250 OP 636: Performed by: INTERNAL MEDICINE

## 2018-12-28 RX ORDER — ANASTROZOLE 1 MG/1
1 TABLET ORAL DAILY
Qty: 90 TABLET | Refills: 3 | Status: SHIPPED | OUTPATIENT
Start: 2018-12-28 | End: 2019-10-22

## 2018-12-28 RX ORDER — HEPARIN SODIUM (PORCINE) LOCK FLUSH IV SOLN 100 UNIT/ML 100 UNIT/ML
500 SOLUTION INTRAVENOUS EVERY 8 HOURS
Status: DISCONTINUED | OUTPATIENT
Start: 2018-12-28 | End: 2018-12-28 | Stop reason: HOSPADM

## 2018-12-28 RX ORDER — HEPARIN SODIUM (PORCINE) LOCK FLUSH IV SOLN 100 UNIT/ML 100 UNIT/ML
500 SOLUTION INTRAVENOUS EVERY 8 HOURS
Status: CANCELLED
Start: 2018-12-28

## 2018-12-28 RX ADMIN — HEPARIN 500 UNITS: 100 SYRINGE at 13:19

## 2018-12-28 ASSESSMENT — MIFFLIN-ST. JEOR: SCORE: 1037.89

## 2018-12-28 ASSESSMENT — PAIN SCALES - GENERAL: PAINLEVEL: NO PAIN (0)

## 2018-12-28 NOTE — PATIENT INSTRUCTIONS
please give Muriel information on anastrozole  -anastrozole prescription sent to your pharmacy  -labs today: CBC, CMP   -return to clinic with Dr. Mckeon in 6 weeks   Scheduled/ Jennifer   -schedule DEXA scan before the next appointment  Scheduled/ Jennifer     AVS printed and given to patient/ Jennifer

## 2018-12-28 NOTE — PROGRESS NOTES
[facility]    Hematology/Oncology Established Patient Note      Today's Date: 12/28/18    Reason for Follow-up: left sided breast cancer      HISTORY OF PRESENT ILLNESS: Muriel Diaz is a 58 year old post-menopausal female with PMHx of HTN, degenerative joint disease, history of cervical spine surgery, who presents with left-sided breast cancer.  She underwent left mastectomy on 4/16/18 by Dr. Umana.  Pathology showed invasive mammary carcinoma, with lobular and ductal features, grade 2, tumor size 4.3 x 4.0 x 2.1 cm, positive margin for invasive carcinoma in the central and lateral posterior surface, DCIS and LCIS present, ER positive (95%), MN positive (50%), HER-2 negative.  Left axillary dissection showed 3 of 5 lymph nodes were positive for metastatic carcinoma (lobular type), also ER positive, MN positive, HER-2 negative. Stage [id number] (stage IIA).      She had port placed on 5/14/18.    She started chemotherapy on 5/22/18, and completed dose-dense doxorubicin+cyclophosphamide x 4 cycles, followed by weekly paclitaxel x 12, completed on 10/2/18.    She completed radiation on 12/17/18.        INTERIM HISTORY: Muriel is here for follow-up.  She has completed radiation almost 2 weeks ago.  She says that everything went well, and she feels good.  Her hair has started growing back.      REVIEW OF SYSTEMS:   14 point ROS was reviewed and is negative other than as noted above in HPI.       HOME MEDICATIONS:  Current Outpatient Medications   Medication Sig Dispense Refill     atenolol (TENORMIN) 25 MG tablet Take 1 tablet (25 mg) by mouth daily 90 tablet 0     Carisoprodol (SOMA PO) Take 350 mg by mouth 3 times daily       hydrochlorothiazide 12.5 MG TABS tablet Take 1 tablet (12.5 mg) by mouth daily 90 tablet 1     lidocaine-prilocaine (EMLA) cream Apply topically as needed for moderate pain Apply to port site 1 hour prior to accessing 30 g 3     LORazepam (ATIVAN) 0.5 MG tablet Take  1 tablet (0.5 mg) by mouth every 4 hours as needed (Anxiety, Nausea/Vomiting or Sleep) 30 tablet 0     prochlorperazine (COMPAZINE) 10 MG tablet Take 1 tablet (10 mg) by mouth every 6 hours as needed (Nausea/Vomiting) 30 tablet 5     VITAMIN D, CHOLECALCIFEROL, PO Take 2,000 Units by mouth daily (2 x 1000 units = 2000 unit dose)       zolpidem (AMBIEN) 5 MG tablet TAKE ONE TABLET BY MOUTH AT BEDTIME AS NEEDED FOR SLEEP . May repeat x 1 PRN 45 tablet 0         ALLERGIES:  Allergies   Allergen Reactions     Amitriptyline Other (See Comments)     nightmares     Oxycontin [Oxycodone] Nausea     Severe headaches         PAST MEDICAL HISTORY:  Past Medical History:   Diagnosis Date     Cancer (H)     BREAST CANCER     Degeneration of cervical intervertebral disc      Degeneration of lumbar or lumbosacral intervertebral disc      Hypertension      PONV (postoperative nausea and vomiting)          PAST SURGICAL HISTORY:  Past Surgical History:   Procedure Laterality Date     BACK SURGERY  2001    lumbar fusion, cervical discectomy     DISSECT LYMPH NODE AXILLA Left 4/16/2018    Procedure: DISSECT LYMPH NODE AXILLA;;  Surgeon: Rodney Umana MD;  [facility]     FUSION CERVICAL ANTERIOR THREE+ LEVELS  5/1/2012    Procedure:FUSION CERVICAL ANTERIOR THREE+ LEVELS; Surgeon:SARAH FRANCO; Location:[facility]     FUSION CERVICAL POSTERIOR THREE+ LEVELS  5/1/2012    Procedure:FUSION CERVICAL POSTERIOR THREE+ LEVELS; Posterior Cervical decompression and fusion C4-7, Anterior Cervical decompression and fusion C4-7 (c-arm), (herb table with abraham, yina oasis, yina aviator, Vitoss foam packing strip, impulse monitoring,); Surgeon:SARAH FRANCO; Location: OR     GYN SURGERY       HYSTERECTOMY VAGINAL  1990's    Judy Aceves OB Gyn specilist     HYSTERECTOMY, PAP NO LONGER INDICATED       INSERT PORT VASCULAR ACCESS N/A 5/14/2018    Procedure: INSERT PORT VASCULAR ACCESS;  PORT PLACEMENT;   Surgeon: Rodney Umana MD;  Location: [facility]     MASTECTOMY SIMPLE Left 4/16/2018    Procedure: MASTECTOMY SIMPLE;  LEFT SIMPLE MASTECTOMY,LEFT AXILLARY DISSECTION;  Surgeon: Rodney Umana MD;  Location: [facility]         SOCIAL HISTORY:  Social History     Socioeconomic History     Marital status: Single     Spouse name: Not on file     Number of children: 0     Years of education: Not on file     Highest education level: Not on file   Social Needs     Financial resource strain: Not on file     Food insecurity - worry: Not on file     Food insecurity - inability: Not on file     Transportation needs - medical: Not on file     Transportation needs - non-medical: Not on file   Occupational History     Employer: [employer]    Tobacco Use     Smoking status: Current Every Day Smoker     Packs/day: 0.50     Types: Cigarettes     Smokeless tobacco: Never Used     Tobacco comment: 4 cigarettes per day   Substance and Sexual Activity     Alcohol use: Yes     Alcohol/week: 0.6 oz     Types: 1 Standard drinks or equivalent per week     Comment: 2-3 drinks per week     Drug use: No     Sexual activity: Not Currently     Partners: Male   Other Topics Concern     Parent/sibling w/ CABG, MI or angioplasty before 65F 55M? No   Social History Narrative     Not on file   She smokes cigarettes, half a pack a day for many years; she is unable to quantify how many.  She drinks alcohol occasionally.  She denies illicit drug use.  She is retired.  She used to to work in  at large companies.  She lives in [place] with her boyfriend.        FAMILY HISTORY:  Family History   Problem Relation Age of Onset     Pancreatitis Mother      Substance Abuse Mother      Unknown/Adopted Father      She does not know much about her family history and is unaware of any cancers in her family.  She has never been pregnant.  She had a hysterectomy in the 1990's.  She still has her ovaries.  She says that she has undergone menopause  "but is unsure when, likely years ago.        PHYSICAL EXAM:  Vital signs:  /89 (BP Location: Right arm, Cuff Size: Adult Regular)   Pulse 68   Temp 98.4  F (36.9  C) (Oral)   Resp 16   Ht 1.598 m (5' 2.9\")   Wt 48.5 kg (107 lb)   SpO2 97%   BMI 19.01 kg/m     ECO  GENERAL/CONSTITUTIONAL: No acute distress.  EYES: No scleral icterus.  BREAST: Right-no palpable mass, discharge, rash, or axillary lymphadenopathy.  Left-s/p mastectomy and radiation, with associated skin changes.  NEUROLOGIC: Alert, oriented, answers questions appropriately.  INTEGUMENTARY: No jaundice.  Port in place at the right upper chest.      LABS:  CBC RESULTS:   Recent Labs   Lab Test 18  1320   WBC 6.1   RBC 3.81   HGB 12.0   HCT 35.9   MCV 94   MCH 31.5   MCHC 33.4   RDW 13.4        Recent Labs   Lab Test 18  1320 10/19/18  1325    140   POTASSIUM 3.9 3.9   CHLORIDE 110* 106   CO2 28 27   ANIONGAP 5 7   GLC 94 107*   BUN 20 21   CR 0.79 0.64   HATTIE 8.8 9.0     Lab Results   Component Value Date    AST 16 2018     Lab Results   Component Value Date    ALT 19 2018     No results found for: BILICONJ   Lab Results   Component Value Date    BILITOTAL 0.2 2018     Lab Results   Component Value Date    ALBUMIN 3.5 2018     Lab Results   Component Value Date    PROTTOTAL 6.6 2018      Lab Results   Component Value Date    ALKPHOS 81 2018         IMAGING:  Mammogram and ultrasound 3/28/18:  FINDINGS:  Standard bilateral diagnostic views were obtained,  including tomosynthesis. Marker was placed on the left upper breast to  denote the site of the palpable lump; deep to the marker there is a  mass measuring approximately 2 cm. The mass is associated with  retraction of the left nipple. There are no associated  microcalcifications. No suspicious findings in the contralateral right  breast.     Further evaluation with targeted left breast ultrasound shows a  corresponding " hypoechoic lesion at the 11:00 position, 2 cm from the  nipple, measuring 1.9 x 2.9 x 1.0 cm. Survey of the axilla shows an  enlarged lymph node measuring 0.7 x 0.6 cm.     MRI breast 4/9/18:  1. The known left breast malignancy measures 2.5 x 2.0 x 2.6 cm on  MRI. There is additional nonmass enhancement surrounding the mass in  the upper left breast.  2. No suspicious MRI finding in the right breast.      ASSESSMENT/PLAN:  Muriel Diaz is a 58 year old post-menopausal female with:    1) Left breast cancer of the upper inner quadrant: s/p left mastectomy on 4/16/18; pathology showed invasive mammary carcinoma, with lobular and ductal features, grade 2, tumor size 4.3 x 4.0 x 2.1 cm, positive margin for invasive carcinoma in the central and lateral posterior surface, DCIS and LCIS present, ER positive (95%), OR positive (50%), HER-2 negative.  Left axillary dissection showed 3 of 5 lymph nodes were positive for metastatic carcinoma (lobular type), also ER positive, OR positive, HER-2 negative. Stage [id number] (stage IIB).  She has completed adjuvant chemotherapy.  She completed radiation on 12/17/18.      -referred for lymphedema therapy  -Muriel has met with plastic surgery, Dr. Crook.  It was recommended that reconstruction is delayed until radiation is completed.  She will follow-up with Dr. Crook.  -she wants to keep port for now, but does want it removed.  She wants to schedule it when she gets her reconstruction surgery.    -we will plan to start aromatase inhibitor.  We discussed anastrozole.    -teaching on anastrozole today  -labs today: CBC, CMP  -side effects may include, but not limited to: fatigue, myalgias/arthralgias, mood changes, hot flashes, headache, cardiovascular risk, bone density loss/osteoporosis  -anastrozole 1 mg daily prescribed - will plan for 5-10 years treatment, if can tolerate  -return to clinic in ~4-6 weeks for toxicity check    2) Smoking: She smokes half a pack a day.  She says  that she is trying to cut back.  -I again advised smoking cessation  -she will see her PCP for tobacco cessation.    3) Hypertension: She is on medications for this.  She says that she has been anxious and nervous and has had difficulty sleeping at night.  -follows with PCP    4) Chronic back pain: s/p history of back surgeries  -she takes soma  -follows with PCP    5) She got flu shot this season.    6) Bone health: She will be starting aromatase inhibitor  -DEXA scan ordered      I spent a total of 25 minutes with the patient, with over >50% of the time in counseling and/or coordination of care.       Patience Mckeon MD  Hematology/Oncology  [facility]

## 2018-12-28 NOTE — PROGRESS NOTES
"Oncology Rooming Note    December 28, 2018 1:34 PM   Muriel Diaz is a 58 year old female who presents for:    Chief Complaint   Patient presents with     Oncology Clinic Visit     Malignant neoplasm of upper-inner quadrant of left breast in female, estrogen receptor positive (H)     Initial Vitals: Wt 48.5 kg (107 lb)   BMI 18.96 kg/m   Estimated body mass index is 18.96 kg/m  as calculated from the following:    Height as of 10/2/18: 1.6 m (5' 2.99\").    Weight as of this encounter: 48.5 kg (107 lb). Body surface area is 1.47 meters squared.  Data Unavailable Comment: Data Unavailable   No LMP recorded. Patient has had a hysterectomy.  Allergies reviewed: Yes  Medications reviewed: Yes    Medications: Medication refills not needed today.  Pharmacy name entered into [employer]:    [facility] [place], [place] - [address] [id number] [place], [place] - [address]    Clinical concerns: NO     8 minutes for nursing intake (face to face time)     Thelma Kolb CMA              "

## 2018-12-28 NOTE — LETTER
"    12/28/2018         RE: Muriel Diaz  [address] [place] [id number]        Dear Colleague,    Thank you for referring your patient, Muriel Diaz, to the [facility]. Please see a copy of my visit note below.    Oncology Rooming Note    December 28, 2018 1:34 PM   Muriel Diaz is a 58 year old female who presents for:    Chief Complaint   Patient presents with     Oncology Clinic Visit     Malignant neoplasm of upper-inner quadrant of left breast in female, estrogen receptor positive (H)     Initial Vitals: Wt 48.5 kg (107 lb)   BMI 18.96 kg/m    Estimated body mass index is 18.96 kg/m  as calculated from the following:    Height as of 10/2/18: 1.6 m (5' 2.99\").    Weight as of this encounter: 48.5 kg (107 lb). Body surface area is 1.47 meters squared.  Data Unavailable Comment: Data Unavailable   No LMP recorded. Patient has had a hysterectomy.  Allergies reviewed: Yes  Medications reviewed: Yes    Medications: Medication refills not needed today.  Pharmacy name entered into [employer]:    [facility]  [facility] [place], [place] - [address] [id number] [place], [place] - [address]    Clinical concerns: NO     8 minutes for nursing intake (face to face time)     Thelma Kolb CMA                [facility]    Hematology/Oncology Established Patient Note      Today's Date: 12/28/18    Reason for Follow-up: left sided breast cancer      HISTORY OF PRESENT ILLNESS: Muriel Diaz is a 58 year old post-menopausal female with PMHx of HTN, degenerative joint disease, history of cervical spine surgery, who presents with left-sided breast cancer.  She underwent left mastectomy on 4/16/18 by Dr. Umana.  Pathology showed invasive mammary carcinoma, with lobular and ductal features, grade 2, tumor size 4.3 x 4.0 x 2.1 cm, positive margin for invasive carcinoma in the central and lateral posterior " surface, DCIS and LCIS present, ER positive (95%), RI positive (50%), HER-2 negative.  Left axillary dissection showed 3 of 5 lymph nodes were positive for metastatic carcinoma (lobular type), also ER positive, RI positive, HER-2 negative. Stage [id number] (stage IIA).      She had port placed on 5/14/18.    She started chemotherapy on 5/22/18, and completed dose-dense doxorubicin+cyclophosphamide x 4 cycles, followed by weekly paclitaxel x 12, completed on 10/2/18.    She completed radiation on 12/17/18.        INTERIM HISTORY: Muriel is here for follow-up.  She has completed radiation almost 2 weeks ago.  She says that everything went well, and she feels good.  Her hair has started growing back.      REVIEW OF SYSTEMS:   14 point ROS was reviewed and is negative other than as noted above in HPI.       HOME MEDICATIONS:  Current Outpatient Medications   Medication Sig Dispense Refill     atenolol (TENORMIN) 25 MG tablet Take 1 tablet (25 mg) by mouth daily 90 tablet 0     Carisoprodol (SOMA PO) Take 350 mg by mouth 3 times daily       hydrochlorothiazide 12.5 MG TABS tablet Take 1 tablet (12.5 mg) by mouth daily 90 tablet 1     lidocaine-prilocaine (EMLA) cream Apply topically as needed for moderate pain Apply to port site 1 hour prior to accessing 30 g 3     LORazepam (ATIVAN) 0.5 MG tablet Take 1 tablet (0.5 mg) by mouth every 4 hours as needed (Anxiety, Nausea/Vomiting or Sleep) 30 tablet 0     prochlorperazine (COMPAZINE) 10 MG tablet Take 1 tablet (10 mg) by mouth every 6 hours as needed (Nausea/Vomiting) 30 tablet 5     VITAMIN D, CHOLECALCIFEROL, PO Take 2,000 Units by mouth daily (2 x 1000 units = 2000 unit dose)       zolpidem (AMBIEN) 5 MG tablet TAKE ONE TABLET BY MOUTH AT BEDTIME AS NEEDED FOR SLEEP . May repeat x 1 PRN 45 tablet 0         ALLERGIES:  Allergies   Allergen Reactions     Amitriptyline Other (See Comments)     nightmares     Oxycontin [Oxycodone] Nausea     Severe headaches         PAST  MEDICAL HISTORY:  Past Medical History:   Diagnosis Date     Cancer (H)     BREAST CANCER     Degeneration of cervical intervertebral disc      Degeneration of lumbar or lumbosacral intervertebral disc      Hypertension      PONV (postoperative nausea and vomiting)          PAST SURGICAL HISTORY:  Past Surgical History:   Procedure Laterality Date     BACK SURGERY  2001    lumbar fusion, cervical discectomy     DISSECT LYMPH NODE AXILLA Left 4/16/2018    Procedure: DISSECT LYMPH NODE AXILLA;;  Surgeon: Rodney Umana MD;  Location: [facility]     FUSION CERVICAL ANTERIOR THREE+ LEVELS  5/1/2012    Procedure:FUSION CERVICAL ANTERIOR THREE+ LEVELS; Surgeon:SARAH FRANCO; Location:[facility]     FUSION CERVICAL POSTERIOR THREE+ LEVELS  5/1/2012    Procedure:FUSION CERVICAL POSTERIOR THREE+ LEVELS; Posterior Cervical decompression and fusion C4-7, Anterior Cervical decompression and fusion C4-7 (c-arm), (herb table with abraham, yina oasis, yina aviator, Vitoss foam packing strip, impulse monitoring,); Surgeon:SARAH FRANCO; Location: OR     GYN SURGERY       HYSTERECTOMY VAGINAL  1990's    Judy Aceves OB Gyn specilist     HYSTERECTOMY, PAP NO LONGER INDICATED       INSERT PORT VASCULAR ACCESS N/A 5/14/2018    Procedure: INSERT PORT VASCULAR ACCESS;  PORT PLACEMENT;  Surgeon: Rodney Umaan MD;  Location: [facility]     MASTECTOMY SIMPLE Left 4/16/2018    Procedure: MASTECTOMY SIMPLE;  LEFT SIMPLE MASTECTOMY,LEFT AXILLARY DISSECTION;  Surgeon: Rodney Umana MD;  Location: [facility]         SOCIAL HISTORY:  Social History     Socioeconomic History     Marital status: Single     Spouse name: Not on file     Number of children: 0     Years of education: Not on file     Highest education level: Not on file   Social Needs     Financial resource strain: Not on file     Food insecurity - worry: Not on file     Food insecurity - inability: Not on file     Transportation needs - medical:  "Not on file     Transportation needs - non-medical: Not on file   Occupational History     Employer: [employer]    Tobacco Use     Smoking status: Current Every Day Smoker     Packs/day: 0.50     Types: Cigarettes     Smokeless tobacco: Never Used     Tobacco comment: 4 cigarettes per day   Substance and Sexual Activity     Alcohol use: Yes     Alcohol/week: 0.6 oz     Types: 1 Standard drinks or equivalent per week     Comment: 2-3 drinks per week     Drug use: No     Sexual activity: Not Currently     Partners: Male   Other Topics Concern     Parent/sibling w/ CABG, MI or angioplasty before 65F 55M? No   Social History Narrative     Not on file   She smokes cigarettes, half a pack a day for many years; she is unable to quantify how many.  She drinks alcohol occasionally.  She denies illicit drug use.  She is retired.  She used to to work in  at large companies.  She lives in [place] with her boyfriend.        FAMILY HISTORY:  Family History   Problem Relation Age of Onset     Pancreatitis Mother      Substance Abuse Mother      Unknown/Adopted Father      She does not know much about her family history and is unaware of any cancers in her family.  She has never been pregnant.  She had a hysterectomy in the .  She still has her ovaries.  She says that she has undergone menopause but is unsure when, likely years ago.        PHYSICAL EXAM:  Vital signs:  /89 (BP Location: Right arm, Cuff Size: Adult Regular)   Pulse 68   Temp 98.4  F (36.9  C) (Oral)   Resp 16   Ht 1.598 m (5' 2.9\")   Wt 48.5 kg (107 lb)   SpO2 97%   BMI 19.01 kg/m      ECO  GENERAL/CONSTITUTIONAL: No acute distress.  EYES: No scleral icterus.  BREAST: Right-no palpable mass, discharge, rash, or axillary lymphadenopathy.  Left-s/p mastectomy and radiation, with associated skin changes.  NEUROLOGIC: Alert, oriented, answers questions appropriately.  INTEGUMENTARY: No jaundice.  Port in place at the right upper " chest.      LABS:  CBC RESULTS:   Recent Labs   Lab Test 12/28/18  1320   WBC 6.1   RBC 3.81   HGB 12.0   HCT 35.9   MCV 94   MCH 31.5   MCHC 33.4   RDW 13.4        Recent Labs   Lab Test 12/28/18  1320 10/19/18  1325    140   POTASSIUM 3.9 3.9   CHLORIDE 110* 106   CO2 28 27   ANIONGAP 5 7   GLC 94 107*   BUN 20 21   CR 0.79 0.64   HATTIE 8.8 9.0     Lab Results   Component Value Date    AST 16 12/28/2018     Lab Results   Component Value Date    ALT 19 12/28/2018     No results found for: BILICONJ   Lab Results   Component Value Date    BILITOTAL 0.2 12/28/2018     Lab Results   Component Value Date    ALBUMIN 3.5 12/28/2018     Lab Results   Component Value Date    PROTTOTAL 6.6 12/28/2018      Lab Results   Component Value Date    ALKPHOS 81 12/28/2018         IMAGING:  Mammogram and ultrasound 3/28/18:  FINDINGS:  Standard bilateral diagnostic views were obtained,  including tomosynthesis. Marker was placed on the left upper breast to  denote the site of the palpable lump; deep to the marker there is a  mass measuring approximately 2 cm. The mass is associated with  retraction of the left nipple. There are no associated  microcalcifications. No suspicious findings in the contralateral right  breast.     Further evaluation with targeted left breast ultrasound shows a  corresponding hypoechoic lesion at the 11:00 position, 2 cm from the  nipple, measuring 1.9 x 2.9 x 1.0 cm. Survey of the axilla shows an  enlarged lymph node measuring 0.7 x 0.6 cm.     MRI breast 4/9/18:  1. The known left breast malignancy measures 2.5 x 2.0 x 2.6 cm on  MRI. There is additional nonmass enhancement surrounding the mass in  the upper left breast.  2. No suspicious MRI finding in the right breast.      ASSESSMENT/PLAN:  Muriel Diaz is a 58 year old post-menopausal female with:    1) Left breast cancer of the upper inner quadrant: s/p left mastectomy on 4/16/18; pathology showed invasive mammary carcinoma, with  lobular and ductal features, grade 2, tumor size 4.3 x 4.0 x 2.1 cm, positive margin for invasive carcinoma in the central and lateral posterior surface, DCIS and LCIS present, ER positive (95%), NM positive (50%), HER-2 negative.  Left axillary dissection showed 3 of 5 lymph nodes were positive for metastatic carcinoma (lobular type), also ER positive, NM positive, HER-2 negative. Stage [id number] (stage IIB).  She has completed adjuvant chemotherapy.  She completed radiation on 12/17/18.      -referred for lymphedema therapy  -Muriel has met with plastic surgery, Dr. Crook.  It was recommended that reconstruction is delayed until radiation is completed.  She will follow-up with Dr. Crook.  -she wants to keep port for now, but does want it removed.  She wants to schedule it when she gets her reconstruction surgery.    -we will plan to start aromatase inhibitor.  We discussed anastrozole.    -teaching on anastrozole today  -labs today: CBC, CMP  -side effects may include, but not limited to: fatigue, myalgias/arthralgias, mood changes, hot flashes, headache, cardiovascular risk, bone density loss/osteoporosis  -anastrozole 1 mg daily prescribed - will plan for 5-10 years treatment, if can tolerate  -return to clinic in ~4-6 weeks for toxicity check    2) Smoking: She smokes half a pack a day.  She says that she is trying to cut back.  -I again advised smoking cessation  -she will see her PCP for tobacco cessation.    3) Hypertension: She is on medications for this.  She says that she has been anxious and nervous and has had difficulty sleeping at night.  -follows with PCP    4) Chronic back pain: s/p history of back surgeries  -she takes soma  -follows with PCP    5) She got flu shot this season.    6) Bone health: She will be starting aromatase inhibitor  -DEXA scan ordered      I spent a total of 25 minutes with the patient, with over >50% of the time in counseling and/or coordination of care.       Patience Mckeon,  MD  Hematology/Oncology  [facility]      Again, thank you for allowing me to participate in the care of your patient.        Sincerely,        Patience Mckeon MD

## 2018-12-28 NOTE — PROGRESS NOTES
Infusion Nursing Note:  Muriel Diaz presents today for port flush.    Patient seen by provider today: No   present during visit today: Not Applicable.    Note: N/A.    Intravenous Access:  Implanted Port.    Treatment Conditions:  Not Applicable.      Post Infusion Assessment:  Patient tolerated infusion without incident.  Site patent and intact, free from redness, edema or discomfort.  No evidence of extravasations.  Access discontinued per protocol.    Discharge Plan:   Discharge instructions reviewed with: Patient.  Patient and/or family verbalized understanding of discharge instructions and all questions answered.  Patient discharged in stable condition accompanied by: self.  Departure Mode: Ambulatory.    Vickie Peck RN

## 2019-01-04 ENCOUNTER — HOSPITAL ENCOUNTER (OUTPATIENT)
Dept: BONE DENSITY | Facility: CLINIC | Age: 59
Discharge: HOME | End: 2019-01-04
Attending: INTERNAL MEDICINE | Admitting: INTERNAL MEDICINE
Payer: COMMERCIAL

## 2019-01-04 DIAGNOSIS — Z17.0 MALIGNANT NEOPLASM OF UPPER-INNER QUADRANT OF LEFT BREAST IN FEMALE, ESTROGEN RECEPTOR POSITIVE (H): ICD-10-CM

## 2019-01-04 DIAGNOSIS — C50.212 MALIGNANT NEOPLASM OF UPPER-INNER QUADRANT OF LEFT BREAST IN FEMALE, ESTROGEN RECEPTOR POSITIVE (H): ICD-10-CM

## 2019-01-04 PROCEDURE — 77080 DXA BONE DENSITY AXIAL: CPT

## 2019-01-17 ENCOUNTER — OFFICE VISIT (OUTPATIENT)
Dept: FAMILY MEDICINE | Facility: CLINIC | Age: 59
End: 2019-01-17

## 2019-01-17 VITALS
DIASTOLIC BLOOD PRESSURE: 82 MMHG | RESPIRATION RATE: 16 BRPM | OXYGEN SATURATION: 99 % | SYSTOLIC BLOOD PRESSURE: 134 MMHG | HEART RATE: 57 BPM | WEIGHT: 104.2 LBS | BODY MASS INDEX: 18.52 KG/M2

## 2019-01-17 DIAGNOSIS — M85.851 OSTEOPENIA OF BOTH HIPS: ICD-10-CM

## 2019-01-17 DIAGNOSIS — M85.852 OSTEOPENIA OF BOTH HIPS: ICD-10-CM

## 2019-01-17 DIAGNOSIS — F17.200 SMOKER: ICD-10-CM

## 2019-01-17 DIAGNOSIS — I10 BENIGN ESSENTIAL HYPERTENSION: ICD-10-CM

## 2019-01-17 DIAGNOSIS — E55.9 VITAMIN D DEFICIENCY: Primary | ICD-10-CM

## 2019-01-17 PROCEDURE — 99214 OFFICE O/P EST MOD 30 MIN: CPT | Performed by: FAMILY MEDICINE

## 2019-01-17 RX ORDER — METOPROLOL SUCCINATE 25 MG/1
25 TABLET, EXTENDED RELEASE ORAL DAILY
Qty: 90 TABLET | Refills: 3 | Status: SHIPPED | OUTPATIENT
Start: 2019-01-17 | End: 2019-01-24 | Stop reason: ALTCHOICE

## 2019-01-17 NOTE — PROGRESS NOTES
Problem(s) Oriented visit        SUBJECTIVE:                                                    Muriel Diaz is a 58 year old female who presents to clinic today for recheck of blood pressure. She takes hydrochlorothiazide and it makes her jittery. She denies any chest pain or pressure with exertion.    She has advanced osteopenia of the hips. She is active regularly. She gets at least 4 servings of dairy daily. She takes a vitamin D supplement.    She just finished treatment for breast cancer and is getting ready for breast reconstruction.       Problem list, Medication list, Allergies, and Medical/Social/Surgical histories reviewed in EPIC and updated as appropriate.   Additional history: as documented    ROS:  5 point ROS completed and negative except noted above, including Gen, CV, Resp, GI, MS      Histories:   Patient Active Problem List   Diagnosis     Sprain of thoracic region     Vertigo     CARDIOVASCULAR SCREENING; LDL GOAL LESS THAN 160     Hypertension goal BP (blood pressure) < 140/90     Strain of hand and finger, right     Polysubstance abuse (H)     Degeneration of lumbar or lumbosacral intervertebral disc     DJD (degenerative joint disease) of cervical spine     Dystonia     Anxiety     Malignant neoplasm of upper-inner quadrant of left breast in female, estrogen receptor positive (H)     Smoker     S/P left mastectomy     Port-A-Cath in place     Past Surgical History:   Procedure Laterality Date     BACK SURGERY  2001    lumbar fusion, cervical discectomy     DISSECT LYMPH NODE AXILLA Left 4/16/2018    Procedure: DISSECT LYMPH NODE AXILLA;;  Surgeon: Rodney Umana MD;  Location: [facility]     FUSION CERVICAL ANTERIOR THREE+ LEVELS  5/1/2012    Procedure:FUSION CERVICAL ANTERIOR THREE+ LEVELS; Surgeon:SARAH FRANCO; Location:[facility]     FUSION CERVICAL POSTERIOR THREE+ LEVELS  5/1/2012    Procedure:FUSION CERVICAL POSTERIOR THREE+ LEVELS; Posterior Cervical decompression and fusion  C4-7, Anterior Cervical decompression and fusion C4-7 (c-arm), (herb table with abraham, yina oasis, yina aviator, Vitoss foam packing strip, impulse monitoring,); Surgeon:SARAH FRANCO; Location: OR     GYN SURGERY       HYSTERECTOMY VAGINAL  1990's    Judy Aceves OB Gyn specilist     HYSTERECTOMY, PAP NO LONGER INDICATED       INSERT PORT VASCULAR ACCESS N/A 5/14/2018    Procedure: INSERT PORT VASCULAR ACCESS;  PORT PLACEMENT;  Surgeon: Rodney Umana MD;  Location: [facility]     MASTECTOMY SIMPLE Left 4/16/2018    Procedure: MASTECTOMY SIMPLE;  LEFT SIMPLE MASTECTOMY,LEFT AXILLARY DISSECTION;  Surgeon: Rodney Umana MD;  Location: [facility]       Social History     Tobacco Use     Smoking status: Current Every Day Smoker     Packs/day: 0.50     Types: Cigarettes     Smokeless tobacco: Never Used     Tobacco comment: 4 cigarettes per day   Substance Use Topics     Alcohol use: Yes     Alcohol/week: 0.6 oz     Types: 1 Standard drinks or equivalent per week     Comment: 2-3 drinks per week     Family History   Problem Relation Age of Onset     Pancreatitis Mother      Substance Abuse Mother      Unknown/Adopted Father            OBJECTIVE:                                                    /82   Pulse 57   Resp 16   Wt 47.3 kg (104 lb 3.2 oz)   SpO2 99%   BMI 18.52 kg/m    Body mass index is 18.52 kg/m .   GENERAL APPEARANCE: Alert, no acute distress  NECK: No adenopathy,masses or thyromegaly  RESP: lungs clear to auscultation   CV: normal rate, regular rhythm, no murmur or gallop  MS: extremities normal, no peripheral edema  NEURO: Alert, oriented, speech and mentation normal  PSYCH: mentation appears normal, affect and mood normal   Labs Resulted Today:   Results for orders placed or performed during the hospital encounter of 01/04/19   DX Hip/Pelvis/Spine    Narrative    DXA BONE MINERAL DENSITY SCAN   1/4/2019 1:37 PM    TECHNIQUE: The distal radius and both hips  were scanned with DXA  technique performed using a Appetise scanner. DXA results are  reported according to T-score. The T-score is the standard deviation  from the peak bone mass in a normal young adult patient. A T-score of  -1.0 to -2.5 correlates with osteopenia. A T-score of less than -2.5  correlates with osteoporosis.    In accordance with the ISCD (International Society of Clinical  Densitometry) the lowest BMD between the total hip and femoral neck  will be used.     All treatment decisions require clinical judgment and consideration of  individual patient factors which may not be captured in the FRAX model  and the risk of fracture may be over- or under-estimated by FRAX.    INDICATION: Screening.    COMPARISON: None.    FINDINGS:     Distal radius: T-score in the ultra distal radius is -2.5.    Hips: The left hip T-score is -2.1. The right hip T-score is -2.1.  Bone mineral density in the worst hip is 0.744 gm/cm2.       Impression    IMPRESSION:  1. Mild osteoporosis in the distal radius. Advanced osteopenia but not  yet osteoporosis in the hips bilaterally.  2. The probability of major osteoporotic fracture is 8.1%  and  probability of hip fracture is 2.0%  within the next 10 years  according to FRAX risk assessment.    MATTI BETH MD     ASSESSMENT/PLAN:                                                        Muriel was seen today for refill request, recheck and recheck medication.    Diagnoses and all orders for this visit:    Vitamin D deficiency  -     vitamin D3 (CHOLECALCIFEROL) 1000 units (25 mcg) tablet; Take 2 tablets (2,000 Units) by mouth daily    Osteopenia of both hips  -     vitamin D3 (CHOLECALCIFEROL) 1000 units (25 mcg) tablet; Take 2 tablets (2,000 Units) by mouth daily  Recommend weight bearing exercise, stop smoking, 4-6 servings of calcium foods daily and vitamin D supplement.    Benign essential hypertension  -     metoprolol succinate ER (TOPROL-XL) 25 MG 24 hr tablet;  Take 1 tablet (25 mg) by mouth daily  Stop hydrochlorothiazide and start Toprol, return in 4-6 weeks to recheck blood pressure. Recommend stop smoking and follow low salt diet.    Smoker  Strongly recommend quitting.      There are no Patient Instructions on file for this visit.    The following health maintenance items are reviewed in Epic and correct as of today:  Health Maintenance   Topic Date Due     MICROALBUMIN Q1 YEAR  11/30/1962     HIV SCREEN (SYSTEM ASSIGNED)  11/30/1979     ZOSTER IMMUNIZATION (1 of 2) 11/30/2011     PHQ-2 Q1 YR  11/13/2016     ADVANCE DIRECTIVE PLANNING Q5 YRS  11/30/2016     LIPID MONITORING Q1 YEAR  04/07/2018     BMP Q1 YR  12/28/2019     MAMMO SCREEN Q2 YR (SYSTEM ASSIGNED)  04/09/2020     DTAP/TDAP/TD IMMUNIZATION (2 - Td) 07/19/2020     COLON CANCER SCREEN (SYSTEM ASSIGNED)  03/18/2026     INFLUENZA VACCINE  Completed     HEPATITIS C SCREENING  Completed     IPV IMMUNIZATION  Aged Out     MENINGITIS IMMUNIZATION  Aged Out       Isabel Landin MD  [facility]  Family Practice  [facility]  [phone]    For any issues my office # is [phone]

## 2019-01-24 ENCOUNTER — TELEPHONE (OUTPATIENT)
Dept: FAMILY MEDICINE | Facility: CLINIC | Age: 59
End: 2019-01-24

## 2019-01-24 DIAGNOSIS — I10 HYPERTENSION GOAL BP (BLOOD PRESSURE) < 140/90: Primary | ICD-10-CM

## 2019-01-24 RX ORDER — ATENOLOL 25 MG/1
25 TABLET ORAL DAILY
Qty: 90 TABLET | Refills: 3 | Status: SHIPPED | OUTPATIENT
Start: 2019-01-24 | End: 2020-01-17

## 2019-01-24 NOTE — TELEPHONE ENCOUNTER
Patient called clinic requesting change in medication from metoprolol back to atenolol. Patient reports not tolerating metoprolol well. Per Dr. Landin OK for this change. Prescription sent to pharmacy, patient is reminded to RTC 4-6 weeks for BP recheck. Wendy Das

## 2019-02-08 ENCOUNTER — INFUSION THERAPY VISIT (OUTPATIENT)
Dept: INFUSION THERAPY | Facility: CLINIC | Age: 59
End: 2019-02-08
Attending: INTERNAL MEDICINE
Payer: COMMERCIAL

## 2019-02-08 ENCOUNTER — ONCOLOGY VISIT (OUTPATIENT)
Dept: ONCOLOGY | Facility: CLINIC | Age: 59
End: 2019-02-08
Attending: INTERNAL MEDICINE
Payer: COMMERCIAL

## 2019-02-08 VITALS
WEIGHT: 107 LBS | SYSTOLIC BLOOD PRESSURE: 129 MMHG | DIASTOLIC BLOOD PRESSURE: 78 MMHG | HEART RATE: 69 BPM | HEIGHT: 63 IN | TEMPERATURE: 98.3 F | RESPIRATION RATE: 16 BRPM | OXYGEN SATURATION: 98 % | BODY MASS INDEX: 18.96 KG/M2

## 2019-02-08 VITALS
RESPIRATION RATE: 16 BRPM | TEMPERATURE: 98.3 F | HEART RATE: 69 BPM | DIASTOLIC BLOOD PRESSURE: 78 MMHG | SYSTOLIC BLOOD PRESSURE: 129 MMHG

## 2019-02-08 DIAGNOSIS — Z95.828 PORT-A-CATH IN PLACE: Primary | ICD-10-CM

## 2019-02-08 DIAGNOSIS — Z17.0 MALIGNANT NEOPLASM OF UPPER-INNER QUADRANT OF LEFT BREAST IN FEMALE, ESTROGEN RECEPTOR POSITIVE (H): Primary | ICD-10-CM

## 2019-02-08 DIAGNOSIS — C50.212 MALIGNANT NEOPLASM OF UPPER-INNER QUADRANT OF LEFT BREAST IN FEMALE, ESTROGEN RECEPTOR POSITIVE (H): Primary | ICD-10-CM

## 2019-02-08 DIAGNOSIS — Z12.39 BREAST CANCER SCREENING: ICD-10-CM

## 2019-02-08 PROCEDURE — 96523 IRRIG DRUG DELIVERY DEVICE: CPT

## 2019-02-08 PROCEDURE — 99214 OFFICE O/P EST MOD 30 MIN: CPT | Performed by: INTERNAL MEDICINE

## 2019-02-08 PROCEDURE — 25000128 H RX IP 250 OP 636: Performed by: INTERNAL MEDICINE

## 2019-02-08 RX ORDER — HEPARIN SODIUM (PORCINE) LOCK FLUSH IV SOLN 100 UNIT/ML 100 UNIT/ML
500 SOLUTION INTRAVENOUS EVERY 8 HOURS
Status: DISCONTINUED | OUTPATIENT
Start: 2019-02-08 | End: 2019-02-08 | Stop reason: HOSPADM

## 2019-02-08 RX ORDER — HEPARIN SODIUM (PORCINE) LOCK FLUSH IV SOLN 100 UNIT/ML 100 UNIT/ML
500 SOLUTION INTRAVENOUS EVERY 8 HOURS
Start: 2019-02-08

## 2019-02-08 RX ADMIN — HEPARIN SODIUM (PORCINE) LOCK FLUSH IV SOLN 100 UNIT/ML 500 UNITS: 100 SOLUTION at 13:04

## 2019-02-08 ASSESSMENT — MIFFLIN-ST. JEOR: SCORE: 1037.89

## 2019-02-08 ASSESSMENT — PAIN SCALES - GENERAL: PAINLEVEL: NO PAIN (0)

## 2019-02-08 NOTE — PROGRESS NOTES
[facility]    Hematology/Oncology Established Patient Note      Today's Date: 2/08/19    Reason for Follow-up: left sided breast cancer      HISTORY OF PRESENT ILLNESS: Muriel Diaz is a 58 year old post-menopausal female with PMHx of HTN, degenerative joint disease, history of cervical spine surgery, who presents with left-sided breast cancer.  She underwent left mastectomy on 4/16/18 by Dr. Umana.  Pathology showed invasive mammary carcinoma, with lobular and ductal features, grade 2, tumor size 4.3 x 4.0 x 2.1 cm, positive margin for invasive carcinoma in the central and lateral posterior surface, DCIS and LCIS present, ER positive (95%), LA positive (50%), HER-2 negative.  Left axillary dissection showed 3 of 5 lymph nodes were positive for metastatic carcinoma (lobular type), also ER positive, LA positive, HER-2 negative. Stage [id number] (stage IIA).      She had port placed on 5/14/18.    She started chemotherapy on 5/22/18, and completed dose-dense doxorubicin+cyclophosphamide x 4 cycles, followed by weekly paclitaxel x 12, completed on 10/2/18.    She completed radiation on 12/17/18.    She started anastrozole on 12/28/18.        INTERIM HISTORY: Muriel is here for follow-up.   She say that she is doing very well.  She started taking anastrozole over a month ago, and she denies any problems with it.  She denies new lumps/bumps or new pains.  She has cut back smoking to 6 cigarettes a day.      REVIEW OF SYSTEMS:   14 point ROS was reviewed and is negative other than as noted above in HPI.       HOME MEDICATIONS:  Current Outpatient Medications   Medication Sig Dispense Refill     anastrozole (ARIMIDEX) 1 MG tablet Take 1 tablet (1 mg) by mouth daily 90 tablet 3     atenolol (TENORMIN) 25 MG tablet Take 1 tablet (25 mg) by mouth daily 90 tablet 3     Carisoprodol (SOMA PO) Take 350 mg by mouth 3 times daily       hydrochlorothiazide 12.5 MG TABS tablet Take 1 tablet (12.5 mg) by  mouth daily 90 tablet 1     lidocaine-prilocaine (EMLA) cream Apply topically as needed for moderate pain Apply to port site 1 hour prior to accessing 30 g 3     LORazepam (ATIVAN) 0.5 MG tablet Take 1 tablet (0.5 mg) by mouth every 4 hours as needed (Anxiety, Nausea/Vomiting or Sleep) 30 tablet 0     prochlorperazine (COMPAZINE) 10 MG tablet Take 1 tablet (10 mg) by mouth every 6 hours as needed (Nausea/Vomiting) 30 tablet 5     VITAMIN D, CHOLECALCIFEROL, PO Take 2,000 Units by mouth daily (2 x 1000 units = 2000 unit dose)       vitamin D3 (CHOLECALCIFEROL) 1000 units (25 mcg) tablet Take 2 tablets (2,000 Units) by mouth daily 180 tablet 3     zolpidem (AMBIEN) 5 MG tablet TAKE ONE TABLET BY MOUTH AT BEDTIME AS NEEDED FOR SLEEP . May repeat x 1 PRN 45 tablet 0         ALLERGIES:  Allergies   Allergen Reactions     Amitriptyline Other (See Comments)     nightmares     Oxycontin [Oxycodone] Nausea     Severe headaches         PAST MEDICAL HISTORY:  Past Medical History:   Diagnosis Date     Cancer (H)     BREAST CANCER     Degeneration of cervical intervertebral disc      Degeneration of lumbar or lumbosacral intervertebral disc      Hypertension      PONV (postoperative nausea and vomiting)          PAST SURGICAL HISTORY:  Past Surgical History:   Procedure Laterality Date     BACK SURGERY  2001    lumbar fusion, cervical discectomy     DISSECT LYMPH NODE AXILLA Left 4/16/2018    Procedure: DISSECT LYMPH NODE AXILLA;;  Surgeon: Rodney Umana MD;  Location: [facility]     FUSION CERVICAL ANTERIOR THREE+ LEVELS  5/1/2012    Procedure:FUSION CERVICAL ANTERIOR THREE+ LEVELS; Surgeon:SARAH FRANCO; Location:[facility]     FUSION CERVICAL POSTERIOR THREE+ LEVELS  5/1/2012    Procedure:FUSION CERVICAL POSTERIOR THREE+ LEVELS; Posterior Cervical decompression and fusion C4-7, Anterior Cervical decompression and fusion C4-7 (c-arm), (herb table with yina abraham oasis, yina aviator, Vitoss foam packing  strip, impulse monitoring,); Surgeon:SARAH FRANCO; Location: OR     GYN SURGERY       HYSTERECTOMY VAGINAL  1990's    Judy Aceves OB Gyn specilist     HYSTERECTOMY, PAP NO LONGER INDICATED       INSERT PORT VASCULAR ACCESS N/A 5/14/2018    Procedure: INSERT PORT VASCULAR ACCESS;  PORT PLACEMENT;  Surgeon: Rodney Umana MD;  Location: [facility]     MASTECTOMY SIMPLE Left 4/16/2018    Procedure: MASTECTOMY SIMPLE;  LEFT SIMPLE MASTECTOMY,LEFT AXILLARY DISSECTION;  Surgeon: Rodney Umana MD;  Location: [facility]         SOCIAL HISTORY:  Social History     Socioeconomic History     Marital status: Single     Spouse name: Not on file     Number of children: 0     Years of education: Not on file     Highest education level: Not on file   Social Needs     Financial resource strain: Not on file     Food insecurity - worry: Not on file     Food insecurity - inability: Not on file     Transportation needs - medical: Not on file     Transportation needs - non-medical: Not on file   Occupational History     Employer: [employer]    Tobacco Use     Smoking status: Current Every Day Smoker     Packs/day: 0.50     Types: Cigarettes     Smokeless tobacco: Never Used     Tobacco comment: 4 cigarettes per day   Substance and Sexual Activity     Alcohol use: Yes     Alcohol/week: 0.6 oz     Types: 1 Standard drinks or equivalent per week     Comment: 2-3 drinks per week     Drug use: No     Sexual activity: Not Currently     Partners: Male   Other Topics Concern     Parent/sibling w/ CABG, MI or angioplasty before 65F 55M? No   Social History Narrative     Not on file   She smokes cigarettes, half a pack a day for many years; she is unable to quantify how many.  She drinks alcohol occasionally.  She denies illicit drug use.  She is retired.  She used to to work in  at large companies.  She lives in [place] with her boyfriend.        FAMILY HISTORY:  Family History   Problem Relation Age of Onset  "    Pancreatitis Mother      Substance Abuse Mother      Unknown/Adopted Father      She does not know much about her family history and is unaware of any cancers in her family.  She has never been pregnant.  She had a hysterectomy in the .  She still has her ovaries.  She says that she has undergone menopause but is unsure when, likely years ago.        PHYSICAL EXAM:  Vital signs:  /78   Pulse 69   Temp 98.3  F (36.8  C) (Oral)   Resp 16   Ht 1.598 m (5' 2.9\")   Wt 48.5 kg (107 lb)   SpO2 98%   BMI 19.01 kg/m     ECO  GENERAL/CONSTITUTIONAL: No acute distress.  EYES: No scleral icterus.  CARDIOVASCULAR: Regular rate and rhythm.  RESPIRATORY: Clear to ausculation bilaterally.  BREAST: Right-no palpable mass, discharge, rash, or axillary lymphadenopathy.  Left-s/p mastectomy and radiation, with associated skin changes.  NEUROLOGIC: Alert, oriented, answers questions appropriately.  INTEGUMENTARY: No jaundice.  Port in place at the right upper chest.      LABS:  None today.      IMAGING:  Mammogram and ultrasound 3/28/18:  FINDINGS:  Standard bilateral diagnostic views were obtained,  including tomosynthesis. Marker was placed on the left upper breast to  denote the site of the palpable lump; deep to the marker there is a  mass measuring approximately 2 cm. The mass is associated with  retraction of the left nipple. There are no associated  microcalcifications. No suspicious findings in the contralateral right  breast.     Further evaluation with targeted left breast ultrasound shows a  corresponding hypoechoic lesion at the 11:00 position, 2 cm from the  nipple, measuring 1.9 x 2.9 x 1.0 cm. Survey of the axilla shows an  enlarged lymph node measuring 0.7 x 0.6 cm.     MRI breast 18:  1. The known left breast malignancy measures 2.5 x 2.0 x 2.6 cm on  MRI. There is additional nonmass enhancement surrounding the mass in  the upper left breast.  2. No suspicious MRI finding in the right " breast.    DEXA scan 1/4/19:  1. Mild osteoporosis in the distal radius. Advanced osteopenia but not  yet osteoporosis in the hips bilaterally.  2. The probability of major osteoporotic fracture is 8.1%  and  probability of hip fracture is 2.0%  within the next 10 years  according to FRAX risk assessment.         ASSESSMENT/PLAN:  Muriel Diaz is a 58 year old post-menopausal female with:    1) Left breast cancer of the upper inner quadrant: s/p left mastectomy on 4/16/18; pathology showed invasive mammary carcinoma, with lobular and ductal features, grade 2, tumor size 4.3 x 4.0 x 2.1 cm, positive margin for invasive carcinoma in the central and lateral posterior surface, DCIS and LCIS present, ER positive (95%), MA positive (50%), HER-2 negative.  Left axillary dissection showed 3 of 5 lymph nodes were positive for metastatic carcinoma (lobular type), also ER positive, MA positive, HER-2 negative. Stage [id number] (stage IIB).  She has completed adjuvant chemotherapy.  She completed radiation on 12/17/18.    She started anastrozole on 12/28/18 and tolerating well.      -referred for lymphedema therapy  -Muriel has met with plastic surgery, Dr. Crook.  It was recommended that reconstruction is delayed until radiation is completed.  She will follow-up with Dr. Crook.  -she would like her port removed.   Referral placed to Dr. Umana, who placed the port.  -continue anastrozole 1 mg daily - will plan for 5-10 years treatment, if can tolerate  -right-sided mammogram in March/April 2019 - ordered  -return to clinic in 3 months    2) Smoking: She has cut back to 6 cigarettes a day and trying to cut back more  -I again advised smoking cessation  -she will see her PCP for tobacco cessation.    3) Hypertension:   -follows with PCP    4) Chronic back pain: s/p history of back surgeries  -she takes soma  -follows with PCP    5) Bone health: She is on aromatase inhibitor.  DEXA scan on 1/4/19 showed mild osteoporosis in  the distal radius.  Advanced osteopenia in the bilateral hips.  -next DEXA scan in January 2021  -she discussed with her PCP; weight bearing exercise, stop smoking, increased calcium intake, vitamin D supplement      I spent a total of 25 minutes with the patient, with over >50% of the time in counseling and/or coordination of care.       Patience Mckeon MD  Hematology/Oncology  [facility]

## 2019-02-08 NOTE — PROGRESS NOTES
Infusion Nursing Note:  Muriel Diaz presents today for port flush.    Patient seen by provider today: Yes:    present during visit today: Not Applicable.    Note: N/A.    Intravenous Access:  Implanted Port.    Treatment Conditions:  Not Applicable.      Post Infusion Assessment:  Patient tolerated saline and heparin flush without incident.  Blood return noted pre and post infusion.  Site patent and intact, free from redness, edema or discomfort.  No evidence of extravasations.  Access discontinued per protocol.    Discharge Plan:   Discharge instructions reviewed with: Patient.  Patient and/or family verbalized understanding of discharge instructions and all questions answered.  Patient discharged in stable condition accompanied by: self.  Departure Mode: Ambulatory.    Nyla Rae RN

## 2019-02-08 NOTE — LETTER
2/8/2019         RE: Muriel Diaz  [address]  [facility] [address]        Dear Colleague,    Thank you for referring your patient, Muriel Diaz, to the [facility]. Please see a copy of my visit note below.    [facility]    Hematology/Oncology Established Patient Note      Today's Date: 2/08/19    Reason for Follow-up: left sided breast cancer      HISTORY OF PRESENT ILLNESS: Muriel Diaz is a 58 year old post-menopausal female with PMHx of HTN, degenerative joint disease, history of cervical spine surgery, who presents with left-sided breast cancer.  She underwent left mastectomy on 4/16/18 by Dr. Umana.  Pathology showed invasive mammary carcinoma, with lobular and ductal features, grade 2, tumor size 4.3 x 4.0 x 2.1 cm, positive margin for invasive carcinoma in the central and lateral posterior surface, DCIS and LCIS present, ER positive (95%), KY positive (50%), HER-2 negative.  Left axillary dissection showed 3 of 5 lymph nodes were positive for metastatic carcinoma (lobular type), also ER positive, KY positive, HER-2 negative. Stage [id number] (stage IIA).      She had port placed on 5/14/18.    She started chemotherapy on 5/22/18, and completed dose-dense doxorubicin+cyclophosphamide x 4 cycles, followed by weekly paclitaxel x 12, completed on 10/2/18.    She completed radiation on 12/17/18.    She started anastrozole on 12/28/18.        INTERIM HISTORY: Muriel is here for follow-up.   She say that she is doing very well.  She started taking anastrozole over a month ago, and she denies any problems with it.  She denies new lumps/bumps or new pains.  She has cut back smoking to 6 cigarettes a day.      REVIEW OF SYSTEMS:   14 point ROS was reviewed and is negative other than as noted above in HPI.       HOME MEDICATIONS:  Current Outpatient Medications   Medication Sig Dispense Refill     anastrozole (ARIMIDEX) 1 MG tablet Take 1 tablet (1 mg)  by mouth daily 90 tablet 3     atenolol (TENORMIN) 25 MG tablet Take 1 tablet (25 mg) by mouth daily 90 tablet 3     Carisoprodol (SOMA PO) Take 350 mg by mouth 3 times daily       hydrochlorothiazide 12.5 MG TABS tablet Take 1 tablet (12.5 mg) by mouth daily 90 tablet 1     lidocaine-prilocaine (EMLA) cream Apply topically as needed for moderate pain Apply to port site 1 hour prior to accessing 30 g 3     LORazepam (ATIVAN) 0.5 MG tablet Take 1 tablet (0.5 mg) by mouth every 4 hours as needed (Anxiety, Nausea/Vomiting or Sleep) 30 tablet 0     prochlorperazine (COMPAZINE) 10 MG tablet Take 1 tablet (10 mg) by mouth every 6 hours as needed (Nausea/Vomiting) 30 tablet 5     VITAMIN D, CHOLECALCIFEROL, PO Take 2,000 Units by mouth daily (2 x 1000 units = 2000 unit dose)       vitamin D3 (CHOLECALCIFEROL) 1000 units (25 mcg) tablet Take 2 tablets (2,000 Units) by mouth daily 180 tablet 3     zolpidem (AMBIEN) 5 MG tablet TAKE ONE TABLET BY MOUTH AT BEDTIME AS NEEDED FOR SLEEP . May repeat x 1 PRN 45 tablet 0         ALLERGIES:  Allergies   Allergen Reactions     Amitriptyline Other (See Comments)     nightmares     Oxycontin [Oxycodone] Nausea     Severe headaches         PAST MEDICAL HISTORY:  Past Medical History:   Diagnosis Date     Cancer (H)     BREAST CANCER     Degeneration of cervical intervertebral disc      Degeneration of lumbar or lumbosacral intervertebral disc      Hypertension      PONV (postoperative nausea and vomiting)          PAST SURGICAL HISTORY:  Past Surgical History:   Procedure Laterality Date     BACK SURGERY  2001    lumbar fusion, cervical discectomy     DISSECT LYMPH NODE AXILLA Left 4/16/2018    Procedure: DISSECT LYMPH NODE AXILLA;;  Surgeon: Rodney Umana MD;  Location: [facility]     FUSION CERVICAL ANTERIOR THREE+ LEVELS  5/1/2012    Procedure:FUSION CERVICAL ANTERIOR THREE+ LEVELS; Surgeon:SARAH FRANCO; Location:[facility]     FUSION CERVICAL POSTERIOR THREE+ LEVELS  5/1/2012     Procedure:FUSION CERVICAL POSTERIOR THREE+ LEVELS; Posterior Cervical decompression and fusion C4-7, Anterior Cervical decompression and fusion C4-7 (c-arm), (herb table with abraham, yina oasis, yina aviator, Vitoss foam packing strip, impulse monitoring,); Surgeon:SARAH FRANCO; Location: OR     GYN SURGERY       HYSTERECTOMY VAGINAL  1990's    Judy Aceves OB Gyn specilist     HYSTERECTOMY, PAP NO LONGER INDICATED       INSERT PORT VASCULAR ACCESS N/A 5/14/2018    Procedure: INSERT PORT VASCULAR ACCESS;  PORT PLACEMENT;  Surgeon: Rodney Umana MD;  Location: [facility]     MASTECTOMY SIMPLE Left 4/16/2018    Procedure: MASTECTOMY SIMPLE;  LEFT SIMPLE MASTECTOMY,LEFT AXILLARY DISSECTION;  Surgeon: Rodney Umana MD;  Location: [facility]         SOCIAL HISTORY:  Social History     Socioeconomic History     Marital status: Single     Spouse name: Not on file     Number of children: 0     Years of education: Not on file     Highest education level: Not on file   Social Needs     Financial resource strain: Not on file     Food insecurity - worry: Not on file     Food insecurity - inability: Not on file     Transportation needs - medical: Not on file     Transportation needs - non-medical: Not on file   Occupational History     Employer: [employer]    Tobacco Use     Smoking status: Current Every Day Smoker     Packs/day: 0.50     Types: Cigarettes     Smokeless tobacco: Never Used     Tobacco comment: 4 cigarettes per day   Substance and Sexual Activity     Alcohol use: Yes     Alcohol/week: 0.6 oz     Types: 1 Standard drinks or equivalent per week     Comment: 2-3 drinks per week     Drug use: No     Sexual activity: Not Currently     Partners: Male   Other Topics Concern     Parent/sibling w/ CABG, MI or angioplasty before 65F 55M? No   Social History Narrative     Not on file   She smokes cigarettes, half a pack a day for many years; she is unable to quantify how many.  She drinks  "alcohol occasionally.  She denies illicit drug use.  She is retired.  She used to to work in  at large companies.  She lives in [place] with her boyfriend.        FAMILY HISTORY:  Family History   Problem Relation Age of Onset     Pancreatitis Mother      Substance Abuse Mother      Unknown/Adopted Father      She does not know much about her family history and is unaware of any cancers in her family.  She has never been pregnant.  She had a hysterectomy in the .  She still has her ovaries.  She says that she has undergone menopause but is unsure when, likely years ago.        PHYSICAL EXAM:  Vital signs:  /78   Pulse 69   Temp 98.3  F (36.8  C) (Oral)   Resp 16   Ht 1.598 m (5' 2.9\")   Wt 48.5 kg (107 lb)   SpO2 98%   BMI 19.01 kg/m      ECO  GENERAL/CONSTITUTIONAL: No acute distress.  EYES: No scleral icterus.  CARDIOVASCULAR: Regular rate and rhythm.  RESPIRATORY: Clear to ausculation bilaterally.  BREAST: Right-no palpable mass, discharge, rash, or axillary lymphadenopathy.  Left-s/p mastectomy and radiation, with associated skin changes.  NEUROLOGIC: Alert, oriented, answers questions appropriately.  INTEGUMENTARY: No jaundice.  Port in place at the right upper chest.      LABS:  None today.      IMAGING:  Mammogram and ultrasound 3/28/18:  FINDINGS:  Standard bilateral diagnostic views were obtained,  including tomosynthesis. Marker was placed on the left upper breast to  denote the site of the palpable lump; deep to the marker there is a  mass measuring approximately 2 cm. The mass is associated with  retraction of the left nipple. There are no associated  microcalcifications. No suspicious findings in the contralateral right  breast.     Further evaluation with targeted left breast ultrasound shows a  corresponding hypoechoic lesion at the 11:00 position, 2 cm from the  nipple, measuring 1.9 x 2.9 x 1.0 cm. Survey of the axilla shows an  enlarged lymph node measuring " 0.7 x 0.6 cm.     MRI breast 4/9/18:  1. The known left breast malignancy measures 2.5 x 2.0 x 2.6 cm on  MRI. There is additional nonmass enhancement surrounding the mass in  the upper left breast.  2. No suspicious MRI finding in the right breast.    DEXA scan 1/4/19:  1. Mild osteoporosis in the distal radius. Advanced osteopenia but not  yet osteoporosis in the hips bilaterally.  2. The probability of major osteoporotic fracture is 8.1%  and  probability of hip fracture is 2.0%  within the next 10 years  according to FRAX risk assessment.         ASSESSMENT/PLAN:  Muriel Diaz is a 58 year old post-menopausal female with:    1) Left breast cancer of the upper inner quadrant: s/p left mastectomy on 4/16/18; pathology showed invasive mammary carcinoma, with lobular and ductal features, grade 2, tumor size 4.3 x 4.0 x 2.1 cm, positive margin for invasive carcinoma in the central and lateral posterior surface, DCIS and LCIS present, ER positive (95%), HI positive (50%), HER-2 negative.  Left axillary dissection showed 3 of 5 lymph nodes were positive for metastatic carcinoma (lobular type), also ER positive, HI positive, HER-2 negative. Stage [id number] (stage IIB).  She has completed adjuvant chemotherapy.  She completed radiation on 12/17/18.    She started anastrozole on 12/28/18 and tolerating well.      -referred for lymphedema therapy  -Muriel has met with plastic surgery, Dr. Crook.  It was recommended that reconstruction is delayed until radiation is completed.  She will follow-up with Dr. Crook.  -she would like her port removed.   Referral placed to Dr. Umana, who placed the port.  -continue anastrozole 1 mg daily - will plan for 5-10 years treatment, if can tolerate  -right-sided mammogram in March/April 2019 - ordered  -return to clinic in 3 months    2) Smoking: She has cut back to 6 cigarettes a day and trying to cut back more  -I again advised smoking cessation  -she will see her PCP for tobacco  cessation.    3) Hypertension:   -follows with PCP    4) Chronic back pain: s/p history of back surgeries  -she takes soma  -follows with PCP    5) Bone health: She is on aromatase inhibitor.  DEXA scan on 1/4/19 showed mild osteoporosis in the distal radius.  Advanced osteopenia in the bilateral hips.  -next DEXA scan in January 2021  -she discussed with her PCP; weight bearing exercise, stop smoking, increased calcium intake, vitamin D supplement      I spent a total of 25 minutes with the patient, with over >50% of the time in counseling and/or coordination of care.       Patience Mckeon MD  Hematology/Oncology  [facility]      Again, thank you for allowing me to participate in the care of your patient.        Sincerely,        Patience Mckeon MD

## 2019-02-22 ENCOUNTER — TELEPHONE (OUTPATIENT)
Dept: ONCOLOGY | Facility: CLINIC | Age: 59
End: 2019-02-22

## 2019-02-22 NOTE — TELEPHONE ENCOUNTER
Called Muriel regarding her port removal, she stated  That she has a meeting with plastic surgery next Friday Dr. Crook next Friday who will coordinate with Dr. Umana to remover port. Jessica Garcia RN,BSN,OCN

## 2019-03-15 ENCOUNTER — OFFICE VISIT (OUTPATIENT)
Dept: FAMILY MEDICINE | Facility: CLINIC | Age: 59
End: 2019-03-15

## 2019-03-15 VITALS
RESPIRATION RATE: 16 BRPM | WEIGHT: 106 LBS | DIASTOLIC BLOOD PRESSURE: 82 MMHG | SYSTOLIC BLOOD PRESSURE: 124 MMHG | BODY MASS INDEX: 18.84 KG/M2 | HEART RATE: 61 BPM | OXYGEN SATURATION: 97 %

## 2019-03-15 DIAGNOSIS — Z01.818 PREOP GENERAL PHYSICAL EXAM: Primary | ICD-10-CM

## 2019-03-15 DIAGNOSIS — C50.212 MALIGNANT NEOPLASM OF UPPER-INNER QUADRANT OF LEFT BREAST IN FEMALE, ESTROGEN RECEPTOR POSITIVE (H): ICD-10-CM

## 2019-03-15 DIAGNOSIS — Z17.0 MALIGNANT NEOPLASM OF UPPER-INNER QUADRANT OF LEFT BREAST IN FEMALE, ESTROGEN RECEPTOR POSITIVE (H): ICD-10-CM

## 2019-03-15 DIAGNOSIS — F17.200 SMOKER: ICD-10-CM

## 2019-03-15 DIAGNOSIS — I10 HYPERTENSION GOAL BP (BLOOD PRESSURE) < 140/90: ICD-10-CM

## 2019-03-15 DIAGNOSIS — Z90.12 S/P LEFT MASTECTOMY: ICD-10-CM

## 2019-03-15 LAB
% GRANULOCYTES: 74 % (ref 42.2–75.2)
HCT VFR BLD AUTO: 40.3 % (ref 35–46)
HEMOGLOBIN: 12.9 G/DL (ref 11.8–15.5)
LYMPHOCYTES NFR BLD AUTO: 19.9 % (ref 20.5–51.1)
MCH RBC QN AUTO: 29.1 PG (ref 27–31)
MCHC RBC AUTO-ENTMCNC: 31.9 G/DL (ref 33–37)
MCV RBC AUTO: 91.2 FL (ref 80–100)
MONOCYTES NFR BLD AUTO: 6.1 % (ref 1.7–9.3)
PLATELET # BLD AUTO: 271 K/UL (ref 140–450)
RBC # BLD AUTO: 4.42 X10/CMM (ref 3.7–5.2)
WBC # BLD AUTO: 7.6 X10/CMM (ref 3.8–11)

## 2019-03-15 PROCEDURE — 99214 OFFICE O/P EST MOD 30 MIN: CPT | Performed by: NURSE PRACTITIONER

## 2019-03-15 PROCEDURE — 85025 COMPLETE CBC W/AUTO DIFF WBC: CPT | Performed by: NURSE PRACTITIONER

## 2019-03-15 PROCEDURE — 36415 COLL VENOUS BLD VENIPUNCTURE: CPT | Performed by: NURSE PRACTITIONER

## 2019-03-15 NOTE — PROGRESS NOTES
[facility]  [address] [place] [id number] [phone]  Dept: [phone]    PRE-OP EVALUATION:  Today's date: 3/15/2019    Muriel Diaz (: 1960) presents for pre-operative evaluation assessment as requested by , Naa Purcell MD .  She requires evaluation and anesthesia risk assessment prior to undergoing surgery/procedure for treatment of LEFT LATISSIMUS FLAP RECONSTRUCT WITH TISSUE EXPANDER AND PORT REMOVAL, INSERT TISSUE EXPANDER BREAST,REMOVE PORT VASCULAR ACCESS .    Proposed Surgery/ Procedure: LEFT LATISSIMUS FLAP RECONSTRUCT WITH TISSUE EXPANDER AND PORT REMOVAL, INSERT TISSUE EXPANDER BREAST,REMOVE PORT VASCULAR ACCESS  Date of Surgery/ Procedure: 3/22/2019  Time of Surgery/ Procedure: [address] [place] [address]/Surgical Facility: [facility]  {SURGERY FAX NUMBER:[id number]:  Primary Physician: Isabel Landin  Type of Anesthesia Anticipated: to be determined    Patient has a Health Care Directive or Living Will:  NO    1. NO - Do you have a history of heart attack, stroke, stent, bypass or surgery on an artery in the head, neck, heart or legs?  2. NO - Do you ever have any pain or discomfort in your chest?  3. NO - Do you have a history of  Heart Failure?  4. NO - Are you troubled by shortness of breath when: walking on the level, up a slight hill or at night?  5. NO - Do you currently have a cold, bronchitis or other respiratory infection?  6. NO - Do you have a cough, shortness of breath or wheezing?  7. NO - Do you sometimes get pains in the calves of your legs when you walk?  8. NO - Do you or anyone in your family have previous history of blood clots?  9. NO - Do you or does anyone in your family have a serious bleeding problem such as prolonged bleeding following surgeries or cuts?  10. NO - Have you ever had problems with anemia or been told to take iron pills?  11. NO - Have you had any abnormal blood loss such as black, tarry or bloody stools, or  abnormal vaginal bleeding?  12. NO - Have you ever had a blood transfusion?  13. NO - Have you or any of your relatives ever had problems with anesthesia?  14. Yes - Do you have sleep apnea, excessive snoring or daytime drowsiness? snoring  15. NO - Do you have any prosthetic heart valves?  16. NO - Do you have prosthetic joints?  17. NO - Is there any chance that you may be pregnant?      HPI:     HPI related to upcoming procedure: 1) Left breast cancer of the upper inner quadrant: s/p left mastectomy on 4/16/18. Takes arimidex 1 mg daily.   Is now going to have port removed and expander placed.       HYPERTENSION - Patient has longstanding history of HTN , currently denies any symptoms referable to elevated blood pressure. Specifically denies chest pain, palpitations, dyspnea, orthopnea, PND or peripheral edema. Blood pressure readings have been in normal range. Current medication regimen is as listed below. Patient denies any side effects of medication.                                                                                                                                                                                          .    MEDICAL HISTORY:     Patient Active Problem List    Diagnosis Date Noted     Port-A-Cath in place 11/16/2018     Priority: Medium     S/P left mastectomy 04/16/2018     Priority: Medium     Smoker 04/13/2018     Priority: Medium     Malignant neoplasm of upper-inner quadrant of left breast in female, estrogen receptor positive (H) 03/29/2018     Priority: Medium     SCP all data entered.  Next apt. Not scheduled.  Due before Sept. 2019.       Anxiety 12/22/2015     Priority: Medium     DJD (degenerative joint disease) of cervical spine 05/30/2014     Priority: Medium     Dystonia 05/30/2014     Priority: Medium     Degeneration of lumbar or lumbosacral intervertebral disc      Priority: Medium     Polysubstance abuse (H) 11/29/2013     Priority: Medium     See phone note  11-29-13       Strain of hand and finger, right 12/05/2011     Priority: Medium     CARDIOVASCULAR SCREENING; LDL GOAL LESS THAN 160 11/09/2011     Priority: Medium     Per provider         Hypertension goal BP (blood pressure) < 140/90 11/09/2011     Priority: Medium     Per provider         Vertigo 07/19/2010     Priority: Medium     Sprain of thoracic region 11/21/2005     Priority: Medium      Past Medical History:   Diagnosis Date     Cancer (H)     BREAST CANCER     Degeneration of cervical intervertebral disc      Degeneration of lumbar or lumbosacral intervertebral disc      Hypertension      PONV (postoperative nausea and vomiting)      Past Surgical History:   Procedure Laterality Date     BACK SURGERY  2001    lumbar fusion, cervical discectomy     DISSECT LYMPH NODE AXILLA Left 4/16/2018    Procedure: DISSECT LYMPH NODE AXILLA;;  Surgeon: Rodney Umana MD;  Location: [facility]     FUSION CERVICAL ANTERIOR THREE+ LEVELS  5/1/2012    Procedure:FUSION CERVICAL ANTERIOR THREE+ LEVELS; Surgeon:SARAH FRANCO; Location:[facility]     FUSION CERVICAL POSTERIOR THREE+ LEVELS  5/1/2012    Procedure:FUSION CERVICAL POSTERIOR THREE+ LEVELS; Posterior Cervical decompression and fusion C4-7, Anterior Cervical decompression and fusion C4-7 (c-arm), (herb table with abraham, yina oasis, yina aviator, Vitoss foam packing strip, impulse monitoring,); Surgeon:SARAH FRANCO; Location: OR     GYN SURGERY       HYSTERECTOMY VAGINAL  1990's    Judy Aceves OB Gyn specilist     HYSTERECTOMY, PAP NO LONGER INDICATED       INSERT PORT VASCULAR ACCESS N/A 5/14/2018    Procedure: INSERT PORT VASCULAR ACCESS;  PORT PLACEMENT;  Surgeon: Rodney Umana MD;  Location: [facility]     MASTECTOMY SIMPLE Left 4/16/2018    Procedure: MASTECTOMY SIMPLE;  LEFT SIMPLE MASTECTOMY,LEFT AXILLARY DISSECTION;  Surgeon: Rodney Umana MD;  Location: [facility]     Current Outpatient Medications   Medication Sig  Dispense Refill     anastrozole (ARIMIDEX) 1 MG tablet Take 1 tablet (1 mg) by mouth daily 90 tablet 3     atenolol (TENORMIN) 25 MG tablet Take 1 tablet (25 mg) by mouth daily 90 tablet 3     Carisoprodol (SOMA PO) Take 350 mg by mouth 3 times daily       hydrochlorothiazide 12.5 MG TABS tablet Take 1 tablet (12.5 mg) by mouth daily 90 tablet 1     lidocaine-prilocaine (EMLA) cream Apply topically as needed for moderate pain Apply to port site 1 hour prior to accessing 30 g 3     LORazepam (ATIVAN) 0.5 MG tablet Take 1 tablet (0.5 mg) by mouth every 4 hours as needed (Anxiety, Nausea/Vomiting or Sleep) 30 tablet 0     prochlorperazine (COMPAZINE) 10 MG tablet Take 1 tablet (10 mg) by mouth every 6 hours as needed (Nausea/Vomiting) 30 tablet 5     VITAMIN D, CHOLECALCIFEROL, PO Take 2,000 Units by mouth daily (2 x 1000 units = 2000 unit dose)       vitamin D3 (CHOLECALCIFEROL) 1000 units (25 mcg) tablet Take 2 tablets (2,000 Units) by mouth daily 180 tablet 3     zolpidem (AMBIEN) 5 MG tablet TAKE ONE TABLET BY MOUTH AT BEDTIME AS NEEDED FOR SLEEP . May repeat x 1 PRN 45 tablet 0     OTC products: no recent use of OTC ASA, NSAIDS or Steroids and no use of herbal medications or other supplements    Allergies   Allergen Reactions     Amitriptyline Other (See Comments)     nightmares     Oxycontin [Oxycodone] Nausea     Severe headaches      Latex Allergy: NO    Social History     Tobacco Use     Smoking status: Current Every Day Smoker     Packs/day: 0.50     Types: Cigarettes     Smokeless tobacco: Never Used     Tobacco comment: 4 cigarettes per day   Substance Use Topics     Alcohol use: Yes     Alcohol/week: 0.6 oz     Types: 1 Standard drinks or equivalent per week     Comment: 2-3 drinks per week     History   Drug Use No       REVIEW OF SYSTEMS:   CONSTITUTIONAL: NEGATIVE for fever, chills, change in weight  INTEGUMENTARY/SKIN: NEGATIVE for worrisome rashes, moles or lesions  EYES: NEGATIVE for vision changes  or irritation  ENT/MOUTH: NEGATIVE for ear, mouth and throat problems  RESP: NEGATIVE for significant cough or SOB  BREAST: NEGATIVE for masses, tenderness or discharge  CV: NEGATIVE for chest pain, palpitations or peripheral edema  GI: NEGATIVE for nausea, abdominal pain, heartburn, or change in bowel habits  : NEGATIVE for frequency, dysuria, or hematuria  MUSCULOSKELETAL: NEGATIVE for significant arthralgias or myalgia  NEURO: NEGATIVE for weakness, dizziness or paresthesias  ENDOCRINE: NEGATIVE for temperature intolerance, skin/hair changes  HEME: NEGATIVE for bleeding problems  PSYCHIATRIC: NEGATIVE for changes in mood or affect    EXAM:   /82   Pulse 61   Resp 16   Wt 48.1 kg (106 lb)   SpO2 97%   BMI 18.84 kg/m      GENERAL APPEARANCE: healthy, alert and no distress     EYES: EOMI, PERRL     HENT: ear canals and TM's normal and nose and mouth without ulcers or lesions     NECK: no adenopathy, no asymmetry, masses, or scars and thyroid normal to palpation     RESP: lungs clear to auscultation - no rales, rhonchi or wheezes     BREAST: Left-s/p mastectomy and radiation with associated skin chnages     CV: regular rates and rhythm, normal S1 S2, no S3 or S4 and no murmur, click or rub     ABDOMEN:  soft, nontender, no HSM or masses and bowel sounds normal     MS: extremities normal- no gross deformities noted, no evidence of inflammation in joints, FROM in all extremities.     SKIN: Port in place right upper chest     NEURO: mentation intact and speech normal     PSYCH: mentation appears normal. and affect normal/bright    DIAGNOSTICS:     Labs Resulted Today:   Results for orders placed or performed in visit on 03/15/19   CBC with Diff/Plt ([facility])   Result Value Ref Range    WBC x10/cmm 7.6 3.8 - 11.0 x10/cmm    % Lymphocytes 19.9 (A) 20.5 - 51.1 %    % Monocytes 6.1 1.7 - 9.3 %    % Granulocytes 74.0 42.2 - 75.2 %    RBC x10/cmm 4.42 3.7 - 5.2 x10/cmm    Hemoglobin 12.9 11.8 - 15.5 g/dl     Hematocrit 40.3 35 - 46 %    MCV 91.2 80 - 100 fL    MCH 29.1 27.0 - 31.0 pg    MCHC 31.9 (A) 33.0 - 37.0 g/dL    Platelet Count 271 140 - 450 K/uL     Labs Drawn and in Process:   Unresulted Labs Ordered in the Past 30 Days of this Admission     No orders found from 1/14/2019 to 3/16/2019.          Recent Labs   Lab Test 12/28/18  1320 10/19/18  1325  03/21/18  1715  04/13/17  0830  05/01/12  1050   HGB 12.0 12.0   < >  --    < >  --    < > 14.6    296   < >  --    < >  --    < >  --    INR  --   --   --   --   --   --   --  0.83*    140   < > 139  --   --    < >  --    POTASSIUM 3.9 3.9   < > 4.8  --   --    < > 3.8   CR 0.79 0.64   < > 0.88  --   --    < >  --    A1C  --   --   --  5.8*  --  5.7*  --   --     < > = values in this interval not displayed.        IMPRESSION:   Reason for surgery/procedure:  Is now going to have left port removed and expander placed.   Diagnosis/reason for consult: pre op    The proposed surgical procedure is considered INTERMEDIATE risk.    REVISED CARDIAC RISK INDEX  The patient has the following serious cardiovascular risks for perioperative complications such as (MI, PE, VFib and 3  AV Block):  No serious cardiac risks  INTERPRETATION: 0 risks: Class I (very low risk - 0.4% complication rate)    The patient has the following additional risks for perioperative complications:  No identified additional risks      ICD-10-CM    1. Preop general physical exam Z01.818 CBC with Diff/Plt ([facility])   2. Malignant neoplasm of upper-inner quadrant of left breast in female, estrogen receptor positive (H) C50.212     Z17.0    3. S/P left mastectomy Z90.12    4. Hypertension goal BP (blood pressure) < 140/90 I10    5. Smoker F17.200        RECOMMENDATIONS:     --Consult hospital rounder / IM to assist post-op medical management  (Z01.818) Preop general physical exam  (primary encounter diagnosis)  Comment: medically stable on exam  Plan: CBC with Diff/Plt ([facility])            (C50.212,   Z17.0) Malignant neoplasm of upper-inner quadrant of left breast in female, estrogen receptor positive (H)  Comment:   Plan: followed by oncology    (Z90.12) S/P left mastectomy  Comment:   Plan: followed by oncology    (I10) Hypertension goal BP (blood pressure) < 140/90  Comment:   Plan: controlled    (F17.200) Smoker  Comment:   Plan: advised to quit    --Patient is to take all scheduled medications on the day of surgery EXCEPT for modifications listed below.    APPROVAL GIVEN to proceed with proposed procedure, without further diagnostic evaluation       Signed Electronically by: CAROLYN Kenney CNP    Copy of this evaluation report is provided to requesting physician.    [place] Preop Guidelines    Revised Cardiac Risk Index

## 2019-03-22 ENCOUNTER — ANESTHESIA EVENT (OUTPATIENT)
Dept: SURGERY | Facility: CLINIC | Age: 59
End: 2019-03-22
Payer: COMMERCIAL

## 2019-03-22 ENCOUNTER — HOSPITAL ENCOUNTER (INPATIENT)
Facility: CLINIC | Age: 59
LOS: 1 days | Discharge: HOME | End: 2019-03-23
Attending: PLASTIC SURGERY | Admitting: PLASTIC SURGERY
Payer: COMMERCIAL

## 2019-03-22 ENCOUNTER — ANESTHESIA (OUTPATIENT)
Dept: SURGERY | Facility: CLINIC | Age: 59
End: 2019-03-22
Payer: COMMERCIAL

## 2019-03-22 DIAGNOSIS — Z90.12 ACQUIRED ABSENCE OF LEFT BREAST: Primary | ICD-10-CM

## 2019-03-22 LAB
CREAT SERPL-MCNC: 0.84 MG/DL (ref 0.52–1.04)
GFR SERPL CREATININE-BSD FRML MDRD: 77 ML/MIN/{1.73_M2}
POTASSIUM SERPL-SCNC: 4.4 MMOL/L (ref 3.4–5.3)

## 2019-03-22 PROCEDURE — 25000128 H RX IP 250 OP 636: Performed by: PLASTIC SURGERY

## 2019-03-22 PROCEDURE — 25000132 ZZH RX MED GY IP 250 OP 250 PS 637: Performed by: PLASTIC SURGERY

## 2019-03-22 PROCEDURE — 25800030 ZZH RX IP 258 OP 636: Performed by: PLASTIC SURGERY

## 2019-03-22 PROCEDURE — 02PY03Z REMOVAL OF INFUSION DEVICE FROM GREAT VESSEL, OPEN APPROACH: ICD-10-PCS | Performed by: PLASTIC SURGERY

## 2019-03-22 PROCEDURE — 25000566 ZZH SEVOFLURANE, EA 15 MIN: Performed by: PLASTIC SURGERY

## 2019-03-22 PROCEDURE — 40000170 ZZH STATISTIC PRE-PROCEDURE ASSESSMENT II: Performed by: PLASTIC SURGERY

## 2019-03-22 PROCEDURE — 25800030 ZZH RX IP 258 OP 636: Performed by: ANESTHESIOLOGY

## 2019-03-22 PROCEDURE — 25000125 ZZHC RX 250: Performed by: ANESTHESIOLOGY

## 2019-03-22 PROCEDURE — 0KXG0Z5 TRANSFER LEFT TRUNK MUSCLE, LATISSIMUS DORSI MYOCUTANEOUS FLAP, OPEN APPROACH: ICD-10-PCS | Performed by: PLASTIC SURGERY

## 2019-03-22 PROCEDURE — 36000069 ZZH SURGERY LEVEL 5 EA 15 ADDTL MIN: Performed by: PLASTIC SURGERY

## 2019-03-22 PROCEDURE — 27810323 ZZHC OR TISSUE EXPANDER OPNP: Performed by: PLASTIC SURGERY

## 2019-03-22 PROCEDURE — 82565 ASSAY OF CREATININE: CPT | Performed by: ANESTHESIOLOGY

## 2019-03-22 PROCEDURE — 37000009 ZZH ANESTHESIA TECHNICAL FEE, EACH ADDTL 15 MIN: Performed by: PLASTIC SURGERY

## 2019-03-22 PROCEDURE — 0HHU0NZ INSERTION OF TISSUE EXPANDER INTO LEFT BREAST, OPEN APPROACH: ICD-10-PCS | Performed by: PLASTIC SURGERY

## 2019-03-22 PROCEDURE — 27810169 ZZH OR IMPLANT GENERAL: Performed by: PLASTIC SURGERY

## 2019-03-22 PROCEDURE — 37000008 ZZH ANESTHESIA TECHNICAL FEE, 1ST 30 MIN: Performed by: PLASTIC SURGERY

## 2019-03-22 PROCEDURE — 71000013 ZZH RECOVERY PHASE 1 LEVEL 1 EA ADDTL HR: Performed by: PLASTIC SURGERY

## 2019-03-22 PROCEDURE — 36000067 ZZH SURGERY LEVEL 5 1ST 30 MIN: Performed by: PLASTIC SURGERY

## 2019-03-22 PROCEDURE — 25000128 H RX IP 250 OP 636: Performed by: NURSE ANESTHETIST, CERTIFIED REGISTERED

## 2019-03-22 PROCEDURE — 71000012 ZZH RECOVERY PHASE 1 LEVEL 1 FIRST HR: Performed by: PLASTIC SURGERY

## 2019-03-22 PROCEDURE — 27210794 ZZH OR GENERAL SUPPLY STERILE: Performed by: PLASTIC SURGERY

## 2019-03-22 PROCEDURE — 84132 ASSAY OF SERUM POTASSIUM: CPT | Performed by: ANESTHESIOLOGY

## 2019-03-22 PROCEDURE — 12000000 ZZH R&B MED SURG/OB

## 2019-03-22 PROCEDURE — 25000128 H RX IP 250 OP 636: Performed by: ANESTHESIOLOGY

## 2019-03-22 PROCEDURE — 25800030 ZZH RX IP 258 OP 636: Performed by: NURSE ANESTHETIST, CERTIFIED REGISTERED

## 2019-03-22 PROCEDURE — 25000125 ZZHC RX 250: Performed by: NURSE ANESTHETIST, CERTIFIED REGISTERED

## 2019-03-22 PROCEDURE — 36415 COLL VENOUS BLD VENIPUNCTURE: CPT | Performed by: ANESTHESIOLOGY

## 2019-03-22 PROCEDURE — 0JPT3XZ REMOVAL OF TUNNELED VASCULAR ACCESS DEVICE FROM TRUNK SUBCUTANEOUS TISSUE AND FASCIA, PERCUTANEOUS APPROACH: ICD-10-PCS | Performed by: PLASTIC SURGERY

## 2019-03-22 RX ORDER — MORPHINE SULFATE 2 MG/ML
2-4 INJECTION, SOLUTION INTRAMUSCULAR; INTRAVENOUS
Status: DISCONTINUED | OUTPATIENT
Start: 2019-03-22 | End: 2019-03-23 | Stop reason: HOSPADM

## 2019-03-22 RX ORDER — HYDROMORPHONE HYDROCHLORIDE 2 MG/1
2-4 TABLET ORAL
Status: DISCONTINUED | OUTPATIENT
Start: 2019-03-22 | End: 2019-03-23 | Stop reason: HOSPADM

## 2019-03-22 RX ORDER — ONDANSETRON 4 MG/1
4 TABLET, ORALLY DISINTEGRATING ORAL EVERY 6 HOURS PRN
Status: DISCONTINUED | OUTPATIENT
Start: 2019-03-22 | End: 2019-03-23 | Stop reason: HOSPADM

## 2019-03-22 RX ORDER — NALOXONE HYDROCHLORIDE 0.4 MG/ML
.1-.4 INJECTION, SOLUTION INTRAMUSCULAR; INTRAVENOUS; SUBCUTANEOUS
Status: DISCONTINUED | OUTPATIENT
Start: 2019-03-22 | End: 2019-03-23 | Stop reason: HOSPADM

## 2019-03-22 RX ORDER — ONDANSETRON 4 MG/1
4 TABLET, ORALLY DISINTEGRATING ORAL EVERY 30 MIN PRN
Status: DISCONTINUED | OUTPATIENT
Start: 2019-03-22 | End: 2019-03-22 | Stop reason: HOSPADM

## 2019-03-22 RX ORDER — CEFAZOLIN SODIUM 2 G/100ML
2 INJECTION, SOLUTION INTRAVENOUS
Status: COMPLETED | OUTPATIENT
Start: 2019-03-22 | End: 2019-03-22

## 2019-03-22 RX ORDER — IPRATROPIUM BROMIDE AND ALBUTEROL SULFATE 2.5; .5 MG/3ML; MG/3ML
3 SOLUTION RESPIRATORY (INHALATION) ONCE
Status: COMPLETED | OUTPATIENT
Start: 2019-03-22 | End: 2019-03-22

## 2019-03-22 RX ORDER — DEXAMETHASONE SODIUM PHOSPHATE 4 MG/ML
INJECTION, SOLUTION INTRA-ARTICULAR; INTRALESIONAL; INTRAMUSCULAR; INTRAVENOUS; SOFT TISSUE PRN
Status: DISCONTINUED | OUTPATIENT
Start: 2019-03-22 | End: 2019-03-22

## 2019-03-22 RX ORDER — KETAMINE HYDROCHLORIDE 10 MG/ML
INJECTION, SOLUTION INTRAMUSCULAR; INTRAVENOUS PRN
Status: DISCONTINUED | OUTPATIENT
Start: 2019-03-22 | End: 2019-03-22

## 2019-03-22 RX ORDER — SODIUM CHLORIDE, SODIUM LACTATE, POTASSIUM CHLORIDE, CALCIUM CHLORIDE 600; 310; 30; 20 MG/100ML; MG/100ML; MG/100ML; MG/100ML
INJECTION, SOLUTION INTRAVENOUS CONTINUOUS
Status: DISCONTINUED | OUTPATIENT
Start: 2019-03-22 | End: 2019-03-23 | Stop reason: HOSPADM

## 2019-03-22 RX ORDER — LORAZEPAM 2 MG/ML
.5-1 INJECTION INTRAMUSCULAR
Status: DISCONTINUED | OUTPATIENT
Start: 2019-03-22 | End: 2019-03-22 | Stop reason: HOSPADM

## 2019-03-22 RX ORDER — FENTANYL CITRATE 50 UG/ML
25-50 INJECTION, SOLUTION INTRAMUSCULAR; INTRAVENOUS EVERY 5 MIN PRN
Status: DISCONTINUED | OUTPATIENT
Start: 2019-03-22 | End: 2019-03-22 | Stop reason: HOSPADM

## 2019-03-22 RX ORDER — ACETAMINOPHEN 500 MG
1000 TABLET ORAL ONCE
Status: COMPLETED | OUTPATIENT
Start: 2019-03-22 | End: 2019-03-22

## 2019-03-22 RX ORDER — CEFAZOLIN SODIUM 1 G/3ML
1 INJECTION, POWDER, FOR SOLUTION INTRAMUSCULAR; INTRAVENOUS EVERY 8 HOURS
Status: COMPLETED | OUTPATIENT
Start: 2019-03-22 | End: 2019-03-23

## 2019-03-22 RX ORDER — HYDROXYZINE HYDROCHLORIDE 25 MG/1
25 TABLET, FILM COATED ORAL EVERY 6 HOURS PRN
Status: DISCONTINUED | OUTPATIENT
Start: 2019-03-22 | End: 2019-03-23 | Stop reason: HOSPADM

## 2019-03-22 RX ORDER — ZOLPIDEM TARTRATE 5 MG/1
5 TABLET ORAL
Status: DISCONTINUED | OUTPATIENT
Start: 2019-03-23 | End: 2019-03-23 | Stop reason: HOSPADM

## 2019-03-22 RX ORDER — NEOSTIGMINE METHYLSULFATE 1 MG/ML
VIAL (ML) INJECTION PRN
Status: DISCONTINUED | OUTPATIENT
Start: 2019-03-22 | End: 2019-03-22

## 2019-03-22 RX ORDER — PROCHLORPERAZINE MALEATE 5 MG
10 TABLET ORAL EVERY 6 HOURS PRN
Status: DISCONTINUED | OUTPATIENT
Start: 2019-03-22 | End: 2019-03-23 | Stop reason: HOSPADM

## 2019-03-22 RX ORDER — ONDANSETRON 2 MG/ML
INJECTION INTRAMUSCULAR; INTRAVENOUS PRN
Status: DISCONTINUED | OUTPATIENT
Start: 2019-03-22 | End: 2019-03-22

## 2019-03-22 RX ORDER — MEPERIDINE HYDROCHLORIDE 25 MG/ML
12.5 INJECTION INTRAMUSCULAR; INTRAVENOUS; SUBCUTANEOUS
Status: DISCONTINUED | OUTPATIENT
Start: 2019-03-22 | End: 2019-03-22 | Stop reason: HOSPADM

## 2019-03-22 RX ORDER — ATENOLOL 25 MG/1
25 TABLET ORAL DAILY
Status: DISCONTINUED | OUTPATIENT
Start: 2019-03-23 | End: 2019-03-23 | Stop reason: HOSPADM

## 2019-03-22 RX ORDER — GLYCOPYRROLATE 0.2 MG/ML
INJECTION, SOLUTION INTRAMUSCULAR; INTRAVENOUS PRN
Status: DISCONTINUED | OUTPATIENT
Start: 2019-03-22 | End: 2019-03-22

## 2019-03-22 RX ORDER — AMOXICILLIN 250 MG
2 CAPSULE ORAL 2 TIMES DAILY
Status: DISCONTINUED | OUTPATIENT
Start: 2019-03-22 | End: 2019-03-23 | Stop reason: HOSPADM

## 2019-03-22 RX ORDER — ONDANSETRON 2 MG/ML
4 INJECTION INTRAMUSCULAR; INTRAVENOUS EVERY 6 HOURS PRN
Status: DISCONTINUED | OUTPATIENT
Start: 2019-03-22 | End: 2019-03-23 | Stop reason: HOSPADM

## 2019-03-22 RX ORDER — CARISOPRODOL 350 MG/1
350 TABLET ORAL 3 TIMES DAILY
Status: DISCONTINUED | OUTPATIENT
Start: 2019-03-22 | End: 2019-03-23 | Stop reason: HOSPADM

## 2019-03-22 RX ORDER — SCOLOPAMINE TRANSDERMAL SYSTEM 1 MG/1
1 PATCH, EXTENDED RELEASE TRANSDERMAL
Status: DISCONTINUED | OUTPATIENT
Start: 2019-03-22 | End: 2019-03-22 | Stop reason: HOSPADM

## 2019-03-22 RX ORDER — PROPOFOL 10 MG/ML
INJECTION, EMULSION INTRAVENOUS PRN
Status: DISCONTINUED | OUTPATIENT
Start: 2019-03-22 | End: 2019-03-22

## 2019-03-22 RX ORDER — DEXAMETHASONE SODIUM PHOSPHATE 4 MG/ML
4 INJECTION, SOLUTION INTRA-ARTICULAR; INTRALESIONAL; INTRAMUSCULAR; INTRAVENOUS; SOFT TISSUE EVERY 10 MIN PRN
Status: DISCONTINUED | OUTPATIENT
Start: 2019-03-22 | End: 2019-03-22 | Stop reason: HOSPADM

## 2019-03-22 RX ORDER — BUPIVACAINE HYDROCHLORIDE 2.5 MG/ML
INJECTION, SOLUTION EPIDURAL; INFILTRATION; INTRACAUDAL
Status: DISCONTINUED
Start: 2019-03-22 | End: 2019-03-22 | Stop reason: HOSPADM

## 2019-03-22 RX ORDER — ACETAMINOPHEN 325 MG/1
650 TABLET ORAL EVERY 4 HOURS PRN
Status: DISCONTINUED | OUTPATIENT
Start: 2019-03-25 | End: 2019-03-23 | Stop reason: HOSPADM

## 2019-03-22 RX ORDER — SODIUM CHLORIDE, SODIUM LACTATE, POTASSIUM CHLORIDE, CALCIUM CHLORIDE 600; 310; 30; 20 MG/100ML; MG/100ML; MG/100ML; MG/100ML
INJECTION, SOLUTION INTRAVENOUS CONTINUOUS
Status: DISCONTINUED | OUTPATIENT
Start: 2019-03-22 | End: 2019-03-22 | Stop reason: HOSPADM

## 2019-03-22 RX ORDER — FENTANYL CITRATE 50 UG/ML
INJECTION, SOLUTION INTRAMUSCULAR; INTRAVENOUS PRN
Status: DISCONTINUED | OUTPATIENT
Start: 2019-03-22 | End: 2019-03-22

## 2019-03-22 RX ORDER — ALBUTEROL SULFATE 0.83 MG/ML
2.5 SOLUTION RESPIRATORY (INHALATION)
Status: DISCONTINUED | OUTPATIENT
Start: 2019-03-22 | End: 2019-03-22 | Stop reason: HOSPADM

## 2019-03-22 RX ORDER — NALOXONE HYDROCHLORIDE 0.4 MG/ML
.1-.4 INJECTION, SOLUTION INTRAMUSCULAR; INTRAVENOUS; SUBCUTANEOUS
Status: DISCONTINUED | OUTPATIENT
Start: 2019-03-22 | End: 2019-03-22 | Stop reason: HOSPADM

## 2019-03-22 RX ORDER — METHOCARBAMOL 750 MG/1
750 TABLET, FILM COATED ORAL 4 TIMES DAILY PRN
Status: DISCONTINUED | OUTPATIENT
Start: 2019-03-22 | End: 2019-03-22

## 2019-03-22 RX ORDER — EPHEDRINE SULFATE 50 MG/ML
INJECTION, SOLUTION INTRAMUSCULAR; INTRAVENOUS; SUBCUTANEOUS PRN
Status: DISCONTINUED | OUTPATIENT
Start: 2019-03-22 | End: 2019-03-22

## 2019-03-22 RX ORDER — BUPIVACAINE HYDROCHLORIDE 2.5 MG/ML
INJECTION, SOLUTION INFILTRATION; PERINEURAL PRN
Status: DISCONTINUED | OUTPATIENT
Start: 2019-03-22 | End: 2019-03-22 | Stop reason: HOSPADM

## 2019-03-22 RX ORDER — DIMENHYDRINATE 50 MG/ML
12.5 INJECTION, SOLUTION INTRAMUSCULAR; INTRAVENOUS
Status: DISCONTINUED | OUTPATIENT
Start: 2019-03-22 | End: 2019-03-22 | Stop reason: HOSPADM

## 2019-03-22 RX ORDER — PROPOFOL 10 MG/ML
INJECTION, EMULSION INTRAVENOUS CONTINUOUS PRN
Status: DISCONTINUED | OUTPATIENT
Start: 2019-03-22 | End: 2019-03-22

## 2019-03-22 RX ORDER — CARISOPRODOL 350 MG/1
350 TABLET ORAL 3 TIMES DAILY
COMMUNITY
End: 2021-02-03

## 2019-03-22 RX ORDER — CEFAZOLIN SODIUM 1 G/3ML
1 INJECTION, POWDER, FOR SOLUTION INTRAMUSCULAR; INTRAVENOUS SEE ADMIN INSTRUCTIONS
Status: DISCONTINUED | OUTPATIENT
Start: 2019-03-22 | End: 2019-03-22 | Stop reason: HOSPADM

## 2019-03-22 RX ORDER — AMOXICILLIN 250 MG
1 CAPSULE ORAL 2 TIMES DAILY
Status: DISCONTINUED | OUTPATIENT
Start: 2019-03-22 | End: 2019-03-23 | Stop reason: HOSPADM

## 2019-03-22 RX ORDER — ACETAMINOPHEN 325 MG/1
975 TABLET ORAL EVERY 8 HOURS
Status: DISCONTINUED | OUTPATIENT
Start: 2019-03-22 | End: 2019-03-23 | Stop reason: HOSPADM

## 2019-03-22 RX ORDER — LIDOCAINE 40 MG/G
CREAM TOPICAL
Status: DISCONTINUED | OUTPATIENT
Start: 2019-03-22 | End: 2019-03-23 | Stop reason: HOSPADM

## 2019-03-22 RX ORDER — HYDROMORPHONE HYDROCHLORIDE 1 MG/ML
.3-.5 INJECTION, SOLUTION INTRAMUSCULAR; INTRAVENOUS; SUBCUTANEOUS EVERY 10 MIN PRN
Status: DISCONTINUED | OUTPATIENT
Start: 2019-03-22 | End: 2019-03-22 | Stop reason: HOSPADM

## 2019-03-22 RX ORDER — LIDOCAINE HYDROCHLORIDE 20 MG/ML
INJECTION, SOLUTION INFILTRATION; PERINEURAL PRN
Status: DISCONTINUED | OUTPATIENT
Start: 2019-03-22 | End: 2019-03-22

## 2019-03-22 RX ORDER — ONDANSETRON 2 MG/ML
4 INJECTION INTRAMUSCULAR; INTRAVENOUS EVERY 30 MIN PRN
Status: DISCONTINUED | OUTPATIENT
Start: 2019-03-22 | End: 2019-03-22 | Stop reason: HOSPADM

## 2019-03-22 RX ORDER — METHOCARBAMOL 750 MG/1
750 TABLET, FILM COATED ORAL EVERY 6 HOURS PRN
Status: DISCONTINUED | OUTPATIENT
Start: 2019-03-22 | End: 2019-03-23 | Stop reason: HOSPADM

## 2019-03-22 RX ADMIN — ACETAMINOPHEN 975 MG: 325 TABLET, FILM COATED ORAL at 21:36

## 2019-03-22 RX ADMIN — PROPOFOL 180 MG: 10 INJECTION, EMULSION INTRAVENOUS at 10:17

## 2019-03-22 RX ADMIN — GLYCOPYRROLATE 0.4 MG: 0.2 INJECTION, SOLUTION INTRAMUSCULAR; INTRAVENOUS at 12:09

## 2019-03-22 RX ADMIN — LIDOCAINE HYDROCHLORIDE 100 MG: 20 INJECTION, SOLUTION INFILTRATION; PERINEURAL at 10:17

## 2019-03-22 RX ADMIN — ACETAMINOPHEN 975 MG: 325 TABLET, FILM COATED ORAL at 15:01

## 2019-03-22 RX ADMIN — PROPOFOL 25 MCG/KG/MIN: 10 INJECTION, EMULSION INTRAVENOUS at 10:31

## 2019-03-22 RX ADMIN — MORPHINE SULFATE 4 MG: 2 INJECTION, SOLUTION INTRAMUSCULAR; INTRAVENOUS at 15:01

## 2019-03-22 RX ADMIN — FENTANYL CITRATE 50 MCG: 50 INJECTION, SOLUTION INTRAMUSCULAR; INTRAVENOUS at 10:40

## 2019-03-22 RX ADMIN — ACETAMINOPHEN 1000 MG: 500 TABLET, FILM COATED ORAL at 08:55

## 2019-03-22 RX ADMIN — SODIUM CHLORIDE, POTASSIUM CHLORIDE, SODIUM LACTATE AND CALCIUM CHLORIDE: 600; 310; 30; 20 INJECTION, SOLUTION INTRAVENOUS at 11:08

## 2019-03-22 RX ADMIN — ROCURONIUM BROMIDE 50 MG: 10 INJECTION INTRAVENOUS at 10:17

## 2019-03-22 RX ADMIN — PHENYLEPHRINE HYDROCHLORIDE 100 MCG: 10 INJECTION, SOLUTION INTRAMUSCULAR; INTRAVENOUS; SUBCUTANEOUS at 10:21

## 2019-03-22 RX ADMIN — FENTANYL CITRATE 50 MCG: 50 INJECTION, SOLUTION INTRAMUSCULAR; INTRAVENOUS at 10:17

## 2019-03-22 RX ADMIN — HYDROMORPHONE HYDROCHLORIDE 2 MG: 2 TABLET ORAL at 16:34

## 2019-03-22 RX ADMIN — MIDAZOLAM 2 MG: 1 INJECTION INTRAMUSCULAR; INTRAVENOUS at 10:09

## 2019-03-22 RX ADMIN — HYDROMORPHONE HYDROCHLORIDE 0.5 MG: 1 INJECTION, SOLUTION INTRAMUSCULAR; INTRAVENOUS; SUBCUTANEOUS at 13:00

## 2019-03-22 RX ADMIN — LIDOCAINE HYDROCHLORIDE 0.3 ML: 10 INJECTION, SOLUTION EPIDURAL; INFILTRATION; INTRACAUDAL; PERINEURAL at 09:10

## 2019-03-22 RX ADMIN — IPRATROPIUM BROMIDE AND ALBUTEROL SULFATE 3 ML: .5; 2.5 SOLUTION RESPIRATORY (INHALATION) at 09:15

## 2019-03-22 RX ADMIN — Medication 10 MG: at 10:21

## 2019-03-22 RX ADMIN — SENNOSIDES AND DOCUSATE SODIUM 1 TABLET: 8.6; 5 TABLET ORAL at 21:36

## 2019-03-22 RX ADMIN — Medication 10 MG: at 10:39

## 2019-03-22 RX ADMIN — CARISOPRODOL 350 MG: 350 TABLET ORAL at 21:36

## 2019-03-22 RX ADMIN — ROCURONIUM BROMIDE 20 MG: 10 INJECTION INTRAVENOUS at 10:54

## 2019-03-22 RX ADMIN — FENTANYL CITRATE 50 MCG: 50 INJECTION, SOLUTION INTRAMUSCULAR; INTRAVENOUS at 11:01

## 2019-03-22 RX ADMIN — SODIUM CHLORIDE, POTASSIUM CHLORIDE, SODIUM LACTATE AND CALCIUM CHLORIDE: 600; 310; 30; 20 INJECTION, SOLUTION INTRAVENOUS at 14:28

## 2019-03-22 RX ADMIN — CEFAZOLIN SODIUM 2 G: 2 INJECTION, SOLUTION INTRAVENOUS at 10:24

## 2019-03-22 RX ADMIN — CARISOPRODOL 350 MG: 350 TABLET ORAL at 16:34

## 2019-03-22 RX ADMIN — SCOLOPAMINE TRANSDERMAL SYSTEM 1 PATCH: 1 PATCH, EXTENDED RELEASE TRANSDERMAL at 09:15

## 2019-03-22 RX ADMIN — CEFAZOLIN 1 G: 1 INJECTION, POWDER, FOR SOLUTION INTRAMUSCULAR; INTRAVENOUS at 18:15

## 2019-03-22 RX ADMIN — HYDROMORPHONE HYDROCHLORIDE 2 MG: 2 TABLET ORAL at 15:48

## 2019-03-22 RX ADMIN — HYDROMORPHONE HYDROCHLORIDE 4 MG: 2 TABLET ORAL at 19:25

## 2019-03-22 RX ADMIN — DEXAMETHASONE SODIUM PHOSPHATE 4 MG: 4 INJECTION, SOLUTION INTRA-ARTICULAR; INTRALESIONAL; INTRAMUSCULAR; INTRAVENOUS; SOFT TISSUE at 10:17

## 2019-03-22 RX ADMIN — Medication 10 MG: at 11:53

## 2019-03-22 RX ADMIN — Medication 5 MG: at 10:55

## 2019-03-22 RX ADMIN — SODIUM CHLORIDE, POTASSIUM CHLORIDE, SODIUM LACTATE AND CALCIUM CHLORIDE: 600; 310; 30; 20 INJECTION, SOLUTION INTRAVENOUS at 09:15

## 2019-03-22 RX ADMIN — DEXMEDETOMIDINE HYDROCHLORIDE 8 MCG: 100 INJECTION, SOLUTION INTRAVENOUS at 10:40

## 2019-03-22 RX ADMIN — FENTANYL CITRATE 50 MCG: 50 INJECTION, SOLUTION INTRAMUSCULAR; INTRAVENOUS at 10:11

## 2019-03-22 RX ADMIN — NEOSTIGMINE METHYLSULFATE 2.5 MG: 1 INJECTION, SOLUTION INTRAVENOUS at 12:09

## 2019-03-22 RX ADMIN — ONDANSETRON 4 MG: 2 INJECTION INTRAMUSCULAR; INTRAVENOUS at 12:03

## 2019-03-22 ASSESSMENT — MIFFLIN-ST. JEOR: SCORE: 1016.57

## 2019-03-22 ASSESSMENT — ACTIVITIES OF DAILY LIVING (ADL)
DRESS: 0-->INDEPENDENT
ADLS_ACUITY_SCORE: 11
RETIRED_COMMUNICATION: 0-->UNDERSTANDS/COMMUNICATES WITHOUT DIFFICULTY
ADLS_ACUITY_SCORE: 11
TRANSFERRING: 0-->INDEPENDENT
COGNITION: 0 - NO COGNITION ISSUES REPORTED
TOILETING: 0-->INDEPENDENT
FALL_HISTORY_WITHIN_LAST_SIX_MONTHS: NO
SWALLOWING: 0-->SWALLOWS FOODS/LIQUIDS WITHOUT DIFFICULTY
RETIRED_EATING: 0-->INDEPENDENT
AMBULATION: 0-->INDEPENDENT
BATHING: 0-->INDEPENDENT

## 2019-03-22 ASSESSMENT — ENCOUNTER SYMPTOMS
SEIZURES: 0
DYSRHYTHMIAS: 0

## 2019-03-22 ASSESSMENT — COPD QUESTIONNAIRES: COPD: 0

## 2019-03-22 ASSESSMENT — LIFESTYLE VARIABLES: TOBACCO_USE: 1

## 2019-03-22 NOTE — ANESTHESIA CARE TRANSFER NOTE
Patient: Muriel Diaz    Procedure(s):  LEFT LATISSIMUS FLAP RECONSTRUCT WITH TISSUE EXPANDER AND PORT REMOVAL  INSERT TISSUE EXPANDER BREAST  REMOVE PORT VASCULAR ACCESS    Diagnosis: STATUS POST MASTECTOMY  Diagnosis Additional Information: No value filed.    Anesthesia Type:   General, ETT     Note:  Airway :Face Mask  Patient transferred to:PACU  Handoff Report: Identifed the Patient, Identified the Reponsible Provider, Reviewed the pertinent medical history, Discussed the surgical course, Reviewed Intra-OP anesthesia mangement and issues during anesthesia, Set expectations for post-procedure period and Allowed opportunity for questions and acknowledgement of understanding      Vitals: (Last set prior to Anesthesia Care Transfer)    CRNA VITALS  3/22/2019 1206 - 3/22/2019 1236      3/22/2019             Resp Rate (set):  10                Electronically Signed By: CAROLYN Pearson CRNA  March 22, 2019  12:36 PM

## 2019-03-22 NOTE — PROGRESS NOTES
Admission medication history interview status for the 3/22/2019  admission is complete. See EPIC admission navigator for prior to admission medications     Medication history source reliability:Good    Medication history interview source(s):Patient    Medication history resources (including written lists, pill bottles, clinic record):None    Primary pharmacy[facility]    Additional medication history information not noted on PTA med list :None    Time spent in this activity: 40 minutes    Prior to Admission medications    Medication Sig Last Dose Taking? Auth Provider   anastrozole (ARIMIDEX) 1 MG tablet Take 1 tablet (1 mg) by mouth daily  Patient taking differently: Take 1 mg by mouth At Bedtime  3/21/2019 at HS Yes Patience Mckeon MD   atenolol (TENORMIN) 25 MG tablet Take 1 tablet (25 mg) by mouth daily 3/22/2019 at 0730 Yes Isabel Landin MD   carisoprodol (SOMA) 350 MG tablet Take 350 mg by mouth 3 times daily 3/21/2019 at PM Yes Reported, Patient   vitamin D3 (CHOLECALCIFEROL) 1000 units (25 mcg) tablet Take 2 tablets (2,000 Units) by mouth daily 3/21/2019 at AM Yes Isabel Landin MD

## 2019-03-22 NOTE — ANESTHESIA POSTPROCEDURE EVALUATION
Patient: Muriel Diaz    Procedure(s):  LEFT LATISSIMUS FLAP RECONSTRUCT WITH TISSUE EXPANDER AND PORT REMOVAL  INSERT TISSUE EXPANDER BREAST  REMOVE PORT VASCULAR ACCESS    Diagnosis:STATUS POST MASTECTOMY  Diagnosis Additional Information: No value filed.    Anesthesia Type:  General, ETT    Note:  Anesthesia Post Evaluation    Patient location during evaluation: Bedside  Patient participation: Able to fully participate in evaluation  Level of consciousness: awake and alert  Pain management: adequate  Airway patency: patent  Cardiovascular status: acceptable  Respiratory status: acceptable  Hydration status: acceptable  PONV: none             Last vitals:  Vitals:    03/22/19 1310 03/22/19 1320 03/22/19 1330   BP: (!) 164/93 (!) 169/95    Pulse: (!) 44 (!) 46    Resp: 14 12 15   Temp:      SpO2: 94% 95%          Electronically Signed By: Diogenes Salas MD  March 22, 2019  1:46 PM

## 2019-03-22 NOTE — OP NOTE
PLASTIC SURGERY OPERATIVE NOTE  Patient Name:  Muriel Diaz     Date of Service:  3/22/2019       PREOPERATIVE DIAGNOSES:     1.  STATUS POST MASTECTOMY   2. Personal history of breast cancer    POSTOPERATIVE DIAGNOSES:     1.  STATUS POST MASTECTOMY  2. Personal history of breast cancer      SURGEON:  Naa Crook MD   OR STAFF:  Circulator: Alyssa Molina RN  Relief Scrub: Aurea Mcnair  Scrub Person: Dimas Delarosa     ANESTHESIA:  General     ESTIMATED BLOOD LOSS:  * No blood loss amount entered *     PROCEDURES:   1.  Placement of left tissue expander(s).   2.  Left latissimus muscle transfer to left chest.   3.  Port removal    DESCRIPTION OF PROCEDURE:  Muriel Diaz was marked for incisions and brought to the operating room.  General anesthesia was administered.  The chest area was prepped and draped in a sterile manner.       On the left side an incision was made through the mastectomy incision, and dissection was carried down to the underlying pectoralis muscle.  The mastectomy defect was recreated with electrocautery.  Hemostasis was achieved.  Laterally the anterior border of the latissimus muscle was identified.   The thoracodorsal nerve was not identified.  The incision was temporarily closed with staples. A tegaderm dressing was applied.    An incision was made over the port and dissection was carried down to the port.  The port was removed and the tunnel was ligated with interrupted 3-0 vicryl suture.  Hemostasis was achieved. The skin was closed with interrupted 4-0 monocryl in the subcutaneous tissue followed by a running 5-0 subcuticular monocryl suture in the skin.    The patient was turned to the right lateral decubitus position, and the chest and back area were prepped and draped in a sterile manner.  An incision was made along the back following the preoperative markings.  Dissection was carried down to the underlying latissimus muscle.  The skin island was delineated,  and the muscle was freed from overlying subcutaneous tissue with electrocautery.  The attachment of the muscle to the humerus was  divided with electrocautery.  The muscle was released from the chest wall using electrocautery.  Large perforators were ligated with Ligaclips.  The muscle and associated skin island were rotated anteriorly into the mastectomy defect.  Hemostasis was achieved.  A 15 Cambodian Jonah-Myers drain was placed and secured with a 2-0 nylon suture.  The incision was reapproximated with interrupted 2-0 PDS in the superficial fascia.  This layer was  tacked to the underlying chest wall.  The skin incision was closed with interrupted 3-0 Monocryl in the subcutaneous tissue followed by a running 4-0 subcuticular Monocryl suture.  A Tegaderm dressing was applied.     The patient was returned to the supine position.  The left chest was prepped and draped in a sterile manner.  The previously-placed staples were removed, and the muscle and associated skin island were retrieved.  The muscle was tacked to the mastectomy defect using interrupted 2-0 PDS suture.  A tissue expander was made ready for insertion and placed below the latissimus muscle.  The muscle was secured circumferentially around the tissue  expander using interrupted 2-0 PDS suture.   A 15 Cambodian Jonah-Myers drain was placed and secured with a 2-0 nylon suture.  The skin island was inset with interrupted 3-0 Monocryl in the subcutaneous tissue followed by a running 4-0 subcuticular Monocryl suture.  Xeroform followed by a light dressing were applied, and the patient was circumferentially wrapped with a double  4-inch Ace bandage.     Implant Name Type Inv. Item Serial No.  Lot No. LRB No. Used   TISSUE EXPANDER IMPLANT ALLERGAN [id number] Breast Implant/Tissue Expander TISSUE EXPANDER IMPLANT ALLERGAN [id number] [employer]  Left 1   CATH PORT POWERPORT CLEARVUE ISP [id number] Catheter CATH PORT POWERPORT  CLEARVUE ISP [id number]  [facility] [employer] [id number] N/A 1        FINDINGS:  The left tissue expander has been filled with 50 cc of saline.      Naa Crook MD  Plastic Surgery  [facility]  [phone] (office)

## 2019-03-22 NOTE — ANESTHESIA PREPROCEDURE EVALUATION
Anesthesia Pre-Procedure Evaluation    Patient: Muriel Diaz   MRN: [id number] : 1960          Preoperative Diagnosis: STATUS POST MASTECTOMY    Procedure(s):  LEFT LATISSIMUS FLAP RECONSTRUCT WITH TISSUE EXPANDER AND PORT REMOVAL  INSERT TISSUE EXPANDER BREAST  REMOVE PORT VASCULAR ACCESS    Past Medical History:   Diagnosis Date     Cancer (H)     BREAST CANCER     Degeneration of cervical intervertebral disc      Degeneration of lumbar or lumbosacral intervertebral disc      Hypertension      PONV (postoperative nausea and vomiting)      Past Surgical History:   Procedure Laterality Date     BACK SURGERY      lumbar fusion, cervical discectomy     DISSECT LYMPH NODE AXILLA Left 2018    Procedure: DISSECT LYMPH NODE AXILLA;;  Surgeon: Rodney Umana MD;  Location: [facility]     FUSION CERVICAL ANTERIOR THREE+ LEVELS  2012    Procedure:FUSION CERVICAL ANTERIOR THREE+ LEVELS; Surgeon:SARAH FRANCO; Location: OR     FUSION CERVICAL POSTERIOR THREE+ LEVELS  2012    Procedure:FUSION CERVICAL POSTERIOR THREE+ LEVELS; Posterior Cervical decompression and fusion C4-7, Anterior Cervical decompression and fusion C4-7 (c-arm), (herb table with abraham, yina oasis, yina aviator, Vitoss foam packing strip, impulse monitoring,); Surgeon:SARAH FRANCO; Location: OR     GYN SURGERY       HYSTERECTOMY VAGINAL      Judy Aceves OB Gyn specilist     HYSTERECTOMY, PAP NO LONGER INDICATED       INSERT PORT VASCULAR ACCESS N/A 2018    Procedure: INSERT PORT VASCULAR ACCESS;  PORT PLACEMENT;  Surgeon: Rodney Umana MD;  Location: [facility]     MASTECTOMY SIMPLE Left 2018    Procedure: MASTECTOMY SIMPLE;  LEFT SIMPLE MASTECTOMY,LEFT AXILLARY DISSECTION;  Surgeon: Rodney Umana MD;  Location: [facility]       Anesthesia Evaluation     . Pt has had prior anesthetic.     History of anesthetic complications   - PONV        ROS/MED  HX    ENT/Pulmonary:     (+)tobacco use, Current use , . .   (-) asthma and COPD   Neurologic:     (+)other neuro Cervical and lumbar spine DDD (s/p Cervical fusion 2012)   (-) seizures, CVA and TIA   Cardiovascular:     (+) hypertension----. : . . . :. . Previous cardiac testing Echodate:8/2018results:Interpretation Summary     The visual ejection fraction is estimated at 55-60%.  Global peak LV longitudinal strain is averaged at -19.7%. This is within  reported normal limits (normal <-18%).  The right ventricle is normal in size and function.  Sinus rhythm was noted.date: results:ECG reviewed date:4/2018 results:SB at 59 bpm date: results:         (-) CAD, CHF and arrhythmias   METS/Exercise Tolerance:     Hematologic:         Musculoskeletal:   (+) arthritis, , , -       GI/Hepatic:        (-) GERD and liver disease   Renal/Genitourinary:      (-) renal disease   Endo:      (-) Type I DM and Type II DM   Psychiatric:     (+) psychiatric history anxiety and other (comment) (Polysubstance abuse - patient denies any illicit drug use)      Infectious Disease:         Malignancy:   (+) Malignancy History of Breast  Breast CA status post Surgery and Chemo.         Other: Comment: Vertigo                         Physical Exam  Normal systems: dental    Airway   Mallampati: II  TM distance: >3 FB  Neck ROM: full    Dental     Cardiovascular   Rhythm and rate: regular      Pulmonary    breath sounds clear to auscultation            Lab Results   Component Value Date    WBC 7.6 03/15/2019    HGB 12.9 03/15/2019    HCT 40.3 03/15/2019     03/15/2019     12/28/2018    POTASSIUM 3.9 12/28/2018    CHLORIDE 110 (H) 12/28/2018    CO2 28 12/28/2018    BUN 20 12/28/2018    CR 0.79 12/28/2018    GLC 94 12/28/2018    HATTIE 8.8 12/28/2018    ALBUMIN 3.5 12/28/2018    PROTTOTAL 6.6 (L) 12/28/2018    ALT 19 12/28/2018    AST 16 12/28/2018    ALKPHOS 81 12/28/2018    BILITOTAL 0.2 12/28/2018    PTT 25 05/01/2012    INR 0.83  "(L) 05/01/2012       Preop Vitals  BP Readings from Last 3 Encounters:   03/15/19 124/82   02/08/19 129/78   02/08/19 129/78    Pulse Readings from Last 3 Encounters:   03/15/19 61   02/08/19 69   02/08/19 69      Resp Readings from Last 3 Encounters:   03/15/19 16   02/08/19 16   02/08/19 16    SpO2 Readings from Last 3 Encounters:   03/15/19 97%   02/08/19 98%   01/17/19 99%      Temp Readings from Last 1 Encounters:   02/08/19 36.8  C (98.3  F) (Oral)    Ht Readings from Last 1 Encounters:   02/08/19 1.598 m (5' 2.9\")      Wt Readings from Last 1 Encounters:   03/15/19 48.1 kg (106 lb)    Estimated body mass index is 18.84 kg/m  as calculated from the following:    Height as of 2/8/19: 1.598 m (5' 2.9\").    Weight as of 3/15/19: 48.1 kg (106 lb).       Anesthesia Plan      History & Physical Review  History and physical reviewed and following examination; no interval change.    ASA Status:  2 .    NPO Status:  > 8 hours    Plan for General and ETT with Intravenous and Propofol induction. Maintenance will be Balanced.    PONV prophylaxis:  Ondansetron (or other 5HT-3), Dexamethasone or Solumedrol and Scopolamine patch  Low dose propofol infusion for PONV prophylaxis (20-30 mcg/kg/min)        Postoperative Care  Postoperative pain management:  Multi-modal analgesia.      Consents  Anesthetic plan, risks, benefits and alternatives discussed with:  Patient..                 Diogenes Salas MD  "

## 2019-03-23 VITALS
BODY MASS INDEX: 18.38 KG/M2 | HEART RATE: 58 BPM | WEIGHT: 103.7 LBS | RESPIRATION RATE: 16 BRPM | HEIGHT: 63 IN | TEMPERATURE: 99.6 F | DIASTOLIC BLOOD PRESSURE: 66 MMHG | SYSTOLIC BLOOD PRESSURE: 117 MMHG | OXYGEN SATURATION: 94 %

## 2019-03-23 LAB — GLUCOSE BLDC GLUCOMTR-MCNC: 96 MG/DL (ref 70–99)

## 2019-03-23 PROCEDURE — 00000146 ZZHCL STATISTIC GLUCOSE BY METER IP

## 2019-03-23 PROCEDURE — 25000132 ZZH RX MED GY IP 250 OP 250 PS 637: Performed by: PLASTIC SURGERY

## 2019-03-23 PROCEDURE — 25000128 H RX IP 250 OP 636: Performed by: PLASTIC SURGERY

## 2019-03-23 RX ORDER — HYDROMORPHONE HYDROCHLORIDE 2 MG/1
2-4 TABLET ORAL
Qty: 20 TABLET | Refills: 0 | Status: SHIPPED | OUTPATIENT
Start: 2019-03-23 | End: 2020-01-29

## 2019-03-23 RX ADMIN — HYDROMORPHONE HYDROCHLORIDE 4 MG: 2 TABLET ORAL at 07:26

## 2019-03-23 RX ADMIN — ACETAMINOPHEN 975 MG: 325 TABLET, FILM COATED ORAL at 06:29

## 2019-03-23 RX ADMIN — ATENOLOL 25 MG: 25 TABLET ORAL at 09:13

## 2019-03-23 RX ADMIN — CARISOPRODOL 350 MG: 350 TABLET ORAL at 09:13

## 2019-03-23 RX ADMIN — HYDROMORPHONE HYDROCHLORIDE 4 MG: 2 TABLET ORAL at 03:58

## 2019-03-23 RX ADMIN — HYDROMORPHONE HYDROCHLORIDE 4 MG: 2 TABLET ORAL at 00:34

## 2019-03-23 RX ADMIN — CEFAZOLIN 1 G: 1 INJECTION, POWDER, FOR SOLUTION INTRAMUSCULAR; INTRAVENOUS at 03:10

## 2019-03-23 RX ADMIN — HYDROMORPHONE HYDROCHLORIDE 4 MG: 2 TABLET ORAL at 10:45

## 2019-03-23 ASSESSMENT — ACTIVITIES OF DAILY LIVING (ADL)
ADLS_ACUITY_SCORE: 11

## 2019-03-23 NOTE — PROGRESS NOTES
Plastic Surgery POD# 1    Patient feeling well, good pain control.    Flap healthy    Donor site fine    Plan: home today, f/u w Dr. Crook approx 1 week.  Fully instructed.

## 2019-03-23 NOTE — PLAN OF CARE
Arrived from PACU at 1430. Alert and oriented x4. Vital signs stable on room air, except bradycardic. Tolerating regular diet. Up independently. Lung sounds clear. Bowel sounds hypoactive, denies flatus. Adequate urine output. Old port site closed with steri strips, WDL. ACE wrap on, dressings CDI. 2 ADE's with bright red/bloody drainage, CDI. Pain managed with PRN morphine and dilaudid + scheduled meds. Denies nausea.

## 2019-03-23 NOTE — PLAN OF CARE
A/Ox4. Up independently. Regular diet tolerating well. BS hypoactive, no gas or BM yet. LS clear, AVSS. Voiding adequately. ACE wrap in place, CDI. ADE drains x2 with bright red bloody output, stripped Q8. Mild pain noted managed well with PRN oral dilaudid and scheduled Soma. Patient will discharge home this afternoon with family. Home care Education provided with verbalized understanding.

## 2019-03-23 NOTE — PLAN OF CARE
VSS on RA, ex bradycardic, A/O x4. Lung sounds clear, BS hypoactive, - flatus. Incisions CDI with ace wrap in place, old port site with steri strips. ADE x2 with bright/bloody drainage, patient empties them independently - stripped by patient (education reinforced). Pain was controlled with oral Dilaudid. Voiding adequately. Up independently. Regular diet.

## 2019-03-23 NOTE — DISCHARGE INSTRUCTIONS
Approx 9 days on Monday w/ Dr. Crook.  Call [phone].  No heavy lifting or strenuous activity.  10 lb lifting limit.  Wear sports bra or camisole.  Drain care as instructed.  May shower tomorrow.    .[facility]  Discharge Instructions: Post-Operative Breast Surgery    ACTIVITY    Take frequent short walks and increase your activity gradually.      Avoid strenuous physical activity or heavy lifting greater than 15-20 lbs. for 1-2 weeks with arm on the surgery side.  You may climb stairs.    Gentle rotation and stretching of your arms and shoulders will prevent joint stiffness.    You may drive without restrictions when you are not using any prescription pain medication and feel comfortable in a car.    You may return to work/school when you are comfortable without any prescription pain medication.    WOUND CARE    You may remove your outer dressing and shower 48 hours after the surgery.  Pat your incisions dry and leave them open to air.  Re-apply dressing (Band-Aids or gauze/tape) as needed for drainage.    You may have steri-strips (looks like white tape) or Dermabond (looks like glue) on your incisions.  You may peel off the steri-strips 2 weeks after your surgery if they have not peeled off on their own.  If you have Dermabond, it will peel up and fall off on its own.    Do not soak your incisions in a tub or pool for 2 weeks.     Do not apply any lotions, creams, or ointments to your incisions.    A ridge under your incisions is normal and will gradually resolve.    Wear a supportive bra for 1-2 weeks, day and night.    DIET    Start with liquids, then gradually resume your regular diet as tolerated.     Drink plenty of liquids to stay hydrated.    PAIN    Expect some tenderness and discomfort at the incision site(s).  Use the prescribed pain medication at your discretion.  Expect gradual resolution of your pain over several days.    You may take ibuprofen with food  (unless you have been told not to) instead of or in addition to your prescribed pain medication.  If you are taking Norco or Percocet, do not take any additional acetaminophen/APAP/Tylenol.    Do not drink alcohol or drive while you are taking pain medications.    You may apply ice to your incisions in 20 minute intervals as needed for the next 48 hours.      EXPECTATIONS    Pain medications can cause constipation.  Limit use when possible.  Take over the counter stool softener/stimulant, such as Colace or Senna, 1-2 times a day with plenty of water.  You may take a mild over the counter laxative, such as Miralax or a suppository, as needed.      You may discontinue these medications once you are having regular bowel movements and/or are no longer taking your narcotic pain medication.    Blue dye may have been used during your surgery to locate lymph nodes and can cause your urine to be blue/green for several days after surgery.  This is not a cause for concern and will resolve on its own.     RETURN APPOINTMENT    Follow up with your surgeon in 2 weeks.  Please call the office at [phone] to schedule your appointment.      CALL OUR OFFICE -[phone] IF YOU HAVE:     Chills or fever above 101 F.    Increased redness, warmth, or drainage at your incisions.    Significant bleeding.    Pain not relieved by your pain medication or rest.    Increasing pain after the first 48 hours.    Any other concerns or questions.    Revised October 2018

## 2019-03-25 NOTE — DISCHARGE SUMMARY
Discharge Summary    Muriel Diaz MRN# [id number]   YOB: 1960 Age: 58 year old     Date of Admission:  3/22/2019  Date of Discharge:  3/23/2019 10:56 AM  Admitting Physician:  Naa Crook MD  Discharge Physician:  Naa Crook MD          Discharging Service:  Plastic and Reconstructive Surgery      HOSPITAL COURSE:  Muriel Diaz was admitted to the hospital after undergoing left latissimus flap and tissue expander placement on 3/22/2019   Her postoperative course was unremarkable.  At the time of discharge, she was tolerating a regular diet, had adequate pain control on oral narcotics, and had been instructed in the care of her drains.      No medications prior to admission.        PE:  All incisions were clean, dry and intact.  Latissimus skin island viable.  ADE drains were in place and functioning.     Discharge Medication List as of 3/23/2019 10:29 AM      START taking these medications    Details   HYDROmorphone (DILAUDID) 2 MG tablet Take 1-2 tablets (2-4 mg) by mouth every 3 hours as needed, Disp-20 tablet, R-0, Local Print         CONTINUE these medications which have NOT CHANGED    Details   anastrozole (ARIMIDEX) 1 MG tablet Take 1 tablet (1 mg) by mouth daily, Disp-90 tablet, R-3, E-Prescribe      atenolol (TENORMIN) 25 MG tablet Take 1 tablet (25 mg) by mouth daily, Disp-90 tablet, R-3, E-Prescribe      carisoprodol (SOMA) 350 MG tablet Take 350 mg by mouth 3 times daily, Historical      vitamin D3 (CHOLECALCIFEROL) 1000 units (25 mcg) tablet Take 2 tablets (2,000 Units) by mouth daily, Disp-180 tablet, R-3, E-Prescribe               PLAN:  The patient has been instructed to follow up with Dr. Crook in 1 week.  She should record drain outputs daily.  She may shower with incisions and drains uncovered. The patient may resume her home medications and was discharged with additional prescription for pain medicines.         Naa Crook MD  Plastic  [facility] Plastic Surgery  [phone]

## 2019-04-03 ENCOUNTER — TELEPHONE (OUTPATIENT)
Dept: ONCOLOGY | Facility: CLINIC | Age: 59
End: 2019-04-03

## 2019-04-12 DIAGNOSIS — R79.89 LOW VITAMIN D LEVEL: ICD-10-CM

## 2019-04-12 RX ORDER — CHOLECALCIFEROL (VITAMIN D3) 50 MCG
2000 TABLET ORAL DAILY
Qty: 30 TABLET | Refills: 2 | Status: SHIPPED | OUTPATIENT
Start: 2019-04-12 | End: 2019-06-05

## 2019-04-15 ENCOUNTER — HOSPITAL ENCOUNTER (OUTPATIENT)
Dept: MAMMOGRAPHY | Facility: CLINIC | Age: 59
Discharge: HOME | End: 2019-04-15
Attending: INTERNAL MEDICINE | Admitting: INTERNAL MEDICINE
Payer: COMMERCIAL

## 2019-04-15 DIAGNOSIS — Z12.39 BREAST CANCER SCREENING: ICD-10-CM

## 2019-04-15 PROCEDURE — 77063 BREAST TOMOSYNTHESIS BI: CPT | Mod: 52

## 2019-04-26 ENCOUNTER — ONCOLOGY VISIT (OUTPATIENT)
Dept: ONCOLOGY | Facility: CLINIC | Age: 59
End: 2019-04-26
Attending: INTERNAL MEDICINE
Payer: COMMERCIAL

## 2019-04-26 VITALS
WEIGHT: 101.2 LBS | SYSTOLIC BLOOD PRESSURE: 145 MMHG | OXYGEN SATURATION: 97 % | BODY MASS INDEX: 17.93 KG/M2 | HEIGHT: 63 IN | DIASTOLIC BLOOD PRESSURE: 76 MMHG | HEART RATE: 66 BPM | TEMPERATURE: 98.3 F | RESPIRATION RATE: 16 BRPM

## 2019-04-26 DIAGNOSIS — C50.212 MALIGNANT NEOPLASM OF UPPER-INNER QUADRANT OF LEFT BREAST IN FEMALE, ESTROGEN RECEPTOR POSITIVE (H): Primary | ICD-10-CM

## 2019-04-26 DIAGNOSIS — Z17.0 MALIGNANT NEOPLASM OF UPPER-INNER QUADRANT OF LEFT BREAST IN FEMALE, ESTROGEN RECEPTOR POSITIVE (H): Primary | ICD-10-CM

## 2019-04-26 PROCEDURE — G0463 HOSPITAL OUTPT CLINIC VISIT: HCPCS

## 2019-04-26 PROCEDURE — 99214 OFFICE O/P EST MOD 30 MIN: CPT | Performed by: INTERNAL MEDICINE

## 2019-04-26 ASSESSMENT — MIFFLIN-ST. JEOR: SCORE: 1005.23

## 2019-04-26 ASSESSMENT — PAIN SCALES - GENERAL: PAINLEVEL: SEVERE PAIN (6)

## 2019-04-26 NOTE — PROGRESS NOTES
"Oncology Rooming Note    April 26, 2019 1:05 PM   Muriel Diaz is a 58 year old female who presents for:    Chief Complaint   Patient presents with     Oncology Clinic Visit     Initial Vitals: /76   Pulse 66   Temp 98.3  F (36.8  C) (Oral)   Resp 16   Ht 1.588 m (5' 2.5\")   Wt 45.9 kg (101 lb 3.2 oz)   SpO2 97%   BMI 18.21 kg/m   Estimated body mass index is 18.21 kg/m  as calculated from the following:    Height as of this encounter: 1.588 m (5' 2.5\").    Weight as of this encounter: 45.9 kg (101 lb 3.2 oz). Body surface area is 1.42 meters squared.  Severe Pain (6) Comment: Data Unavailable   No LMP recorded. Patient has had a hysterectomy.  Allergies reviewed: Yes  Medications reviewed: Yes    Medications: Medication refills not needed today.  Pharmacy name entered into [facility]:      [id number] - [place], [place] - [address]    Clinical concerns: still in pain and would like a med. [facility] [place].       Kaya Luque, MULUGETA            "

## 2019-04-26 NOTE — PROGRESS NOTES
[facility]    Hematology/Oncology Established Patient Note      Today's Date: 4/26/19    Reason for Follow-up: left sided breast cancer      HISTORY OF PRESENT ILLNESS: Muriel Diaz is a 58 year old post-menopausal female with PMHx of HTN, degenerative joint disease, history of cervical spine surgery, who presents with left-sided breast cancer.  She underwent left mastectomy on 4/16/18 by Dr. Umana.  Pathology showed invasive mammary carcinoma, with lobular and ductal features, grade 2, tumor size 4.3 x 4.0 x 2.1 cm, positive margin for invasive carcinoma in the central and lateral posterior surface, DCIS and LCIS present, ER positive (95%), OK positive (50%), HER-2 negative.  Left axillary dissection showed 3 of 5 lymph nodes were positive for metastatic carcinoma (lobular type), also ER positive, OK positive, HER-2 negative. Stage [id number] (stage IIA).      She had port placed on 5/14/18. Port was removed on 3/22/19.    She started chemotherapy on 5/22/18, and completed dose-dense doxorubicin+cyclophosphamide x 4 cycles, followed by weekly paclitaxel x 12, completed on 10/2/18.    She completed radiation on 12/17/18.    She started anastrozole on 12/28/18.        INTERIM HISTORY: Muriel is here for follow-up.   She is doing well.  On 3/22/19, she underwent reconstruction surgery with left latissimus flap and left tissue expander placement.  The area still feels a bit sore.  She continues to take anastrozole without problems.  She is otherwise doing well.          REVIEW OF SYSTEMS:   14 point ROS was reviewed and is negative other than as noted above in HPI.       HOME MEDICATIONS:  Current Outpatient Medications   Medication Sig Dispense Refill     anastrozole (ARIMIDEX) 1 MG tablet Take 1 tablet (1 mg) by mouth daily (Patient taking differently: Take 1 mg by mouth At Bedtime ) 90 tablet 3     atenolol (TENORMIN) 25 MG tablet Take 1 tablet (25 mg) by mouth daily 90 tablet 3      carisoprodol (SOMA) 350 MG tablet Take 350 mg by mouth 3 times daily       HYDROmorphone (DILAUDID) 2 MG tablet Take 1-2 tablets (2-4 mg) by mouth every 3 hours as needed 20 tablet 0     vitamin D3 (CHOLECALCIFEROL) 1000 units (25 mcg) tablet Take 2 tablets (2,000 Units) by mouth daily 180 tablet 3     vitamin D3 (CHOLECALCIFEROL) 2000 units tablet Take 2,000 Units by mouth daily 30 tablet 2         ALLERGIES:  Allergies   Allergen Reactions     Amitriptyline Other (See Comments)     nightmares     Oxycontin [Oxycodone] Nausea     Severe headaches         PAST MEDICAL HISTORY:  Past Medical History:   Diagnosis Date     Cancer (H)     BREAST CANCER     Degeneration of cervical intervertebral disc      Degeneration of lumbar or lumbosacral intervertebral disc      Hypertension      PONV (postoperative nausea and vomiting)          PAST SURGICAL HISTORY:  Past Surgical History:   Procedure Laterality Date     BACK SURGERY  2001    lumbar fusion, cervical discectomy     DISSECT LYMPH NODE AXILLA Left 4/16/2018    Procedure: DISSECT LYMPH NODE AXILLA;;  Surgeon: Rodney Umana MD;  Location: [facility]     FUSION CERVICAL ANTERIOR THREE+ LEVELS  5/1/2012    Procedure:FUSION CERVICAL ANTERIOR THREE+ LEVELS; Surgeon:SARAH FRANCO; Location:[facility]     FUSION CERVICAL POSTERIOR THREE+ LEVELS  5/1/2012    Procedure:FUSION CERVICAL POSTERIOR THREE+ LEVELS; Posterior Cervical decompression and fusion C4-7, Anterior Cervical decompression and fusion C4-7 (c-arm), (herb table with abraham, yina oasis, yina aviator, Vitoss foam packing strip, impulse monitoring,); Surgeon:SARAH FRANCO; Location: OR     GYN SURGERY       HYSTERECTOMY VAGINAL  1990's    Judy Aceves OB Gyn specilist     HYSTERECTOMY, PAP NO LONGER INDICATED       INSERT PORT VASCULAR ACCESS N/A 5/14/2018    Procedure: INSERT PORT VASCULAR ACCESS;  PORT PLACEMENT;  Surgeon: Rodney Umana MD;  Location: [facility]     INSERT  TISSUE EXPANDER BREAST Bilateral 3/22/2019    Procedure: INSERT TISSUE EXPANDER BREAST;  Surgeon: Naa Crook MD;  Location: [facility] OR     MASTECTOMY SIMPLE Left 4/16/2018    Procedure: MASTECTOMY SIMPLE;  LEFT SIMPLE MASTECTOMY,LEFT AXILLARY DISSECTION;  Surgeon: Rodney Umana MD;  [facility]     RECONSTRUCT BREAST LATISSIMUS DORSI PEDICLE Left 3/22/2019    Procedure: LEFT LATISSIMUS FLAP RECONSTRUCT WITH TISSUE EXPANDER AND PORT REMOVAL;  Surgeon: Naa Crook MD;  Location:  OR     REMOVE PORT VASCULAR ACCESS N/A 3/22/2019    Procedure: REMOVE PORT VASCULAR ACCESS;  Surgeon: Naa Crook MD;  Location:  OR         SOCIAL HISTORY:  Social History     Socioeconomic History     Marital status: Single     Spouse name: Not on file     Number of children: 0     Years of education: Not on file     Highest education level: Not on file   Occupational History     Employer: [employer]    Social Needs     Financial resource strain: Not on file     Food insecurity:     Worry: Not on file     Inability: Not on file     Transportation needs:     Medical: Not on file     Non-medical: Not on file   Tobacco Use     Smoking status: Current Every Day Smoker     Packs/day: 0.50     Types: Cigarettes     Smokeless tobacco: Never Used     Tobacco comment: 4 cigarettes per day   Substance and Sexual Activity     Alcohol use: Yes     Alcohol/week: 0.6 oz     Types: 1 Standard drinks or equivalent per week     Comment: 2-3 drinks per week     Drug use: No     Sexual activity: Not Currently     Partners: Male   Lifestyle     Physical activity:     Days per week: Not on file     Minutes per session: Not on file     Stress: Not on file   Relationships     Social connections:     Talks on phone: Not on file     Gets together: Not on file     Attends [employer] service: Not on file     Active member of club or organization: Not on file     Attends meetings of clubs or organizations: Not on file      "Relationship status: Not on file     Intimate partner violence:     Fear of current or ex partner: Not on file     Emotionally abused: Not on file     Physically abused: Not on file     Forced sexual activity: Not on file   Other Topics Concern     Parent/sibling w/ CABG, MI or angioplasty before 65F 55M? No   Social History Narrative     Not on file   She smokes cigarettes, half a pack a day for many years; she is unable to quantify how many.  She drinks alcohol occasionally.  She denies illicit drug use.  She is retired.  She used to to work in  at large companies.  She lives in [place] with her boyfriend.        FAMILY HISTORY:  Family History   Problem Relation Age of Onset     Pancreatitis Mother      Substance Abuse Mother      Unknown/Adopted Father      She does not know much about her family history and is unaware of any cancers in her family.  She has never been pregnant.  She had a hysterectomy in the .  She still has her ovaries.  She says that she has undergone menopause but is unsure when, likely years ago.        PHYSICAL EXAM:  Vital signs:  /76   Pulse 66   Temp 98.3  F (36.8  C) (Oral)   Resp 16   Ht 1.588 m (5' 2.5\")   Wt 45.9 kg (101 lb 3.2 oz)   SpO2 97%   BMI 18.21 kg/m     ECO  GENERAL/CONSTITUTIONAL: No acute distress.  EYES: No scleral icterus.  CARDIOVASCULAR: Regular rate and rhythm.  RESPIRATORY: Clear to ausculation bilaterally.  BREAST: Right-no palpable mass, discharge, rash, or axillary lymphadenopathy.  Left-s/p tissue expander placement  NEUROLOGIC: Alert, oriented, answers questions appropriately.  INTEGUMENTARY: No jaundice.  Port in place at the right upper chest.      LABS:  None today.      IMAGING:  DEXA scan 19:  1. Mild osteoporosis in the distal radius. Advanced osteopenia but not  yet osteoporosis in the hips bilaterally.  2. The probability of major osteoporotic fracture is 8.1%  and  probability of hip fracture is 2.0%  " within the next 10 years  according to FRAX risk assessment.     Right mammogram 4/15/19:  BI-RADS 1 - negative      ASSESSMENT/PLAN:  Muriel Diaz is a 58 year old post-menopausal female with:    1) Left breast cancer of the upper inner quadrant: s/p left mastectomy on 4/16/18; pathology showed invasive mammary carcinoma, with lobular and ductal features, grade 2, tumor size 4.3 x 4.0 x 2.1 cm, positive margin for invasive carcinoma in the central and lateral posterior surface, DCIS and LCIS present, ER positive (95%), WA positive (50%), HER-2 negative.  Left axillary dissection showed 3 of 5 lymph nodes were positive for metastatic carcinoma (lobular type), also ER positive, WA positive, HER-2 negative. Stage [id number] (stage IIB).  She has completed adjuvant chemotherapy.  She completed radiation on 12/17/18.    She started anastrozole on 12/28/18 and tolerating well.    She had mammogram done on 4/15/19, which was BI-RADS 1 - negative.    -referred for lymphedema therapy previously  -Muriel has met with plastic surgery, Dr. Crook.  She underwent reconstruction with left latissimus flap and left tissue expander placement on 3/22/19.  -continue anastrozole 1 mg daily - will plan for 5-10 years treatment, if can tolerate  -next right-sided mammogram in April 2020 - will order at the next visit  -return to clinic in 6 months    2) Smoking: She says that she doesn't buy cigarettes anymore takes a puff from her boyfriend's cigarette.  I again advised her to quit completely.  Her boyfriend should quit too, as it would be difficult for her to quit if her partner smokes.  She says that she has talked to him about it.  -I again advised smoking cessation  -she will see her PCP for tobacco cessation.    3) Hypertension:   -follows with PCP    4) Chronic back pain: s/p history of back surgeries  -she takes soma  -follows with PCP    5) Bone health: She is on aromatase inhibitor.  DEXA scan on 1/4/19 showed mild  osteoporosis in the distal radius.  Advanced osteopenia in the bilateral hips.  -next DEXA scan in January 2021  -she discussed with her PCP; weight bearing exercise, stop smoking, increased calcium intake, vitamin D supplement      I spent a total of 25 minutes with the patient, with over >50% of the time in counseling and/or coordination of care.       Patience Mckeon MD  Hematology/Oncology  [facility]

## 2019-04-26 NOTE — LETTER
"    4/26/2019         RE: Muriel Diaz  [address]  [facility] [address]        Dear Colleague,    Thank you for referring your patient, Muriel Diaz, to the [facility]. Please see a copy of my visit note below.    Oncology Rooming Note    April 26, 2019 1:05 PM   Muriel Diaz is a 58 year old female who presents for:    Chief Complaint   Patient presents with     Oncology Clinic Visit     Initial Vitals: /76   Pulse 66   Temp 98.3  F (36.8  C) (Oral)   Resp 16   Ht 1.588 m (5' 2.5\")   Wt 45.9 kg (101 lb 3.2 oz)   SpO2 97%   BMI 18.21 kg/m    Estimated body mass index is 18.21 kg/m  as calculated from the following:    Height as of this encounter: 1.588 m (5' 2.5\").    Weight as of this encounter: 45.9 kg (101 lb 3.2 oz). Body surface area is 1.42 meters squared.  Severe Pain (6) Comment: Data Unavailable   No LMP recorded. Patient has had a hysterectomy.  Allergies reviewed: Yes  Medications reviewed: Yes    Medications: Medication refills not needed today.  Pharmacy name entered into [employer]:      [id number] [place], [place] - [address]    Clinical concerns: still in pain and would like a med. Kacie in [facility].       Kaya Luque, [facility]    Hematology/Oncology Established Patient Note      Today's Date: 4/26/19    Reason for Follow-up: left sided breast cancer      HISTORY OF PRESENT ILLNESS: Muriel Diaz is a 58 year old post-menopausal female with PMHx of HTN, degenerative joint disease, history of cervical spine surgery, who presents with left-sided breast cancer.  She underwent left mastectomy on 4/16/18 by Dr. Umana.  Pathology showed invasive mammary carcinoma, with lobular and ductal features, grade 2, tumor size 4.3 x 4.0 x 2.1 cm, positive margin for invasive carcinoma in the central and lateral posterior surface, DCIS and LCIS present, ER positive (95%), CA positive (50%), HER-2 " negative.  Left axillary dissection showed 3 of 5 lymph nodes were positive for metastatic carcinoma (lobular type), also ER positive, IA positive, HER-2 negative. Stage [id number] (stage IIA).      She had port placed on 5/14/18. Port was removed on 3/22/19.    She started chemotherapy on 5/22/18, and completed dose-dense doxorubicin+cyclophosphamide x 4 cycles, followed by weekly paclitaxel x 12, completed on 10/2/18.    She completed radiation on 12/17/18.    She started anastrozole on 12/28/18.        INTERIM HISTORY: uMriel is here for follow-up.   She is doing well.  On 3/22/19, she underwent reconstruction surgery with left latissimus flap and left tissue expander placement.  The area still feels a bit sore.  She continues to take anastrozole without problems.  She is otherwise doing well.          REVIEW OF SYSTEMS:   14 point ROS was reviewed and is negative other than as noted above in HPI.       HOME MEDICATIONS:  Current Outpatient Medications   Medication Sig Dispense Refill     anastrozole (ARIMIDEX) 1 MG tablet Take 1 tablet (1 mg) by mouth daily (Patient taking differently: Take 1 mg by mouth At Bedtime ) 90 tablet 3     atenolol (TENORMIN) 25 MG tablet Take 1 tablet (25 mg) by mouth daily 90 tablet 3     carisoprodol (SOMA) 350 MG tablet Take 350 mg by mouth 3 times daily       HYDROmorphone (DILAUDID) 2 MG tablet Take 1-2 tablets (2-4 mg) by mouth every 3 hours as needed 20 tablet 0     vitamin D3 (CHOLECALCIFEROL) 1000 units (25 mcg) tablet Take 2 tablets (2,000 Units) by mouth daily 180 tablet 3     vitamin D3 (CHOLECALCIFEROL) 2000 units tablet Take 2,000 Units by mouth daily 30 tablet 2         ALLERGIES:  Allergies   Allergen Reactions     Amitriptyline Other (See Comments)     nightmares     Oxycontin [Oxycodone] Nausea     Severe headaches         PAST MEDICAL HISTORY:  Past Medical History:   Diagnosis Date     Cancer (H)     BREAST CANCER     Degeneration of cervical intervertebral disc       Degeneration of lumbar or lumbosacral intervertebral disc      Hypertension      PONV (postoperative nausea and vomiting)          PAST SURGICAL HISTORY:  Past Surgical History:   Procedure Laterality Date     BACK SURGERY  2001    lumbar fusion, cervical discectomy     DISSECT LYMPH NODE AXILLA Left 4/16/2018    Procedure: DISSECT LYMPH NODE AXILLA;;  Surgeon: Rodney Umana MD;  Location: [facility]     FUSION CERVICAL ANTERIOR THREE+ LEVELS  5/1/2012    Procedure:FUSION CERVICAL ANTERIOR THREE+ LEVELS; Surgeon:SARAH FRANCO; Location:[facility] OR     FUSION CERVICAL POSTERIOR THREE+ LEVELS  5/1/2012    Procedure:FUSION CERVICAL POSTERIOR THREE+ LEVELS; Posterior Cervical decompression and fusion C4-7, Anterior Cervical decompression and fusion C4-7 (c-arm), (herb table with abraham, yina oasis, yina aviator, Vitoss foam packing strip, impulse monitoring,); Surgeon:SARAH FRANCO; Location: OR     GYN SURGERY       HYSTERECTOMY VAGINAL  1990's    Judy Aceves OB Gyn specilist     HYSTERECTOMY, PAP NO LONGER INDICATED       INSERT PORT VASCULAR ACCESS N/A 5/14/2018    Procedure: INSERT PORT VASCULAR ACCESS;  PORT PLACEMENT;  Surgeon: Rodney Umana MD;  Location: [facility]     INSERT TISSUE EXPANDER BREAST Bilateral 3/22/2019    Procedure: INSERT TISSUE EXPANDER BREAST;  Surgeon: Naa Crook MD;  Location:  OR     MASTECTOMY SIMPLE Left 4/16/2018    Procedure: MASTECTOMY SIMPLE;  LEFT SIMPLE MASTECTOMY,LEFT AXILLARY DISSECTION;  Surgeon: Rodney Umana MD;  [facility]     RECONSTRUCT BREAST LATISSIMUS DORSI PEDICLE Left 3/22/2019    Procedure: LEFT LATISSIMUS FLAP RECONSTRUCT WITH TISSUE EXPANDER AND PORT REMOVAL;  Surgeon: Naa Crook MD;  Location:  OR     REMOVE PORT VASCULAR ACCESS N/A 3/22/2019    Procedure: REMOVE PORT VASCULAR ACCESS;  Surgeon: Naa Crook MD;  Location:  OR         SOCIAL HISTORY:  Social History      Socioeconomic History     Marital status: Single     Spouse name: Not on file     Number of children: 0     Years of education: Not on file     Highest education level: Not on file   Occupational History     Employer: [employer]    Social Needs     Financial resource strain: Not on file     Food insecurity:     Worry: Not on file     Inability: Not on file     Transportation needs:     Medical: Not on file     Non-medical: Not on file   Tobacco Use     Smoking status: Current Every Day Smoker     Packs/day: 0.50     Types: Cigarettes     Smokeless tobacco: Never Used     Tobacco comment: 4 cigarettes per day   Substance and Sexual Activity     Alcohol use: Yes     Alcohol/week: 0.6 oz     Types: 1 Standard drinks or equivalent per week     Comment: 2-3 drinks per week     Drug use: No     Sexual activity: Not Currently     Partners: Male   Lifestyle     Physical activity:     Days per week: Not on file     Minutes per session: Not on file     Stress: Not on file   Relationships     Social connections:     Talks on phone: Not on file     Gets together: Not on file     Attends [employer] service: Not on file     Active member of club or organization: Not on file     Attends meetings of clubs or organizations: Not on file     Relationship status: Not on file     Intimate partner violence:     Fear of current or ex partner: Not on file     Emotionally abused: Not on file     Physically abused: Not on file     Forced sexual activity: Not on file   Other Topics Concern     Parent/sibling w/ CABG, MI or angioplasty before 65F 55M? No   Social History Narrative     Not on file   She smokes cigarettes, half a pack a day for many years; she is unable to quantify how many.  She drinks alcohol occasionally.  She denies illicit drug use.  She is retired.  She used to to work in  at large companies.  She lives in [place] with her boyfriend.        FAMILY HISTORY:  Family History   Problem Relation Age of Onset      "Pancreatitis Mother      Substance Abuse Mother      Unknown/Adopted Father      She does not know much about her family history and is unaware of any cancers in her family.  She has never been pregnant.  She had a hysterectomy in the .  She still has her ovaries.  She says that she has undergone menopause but is unsure when, likely years ago.        PHYSICAL EXAM:  Vital signs:  /76   Pulse 66   Temp 98.3  F (36.8  C) (Oral)   Resp 16   Ht 1.588 m (5' 2.5\")   Wt 45.9 kg (101 lb 3.2 oz)   SpO2 97%   BMI 18.21 kg/m      ECO  GENERAL/CONSTITUTIONAL: No acute distress.  EYES: No scleral icterus.  CARDIOVASCULAR: Regular rate and rhythm.  RESPIRATORY: Clear to ausculation bilaterally.  BREAST: Right-no palpable mass, discharge, rash, or axillary lymphadenopathy.  Left-s/p tissue expander placement  NEUROLOGIC: Alert, oriented, answers questions appropriately.  INTEGUMENTARY: No jaundice.  Port in place at the right upper chest.      LABS:  None today.      IMAGING:  DEXA scan 19:  1. Mild osteoporosis in the distal radius. Advanced osteopenia but not  yet osteoporosis in the hips bilaterally.  2. The probability of major osteoporotic fracture is 8.1%  and  probability of hip fracture is 2.0%  within the next 10 years  according to FRAX risk assessment.     Right mammogram 4/15/19:  BI-RADS 1 - negative      ASSESSMENT/PLAN:  Muriel Diaz is a 58 year old post-menopausal female with:    1) Left breast cancer of the upper inner quadrant: s/p left mastectomy on 18; pathology showed invasive mammary carcinoma, with lobular and ductal features, grade 2, tumor size 4.3 x 4.0 x 2.1 cm, positive margin for invasive carcinoma in the central and lateral posterior surface, DCIS and LCIS present, ER positive (95%), MS positive (50%), HER-2 negative.  Left axillary dissection showed 3 of 5 lymph nodes were positive for metastatic carcinoma (lobular type), also ER positive, MS positive, HER-2 " negative. Stage [id number] (stage IIB).  She has completed adjuvant chemotherapy.  She completed radiation on 12/17/18.    She started anastrozole on 12/28/18 and tolerating well.    She had mammogram done on 4/15/19, which was BI-RADS 1 - negative.    -referred for lymphedema therapy previously  -Muriel has met with plastic surgery, Dr. Crook.  She underwent reconstruction with left latissimus flap and left tissue expander placement on 3/22/19.  -continue anastrozole 1 mg daily - will plan for 5-10 years treatment, if can tolerate  -next right-sided mammogram in April 2020 - will order at the next visit  -return to clinic in 6 months    2) Smoking: She says that she doesn't buy cigarettes anymore takes a puff from her boyfriend's cigarette.  I again advised her to quit completely.  Her boyfriend should quit too, as it would be difficult for her to quit if her partner smokes.  She says that she has talked to him about it.  -I again advised smoking cessation  -she will see her PCP for tobacco cessation.    3) Hypertension:   -follows with PCP    4) Chronic back pain: s/p history of back surgeries  -she takes soma  -follows with PCP    5) Bone health: She is on aromatase inhibitor.  DEXA scan on 1/4/19 showed mild osteoporosis in the distal radius.  Advanced osteopenia in the bilateral hips.  -next DEXA scan in January 2021  -she discussed with her PCP; weight bearing exercise, stop smoking, increased calcium intake, vitamin D supplement      I spent a total of 25 minutes with the patient, with over >50% of the time in counseling and/or coordination of care.       Patience Mckeon MD  Hematology/Oncology  [facility]      Again, thank you for allowing me to participate in the care of your patient.        Sincerely,        Patience Mckeon MD

## 2019-10-02 ENCOUNTER — HEALTH MAINTENANCE LETTER (OUTPATIENT)
Age: 59
End: 2019-10-02

## 2019-10-22 ENCOUNTER — ONCOLOGY VISIT (OUTPATIENT)
Dept: ONCOLOGY | Facility: CLINIC | Age: 59
End: 2019-10-22
Attending: INTERNAL MEDICINE
Payer: MEDICARE

## 2019-10-22 ENCOUNTER — TELEPHONE (OUTPATIENT)
Dept: ONCOLOGY | Facility: CLINIC | Age: 59
End: 2019-10-22

## 2019-10-22 VITALS
RESPIRATION RATE: 18 BRPM | HEART RATE: 59 BPM | HEIGHT: 63 IN | WEIGHT: 104.2 LBS | SYSTOLIC BLOOD PRESSURE: 151 MMHG | OXYGEN SATURATION: 96 % | DIASTOLIC BLOOD PRESSURE: 88 MMHG | BODY MASS INDEX: 18.46 KG/M2 | TEMPERATURE: 96 F

## 2019-10-22 DIAGNOSIS — Z17.0 MALIGNANT NEOPLASM OF UPPER-INNER QUADRANT OF LEFT BREAST IN FEMALE, ESTROGEN RECEPTOR POSITIVE (H): ICD-10-CM

## 2019-10-22 DIAGNOSIS — Z23 NEED FOR PROPHYLACTIC VACCINATION AND INOCULATION AGAINST INFLUENZA: ICD-10-CM

## 2019-10-22 DIAGNOSIS — C50.212 MALIGNANT NEOPLASM OF UPPER-INNER QUADRANT OF LEFT BREAST IN FEMALE, ESTROGEN RECEPTOR POSITIVE (H): ICD-10-CM

## 2019-10-22 DIAGNOSIS — Z12.31 VISIT FOR SCREENING MAMMOGRAM: Primary | ICD-10-CM

## 2019-10-22 PROCEDURE — G0008 ADMIN INFLUENZA VIRUS VAC: HCPCS

## 2019-10-22 PROCEDURE — 90682 RIV4 VACC RECOMBINANT DNA IM: CPT | Performed by: INTERNAL MEDICINE

## 2019-10-22 PROCEDURE — G0463 HOSPITAL OUTPT CLINIC VISIT: HCPCS | Mod: 25

## 2019-10-22 PROCEDURE — 99214 OFFICE O/P EST MOD 30 MIN: CPT | Performed by: INTERNAL MEDICINE

## 2019-10-22 PROCEDURE — 25000128 H RX IP 250 OP 636: Performed by: INTERNAL MEDICINE

## 2019-10-22 RX ORDER — ANASTROZOLE 1 MG/1
1 TABLET ORAL DAILY
Qty: 90 TABLET | Refills: 3 | Status: SHIPPED | OUTPATIENT
Start: 2019-10-22 | End: 2020-07-22

## 2019-10-22 RX ADMIN — INFLUENZA A VIRUS A/BRISBANE/02/2018 (H1N1) RECOMBINANT HEMAGGLUTININ ANTIGEN, INFLUENZA A VIRUS A/KANSAS/14/2017 (H3N2) RECOMBINANT HEMAGGLUTININ ANTIGEN, INFLUENZA B VIRUS B/PHUKET/3073/2013 RECOMBINANT HEMAGGLUTININ ANTIGEN, AND INFLUENZA B VIRUS B/MARYLAND/15/2016 RECOMBINANT HEMAGGLUTININ ANTIGEN 0.5 ML: 45; 45; 45; 45 INJECTION INTRAMUSCULAR at 13:23

## 2019-10-22 ASSESSMENT — MIFFLIN-ST. JEOR: SCORE: 1018.84

## 2019-10-22 ASSESSMENT — PAIN SCALES - GENERAL: PAINLEVEL: NO PAIN (0)

## 2019-10-22 NOTE — LETTER
10/22/2019         RE: Muriel Diaz  [address]  [facility] [address]        Dear Colleague,    Thank you for referring your patient, Muriel Diaz, to the [facility]. Please see a copy of my visit note below.    [facility]    Hematology/Oncology Established Patient Note      Today's Date: 10/22/19    Reason for Follow-up: left sided breast cancer      HISTORY OF PRESENT ILLNESS: Muriel Diaz is a 58 year old post-menopausal female with PMHx of HTN, degenerative joint disease, history of cervical spine surgery, who presents with left-sided breast cancer.  She underwent left mastectomy on 4/16/18 by Dr. Umana.  Pathology showed invasive mammary carcinoma, with lobular and ductal features, grade 2, tumor size 4.3 x 4.0 x 2.1 cm, positive margin for invasive carcinoma in the central and lateral posterior surface, DCIS and LCIS present, ER positive (95%), MT positive (50%), HER-2 negative.  Left axillary dissection showed 3 of 5 lymph nodes were positive for metastatic carcinoma (lobular type), also ER positive, MT positive, HER-2 negative. Stage [id number] (stage IIA).      She had port placed on 5/14/18. Port was removed on 3/22/19.    She started chemotherapy on 5/22/18, and completed dose-dense doxorubicin+cyclophosphamide x 4 cycles, followed by weekly paclitaxel x 12, completed on 10/2/18.    She completed radiation on 12/17/18.    She started anastrozole on 12/28/18.  .    On 3/22/19, she underwent reconstruction surgery with left latissimus flap and left tissue expander placement.        INTERIM HISTORY: Muriel is here for follow-up.   She is feeing well.  She continues on anastrozole and denies problems with that.  She says that her insurance is changing, so hasn't scheduled her plastic surgery follow-up yet.        REVIEW OF SYSTEMS:   14 point ROS was reviewed and is negative other than as noted above in HPI.       HOME MEDICATIONS:  Current  Outpatient Medications   Medication Sig Dispense Refill     anastrozole (ARIMIDEX) 1 MG tablet Take 1 tablet (1 mg) by mouth daily 90 tablet 3     atenolol (TENORMIN) 25 MG tablet Take 1 tablet (25 mg) by mouth daily 90 tablet 3     carisoprodol (SOMA) 350 MG tablet Take 350 mg by mouth 3 times daily       HYDROmorphone (DILAUDID) 2 MG tablet Take 1-2 tablets (2-4 mg) by mouth every 3 hours as needed 20 tablet 0     vitamin D3 (CHOLECALCIFEROL) 1000 units (25 mcg) tablet Take 2 tablets (2,000 Units) by mouth daily 180 tablet 3     vitamin D3 (CHOLECALCIFEROL) 2000 units (50 mcg) tablet Take 2,000 Units by mouth daily 100 tablet 3         ALLERGIES:  Allergies   Allergen Reactions     Amitriptyline Other (See Comments)     nightmares     Oxycontin [Oxycodone] Nausea     Severe headaches         PAST MEDICAL HISTORY:  Past Medical History:   Diagnosis Date     Cancer (H)     BREAST CANCER     Degeneration of cervical intervertebral disc      Degeneration of lumbar or lumbosacral intervertebral disc      Hypertension      PONV (postoperative nausea and vomiting)          PAST SURGICAL HISTORY:  Past Surgical History:   Procedure Laterality Date     BACK SURGERY  2001    lumbar fusion, cervical discectomy     DISSECT LYMPH NODE AXILLA Left 4/16/2018    Procedure: DISSECT LYMPH NODE AXILLA;;  Surgeon: Rodney Umana MD;  [facility]     FUSION CERVICAL ANTERIOR THREE+ LEVELS  5/1/2012    Procedure:FUSION CERVICAL ANTERIOR THREE+ LEVELS; Surgeon:SARAH FRANCO; Location:[facility] OR     FUSION CERVICAL POSTERIOR THREE+ LEVELS  5/1/2012    Procedure:FUSION CERVICAL POSTERIOR THREE+ LEVELS; Posterior Cervical decompression and fusion C4-7, Anterior Cervical decompression and fusion C4-7 (c-arm), (herb table with abraham, yina oasis, yina aviator, Vitoss foam packing strip, impulse monitoring,); Surgeon:SARAH FRANCO; Location: OR     GYN SURGERY       HYSTERECTOMY VAGINAL  1990's    Dr. Monterroso  [facility] specilist     HYSTERECTOMY, PAP NO LONGER INDICATED       INSERT PORT VASCULAR ACCESS N/A 5/14/2018    Procedure: INSERT PORT VASCULAR ACCESS;  PORT PLACEMENT;  Surgeon: Rodney Umana MD;  Location: [facility]     INSERT TISSUE EXPANDER BREAST Bilateral 3/22/2019    Procedure: INSERT TISSUE EXPANDER BREAST;  Surgeon: Naa Crook MD;  Location:  OR     MASTECTOMY SIMPLE Left 4/16/2018    Procedure: MASTECTOMY SIMPLE;  LEFT SIMPLE MASTECTOMY,LEFT AXILLARY DISSECTION;  Surgeon: Rodney Umana MD;  Location: [facility]     RECONSTRUCT BREAST LATISSIMUS DORSI PEDICLE Left 3/22/2019    Procedure: LEFT LATISSIMUS FLAP RECONSTRUCT WITH TISSUE EXPANDER AND PORT REMOVAL;  Surgeon: Naa Crook MD;  Location:  OR     REMOVE PORT VASCULAR ACCESS N/A 3/22/2019    Procedure: REMOVE PORT VASCULAR ACCESS;  Surgeon: Naa Crook MD;  Location:  OR         SOCIAL HISTORY:  Social History     Socioeconomic History     Marital status: Single     Spouse name: Not on file     Number of children: 0     Years of education: Not on file     Highest education level: Not on file   Occupational History     Employer: [employer]    Social Needs     Financial resource strain: Not on file     Food insecurity:     Worry: Not on file     Inability: Not on file     Transportation needs:     Medical: Not on file     Non-medical: Not on file   Tobacco Use     Smoking status: Current Every Day Smoker     Packs/day: 0.50     Types: Cigarettes     Smokeless tobacco: Never Used     Tobacco comment: 4 cigarettes per day   Substance and Sexual Activity     Alcohol use: Yes     Alcohol/week: 1.0 standard drinks     Types: 1 Standard drinks or equivalent per week     Comment: 2-3 drinks per week     Drug use: No     Sexual activity: Not Currently     Partners: Male   Lifestyle     Physical activity:     Days per week: Not on file     Minutes per session: Not on file     Stress: Not on file  "  Relationships     Social connections:     Talks on phone: Not on file     Gets together: Not on file     Attends Restorationism service: Not on file     Active member of club or organization: Not on file     Attends meetings of clubs or organizations: Not on file     Relationship status: Not on file     Intimate partner violence:     Fear of current or ex partner: Not on file     Emotionally abused: Not on file     Physically abused: Not on file     Forced sexual activity: Not on file   Other Topics Concern     Parent/sibling w/ CABG, MI or angioplasty before 65F 55M? No   Social History Narrative     Not on file   She smokes cigarettes, half a pack a day for many years; she is unable to quantify how many.  She drinks alcohol occasionally.  She denies illicit drug use.  She is retired.  She used to to work in  at large companies.  She lives in [place] with her boyfriend.        FAMILY HISTORY:  Family History   Problem Relation Age of Onset     Pancreatitis Mother      Substance Abuse Mother      Unknown/Adopted Father      She does not know much about her family history and is unaware of any cancers in her family.  She has never been pregnant.  She had a hysterectomy in the .  She still has her ovaries.  She says that she has undergone menopause but is unsure when, likely years ago.        PHYSICAL EXAM:  Vital signs:  BP (!) 151/88 (BP Location: Right arm, Patient Position: Sitting, Cuff Size: Adult Regular)   Pulse 59   Temp 96  F (35.6  C) (Oral)   Resp 18   Ht 1.588 m (5' 2.5\")   Wt 47.3 kg (104 lb 3.2 oz)   SpO2 96%   BMI 18.75 kg/m      ECO  GENERAL/CONSTITUTIONAL: No acute distress.  EYES: No scleral icterus.  CARDIOVASCULAR: Regular rate and rhythm.  RESPIRATORY: Clear to ausculation bilaterally.  BREAST: Right-no palpable mass, discharge, rash, or axillary lymphadenopathy.  Left-s/p tissue expander placement  NEUROLOGIC: Alert, oriented, answers questions " appropriately.  INTEGUMENTARY: No jaundice.  Port in place at the right upper chest.      LABS:  None today.      IMAGING:  DEXA scan 1/4/19:  1. Mild osteoporosis in the distal radius. Advanced osteopenia but not  yet osteoporosis in the hips bilaterally.  2. The probability of major osteoporotic fracture is 8.1%  and  probability of hip fracture is 2.0%  within the next 10 years  according to FRAX risk assessment.     Right mammogram 4/15/19:  BI-RADS 1 - negative      ASSESSMENT/PLAN:  Muriel Diaz is a 58 year old post-menopausal female with:    1) Left breast cancer of the upper inner quadrant: s/p left mastectomy on 4/16/18; pathology showed invasive mammary carcinoma, with lobular and ductal features, grade 2, tumor size 4.3 x 4.0 x 2.1 cm, positive margin for invasive carcinoma in the central and lateral posterior surface, DCIS and LCIS present, ER positive (95%), WV positive (50%), HER-2 negative.  Left axillary dissection showed 3 of 5 lymph nodes were positive for metastatic carcinoma (lobular type), also ER positive, WV positive, HER-2 negative. Stage [id number] (stage IIB).  She has completed adjuvant chemotherapy.  She completed radiation on 12/17/18.    She started anastrozole on 12/28/18 and tolerating well.    She had mammogram done on 4/15/19, which was BI-RADS 1 - negative.    -referred for lymphedema therapy previously  -Muriel has met with plastic surgery, Dr. Crook.  She underwent reconstruction with left latissimus flap and left tissue expander placement on 3/22/19.  Follow-up with plastic surgery.   -continue anastrozole 1 mg daily - will plan for 5-10 years treatment, if can tolerate.  Prescription was renewed today.  -next right-sided mammogram in April 2020 - ordered  -return to clinic in 6 months    2) Smoking: She is trying to quit, but her boyfriend smokes too, so it is difficult for her.  -I again advised smoking cessation  -she will see her PCP for tobacco cessation.    3)  "Hypertension:   -follows with PCP    4) Chronic back pain: s/p history of back surgeries  -she takes soma  -follows with PCP    5) Bone health: She is on aromatase inhibitor.  DEXA scan on 1/4/19 showed mild osteoporosis in the distal radius.  Advanced osteopenia in the bilateral hips.  -next DEXA scan in January 2021  -she discussed with her PCP; weight bearing exercise, stop smoking, increased calcium intake, vitamin D supplement    6) She will get flu shot today.      I spent a total of 25 minutes with the patient, with over >50% of the time in counseling and/or coordination of care.       Patience Mckeon MD  Hematology/Oncology  [facility]      Oncology Rooming Note    October 22, 2019 12:38 PM   Muriel Diaz is a 58 year old female who presents for:    Chief Complaint   Patient presents with     Oncology Clinic Visit     Initial Vitals: There were no vitals taken for this visit. Estimated body mass index is 18.21 kg/m  as calculated from the following:    Height as of 4/26/19: 1.588 m (5' 2.5\").    Weight as of 4/26/19: 45.9 kg (101 lb 3.2 oz). There is no height or weight on file to calculate BSA.  Data Unavailable Comment: Data Unavailable   No LMP recorded. Patient has had a hysterectomy.  Allergies reviewed: Yes  Medications reviewed: Yes    Medications: MEDICATION REFILLS NEEDED TODAY. Provider was notified.  Pharmacy name entered into [facility]:    [facility] [address]  [facility] [place], [place] - [address] [place] [address] [facility]ARMACY [id number] [place], [place] - [address]    Clinical concerns:  Dr. Mckeon was notified.      Shari J. Schoenberger, [facility]              Oncology Rooming Note    October 22, 2019 12:45 PM   Muriel Diaz is a 58 year old female who presents for:    Chief Complaint   Patient presents with     Oncology Clinic Visit     Initial Vitals: BP (!) 151/88 (BP Location: Right arm, Patient Position: Sitting, Cuff Size: Adult " "Regular)   Pulse 59   Temp 96  F (35.6  C) (Oral)   Resp 18   Ht 1.588 m (5' 2.5\")   Wt 47.3 kg (104 lb 3.2 oz)   SpO2 96%   BMI 18.75 kg/m    Estimated body mass index is 18.75 kg/m  as calculated from the following:    Height as of this encounter: 1.588 m (5' 2.5\").    Weight as of this encounter: 47.3 kg (104 lb 3.2 oz). Body surface area is 1.44 meters squared.  No Pain (0) Comment: Data Unavailable   No LMP recorded. Patient has had a hysterectomy.  Allergies reviewed: Yes  Medications reviewed: Yes    Medications: MEDICATION REFILLS NEEDED TODAY. Provider was notified. Refill ANASTROZOLE  Pharmacy name entered into [facility]:    [facility] [address]  [facility] [place], [place] - [address] [id number] [place], [place] - [address]    Clinical concerns: no     Teagan Lakhani [facility]                Again, thank you for allowing me to participate in the care of your patient.        Sincerely,        Patience Mckeon MD    "

## 2019-10-22 NOTE — TELEPHONE ENCOUNTER
Oncology Distress Screening Follow-up  Clinical Social Work  [facility]    Identified Concern and Score From Distress Screenin. If you want to be contacted by one of our professionals, I can send a message to them right now.   Oncology Social  Worker       Date of Distress Screening: 10/22/19    Intervention:   Muriel is a 58-year-old woman who is followed by Dr. Mckeon at [facility] for her diagnosis of breast cancer, she is s/p mastectomy, chemotherapy, radiation, and is now on anastrozole. This clinician called Muriel today at her request for psychosocial follow-up by phone. This clinician called and left voicemail with social work contact information. Will await return call.     Follow-up Required:   No further social work outreach at present time. Will await return call from Muriel.     Please page in the case of psychosocial distress or need    LAUERN Frances, [facility]  Phone: [phone]  Pager: [phone]    [facility]: M, Thu  *every other Tue, 8am-4:30pm  [facility]: W, F, *every other Tue, 8am-4:30pm

## 2019-10-22 NOTE — PROGRESS NOTES
[facility]    Hematology/Oncology Established Patient Note      Today's Date: 10/22/19    Reason for Follow-up: left sided breast cancer      HISTORY OF PRESENT ILLNESS: Muriel Diaz is a 58 year old post-menopausal female with PMHx of HTN, degenerative joint disease, history of cervical spine surgery, who presents with left-sided breast cancer.  She underwent left mastectomy on 4/16/18 by Dr. Umana.  Pathology showed invasive mammary carcinoma, with lobular and ductal features, grade 2, tumor size 4.3 x 4.0 x 2.1 cm, positive margin for invasive carcinoma in the central and lateral posterior surface, DCIS and LCIS present, ER positive (95%), GA positive (50%), HER-2 negative.  Left axillary dissection showed 3 of 5 lymph nodes were positive for metastatic carcinoma (lobular type), also ER positive, GA positive, HER-2 negative. Stage [id number] (stage IIA).      She had port placed on 5/14/18. Port was removed on 3/22/19.    She started chemotherapy on 5/22/18, and completed dose-dense doxorubicin+cyclophosphamide x 4 cycles, followed by weekly paclitaxel x 12, completed on 10/2/18.    She completed radiation on 12/17/18.    She started anastrozole on 12/28/18.  .    On 3/22/19, she underwent reconstruction surgery with left latissimus flap and left tissue expander placement.        INTERIM HISTORY: Muriel is here for follow-up.   She is feeing well.  She continues on anastrozole and denies problems with that.  She says that her insurance is changing, so hasn't scheduled her plastic surgery follow-up yet.        REVIEW OF SYSTEMS:   14 point ROS was reviewed and is negative other than as noted above in HPI.       HOME MEDICATIONS:  Current Outpatient Medications   Medication Sig Dispense Refill     anastrozole (ARIMIDEX) 1 MG tablet Take 1 tablet (1 mg) by mouth daily 90 tablet 3     atenolol (TENORMIN) 25 MG tablet Take 1 tablet (25 mg) by mouth daily 90 tablet 3     carisoprodol  (SOMA) 350 MG tablet Take 350 mg by mouth 3 times daily       HYDROmorphone (DILAUDID) 2 MG tablet Take 1-2 tablets (2-4 mg) by mouth every 3 hours as needed 20 tablet 0     vitamin D3 (CHOLECALCIFEROL) 1000 units (25 mcg) tablet Take 2 tablets (2,000 Units) by mouth daily 180 tablet 3     vitamin D3 (CHOLECALCIFEROL) 2000 units (50 mcg) tablet Take 2,000 Units by mouth daily 100 tablet 3         ALLERGIES:  Allergies   Allergen Reactions     Amitriptyline Other (See Comments)     nightmares     Oxycontin [Oxycodone] Nausea     Severe headaches         PAST MEDICAL HISTORY:  Past Medical History:   Diagnosis Date     Cancer (H)     BREAST CANCER     Degeneration of cervical intervertebral disc      Degeneration of lumbar or lumbosacral intervertebral disc      Hypertension      PONV (postoperative nausea and vomiting)          PAST SURGICAL HISTORY:  Past Surgical History:   Procedure Laterality Date     BACK SURGERY  2001    lumbar fusion, cervical discectomy     DISSECT LYMPH NODE AXILLA Left 4/16/2018    Procedure: DISSECT LYMPH NODE AXILLA;;  Surgeon: Rodney Umana MD;  Location: [facility]     FUSION CERVICAL ANTERIOR THREE+ LEVELS  5/1/2012    Procedure:FUSION CERVICAL ANTERIOR THREE+ LEVELS; Surgeon:SARAH FRANCO; Location:[facility]     FUSION CERVICAL POSTERIOR THREE+ LEVELS  5/1/2012    Procedure:FUSION CERVICAL POSTERIOR THREE+ LEVELS; Posterior Cervical decompression and fusion C4-7, Anterior Cervical decompression and fusion C4-7 (c-arm), (herb table with abraham, yina oasis, yina aviator, Vitoss foam packing strip, impulse monitoring,); Surgeon:SARAH FRANCO; Location: OR     GYN SURGERY       HYSTERECTOMY VAGINAL  1990's    Judy Aceves OB Gyn specilist     HYSTERECTOMY, PAP NO LONGER INDICATED       INSERT PORT VASCULAR ACCESS N/A 5/14/2018    Procedure: INSERT PORT VASCULAR ACCESS;  PORT PLACEMENT;  Surgeon: Rodney Umana MD;  Location: [facility]     INSERT  TISSUE EXPANDER BREAST Bilateral 3/22/2019    Procedure: INSERT TISSUE EXPANDER BREAST;  Surgeon: Naa Crook MD;  Location: [facility]     MASTECTOMY SIMPLE Left 4/16/2018    Procedure: MASTECTOMY SIMPLE;  LEFT SIMPLE MASTECTOMY,LEFT AXILLARY DISSECTION;  Surgeon: Rodney Umana MD;  [facility]     RECONSTRUCT BREAST LATISSIMUS DORSI PEDICLE Left 3/22/2019    Procedure: LEFT LATISSIMUS FLAP RECONSTRUCT WITH TISSUE EXPANDER AND PORT REMOVAL;  Surgeon: Naa Crook MD;  Location:  OR     REMOVE PORT VASCULAR ACCESS N/A 3/22/2019    Procedure: REMOVE PORT VASCULAR ACCESS;  Surgeon: Naa Crook MD;  Location:  OR         SOCIAL HISTORY:  Social History     Socioeconomic History     Marital status: Single     Spouse name: Not on file     Number of children: 0     Years of education: Not on file     Highest education level: Not on file   Occupational History     Employer: [employer]    Social Needs     Financial resource strain: Not on file     Food insecurity:     Worry: Not on file     Inability: Not on file     Transportation needs:     Medical: Not on file     Non-medical: Not on file   Tobacco Use     Smoking status: Current Every Day Smoker     Packs/day: 0.50     Types: Cigarettes     Smokeless tobacco: Never Used     Tobacco comment: 4 cigarettes per day   Substance and Sexual Activity     Alcohol use: Yes     Alcohol/week: 1.0 standard drinks     Types: 1 Standard drinks or equivalent per week     Comment: 2-3 drinks per week     Drug use: No     Sexual activity: Not Currently     Partners: Male   Lifestyle     Physical activity:     Days per week: Not on file     Minutes per session: Not on file     Stress: Not on file   Relationships     Social connections:     Talks on phone: Not on file     Gets together: Not on file     Attends Judaism service: Not on file     Active member of club or organization: Not on file     Attends meetings of clubs or organizations: Not on  "file     Relationship status: Not on file     Intimate partner violence:     Fear of current or ex partner: Not on file     Emotionally abused: Not on file     Physically abused: Not on file     Forced sexual activity: Not on file   Other Topics Concern     Parent/sibling w/ CABG, MI or angioplasty before 65F 55M? No   Social History Narrative     Not on file   She smokes cigarettes, half a pack a day for many years; she is unable to quantify how many.  She drinks alcohol occasionally.  She denies illicit drug use.  She is retired.  She used to to work in  at large companies.  She lives in [place] with her boyfriend.        FAMILY HISTORY:  Family History   Problem Relation Age of Onset     Pancreatitis Mother      Substance Abuse Mother      Unknown/Adopted Father      She does not know much about her family history and is unaware of any cancers in her family.  She has never been pregnant.  She had a hysterectomy in the .  She still has her ovaries.  She says that she has undergone menopause but is unsure when, likely years ago.        PHYSICAL EXAM:  Vital signs:  BP (!) 151/88 (BP Location: Right arm, Patient Position: Sitting, Cuff Size: Adult Regular)   Pulse 59   Temp 96  F (35.6  C) (Oral)   Resp 18   Ht 1.588 m (5' 2.5\")   Wt 47.3 kg (104 lb 3.2 oz)   SpO2 96%   BMI 18.75 kg/m     ECO  GENERAL/CONSTITUTIONAL: No acute distress.  EYES: No scleral icterus.  CARDIOVASCULAR: Regular rate and rhythm.  RESPIRATORY: Clear to ausculation bilaterally.  BREAST: Right-no palpable mass, discharge, rash, or axillary lymphadenopathy.  Left-s/p tissue expander placement  NEUROLOGIC: Alert, oriented, answers questions appropriately.  INTEGUMENTARY: No jaundice.  Port in place at the right upper chest.      LABS:  None today.      IMAGING:  DEXA scan 19:  1. Mild osteoporosis in the distal radius. Advanced osteopenia but not  yet osteoporosis in the hips bilaterally.  2. The " probability of major osteoporotic fracture is 8.1%  and  probability of hip fracture is 2.0%  within the next 10 years  according to FRAX risk assessment.     Right mammogram 4/15/19:  BI-RADS 1 - negative      ASSESSMENT/PLAN:  Muriel Diaz is a 58 year old post-menopausal female with:    1) Left breast cancer of the upper inner quadrant: s/p left mastectomy on 4/16/18; pathology showed invasive mammary carcinoma, with lobular and ductal features, grade 2, tumor size 4.3 x 4.0 x 2.1 cm, positive margin for invasive carcinoma in the central and lateral posterior surface, DCIS and LCIS present, ER positive (95%), KS positive (50%), HER-2 negative.  Left axillary dissection showed 3 of 5 lymph nodes were positive for metastatic carcinoma (lobular type), also ER positive, KS positive, HER-2 negative. Stage [id number] (stage IIB).  She has completed adjuvant chemotherapy.  She completed radiation on 12/17/18.    She started anastrozole on 12/28/18 and tolerating well.    She had mammogram done on 4/15/19, which was BI-RADS 1 - negative.    -referred for lymphedema therapy previously  -Muriel has met with plastic surgery, Dr. Crook.  She underwent reconstruction with left latissimus flap and left tissue expander placement on 3/22/19.  Follow-up with plastic surgery.   -continue anastrozole 1 mg daily - will plan for 5-10 years treatment, if can tolerate.  Prescription was renewed today.  -next right-sided mammogram in April 2020 - ordered  -return to clinic in 6 months    2) Smoking: She is trying to quit, but her boyfriend smokes too, so it is difficult for her.  -I again advised smoking cessation  -she will see her PCP for tobacco cessation.    3) Hypertension:   -follows with PCP    4) Chronic back pain: s/p history of back surgeries  -she takes soma  -follows with PCP    5) Bone health: She is on aromatase inhibitor.  DEXA scan on 1/4/19 showed mild osteoporosis in the distal radius.  Advanced osteopenia in the  bilateral hips.  -next DEXA scan in January 2021  -she discussed with her PCP; weight bearing exercise, stop smoking, increased calcium intake, vitamin D supplement    6) She will get flu shot today.      I spent a total of 25 minutes with the patient, with over >50% of the time in counseling and/or coordination of care.       Patience Mckeon MD  Hematology/Oncology  [facility]

## 2019-10-22 NOTE — PROGRESS NOTES
"Oncology Rooming Note    October 22, 2019 12:45 PM   Muriel Diaz is a 58 year old female who presents for:    Chief Complaint   Patient presents with     Oncology Clinic Visit     Initial Vitals: BP (!) 151/88 (BP Location: Right arm, Patient Position: Sitting, Cuff Size: Adult Regular)   Pulse 59   Temp 96  F (35.6  C) (Oral)   Resp 18   Ht 1.588 m (5' 2.5\")   Wt 47.3 kg (104 lb 3.2 oz)   SpO2 96%   BMI 18.75 kg/m   Estimated body mass index is 18.75 kg/m  as calculated from the following:    Height as of this encounter: 1.588 m (5' 2.5\").    Weight as of this encounter: 47.3 kg (104 lb 3.2 oz). Body surface area is 1.44 meters squared.  No Pain (0) Comment: Data Unavailable   No LMP recorded. Patient has had a hysterectomy.  Allergies reviewed: Yes  Medications reviewed: Yes    Medications: MEDICATION REFILLS NEEDED TODAY. Provider was notified. Refill ANASTROZOLE  Pharmacy name entered into [facility]:    [facility] [address]  [facility] [place], [place] - [address] [facility] [id number] - [place], [place] - [address]    Clinical concerns: no     Teagan Lakhani CMA              "

## 2019-10-22 NOTE — PROGRESS NOTES
"Oncology Rooming Note    October 22, 2019 12:38 PM   Muriel Diaz is a 58 year old female who presents for:    Chief Complaint   Patient presents with     Oncology Clinic Visit     Initial Vitals: There were no vitals taken for this visit. Estimated body mass index is 18.21 kg/m  as calculated from the following:    Height as of 4/26/19: 1.588 m (5' 2.5\").    Weight as of 4/26/19: 45.9 kg (101 lb 3.2 oz). There is no height or weight on file to calculate BSA.  Data Unavailable Comment: Data Unavailable   No LMP recorded. Patient has had a hysterectomy.  Allergies reviewed: Yes  Medications reviewed: Yes    Medications: MEDICATION REFILLS NEEDED TODAY. Provider was notified.  Pharmacy name entered into [employer]:    [facility] [address]  [facility] [place], [place] - [address] [facility] [id number] [place], [place] - [address]    Clinical concerns:  Dr. Mckeon was notified.      Shari J. Schoenberger, [facility]            "

## 2019-10-29 ENCOUNTER — TELEPHONE (OUTPATIENT)
Dept: ONCOLOGY | Facility: CLINIC | Age: 59
End: 2019-10-29

## 2019-10-29 NOTE — TELEPHONE ENCOUNTER
Oncology Distress Screening Follow-up  Clinical Social Work  [facility]      Intervention:   Muriel is a 58-year-old woman who is followed by Dr. Mckeon at [facility] for her diagnosis of breast cancer, she is s/p mastectomy, chemotherapy, radiation, and is now on anastrozole. This clinician called Muriel today to follow-up regarding insurance concerns discussed last week. Muriel reported that she has met with [employer] and that new coverage will start 11/1/19 and that she is planning to call [employer] [facility] today to discuss if there is an easier way to link up payment with her Medicare plan. Muriel reported that once insurance starts, she plans to schedule follow-up for plastic surgery. No other psychosocial concerns or needs reported today.     Follow-up Required:   No further social work outreach at present time. Muriel knows to call in the case of psychosocial distress or concern.     Please page in the case of psychosocial distress or need    LAUREN Frances, LICSW  Phone: [phone]  Pager: [phone]    [facility]: M, Thu  *every other Tue, 8am-4:30pm  [facility]: W, F, *every other Tue, 8am-4:30pm

## 2019-12-15 ENCOUNTER — HEALTH MAINTENANCE LETTER (OUTPATIENT)
Age: 59
End: 2019-12-15

## 2020-01-12 NOTE — TELEPHONE ENCOUNTER
Received fax from [employer] that patients rx for Atenolol is on back order.  They request an alternative.  Per our pharmacist Nelda we are substituting for Metoprolol succinate 50 mg once daily.  Sent over rx to [employer].  Called patient and left message re: change in rx.   
Patient

## 2020-01-17 DIAGNOSIS — I10 HYPERTENSION GOAL BP (BLOOD PRESSURE) < 140/90: ICD-10-CM

## 2020-01-17 RX ORDER — ATENOLOL 25 MG/1
25 TABLET ORAL DAILY
Qty: 30 TABLET | Refills: 2 | Status: SHIPPED | OUTPATIENT
Start: 2020-01-17 | End: 2020-01-29

## 2020-01-29 ENCOUNTER — OFFICE VISIT (OUTPATIENT)
Dept: FAMILY MEDICINE | Facility: CLINIC | Age: 60
End: 2020-01-29

## 2020-01-29 VITALS
WEIGHT: 106.8 LBS | HEART RATE: 64 BPM | SYSTOLIC BLOOD PRESSURE: 146 MMHG | DIASTOLIC BLOOD PRESSURE: 88 MMHG | OXYGEN SATURATION: 98 % | RESPIRATION RATE: 16 BRPM | BODY MASS INDEX: 19.22 KG/M2

## 2020-01-29 DIAGNOSIS — Z01.818 PREOP GENERAL PHYSICAL EXAM: Primary | ICD-10-CM

## 2020-01-29 DIAGNOSIS — Z23 NEED FOR TD VACCINE: ICD-10-CM

## 2020-01-29 DIAGNOSIS — I10 HYPERTENSION GOAL BP (BLOOD PRESSURE) < 140/90: ICD-10-CM

## 2020-01-29 DIAGNOSIS — F17.200 SMOKER: ICD-10-CM

## 2020-01-29 PROBLEM — C50.212 MALIGNANT NEOPLASM OF UPPER-INNER QUADRANT OF LEFT BREAST IN FEMALE, ESTROGEN RECEPTOR POSITIVE (H): Status: ACTIVE | Noted: 2018-03-29

## 2020-01-29 PROBLEM — Z17.0 MALIGNANT NEOPLASM OF UPPER-INNER QUADRANT OF LEFT BREAST IN FEMALE, ESTROGEN RECEPTOR POSITIVE (H): Status: ACTIVE | Noted: 2018-03-29

## 2020-01-29 PROCEDURE — 90714 TD VACC NO PRESV 7 YRS+ IM: CPT | Performed by: FAMILY MEDICINE

## 2020-01-29 PROCEDURE — 99215 OFFICE O/P EST HI 40 MIN: CPT | Mod: 25 | Performed by: FAMILY MEDICINE

## 2020-01-29 PROCEDURE — 90471 IMMUNIZATION ADMIN: CPT | Performed by: FAMILY MEDICINE

## 2020-01-29 PROCEDURE — 93000 ELECTROCARDIOGRAM COMPLETE: CPT | Mod: 59 | Performed by: FAMILY MEDICINE

## 2020-01-29 RX ORDER — ATENOLOL 50 MG/1
50 TABLET ORAL DAILY
Qty: 90 TABLET | Refills: 3 | Status: SHIPPED | OUTPATIENT
Start: 2020-01-29 | End: 2021-02-03

## 2020-01-29 NOTE — PATIENT INSTRUCTIONS
Before Your Surgery      Call your surgeon if there is any change in your health. This includes signs of a cold or flu (such as a sore throat, runny nose, cough, rash or fever).    Do not smoke, drink alcohol or take over the counter medicine (unless your surgeon or primary care doctor tells you to) for the 24 hours before and after surgery.    If you take prescribed drugs: Follow your doctor s orders about which medicines to take and which to stop until after surgery.    Eating and drinking prior to surgery: follow the instructions from your surgeon    Take a shower or bath the night before surgery. Use the soap your surgeon gave you to gently clean your skin. If you do not have soap from your surgeon, use your regular soap. Do not shave or scrub the surgery site.  Wear clean pajamas and have clean sheets on your bed.     Return in 4-6 weeks for recheck of blood pressure.  [facility] are Nurse Practitioners who are capable of being your new primary care provider.

## 2020-01-29 NOTE — PROGRESS NOTES
[facility]  [address] [place] [id number] [phone]  Dept: [phone]    PRE-OP EVALUATION:  Today's date: 2020    Muriel Diaz (: 1960) presents for pre-operative evaluation assessment as requested by Dr. Kelly Crook.  She requires evaluation and anesthesia risk assessment prior to undergoing surgery/procedure for treatment of left breast cancer .    Proposed Surgery/ Procedure: Breast Reconstuction  Date of Surgery/ Procedure: 2020  Time of Surgery/ Procedure: 1000  Hospital/Surgical Facility: [facility]  Fax number for surgical facility: [phone]  Primary Physician: Isabel Landin  Type of Anesthesia Anticipated: to be determined    Patient has a Health Care Directive or Living Will:  NO    1. NO - Do you have a history of heart attack, stroke, stent, bypass or surgery on an artery in the head, neck, heart or legs?  2. NO - Do you ever have any pain or discomfort in your chest?  3. NO - Do you have a history of  Heart Failure?  4. NO - Are you troubled by shortness of breath when: walking on the level, up a slight hill or at night?  5. NO - Do you currently have a cold, bronchitis or other respiratory infection?  6. NO - Do you have a cough, shortness of breath or wheezing?  7. NO - Do you sometimes get pains in the calves of your legs when you walk?  8. NO - Do you or anyone in your family have previous history of blood clots?  9. NO - Do you or does anyone in your family have a serious bleeding problem such as prolonged bleeding following surgeries or cuts?  10. NO - Have you ever had problems with anemia or been told to take iron pills?  11. NO - Have you had any abnormal blood loss such as black, tarry or bloody stools, or abnormal vaginal bleeding?  12. NO - Have you ever had a blood transfusion?  13. NO - Have you or any of your relatives ever had problems with anesthesia?  14. NO - Do you have sleep apnea,  excessive snoring or daytime drowsiness?  15. NO - Do you have any prosthetic heart valves?  16. NO - Do you have prosthetic joints?  17. NO - Is there any chance that you may be pregnant?      HPI:     HPI related to upcoming procedure: Left breast cancer diagnosed 3/2018, s/p mastectomy, chemo and radiation. She is now ready for breast reconstruction. She had tissue expander placed in May 2019.        HYPERTENSION - Patient has longstanding history of HTN , currently denies any symptoms referable to elevated blood pressure. Specifically denies chest pain, palpitations, dyspnea, orthopnea, PND or peripheral edema. Blood pressure readings have not been in normal range. Current medication regimen is as listed below. Patient denies any side effects of medication.     CURRENT SMOKER - averages 1/2 PPD.    MEDICAL HISTORY:     Patient Active Problem List    Diagnosis Date Noted     Myalgia and myositis 01/30/2020     Priority: Medium     Acquired absence of left breast 03/22/2019     Priority: Medium     Port-A-Cath in place 11/16/2018     Priority: Medium     S/P left mastectomy 04/16/2018     Priority: Medium     Malignant neoplasm of upper-inner quadrant of left breast in female, estrogen receptor positive (H) 03/29/2018     Priority: Medium     Anxiety 12/22/2015     Priority: Medium     DJD (degenerative joint disease) of cervical spine 05/30/2014     Priority: Medium     Dystonia 05/30/2014     Priority: Medium     Degeneration of lumbar or lumbosacral intervertebral disc      Priority: Medium     Strain of hand and finger, right 12/05/2011     Priority: Medium     CARDIOVASCULAR SCREENING; LDL GOAL LESS THAN 160 11/09/2011     Priority: Medium     Per provider         Hypertension goal BP (blood pressure) < 140/90 11/09/2011     Priority: Medium     Per provider         Mixed emotional features as adjustment reaction 07/21/2011     Priority: Medium     Cervicalgia 02/11/2011     Priority: Medium     Displacement  of cervical intervertebral disc without myelopathy 02/11/2011     Priority: Medium     Vertigo 07/19/2010     Priority: Medium     Tobacco use disorder 03/06/2006     Priority: Medium     Allergic rhinitis due to other allergen 03/06/2006     Priority: Medium     Sprain of thoracic region 11/21/2005     Priority: Medium      Past Medical History:   Diagnosis Date     Cancer (H)     BREAST CANCER     Degeneration of cervical intervertebral disc      Degeneration of lumbar or lumbosacral intervertebral disc      Hypertension      PONV (postoperative nausea and vomiting)      Past Surgical History:   Procedure Laterality Date     BACK SURGERY  2001    lumbar fusion, cervical discectomy     DISSECT LYMPH NODE AXILLA Left 4/16/2018    Procedure: DISSECT LYMPH NODE AXILLA;;  Surgeon: Rodney Umana MD;  Location: [facility]     FUSION CERVICAL ANTERIOR THREE+ LEVELS  5/1/2012    Procedure:FUSION CERVICAL ANTERIOR THREE+ LEVELS; Surgeon:SARAH FRANCO; Location:[facility] OR     FUSION CERVICAL POSTERIOR THREE+ LEVELS  5/1/2012    Procedure:FUSION CERVICAL POSTERIOR THREE+ LEVELS; Posterior Cervical decompression and fusion C4-7, Anterior Cervical decompression and fusion C4-7 (c-arm), (herb table with abraham, yina oasis, yina aviator, Vitoss foam packing strip, impulse monitoring,); Surgeon:SARAH FRANCO; Location: OR     GYN SURGERY       HYSTERECTOMY VAGINAL  1990's    Judy Aceves OB Gyn specilist     HYSTERECTOMY, PAP NO LONGER INDICATED       INSERT PORT VASCULAR ACCESS N/A 5/14/2018    Procedure: INSERT PORT VASCULAR ACCESS;  PORT PLACEMENT;  Surgeon: Rodney Umana MD;  Location: [facility]     INSERT TISSUE EXPANDER BREAST Bilateral 3/22/2019    Procedure: INSERT TISSUE EXPANDER BREAST;  Surgeon: Naa Crook MD;  Location:  OR     MASTECTOMY SIMPLE Left 4/16/2018    Procedure: MASTECTOMY SIMPLE;  LEFT SIMPLE MASTECTOMY,LEFT AXILLARY DISSECTION;  Surgeon: Rodney Umana  MD Austyn;  Location:  [facility]     RECONSTRUCT BREAST LATISSIMUS DORSI PEDICLE Left 3/22/2019    Procedure: LEFT LATISSIMUS FLAP RECONSTRUCT WITH TISSUE EXPANDER AND PORT REMOVAL;  Surgeon: Naa Crook MD;  Location:  OR     REMOVE PORT VASCULAR ACCESS N/A 3/22/2019    Procedure: REMOVE PORT VASCULAR ACCESS;  Surgeon: Naa Crook MD;  Location:  OR     Current Outpatient Medications   Medication Sig Dispense Refill     anastrozole (ARIMIDEX) 1 MG tablet Take 1 tablet (1 mg) by mouth daily 90 tablet 3     atenolol (TENORMIN) 50 MG tablet Take 1 tablet (50 mg) by mouth daily 90 tablet 3     carisoprodol (SOMA) 350 MG tablet Take 350 mg by mouth 3 times daily       vitamin D3 (CHOLECALCIFEROL) 2000 units (50 mcg) tablet Take 2,000 Units by mouth daily 100 tablet 3     OTC products: None, except as noted above    Allergies   Allergen Reactions     Amitriptyline Other (See Comments)     nightmares     Oxycontin [Oxycodone] Nausea     Severe headaches      Latex Allergy: NO    Social History     Tobacco Use     Smoking status: Current Every Day Smoker     Packs/day: 0.50     Types: Cigarettes     Smokeless tobacco: Never Used     Tobacco comment: 4 cigarettes per day   Substance Use Topics     Alcohol use: Yes     Alcohol/week: 1.0 standard drinks     Types: 1 Standard drinks or equivalent per week     Comment: 2-3 drinks per week     History   Drug Use No       REVIEW OF SYSTEMS:   Constitutional, neuro, ENT, endocrine, pulmonary, cardiac, gastrointestinal, genitourinary, musculoskeletal, integument and psychiatric systems are negative, except as otherwise noted.    EXAM:   BP (!) 146/88   Pulse 64   Resp 16   Wt 48.4 kg (106 lb 12.8 oz)   SpO2 98%   BMI 19.22 kg/m      GENERAL APPEARANCE: healthy, alert and no distress     EYES: EOMI, PERRL     HENT: ear canals and TM's normal and nose and mouth without ulcers or lesions     NECK: no adenopathy, no asymmetry, masses, or scars and thyroid  normal to palpation     RESP: lungs clear to auscultation - no rales, rhonchi or wheezes     BREAST: firm, unnatural appearance of the left breast due to tissue expander     CV: regular rates and rhythm, normal S1 S2, no S3 or S4 and no murmur, click or rub     ABDOMEN:  soft, nontender, no HSM or masses and bowel sounds normal     MS: extremities normal- no gross deformities noted, no evidence of inflammation in joints, FROM in all extremities.     SKIN: no suspicious lesions or rashes     NEURO: Normal strength and tone, sensory exam grossly normal, mentation intact and speech normal     PSYCH: mentation appears normal. and affect normal/bright     LYMPHATICS: No cervical adenopathy    DIAGNOSTICS:   EKG: appears normal, NSR, sinus bradycardia, normal axis, normal intervals, no acute ST/T changes c/w ischemia, no LVH by voltage criteria, unchanged from previous tracings    Recent Labs   Lab Test 03/22/19  0859 03/15/19  1332 12/28/18  1320 10/19/18  1325  03/21/18  1715  04/13/17  0830  05/01/12  1050   HGB  --  12.9 12.0 12.0   < >  --    < >  --    < > 14.6   PLT  --  271 259 296   < >  --    < >  --    < >  --    INR  --   --   --   --   --   --   --   --   --  0.83*   NA  --   --  143 140   < > 139  --   --    < >  --    POTASSIUM 4.4  --  3.9 3.9   < > 4.8  --   --    < > 3.8   CR 0.84  --  0.79 0.64   < > 0.88  --   --    < >  --    A1C  --   --   --   --   --  5.8*  --  5.7*  --   --     < > = values in this interval not displayed.        IMPRESSION:   Reason for surgery/procedure: need for breast reconstruction post mastectomy  Diagnosis/reason for consult: preoperative clearance    The proposed surgical procedure is considered INTERMEDIATE risk.    REVISED CARDIAC RISK INDEX  The patient has the following serious cardiovascular risks for perioperative complications such as (MI, PE, VFib and 3  AV Block):  No serious cardiac risks  INTERPRETATION: 0 risks: Class I (very low risk - 0.4% complication  rate)    The patient has the following additional risks for perioperative complications:  No identified additional risks      ICD-10-CM    1. Preop general physical exam Z01.818    2. Hypertension goal BP (blood pressure) < 140/90 I10 atenolol (TENORMIN) 50 MG tablet     EKG 12-lead complete w/read - Clinics   3. Need for Td vaccine Z23 C TD PRSERV FREE >=7 YRS ADS IM     ADMIN 1st VACCINE   4. Smoker F17.200        RECOMMENDATIONS:     Quit Plan referral is given. Recommend that she stop smoking prior to her surgery.   Td booster is given.  Atenolol is increased to 50 mg daily for better blood pressure control.    APPROVAL GIVEN to proceed with proposed procedure, without further diagnostic evaluation       Signed Electronically by: Isabel Landin MD    Copy of this evaluation report is provided to requesting physician.    [facility] Preop Guidelines    Revised Cardiac Risk Index

## 2020-02-19 NOTE — DISCHARGE INSTRUCTIONS
After Your Breast Biopsy    Bleeding or bruising: Slight bruising is normal.  If you bleed through the bandage, put direct pressure on the breast.  If you are still bleeding after 20 minutes, call the doctor who ordered the exam.    Bandages: Keep your bandage in place until tomorrow morning.  Do not get it wet. .  On the second day, cover it with a Band-Aid.    Activity: You may shower the morning after the exam.  No heavy activity (lifting, vacuuming) for 24 hours.    Discomfort: Wear your bra overnight to support the breast.  You may take Tylenol (acetaminophen) for pain.  If you had a stereotactic of MR-directed biopsy, you may take aspirin or ibuprofen (Advil, Motrin) the morning after your biopsy, unless your doctor tells you not to.    Infection: Infection is rare.  Symptoms include fever, redness, increasing pain and fluid draining from the biopsy site.  If you have any of these symptoms, please call the doctor who ordered your exam.    Results: Results may take up to three business days.  If you have not heard your results in three days, call the Breast Center Nurse at [phone] or [phone].  In rare cases, we may need to do another biopsy.    Call the doctor who ordered your exam if:    You have bleeding that lasts more than 20 minutes.    You have pain that cannot be controlled.    You have signs of infection (fever, redness, drainage or other signs).    You have not had your results within three days.    Nurse navigator: Our nurse navigator is here to answer your questions and help you set up future clinic visits.  Please call [phone].    Thank you for choosing [facility].  Please call us if you have questions or concerns about your biopsy.   Spoke with pt and she stated that she'll call the office back to schedule her post op appointment. 2/19/20 benji

## 2020-04-10 ENCOUNTER — TELEPHONE (OUTPATIENT)
Dept: ONCOLOGY | Facility: CLINIC | Age: 60
End: 2020-04-10

## 2020-04-10 NOTE — TELEPHONE ENCOUNTER
Muriel called stating she has her mammogram arragned for the end of this month. Sh reecently had reconstructive surgery and wants to know if the mammogram should be pushed out and for how long.      Will route to Dr. Mckeon and RNANTIONE Tatum to advise.

## 2020-04-10 NOTE — TELEPHONE ENCOUNTER
"Per Dr. Mckeon, \"She can push it out to this summer.  I don't think the breast center is doing screening mammograms currently anyway due to COVID.\"    Writer notified patient of Dr. Mckeon's recommendations. She will call back to reschedule Monday.     Deepti Chandra, RHONAN, RN, PHN    "

## 2020-04-29 NOTE — PROGRESS NOTES
Received a fax from [employer] that patient's Tenivac inject that was given was approved.  This was given as a routine injection.

## 2020-06-20 DIAGNOSIS — R79.89 LOW VITAMIN D LEVEL: ICD-10-CM

## 2020-06-21 RX ORDER — CHOLECALCIFEROL (VITAMIN D3) 50 MCG
TABLET ORAL DAILY
Qty: 100 TABLET | Refills: 0 | Status: SHIPPED | OUTPATIENT
Start: 2020-06-21 | End: 2020-11-05

## 2020-07-13 ENCOUNTER — PATIENT OUTREACH (OUTPATIENT)
Dept: ONCOLOGY | Facility: CLINIC | Age: 60
End: 2020-07-13

## 2020-07-13 DIAGNOSIS — C50.212 MALIGNANT NEOPLASM OF UPPER-INNER QUADRANT OF LEFT BREAST IN FEMALE, ESTROGEN RECEPTOR POSITIVE (H): Primary | ICD-10-CM

## 2020-07-13 DIAGNOSIS — Z17.0 MALIGNANT NEOPLASM OF UPPER-INNER QUADRANT OF LEFT BREAST IN FEMALE, ESTROGEN RECEPTOR POSITIVE (H): Primary | ICD-10-CM

## 2020-07-13 NOTE — PROGRESS NOTES
Patience Mckeon MD  You; Deonna Barnard RN 3 hours ago (11:09 AM)       Agree that patient should be evaluated this week by me or Jorje.  If she declines, She can at least get a left upper extremity ultrasound to rule out blood clot.    Message text

## 2020-07-13 NOTE — PROGRESS NOTES
"Patient was transferred from scheduling because she a had a message of concern. Patient is experiencing lymphedema of her left arm and then states \"my whole left side of the body\", might be affected.  I verbalized to patient that it should be evaluated today or tomorrow, Patient declined since she is scheduled for a virtual video visit with Dr. Mckeon. I verbalized that I would route a message to Dr. Mckeon.    Dr. Mckeon please review and advise,    Message routed to Dr. Mckeon and covering CCRN.    Deonna Barnard RN        "

## 2020-07-13 NOTE — PROGRESS NOTES
Called Muriel. who states she does not want to come in any earlier than the visit she currently has arranged.     She also declined the ultrasound.     States this is the second time this has happened, the first time was last October, states she mentioned it at her visit.  Swelling comes and goes and is not constant.    Will route to TERI TatumCC for Dr. Mckeon

## 2020-07-20 ENCOUNTER — HOSPITAL ENCOUNTER (OUTPATIENT)
Dept: MAMMOGRAPHY | Facility: CLINIC | Age: 60
Discharge: HOME | End: 2020-07-20
Attending: INTERNAL MEDICINE | Admitting: INTERNAL MEDICINE
Payer: MEDICARE

## 2020-07-20 DIAGNOSIS — Z12.31 VISIT FOR SCREENING MAMMOGRAM: ICD-10-CM

## 2020-07-20 PROCEDURE — 77063 BREAST TOMOSYNTHESIS BI: CPT | Mod: 52

## 2020-07-22 ENCOUNTER — VIRTUAL VISIT (OUTPATIENT)
Dept: ONCOLOGY | Facility: CLINIC | Age: 60
End: 2020-07-22
Attending: INTERNAL MEDICINE
Payer: MEDICARE

## 2020-07-22 DIAGNOSIS — C50.212 MALIGNANT NEOPLASM OF UPPER-INNER QUADRANT OF LEFT BREAST IN FEMALE, ESTROGEN RECEPTOR POSITIVE (H): ICD-10-CM

## 2020-07-22 DIAGNOSIS — Z78.0 ASYMPTOMATIC MENOPAUSAL STATE: ICD-10-CM

## 2020-07-22 DIAGNOSIS — Z17.0 MALIGNANT NEOPLASM OF UPPER-INNER QUADRANT OF LEFT BREAST IN FEMALE, ESTROGEN RECEPTOR POSITIVE (H): ICD-10-CM

## 2020-07-22 PROCEDURE — 99214 OFFICE O/P EST MOD 30 MIN: CPT | Mod: 95 | Performed by: INTERNAL MEDICINE

## 2020-07-22 PROCEDURE — 40001009 ZZH VIDEO/TELEPHONE VISIT; NO CHARGE

## 2020-07-22 RX ORDER — ANASTROZOLE 1 MG/1
1 TABLET ORAL DAILY
Qty: 90 TABLET | Refills: 3 | Status: SHIPPED | OUTPATIENT
Start: 2020-07-22 | End: 2021-07-20

## 2020-07-22 ASSESSMENT — PAIN SCALES - GENERAL: PAINLEVEL: NO PAIN (1)

## 2020-07-22 NOTE — LETTER
"    7/22/2020         RE: Muriel Diaz  [address]  [facility] [address]        Dear Colleague,    Thank you for referring your patient, Muriel Diaz, to the [facility]. Please see a copy of my visit note below.    Muriel Diaz is a 59 year old female who is being evaluated via a billable video visit.      The patient has been notified of following:     \"This video visit will be conducted via a call between you and your physician/provider. We have found that certain health care needs can be provided without the need for an in-person physical exam.  This service lets us provide the care you need with a video conversation.  If a prescription is necessary we can send it directly to your pharmacy.  If lab work is needed we can place an order for that and you can then stop by our lab to have the test done at a later time.    Video visits are billed at different rates depending on your insurance coverage.  Please reach out to your insurance provider with any questions.    If during the course of the call the physician/provider feels a video visit is not appropriate, you will not be charged for this service.\"    Patient has given verbal consent for Video visit? Yes  How would you like to obtain your AVS? MyChart  If you are dropped from the video visit, the video invite should be resent to: Send to e-mail at: [email or web address]  Will anyone else be joining your video visit? No      Video-Visit Details    Type of service:  Video Visit    Video Start Time: 10:03 am  Video End Time: 10:10 am    Originating Location (pt. Location): Home    Distant Location (provider location):  [facility]     Platform used for Video Visit: Anmol Galeas MA      [facility]    Hematology/Oncology Established Patient Note      Today's Date: 7/22/20    Reason for Follow-up: left sided breast cancer      HISTORY OF PRESENT ILLNESS: Muriel Diaz is a 59 " year old post-menopausal female with PMHx of HTN, degenerative joint disease, history of cervical spine surgery, who presents with left-sided breast cancer.  She underwent left mastectomy on 4/16/18 by Dr. Umana.  Pathology showed invasive mammary carcinoma, with lobular and ductal features, grade 2, tumor size 4.3 x 4.0 x 2.1 cm, positive margin for invasive carcinoma in the central and lateral posterior surface, DCIS and LCIS present, ER positive (95%), MT positive (50%), HER-2 negative.  Left axillary dissection showed 3 of 5 lymph nodes were positive for metastatic carcinoma (lobular type), also ER positive, MT positive, HER-2 negative. Stage [id number] (stage IIA).      She had port placed on 5/14/18. Port was removed on 3/22/19.    She started chemotherapy on 5/22/18, and completed dose-dense doxorubicin+cyclophosphamide x 4 cycles, followed by weekly paclitaxel x 12, completed on 10/2/18.    She completed radiation on 12/17/18.    She started anastrozole on 12/28/18.  .    On 3/22/19, she underwent reconstruction surgery with left latissimus flap and left tissue expander placement.        INTERIM HISTORY: Muriel says that she is doing well.  A couple of weeks ago, she experienced left arm swelling.  Ultrasound was recommended to rule out DVT, but she declined that and a visit because it has happened before.  It was likely lymphedema.  She says that it would come and go, but each time it would last about a month.  It is currently not swollen.  She was agreeable to a lymphedema therapy referral, but she has not made the appointment yet due to reluctance to go out due to pandemic.      REVIEW OF SYSTEMS:   14 point ROS was reviewed and is negative other than as noted above in HPI.       HOME MEDICATIONS:  Current Outpatient Medications   Medication Sig Dispense Refill     anastrozole (ARIMIDEX) 1 MG tablet Take 1 tablet (1 mg) by mouth daily 90 tablet 3     atenolol (TENORMIN) 50 MG tablet Take 1 tablet (50 mg)  by mouth daily 90 tablet 3     carisoprodol (SOMA) 350 MG tablet Take 350 mg by mouth 3 times daily       vitamin D3 (CHOLECALCIFEROL) 50 mcg (2000 units) tablet Take 2,000 Units by mouth daily 100 tablet 0     atenolol (TENORMIN) 25 MG tablet Take 2 tablets (50 mg) by mouth daily 60 tablet 0         ALLERGIES:  Allergies   Allergen Reactions     Amitriptyline Other (See Comments)     nightmares     Oxycontin [Oxycodone] Nausea     Severe headaches         PAST MEDICAL HISTORY:  Past Medical History:   Diagnosis Date     Cancer (H)     BREAST CANCER     Degeneration of cervical intervertebral disc      Degeneration of lumbar or lumbosacral intervertebral disc      Hypertension      PONV (postoperative nausea and vomiting)          PAST SURGICAL HISTORY:  Past Surgical History:   Procedure Laterality Date     BACK SURGERY  2001    lumbar fusion, cervical discectomy     DISSECT LYMPH NODE AXILLA Left 4/16/2018    Procedure: DISSECT LYMPH NODE AXILLA;;  Surgeon: Rodney Umana MD;  Location: [facility]     FUSION CERVICAL ANTERIOR THREE+ LEVELS  5/1/2012    Procedure:FUSION CERVICAL ANTERIOR THREE+ LEVELS; Surgeon:SARAH FRANCO; Location:[facility] OR     FUSION CERVICAL POSTERIOR THREE+ LEVELS  5/1/2012    Procedure:FUSION CERVICAL POSTERIOR THREE+ LEVELS; Posterior Cervical decompression and fusion C4-7, Anterior Cervical decompression and fusion C4-7 (c-arm), (herb table with abraham, yina oasis, yina aviator, Vitoss foam packing strip, impulse monitoring,); Surgeon:SARAH FRANCO; Location: OR     GYN SURGERY       HYSTERECTOMY VAGINAL  1990's    Judy Aceves OB Gyn specilist     HYSTERECTOMY, PAP NO LONGER INDICATED       INSERT PORT VASCULAR ACCESS N/A 5/14/2018    Procedure: INSERT PORT VASCULAR ACCESS;  PORT PLACEMENT;  Surgeon: Rodney Umana MD;  Location: [facility]     INSERT TISSUE EXPANDER BREAST Bilateral 3/22/2019    Procedure: INSERT TISSUE EXPANDER BREAST;  Surgeon:  Naa Crook MD;  Location: [facility] OR     MASTECTOMY SIMPLE Left 4/16/2018    Procedure: MASTECTOMY SIMPLE;  LEFT SIMPLE MASTECTOMY,LEFT AXILLARY DISSECTION;  Surgeon: Rodney Umana MD;  Location:  [facility]     RECONSTRUCT BREAST LATISSIMUS DORSI PEDICLE Left 3/22/2019    Procedure: LEFT LATISSIMUS FLAP RECONSTRUCT WITH TISSUE EXPANDER AND PORT REMOVAL;  Surgeon: Naa Crook MD;  Location:  OR     REMOVE PORT VASCULAR ACCESS N/A 3/22/2019    Procedure: REMOVE PORT VASCULAR ACCESS;  Surgeon: Naa Crook MD;  Location:  OR         SOCIAL HISTORY:  Social History     Socioeconomic History     Marital status: Single     Spouse name: Not on file     Number of children: 0     Years of education: Not on file     Highest education level: Not on file   Occupational History     Employer: [employer]    Social Needs     Financial resource strain: Not on file     Food insecurity     Worry: Not on file     Inability: Not on file     Transportation needs     Medical: Not on file     Non-medical: Not on file   Tobacco Use     Smoking status: Current Every Day Smoker     Packs/day: 0.50     Types: Cigarettes     Smokeless tobacco: Never Used     Tobacco comment: 4 cigarettes per day   Substance and Sexual Activity     Alcohol use: Yes     Alcohol/week: 1.0 standard drinks     Types: 1 Standard drinks or equivalent per week     Comment: 2-3 drinks per week     Drug use: No     Sexual activity: Not Currently     Partners: Male   Lifestyle     Physical activity     Days per week: Not on file     Minutes per session: Not on file     Stress: Not on file   Relationships     Social connections     Talks on phone: Not on file     Gets together: Not on file     Attends Bahai service: Not on file     Active member of club or organization: Not on file     Attends meetings of clubs or organizations: Not on file     Relationship status: Not on file     Intimate partner violence     Fear of current or ex  partner: Not on file     Emotionally abused: Not on file     Physically abused: Not on file     Forced sexual activity: Not on file   Other Topics Concern     Parent/sibling w/ CABG, MI or angioplasty before 65F 55M? No   Social History Narrative     Not on file   She smokes cigarettes, half a pack a day for many years; she is unable to quantify how many.  She drinks alcohol occasionally.  She denies illicit drug use.  She is retired.  She used to to work in  at large companies.  She lives in [place] with her boyfriend.        FAMILY HISTORY:  Family History   Problem Relation Age of Onset     Pancreatitis Mother      Substance Abuse Mother      Unknown/Adopted Father      She does not know much about her family history and is unaware of any cancers in her family.  She has never been pregnant.  She had a hysterectomy in the .  She still has her ovaries.  She says that she has undergone menopause but is unsure when, likely years ago.        PHYSICAL EXAM:  Vital signs:  There were no vitals taken for this visit.   ECO  GENERAL/CONSTITUTIONAL: No acute distress. Healthy, alert.  EYES: No scleral icterus.  No redness or discharge.    RESPIRATORY: No audible wheeze, cough, or visible cyanosis.  No visible retractions or increased work of breathing.  Able to speak fully in complete sentences.  MUSCULOSKELETAL: Normal range of motion.  NEUROLOGIC: Alert, oriented, answers questions appropriately. No tremor. Mentation intact and speech normal  INTEGUMENTARY: No jaundice.  No obvious rash or skin lesions.  PSYCHIATRIC:  Mentation appears normal, affect normal/bright, judgement and insight intact, normal speech and appearance well-groomed.    The rest of a comprehensive physical exam is deferred due to public health emergency video visit restrictions.      LABS:  None today.      IMAGING:  DEXA scan 19:  1. Mild osteoporosis in the distal radius. Advanced osteopenia but not  yet osteoporosis  in the hips bilaterally.  2. The probability of major osteoporotic fracture is 8.1%  and  probability of hip fracture is 2.0%  within the next 10 years  according to FRAX risk assessment.     Right mammogram 7/20/20:  BI-RADS 2 - benign      ASSESSMENT/PLAN:  Muriel Diaz is a 59 year old post-menopausal female with:    1) Left breast cancer of the upper inner quadrant: s/p left mastectomy on 4/16/18; pathology showed invasive mammary carcinoma, with lobular and ductal features, grade 2, tumor size 4.3 x 4.0 x 2.1 cm, positive margin for invasive carcinoma in the central and lateral posterior surface, DCIS and LCIS present, ER positive (95%), ME positive (50%), HER-2 negative.  Left axillary dissection showed 3 of 5 lymph nodes were positive for metastatic carcinoma (lobular type), also ER positive, ME positive, HER-2 negative. Stage [id number] (stage IIB).  She has completed adjuvant chemotherapy.  She completed radiation on 12/17/18.    She started anastrozole on 12/28/18 and tolerating well.    She had mammogram done on 7/20/20, which was BI-RADS 2 - benign.    -referred for lymphedema therapy  -Muriel has met with plastic surgery, Dr. Crook.  She underwent reconstruction with left latissimus flap and left tissue expander placement on 3/22/19.  Follow-up with plastic surgery.   -continue anastrozole 1 mg daily - will plan for 5-10 years treatment, if can tolerate.  Prescription was renewed today.  -next right-sided mammogram in July 2021 - will order at the next visit  -return to clinic in 6 months    Since today is a video visit in setting of COVID-19 pandemic, a breast exam was not able to be done.  Therefore, I offered an in-person appointment in a couple of months, but she declined, since she is feeling well.  She opts to keep her usual follow-up in 6 months.    2) Smoking: She still has no motivation to quit.  -I again advised smoking cessation  -she will see her PCP for tobacco cessation.    3)  Hypertension:   -follows with PCP    4) Chronic back pain: s/p history of back surgeries  -she takes soma  -follows with PCP    5) Bone health: She is on aromatase inhibitor.  DEXA scan on 1/4/19 showed mild osteoporosis in the distal radius.  Advanced osteopenia in the bilateral hips.  -next DEXA scan in January 2021 - ordered  -she discussed with her PCP; weight bearing exercise, stop smoking, increased calcium intake, vitamin D supplement      Patience Mckeon MD  Hematology/Oncology  [facility]          Again, thank you for allowing me to participate in the care of your patient.        Sincerely,        Patience Mckeon MD

## 2020-07-22 NOTE — LETTER
"    7/22/2020         RE: Muriel Diaz  [address]  [facility] [address]        Dear Colleague,    Thank you for referring your patient, Muriel Diaz, to the [facility]. Please see a copy of my visit note below.    Muriel Diaz is a 59 year old female who is being evaluated via a billable video visit.      The patient has been notified of following:     \"This video visit will be conducted via a call between you and your physician/provider. We have found that certain health care needs can be provided without the need for an in-person physical exam.  This service lets us provide the care you need with a video conversation.  If a prescription is necessary we can send it directly to your pharmacy.  If lab work is needed we can place an order for that and you can then stop by our lab to have the test done at a later time.    Video visits are billed at different rates depending on your insurance coverage.  Please reach out to your insurance provider with any questions.    If during the course of the call the physician/provider feels a video visit is not appropriate, you will not be charged for this service.\"    Patient has given verbal consent for Video visit? Yes  How would you like to obtain your AVS? MyChart  If you are dropped from the video visit, the video invite should be resent to: Send to e-mail at: [email or web address]  Will anyone else be joining your video visit? No      Video-Visit Details    Type of service:  Video Visit    Video Start Time: 10:03 am  Video End Time: 10:10 am    Originating Location (pt. Location): Home    Distant Location (provider location):  [facility]     Platform used for Video Visit: Anmol Galeas MA      [facility]    Hematology/Oncology Established Patient Note      Today's Date: 7/22/20    Reason for Follow-up: left sided breast cancer      HISTORY OF PRESENT ILLNESS: Muriel Diaz is a 59 " year old post-menopausal female with PMHx of HTN, degenerative joint disease, history of cervical spine surgery, who presents with left-sided breast cancer.  She underwent left mastectomy on 4/16/18 by Dr. Umana.  Pathology showed invasive mammary carcinoma, with lobular and ductal features, grade 2, tumor size 4.3 x 4.0 x 2.1 cm, positive margin for invasive carcinoma in the central and lateral posterior surface, DCIS and LCIS present, ER positive (95%), KS positive (50%), HER-2 negative.  Left axillary dissection showed 3 of 5 lymph nodes were positive for metastatic carcinoma (lobular type), also ER positive, KS positive, HER-2 negative. Stage [id number] (stage IIA).      She had port placed on 5/14/18. Port was removed on 3/22/19.    She started chemotherapy on 5/22/18, and completed dose-dense doxorubicin+cyclophosphamide x 4 cycles, followed by weekly paclitaxel x 12, completed on 10/2/18.    She completed radiation on 12/17/18.    She started anastrozole on 12/28/18.  .    On 3/22/19, she underwent reconstruction surgery with left latissimus flap and left tissue expander placement.        INTERIM HISTORY: Muriel says that she is doing well.  A couple of weeks ago, she experienced left arm swelling.  Ultrasound was recommended to rule out DVT, but she declined that and a visit because it has happened before.  It was likely lymphedema.  She says that it would come and go, but each time it would last about a month.  It is currently not swollen.  She was agreeable to a lymphedema therapy referral, but she has not made the appointment yet due to reluctance to go out due to pandemic.      REVIEW OF SYSTEMS:   14 point ROS was reviewed and is negative other than as noted above in HPI.       HOME MEDICATIONS:  Current Outpatient Medications   Medication Sig Dispense Refill     anastrozole (ARIMIDEX) 1 MG tablet Take 1 tablet (1 mg) by mouth daily 90 tablet 3     atenolol (TENORMIN) 50 MG tablet Take 1 tablet (50 mg)  by mouth daily 90 tablet 3     carisoprodol (SOMA) 350 MG tablet Take 350 mg by mouth 3 times daily       vitamin D3 (CHOLECALCIFEROL) 50 mcg (2000 units) tablet Take 2,000 Units by mouth daily 100 tablet 0     atenolol (TENORMIN) 25 MG tablet Take 2 tablets (50 mg) by mouth daily 60 tablet 0         ALLERGIES:  Allergies   Allergen Reactions     Amitriptyline Other (See Comments)     nightmares     Oxycontin [Oxycodone] Nausea     Severe headaches         PAST MEDICAL HISTORY:  Past Medical History:   Diagnosis Date     Cancer (H)     BREAST CANCER     Degeneration of cervical intervertebral disc      Degeneration of lumbar or lumbosacral intervertebral disc      Hypertension      PONV (postoperative nausea and vomiting)          PAST SURGICAL HISTORY:  Past Surgical History:   Procedure Laterality Date     BACK SURGERY  2001    lumbar fusion, cervical discectomy     DISSECT LYMPH NODE AXILLA Left 4/16/2018    Procedure: DISSECT LYMPH NODE AXILLA;;  Surgeon: Rodney Umana MD;  Location: [facility]     FUSION CERVICAL ANTERIOR THREE+ LEVELS  5/1/2012    Procedure:FUSION CERVICAL ANTERIOR THREE+ LEVELS; Surgeon:SARAH FRANCO; Location:[facility] OR     FUSION CERVICAL POSTERIOR THREE+ LEVELS  5/1/2012    Procedure:FUSION CERVICAL POSTERIOR THREE+ LEVELS; Posterior Cervical decompression and fusion C4-7, Anterior Cervical decompression and fusion C4-7 (c-arm), (herb table with abraham, yina oasis, yina aviator, Vitoss foam packing strip, impulse monitoring,); Surgeon:SARAH FRANCO; Location: OR     GYN SURGERY       HYSTERECTOMY VAGINAL  1990's    Judy Aceves OB Gyn specilist     HYSTERECTOMY, PAP NO LONGER INDICATED       INSERT PORT VASCULAR ACCESS N/A 5/14/2018    Procedure: INSERT PORT VASCULAR ACCESS;  PORT PLACEMENT;  Surgeon: Rodney Umana MD;  Location: [facility]     INSERT TISSUE EXPANDER BREAST Bilateral 3/22/2019    Procedure: INSERT TISSUE EXPANDER BREAST;  Surgeon:  Naa Crook MD;  Location: [facility] OR     MASTECTOMY SIMPLE Left 4/16/2018    Procedure: MASTECTOMY SIMPLE;  LEFT SIMPLE MASTECTOMY,LEFT AXILLARY DISSECTION;  Surgeon: Rodney Umana MD;  [facility]     RECONSTRUCT BREAST LATISSIMUS DORSI PEDICLE Left 3/22/2019    Procedure: LEFT LATISSIMUS FLAP RECONSTRUCT WITH TISSUE EXPANDER AND PORT REMOVAL;  Surgeon: Naa Crook MD;  Location:  OR     REMOVE PORT VASCULAR ACCESS N/A 3/22/2019    Procedure: REMOVE PORT VASCULAR ACCESS;  Surgeon: Naa Crook MD;  Location:  OR         SOCIAL HISTORY:  Social History     Socioeconomic History     Marital status: Single     Spouse name: Not on file     Number of children: 0     Years of education: Not on file     Highest education level: Not on file   Occupational History     Employer: [employer]    Social Needs     Financial resource strain: Not on file     Food insecurity     Worry: Not on file     Inability: Not on file     Transportation needs     Medical: Not on file     Non-medical: Not on file   Tobacco Use     Smoking status: Current Every Day Smoker     Packs/day: 0.50     Types: Cigarettes     Smokeless tobacco: Never Used     Tobacco comment: 4 cigarettes per day   Substance and Sexual Activity     Alcohol use: Yes     Alcohol/week: 1.0 standard drinks     Types: 1 Standard drinks or equivalent per week     Comment: 2-3 drinks per week     Drug use: No     Sexual activity: Not Currently     Partners: Male   Lifestyle     Physical activity     Days per week: Not on file     Minutes per session: Not on file     Stress: Not on file   Relationships     Social connections     Talks on phone: Not on file     Gets together: Not on file     Attends [employer] service: Not on file     Active member of club or organization: Not on file     Attends meetings of clubs or organizations: Not on file     Relationship status: Not on file     Intimate partner violence     Fear of current or ex  partner: Not on file     Emotionally abused: Not on file     Physically abused: Not on file     Forced sexual activity: Not on file   Other Topics Concern     Parent/sibling w/ CABG, MI or angioplasty before 65F 55M? No   Social History Narrative     Not on file   She smokes cigarettes, half a pack a day for many years; she is unable to quantify how many.  She drinks alcohol occasionally.  She denies illicit drug use.  She is retired.  She used to to work in  at large companies.  She lives in [place] with her boyfriend.        FAMILY HISTORY:  Family History   Problem Relation Age of Onset     Pancreatitis Mother      Substance Abuse Mother      Unknown/Adopted Father      She does not know much about her family history and is unaware of any cancers in her family.  She has never been pregnant.  She had a hysterectomy in the .  She still has her ovaries.  She says that she has undergone menopause but is unsure when, likely years ago.        PHYSICAL EXAM:  Vital signs:  There were no vitals taken for this visit.   ECO  GENERAL/CONSTITUTIONAL: No acute distress. Healthy, alert.  EYES: No scleral icterus.  No redness or discharge.    RESPIRATORY: No audible wheeze, cough, or visible cyanosis.  No visible retractions or increased work of breathing.  Able to speak fully in complete sentences.  MUSCULOSKELETAL: Normal range of motion.  NEUROLOGIC: Alert, oriented, answers questions appropriately. No tremor. Mentation intact and speech normal  INTEGUMENTARY: No jaundice.  No obvious rash or skin lesions.  PSYCHIATRIC:  Mentation appears normal, affect normal/bright, judgement and insight intact, normal speech and appearance well-groomed.    The rest of a comprehensive physical exam is deferred due to public health emergency video visit restrictions.      LABS:  None today.      IMAGING:  DEXA scan 19:  1. Mild osteoporosis in the distal radius. Advanced osteopenia but not  yet osteoporosis  in the hips bilaterally.  2. The probability of major osteoporotic fracture is 8.1%  and  probability of hip fracture is 2.0%  within the next 10 years  according to FRAX risk assessment.     Right mammogram 7/20/20:  BI-RADS 2 - benign      ASSESSMENT/PLAN:  Muriel Diaz is a 59 year old post-menopausal female with:    1) Left breast cancer of the upper inner quadrant: s/p left mastectomy on 4/16/18; pathology showed invasive mammary carcinoma, with lobular and ductal features, grade 2, tumor size 4.3 x 4.0 x 2.1 cm, positive margin for invasive carcinoma in the central and lateral posterior surface, DCIS and LCIS present, ER positive (95%), VT positive (50%), HER-2 negative.  Left axillary dissection showed 3 of 5 lymph nodes were positive for metastatic carcinoma (lobular type), also ER positive, VT positive, HER-2 negative. Stage [id number] (stage IIB).  She has completed adjuvant chemotherapy.  She completed radiation on 12/17/18.    She started anastrozole on 12/28/18 and tolerating well.    She had mammogram done on 7/20/20, which was BI-RADS 2 - benign.    -referred for lymphedema therapy  -Muriel has met with plastic surgery, Dr. Crook.  She underwent reconstruction with left latissimus flap and left tissue expander placement on 3/22/19.  Follow-up with plastic surgery.   -continue anastrozole 1 mg daily - will plan for 5-10 years treatment, if can tolerate.  Prescription was renewed today.  -next right-sided mammogram in July 2021 - will order at the next visit  -return to clinic in 6 months    Since today is a video visit in setting of COVID-19 pandemic, a breast exam was not able to be done.  Therefore, I offered an in-person appointment in a couple of months, but she declined, since she is feeling well.  She opts to keep her usual follow-up in 6 months.    2) Smoking: She still has no motivation to quit.  -I again advised smoking cessation  -she will see her PCP for tobacco cessation.    3)  Hypertension:   -follows with PCP    4) Chronic back pain: s/p history of back surgeries  -she takes soma  -follows with PCP    5) Bone health: She is on aromatase inhibitor.  DEXA scan on 1/4/19 showed mild osteoporosis in the distal radius.  Advanced osteopenia in the bilateral hips.  -next DEXA scan in January 2021 - ordered  -she discussed with her PCP; weight bearing exercise, stop smoking, increased calcium intake, vitamin D supplement      Patience Mckeon MD  Hematology/Oncology  [facility]          Again, thank you for allowing me to participate in the care of your patient.        Sincerely,        Patience Mckeon MD

## 2020-07-22 NOTE — PROGRESS NOTES
"Muriel Diaz is a 59 year old female who is being evaluated via a billable video visit.      The patient has been notified of following:     \"This video visit will be conducted via a call between you and your physician/provider. We have found that certain health care needs can be provided without the need for an in-person physical exam.  This service lets us provide the care you need with a video conversation.  If a prescription is necessary we can send it directly to your pharmacy.  If lab work is needed we can place an order for that and you can then stop by our lab to have the test done at a later time.    Video visits are billed at different rates depending on your insurance coverage.  Please reach out to your insurance provider with any questions.    If during the course of the call the physician/provider feels a video visit is not appropriate, you will not be charged for this service.\"    Patient has given verbal consent for Video visit? Yes  How would you like to obtain your AVS? MyChart  If you are dropped from the video visit, the video invite should be resent to: Send to e-mail at: [email or web address]  Will anyone else be joining your video visit? No      Video-Visit Details    Type of service:  Video Visit    Video Start Time: 10:03 am  Video End Time: 10:10 am    Originating Location (pt. Location): Home    Distant Location (provider location):  [facility] CANCER [facility]     Platform used for Video Visit: Anmol Galeas MA      [facility]    Hematology/Oncology Established Patient Note      Today's Date: 7/22/20    Reason for Follow-up: left sided breast cancer      HISTORY OF PRESENT ILLNESS: Muriel Diaz is a 59 year old post-menopausal female with PMHx of HTN, degenerative joint disease, history of cervical spine surgery, who presents with left-sided breast cancer.  She underwent left mastectomy on 4/16/18 by Dr. Umana.  Pathology showed invasive mammary " carcinoma, with lobular and ductal features, grade 2, tumor size 4.3 x 4.0 x 2.1 cm, positive margin for invasive carcinoma in the central and lateral posterior surface, DCIS and LCIS present, ER positive (95%), MI positive (50%), HER-2 negative.  Left axillary dissection showed 3 of 5 lymph nodes were positive for metastatic carcinoma (lobular type), also ER positive, MI positive, HER-2 negative. Stage [id number] (stage IIA).      She had port placed on 5/14/18. Port was removed on 3/22/19.    She started chemotherapy on 5/22/18, and completed dose-dense doxorubicin+cyclophosphamide x 4 cycles, followed by weekly paclitaxel x 12, completed on 10/2/18.    She completed radiation on 12/17/18.    She started anastrozole on 12/28/18.  .    On 3/22/19, she underwent reconstruction surgery with left latissimus flap and left tissue expander placement.        INTERIM HISTORY: Muriel says that she is doing well.  A couple of weeks ago, she experienced left arm swelling.  Ultrasound was recommended to rule out DVT, but she declined that and a visit because it has happened before.  It was likely lymphedema.  She says that it would come and go, but each time it would last about a month.  It is currently not swollen.  She was agreeable to a lymphedema therapy referral, but she has not made the appointment yet due to reluctance to go out due to pandemic.      REVIEW OF SYSTEMS:   14 point ROS was reviewed and is negative other than as noted above in HPI.       HOME MEDICATIONS:  Current Outpatient Medications   Medication Sig Dispense Refill     anastrozole (ARIMIDEX) 1 MG tablet Take 1 tablet (1 mg) by mouth daily 90 tablet 3     atenolol (TENORMIN) 50 MG tablet Take 1 tablet (50 mg) by mouth daily 90 tablet 3     carisoprodol (SOMA) 350 MG tablet Take 350 mg by mouth 3 times daily       vitamin D3 (CHOLECALCIFEROL) 50 mcg (2000 units) tablet Take 2,000 Units by mouth daily 100 tablet 0     atenolol (TENORMIN) 25 MG tablet Take  2 tablets (50 mg) by mouth daily 60 tablet 0         ALLERGIES:  Allergies   Allergen Reactions     Amitriptyline Other (See Comments)     nightmares     Oxycontin [Oxycodone] Nausea     Severe headaches         PAST MEDICAL HISTORY:  Past Medical History:   Diagnosis Date     Cancer (H)     BREAST CANCER     Degeneration of cervical intervertebral disc      Degeneration of lumbar or lumbosacral intervertebral disc      Hypertension      PONV (postoperative nausea and vomiting)          PAST SURGICAL HISTORY:  Past Surgical History:   Procedure Laterality Date     BACK SURGERY  2001    lumbar fusion, cervical discectomy     DISSECT LYMPH NODE AXILLA Left 4/16/2018    Procedure: DISSECT LYMPH NODE AXILLA;;  Surgeon: Rodney Umana MD;  Location: [facility]     FUSION CERVICAL ANTERIOR THREE+ LEVELS  5/1/2012    Procedure:FUSION CERVICAL ANTERIOR THREE+ LEVELS; Surgeon:SARAH FRANCO; Location:[facility]     FUSION CERVICAL POSTERIOR THREE+ LEVELS  5/1/2012    Procedure:FUSION CERVICAL POSTERIOR THREE+ LEVELS; Posterior Cervical decompression and fusion C4-7, Anterior Cervical decompression and fusion C4-7 (c-arm), (herb table with abraham, yina oasis, yina aviator, Vitoss foam packing strip, impulse monitoring,); Surgeon:SARAH FRANCO; Location: OR     GYN SURGERY       HYSTERECTOMY VAGINAL  1990's    Judy Aceves OB Gyn specilist     HYSTERECTOMY, PAP NO LONGER INDICATED       INSERT PORT VASCULAR ACCESS N/A 5/14/2018    Procedure: INSERT PORT VASCULAR ACCESS;  PORT PLACEMENT;  Surgeon: Rodney Umana MD;  Location: [facility]     INSERT TISSUE EXPANDER BREAST Bilateral 3/22/2019    Procedure: INSERT TISSUE EXPANDER BREAST;  Surgeon: Naa Crook MD;  Location:  OR     MASTECTOMY SIMPLE Left 4/16/2018    Procedure: MASTECTOMY SIMPLE;  LEFT SIMPLE MASTECTOMY,LEFT AXILLARY DISSECTION;  Surgeon: Rodney Umana MD;  Location: [facility]     RECONSTRUCT BREAST LATISSIMUS  DORSI PEDICLE Left 3/22/2019    Procedure: LEFT LATISSIMUS FLAP RECONSTRUCT WITH TISSUE EXPANDER AND PORT REMOVAL;  Surgeon: Naa Crook MD;  Location:  OR     REMOVE PORT VASCULAR ACCESS N/A 3/22/2019    Procedure: REMOVE PORT VASCULAR ACCESS;  Surgeon: Naa Crook MD;  Location:  OR         SOCIAL HISTORY:  Social History     Socioeconomic History     Marital status: Single     Spouse name: Not on file     Number of children: 0     Years of education: Not on file     Highest education level: Not on file   Occupational History     Employer: [employer]    Social Needs     Financial resource strain: Not on file     Food insecurity     Worry: Not on file     Inability: Not on file     Transportation needs     Medical: Not on file     Non-medical: Not on file   Tobacco Use     Smoking status: Current Every Day Smoker     Packs/day: 0.50     Types: Cigarettes     Smokeless tobacco: Never Used     Tobacco comment: 4 cigarettes per day   Substance and Sexual Activity     Alcohol use: Yes     Alcohol/week: 1.0 standard drinks     Types: 1 Standard drinks or equivalent per week     Comment: 2-3 drinks per week     Drug use: No     Sexual activity: Not Currently     Partners: Male   Lifestyle     Physical activity     Days per week: Not on file     Minutes per session: Not on file     Stress: Not on file   Relationships     Social connections     Talks on phone: Not on file     Gets together: Not on file     Attends [employer] service: Not on file     Active member of club or organization: Not on file     Attends meetings of clubs or organizations: Not on file     Relationship status: Not on file     Intimate partner violence     Fear of current or ex partner: Not on file     Emotionally abused: Not on file     Physically abused: Not on file     Forced sexual activity: Not on file   Other Topics Concern     Parent/sibling w/ CABG, MI or angioplasty before 65F 55M? No   Social History Narrative     Not on  file   She smokes cigarettes, half a pack a day for many years; she is unable to quantify how many.  She drinks alcohol occasionally.  She denies illicit drug use.  She is retired.  She used to to work in  at large companies.  She lives in [place] with her boyfriend.        FAMILY HISTORY:  Family History   Problem Relation Age of Onset     Pancreatitis Mother      Substance Abuse Mother      Unknown/Adopted Father      She does not know much about her family history and is unaware of any cancers in her family.  She has never been pregnant.  She had a hysterectomy in the .  She still has her ovaries.  She says that she has undergone menopause but is unsure when, likely years ago.        PHYSICAL EXAM:  Vital signs:  There were no vitals taken for this visit.   ECO  GENERAL/CONSTITUTIONAL: No acute distress. Healthy, alert.  EYES: No scleral icterus.  No redness or discharge.    RESPIRATORY: No audible wheeze, cough, or visible cyanosis.  No visible retractions or increased work of breathing.  Able to speak fully in complete sentences.  MUSCULOSKELETAL: Normal range of motion.  NEUROLOGIC: Alert, oriented, answers questions appropriately. No tremor. Mentation intact and speech normal  INTEGUMENTARY: No jaundice.  No obvious rash or skin lesions.  PSYCHIATRIC:  Mentation appears normal, affect normal/bright, judgement and insight intact, normal speech and appearance well-groomed.    The rest of a comprehensive physical exam is deferred due to public health emergency video visit restrictions.      LABS:  None today.      IMAGING:  DEXA scan 19:  1. Mild osteoporosis in the distal radius. Advanced osteopenia but not  yet osteoporosis in the hips bilaterally.  2. The probability of major osteoporotic fracture is 8.1%  and  probability of hip fracture is 2.0%  within the next 10 years  according to FRAX risk assessment.     Right mammogram 20:  BI-RADS 2 - benign      ASSESSMENT/PLAN:   Muriel Diaz is a 59 year old post-menopausal female with:    1) Left breast cancer of the upper inner quadrant: s/p left mastectomy on 4/16/18; pathology showed invasive mammary carcinoma, with lobular and ductal features, grade 2, tumor size 4.3 x 4.0 x 2.1 cm, positive margin for invasive carcinoma in the central and lateral posterior surface, DCIS and LCIS present, ER positive (95%), VT positive (50%), HER-2 negative.  Left axillary dissection showed 3 of 5 lymph nodes were positive for metastatic carcinoma (lobular type), also ER positive, VT positive, HER-2 negative. Stage [id number] (stage IIB).  She has completed adjuvant chemotherapy.  She completed radiation on 12/17/18.    She started anastrozole on 12/28/18 and tolerating well.    She had mammogram done on 7/20/20, which was BI-RADS 2 - benign.    -referred for lymphedema therapy  -Muriel has met with plastic surgery, Dr. Crook.  She underwent reconstruction with left latissimus flap and left tissue expander placement on 3/22/19.  Follow-up with plastic surgery.   -continue anastrozole 1 mg daily - will plan for 5-10 years treatment, if can tolerate.  Prescription was renewed today.  -next right-sided mammogram in July 2021 - will order at the next visit  -return to clinic in 6 months    Since today is a video visit in setting of COVID-19 pandemic, a breast exam was not able to be done.  Therefore, I offered an in-person appointment in a couple of months, but she declined, since she is feeling well.  She opts to keep her usual follow-up in 6 months.    2) Smoking: She still has no motivation to quit.  -I again advised smoking cessation  -she will see her PCP for tobacco cessation.    3) Hypertension:   -follows with PCP    4) Chronic back pain: s/p history of back surgeries  -she takes soma  -follows with PCP    5) Bone health: She is on aromatase inhibitor.  DEXA scan on 1/4/19 showed mild osteoporosis in the distal radius.  Advanced osteopenia in  the bilateral hips.  -next DEXA scan in January 2021 - ordered  -she discussed with her PCP; weight bearing exercise, stop smoking, increased calcium intake, vitamin D supplement      Patience Mckeon MD  Hematology/Oncology  [facility]

## 2020-11-05 ENCOUNTER — VIRTUAL VISIT (OUTPATIENT)
Dept: FAMILY MEDICINE | Facility: CLINIC | Age: 60
End: 2020-11-05

## 2020-11-05 DIAGNOSIS — I10 HYPERTENSION GOAL BP (BLOOD PRESSURE) < 140/90: ICD-10-CM

## 2020-11-05 DIAGNOSIS — I89.0 LYMPHEDEMA OF LEFT UPPER EXTREMITY: ICD-10-CM

## 2020-11-05 DIAGNOSIS — Z85.3 PERSONAL HISTORY OF MALIGNANT NEOPLASM OF BREAST: ICD-10-CM

## 2020-11-05 DIAGNOSIS — M54.2 CERVICALGIA: Primary | ICD-10-CM

## 2020-11-05 DIAGNOSIS — R79.89 LOW VITAMIN D LEVEL: ICD-10-CM

## 2020-11-05 DIAGNOSIS — F17.200 TOBACCO USE DISORDER: ICD-10-CM

## 2020-11-05 PROCEDURE — 99214 OFFICE O/P EST MOD 30 MIN: CPT | Mod: 95 | Performed by: NURSE PRACTITIONER

## 2020-11-05 RX ORDER — CARISOPRODOL 350 MG/1
350 TABLET ORAL 4 TIMES DAILY PRN
Qty: 120 TABLET | Refills: 2 | Status: SHIPPED | OUTPATIENT
Start: 2020-11-05 | End: 2021-01-29

## 2020-11-05 RX ORDER — CHOLECALCIFEROL (VITAMIN D3) 50 MCG
1 TABLET ORAL DAILY
Qty: 90 TABLET | Refills: 3 | Status: SHIPPED | OUTPATIENT
Start: 2020-11-05 | End: 2021-11-15
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
## 2020-11-05 NOTE — PATIENT INSTRUCTIONS
Dear Muriel, it was good meeting you today!    Neck pain: Soma increased to three times daily plus at bedtime if needed. Please continue to follow up with neurology and please have at least one visit with PDR. We will need to have you sign a controlled substance agreement when you're in clinic next. I see everyone on controlled substances every three months.    Vitamin D: Continue current regimen, we will recheck labs next time you're in.    Hypertension: Continue atenolol, let me know your blood pressures next week.    Smoking: Continue working on decreasing this! Let me know if you'd like to discuss further interventions.    Lymphedema: Let me know when you feel safe to see a lymphedema therapist. There may possibly be videos on lymphedema massage on Youtube, but I have not personally reviewed these.      Take care,  Elizabeth

## 2020-11-13 ENCOUNTER — TRANSFERRED RECORDS (OUTPATIENT)
Dept: FAMILY MEDICINE | Facility: CLINIC | Age: 60
End: 2020-11-13

## 2020-11-13 NOTE — PROGRESS NOTES
11/11/20 faxed this office note to PDR @ [phone]    Tre Webber,   [facility]  [phone]

## 2020-12-30 ENCOUNTER — TELEPHONE (OUTPATIENT)
Dept: FAMILY MEDICINE | Facility: CLINIC | Age: 60
End: 2020-12-30

## 2020-12-30 ENCOUNTER — MYC MEDICAL ADVICE (OUTPATIENT)
Dept: FAMILY MEDICINE | Facility: CLINIC | Age: 60
End: 2020-12-30

## 2020-12-31 NOTE — TELEPHONE ENCOUNTER
Prior authorization done through [facility]-approved 10/2/20-12/31/2021. Patient and pharmacy notified. Wendy Das

## 2021-01-13 ENCOUNTER — HOSPITAL ENCOUNTER (OUTPATIENT)
Dept: BONE DENSITY | Facility: CLINIC | Age: 61
Discharge: HOME | End: 2021-01-13
Attending: INTERNAL MEDICINE | Admitting: INTERNAL MEDICINE
Payer: MEDICARE

## 2021-01-13 DIAGNOSIS — Z78.0 ASYMPTOMATIC MENOPAUSAL STATE: ICD-10-CM

## 2021-01-13 DIAGNOSIS — Z17.0 MALIGNANT NEOPLASM OF UPPER-INNER QUADRANT OF LEFT BREAST IN FEMALE, ESTROGEN RECEPTOR POSITIVE (H): ICD-10-CM

## 2021-01-13 DIAGNOSIS — C50.212 MALIGNANT NEOPLASM OF UPPER-INNER QUADRANT OF LEFT BREAST IN FEMALE, ESTROGEN RECEPTOR POSITIVE (H): ICD-10-CM

## 2021-01-13 PROCEDURE — 77080 DXA BONE DENSITY AXIAL: CPT

## 2021-01-15 ENCOUNTER — VIRTUAL VISIT (OUTPATIENT)
Dept: ONCOLOGY | Facility: CLINIC | Age: 61
End: 2021-01-15
Attending: NURSE PRACTITIONER
Payer: MEDICARE

## 2021-01-15 ENCOUNTER — HEALTH MAINTENANCE LETTER (OUTPATIENT)
Age: 61
End: 2021-01-15

## 2021-01-15 VITALS — WEIGHT: 105 LBS | BODY MASS INDEX: 18.9 KG/M2

## 2021-01-15 DIAGNOSIS — M85.852 OSTEOPENIA OF BOTH HIPS: Primary | ICD-10-CM

## 2021-01-15 DIAGNOSIS — M85.851 OSTEOPENIA OF BOTH HIPS: Primary | ICD-10-CM

## 2021-01-15 DIAGNOSIS — Z12.31 VISIT FOR SCREENING MAMMOGRAM: ICD-10-CM

## 2021-01-15 PROCEDURE — 99441 PR PHYSICIAN TELEPHONE EVALUATION 5-10 MIN: CPT | Mod: 95 | Performed by: INTERNAL MEDICINE

## 2021-01-15 PROCEDURE — 999N001193 HC VIDEO/TELEPHONE VISIT; NO CHARGE

## 2021-01-15 RX ORDER — PHENOL 1.4 %
600 AEROSOL, SPRAY (ML) MUCOUS MEMBRANE 2 TIMES DAILY WITH MEALS
Qty: 180 TABLET | Refills: 3 | Status: SHIPPED | OUTPATIENT
Start: 2021-01-15 | End: 2022-08-02 | Stop reason: DRUGHIGH

## 2021-01-15 RX ORDER — ALENDRONATE SODIUM 70 MG/1
70 TABLET ORAL
Qty: 12 TABLET | Refills: 3 | Status: SHIPPED | OUTPATIENT
Start: 2021-01-15 | End: 2021-12-30

## 2021-01-15 ASSESSMENT — PAIN SCALES - GENERAL: PAINLEVEL: NO PAIN (0)

## 2021-01-15 NOTE — LETTER
1/15/2021         RE: Muriel Diaz  [address]  [facility] [address]        Dear Colleague,    Thank you for referring your patient, Muriel Diaz, to the [facility]. Please see a copy of my visit note below.    Muriel is a 60 year old who is being evaluated via a billable telephone visit.      What phone number would you like to be contacted at? [phone]  How would you like to obtain your AVS? Freedom Lakhani [facility]    Hematology/Oncology Established Patient Note      Today's Date: 1/15/21    Reason for Follow-up: left sided breast cancer      HISTORY OF PRESENT ILLNESS: Muriel Diaz is a 60 year old post-menopausal female with PMHx of HTN, degenerative joint disease, history of cervical spine surgery, who presents with left-sided breast cancer.  She underwent left mastectomy on 4/16/18 by Dr. Umana.  Pathology showed invasive mammary carcinoma, with lobular and ductal features, grade 2, tumor size 4.3 x 4.0 x 2.1 cm, positive margin for invasive carcinoma in the central and lateral posterior surface, DCIS and LCIS present, ER positive (95%), AK positive (50%), HER-2 negative.  Left axillary dissection showed 3 of 5 lymph nodes were positive for metastatic carcinoma (lobular type), also ER positive, AK positive, HER-2 negative. Stage [id number] (stage IIA).      She had port placed on 5/14/18. Port was removed on 3/22/19.    She started chemotherapy on 5/22/18, and completed dose-dense doxorubicin+cyclophosphamide x 4 cycles, followed by weekly paclitaxel x 12, completed on 10/2/18.    She completed radiation on 12/17/18.    She started anastrozole on 12/28/18.      On 3/22/19, she underwent reconstruction surgery with left latissimus flap and left tissue expander placement.        INTERIM HISTORY: Muriel says that she is feeling well.  She denies any new complaints today.        REVIEW OF SYSTEMS:   14  point ROS was reviewed and is negative other than as noted above in HPI.       HOME MEDICATIONS:  Current Outpatient Medications   Medication Sig Dispense Refill     anastrozole (ARIMIDEX) 1 MG tablet Take 1 tablet (1 mg) by mouth daily 90 tablet 3     atenolol (TENORMIN) 50 MG tablet Take 1 tablet (50 mg) by mouth daily 90 tablet 3     carisoprodol (SOMA) 350 MG tablet Take 350 mg by mouth 3 times daily       carisoprodol (SOMA) 350 MG tablet Take 1 tablet (350 mg) by mouth 4 times daily as needed for muscle spasms (Patient not taking: Reported on 1/15/2021) 120 tablet 2     vitamin D3 (CHOLECALCIFEROL) 50 mcg (2000 units) tablet Take 1 tablet (50 mcg) by mouth daily 90 tablet 3         ALLERGIES:  Allergies   Allergen Reactions     Amitriptyline Other (See Comments)     nightmares     Oxycontin [Oxycodone] Nausea     Severe headaches         PAST MEDICAL HISTORY:  Past Medical History:   Diagnosis Date     Cancer (H)     BREAST CANCER     Degeneration of cervical intervertebral disc      Degeneration of lumbar or lumbosacral intervertebral disc      Hypertension      PONV (postoperative nausea and vomiting)          PAST SURGICAL HISTORY:  Past Surgical History:   Procedure Laterality Date     BACK SURGERY  2001    lumbar fusion, cervical discectomy     DISSECT LYMPH NODE AXILLA Left 4/16/2018    Procedure: DISSECT LYMPH NODE AXILLA;;  Surgeon: Rodney Umana MD;  [facility]     FUSION CERVICAL ANTERIOR THREE+ LEVELS  5/1/2012    Procedure:FUSION CERVICAL ANTERIOR THREE+ LEVELS; Surgeon:SARAH FRANCO; Location:[facility]     FUSION CERVICAL POSTERIOR THREE+ LEVELS  5/1/2012    Procedure:FUSION CERVICAL POSTERIOR THREE+ LEVELS; Posterior Cervical decompression and fusion C4-7, Anterior Cervical decompression and fusion C4-7 (c-arm), (herb table with jarad, yina oasis, yina aviator, Vitoss foam packing strip, impulse monitoring,); Surgeon:SARAH FRANCO; Location: OR     GYN SURGERY        HYSTERECTOMY VAGINAL  1990's    Judy Aceves OB Gyn specilist     HYSTERECTOMY, PAP NO LONGER INDICATED       INSERT PORT VASCULAR ACCESS N/A 5/14/2018    Procedure: INSERT PORT VASCULAR ACCESS;  PORT PLACEMENT;  Surgeon: Rodney Umana MD;  Location: [facility]     INSERT TISSUE EXPANDER BREAST Bilateral 3/22/2019    Procedure: INSERT TISSUE EXPANDER BREAST;  Surgeon: Naa Crook MD;  Location:  OR     MASTECTOMY SIMPLE Left 4/16/2018    Procedure: MASTECTOMY SIMPLE;  LEFT SIMPLE MASTECTOMY,LEFT AXILLARY DISSECTION;  Surgeon: Rodney Umana MD;  [facility]     RECONSTRUCT BREAST LATISSIMUS DORSI PEDICLE Left 3/22/2019    Procedure: LEFT LATISSIMUS FLAP RECONSTRUCT WITH TISSUE EXPANDER AND PORT REMOVAL;  Surgeon: Naa Crook MD;  Location:  OR     REMOVE PORT VASCULAR ACCESS N/A 3/22/2019    Procedure: REMOVE PORT VASCULAR ACCESS;  Surgeon: Naa Crook MD;  Location:  OR         SOCIAL HISTORY:  Social History     Socioeconomic History     Marital status: Single     Spouse name: Not on file     Number of children: 0     Years of education: Not on file     Highest education level: Not on file   Occupational History     Employer: [employer]    Social Needs     Financial resource strain: Not on file     Food insecurity     Worry: Not on file     Inability: Not on file     Transportation needs     Medical: Not on file     Non-medical: Not on file   Tobacco Use     Smoking status: Current Every Day Smoker     Packs/day: 0.50     Types: Cigarettes     Smokeless tobacco: Never Used     Tobacco comment: 4 cigarettes per day   Substance and Sexual Activity     Alcohol use: Yes     Alcohol/week: 1.0 standard drinks     Types: 1 Standard drinks or equivalent per week     Comment: 2-3 drinks per week     Drug use: No     Sexual activity: Not Currently     Partners: Male   Lifestyle     Physical activity     Days per week: Not on file     Minutes per session: Not  on file     Stress: Not on file   Relationships     Social connections     Talks on phone: Not on file     Gets together: Not on file     Attends Adventism service: Not on file     Active member of club or organization: Not on file     Attends meetings of clubs or organizations: Not on file     Relationship status: Not on file     Intimate partner violence     Fear of current or ex partner: Not on file     Emotionally abused: Not on file     Physically abused: Not on file     Forced sexual activity: Not on file   Other Topics Concern     Parent/sibling w/ CABG, MI or angioplasty before 65F 55M? No   Social History Narrative     Not on file   She smokes cigarettes, half a pack a day for many years; she is unable to quantify how many.  She drinks alcohol occasionally.  She denies illicit drug use.  She is retired.  She used to to work in  at large companies.  She lives in [place] with her boyfriend.        FAMILY HISTORY:  Family History   Problem Relation Age of Onset     Pancreatitis Mother      Substance Abuse Mother      Unknown/Adopted Father      She does not know much about her family history and is unaware of any cancers in her family.  She has never been pregnant.  She had a hysterectomy in the 1990's.  She still has her ovaries.  She says that she has undergone menopause but is unsure when, likely years ago.        PHYSICAL EXAM:  Phone visit.      LABS:  None today.      IMAGING:  DEXA scan 1/13/21:  Left forearm and bilateral hip osteopenia.     Right mammogram 7/20/20:  BI-RADS 2 - benign      ASSESSMENT/PLAN:  Muriel Diaz is a 59 year old post-menopausal female with:    1) Left breast cancer of the upper inner quadrant: s/p left mastectomy on 4/16/18; pathology showed invasive mammary carcinoma, with lobular and ductal features, grade 2, tumor size 4.3 x 4.0 x 2.1 cm, positive margin for invasive carcinoma in the central and lateral posterior surface, DCIS and LCIS present, ER  positive (95%), AL positive (50%), HER-2 negative.  Left axillary dissection showed 3 of 5 lymph nodes were positive for metastatic carcinoma (lobular type), also ER positive, AL positive, HER-2 negative. Stage [id number] (stage IIB).  She has completed adjuvant chemotherapy.  She completed radiation on 12/17/18.    She started anastrozole on 12/28/18 and tolerating well.    She had mammogram done on 7/20/20, which was BI-RADS 2 - benign.    -referred for lymphedema therapy - she will see once pandemic is bettter  -Muriel has met with plastic surgery, Dr. Crook.  She underwent reconstruction with left latissimus flap and left tissue expander placement on 3/22/19.    -continue anastrozole 1 mg daily - will plan for 5-10 years treatment, if can tolerate.    -next right-sided mammogram in July 2021 - ordered  -return to clinic in 6 months; she declined sooner in-person appointment than this.  Next appointment will be in-person so that we can do breast exam.     2) Smoking: She says that she is trying to quit.  -I again advised smoking cessation  -she will see her PCP for tobacco cessation.    3) Hypertension:   -follows with PCP    4) Chronic back pain: s/p history of back surgeries  -she takes soma  -follows with PCP    5) Bone health: She is on aromatase inhibitor.  DEXA scan on 1/13/21 showed left forearm and bilateral hip osteopenia, not significantly changed since 2 years prior.    -she discussed with her PCP; weight bearing exercise, stop smoking, increased calcium intake, vitamin D supplement  -she is borderline of osteoporosis, and at high risk due to her being on aromatase inhibitor.  I discussed with her the treatment options, and she is interested in trying Fosamax, which was prescribed  -next DEXA to be in January 2023      Patience Mckeon MD  Hematology/Oncology  [facility]      Phone call duration: 7  Minutes    Total time spent on day of visit, including review of tests,  obtaining/reviewing separately obtained history, ordering medications/tests/procedures, communicating with PCP/consultants, and documenting in electronic medical record: 25 minutes          Again, thank you for allowing me to participate in the care of your patient.        Sincerely,        Patience Mckeon MD

## 2021-01-15 NOTE — PROGRESS NOTES
Muriel is a 60 year old who is being evaluated via a billable telephone visit.      What phone number would you like to be contacted at? [phone]  How would you like to obtain your AVS? Freedom Lakhani CMA        [facility]    Hematology/Oncology Established Patient Note      Today's Date: 1/15/21    Reason for Follow-up: left sided breast cancer      HISTORY OF PRESENT ILLNESS: Muriel Diaz is a 60 year old post-menopausal female with PMHx of HTN, degenerative joint disease, history of cervical spine surgery, who presents with left-sided breast cancer.  She underwent left mastectomy on 4/16/18 by Dr. Umana.  Pathology showed invasive mammary carcinoma, with lobular and ductal features, grade 2, tumor size 4.3 x 4.0 x 2.1 cm, positive margin for invasive carcinoma in the central and lateral posterior surface, DCIS and LCIS present, ER positive (95%), ND positive (50%), HER-2 negative.  Left axillary dissection showed 3 of 5 lymph nodes were positive for metastatic carcinoma (lobular type), also ER positive, ND positive, HER-2 negative. Stage [id number] (stage IIA).      She had port placed on 5/14/18. Port was removed on 3/22/19.    She started chemotherapy on 5/22/18, and completed dose-dense doxorubicin+cyclophosphamide x 4 cycles, followed by weekly paclitaxel x 12, completed on 10/2/18.    She completed radiation on 12/17/18.    She started anastrozole on 12/28/18.      On 3/22/19, she underwent reconstruction surgery with left latissimus flap and left tissue expander placement.        INTERIM HISTORY: Muriel says that she is feeling well.  She denies any new complaints today.        REVIEW OF SYSTEMS:   14 point ROS was reviewed and is negative other than as noted above in HPI.       HOME MEDICATIONS:  Current Outpatient Medications   Medication Sig Dispense Refill     anastrozole (ARIMIDEX) 1 MG tablet Take 1 tablet (1 mg) by mouth daily 90 tablet 3     atenolol  (TENORMIN) 50 MG tablet Take 1 tablet (50 mg) by mouth daily 90 tablet 3     carisoprodol (SOMA) 350 MG tablet Take 350 mg by mouth 3 times daily       carisoprodol (SOMA) 350 MG tablet Take 1 tablet (350 mg) by mouth 4 times daily as needed for muscle spasms (Patient not taking: Reported on 1/15/2021) 120 tablet 2     vitamin D3 (CHOLECALCIFEROL) 50 mcg (2000 units) tablet Take 1 tablet (50 mcg) by mouth daily 90 tablet 3         ALLERGIES:  Allergies   Allergen Reactions     Amitriptyline Other (See Comments)     nightmares     Oxycontin [Oxycodone] Nausea     Severe headaches         PAST MEDICAL HISTORY:  Past Medical History:   Diagnosis Date     Cancer (H)     BREAST CANCER     Degeneration of cervical intervertebral disc      Degeneration of lumbar or lumbosacral intervertebral disc      Hypertension      PONV (postoperative nausea and vomiting)          PAST SURGICAL HISTORY:  Past Surgical History:   Procedure Laterality Date     BACK SURGERY  2001    lumbar fusion, cervical discectomy     DISSECT LYMPH NODE AXILLA Left 4/16/2018    Procedure: DISSECT LYMPH NODE AXILLA;;  Surgeon: Rodney Umana MD;  [facility]     FUSION CERVICAL ANTERIOR THREE+ LEVELS  5/1/2012    Procedure:FUSION CERVICAL ANTERIOR THREE+ LEVELS; Surgeon:SARAH FRANCO; Location:[facility]     FUSION CERVICAL POSTERIOR THREE+ LEVELS  5/1/2012    Procedure:FUSION CERVICAL POSTERIOR THREE+ LEVELS; Posterior Cervical decompression and fusion C4-7, Anterior Cervical decompression and fusion C4-7 (c-arm), (herb table with abraham, yina oasis, yina aviator, Vitoss foam packing strip, impulse monitoring,); Surgeon:SARAH FRANCO; Location:[facility]     GYN SURGERY       HYSTERECTOMY VAGINAL  1990's    Judy Aceves OB Gyn specilist     HYSTERECTOMY, PAP NO LONGER INDICATED       INSERT PORT VASCULAR ACCESS N/A 5/14/2018    Procedure: INSERT PORT VASCULAR ACCESS;  PORT PLACEMENT;  Surgeon: Rodney Umana  MD;  [facility]     INSERT TISSUE EXPANDER BREAST Bilateral 3/22/2019    Procedure: INSERT TISSUE EXPANDER BREAST;  Surgeon: Naa Crook MD;  Location:  OR     MASTECTOMY SIMPLE Left 4/16/2018    Procedure: MASTECTOMY SIMPLE;  LEFT SIMPLE MASTECTOMY,LEFT AXILLARY DISSECTION;  Surgeon: Rodney Umana MD;  [facility]     RECONSTRUCT BREAST LATISSIMUS DORSI PEDICLE Left 3/22/2019    Procedure: LEFT LATISSIMUS FLAP RECONSTRUCT WITH TISSUE EXPANDER AND PORT REMOVAL;  Surgeon: Naa Crook MD;  Location:  OR     REMOVE PORT VASCULAR ACCESS N/A 3/22/2019    Procedure: REMOVE PORT VASCULAR ACCESS;  Surgeon: Naa Crook MD;  Location:  OR         SOCIAL HISTORY:  Social History     Socioeconomic History     Marital status: Single     Spouse name: Not on file     Number of children: 0     Years of education: Not on file     Highest education level: Not on file   Occupational History     Employer: [employer]    Social Needs     Financial resource strain: Not on file     Food insecurity     Worry: Not on file     Inability: Not on file     Transportation needs     Medical: Not on file     Non-medical: Not on file   Tobacco Use     Smoking status: Current Every Day Smoker     Packs/day: 0.50     Types: Cigarettes     Smokeless tobacco: Never Used     Tobacco comment: 4 cigarettes per day   Substance and Sexual Activity     Alcohol use: Yes     Alcohol/week: 1.0 standard drinks     Types: 1 Standard drinks or equivalent per week     Comment: 2-3 drinks per week     Drug use: No     Sexual activity: Not Currently     Partners: Male   Lifestyle     Physical activity     Days per week: Not on file     Minutes per session: Not on file     Stress: Not on file   Relationships     Social connections     Talks on phone: Not on file     Gets together: Not on file     Attends Hindu service: Not on file     Active member of club or organization: Not on file     Attends meetings of  clubs or organizations: Not on file     Relationship status: Not on file     Intimate partner violence     Fear of current or ex partner: Not on file     Emotionally abused: Not on file     Physically abused: Not on file     Forced sexual activity: Not on file   Other Topics Concern     Parent/sibling w/ CABG, MI or angioplasty before 65F 55M? No   Social History Narrative     Not on file   She smokes cigarettes, half a pack a day for many years; she is unable to quantify how many.  She drinks alcohol occasionally.  She denies illicit drug use.  She is retired.  She used to to work in executive admin at large companies.  She lives in [place] with her boyfriend.        FAMILY HISTORY:  Family History   Problem Relation Age of Onset     Pancreatitis Mother      Substance Abuse Mother      Unknown/Adopted Father      She does not know much about her family history and is unaware of any cancers in her family.  She has never been pregnant.  She had a hysterectomy in the 1990's.  She still has her ovaries.  She says that she has undergone menopause but is unsure when, likely years ago.        PHYSICAL EXAM:  Phone visit.      LABS:  None today.      IMAGING:  DEXA scan 1/13/21:  Left forearm and bilateral hip osteopenia.     Right mammogram 7/20/20:  BI-RADS 2 - benign      ASSESSMENT/PLAN:  Muriel Diaz is a 59 year old post-menopausal female with:    1) Left breast cancer of the upper inner quadrant: s/p left mastectomy on 4/16/18; pathology showed invasive mammary carcinoma, with lobular and ductal features, grade 2, tumor size 4.3 x 4.0 x 2.1 cm, positive margin for invasive carcinoma in the central and lateral posterior surface, DCIS and LCIS present, ER positive (95%), CA positive (50%), HER-2 negative.  Left axillary dissection showed 3 of 5 lymph nodes were positive for metastatic carcinoma (lobular type), also ER positive, CA positive, HER-2 negative. Stage [id number] (stage IIB).  She has completed  adjuvant chemotherapy.  She completed radiation on 12/17/18.    She started anastrozole on 12/28/18 and tolerating well.    She had mammogram done on 7/20/20, which was BI-RADS 2 - benign.    -referred for lymphedema therapy - she will see once pandemic is bettter  -Muriel has met with plastic surgery, Dr. Crook.  She underwent reconstruction with left latissimus flap and left tissue expander placement on 3/22/19.    -continue anastrozole 1 mg daily - will plan for 5-10 years treatment, if can tolerate.    -next right-sided mammogram in July 2021 - ordered  -return to clinic in 6 months; she declined sooner in-person appointment than this.  Next appointment will be in-person so that we can do breast exam.     2) Smoking: She says that she is trying to quit.  -I again advised smoking cessation  -she will see her PCP for tobacco cessation.    3) Hypertension:   -follows with PCP    4) Chronic back pain: s/p history of back surgeries  -she takes soma  -follows with PCP    5) Bone health: She is on aromatase inhibitor.  DEXA scan on 1/13/21 showed left forearm and bilateral hip osteopenia, not significantly changed since 2 years prior.    -she discussed with her PCP; weight bearing exercise, stop smoking, increased calcium intake, vitamin D supplement  -she is borderline of osteoporosis, and at high risk due to her being on aromatase inhibitor.  I discussed with her the treatment options, and she is interested in trying Fosamax, which was prescribed  -next DEXA to be in January 2023      Patience Mckeon MD  Hematology/Oncology  [facility]      Phone call duration: 7  Minutes    Total time spent on day of visit, including review of tests, obtaining/reviewing separately obtained history, ordering medications/tests/procedures, communicating with PCP/consultants, and documenting in electronic medical record: 25 minutes

## 2021-01-15 NOTE — LETTER
1/15/2021         RE: Muriel Diaz  [address]  [facility] [address]        Dear Colleague,    Thank you for referring your patient, Muriel Diaz, to the [facility]. Please see a copy of my visit note below.    Muriel is a 60 year old who is being evaluated via a billable telephone visit.      What phone number would you like to be contacted at? [phone]  How would you like to obtain your AVS? [facility]    Hematology/Oncology Established Patient Note      Today's Date: 1/15/21    Reason for Follow-up: left sided breast cancer      HISTORY OF PRESENT ILLNESS: Muriel Diaz is a 60 year old post-menopausal female with PMHx of HTN, degenerative joint disease, history of cervical spine surgery, who presents with left-sided breast cancer.  She underwent left mastectomy on 4/16/18 by Dr. Umana.  Pathology showed invasive mammary carcinoma, with lobular and ductal features, grade 2, tumor size 4.3 x 4.0 x 2.1 cm, positive margin for invasive carcinoma in the central and lateral posterior surface, DCIS and LCIS present, ER positive (95%), AR positive (50%), HER-2 negative.  Left axillary dissection showed 3 of 5 lymph nodes were positive for metastatic carcinoma (lobular type), also ER positive, AR positive, HER-2 negative. Stage [id number] (stage IIA).      She had port placed on 5/14/18. Port was removed on 3/22/19.    She started chemotherapy on 5/22/18, and completed dose-dense doxorubicin+cyclophosphamide x 4 cycles, followed by weekly paclitaxel x 12, completed on 10/2/18.    She completed radiation on 12/17/18.    She started anastrozole on 12/28/18.      On 3/22/19, she underwent reconstruction surgery with left latissimus flap and left tissue expander placement.        INTERIM HISTORY: Muriel says that she is feeling well.  She denies any new complaints today.        REVIEW OF SYSTEMS:   14  point ROS was reviewed and is negative other than as noted above in HPI.       HOME MEDICATIONS:  Current Outpatient Medications   Medication Sig Dispense Refill     anastrozole (ARIMIDEX) 1 MG tablet Take 1 tablet (1 mg) by mouth daily 90 tablet 3     atenolol (TENORMIN) 50 MG tablet Take 1 tablet (50 mg) by mouth daily 90 tablet 3     carisoprodol (SOMA) 350 MG tablet Take 350 mg by mouth 3 times daily       carisoprodol (SOMA) 350 MG tablet Take 1 tablet (350 mg) by mouth 4 times daily as needed for muscle spasms (Patient not taking: Reported on 1/15/2021) 120 tablet 2     vitamin D3 (CHOLECALCIFEROL) 50 mcg (2000 units) tablet Take 1 tablet (50 mcg) by mouth daily 90 tablet 3         ALLERGIES:  Allergies   Allergen Reactions     Amitriptyline Other (See Comments)     nightmares     Oxycontin [Oxycodone] Nausea     Severe headaches         PAST MEDICAL HISTORY:  Past Medical History:   Diagnosis Date     Cancer (H)     BREAST CANCER     Degeneration of cervical intervertebral disc      Degeneration of lumbar or lumbosacral intervertebral disc      Hypertension      PONV (postoperative nausea and vomiting)          PAST SURGICAL HISTORY:  Past Surgical History:   Procedure Laterality Date     BACK SURGERY  2001    lumbar fusion, cervical discectomy     DISSECT LYMPH NODE AXILLA Left 4/16/2018    Procedure: DISSECT LYMPH NODE AXILLA;;  Surgeon: Rodney Umana MD;  Location:  [facility]     FUSION CERVICAL ANTERIOR THREE+ LEVELS  5/1/2012    Procedure:FUSION CERVICAL ANTERIOR THREE+ LEVELS; Surgeon:SARAH FRANCO; Location:[facility] OR     FUSION CERVICAL POSTERIOR THREE+ LEVELS  5/1/2012    Procedure:FUSION CERVICAL POSTERIOR THREE+ LEVELS; Posterior Cervical decompression and fusion C4-7, Anterior Cervical decompression and fusion C4-7 (c-arm), (herb table with jarad, yina oasis, yina aviator, Vitoss foam packing strip, impulse monitoring,); Surgeon:SARAH FRANCO; Location: OR     GYN SURGERY        HYSTERECTOMY VAGINAL  1990's    Judy Aceves OB Gyn specilist     HYSTERECTOMY, PAP NO LONGER INDICATED       INSERT PORT VASCULAR ACCESS N/A 5/14/2018    Procedure: INSERT PORT VASCULAR ACCESS;  PORT PLACEMENT;  Surgeon: Rodney Umana MD;  Location: [facility]     INSERT TISSUE EXPANDER BREAST Bilateral 3/22/2019    Procedure: INSERT TISSUE EXPANDER BREAST;  Surgeon: Naa Crook MD;  Location:  OR     MASTECTOMY SIMPLE Left 4/16/2018    Procedure: MASTECTOMY SIMPLE;  LEFT SIMPLE MASTECTOMY,LEFT AXILLARY DISSECTION;  Surgeon: Rodney Umana MD;  Location:  [facility]     RECONSTRUCT BREAST LATISSIMUS DORSI PEDICLE Left 3/22/2019    Procedure: LEFT LATISSIMUS FLAP RECONSTRUCT WITH TISSUE EXPANDER AND PORT REMOVAL;  Surgeon: Naa Crook MD;  Location:  OR     REMOVE PORT VASCULAR ACCESS N/A 3/22/2019    Procedure: REMOVE PORT VASCULAR ACCESS;  Surgeon: Naa Crook MD;  Location:  OR         SOCIAL HISTORY:  Social History     Socioeconomic History     Marital status: Single     Spouse name: Not on file     Number of children: 0     Years of education: Not on file     Highest education level: Not on file   Occupational History     Employer: [employer]    Social Needs     Financial resource strain: Not on file     Food insecurity     Worry: Not on file     Inability: Not on file     Transportation needs     Medical: Not on file     Non-medical: Not on file   Tobacco Use     Smoking status: Current Every Day Smoker     Packs/day: 0.50     Types: Cigarettes     Smokeless tobacco: Never Used     Tobacco comment: 4 cigarettes per day   Substance and Sexual Activity     Alcohol use: Yes     Alcohol/week: 1.0 standard drinks     Types: 1 Standard drinks or equivalent per week     Comment: 2-3 drinks per week     Drug use: No     Sexual activity: Not Currently     Partners: Male   Lifestyle     Physical activity     Days per week: Not on file     Minutes per session: Not  on file     Stress: Not on file   Relationships     Social connections     Talks on phone: Not on file     Gets together: Not on file     Attends [employer] service: Not on file     Active member of club or organization: Not on file     Attends meetings of clubs or organizations: Not on file     Relationship status: Not on file     Intimate partner violence     Fear of current or ex partner: Not on file     Emotionally abused: Not on file     Physically abused: Not on file     Forced sexual activity: Not on file   Other Topics Concern     Parent/sibling w/ CABG, MI or angioplasty before 65F 55M? No   Social History Narrative     Not on file   She smokes cigarettes, half a pack a day for many years; she is unable to quantify how many.  She drinks alcohol occasionally.  She denies illicit drug use.  She is retired.  She used to to work in  at large companies.  She lives in [place] with her boyfriend.        FAMILY HISTORY:  Family History   Problem Relation Age of Onset     Pancreatitis Mother      Substance Abuse Mother      Unknown/Adopted Father      She does not know much about her family history and is unaware of any cancers in her family.  She has never been pregnant.  She had a hysterectomy in the 1990's.  She still has her ovaries.  She says that she has undergone menopause but is unsure when, likely years ago.        PHYSICAL EXAM:  Phone visit.      LABS:  None today.      IMAGING:  DEXA scan 1/13/21:  Left forearm and bilateral hip osteopenia.     Right mammogram 7/20/20:  BI-RADS 2 - benign      ASSESSMENT/PLAN:  Muriel Diaz is a 59 year old post-menopausal female with:    1) Left breast cancer of the upper inner quadrant: s/p left mastectomy on 4/16/18; pathology showed invasive mammary carcinoma, with lobular and ductal features, grade 2, tumor size 4.3 x 4.0 x 2.1 cm, positive margin for invasive carcinoma in the central and lateral posterior surface, DCIS and LCIS present, ER  positive (95%), ME positive (50%), HER-2 negative.  Left axillary dissection showed 3 of 5 lymph nodes were positive for metastatic carcinoma (lobular type), also ER positive, ME positive, HER-2 negative. Stage [id number] (stage IIB).  She has completed adjuvant chemotherapy.  She completed radiation on 12/17/18.    She started anastrozole on 12/28/18 and tolerating well.    She had mammogram done on 7/20/20, which was BI-RADS 2 - benign.    -referred for lymphedema therapy - she will see once pandemic is bettter  -Muriel has met with plastic surgery, Dr. Crook.  She underwent reconstruction with left latissimus flap and left tissue expander placement on 3/22/19.    -continue anastrozole 1 mg daily - will plan for 5-10 years treatment, if can tolerate.    -next right-sided mammogram in July 2021 - ordered  -return to clinic in 6 months; she declined sooner in-person appointment than this.  Next appointment will be in-person so that we can do breast exam.     2) Smoking: She says that she is trying to quit.  -I again advised smoking cessation  -she will see her PCP for tobacco cessation.    3) Hypertension:   -follows with PCP    4) Chronic back pain: s/p history of back surgeries  -she takes soma  -follows with PCP    5) Bone health: She is on aromatase inhibitor.  DEXA scan on 1/13/21 showed left forearm and bilateral hip osteopenia, not significantly changed since 2 years prior.    -she discussed with her PCP; weight bearing exercise, stop smoking, increased calcium intake, vitamin D supplement  -she is borderline of osteoporosis, and at high risk due to her being on aromatase inhibitor.  I discussed with her the treatment options, and she is interested in trying Fosamax, which was prescribed  -next DEXA to be in January 2023      Patience Mckeon MD  Hematology/Oncology  [facility]      Phone call duration: 7  Minutes    Total time spent on day of visit, including review of tests,  obtaining/reviewing separately obtained history, ordering medications/tests/procedures, communicating with PCP/consultants, and documenting in electronic medical record: 25 minutes          Again, thank you for allowing me to participate in the care of your patient.        Sincerely,        Patience Mckeon MD

## 2021-01-18 ENCOUNTER — TELEPHONE (OUTPATIENT)
Dept: ONCOLOGY | Facility: CLINIC | Age: 61
End: 2021-01-18

## 2021-01-18 NOTE — TELEPHONE ENCOUNTER
Left voicemail message for patient requesting a return call regarding scheduling MRI and return appointment with Dr Mckeon due July 2021.

## 2021-01-21 ENCOUNTER — MYC MEDICAL ADVICE (OUTPATIENT)
Dept: FAMILY MEDICINE | Facility: CLINIC | Age: 61
End: 2021-01-21

## 2021-01-21 NOTE — CONFIDENTIAL NOTE
Submitted PA for Soma by faxing form to [facility].  [phone] Will wait on response.

## 2021-01-28 DIAGNOSIS — M54.2 CERVICALGIA: ICD-10-CM

## 2021-01-29 RX ORDER — CARISOPRODOL 350 MG
TABLET ORAL
Qty: 120 TABLET | Refills: 0 | Status: SHIPPED | OUTPATIENT
Start: 2021-01-29 | End: 2021-03-01

## 2021-02-03 ENCOUNTER — VIRTUAL VISIT (OUTPATIENT)
Dept: FAMILY MEDICINE | Facility: CLINIC | Age: 61
End: 2021-02-03

## 2021-02-03 DIAGNOSIS — I10 HYPERTENSION GOAL BP (BLOOD PRESSURE) < 140/90: ICD-10-CM

## 2021-02-03 DIAGNOSIS — M54.2 NECK PAIN: Primary | ICD-10-CM

## 2021-02-03 PROCEDURE — 99213 OFFICE O/P EST LOW 20 MIN: CPT | Mod: 95 | Performed by: NURSE PRACTITIONER

## 2021-02-03 RX ORDER — ATENOLOL 50 MG/1
50 TABLET ORAL DAILY
Qty: 90 TABLET | Refills: 0 | Status: SHIPPED | OUTPATIENT
Start: 2021-02-03 | End: 2021-05-05

## 2021-02-03 RX ORDER — HYDROCODONE BITARTRATE AND ACETAMINOPHEN 5; 325 MG/1; MG/1
1 TABLET ORAL EVERY 6 HOURS PRN
Qty: 10 TABLET | Refills: 0 | Status: SHIPPED | OUTPATIENT
Start: 2021-02-03 | End: 2021-02-06

## 2021-02-03 NOTE — PROGRESS NOTES
"Problem(s) Oriented visit      Video-Visit Details    Type of service:  Video Visit    Video Start Time (time video started): 1421    Video End Time (time video stopped): 1429    Originating Location (pt. Location): Home    Distant Location (provider location):  [facility]     Mode of Communication:  Video Conference via [employer]    Physician has received verbal consent for a Video Visit from the patient? Yes    This was a virtual video-visit conducted during COVID-19 outbreak in regulation with social distancing and quarantine recommendations of the CDC and [place] department of health and human services. A two way audio/video connection was used in real time with patient's consent.    CAROLYN Enriquez CNP    SUBJECTIVE:                                                    Muriel Diaz is a 60 year old female who presents to clinic today for the following health issues :    Chronic neck and back pain with history of spine surgery. Had physical therapy yesterday for neck and back pain. \"Put me on those machines\". Had increase in neck pain on way home. Pain more severe and using neck brace. Whole neck is \"like a war zone\". Does not radiate into shoulders. Has some headache base of skull, but that is better while wearing brace. Baseline numbness/tingling of arms/hands but not more than usual. Takes Soma regularly. Tried ice, heat, Ibuprofen without any improvement in pain. Requesting a couple days worth of narcotics until pain calms down.    Problem list, Medication list, Allergies, and Medical/Social/Surgical histories reviewed in [facility] and updated as appropriate.   Additional history: as documented    ROS:  3 point ROS completed and negative except noted above, including Gen, MS, Neuro    OBJECTIVE:                                                    No vitals taken due to telehealth visit    APPEARANCE: = Chronically ill appearing adult woman  Musculsktl: Wearing neck brace     ASSESSMENT/PLAN:    " "                                                  Muriel was seen today for neck pain.    Diagnoses and all orders for this visit:    Neck pain  -     HYDROcodone-acetaminophen (NORCO) 5-325 MG tablet; Take 1 tablet by mouth every 6 hours as needed for severe pain  Patient feels as though pain is acute after physical therapy she had yesterday and requesting small amount of narcotic pain medication to use for the next couple of days. Treatments attempted so far include ice, heat, ibuprofen without relief. Does have neurologist and discussed that if symptoms do not improve, then I recommend MRI/specialist follow-up.  Caution taking Vicodin with her Soma medication discussed.  Discussed side effects associated with narcotic pain meds including GI disturbances (such as diarrhea, nausea, vomiting, constipation, etc.), drowsiness, decreased cognition, narcotic withdrawal, and narcotic addiction/dependence.  Told the patient never to drink alcohol while taking this medication and not to drive or operate dangerous machinery while taking it.  Also discussed in specific detail my expectation regarding responsible use of narcotic and the implied \"contract\" with the patient that this type of medication will be used properly and exactly as instructed, and that any abnormal behaviors associated with the use of this medication will cause me to stop prescribing these medications at any time.      PDMP Review       Value Time User    State PDMP site checked  Yes 2/3/2021  2:21 PM Susy Saunders APRN CNP              See Patient Instructions  Patient Instructions   Continue ice/heat on neck.    Ibuprofen 600-800 mg 2-3 times daily for next 1-2 weeks    Could try lidocaine patches (Salon pas over the counter)    Vicodin (opioid pain medication) - take 1 pill every 6 hours as needed for severe pain. If needing more than 3 days/10 tablets prescribed, then I recommend following up with your Neurologist/surgeon      Susy Saunders, " APRN CNP  [facility]  [phone]    For any issues my office # is [phone]

## 2021-02-03 NOTE — TELEPHONE ENCOUNTER
atenolol (TENORMIN) 50 MG    Last OV- 11/5/20 VV    Last labs 2019= due    BP Readings from Last 3 Encounters:   01/29/20 (!) 146/88   10/22/19 (!) 151/88   04/26/19 145/76     Last Comprehensive Metabolic Panel:  Sodium   Date Value Ref Range Status   12/28/2018 143 133 - 144 mmol/L Final     Potassium   Date Value Ref Range Status   03/22/2019 4.4 3.4 - 5.3 mmol/L Final     Chloride   Date Value Ref Range Status   12/28/2018 110 (H) 94 - 109 mmol/L Final     Carbon Dioxide   Date Value Ref Range Status   12/28/2018 28 20 - 32 mmol/L Final     Anion Gap   Date Value Ref Range Status   12/28/2018 5 3 - 14 mmol/L Final     Glucose   Date Value Ref Range Status   12/28/2018 94 70 - 99 mg/dL Final     Urea Nitrogen   Date Value Ref Range Status   12/28/2018 20 7 - 30 mg/dL Final     Creatinine   Date Value Ref Range Status   03/22/2019 0.84 0.52 - 1.04 mg/dL Final     GFR Estimate   Date Value Ref Range Status   03/22/2019 77 >60 mL/min/[1.73_m2] Final     Comment:     Non  GFR Calc  Starting 12/18/2018, serum creatinine based estimated GFR (eGFR) will be   calculated using the Chronic Kidney Disease Epidemiology Collaboration   (CKD-EPI) equation.       Calcium   Date Value Ref Range Status   12/28/2018 8.8 8.5 - 10.1 mg/dL Final

## 2021-02-03 NOTE — PATIENT INSTRUCTIONS
Continue ice/heat on neck.    Ibuprofen 600-800 mg 2-3 times daily for next 1-2 weeks    Could try lidocaine patches (Salon pas over the counter)    Vicodin (opioid pain medication) - take 1 pill every 6 hours as needed for severe pain. If needing more than 3 days/10 tablets prescribed, then I recommend following up with your Neurologist/surgeon

## 2021-02-15 ENCOUNTER — VIRTUAL VISIT (OUTPATIENT)
Dept: FAMILY MEDICINE | Facility: CLINIC | Age: 61
End: 2021-02-15

## 2021-02-15 DIAGNOSIS — M54.2 CERVICALGIA: Primary | ICD-10-CM

## 2021-02-15 PROCEDURE — 99214 OFFICE O/P EST MOD 30 MIN: CPT | Mod: 95 | Performed by: NURSE PRACTITIONER

## 2021-02-15 RX ORDER — DULOXETIN HYDROCHLORIDE 30 MG/1
30 CAPSULE, DELAYED RELEASE ORAL DAILY
Qty: 30 CAPSULE | Refills: 0 | Status: SHIPPED | OUTPATIENT
Start: 2021-02-15 | End: 2021-03-15

## 2021-02-15 RX ORDER — PREDNISONE 20 MG/1
40 TABLET ORAL DAILY
Qty: 10 TABLET | Refills: 0 | Status: SHIPPED | OUTPATIENT
Start: 2021-02-15 | End: 2021-02-20

## 2021-02-15 RX ORDER — HYDROCODONE BITARTRATE AND ACETAMINOPHEN 5; 325 MG/1; MG/1
1 TABLET ORAL EVERY 6 HOURS PRN
Qty: 10 TABLET | Refills: 0 | Status: SHIPPED | OUTPATIENT
Start: 2021-02-15 | End: 2021-02-18

## 2021-02-15 NOTE — PROGRESS NOTES
Problem(s) Oriented visit      Telephone Visit Details    Type of service:  Telephone Visit    Start Time (time call started): 1633    End Time (time call stopped): 1653    Originating Location (pt. Location): Home    Distant Location (provider location):  [facility]     Mode of Communication:  Telephone    Clinician has received verbal consent for a telephone visit from the patient? Yes      CAROLYN Franks CNP        SUBJECTIVE:                                                    Muriel Diaz is a 60 year old female who presents to clinic today for the following health issues :    CC: follow up of neck pain    Muriel had seen PDR for her chronic neck pain, but feels her neck pain acutely worsened afterward- felt worse than any other neck surgery she's had. Norco was helpful. Went to have the Botox injections in her neck and since then her pain has been worsening. Last night it was bad enough that she had to take Norco- sharp shooting pains, couldn't turn her head to the left yesterday due to this. Today feels much better, denies need for Norco right now but is concerned that she will have the pain again. Prednisone burst has been helpful in the past as well. Has been on gabapentin in the past, gave her weird dreams and made her feel not normal. Needs letter for SOMA- carisoprodol has no effect but name brand SOMA helps her and has been helpful for many years.      Problem list, Medication list, Allergies, and Medical/Social/Surgical histories reviewed in [facility] and updated as appropriate.   Additional history: as documented    ROS:  Gen, MSK, neuro      OBJECTIVE:                                                    No vitals- telephone visit    Alert sounding female in no apparent distress. Speaking in full, coherent sentences without apparent dyspnea, wheezing, or cough. Pleasant and interactive, thought process logical.         ASSESSMENT/PLAN:                                                           Muriel was seen today for recheck and recheck medication.    Diagnoses and all orders for this visit:    Cervicalgia  -     predniSONE (DELTASONE) 20 MG tablet; Take 2 tablets (40 mg) by mouth daily for 5 days  -     HYDROcodone-acetaminophen (NORCO) 5-325 MG tablet; Take 1 tablet by mouth every 6 hours as needed for severe pain  -     DULoxetine (CYMBALTA) 30 MG capsule; Take 1 capsule (30 mg) by mouth daily      She would like to continue with PDR but is concerned she'll have recurrent pain flare. Will treat with prednisone 40mg x5 days, can have a small dose of Norco on file in case pain is severe-  reviewed, reviewed safety in detail. Discussed the neuropathic seeming pain and will start duloxetine as well but will need to be seen back in person in four weeks for med check/BP check. Reviewed side effects of medications, alarm signs and symptoms, and when to seek further care.            The following health maintenance items are reviewed in Epic and correct as of today:  Health Maintenance   Topic Date Due     MICROALBUMIN  1960     ADVANCE CARE PLANNING  1960     Pneumococcal Vaccine: Pediatrics (0 to 5 Years) and At-Risk Patients (6 to 64 Years) (1 of 3 - PCV13) 11/30/1967     HIV SCREENING  11/30/1976     MEDICARE ANNUAL WELLNESS VISIT  11/30/1979     LIPID  04/07/2018     BMP  12/28/2019     PHQ-2  01/01/2021     MAMMO SCREENING  07/20/2022     COLORECTAL CANCER SCREENING  03/18/2026     DTAP/TDAP/TD IMMUNIZATION (3 - Td) 01/29/2030     HEPATITIS C SCREENING  Completed     INFLUENZA VACCINE  Completed     ZOSTER IMMUNIZATION  Completed     IPV IMMUNIZATION  Aged Out     MENINGITIS IMMUNIZATION  Aged Out     HEPATITIS B IMMUNIZATION  Aged Out     PAP  Discontinued       There are no Patient Instructions on file for this visit.    CAROLYN Franks CNP  [facility]  Family Practice  [facility]  [phone]    For any issues my office # is [phone]

## 2021-02-27 DIAGNOSIS — M54.2 CERVICALGIA: ICD-10-CM

## 2021-03-01 RX ORDER — CARISOPRODOL 350 MG/1
TABLET ORAL
Qty: 120 TABLET | Refills: 0 | Status: SHIPPED | OUTPATIENT
Start: 2021-03-01 | End: 2021-03-29

## 2021-03-09 NOTE — TELEPHONE ENCOUNTER
PA for Soma was denied,  Submitted appeal by fax.  Appeal was approved on 01/26.  Called patient to inform her.  She called back stating the prescription was still very expensive. Called [facility] and they explained that patients insurance plan has a high deductible and she still has to pay 47 % of the original cost of medication. No tier exception can be done for non formulary mediation. Patient informed. She will call her insurance plan if she has any questions. She will try the generic of Soma again.

## 2021-03-15 ENCOUNTER — OFFICE VISIT (OUTPATIENT)
Dept: FAMILY MEDICINE | Facility: CLINIC | Age: 61
End: 2021-03-15

## 2021-03-15 VITALS
HEIGHT: 64 IN | SYSTOLIC BLOOD PRESSURE: 144 MMHG | BODY MASS INDEX: 17.79 KG/M2 | OXYGEN SATURATION: 95 % | HEART RATE: 54 BPM | WEIGHT: 104.2 LBS | DIASTOLIC BLOOD PRESSURE: 78 MMHG

## 2021-03-15 DIAGNOSIS — M54.2 CERVICALGIA: ICD-10-CM

## 2021-03-15 DIAGNOSIS — G24.9 DYSTONIA: ICD-10-CM

## 2021-03-15 DIAGNOSIS — I10 HYPERTENSION GOAL BP (BLOOD PRESSURE) < 140/90: Primary | ICD-10-CM

## 2021-03-15 PROCEDURE — 36415 COLL VENOUS BLD VENIPUNCTURE: CPT | Performed by: NURSE PRACTITIONER

## 2021-03-15 PROCEDURE — 99213 OFFICE O/P EST LOW 20 MIN: CPT | Performed by: NURSE PRACTITIONER

## 2021-03-15 PROCEDURE — 93050 ART PRESSURE WAVEFORM ANALYS: CPT | Performed by: NURSE PRACTITIONER

## 2021-03-15 RX ORDER — HYDROXYZINE PAMOATE 50 MG/1
CAPSULE ORAL
COMMUNITY
Start: 2021-03-05 | End: 2021-10-07 | Stop reason: SINTOL

## 2021-03-15 ASSESSMENT — MIFFLIN-ST. JEOR: SCORE: 1028.68
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
## 2021-03-15 NOTE — PATIENT INSTRUCTIONS
Check pressures at home and let Elizabeth know if your blood pressure is still >130/85- if so, we will discuss adding in another medication. Please let Elizabeth know in about a month.  Www.validateBP.org is a good website to find good reliable blood pressure cuffs    Take the duloxetine every other day the next week then stop

## 2021-03-16 LAB
BUN SERPL-MCNC: 18 MG/DL (ref 6–24)
BUN/CREATININE RATIO: 25 (ref 9–23)
CALCIUM SERPL-MCNC: 9.4 MG/DL (ref 8.7–10.2)
CHLORIDE SERPLBLD-SCNC: 103 MMOL/L (ref 96–106)
CREAT SERPL-MCNC: 0.72 MG/DL (ref 0.57–1)
EGFR IF AFRICN AM: 106 ML/MIN/1.73
EGFR IF NONAFRICN AM: 92 ML/MIN/1.73
GLUCOSE SERPL-MCNC: 108 MG/DL (ref 65–99)
POTASSIUM SERPL-SCNC: 5.1 MMOL/L (ref 3.5–5.2)
SODIUM SERPL-SCNC: 141 MMOL/L (ref 134–144)
TOTAL CO2: 28 MMOL/L (ref 20–29)

## 2021-03-29 DIAGNOSIS — M54.2 CERVICALGIA: ICD-10-CM

## 2021-03-29 RX ORDER — CARISOPRODOL 350 MG/1
TABLET ORAL
Qty: 120 TABLET | Refills: 0 | Status: SHIPPED | OUTPATIENT
Start: 2021-03-29 | End: 2021-04-26

## 2021-03-29 NOTE — TELEPHONE ENCOUNTER
Elicia. Last addressed 3/15/21. Last written 3/1/21 #120.       Cervicalgia  Dystonia     Generally stable, symptoms are mostly concerned with carisoprodol. Taper off duloxetine.

## 2021-04-14 ENCOUNTER — MYC MEDICAL ADVICE (OUTPATIENT)
Dept: FAMILY MEDICINE | Facility: CLINIC | Age: 61
End: 2021-04-14

## 2021-04-14 NOTE — CONFIDENTIAL NOTE
Called patient and offered appt today with Elizabeth Willson CNP - patient unable to RTC today. Scheduled for eval tomorrow with Susy Saunders CNP.

## 2021-04-26 DIAGNOSIS — M54.2 CERVICALGIA: ICD-10-CM

## 2021-04-26 RX ORDER — CARISOPRODOL 350 MG/1
TABLET ORAL
Qty: 120 TABLET | Refills: 0 | Status: SHIPPED | OUTPATIENT
Start: 2021-04-26 | End: 2021-05-24

## 2021-04-26 RX ORDER — CARISOPRODOL 350 MG/1
TABLET ORAL
Qty: 120 TABLET | Refills: 0 | Status: SHIPPED | OUTPATIENT
Start: 2021-04-26 | End: 2021-04-26

## 2021-05-05 DIAGNOSIS — I10 HYPERTENSION GOAL BP (BLOOD PRESSURE) < 140/90: ICD-10-CM

## 2021-05-05 RX ORDER — ATENOLOL 50 MG/1
50 TABLET ORAL DAILY
Qty: 90 TABLET | Refills: 0 | Status: SHIPPED | OUTPATIENT
Start: 2021-05-05 | End: 2021-08-04

## 2021-05-05 NOTE — TELEPHONE ENCOUNTER
Atenolol  LOV 3/15/2021    BP Readings from Last 3 Encounters:   03/15/21 (!) 144/78   01/29/20 (!) 146/88   10/22/19 (!) 151/88

## 2021-05-23 DIAGNOSIS — M54.2 CERVICALGIA: ICD-10-CM

## 2021-05-24 RX ORDER — CARISOPRODOL 350 MG/1
TABLET ORAL
Qty: 120 TABLET | Refills: 0 | Status: SHIPPED | OUTPATIENT
Start: 2021-05-24 | End: 2021-06-22

## 2021-06-21 DIAGNOSIS — M54.2 CERVICALGIA: ICD-10-CM

## 2021-06-21 NOTE — TELEPHONE ENCOUNTER
carisoprodol (SOMA) 350 MG    LOV - 3/15/21    Last labs 3/15/21    No appts scheduled at this time

## 2021-06-22 ENCOUNTER — MYC REFILL (OUTPATIENT)
Dept: FAMILY MEDICINE | Facility: CLINIC | Age: 61
End: 2021-06-22

## 2021-06-22 DIAGNOSIS — M54.2 CERVICALGIA: ICD-10-CM

## 2021-06-22 RX ORDER — CARISOPRODOL 350 MG/1
TABLET ORAL
Qty: 120 TABLET | Refills: 0 | Status: SHIPPED | OUTPATIENT
Start: 2021-06-22 | End: 2021-07-20

## 2021-06-22 RX ORDER — CARISOPRODOL 350 MG/1
TABLET ORAL
Qty: 120 TABLET | Refills: 0 | Status: CANCELLED | OUTPATIENT
Start: 2021-06-22

## 2021-07-19 DIAGNOSIS — Z17.0 MALIGNANT NEOPLASM OF UPPER-INNER QUADRANT OF LEFT BREAST IN FEMALE, ESTROGEN RECEPTOR POSITIVE (H): ICD-10-CM

## 2021-07-19 DIAGNOSIS — C50.212 MALIGNANT NEOPLASM OF UPPER-INNER QUADRANT OF LEFT BREAST IN FEMALE, ESTROGEN RECEPTOR POSITIVE (H): ICD-10-CM

## 2021-07-20 ENCOUNTER — OFFICE VISIT (OUTPATIENT)
Dept: FAMILY MEDICINE | Facility: CLINIC | Age: 61
End: 2021-07-20

## 2021-07-20 VITALS
DIASTOLIC BLOOD PRESSURE: 94 MMHG | HEART RATE: 56 BPM | SYSTOLIC BLOOD PRESSURE: 165 MMHG | HEIGHT: 63 IN | OXYGEN SATURATION: 98 % | RESPIRATION RATE: 18 BRPM | BODY MASS INDEX: 18.54 KG/M2 | WEIGHT: 104.6 LBS

## 2021-07-20 DIAGNOSIS — I10 HYPERTENSION GOAL BP (BLOOD PRESSURE) < 140/90: Primary | ICD-10-CM

## 2021-07-20 DIAGNOSIS — F17.200 TOBACCO USE DISORDER: ICD-10-CM

## 2021-07-20 DIAGNOSIS — M54.2 CERVICALGIA: ICD-10-CM

## 2021-07-20 PROCEDURE — 93050 ART PRESSURE WAVEFORM ANALYS: CPT | Performed by: FAMILY MEDICINE

## 2021-07-20 PROCEDURE — 99214 OFFICE O/P EST MOD 30 MIN: CPT | Performed by: FAMILY MEDICINE

## 2021-07-20 RX ORDER — ANASTROZOLE 1 MG/1
1 TABLET ORAL DAILY
Qty: 90 TABLET | Refills: 0 | Status: SHIPPED | OUTPATIENT
Start: 2021-07-20 | End: 2021-07-23

## 2021-07-20 RX ORDER — HYDROCODONE BITARTRATE AND ACETAMINOPHEN 5; 325 MG/1; MG/1
1 TABLET ORAL EVERY 6 HOURS PRN
Qty: 10 TABLET | Refills: 0 | Status: SHIPPED | OUTPATIENT
Start: 2021-07-20 | End: 2021-07-23

## 2021-07-20 RX ORDER — CARISOPRODOL 350 MG/1
TABLET ORAL
Qty: 120 TABLET | Refills: 0 | Status: SHIPPED | OUTPATIENT
Start: 2021-07-20 | End: 2021-08-20

## 2021-07-20 ASSESSMENT — MIFFLIN-ST. JEOR: SCORE: 1018.59

## 2021-07-20 NOTE — PROGRESS NOTES
"  Savannah Llamas is a 60 year old patient who presents to clinic for evaluation.  Developed neck pain 1 week ago.  She was out of town and thinks it is from sleeping on a different bed.  She has longstanding chronic neck pain, dystonia/torticollis and is s/p fusion.  She follows with neurology for botox injections but feels these are not effective.  She was referred to PT which she felt made it worse.  She was tried on duloxetine but did not help.  She feels Carisoprodol is only thing that helps her baseline symptoms.  She has periodic flares like this and was given Norco last time which helped significantly.  She has not needed any since.  No radicular symptoms.      Review of Systems   Constitutional, HEENT, cardiovascular, pulmonary, GI, , musculoskeletal, neuro, skin, endocrine and psych systems are negative, except as otherwise noted.      Objective    BP (!) 165/94   Pulse 56   Resp 18   Ht 1.6 m (5' 3\")   Wt 47.4 kg (104 lb 9.6 oz)   SpO2 98%   BMI 18.53 kg/m      General: Well appearing, NAD  MSK: ROM limited in all planes.  No reproducible tenderness.  UE strength normal and symmetric.  Normal reflexes.  NV exam intact  Psych: normal mood and affect      Assessment & Plan     Hypertension goal BP (blood pressure) < 140/90  Above goal, recheck when not in pain  - TX ART PRESS WAVEFORM ANALYS CENTRAL ART PRESSURE    Tobacco use disorder  Encouraged cessation    Cervicalgia  Acute on chronic.  Short course norco.  May continue Soma.  Cont all of supportive cares.  Follow up prn.  Proper use and potential benefits, harms and side effects discussed in detail.  - HYDROcodone-acetaminophen (NORCO) 5-325 MG tablet; Take 1 tablet by mouth every 6 hours as needed for severe pain  - carisoprodol (SOMA) 350 MG tablet; TAKE ONE TABLET BY MOUTH FOUR TIMES DAILY AS NEEDED FOR MUSCLE SPASM               Avni Brown MD  [facility]          "

## 2021-07-21 ENCOUNTER — HOSPITAL ENCOUNTER (OUTPATIENT)
Dept: MAMMOGRAPHY | Facility: CLINIC | Age: 61
Discharge: HOME | End: 2021-07-21
Attending: INTERNAL MEDICINE | Admitting: INTERNAL MEDICINE
Payer: MEDICARE

## 2021-07-21 DIAGNOSIS — Z12.31 VISIT FOR SCREENING MAMMOGRAM: ICD-10-CM

## 2021-07-21 PROCEDURE — 77067 SCR MAMMO BI INCL CAD: CPT | Mod: 52

## 2021-07-23 ENCOUNTER — ONCOLOGY VISIT (OUTPATIENT)
Dept: ONCOLOGY | Facility: CLINIC | Age: 61
End: 2021-07-23
Attending: INTERNAL MEDICINE
Payer: MEDICARE

## 2021-07-23 VITALS
WEIGHT: 105.2 LBS | HEART RATE: 61 BPM | HEIGHT: 63 IN | DIASTOLIC BLOOD PRESSURE: 85 MMHG | TEMPERATURE: 98.8 F | SYSTOLIC BLOOD PRESSURE: 153 MMHG | BODY MASS INDEX: 18.64 KG/M2 | OXYGEN SATURATION: 98 % | RESPIRATION RATE: 16 BRPM

## 2021-07-23 DIAGNOSIS — Z17.0 MALIGNANT NEOPLASM OF UPPER-INNER QUADRANT OF LEFT BREAST IN FEMALE, ESTROGEN RECEPTOR POSITIVE (H): ICD-10-CM

## 2021-07-23 DIAGNOSIS — C50.212 MALIGNANT NEOPLASM OF UPPER-INNER QUADRANT OF LEFT BREAST IN FEMALE, ESTROGEN RECEPTOR POSITIVE (H): ICD-10-CM

## 2021-07-23 PROCEDURE — G0463 HOSPITAL OUTPT CLINIC VISIT: HCPCS

## 2021-07-23 PROCEDURE — 99214 OFFICE O/P EST MOD 30 MIN: CPT | Performed by: INTERNAL MEDICINE

## 2021-07-23 RX ORDER — ANASTROZOLE 1 MG/1
1 TABLET ORAL DAILY
Qty: 90 TABLET | Refills: 3 | Status: SHIPPED | OUTPATIENT
Start: 2021-07-23 | End: 2022-09-13

## 2021-07-23 ASSESSMENT — PAIN SCALES - GENERAL: PAINLEVEL: NO PAIN (0)

## 2021-07-23 ASSESSMENT — MIFFLIN-ST. JEOR: SCORE: 1021.31

## 2021-07-23 NOTE — LETTER
"    7/23/2021         RE: Muriel Diaz  [address] [facility] [address]        Dear Colleague,    Thank you for referring your patient, Muriel Diaz, to the [facility]. Please see a copy of my visit note below.    Oncology Rooming Note    July 23, 2021 1:25 PM   Muriel Diaz is a 60 year old female who presents for:    Chief Complaint   Patient presents with     Oncology Clinic Visit     Initial Vitals: There were no vitals taken for this visit. Estimated body mass index is 18.53 kg/m  as calculated from the following:    Height as of 7/20/21: 1.6 m (5' 3\").    Weight as of 7/20/21: 47.4 kg (104 lb 9.6 oz). There is no height or weight on file to calculate BSA.  Data Unavailable Comment: Data Unavailable   No LMP recorded. Patient has had a hysterectomy.  Allergies reviewed: Yes  Medications reviewed: Yes    Medications: Medication refills not needed today.  Pharmacy name entered into MyDocTime:    [facility] [place], [place] - [address] [facility] [address] [place], [place] - [address]    Clinical concerns:  doctor was notified.      Dodie Galeas MA              [facility]    Hematology/Oncology Established Patient Note      Today's Date: 7/23/21    Reason for Follow-up: left sided breast cancer      HISTORY OF PRESENT ILLNESS: Muriel Diaz is a 60 year old post-menopausal female with PMHx of HTN, degenerative joint disease, history of cervical spine surgery, who presents with left-sided breast cancer.  She underwent left mastectomy on 4/16/18 by Dr. Umana.  Pathology showed invasive mammary carcinoma, with lobular and ductal features, grade 2, tumor size 4.3 x 4.0 x 2.1 cm, positive margin for invasive carcinoma in the central and lateral posterior surface, DCIS and LCIS present, ER positive (95%), [place] positive (50%), HER-2 negative.  Left axillary dissection " showed 3 of 5 lymph nodes were positive for metastatic carcinoma (lobular type), also ER positive, ID positive, HER-2 negative. Stage [id number] (stage IIA).      She had port placed on 5/14/18. Port was removed on 3/22/19.    She started chemotherapy on 5/22/18, and completed dose-dense doxorubicin+cyclophosphamide x 4 cycles, followed by weekly paclitaxel x 12, completed on 10/2/18.    She completed radiation on 12/17/18.    She started anastrozole on 12/28/18.      On 3/22/19, she underwent reconstruction surgery with left latissimus flap and left tissue expander placement.        INTERIM HISTORY: Muriel is doing well.  She is tolerating anastrozole fine.  She had diarrhea when she started Fosamax, but that is slowly getting better.  She notes that her implants are lopsided and would like to see plastic surgery again.        REVIEW OF SYSTEMS:   14 point ROS was reviewed and is negative other than as noted above in HPI.       HOME MEDICATIONS:  Current Outpatient Medications   Medication Sig Dispense Refill     alendronate (FOSAMAX) 70 MG tablet Take 1 tablet (70 mg) by mouth every 7 days 12 tablet 3     anastrozole (ARIMIDEX) 1 MG tablet Take 1 tablet (1 mg) by mouth daily 90 tablet 0     atenolol (TENORMIN) 50 MG tablet Take 1 tablet (50 mg) by mouth daily 90 tablet 0     botulinum toxin type A (BOTOX) 50 units/mL injection 250 Units       calcium carbonate (OS-HATTIE) 600 MG tablet Take 1 tablet (600 mg) by mouth 2 times daily (with meals) 180 tablet 3     carisoprodol (SOMA) 350 MG tablet TAKE ONE TABLET BY MOUTH FOUR TIMES DAILY AS NEEDED FOR MUSCLE SPASM 120 tablet 0     HYDROcodone-acetaminophen (NORCO) 5-325 MG tablet Take 1 tablet by mouth every 6 hours as needed for severe pain 10 tablet 0     hydrOXYzine (VISTARIL) 50 MG capsule take 1 capsule (50 mg) by mouth every night at bedtime for sleep       vitamin D3 (CHOLECALCIFEROL) 50 mcg (2000 units) tablet Take 1 tablet (50 mcg) by mouth daily 90 tablet 3          ALLERGIES:  Allergies   Allergen Reactions     Amitriptyline Other (See Comments)     nightmares     Oxycontin [Oxycodone] Nausea     Severe headaches         PAST MEDICAL HISTORY:  Past Medical History:   Diagnosis Date     Cancer (H)     BREAST CANCER     Degeneration of cervical intervertebral disc      Degeneration of lumbar or lumbosacral intervertebral disc      Hypertension      PONV (postoperative nausea and vomiting)          PAST SURGICAL HISTORY:  Past Surgical History:   Procedure Laterality Date     BACK SURGERY  2001    lumbar fusion, cervical discectomy     DISSECT LYMPH NODE AXILLA Left 4/16/2018    Procedure: DISSECT LYMPH NODE AXILLA;;  Surgeon: Rodney Umana MD;  Location: [facility]     FUSION CERVICAL ANTERIOR THREE+ LEVELS  5/1/2012    Procedure:FUSION CERVICAL ANTERIOR THREE+ LEVELS; Surgeon:SARAH FRANCO; Location:[facility] OR     FUSION CERVICAL POSTERIOR THREE+ LEVELS  5/1/2012    Procedure:FUSION CERVICAL POSTERIOR THREE+ LEVELS; Posterior Cervical decompression and fusion C4-7, Anterior Cervical decompression and fusion C4-7 (c-arm), (herb table with abraham, yina oasis, yina aviator, Vitoss foam packing strip, impulse monitoring,); Surgeon:SARAH FRANCO; Location: OR     GYN SURGERY       HYSTERECTOMY VAGINAL  1990's    Judy Aceves OB Gyn specilist     HYSTERECTOMY, PAP NO LONGER INDICATED       INSERT PORT VASCULAR ACCESS N/A 5/14/2018    Procedure: INSERT PORT VASCULAR ACCESS;  PORT PLACEMENT;  Surgeon: Rodney Umana MD;  Location: [facility]     INSERT TISSUE EXPANDER BREAST Bilateral 3/22/2019    Procedure: INSERT TISSUE EXPANDER BREAST;  Surgeon: Naa Crook MD;  Location:  OR     MASTECTOMY SIMPLE Left 4/16/2018    Procedure: MASTECTOMY SIMPLE;  LEFT SIMPLE MASTECTOMY,LEFT AXILLARY DISSECTION;  Surgeon: Rodney Umana MD;  Location: [facility]     RECONSTRUCT BREAST LATISSIMUS DORSI PEDICLE Left 3/22/2019    Procedure: LEFT  LATISSIMUS FLAP RECONSTRUCT WITH TISSUE EXPANDER AND PORT REMOVAL;  Surgeon: Naa Crook MD;  Location:  OR     REMOVE PORT VASCULAR ACCESS N/A 3/22/2019    Procedure: REMOVE PORT VASCULAR ACCESS;  Surgeon: Naa Crook MD;  Location:  OR         SOCIAL HISTORY:  Social History     Socioeconomic History     Marital status: Single     Spouse name: Not on file     Number of children: 0     Years of education: Not on file     Highest education level: Not on file   Occupational History     Employer: [employer]    Tobacco Use     Smoking status: Current Every Day Smoker     Packs/day: 0.50     Types: Cigarettes     Smokeless tobacco: Never Used     Tobacco comment: 4 cigarettes per day   Substance and Sexual Activity     Alcohol use: Yes     Alcohol/week: 1.0 standard drinks     Types: 1 Standard drinks or equivalent per week     Comment: 2-3 drinks per week     Drug use: No     Sexual activity: Not Currently     Partners: Male   Other Topics Concern     Parent/sibling w/ CABG, MI or angioplasty before 65F 55M? No   Social History Narrative     Not on file     Social Determinants of Health     Financial Resource Strain:      Difficulty of Paying Living Expenses:    Food Insecurity:      Worried About Running Out of Food in the Last Year:      Ran Out of Food in the Last Year:    Transportation Needs:      Lack of Transportation (Medical):      Lack of Transportation (Non-Medical):    Physical Activity:      Days of Exercise per Week:      Minutes of Exercise per Session:    Stress:      Feeling of Stress :    Social Connections:      Frequency of Communication with Friends and Family:      Frequency of Social Gatherings with Friends and Family:      Attends Judaism Services:      Active Member of Clubs or Organizations:      Attends Club or Organization Meetings:      Marital Status:    Intimate Partner Violence:      Fear of Current or Ex-Partner:      Emotionally Abused:      Physically Abused:   "    Sexually Abused:    She smokes cigarettes, half a pack a day for many years; she is unable to quantify how many.  She drinks alcohol occasionally.  She denies illicit drug use.  She is retired.  She used to to work in  at large companies.  She lives in [place] with her boyfriend.        FAMILY HISTORY:  Family History   Problem Relation Age of Onset     Pancreatitis Mother      Substance Abuse Mother      Unknown/Adopted Father      She does not know much about her family history and is unaware of any cancers in her family.  She has never been pregnant.  She had a hysterectomy in the .  She still has her ovaries.  She says that she has undergone menopause but is unsure when, likely years ago.        PHYSICAL EXAM:  BP (!) 153/85   Pulse 61   Temp 98.8  F (37.1  C)   Resp 16   Ht 1.6 m (5' 3\")   Wt 47.7 kg (105 lb 3.2 oz)   SpO2 98%   BMI 18.64 kg/m    ECO  GENERAL/CONSTITUTIONAL: No acute distress.  EYES: No scleral icterus.  NECK: No supraclavicular or cervical lymphadenopathy.  CARDIOVASCULAR: Regular rate and rhythm.  RESPIRATORY: Clear to ausculation bilaterally.  BREAST: Right-no palpable mass, discharge, rash, or axillary lymphadenopathy, implant in place.  Left-s/p mastectomy with implant in place.  NEUROLOGIC: Alert, oriented, answers questions appropriately.  INTEGUMENTARY: No jaundice.      LABS:  None today.      IMAGING:  DEXA scan 21:  Left forearm and bilateral hip osteopenia.     Right mammogram 21:  BI-RADS 2 - benign      ASSESSMENT/PLAN:  Muriel Diaz is a 60 year old post-menopausal female with:    1) Left breast cancer of the upper inner quadrant: s/p left mastectomy on 18; pathology showed invasive mammary carcinoma, with lobular and ductal features, grade 2, tumor size 4.3 x 4.0 x 2.1 cm, positive margin for invasive carcinoma in the central and lateral posterior surface, DCIS and LCIS present, ER positive (95%), DC positive (50%), " HER-2 negative.  Left axillary dissection showed 3 of 5 lymph nodes were positive for metastatic carcinoma (lobular type), also ER positive, [place] positive, HER-2 negative. Stage [id number] (stage IIB).  She has completed adjuvant chemotherapy.  She completed radiation on 12/17/18.    She started anastrozole on 12/28/18 and tolerating well.    She had mammogram done on 7/21/21, which was BI-RADS 2 - benign.    -Muriel has met with plastic surgery, Dr. Crook.  She underwent reconstruction with left latissimus flap and left tissue expander placement on 3/22/19.  She feels that the implants are uneven.  She says that she will see Dr. Crook again.  -continue anastrozole 1 mg daily - will plan for 5-10 years treatment, if can tolerate.  Prescription renewed today.  -next right-sided mammogram in July 2022 - will order at the next visit.  -return to clinic in 6 months    2) Smoking: She says that she is trying to quit.  -I again advised smoking cessation  -she will see her PCP for tobacco cessation.    3) Hypertension:   -follows with PCP    4) Chronic back pain: s/p history of back surgeries  -follows with PCP    5) Bone health: She is on aromatase inhibitor.  DEXA scan on 1/13/21 showed left forearm and bilateral hip osteopenia, not significantly changed since 2 years prior.    -she discussed with her PCP; weight bearing exercise, stop smoking, increased calcium intake, vitamin D supplement  -she is borderline of osteoporosis, and at high risk due to her being on aromatase inhibitor.  I discussed with her the treatment options, and she is interested in trying Fosamax, which was prescribed  -she had diarrhea with Fosamax, but is slowly improving.  She wants to stay on Fosamax for now.  If intolerable, we can do Zometa or Prolia.  -next DEXA to be in January 2023      Patience Mckeon MD  Hematology/Oncology  [facility]    Total time spent on day of visit, including review of tests, obtaining/reviewing  separately obtained history, ordering medications/tests/procedures, communicating with PCP/consultants, and documenting in electronic medical record: 25 minutes        Again, thank you for allowing me to participate in the care of your patient.        Sincerely,        Patience Mckeon MD

## 2021-07-23 NOTE — PROGRESS NOTES
"Oncology Rooming Note    July 23, 2021 1:25 PM   Muriel Diaz is a 60 year old female who presents for:    Chief Complaint   Patient presents with     Oncology Clinic Visit     Initial Vitals: There were no vitals taken for this visit. Estimated body mass index is 18.53 kg/m  as calculated from the following:    Height as of 7/20/21: 1.6 m (5' 3\").    Weight as of 7/20/21: 47.4 kg (104 lb 9.6 oz). There is no height or weight on file to calculate BSA.  Data Unavailable Comment: Data Unavailable   No LMP recorded. Patient has had a hysterectomy.  Allergies reviewed: Yes  Medications reviewed: Yes    Medications: Medication refills not needed today.  Pharmacy name entered into [employer]:    [facility] [place], [place] - [address] [facility] [place], [place] - [address]    Clinical concerns:  doctor was notified.      Dodie Galeas MA            "

## 2021-08-20 ENCOUNTER — MYC REFILL (OUTPATIENT)
Dept: FAMILY MEDICINE | Facility: CLINIC | Age: 61
End: 2021-08-20

## 2021-08-20 DIAGNOSIS — M54.2 CERVICALGIA: ICD-10-CM

## 2021-08-20 RX ORDER — CARISOPRODOL 350 MG/1
TABLET ORAL
Qty: 120 TABLET | Refills: 0 | Status: SHIPPED | OUTPATIENT
Start: 2021-08-20 | End: 2021-09-20

## 2021-08-20 RX ORDER — CARISOPRODOL 350 MG/1
TABLET ORAL
Qty: 120 TABLET | Refills: 0 | OUTPATIENT
Start: 2021-08-20

## 2021-08-20 NOTE — CONFIDENTIAL NOTE
Patient requesting refill on carisoprodol 350mg #120/mo si po QID prn.   Last related visit: 21 for acute on chronic neck pain with Dr. Brown.   3/15/21 with PCP Elizabeth Willson CNP:  Chronic neck pain due to cervical dystonia and stenosis, s/p surgeries and Botox injections.           Plan: routed refill request to Dr. Brown (oncall for Elizabeth Willson CNP).  Rosalinda Constantino RN

## 2021-09-04 ENCOUNTER — HEALTH MAINTENANCE LETTER (OUTPATIENT)
Age: 61
End: 2021-09-04

## 2021-09-20 DIAGNOSIS — M54.2 CERVICALGIA: ICD-10-CM

## 2021-09-20 RX ORDER — CARISOPRODOL 350 MG/1
TABLET ORAL
Qty: 120 TABLET | Refills: 0 | Status: SHIPPED | OUTPATIENT
Start: 2021-09-20 | End: 2021-10-19

## 2021-10-07 ENCOUNTER — VIRTUAL VISIT (OUTPATIENT)
Dept: FAMILY MEDICINE | Facility: CLINIC | Age: 61
End: 2021-10-07

## 2021-10-07 DIAGNOSIS — M54.2 CERVICALGIA: Primary | ICD-10-CM

## 2021-10-07 DIAGNOSIS — G25.81 RESTLESS LEG SYNDROME: ICD-10-CM

## 2021-10-07 PROCEDURE — 99214 OFFICE O/P EST MOD 30 MIN: CPT | Mod: GT | Performed by: NURSE PRACTITIONER

## 2021-10-07 RX ORDER — HYDROCODONE BITARTRATE AND ACETAMINOPHEN 5; 325 MG/1; MG/1
1 TABLET ORAL EVERY 6 HOURS PRN
Qty: 10 TABLET | Refills: 0 | Status: SHIPPED | OUTPATIENT
Start: 2021-10-07 | End: 2021-10-10

## 2021-10-07 RX ORDER — PREDNISONE 20 MG/1
40 TABLET ORAL DAILY
Qty: 10 TABLET | Refills: 0 | Status: SHIPPED | OUTPATIENT
Start: 2021-10-07 | End: 2021-10-12

## 2021-10-07 NOTE — PROGRESS NOTES
Problem(s) Oriented visit      Video-Visit Details    Type of service:  Video Visit    Video Start Time (time video started): 1235    Video End Time (time video stopped): 1247    Originating Location (pt. Location): Home    Distant Location (provider location):  [facility]     Mode of Communication:  Video Conference via [facility]    Physician has received verbal consent for a Video Visit from the patient? Yes      CAROLYN Franks CNP        SUBJECTIVE:                                                    Muriel Diaz is a 60 year old female who presents to clinic today for the following health issues :    Hx of spasmodic torticollis and cervicalgia, on Soma daily, receives Botox injections, and has done PT. Has flares of pain and weakness at times. Most recent flare began 4 days ago- felt like she couldn't move the neck up or down, couldn't move it side to side. Used the neck collar to help stabilize it, had to sleep in the recliner chair. Very sore, goes down her back, and it feels weak like it needs to be supported. Discomfort at the base of her neck. This has been the worst it's been since prior to starting PT. No nausea, vomiting, visual changes, or fevers.     Restless legs which bother her intermittently and keep her awake, worsens when her body is under stress    Problem list, Medication list, Allergies, and Medical/Social/Surgical histories reviewed in [facility] and updated as appropriate.   Additional history: as documented    ROS:  Gen, HEENT, neuro negative except as listed per HPI      OBJECTIVE:                                                    No vitals taken- virtual visit  Constitutional: Alert and oriented non-toxic appearing female in no acute distress, appears to be feeling poorly  HEENT: No periorbital edema or perioral cyanosis  Resp: Respirations unlabored, speaking in full sentences without apparent dyspnea, wheezing, or cough  Neuro: Holding chin. EOMI, tongue extension  midline, no ptosis. Limited ROM.  Psych: Pleasant and interactive, affect euthymic, makes appropriate eye contact, answers questions appropriately, thought content logical       ASSESSMENT/PLAN:                                                          Muriel was seen today for neck pain, sleep problem, restless leg issues and headache.    Diagnoses and all orders for this visit:    Cervicalgia  -     predniSONE (DELTASONE) 20 MG tablet; Take 2 tablets (40 mg) by mouth daily for 5 days  -     HYDROcodone-acetaminophen (NORCO) 5-325 MG tablet; Take 1 tablet by mouth every 6 hours as needed for severe pain          Acute exacerbation of chronic neck pain- no evidence for infection. Muriel's flares typically respond well to prednisone with a small amount of Norco for severe pain. Trial of prednisone 40mg x5 days with food and water, sparing use of Norco as needed. MN  reviewed. Safety reviewed.    Restless leg syndrome  Discussed RLS- denies need for intervention at the moment. If symptoms fail to improve with better control of neck pain, to have ferritin checked, consider pramipexole vs ropinirole.          The following health maintenance items are reviewed in Epic and correct as of today:  Health Maintenance   Topic Date Due     MICROALBUMIN  Never done     ADVANCE CARE PLANNING  Never done     Pneumococcal Vaccine: Pediatrics (0 to 5 Years) and At-Risk Patients (6 to 64 Years) (1 of 2 - PPSV23) Never done     HIV SCREENING  Never done     MEDICARE ANNUAL WELLNESS VISIT  Never done     LIPID  04/07/2018     INFLUENZA VACCINE (1) 09/01/2021     BMP  03/15/2022     MAMMO SCREENING  07/21/2023     COLORECTAL CANCER SCREENING  03/18/2026     DTAP/TDAP/TD IMMUNIZATION (3 - Td or Tdap) 01/29/2030     HEPATITIS C SCREENING  Completed     PHQ-2  Completed     ZOSTER IMMUNIZATION  Completed     COVID-19 Vaccine  Completed     IPV IMMUNIZATION  Aged Out     MENINGITIS IMMUNIZATION  Aged Out     HEPATITIS B IMMUNIZATION   Aged Out     PAP  Discontinued       CAROLYN Franks CNP  [facility]  [phone]    For any issues my office # is [phone]

## 2021-10-19 DIAGNOSIS — M54.2 CERVICALGIA: ICD-10-CM

## 2021-10-19 RX ORDER — CARISOPRODOL 350 MG/1
TABLET ORAL
Qty: 120 TABLET | Refills: 0 | Status: SHIPPED | OUTPATIENT
Start: 2021-10-19 | End: 2021-11-18

## 2021-11-18 DIAGNOSIS — M54.2 CERVICALGIA: ICD-10-CM

## 2021-11-18 RX ORDER — CARISOPRODOL 350 MG/1
TABLET ORAL
Qty: 120 TABLET | Refills: 0 | Status: SHIPPED | OUTPATIENT
Start: 2021-11-18 | End: 2021-12-20

## 2021-11-18 NOTE — TELEPHONE ENCOUNTER
carisoprodol (SOMA) 350 MG    LOV - 10/7/21 VV  Last labs 3/15/21    Last neck Xrays in 2012

## 2021-12-19 DIAGNOSIS — M54.2 CERVICALGIA: ICD-10-CM

## 2021-12-20 RX ORDER — CARISOPRODOL 350 MG/1
TABLET ORAL
Qty: 120 TABLET | Refills: 0 | Status: SHIPPED | OUTPATIENT
Start: 2021-12-20 | End: 2022-01-19

## 2021-12-26 DIAGNOSIS — M85.851 OSTEOPENIA OF BOTH HIPS: ICD-10-CM

## 2021-12-26 DIAGNOSIS — M85.852 OSTEOPENIA OF BOTH HIPS: ICD-10-CM

## 2021-12-26 NOTE — PROGRESS NOTES
[facility]    Hematology/Oncology Established Patient Note      Today's Date: 7/23/21    Reason for Follow-up: left sided breast cancer      HISTORY OF PRESENT ILLNESS: Muriel Diaz is a 60 year old post-menopausal female with PMHx of HTN, degenerative joint disease, history of cervical spine surgery, who presents with left-sided breast cancer.  She underwent left mastectomy on 4/16/18 by Dr. Umana.  Pathology showed invasive mammary carcinoma, with lobular and ductal features, grade 2, tumor size 4.3 x 4.0 x 2.1 cm, positive margin for invasive carcinoma in the central and lateral posterior surface, DCIS and LCIS present, ER positive (95%), IL positive (50%), HER-2 negative.  Left axillary dissection showed 3 of 5 lymph nodes were positive for metastatic carcinoma (lobular type), also ER positive, IL positive, HER-2 negative. Stage [id number] (stage IIA).      She had port placed on 5/14/18. Port was removed on 3/22/19.    She started chemotherapy on 5/22/18, and completed dose-dense doxorubicin+cyclophosphamide x 4 cycles, followed by weekly paclitaxel x 12, completed on 10/2/18.    She completed radiation on 12/17/18.    She started anastrozole on 12/28/18.      On 3/22/19, she underwent reconstruction surgery with left latissimus flap and left tissue expander placement.        INTERIM HISTORY: Mureil is doing well.  She is tolerating anastrozole fine.  She had diarrhea when she started Fosamax, but that is slowly getting better.  She notes that her implants are lopsided and would like to see plastic surgery again.        REVIEW OF SYSTEMS:   14 point ROS was reviewed and is negative other than as noted above in HPI.       HOME MEDICATIONS:  Current Outpatient Medications   Medication Sig Dispense Refill     alendronate (FOSAMAX) 70 MG tablet Take 1 tablet (70 mg) by mouth every 7 days 12 tablet 3     anastrozole (ARIMIDEX) 1 MG tablet Take 1 tablet (1 mg) by mouth daily 90 tablet 0  Luz Shoemaker was seen and treated in our emergency department on 12/26/2021. She may return to work on 12/28/2021. If you have any questions or concerns, please don't hesitate to call.       Elliot Jeter MD     atenolol (TENORMIN) 50 MG tablet Take 1 tablet (50 mg) by mouth daily 90 tablet 0     botulinum toxin type A (BOTOX) 50 units/mL injection 250 Units       calcium carbonate (OS-HATTIE) 600 MG tablet Take 1 tablet (600 mg) by mouth 2 times daily (with meals) 180 tablet 3     carisoprodol (SOMA) 350 MG tablet TAKE ONE TABLET BY MOUTH FOUR TIMES DAILY AS NEEDED FOR MUSCLE SPASM 120 tablet 0     HYDROcodone-acetaminophen (NORCO) 5-325 MG tablet Take 1 tablet by mouth every 6 hours as needed for severe pain 10 tablet 0     hydrOXYzine (VISTARIL) 50 MG capsule take 1 capsule (50 mg) by mouth every night at bedtime for sleep       vitamin D3 (CHOLECALCIFEROL) 50 mcg (2000 units) tablet Take 1 tablet (50 mcg) by mouth daily 90 tablet 3         ALLERGIES:  Allergies   Allergen Reactions     Amitriptyline Other (See Comments)     nightmares     Oxycontin [Oxycodone] Nausea     Severe headaches         PAST MEDICAL HISTORY:  Past Medical History:   Diagnosis Date     Cancer (H)     BREAST CANCER     Degeneration of cervical intervertebral disc      Degeneration of lumbar or lumbosacral intervertebral disc      Hypertension      PONV (postoperative nausea and vomiting)          PAST SURGICAL HISTORY:  Past Surgical History:   Procedure Laterality Date     BACK SURGERY  2001    lumbar fusion, cervical discectomy     DISSECT LYMPH NODE AXILLA Left 4/16/2018    Procedure: DISSECT LYMPH NODE AXILLA;;  Surgeon: Rodney Umana MD;  Location: [facility]     FUSION CERVICAL ANTERIOR THREE+ LEVELS  5/1/2012    Procedure:FUSION CERVICAL ANTERIOR THREE+ LEVELS; Surgeon:SARAH FRANCO; Location:[facility]     FUSION CERVICAL POSTERIOR THREE+ LEVELS  5/1/2012    Procedure:FUSION CERVICAL POSTERIOR THREE+ LEVELS; Posterior Cervical decompression and fusion C4-7, Anterior Cervical decompression and fusion C4-7 (c-arm), (herb table with jarad, yina oasis, yina aviator, Vitoss foam packing strip, impulse monitoring,);  Surgeon:SARAH FRANCO; Location: OR     GYN SURGERY       HYSTERECTOMY VAGINAL  1990's    Judy Aceves OB Gyn specilist     HYSTERECTOMY, PAP NO LONGER INDICATED       INSERT PORT VASCULAR ACCESS N/A 5/14/2018    Procedure: INSERT PORT VASCULAR ACCESS;  PORT PLACEMENT;  Surgeon: Rodney Umana MD;  [facility]     INSERT TISSUE EXPANDER BREAST Bilateral 3/22/2019    Procedure: INSERT TISSUE EXPANDER BREAST;  Surgeon: Naa Crook MD;  Location:  OR     MASTECTOMY SIMPLE Left 4/16/2018    Procedure: MASTECTOMY SIMPLE;  LEFT SIMPLE MASTECTOMY,LEFT AXILLARY DISSECTION;  Surgeon: Rodney Umana MD;  Location:  [facility]     RECONSTRUCT BREAST LATISSIMUS DORSI PEDICLE Left 3/22/2019    Procedure: LEFT LATISSIMUS FLAP RECONSTRUCT WITH TISSUE EXPANDER AND PORT REMOVAL;  Surgeon: Naa Crook MD;  Location:  OR     REMOVE PORT VASCULAR ACCESS N/A 3/22/2019    Procedure: REMOVE PORT VASCULAR ACCESS;  Surgeon: Naa Crook MD;  Location:  OR         SOCIAL HISTORY:  Social History     Socioeconomic History     Marital status: Single     Spouse name: Not on file     Number of children: 0     Years of education: Not on file     Highest education level: Not on file   Occupational History     Employer: [employer]    Tobacco Use     Smoking status: Current Every Day Smoker     Packs/day: 0.50     Types: Cigarettes     Smokeless tobacco: Never Used     Tobacco comment: 4 cigarettes per day   Substance and Sexual Activity     Alcohol use: Yes     Alcohol/week: 1.0 standard drinks     Types: 1 Standard drinks or equivalent per week     Comment: 2-3 drinks per week     Drug use: No     Sexual activity: Not Currently     Partners: Male   Other Topics Concern     Parent/sibling w/ CABG, MI or angioplasty before 65F 55M? No   Social History Narrative     Not on file     Social Determinants of Health     Financial Resource Strain:      Difficulty of Paying Living Expenses:   "  Food Insecurity:      Worried About Running Out of Food in the Last Year:      Ran Out of Food in the Last Year:    Transportation Needs:      Lack of Transportation (Medical):      Lack of Transportation (Non-Medical):    Physical Activity:      Days of Exercise per Week:      Minutes of Exercise per Session:    Stress:      Feeling of Stress :    Social Connections:      Frequency of Communication with Friends and Family:      Frequency of Social Gatherings with Friends and Family:      Attends Buddhist Services:      Active Member of Clubs or Organizations:      Attends Club or Organization Meetings:      Marital Status:    Intimate Partner Violence:      Fear of Current or Ex-Partner:      Emotionally Abused:      Physically Abused:      Sexually Abused:    She smokes cigarettes, half a pack a day for many years; she is unable to quantify how many.  She drinks alcohol occasionally.  She denies illicit drug use.  She is retired.  She used to to work in  at large companies.  She lives in [place] with her boyfriend.        FAMILY HISTORY:  Family History   Problem Relation Age of Onset     Pancreatitis Mother      Substance Abuse Mother      Unknown/Adopted Father      She does not know much about her family history and is unaware of any cancers in her family.  She has never been pregnant.  She had a hysterectomy in the .  She still has her ovaries.  She says that she has undergone menopause but is unsure when, likely years ago.        PHYSICAL EXAM:  BP (!) 153/85   Pulse 61   Temp 98.8  F (37.1  C)   Resp 16   Ht 1.6 m (5' 3\")   Wt 47.7 kg (105 lb 3.2 oz)   SpO2 98%   BMI 18.64 kg/m    ECO  GENERAL/CONSTITUTIONAL: No acute distress.  EYES: No scleral icterus.  NECK: No supraclavicular or cervical lymphadenopathy.  CARDIOVASCULAR: Regular rate and rhythm.  RESPIRATORY: Clear to ausculation bilaterally.  BREAST: Right-no palpable mass, discharge, rash, or axillary " lymphadenopathy, implant in place.  Left-s/p mastectomy with implant in place.  NEUROLOGIC: Alert, oriented, answers questions appropriately.  INTEGUMENTARY: No jaundice.      LABS:  None today.      IMAGING:  DEXA scan 1/13/21:  Left forearm and bilateral hip osteopenia.     Right mammogram 7/21/21:  BI-RADS 2 - benign      ASSESSMENT/PLAN:  Muriel Diaz is a 60 year old post-menopausal female with:    1) Left breast cancer of the upper inner quadrant: s/p left mastectomy on 4/16/18; pathology showed invasive mammary carcinoma, with lobular and ductal features, grade 2, tumor size 4.3 x 4.0 x 2.1 cm, positive margin for invasive carcinoma in the central and lateral posterior surface, DCIS and LCIS present, ER positive (95%), CA positive (50%), HER-2 negative.  Left axillary dissection showed 3 of 5 lymph nodes were positive for metastatic carcinoma (lobular type), also ER positive, CA positive, HER-2 negative. Stage [id number] (stage IIB).  She has completed adjuvant chemotherapy.  She completed radiation on 12/17/18.    She started anastrozole on 12/28/18 and tolerating well.    She had mammogram done on 7/21/21, which was BI-RADS 2 - benign.    -Muriel has met with plastic surgery, Dr. Crook.  She underwent reconstruction with left latissimus flap and left tissue expander placement on 3/22/19.  She feels that the implants are uneven.  She says that she will see Dr. Crook again.  -continue anastrozole 1 mg daily - will plan for 5-10 years treatment, if can tolerate.  Prescription renewed today.  -next right-sided mammogram in July 2022 - will order at the next visit.  -return to clinic in 6 months    2) Smoking: She says that she is trying to quit.  -I again advised smoking cessation  -she will see her PCP for tobacco cessation.    3) Hypertension:   -follows with PCP    4) Chronic back pain: s/p history of back surgeries  -follows with PCP    5) Bone health: She is on aromatase inhibitor.  DEXA scan on 1/13/21  showed left forearm and bilateral hip osteopenia, not significantly changed since 2 years prior.    -she discussed with her PCP; weight bearing exercise, stop smoking, increased calcium intake, vitamin D supplement  -she is borderline of osteoporosis, and at high risk due to her being on aromatase inhibitor.  I discussed with her the treatment options, and she is interested in trying Fosamax, which was prescribed  -she had diarrhea with Fosamax, but is slowly improving.  She wants to stay on Fosamax for now.  If intolerable, we can do Zometa or Prolia.  -next DEXA to be in January 2023      Patience Mckeon MD  Hematology/Oncology  [facility]    Total time spent on day of visit, including review of tests, obtaining/reviewing separately obtained history, ordering medications/tests/procedures, communicating with PCP/consultants, and documenting in electronic medical record: 25 minutes

## 2021-12-27 ENCOUNTER — MYC MEDICAL ADVICE (OUTPATIENT)
Dept: FAMILY MEDICINE | Facility: CLINIC | Age: 61
End: 2021-12-27

## 2021-12-30 RX ORDER — ALENDRONATE SODIUM 70 MG/1
70 TABLET ORAL
Qty: 12 TABLET | Refills: 0 | Status: SHIPPED | OUTPATIENT
Start: 2021-12-30 | End: 2022-01-26

## 2021-12-30 NOTE — TELEPHONE ENCOUNTER
Pt due for blood pressure check over phone. See screening pt reported vital signs for readings.  Nelda Adams, CMA

## 2022-01-19 ENCOUNTER — MYC REFILL (OUTPATIENT)
Dept: FAMILY MEDICINE | Facility: CLINIC | Age: 62
End: 2022-01-19

## 2022-01-19 DIAGNOSIS — M54.2 CERVICALGIA: ICD-10-CM

## 2022-01-19 RX ORDER — CARISOPRODOL 350 MG/1
TABLET ORAL
Qty: 120 TABLET | Refills: 0 | Status: SHIPPED | OUTPATIENT
Start: 2022-01-19 | End: 2022-02-15

## 2022-01-19 NOTE — TELEPHONE ENCOUNTER
MyChart refill request for carisoprodol. Last office visit 10/7/21, virtual visit. No CSA or tox screen on file. Fill history per MN  below. Refill entered and routed to Elizabeth LEON CNP for review. Wendy Das

## 2022-01-26 ENCOUNTER — ONCOLOGY VISIT (OUTPATIENT)
Dept: ONCOLOGY | Facility: CLINIC | Age: 62
End: 2022-01-26
Attending: INTERNAL MEDICINE
Payer: MEDICARE

## 2022-01-26 VITALS
BODY MASS INDEX: 19.03 KG/M2 | WEIGHT: 107.4 LBS | TEMPERATURE: 97.7 F | HEART RATE: 66 BPM | DIASTOLIC BLOOD PRESSURE: 90 MMHG | SYSTOLIC BLOOD PRESSURE: 170 MMHG | RESPIRATION RATE: 20 BRPM

## 2022-01-26 DIAGNOSIS — Z12.31 VISIT FOR SCREENING MAMMOGRAM: ICD-10-CM

## 2022-01-26 DIAGNOSIS — Z17.0 MALIGNANT NEOPLASM OF UPPER-INNER QUADRANT OF LEFT BREAST IN FEMALE, ESTROGEN RECEPTOR POSITIVE (H): Primary | ICD-10-CM

## 2022-01-26 DIAGNOSIS — C50.212 MALIGNANT NEOPLASM OF UPPER-INNER QUADRANT OF LEFT BREAST IN FEMALE, ESTROGEN RECEPTOR POSITIVE (H): Primary | ICD-10-CM

## 2022-01-26 DIAGNOSIS — M85.852 OSTEOPENIA OF BOTH HIPS: ICD-10-CM

## 2022-01-26 DIAGNOSIS — M85.851 OSTEOPENIA OF BOTH HIPS: ICD-10-CM

## 2022-01-26 PROCEDURE — 99214 OFFICE O/P EST MOD 30 MIN: CPT | Performed by: INTERNAL MEDICINE

## 2022-01-26 PROCEDURE — G0463 HOSPITAL OUTPT CLINIC VISIT: HCPCS

## 2022-01-26 RX ORDER — ALENDRONATE SODIUM 70 MG/1
70 TABLET ORAL
Qty: 12 TABLET | Refills: 0 | Status: SHIPPED | OUTPATIENT
Start: 2022-01-26 | End: 2022-01-26

## 2022-01-26 RX ORDER — ALENDRONATE SODIUM 70 MG/1
70 TABLET ORAL
Qty: 12 TABLET | Refills: 3 | Status: SHIPPED | OUTPATIENT
Start: 2022-01-26 | End: 2023-03-22

## 2022-01-26 ASSESSMENT — PAIN SCALES - GENERAL: PAINLEVEL: NO PAIN (0)

## 2022-01-26 NOTE — PROGRESS NOTES
[facility]    Hematology/Oncology Established Patient Note      Today's Date: 1/26/22    Reason for Follow-up: left sided breast cancer      HISTORY OF PRESENT ILLNESS: Muriel Diaz is a 61 year old post-menopausal female with PMHx of HTN, degenerative joint disease, history of cervical spine surgery, who presents with left-sided breast cancer.  She underwent left mastectomy on 4/16/18 by Dr. Umana.  Pathology showed invasive mammary carcinoma, with lobular and ductal features, grade 2, tumor size 4.3 x 4.0 x 2.1 cm, positive margin for invasive carcinoma in the central and lateral posterior surface, DCIS and LCIS present, ER positive (95%), MA positive (50%), HER-2 negative.  Left axillary dissection showed 3 of 5 lymph nodes were positive for metastatic carcinoma (lobular type), also ER positive, MA positive, HER-2 negative. Stage [id number] (stage IIA).      She had port placed on 5/14/18. Port was removed on 3/22/19.    She started chemotherapy on 5/22/18, and completed dose-dense doxorubicin+cyclophosphamide x 4 cycles, followed by weekly paclitaxel x 12, completed on 10/2/18.    She completed radiation on 12/17/18.    She started anastrozole on 12/28/18.      On 3/22/19, she underwent reconstruction surgery with left latissimus flap and left tissue expander placement.        INTERIM HISTORY: Muriel is doing very well.   She stays home most of the time, which she is happy with.        REVIEW OF SYSTEMS:   14 point ROS was reviewed and is negative other than as noted above in HPI.       HOME MEDICATIONS:  Current Outpatient Medications   Medication Sig Dispense Refill     alendronate (FOSAMAX) 70 MG tablet Take 1 tablet (70 mg) by mouth every 7 days 12 tablet 0     anastrozole (ARIMIDEX) 1 MG tablet Take 1 tablet (1 mg) by mouth daily 90 tablet 3     atenolol (TENORMIN) 50 MG tablet Take 1 tablet (50 mg) by mouth daily 90 tablet 3     calcium carbonate (OS-HATTIE) 600 MG tablet Take 1  tablet (600 mg) by mouth 2 times daily (with meals) 180 tablet 3     carisoprodol (SOMA) 350 MG tablet TAKE ONE TABLET BY MOUTH FOUR TIMES DAILY AS NEEDED FOR MUSCLE SPASM 120 tablet 0     VITAMIN D3 50 MCG (2000 UT) tablet Take 1 tablet (50 mcg) by mouth daily 90 tablet 3         ALLERGIES:  Allergies   Allergen Reactions     Amitriptyline Other (See Comments)     nightmares     Oxycontin [Oxycodone] Nausea     Severe headaches         PAST MEDICAL HISTORY:  Past Medical History:   Diagnosis Date     Cancer (H)     BREAST CANCER     Degeneration of cervical intervertebral disc      Degeneration of lumbar or lumbosacral intervertebral disc      Hypertension      PONV (postoperative nausea and vomiting)          PAST SURGICAL HISTORY:  Past Surgical History:   Procedure Laterality Date     BACK SURGERY  2001    lumbar fusion, cervical discectomy     DISSECT LYMPH NODE AXILLA Left 4/16/2018    Procedure: DISSECT LYMPH NODE AXILLA;;  Surgeon: Rodney Umana MD;  Location: [facility]     FUSION CERVICAL ANTERIOR THREE+ LEVELS  5/1/2012    Procedure:FUSION CERVICAL ANTERIOR THREE+ LEVELS; Surgeon:SARAH FRANCO; Location: OR     FUSION CERVICAL POSTERIOR THREE+ LEVELS  5/1/2012    Procedure:FUSION CERVICAL POSTERIOR THREE+ LEVELS; Posterior Cervical decompression and fusion C4-7, Anterior Cervical decompression and fusion C4-7 (c-arm), (herb table with abraham, yina oasis, yina aviator, Vitoss foam packing strip, impulse monitoring,); Surgeon:SARAH FRANCO; Location: OR     GYN SURGERY       HYSTERECTOMY VAGINAL  1990's    Judy Aceves OB Gyn specilist     HYSTERECTOMY, PAP NO LONGER INDICATED       INSERT PORT VASCULAR ACCESS N/A 5/14/2018    Procedure: INSERT PORT VASCULAR ACCESS;  PORT PLACEMENT;  Surgeon: Rodney Umana MD;  Location: [facility]     INSERT TISSUE EXPANDER BREAST Bilateral 3/22/2019    Procedure: INSERT TISSUE EXPANDER BREAST;  Surgeon: Naa Crook,  MD;  Location: [facility]     MASTECTOMY SIMPLE Left 4/16/2018    Procedure: MASTECTOMY SIMPLE;  LEFT SIMPLE MASTECTOMY,LEFT AXILLARY DISSECTION;  Surgeon: Rodney Umana MD;  [facility]     RECONSTRUCT BREAST LATISSIMUS DORSI PEDICLE Left 3/22/2019    Procedure: LEFT LATISSIMUS FLAP RECONSTRUCT WITH TISSUE EXPANDER AND PORT REMOVAL;  Surgeon: Naa Crook MD;  Location:  OR     REMOVE PORT VASCULAR ACCESS N/A 3/22/2019    Procedure: REMOVE PORT VASCULAR ACCESS;  Surgeon: Naa Crook MD;  Location: [facility] OR         SOCIAL HISTORY:  Social History     Socioeconomic History     Marital status: Single     Spouse name: Not on file     Number of children: 0     Years of education: Not on file     Highest education level: Not on file   Occupational History     Employer: [employer]    Tobacco Use     Smoking status: Current Every Day Smoker     Packs/day: 0.50     Types: Cigarettes     Smokeless tobacco: Never Used     Tobacco comment: 4 cigarettes per day   Substance and Sexual Activity     Alcohol use: Yes     Alcohol/week: 1.0 standard drink     Types: 1 Standard drinks or equivalent per week     Comment: 2-3 drinks per week     Drug use: No     Sexual activity: Not Currently     Partners: Male   Other Topics Concern     Parent/sibling w/ CABG, MI or angioplasty before 65F 55M? No   Social History Narrative     Not on file     Social Determinants of Health     Financial Resource Strain: Not on file   Food Insecurity: Not on file   Transportation Needs: Not on file   Physical Activity: Not on file   Stress: Not on file   Social Connections: Not on file   Intimate Partner Violence: Not on file   Housing Stability: Not on file   She smokes cigarettes, half a pack a day for many years; she is unable to quantify how many.  She drinks alcohol occasionally.  She denies illicit drug use.  She is retired.  She used to to work in  at large companies.  She lives in [place] with her  boyfriend.        FAMILY HISTORY:  Family History   Problem Relation Age of Onset     Pancreatitis Mother      Substance Abuse Mother      Unknown/Adopted Father      She does not know much about her family history and is unaware of any cancers in her family.  She has never been pregnant.  She had a hysterectomy in the .  She still has her ovaries.  She says that she has undergone menopause but is unsure when, likely years ago.        PHYSICAL EXAM:  BP (!) 170/90 (BP Location: Right arm, Patient Position: Sitting, Cuff Size: Adult Regular)   Pulse 66   Temp 97.7  F (36.5  C) (Oral)   Resp 20   Wt 48.7 kg (107 lb 6.4 oz)   BMI 19.03 kg/m    ECO  GENERAL/CONSTITUTIONAL: No acute distress.  EYES: No scleral icterus.  NECK: No supraclavicular or cervical lymphadenopathy.  CARDIOVASCULAR: Regular rate and rhythm.  RESPIRATORY: Clear to ausculation bilaterally.  BREAST: Right-no palpable mass, discharge, rash, or axillary lymphadenopathy, implant in place.  Left-s/p mastectomy with implant in place.  NEUROLOGIC: Alert, oriented, answers questions appropriately.  INTEGUMENTARY: No jaundice.      IMAGING:  DEXA scan 21:  Left forearm and bilateral hip osteopenia.     Right mammogram 21:  BI-RADS 2 - benign      ASSESSMENT/PLAN:  Muriel Diaz is a 61 year old post-menopausal female with:    1) Left breast cancer of the upper inner quadrant: s/p left mastectomy on 18; pathology showed invasive mammary carcinoma, with lobular and ductal features, grade 2, tumor size 4.3 x 4.0 x 2.1 cm, positive margin for invasive carcinoma in the central and lateral posterior surface, DCIS and LCIS present, ER positive (95%), FL positive (50%), HER-2 negative.  Left axillary dissection showed 3 of 5 lymph nodes were positive for metastatic carcinoma (lobular type), also ER positive, FL positive, HER-2 negative. Stage [id number] (stage IIB).  She has completed adjuvant chemotherapy.  She completed radiation on  12/17/18.    She started anastrozole on 12/28/18 and tolerating well.    She had mammogram done on 7/21/21, which was BI-RADS 2 - benign.    -Muriel has met with plastic surgery, Dr. Crook.  She underwent reconstruction with left latissimus flap and left tissue expander placement on 3/22/19.  She feels that the implants are uneven.  She says that she will see Dr. Crook again when COVID cases are down.  -continue anastrozole 1 mg daily - will plan for 5-10 years treatment, if can tolerate.   -next right-sided mammogram in July 2022 - ordered  -return to clinic in 6 months    2) Smoking:   -I again advised smoking cessation  -she will see her PCP for tobacco cessation.    3) Hypertension:   -follows with PCP    4) Chronic back pain: s/p history of back surgeries  -follows with PCP    5) Bone health: She is on aromatase inhibitor.  DEXA scan on 1/13/21 showed left forearm and bilateral hip osteopenia, not significantly changed since 2 years prior.    -she discussed with her PCP; weight bearing exercise, stop smoking, increased calcium intake, vitamin D supplement  -she is borderline of osteoporosis, and at high risk due to her being on aromatase inhibitor.  I discussed with her the treatment options, and she is interested in trying Fosamax, which was prescribed  -she had diarrhea with Fosamax, but is slowly improving.  She wants to stay on Fosamax for now.  If intolerable, we can do Zometa or Prolia.  -Prescription for Fosamax renewed today  -next DEXA to be in January 2023    She had wanted to get her flu shot and COVID booster today, but couldn't wait, since she has a ride waiting for her. She says that she will get them done another day.        Patience Mckeon MD  Hematology/Oncology  [facility]    Total time spent on day of visit, including review of tests, obtaining/reviewing separately obtained history, ordering medications/tests/procedures, communicating with PCP/consultants, and  documenting in electronic medical record: 30 minutes

## 2022-01-26 NOTE — LETTER
1/26/2022         RE: Muriel Diaz  [address]  [facility] [address]        Dear Colleague,    Thank you for referring your patient, Muriel Diaz, to the [facility]. Please see a copy of my visit note below.    [facility]    Hematology/Oncology Established Patient Note      Today's Date: 1/26/22    Reason for Follow-up: left sided breast cancer      HISTORY OF PRESENT ILLNESS: Muriel Diaz is a 61 year old post-menopausal female with PMHx of HTN, degenerative joint disease, history of cervical spine surgery, who presents with left-sided breast cancer.  She underwent left mastectomy on 4/16/18 by Dr. Umana.  Pathology showed invasive mammary carcinoma, with lobular and ductal features, grade 2, tumor size 4.3 x 4.0 x 2.1 cm, positive margin for invasive carcinoma in the central and lateral posterior surface, DCIS and LCIS present, ER positive (95%), VA positive (50%), HER-2 negative.  Left axillary dissection showed 3 of 5 lymph nodes were positive for metastatic carcinoma (lobular type), also ER positive, VA positive, HER-2 negative. Stage [id number] (stage IIA).      She had port placed on 5/14/18. Port was removed on 3/22/19.    She started chemotherapy on 5/22/18, and completed dose-dense doxorubicin+cyclophosphamide x 4 cycles, followed by weekly paclitaxel x 12, completed on 10/2/18.    She completed radiation on 12/17/18.    She started anastrozole on 12/28/18.      On 3/22/19, she underwent reconstruction surgery with left latissimus flap and left tissue expander placement.        INTERIM HISTORY: Muriel is doing very well.   She stays home most of the time, which she is happy with.        REVIEW OF SYSTEMS:   14 point ROS was reviewed and is negative other than as noted above in HPI.       HOME MEDICATIONS:  Current Outpatient Medications   Medication Sig Dispense Refill     alendronate (FOSAMAX) 70 MG tablet Take 1 tablet (70  mg) by mouth every 7 days 12 tablet 0     anastrozole (ARIMIDEX) 1 MG tablet Take 1 tablet (1 mg) by mouth daily 90 tablet 3     atenolol (TENORMIN) 50 MG tablet Take 1 tablet (50 mg) by mouth daily 90 tablet 3     calcium carbonate (OS-HATTIE) 600 MG tablet Take 1 tablet (600 mg) by mouth 2 times daily (with meals) 180 tablet 3     carisoprodol (SOMA) 350 MG tablet TAKE ONE TABLET BY MOUTH FOUR TIMES DAILY AS NEEDED FOR MUSCLE SPASM 120 tablet 0     VITAMIN D3 50 MCG (2000 UT) tablet Take 1 tablet (50 mcg) by mouth daily 90 tablet 3         ALLERGIES:  Allergies   Allergen Reactions     Amitriptyline Other (See Comments)     nightmares     Oxycontin [Oxycodone] Nausea     Severe headaches         PAST MEDICAL HISTORY:  Past Medical History:   Diagnosis Date     Cancer (H)     BREAST CANCER     Degeneration of cervical intervertebral disc      Degeneration of lumbar or lumbosacral intervertebral disc      Hypertension      PONV (postoperative nausea and vomiting)          PAST SURGICAL HISTORY:  Past Surgical History:   Procedure Laterality Date     BACK SURGERY  2001    lumbar fusion, cervical discectomy     DISSECT LYMPH NODE AXILLA Left 4/16/2018    Procedure: DISSECT LYMPH NODE AXILLA;;  Surgeon: Rodney Umana MD;  [facility]     FUSION CERVICAL ANTERIOR THREE+ LEVELS  5/1/2012    Procedure:FUSION CERVICAL ANTERIOR THREE+ LEVELS; Surgeon:SARAH FRANCO; Location:[facility] OR     FUSION CERVICAL POSTERIOR THREE+ LEVELS  5/1/2012    Procedure:FUSION CERVICAL POSTERIOR THREE+ LEVELS; Posterior Cervical decompression and fusion C4-7, Anterior Cervical decompression and fusion C4-7 (c-arm), (herb table with abraham, yina oasis, yina aviator, Vitoss foam packing strip, impulse monitoring,); Surgeon:SARAH FRANCO; Location:[facility] OR     GYN SURGERY       HYSTERECTOMY VAGINAL  1990's    Judy Aceves OB Gyn specilist     HYSTERECTOMY, PAP NO LONGER INDICATED       INSERT PORT VASCULAR  ACCESS N/A 5/14/2018    Procedure: INSERT PORT VASCULAR ACCESS;  PORT PLACEMENT;  Surgeon: Rodney Umana MD;  [facility]     INSERT TISSUE EXPANDER BREAST Bilateral 3/22/2019    Procedure: INSERT TISSUE EXPANDER BREAST;  Surgeon: Naa Crook MD;  Location:  OR     MASTECTOMY SIMPLE Left 4/16/2018    Procedure: MASTECTOMY SIMPLE;  LEFT SIMPLE MASTECTOMY,LEFT AXILLARY DISSECTION;  Surgeon: Rodney Umana MD;  [facility]     RECONSTRUCT BREAST LATISSIMUS DORSI PEDICLE Left 3/22/2019    Procedure: LEFT LATISSIMUS FLAP RECONSTRUCT WITH TISSUE EXPANDER AND PORT REMOVAL;  Surgeon: Naa Crook MD;  Location:  OR     REMOVE PORT VASCULAR ACCESS N/A 3/22/2019    Procedure: REMOVE PORT VASCULAR ACCESS;  Surgeon: Naa Crook MD;  Location:  OR         SOCIAL HISTORY:  Social History     Socioeconomic History     Marital status: Single     Spouse name: Not on file     Number of children: 0     Years of education: Not on file     Highest education level: Not on file   Occupational History     Employer: [employer]    Tobacco Use     Smoking status: Current Every Day Smoker     Packs/day: 0.50     Types: Cigarettes     Smokeless tobacco: Never Used     Tobacco comment: 4 cigarettes per day   Substance and Sexual Activity     Alcohol use: Yes     Alcohol/week: 1.0 standard drink     Types: 1 Standard drinks or equivalent per week     Comment: 2-3 drinks per week     Drug use: No     Sexual activity: Not Currently     Partners: Male   Other Topics Concern     Parent/sibling w/ CABG, MI or angioplasty before 65F 55M? No   Social History Narrative     Not on file     Social Determinants of Health     Financial Resource Strain: Not on file   Food Insecurity: Not on file   Transportation Needs: Not on file   Physical Activity: Not on file   Stress: Not on file   Social Connections: Not on file   Intimate Partner Violence: Not on file   Housing Stability: Not on file   She  smokes cigarettes, half a pack a day for many years; she is unable to quantify how many.  She drinks alcohol occasionally.  She denies illicit drug use.  She is retired.  She used to to work in  at large [employer].  She lives in [place] with her boyfriend.        FAMILY HISTORY:  Family History   Problem Relation Age of Onset     Pancreatitis Mother      Substance Abuse Mother      Unknown/Adopted Father      She does not know much about her family history and is unaware of any cancers in her family.  She has never been pregnant.  She had a hysterectomy in the .  She still has her ovaries.  She says that she has undergone menopause but is unsure when, likely years ago.        PHYSICAL EXAM:  BP (!) 170/90 (BP Location: Right arm, Patient Position: Sitting, Cuff Size: Adult Regular)   Pulse 66   Temp 97.7  F (36.5  C) (Oral)   Resp 20   Wt 48.7 kg (107 lb 6.4 oz)   BMI 19.03 kg/m    ECO  GENERAL/CONSTITUTIONAL: No acute distress.  EYES: No scleral icterus.  NECK: No supraclavicular or cervical lymphadenopathy.  CARDIOVASCULAR: Regular rate and rhythm.  RESPIRATORY: Clear to ausculation bilaterally.  BREAST: Right-no palpable mass, discharge, rash, or axillary lymphadenopathy, implant in place.  Left-s/p mastectomy with implant in place.  NEUROLOGIC: Alert, oriented, answers questions appropriately.  INTEGUMENTARY: No jaundice.      IMAGING:  DEXA scan 21:  Left forearm and bilateral hip osteopenia.     Right mammogram 21:  BI-RADS 2 - benign      ASSESSMENT/PLAN:  Muriel Diaz is a 61 year old post-menopausal female with:    1) Left breast cancer of the upper inner quadrant: s/p left mastectomy on 18; pathology showed invasive mammary carcinoma, with lobular and ductal features, grade 2, tumor size 4.3 x 4.0 x 2.1 cm, positive margin for invasive carcinoma in the central and lateral posterior surface, DCIS and LCIS present, ER positive (95%), [place] positive (50%),  HER-2 negative.  Left axillary dissection showed 3 of 5 lymph nodes were positive for metastatic carcinoma (lobular type), also ER positive, LA positive, HER-2 negative. Stage [id number] (stage IIB).  She has completed adjuvant chemotherapy.  She completed radiation on 12/17/18.    She started anastrozole on 12/28/18 and tolerating well.    She had mammogram done on 7/21/21, which was BI-RADS 2 - benign.    -Muriel has met with plastic surgery, Dr. Crook.  She underwent reconstruction with left latissimus flap and left tissue expander placement on 3/22/19.  She feels that the implants are uneven.  She says that she will see Dr. Crook again when COVID cases are down.  -continue anastrozole 1 mg daily - will plan for 5-10 years treatment, if can tolerate.   -next right-sided mammogram in July 2022 - ordered  -return to clinic in 6 months    2) Smoking:   -I again advised smoking cessation  -she will see her PCP for tobacco cessation.    3) Hypertension:   -follows with PCP    4) Chronic back pain: s/p history of back surgeries  -follows with PCP    5) Bone health: She is on aromatase inhibitor.  DEXA scan on 1/13/21 showed left forearm and bilateral hip osteopenia, not significantly changed since 2 years prior.    -she discussed with her PCP; weight bearing exercise, stop smoking, increased calcium intake, vitamin D supplement  -she is borderline of osteoporosis, and at high risk due to her being on aromatase inhibitor.  I discussed with her the treatment options, and she is interested in trying Fosamax, which was prescribed  -she had diarrhea with Fosamax, but is slowly improving.  She wants to stay on Fosamax for now.  If intolerable, we can do Zometa or Prolia.  -Prescription for Fosamax renewed today  -next DEXA to be in January 2023    She had wanted to get her flu shot and COVID booster today, but couldn't wait, since she has a ride waiting for her. She says that she will get them done another day.         Patience Mckeon MD  Hematology/Oncology  [facility]    Total time spent on day of visit, including review of tests, obtaining/reviewing separately obtained history, ordering medications/tests/procedures, communicating with PCP/consultants, and documenting in electronic medical record: 30 minutes        Again, thank you for allowing me to participate in the care of your patient.        Sincerely,        Patience Mckeon MD

## 2022-02-09 DIAGNOSIS — M85.851 OSTEOPENIA OF BOTH HIPS: Primary | ICD-10-CM

## 2022-02-09 DIAGNOSIS — M85.852 OSTEOPENIA OF BOTH HIPS: Primary | ICD-10-CM

## 2022-02-15 ENCOUNTER — VIRTUAL VISIT (OUTPATIENT)
Dept: FAMILY MEDICINE | Facility: CLINIC | Age: 62
End: 2022-02-15

## 2022-02-15 DIAGNOSIS — M54.2 CERVICALGIA: Primary | ICD-10-CM

## 2022-02-15 DIAGNOSIS — G24.9 DYSTONIA: ICD-10-CM

## 2022-02-15 PROCEDURE — 99214 OFFICE O/P EST MOD 30 MIN: CPT | Mod: GT | Performed by: NURSE PRACTITIONER

## 2022-02-15 RX ORDER — PREDNISONE 20 MG/1
40 TABLET ORAL DAILY
Qty: 10 TABLET | Refills: 0 | Status: SHIPPED | OUTPATIENT
Start: 2022-02-15 | End: 2022-02-20

## 2022-02-15 RX ORDER — HYDROCODONE BITARTRATE AND ACETAMINOPHEN 5; 325 MG/1; MG/1
1 TABLET ORAL EVERY 6 HOURS PRN
Qty: 10 TABLET | Refills: 0 | Status: SHIPPED | OUTPATIENT
Start: 2022-02-15 | End: 2022-02-18

## 2022-02-15 RX ORDER — CARISOPRODOL 350 MG/1
TABLET ORAL
Qty: 120 TABLET | Refills: 0 | Status: SHIPPED | OUTPATIENT
Start: 2022-02-15 | End: 2022-03-16

## 2022-02-15 NOTE — PROGRESS NOTES
Problem(s) Oriented visit      Telephone Visit Details    Type of service: Phone Visit    Start Time (time started): 1442    End Time (time stopped): 1500    Originating Location (pt. Location): Home    Distant Location (provider location):  [facility]     Mode of Communication:  Telephone    Physician has received verbal consent for a telephone Visit from the patient? Yes      CAROLYN Franks CNP        SUBJECTIVE:                                                    Muriel Diaz is a 61 year old female who presents to clinic today for the following health issues :    Hx of spasmodic torticollis and cervicalgia, on Soma daily, receives Botox injections through neurology (last 6/4/21), and has done PT multiples time, s/p anterior/posterior C4-C7 fusion. Has flares of pain and weakness at times. Most recent flare began one week ago, but worsened over the past two days- causing headaches, muscle spasms, difficulty sleeping. Has been wearing neck brace the past week but this has not been very helpful right now. Very sore, goes down her back, and it feels weak like it needs to be supported. Discomfort at the base of her neck. No fevers, chills, visual changes, nausea, vomiting, one sided weakness. She is reluctant to undergo PT again- feels this is what lead to the frequent flares she has been having over the past two years. Previously treated for flares with prednisone and a small amount of Norco which typically has resolved the pain.    Problem list, Medication list, Allergies, and Medical/Social/Surgical histories reviewed in [facility] and updated as appropriate.   Additional history: as documented    ROS:  Gen, HEENT, neuro negative except as listed per HPI      OBJECTIVE:                                                    No vitals- telephone visit    Alert sounding female in no apparent distress but sounds uncomfortable. Speaking in full, coherent sentences without apparent dyspnea, wheezing, or  cough. Pleasant and interactive, thought process logical.       ASSESSMENT/PLAN:                                                      Muriel was seen today for neck pain.    Diagnoses and all orders for this visit:    Cervicalgia/Dystonia  -     carisoprodol (SOMA) 350 MG tablet; TAKE ONE TABLET BY MOUTH FOUR TIMES DAILY AS NEEDED FOR MUSCLE SPASM  -     predniSONE (DELTASONE) 20 MG tablet; Take 2 tablets (40 mg) by mouth daily for 5 days  -     HYDROcodone-acetaminophen (NORCO) 5-325 MG tablet; Take 1 tablet by mouth every 6 hours as needed for severe pain      Acute exacerbation of chronic neck pain- no obvious s/sxs infection or neurologic emergency, but reviewed that this is difficult to fully assess via phone visit. Muriel's flares typically respond well to prednisone with a small amount of Norco for severe pain. Trial of prednisone 40mg x5 days with food and water, sparing use of Norco as needed. MN  reviewed. Safety reviewed. Soma continues to be the only medication she feels is effective for her and is aware of the risks, benefits, and indications. Discussed importance of further management of the chronic neck pain and dystonia- recommend that she establish with physiatry. She is understandably hesitant, will look into this and update me. Reviewed side effects of medications, alarm signs and symptoms, and when to seek further care.      HTN: Noted after visit completed that she had significantly elevated BP at oncology office last month- unclear if this is due to pain, anxiety, or poorly controlled HTN. Recommend starting to check home pressures and return to clinic for in person visit to assess blood pressure control.        The following health maintenance items are reviewed in Epic and correct as of today:  Health Maintenance   Topic Date Due     MICROALBUMIN  Never done     URINE DRUG SCREEN  Never done     ADVANCE CARE PLANNING  Never done     Pneumococcal Vaccine: Pediatrics (0 to 5 Years) and  At-Risk Patients (6 to 64 Years) (1 of 2 - PPSV23) Never done     HIV SCREENING  Never done     MEDICARE ANNUAL WELLNESS VISIT  Never done     LIPID  04/07/2018     LUNG CANCER SCREENING  04/09/2019     INFLUENZA VACCINE (1) 09/01/2021     COVID-19 Vaccine (3 - Booster for Pfizer series) 10/22/2021     BMP  03/15/2022     PHQ-9  07/20/2022     ANTHONY ASSESSMENT  01/20/2023     MAMMO SCREENING  07/21/2023     COLORECTAL CANCER SCREENING  03/18/2026     DTAP/TDAP/TD IMMUNIZATION (3 - Td or Tdap) 01/29/2030     HEPATITIS C SCREENING  Completed     PHQ-2  Completed     ZOSTER IMMUNIZATION  Completed     IPV IMMUNIZATION  Aged Out     MENINGITIS IMMUNIZATION  Aged Out     HEPATITIS B IMMUNIZATION  Aged Out     PAP  Discontinued       CAROLYN Franks CNP  [facility]  Family Practice  [facility]  [phone]    For any issues my office # is [phone]

## 2022-02-16 PROBLEM — G44.209 TENSION HEADACHE: Status: ACTIVE | Noted: 2020-10-02

## 2022-02-16 PROBLEM — G25.81 RESTLESS LEGS: Status: ACTIVE | Noted: 2022-02-16

## 2022-02-16 PROBLEM — G24.3 SPASMODIC TORTICOLLIS: Status: ACTIVE | Noted: 2020-10-02

## 2022-02-16 PROBLEM — G43.909 MIGRAINES: Status: ACTIVE | Noted: 2022-02-16

## 2022-02-16 PROBLEM — M47.816 LUMBAR SPONDYLOSIS: Status: ACTIVE | Noted: 2022-02-16

## 2022-02-16 PROBLEM — M47.812 CERVICAL SPONDYLOSIS: Status: ACTIVE | Noted: 2022-02-16

## 2022-02-19 ENCOUNTER — HEALTH MAINTENANCE LETTER (OUTPATIENT)
Age: 62
End: 2022-02-19

## 2022-03-16 DIAGNOSIS — M54.2 CERVICALGIA: ICD-10-CM

## 2022-03-16 DIAGNOSIS — G24.9 DYSTONIA: ICD-10-CM

## 2022-03-16 RX ORDER — CARISOPRODOL 350 MG/1
TABLET ORAL
Qty: 120 TABLET | Refills: 0 | Status: SHIPPED | OUTPATIENT
Start: 2022-03-16 | End: 2022-04-14

## 2022-03-16 NOTE — TELEPHONE ENCOUNTER
PCP out of clinic. Soma refilled.  PDMP Review       Value Time User    State PDMP site checked  Yes 3/16/2022  9:33 AM Susy Saunders APRN CNP Jennifer Wilson, APRN CNP on 3/16/2022 at 9:34 AM

## 2022-04-14 DIAGNOSIS — M54.2 CERVICALGIA: ICD-10-CM

## 2022-04-14 DIAGNOSIS — G24.9 DYSTONIA: ICD-10-CM

## 2022-04-14 RX ORDER — CARISOPRODOL 350 MG/1
350 TABLET ORAL 4 TIMES DAILY PRN
Qty: 120 TABLET | Refills: 0 | Status: SHIPPED | OUTPATIENT
Start: 2022-04-14 | End: 2022-05-12

## 2022-04-14 NOTE — TELEPHONE ENCOUNTER
Carisoprodol ( Soma) 350 mg    LOV 7/20/21  Last labs 3/15/2021    No appt  Left message to call back [facility]  April 14, 2022  Jailene Winter MA      BP Readings from Last 3 Encounters:   01/26/22 (!) 170/90   07/23/21 (!) 153/85   07/20/21 (!) 165/94     Cervicalgia  Acute on chronic.  Short course norco.  May continue Soma.  Cont all of supportive cares.  Follow up prn.  Proper use and potential benefits, harms and side effects discussed in detail.  - HYDROcodone-acetaminophen (NORCO) 5-325 MG tablet; Take 1 tablet by mouth every 6 hours as needed for severe pain  - carisoprodol (SOMA) 350 MG tablet; TAKE ONE TABLET BY MOUTH FOUR TIMES DAILY AS NEEDED FOR MUSCLE SPASM

## 2022-04-28 DIAGNOSIS — M54.2 CERVICALGIA: ICD-10-CM

## 2022-04-28 DIAGNOSIS — G24.9 DYSTONIA: Primary | ICD-10-CM

## 2022-05-12 DIAGNOSIS — G24.9 DYSTONIA: ICD-10-CM

## 2022-05-12 DIAGNOSIS — M54.2 CERVICALGIA: ICD-10-CM

## 2022-05-12 RX ORDER — CARISOPRODOL 350 MG/1
350 TABLET ORAL 4 TIMES DAILY PRN
Qty: 120 TABLET | Refills: 0 | Status: SHIPPED | OUTPATIENT
Start: 2022-05-12 | End: 2022-06-09

## 2022-06-09 DIAGNOSIS — G24.9 DYSTONIA: ICD-10-CM

## 2022-06-09 DIAGNOSIS — M54.2 CERVICALGIA: ICD-10-CM

## 2022-06-09 RX ORDER — CARISOPRODOL 350 MG/1
350 TABLET ORAL 4 TIMES DAILY PRN
Qty: 120 TABLET | Refills: 0 | Status: SHIPPED | OUTPATIENT
Start: 2022-06-09 | End: 2022-07-06

## 2022-06-27 DIAGNOSIS — M85.851 OSTEOPENIA OF BOTH HIPS: ICD-10-CM

## 2022-06-27 DIAGNOSIS — M85.852 OSTEOPENIA OF BOTH HIPS: ICD-10-CM

## 2022-06-27 RX ORDER — ALENDRONATE SODIUM 70 MG/1
70 TABLET ORAL
Qty: 12 TABLET | Refills: 3 | Status: CANCELLED | OUTPATIENT
Start: 2022-06-27

## 2022-07-06 ENCOUNTER — MYC REFILL (OUTPATIENT)
Dept: FAMILY MEDICINE | Facility: CLINIC | Age: 62
End: 2022-07-06

## 2022-07-06 DIAGNOSIS — M54.2 CERVICALGIA: ICD-10-CM

## 2022-07-06 DIAGNOSIS — G24.9 DYSTONIA: ICD-10-CM

## 2022-07-06 NOTE — TELEPHONE ENCOUNTER
Refill request for carisoprodol. Last office visit 2/15/22. CSA signed 1/20/22, no tox screen on file. MN  checked, fill history below. Refill routed to Elizabeth LEON CNP for review. Wendy Das

## 2022-07-07 RX ORDER — CARISOPRODOL 350 MG/1
350 TABLET ORAL 4 TIMES DAILY PRN
Qty: 120 TABLET | Refills: 0 | Status: SHIPPED | OUTPATIENT
Start: 2022-07-07 | End: 2022-08-02

## 2022-07-22 DIAGNOSIS — I10 HYPERTENSION GOAL BP (BLOOD PRESSURE) < 140/90: ICD-10-CM

## 2022-07-22 RX ORDER — ATENOLOL 50 MG/1
50 TABLET ORAL DAILY
Qty: 90 TABLET | Refills: 0 | Status: SHIPPED | OUTPATIENT
Start: 2022-07-22 | End: 2022-10-31

## 2022-07-22 NOTE — PROGRESS NOTES
[facility]    Hematology/Oncology Established Patient Follow-up Note      Today's Date: 07/22/22    Reason for Follow-up: Left breast cancer.  Transfer of care from Dr. Patience Mckeon.    HISTORY OF PRESENT ILLNESS: Muriel Diaz is a 61 year old female with history of hypertension, degenerative joint disease, cervical spine surgery who presents with the following oncologic history:    - 4/16/2018: Left mastectomy under the care of Dr. Umana.  Pathology showed a grade 2 invasive mammary carcinoma with lobular and ductal features, measuring 4.3 x 4 x 2.1 cm, positive margin for invasive carcinoma in the central and lateral posterior surface, DCIS and LCIS present, ER positive at 95%, TX positive at 50%, HER2/nicola negative.  Left axillary dissection showed 3 of 5 lymph nodes positive for metastatic carcinoma (lobular type), ER positive, TX positive, HER2/nicola negative. Stage IIB, [id number].  -5/22/2018 - 10/2/2018: Completed adjuvant chemotherapy with dose dense dense doxorubicin and cyclophosphamide for 4 cycles followed by weekly paclitaxel for 12 cycles.  -12/17/2018: Completed adjuvant radiation therapy to the left chest wall.  - 12/28/2018: Started anastrozole.  - 3/22/2019: Underwent reconstructive surgery with left latissimus flap and left tissue expander placement with Dr. Crook.    INTERIM HISTORY:  Muriel Diaz reports occasional dyspepsia with the alendronate. She has occasional discomfort around the left implant.      REVIEW OF SYSTEMS:   14 point ROS was reviewed and is negative other than as noted above in HPI.       HOME MEDICATIONS:  Current Outpatient Medications   Medication Sig Dispense Refill     alendronate (FOSAMAX) 70 MG tablet Take 1 tablet (70 mg) by mouth every 7 days 12 tablet 3     anastrozole (ARIMIDEX) 1 MG tablet Take 1 tablet (1 mg) by mouth daily 90 tablet 3     atenolol (TENORMIN) 50 MG tablet Take 1 tablet (50 mg) by mouth daily 90 tablet 0     calcium  carbonate (OS-HATTIE) 1500 (600 Ca) MG tablet TAKE ONE TABLET BY MOUTH TWICE DAILY WITH MEALS 180 tablet 3     calcium carbonate (OS-HATTIE) 600 MG tablet Take 1 tablet (600 mg) by mouth 2 times daily (with meals) 180 tablet 3     carisoprodol (SOMA) 350 MG tablet Take 1 tablet (350 mg) by mouth 4 times daily as needed for muscle spasms 120 tablet 0     VITAMIN D3 50 MCG (2000 UT) tablet Take 1 tablet (50 mcg) by mouth daily 90 tablet 3         ALLERGIES:  Allergies   Allergen Reactions     Amitriptyline Other (See Comments)     nightmares     Oxycontin [Oxycodone] Nausea     Severe headaches         PAST MEDICAL HISTORY:  Past Medical History:   Diagnosis Date     Cancer (H)     BREAST CANCER     Degeneration of cervical intervertebral disc      Degeneration of lumbar or lumbosacral intervertebral disc      Hypertension      PONV (postoperative nausea and vomiting)      Gynecologic history: G0.  Hysterectomy in the 1990s.  Ovaries are intact.  Currently postmenopausal.    PAST SURGICAL HISTORY:  Past Surgical History:   Procedure Laterality Date     BACK SURGERY  2001    lumbar fusion, cervical discectomy     DISSECT LYMPH NODE AXILLA Left 4/16/2018    Procedure: DISSECT LYMPH NODE AXILLA;;  Surgeon: Rodney Umana MD;  [facility]:  [facility]     FUSION CERVICAL ANTERIOR THREE+ LEVELS  5/1/2012    Procedure:FUSION CERVICAL ANTERIOR THREE+ LEVELS; Surgeon:SARAH FRANCO; Location:[facility] OR     FUSION CERVICAL POSTERIOR THREE+ LEVELS  5/1/2012    Procedure:FUSION CERVICAL POSTERIOR THREE+ LEVELS; Posterior Cervical decompression and fusion C4-7, Anterior Cervical decompression and fusion C4-7 (c-arm), (herb table with abraham, yina oasis, yina aviator, Vitoss foam packing strip, impulse monitoring,); Surgeon:SARAH FRANCO; Location: OR     GYN SURGERY       HYSTERECTOMY VAGINAL  1990's    Judy Aceves OB Gyn specilist     HYSTERECTOMY, PAP NO LONGER INDICATED       INSERT PORT VASCULAR ACCESS  N/A 5/14/2018    Procedure: INSERT PORT VASCULAR ACCESS;  PORT PLACEMENT;  Surgeon: Rodney Umana MD;  [facility]:  [facility]     INSERT TISSUE EXPANDER BREAST Bilateral 3/22/2019    Procedure: INSERT TISSUE EXPANDER BREAST;  Surgeon: Naa Crook MD;  Location:  OR     MASTECTOMY SIMPLE Left 4/16/2018    Procedure: MASTECTOMY SIMPLE;  LEFT SIMPLE MASTECTOMY,LEFT AXILLARY DISSECTION;  Surgeon: Rodney Umana MD;  [facility]:  [facility]     RECONSTRUCT BREAST LATISSIMUS DORSI PEDICLE Left 3/22/2019    Procedure: LEFT LATISSIMUS FLAP RECONSTRUCT WITH TISSUE EXPANDER AND PORT REMOVAL;  Surgeon: Naa Crook MD;  Location:  OR     REMOVE PORT VASCULAR ACCESS N/A 3/22/2019    Procedure: REMOVE PORT VASCULAR ACCESS;  Surgeon: Naa Crook MD;  Location:  OR         SOCIAL HISTORY:  Social History     Socioeconomic History     Marital status: Single     Spouse name: Not on file     Number of children: 0     Years of education: Not on file     Highest education level: Not on file   Occupational History     Employer: [employer]    Tobacco Use     Smoking status: Current Every Day Smoker     Packs/day: 0.50     Types: Cigarettes     Smokeless tobacco: Never Used     Tobacco comment: 4 cigarettes per day   Substance and Sexual Activity     Alcohol use: Yes     Alcohol/week: 1.0 standard drink     Types: 1 Standard drinks or equivalent per week     Comment: 2-3 drinks per week     Drug use: No     Sexual activity: Not Currently     Partners: Male   Other Topics Concern     Parent/sibling w/ CABG, MI or angioplasty before 65F 55M? No   Social History Narrative     Not on file     Social Determinants of Health     Financial Resource Strain: Not on file   Food Insecurity: Not on file   Transportation Needs: Not on file   Physical Activity: Not on file   Stress: Not on file   Social Connections: Not on file   Intimate Partner Violence: Not on file   Housing Stability: Not on file   She has smoked  half a pack of cigarettes for many years, duration uncertain.  She drinks alcohol occasionally.  Denies illicit drug use.  She is retired.  Previously worked in  at large [employer].      FAMILY HISTORY:  Family History   Problem Relation Age of Onset     Pancreatitis Mother      Substance Abuse Mother      Unknown/Adopted Father    She does not know much about her family history and is unaware of any malignancy in her family.      PHYSICAL EXAM:  Vital signs:  BP (!) 145/85   Pulse 57   Temp 98.4  F (36.9  C) (Oral)   Resp 16   Wt 44 kg (97 lb)   SpO2 95%   BMI 17.18 kg/m     GENERAL/CONSTITUTIONAL: No acute distress.  EYES:  No scleral icterus.  ENT/MOUTH: Neck supple. Mask in place.  LYMPH: No anterior cervical, posterior cervical, supraclavicular, axillary adenopathy.   BREAST: Left breast is surgically absent with flap reconstruction changes and implant in place.  No periprosthetic fluid. No evidence of capsular contracture. No left chest wall masses.  Right breast with no masses.  Right nipple everted with no discharge. Some slight darkening of skin at the left inner chest wall skin.  RESPIRATORY: No audible cough or wheezing.   GASTROINTESTINAL: No hepatosplenomegaly, masses, or tenderness. No guarding.  No distention.  MUSCULOSKELETAL: Warm and well-perfused, no cyanosis, clubbing, or edema.  NEUROLOGIC: Alert, oriented, answers questions appropriately.  INTEGUMENTARY: No rashes or jaundice.  GAIT: Steady, does not use assistive device      LABS:  CBC RESULTS:   Recent Labs   Lab Test 03/15/19  1332 12/28/18  1320   WBC 7.6 6.1   RBC 4.42 3.81   HGB 12.9 12.0   HCT 40.3 35.9   MCV 91.2 94   MCH 29.1 31.5   MCHC 31.9* 33.4   RDW  --  13.4    259       Recent Labs   Lab Test 03/15/21  1345 03/22/19  0859 12/28/18  1320 10/19/18  1325     --  143 140   POTASSIUM 5.1 4.4 3.9 3.9   CHLORIDE 103  --  110* 106   CO2  --   --  28 27   ANIONGAP  --   --  5 7   *  --  94 107*    BUN 18  25*  --  20 21   CR 0.72 0.84 0.79 0.64   HATTIE 9.4  --  8.8 9.0         PATHOLOGY:  Reviewed as per HPI.    IMAGIN2021: DEXA scan showed left forearm and bilateral hip osteopenia.    2022: Right-sided mammogram showed benign findings.    ASSESSMENT/PLAN:  Muriel Diaz is a 61 year old female with the following issues:  1.  Stage IIB, [id number], grade 2 invasive mammary carcinoma with lobular and ductal features of the left upper inner breast, ER +95%, NV +50%, HER2 negative  - Muriel is status post left mastectomy 2018 and left axillary dissection with 3 of 5 lymph nodes positive for metastatic lobular carcinoma.  She completed adjuvant chemotherapy with ddAC-T followed by radiation to the left chest wall completed 2018.  -She has no clinical evidence for recurrent breast cancer by physical exam or mammogram reviewed from 2022.  -She has been on adjuvant anastrozole since 2018, currently tolerating this well with no significant adverse effects.  -Advised up to 10 years of hormone blockade therapy given her extent of lymph node involvement and higher risk for recurrence.  -Continue with annual right-sided mammograms, next due 2023.  --She will see plastic surgery for the asymmetry of her breast implants.    2.  Osteopenia  - DEXA scan from  showed osteopenia.  - Advised adequate calcium and vitamin D as well as weightbearing exercise.  - Continue alendronate.  She prefers not to switch to Zometa or Prolia.  - Plan to repeat DEXA scan in 2023.    3.  Tobacco abuse  --She has cut down to about a pack every 3 days.  - Urged smoking cessation and offered assistance with cessation. She declines at this time.    Return in 6 months.    Lisa Maier MD  Hematology/Oncology  [facility]    Total time spent: 40 minutes in patient evaluation, counseling, documentation, and coordination of care.

## 2022-07-28 ENCOUNTER — HOSPITAL ENCOUNTER (OUTPATIENT)
Dept: MAMMOGRAPHY | Facility: CLINIC | Age: 62
Discharge: HOME | End: 2022-07-28
Attending: INTERNAL MEDICINE | Admitting: INTERNAL MEDICINE
Payer: MEDICARE

## 2022-07-28 DIAGNOSIS — Z12.31 VISIT FOR SCREENING MAMMOGRAM: ICD-10-CM

## 2022-07-28 PROCEDURE — 77067 SCR MAMMO BI INCL CAD: CPT | Mod: 52

## 2022-08-01 ENCOUNTER — ONCOLOGY VISIT (OUTPATIENT)
Dept: ONCOLOGY | Facility: CLINIC | Age: 62
End: 2022-08-01
Attending: INTERNAL MEDICINE
Payer: MEDICARE

## 2022-08-01 VITALS
BODY MASS INDEX: 17.18 KG/M2 | HEART RATE: 57 BPM | TEMPERATURE: 98.4 F | OXYGEN SATURATION: 95 % | DIASTOLIC BLOOD PRESSURE: 85 MMHG | WEIGHT: 97 LBS | SYSTOLIC BLOOD PRESSURE: 145 MMHG | RESPIRATION RATE: 16 BRPM

## 2022-08-01 DIAGNOSIS — C50.212 MALIGNANT NEOPLASM OF UPPER-INNER QUADRANT OF LEFT BREAST IN FEMALE, ESTROGEN RECEPTOR POSITIVE (H): Primary | ICD-10-CM

## 2022-08-01 DIAGNOSIS — M85.852 OSTEOPENIA OF BOTH HIPS: ICD-10-CM

## 2022-08-01 DIAGNOSIS — Z17.0 MALIGNANT NEOPLASM OF UPPER-INNER QUADRANT OF LEFT BREAST IN FEMALE, ESTROGEN RECEPTOR POSITIVE (H): Primary | ICD-10-CM

## 2022-08-01 DIAGNOSIS — F17.200 TOBACCO USE DISORDER: ICD-10-CM

## 2022-08-01 DIAGNOSIS — M85.851 OSTEOPENIA OF BOTH HIPS: ICD-10-CM

## 2022-08-01 PROCEDURE — 99215 OFFICE O/P EST HI 40 MIN: CPT | Performed by: INTERNAL MEDICINE

## 2022-08-01 PROCEDURE — G0463 HOSPITAL OUTPT CLINIC VISIT: HCPCS

## 2022-08-01 ASSESSMENT — PAIN SCALES - GENERAL: PAINLEVEL: NO PAIN (0)

## 2022-08-01 NOTE — LETTER
8/1/2022         RE: Muriel Diaz  [address]  [facility] [address]        Dear Colleague,    Thank you for referring your patient, Muriel Diaz, to the [facility]. Please see a copy of my visit note below.    [facility], [place]    Hematology/Oncology Established Patient Follow-up Note      Today's Date: 07/22/22    Reason for Follow-up: Left breast cancer.  Transfer of care from Dr. Patience Mckeon.    HISTORY OF PRESENT ILLNESS: Muriel Diaz is a 61 year old female with history of hypertension, degenerative joint disease, cervical spine surgery who presents with the following oncologic history:    - 4/16/2018: Left mastectomy under the care of Dr. Umana.  Pathology showed a grade 2 invasive mammary carcinoma with lobular and ductal features, measuring 4.3 x 4 x 2.1 cm, positive margin for invasive carcinoma in the central and lateral posterior surface, DCIS and LCIS present, ER positive at 95%, KS positive at 50%, HER2/nicola negative.  Left axillary dissection showed 3 of 5 lymph nodes positive for metastatic carcinoma (lobular type), ER positive, KS positive, HER2/nicola negative. Stage IIB, [id number].  -5/22/2018 - 10/2/2018: Completed adjuvant chemotherapy with dose dense dense doxorubicin and cyclophosphamide for 4 cycles followed by weekly paclitaxel for 12 cycles.  -12/17/2018: Completed adjuvant radiation therapy to the left chest wall.  - 12/28/2018: Started anastrozole.  - 3/22/2019: Underwent reconstructive surgery with left latissimus flap and left tissue expander placement with Dr. Crook.    INTERIM HISTORY:  Muriel Diaz reports occasional dyspepsia with the alendronate. She has occasional discomfort around the left implant.      REVIEW OF SYSTEMS:   14 point ROS was reviewed and is negative other than as noted above in HPI.       HOME MEDICATIONS:  Current Outpatient Medications   Medication Sig Dispense Refill     alendronate  (FOSAMAX) 70 MG tablet Take 1 tablet (70 mg) by mouth every 7 days 12 tablet 3     anastrozole (ARIMIDEX) 1 MG tablet Take 1 tablet (1 mg) by mouth daily 90 tablet 3     atenolol (TENORMIN) 50 MG tablet Take 1 tablet (50 mg) by mouth daily 90 tablet 0     calcium carbonate (OS-HATTIE) 1500 (600 Ca) MG tablet TAKE ONE TABLET BY MOUTH TWICE DAILY WITH MEALS 180 tablet 3     calcium carbonate (OS-HATTIE) 600 MG tablet Take 1 tablet (600 mg) by mouth 2 times daily (with meals) 180 tablet 3     carisoprodol (SOMA) 350 MG tablet Take 1 tablet (350 mg) by mouth 4 times daily as needed for muscle spasms 120 tablet 0     VITAMIN D3 50 MCG (2000 UT) tablet Take 1 tablet (50 mcg) by mouth daily 90 tablet 3         ALLERGIES:  Allergies   Allergen Reactions     Amitriptyline Other (See Comments)     nightmares     Oxycontin [Oxycodone] Nausea     Severe headaches         PAST MEDICAL HISTORY:  Past Medical History:   Diagnosis Date     Cancer (H)     BREAST CANCER     Degeneration of cervical intervertebral disc      Degeneration of lumbar or lumbosacral intervertebral disc      Hypertension      PONV (postoperative nausea and vomiting)      Gynecologic history: G0.  Hysterectomy in the 1990s.  Ovaries are intact.  Currently postmenopausal.    PAST SURGICAL HISTORY:  Past Surgical History:   Procedure Laterality Date     BACK SURGERY  2001    lumbar fusion, cervical discectomy     DISSECT LYMPH NODE AXILLA Left 4/16/2018    Procedure: DISSECT LYMPH NODE AXILLA;;  Surgeon: Rodney Umana MD;  Location: [facility]     FUSION CERVICAL ANTERIOR THREE+ LEVELS  5/1/2012    Procedure:FUSION CERVICAL ANTERIOR THREE+ LEVELS; Surgeon:SARAH FRANCO; Location:[facility]     FUSION CERVICAL POSTERIOR THREE+ LEVELS  5/1/2012    Procedure:FUSION CERVICAL POSTERIOR THREE+ LEVELS; Posterior Cervical decompression and fusion C4-7, Anterior Cervical decompression and fusion C4-7 (c-arm), (herb table with yina abraham oasis, yina avirambo,  Vitoss foam packing strip, impulse monitoring,); Surgeon:SARAH FRANCO; Location: OR     GYN SURGERY       HYSTERECTOMY VAGINAL  1990's    Judy Aceves OB Gyn specilist     HYSTERECTOMY, PAP NO LONGER INDICATED       INSERT PORT VASCULAR ACCESS N/A 5/14/2018    Procedure: INSERT PORT VASCULAR ACCESS;  PORT PLACEMENT;  Surgeon: Rodney Umana MD;  Location: [facility]     INSERT TISSUE EXPANDER BREAST Bilateral 3/22/2019    Procedure: INSERT TISSUE EXPANDER BREAST;  Surgeon: Naa Crook MD;  Location:  OR     MASTECTOMY SIMPLE Left 4/16/2018    Procedure: MASTECTOMY SIMPLE;  LEFT SIMPLE MASTECTOMY,LEFT AXILLARY DISSECTION;  Surgeon: Rodney Umana MD;  Location: [facility]     RECONSTRUCT BREAST LATISSIMUS DORSI PEDICLE Left 3/22/2019    Procedure: LEFT LATISSIMUS FLAP RECONSTRUCT WITH TISSUE EXPANDER AND PORT REMOVAL;  Surgeon: Naa Crook MD;  Location:  OR     REMOVE PORT VASCULAR ACCESS N/A 3/22/2019    Procedure: REMOVE PORT VASCULAR ACCESS;  Surgeon: Naa Crook MD;  Location:  OR         SOCIAL HISTORY:  Social History     Socioeconomic History     Marital status: Single     Spouse name: Not on file     Number of children: 0     Years of education: Not on file     Highest education level: Not on file   Occupational History     Employer: [employer]    Tobacco Use     Smoking status: Current Every Day Smoker     Packs/day: 0.50     Types: Cigarettes     Smokeless tobacco: Never Used     Tobacco comment: 4 cigarettes per day   Substance and Sexual Activity     Alcohol use: Yes     Alcohol/week: 1.0 standard drink     Types: 1 Standard drinks or equivalent per week     Comment: 2-3 drinks per week     Drug use: No     Sexual activity: Not Currently     Partners: Male   Other Topics Concern     Parent/sibling w/ CABG, MI or angioplasty before 65F 55M? No   Social History Narrative     Not on file     Social Determinants of Health     Financial Resource  Strain: Not on file   Food Insecurity: Not on file   Transportation Needs: Not on file   Physical Activity: Not on file   Stress: Not on file   Social Connections: Not on file   Intimate Partner Violence: Not on file   Housing Stability: Not on file   She has smoked half a pack of cigarettes for many years, duration uncertain.  She drinks alcohol occasionally.  Denies illicit drug use.  She is retired.  Previously worked in  at large [employer].      FAMILY HISTORY:  Family History   Problem Relation Age of Onset     Pancreatitis Mother      Substance Abuse Mother      Unknown/Adopted Father    She does not know much about her family history and is unaware of any malignancy in her family.      PHYSICAL EXAM:  Vital signs:  BP (!) 145/85   Pulse 57   Temp 98.4  F (36.9  C) (Oral)   Resp 16   Wt 44 kg (97 lb)   SpO2 95%   BMI 17.18 kg/m     GENERAL/CONSTITUTIONAL: No acute distress.  EYES:  No scleral icterus.  ENT/MOUTH: Neck supple. Mask in place.  LYMPH: No anterior cervical, posterior cervical, supraclavicular, axillary adenopathy.   BREAST: Left breast is surgically absent with flap reconstruction changes and implant in place.  No periprosthetic fluid. No evidence of capsular contracture. No left chest wall masses.  Right breast with no masses.  Right nipple everted with no discharge. Some slight darkening of skin at the left inner chest wall skin.  RESPIRATORY: No audible cough or wheezing.   GASTROINTESTINAL: No hepatosplenomegaly, masses, or tenderness. No guarding.  No distention.  MUSCULOSKELETAL: Warm and well-perfused, no cyanosis, clubbing, or edema.  NEUROLOGIC: Alert, oriented, answers questions appropriately.  INTEGUMENTARY: No rashes or jaundice.  GAIT: Steady, does not use assistive device      LABS:  CBC RESULTS:   Recent Labs   Lab Test 03/15/19  1332 12/28/18  1320   WBC 7.6 6.1   RBC 4.42 3.81   HGB 12.9 12.0   HCT 40.3 35.9   MCV 91.2 94   MCH 29.1 31.5   MCHC 31.9* 33.4    RDW  --  13.4    259       Recent Labs   Lab Test 03/15/21  1345 19  0859 18  1320 10/19/18  1325     --  143 140   POTASSIUM 5.1 4.4 3.9 3.9   CHLORIDE 103  --  110* 106   CO2  --   --  28 27   ANIONGAP  --   --  5 7   *  --  94 107*   BUN 18  25*  --  20 21   CR 0.72 0.84 0.79 0.64   HATTIE 9.4  --  8.8 9.0         PATHOLOGY:  Reviewed as per HPI.    IMAGIN2021: DEXA scan showed left forearm and bilateral hip osteopenia.    2022: Right-sided mammogram showed benign findings.    ASSESSMENT/PLAN:  Muriel Diaz is a 61 year old female with the following issues:  1.  Stage IIB, [id number], grade 2 invasive mammary carcinoma with lobular and ductal features of the left upper inner breast, ER +95%, FL +50%, HER2 negative  - Muriel is status post left mastectomy 2018 and left axillary dissection with 3 of 5 lymph nodes positive for metastatic lobular carcinoma.  She completed adjuvant chemotherapy with ddAC-T followed by radiation to the left chest wall completed 2018.  -She has no clinical evidence for recurrent breast cancer by physical exam or mammogram reviewed from 2022.  -She has been on adjuvant anastrozole since 2018, currently tolerating this well with no significant adverse effects.  -Advised up to 10 years of hormone blockade therapy given her extent of lymph node involvement and higher risk for recurrence.  -Continue with annual right-sided mammograms, next due 2023.  --She will see plastic surgery for the asymmetry of her breast implants.    2.  Osteopenia  - DEXA scan from  showed osteopenia.  - Advised adequate calcium and vitamin D as well as weightbearing exercise.  - Continue alendronate.  She prefers not to switch to Zometa or Prolia.  - Plan to repeat DEXA scan in 2023.    3.  Tobacco abuse  --She has cut down to about a pack every 3 days.  - Urged smoking cessation and offered assistance with cessation. She declines at  "this time.    Return in 6 months.    Lisa Maier MD  Hematology/Oncology  [facility]    Total time spent: 40 minutes in patient evaluation, counseling, documentation, and coordination of care.      Oncology Rooming Note    August 1, 2022 10:47 AM   Muriel Diaz is a 61 year old female who presents for:    Chief Complaint   Patient presents with     Oncology Clinic Visit     Initial Vitals: Resp 16   Wt 44 kg (97 lb)   BMI 17.18 kg/m   Estimated body mass index is 17.18 kg/m  as calculated from the following:    Height as of 7/23/21: 1.6 m (5' 3\").    Weight as of this encounter: 44 kg (97 lb). Body surface area is 1.4 meters squared.  No Pain (0) Comment: Data Unavailable   No LMP recorded. Patient has had a hysterectomy.  Allergies reviewed: Yes  Medications reviewed: Yes    Medications: Medication refills not needed today.  Pharmacy name entered into [employer]:    [facility] [place], [place] - [address] [place] [address] PHARMACY [address][id number] [facility] [address]    Clinical concerns: no       Mana Liang                Again, thank you for allowing me to participate in the care of your patient.        Sincerely,        Lisa Maier MD    "

## 2022-08-01 NOTE — PROGRESS NOTES
"Oncology Rooming Note    August 1, 2022 10:47 AM   Muriel Diaz is a 61 year old female who presents for:    Chief Complaint   Patient presents with     Oncology Clinic Visit     Initial Vitals: Resp 16   Wt 44 kg (97 lb)   BMI 17.18 kg/m   Estimated body mass index is 17.18 kg/m  as calculated from the following:    Height as of 7/23/21: 1.6 m (5' 3\").    Weight as of this encounter: 44 kg (97 lb). Body surface area is 1.4 meters squared.  No Pain (0) Comment: Data Unavailable   No LMP recorded. Patient has had a hysterectomy.  Allergies reviewed: Yes  Medications reviewed: Yes    Medications: Medication refills not needed today.  Pharmacy name entered into [employer]:    [facility]  [facility] [place], [place] - [address] PHARMACY [address][id number] [place], [place] - [address]    Clinical concerns: no       Mana Liang            "

## 2022-08-02 ENCOUNTER — OFFICE VISIT (OUTPATIENT)
Dept: FAMILY MEDICINE | Facility: CLINIC | Age: 62
End: 2022-08-02

## 2022-08-02 VITALS
SYSTOLIC BLOOD PRESSURE: 148 MMHG | BODY MASS INDEX: 17.33 KG/M2 | HEART RATE: 56 BPM | DIASTOLIC BLOOD PRESSURE: 94 MMHG | OXYGEN SATURATION: 96 % | HEIGHT: 63 IN | WEIGHT: 97.8 LBS

## 2022-08-02 DIAGNOSIS — I10 ESSENTIAL HYPERTENSION: ICD-10-CM

## 2022-08-02 DIAGNOSIS — G24.3 SPASMODIC TORTICOLLIS: ICD-10-CM

## 2022-08-02 DIAGNOSIS — Z79.899 CONTROLLED SUBSTANCE AGREEMENT SIGNED: ICD-10-CM

## 2022-08-02 DIAGNOSIS — Z13.0 SCREENING FOR ENDOCRINE, METABOLIC AND IMMUNITY DISORDER: ICD-10-CM

## 2022-08-02 DIAGNOSIS — Z23 NEED FOR PNEUMOCOCCAL VACCINATION: ICD-10-CM

## 2022-08-02 DIAGNOSIS — Z72.0 TOBACCO ABUSE: ICD-10-CM

## 2022-08-02 DIAGNOSIS — G24.3 CERVICAL DYSTONIA: Primary | ICD-10-CM

## 2022-08-02 DIAGNOSIS — Z13.228 SCREENING FOR ENDOCRINE, METABOLIC AND IMMUNITY DISORDER: ICD-10-CM

## 2022-08-02 DIAGNOSIS — R23.9 SKIN CHANGE: ICD-10-CM

## 2022-08-02 DIAGNOSIS — Z12.2 SCREENING FOR LUNG CANCER: ICD-10-CM

## 2022-08-02 DIAGNOSIS — Z13.29 SCREENING FOR ENDOCRINE, METABOLIC AND IMMUNITY DISORDER: ICD-10-CM

## 2022-08-02 DIAGNOSIS — L85.8 KERATOSIS PILARIS: ICD-10-CM

## 2022-08-02 DIAGNOSIS — Z13.220 SCREENING FOR LIPOID DISORDERS: ICD-10-CM

## 2022-08-02 DIAGNOSIS — Z11.4 SCREENING FOR HIV (HUMAN IMMUNODEFICIENCY VIRUS): ICD-10-CM

## 2022-08-02 LAB
CHOL/HDL RATIO (RMG): 3 MG/DL (ref 0–4.5)
CHOLESTEROL: 228 MG/DL (ref 100–199)
HDL (RMG): 75 MG/DL (ref 40–?)
LDL CALCULATED (RMG): 134 MG/DL (ref 0–130)
TRIGLYCERIDES (RMG): 97 MG/DL (ref 0–149)

## 2022-08-02 PROCEDURE — 36415 COLL VENOUS BLD VENIPUNCTURE: CPT | Performed by: NURSE PRACTITIONER

## 2022-08-02 PROCEDURE — 90677 PCV20 VACCINE IM: CPT | Performed by: NURSE PRACTITIONER

## 2022-08-02 PROCEDURE — 99214 OFFICE O/P EST MOD 30 MIN: CPT | Mod: 25 | Performed by: NURSE PRACTITIONER

## 2022-08-02 PROCEDURE — G0009 ADMIN PNEUMOCOCCAL VACCINE: HCPCS | Mod: 59 | Performed by: NURSE PRACTITIONER

## 2022-08-02 PROCEDURE — 80061 LIPID PANEL: CPT | Mod: QW | Performed by: NURSE PRACTITIONER

## 2022-08-02 RX ORDER — CLONAZEPAM 0.5 MG/1
0.5 TABLET ORAL 2 TIMES DAILY
Qty: 60 TABLET | Refills: 0 | Status: SHIPPED | OUTPATIENT
Start: 2022-08-02 | End: 2022-08-29

## 2022-08-02 ASSESSMENT — PATIENT HEALTH QUESTIONNAIRE - PHQ9
5. POOR APPETITE OR OVEREATING: SEVERAL DAYS
SUM OF ALL RESPONSES TO PHQ QUESTIONS 1-9: 5

## 2022-08-02 ASSESSMENT — ANXIETY QUESTIONNAIRES
3. WORRYING TOO MUCH ABOUT DIFFERENT THINGS: SEVERAL DAYS
5. BEING SO RESTLESS THAT IT IS HARD TO SIT STILL: NOT AT ALL
1. FEELING NERVOUS, ANXIOUS, OR ON EDGE: NOT AT ALL
6. BECOMING EASILY ANNOYED OR IRRITABLE: SEVERAL DAYS
GAD7 TOTAL SCORE: 4
GAD7 TOTAL SCORE: 4
IF YOU CHECKED OFF ANY PROBLEMS ON THIS QUESTIONNAIRE, HOW DIFFICULT HAVE THESE PROBLEMS MADE IT FOR YOU TO DO YOUR WORK, TAKE CARE OF THINGS AT HOME, OR GET ALONG WITH OTHER PEOPLE: SOMEWHAT DIFFICULT
2. NOT BEING ABLE TO STOP OR CONTROL WORRYING: SEVERAL DAYS
7. FEELING AFRAID AS IF SOMETHING AWFUL MIGHT HAPPEN: NOT AT ALL

## 2022-08-02 NOTE — LETTER
[facility]  08/02/22  Patient: Muriel Diaz  YOB: 1960  Medical Record Number: [id number]                                                                                  Non-Opioid Controlled Substance Agreement    This is an agreement between you and your provider regarding safe and appropriate use of controlled substances prescribed by your care team. Controlled substances are?medicines that can cause physical and mental dependence (abuse).     There are strict laws about having and using these medicines. We here at [facility] are  committed to working with you in your efforts to get better. To support you in this work, we'll help you schedule regular office appointments for medicine refills. If we must cancel or change your appointment for any reason, we'll make sure you have enough medicine to last until your next appointment.     As a Provider, I will:     Listen carefully to your concerns while treating you with respect.     Recommend a treatment plan that I believe is in your best interest and may involve therapies other than medicine.      Talk with you often about the possible benefits and the risk of harm of any medicine that we prescribe for you.    Assess the safety of this medicine and check how well it works.      Provide a plan on how to taper (discontinue or go off) using this medicine if the decision is made to stop its use.      ::  As a Patient, I understand controlled substances:       Are prescribed by my care provider to help me function or work and manage my condition(s).?    Are strong medicines and can cause serious side effects.       Need to be taken exactly as prescribed.?Combining controlled substances with certain medicines or chemicals (such as illegal drugs, alcohol, sedatives, sleeping pills, and benzodiazepines) can be dangerous or even fatal.? If I stop taking my medicines suddenly, I may have severe withdrawal symptoms.     The risks,  benefits, and side effects of these medicine(s) were explained to me. I agree that:    1. I will take part in other treatments as advised by my care team. This may be psychiatry or counseling, physical therapy, behavioral therapy, group treatment or a referral to specialist.    2. I will keep all my appointments and understand this is part of the monitoring of controlled substances.?My care team may require an office visit for EVERY controlled substance refill. If I miss appointments or don t follow instructions, my care team may stop my medicine    3. I will take my medicines as prescribed. I will not change the dose or schedule unless my care team tells me to. There will be no refills if I run out early.      4. I may be asked to come to the clinic and complete a urine drug test or complete a pill count. If I don t give a urine sample or participate in a pill count, the care team may stop my medicine.    5. I will only receive controlled substance prescriptions from this clinic. If I am treated by another provider, I will tell them that I am taking controlled substances and that I have a treatment agreement with this provider. I will inform my [facility] care team within one business day if I am given a prescription for any controlled substance by another healthcare provider. My [facility] care team can contact other providers and pharmacists about my use of any medicines.    6. It is up to me to make sure that I don't run out of my medicines on weekends or holidays.?If my care team is willing to refill my prescription without a visit, I must request refills only during office hours. Refills may take up to 3 business days to process. I will use one pharmacy to fill all my controlled substance prescriptions. I will notify the clinic about any changes to my insurance or medicine availability.    7. I am responsible for my prescriptions. If the medicine/prescription is lost, stolen or destroyed, it will  not be replaced.?I also agree not to share controlled substance medicines with anyone.     8. I am aware I should not use any illegal or recreational drugs. I agree not to drink alcohol unless my care team says I can.     9. If I enroll in the [place] Medical Cannabis program, I will tell my care team before my next refill.    10. I will tell my care team right away if I become pregnant, have a new medical problem treated outside of my regular clinic, or have a change in my medicines.     11. I understand that this medicine can affect my thinking, judgment and reaction time.? Alcohol and drugs affect the brain and body, which can affect the safety of my driving. Being under the influence of alcohol or drugs can affect my decision-making, behaviors, personal safety and the safety of others. Driving while impaired (DWI) can occur if a person is driving, operating or in physical control of a car, motorcycle, boat, snowmobile, ATV, motorbike, off-road vehicle or any other motor vehicle ([place] Statute 169A.20). I understand the risk if I choose to drive or operate any vehicle or machinery.    I understand that if I do not follow any of the conditions above, my prescriptions or treatment may be stopped or changed.   I agree that my provider, clinic care team and pharmacy may work with any city, state or federal law enforcement agency that investigates the misuse, sale or other diversion of my controlled medicine. I will allow my provider to discuss my care with, or share a copy of, this agreement with any other treating provider, pharmacy or emergency room where I receive care.     I have read this agreement and have asked questions about anything I did not understand.    ________________________________________________________  Patient Signature - Muriel Diaz     ___________________                   Date     ________________________________________________________  Provider Signature - CAROLYN Franks CNP        ___________________                   Date     ________________________________________________________  Witness Signature (required if provider not present while patient signing)          ___________________                   Date
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
## 2022-08-02 NOTE — PATIENT INSTRUCTIONS
"Stop the Soma  Start clonazepam 0.5mg twice daily for the dystonia and spasmodic torticollis. If this dose is too high, cut the dose in half.  I want to see you back within four weeks for follow up and recheck to make sure you're doing okay. I want you to bring your blood pressure cuff so we can validate it against ours.    Chest CT ordered for lung cancer screening when you are ready  When you are ready to quit smoking, let me know, and we can assist      KERATOSIS PILARIS    Keratosis Pilaris (KP) is a common skin condition that is not harmful.  It tends to run in families and usually affects the upper arms, and sometimes affects the cheeks and thighs.  Facial involvement tends to improve with age (after childhood).  There is no cure for keratosis pilaris, but certain moisturizers (see below) may make the bumps smoother and less obvious.  If the KP is itchy or inflamed, your doctor may prescribe a medication to improve these symptoms  Recommended moisturizers:   Ammonium lactate cream or lotion, 4% or 8% (brand names include AmLactin and LacHydrin)  CeraVe SA lotion  Eucerin \"Smoothing Repair\" Or \"Professional Repair\" lotion  Eucerin Roughness Relief Lotion   Sometimes these are kept behind the pharmacy counter or need to be ordered by the pharmacist.  They are also available for purchase on the internet.      "

## 2022-08-03 LAB
BUN SERPL-MCNC: 18 MG/DL (ref 8–27)
BUN/CREATININE RATIO: 26 (ref 12–28)
CALCIUM SERPL-MCNC: 11 MG/DL (ref 8.7–10.3)
CHLORIDE SERPLBLD-SCNC: 102 MMOL/L (ref 96–106)
CREAT SERPL-MCNC: 0.7 MG/DL (ref 0.57–1)
EGFR: 99 ML/MIN/1.73
GLUCOSE SERPL-MCNC: 101 MG/DL (ref 65–99)
HIV SCREEN 4TH GEN WITH RFLX: NON REACTIVE
POTASSIUM SERPL-SCNC: 4.4 MMOL/L (ref 3.5–5.2)
SODIUM SERPL-SCNC: 143 MMOL/L (ref 134–144)
TOTAL CO2: 27 MMOL/L (ref 20–29)
TSH BLD-ACNC: 2.28 UIU/ML (ref 0.45–4.5)

## 2022-08-29 DIAGNOSIS — G24.3 CERVICAL DYSTONIA: ICD-10-CM

## 2022-08-29 DIAGNOSIS — G24.3 SPASMODIC TORTICOLLIS: ICD-10-CM

## 2022-08-29 RX ORDER — CLONAZEPAM 0.5 MG/1
0.5 TABLET ORAL 2 TIMES DAILY
Qty: 60 TABLET | Refills: 0 | Status: SHIPPED | OUTPATIENT
Start: 2022-08-29 | End: 2022-09-26

## 2022-09-13 DIAGNOSIS — C50.212 MALIGNANT NEOPLASM OF UPPER-INNER QUADRANT OF LEFT BREAST IN FEMALE, ESTROGEN RECEPTOR POSITIVE (H): ICD-10-CM

## 2022-09-13 DIAGNOSIS — Z17.0 MALIGNANT NEOPLASM OF UPPER-INNER QUADRANT OF LEFT BREAST IN FEMALE, ESTROGEN RECEPTOR POSITIVE (H): ICD-10-CM

## 2022-09-13 RX ORDER — ANASTROZOLE 1 MG/1
1 TABLET ORAL DAILY
Qty: 90 TABLET | Refills: 0 | Status: SHIPPED | OUTPATIENT
Start: 2022-09-13 | End: 2023-03-20

## 2022-09-13 NOTE — TELEPHONE ENCOUNTER
"Last prescribing provider: Dr. Mckeon    Last clinic visit date: 8/1/22    Any missed appointments or no-shows since last clinic visit?: No    Recommendations for requested medication (if none, N/A): Copied from chart note from Dr. Maier on 8/1: \"-She has been on adjuvant anastrozole since 12/28/2018, currently tolerating this well with no significant adverse effects.  -Advised up to 10 years of hormone blockade therapy given her extent of lymph node involvement and higher risk for recurrence.\"    Next clinic visit date: Not scheduled    Any other pertinent information (if none, N/A): N/A    "

## 2022-09-26 ENCOUNTER — MYC REFILL (OUTPATIENT)
Dept: FAMILY MEDICINE | Facility: CLINIC | Age: 62
End: 2022-09-26

## 2022-09-26 DIAGNOSIS — G24.3 SPASMODIC TORTICOLLIS: ICD-10-CM

## 2022-09-26 DIAGNOSIS — G24.3 CERVICAL DYSTONIA: ICD-10-CM

## 2022-09-26 RX ORDER — CLONAZEPAM 0.5 MG/1
0.5 TABLET ORAL 2 TIMES DAILY
Qty: 60 TABLET | Refills: 0 | Status: SHIPPED | OUTPATIENT
Start: 2022-09-26 | End: 2022-10-26

## 2022-10-22 ENCOUNTER — HEALTH MAINTENANCE LETTER (OUTPATIENT)
Age: 62
End: 2022-10-22

## 2022-10-24 DIAGNOSIS — G24.3 SPASMODIC TORTICOLLIS: ICD-10-CM

## 2022-10-24 DIAGNOSIS — G24.3 CERVICAL DYSTONIA: ICD-10-CM

## 2022-10-24 NOTE — TELEPHONE ENCOUNTER
Clonazepam   LOV 8/2/22  Cervical dystonia  Spasmodic torticollis  Controlled substance agreement signed  -     clonazePAM (KLONOPIN) 0.5 MG tablet; Take 1 tablet (0.5 mg) by mouth 2 times daily  Comments:  8/2/22- clonazepam for spasmodic torticollis and cervical dystonia     Longstanding history of both cervical dystonia and spasmodic torticollis, negatively impacting her on a routine basis. Has tried many therapies which have either been intolerable, ineffective, or cost-prohibitive. Trial of clonazepam 0.5mg BID. We reviewed risks associated with benzodiazepine use in great detail, including risks of physiologic dependence, psychologic dependence, addiction, risks of respiratory depression and death when misused or used in conjunction with alcohol or sedating agents. [place]. Recheck with me in 2-4 weeks, earlier with concerns. To STOP Soma.

## 2022-10-26 RX ORDER — CLONAZEPAM 0.5 MG/1
0.5 TABLET ORAL 2 TIMES DAILY
Qty: 60 TABLET | Refills: 0 | Status: SHIPPED | OUTPATIENT
Start: 2022-10-26 | End: 2022-11-21

## 2022-10-31 DIAGNOSIS — I10 HYPERTENSION GOAL BP (BLOOD PRESSURE) < 140/90: ICD-10-CM

## 2022-10-31 RX ORDER — ATENOLOL 50 MG/1
50 TABLET ORAL DAILY
Qty: 90 TABLET | Refills: 0 | Status: SHIPPED | OUTPATIENT
Start: 2022-10-31 | End: 2023-01-23

## 2022-11-19 DIAGNOSIS — R79.89 LOW VITAMIN D LEVEL: ICD-10-CM

## 2022-11-19 RX ORDER — CHOLECALCIFEROL (VITAMIN D3) 50 MCG
TABLET ORAL
Qty: 90 TABLET | Refills: 0 | Status: SHIPPED | OUTPATIENT
Start: 2022-11-19 | End: 2023-02-20

## 2022-11-21 DIAGNOSIS — G24.3 SPASMODIC TORTICOLLIS: ICD-10-CM

## 2022-11-21 DIAGNOSIS — G24.3 CERVICAL DYSTONIA: ICD-10-CM

## 2022-11-21 RX ORDER — CLONAZEPAM 0.5 MG/1
0.5 TABLET ORAL 2 TIMES DAILY
Qty: 14 TABLET | Refills: 0 | Status: SHIPPED | OUTPATIENT
Start: 2022-11-25 | End: 2022-11-30

## 2022-11-21 NOTE — TELEPHONE ENCOUNTER
Patient sent the following [employer] message requesting a refill on clonazepam:     Muriel Diaz  Patient Medication Renewal Request Pool 3 hours ago (11:55 AM)     KP  Refills have been requested for the following medications:         clonazePAM (KLONOPIN) 0.5 MG tablet [Avni Brown]      Patient Comment: With the Thanksgiving holiday, I will need to fill this a little early; holiday [place] hours, etc. Refill falls on a Saturday!     Preferred pharmacy: [id number] [address]    Last related visit: 8/2/22 with Elizabeth Willson CNP (no longer practicing in this office).   Controlled Substance Agreement: 8/2/22  Urine drug screen: due  Last filled: 10/26/22 for #60/30 days    Clonazepam [place] :         Called patient and discussed needs to establish care with new PCP in our office.   Scheduled appt with Dr. Yeung for 11/30 clinic visit.   Advised will send refill request to Dr. Yeung to address tomorrow 11/22 for a 1 week supply (#14 tabs) with start date of 11/25 but okay to fill Wednesday 11/23 per patient's request.  Next refill start date would be 12/3/22.     Rosalinda Constantino RN

## 2022-11-30 ENCOUNTER — OFFICE VISIT (OUTPATIENT)
Dept: FAMILY MEDICINE | Facility: CLINIC | Age: 62
End: 2022-11-30

## 2022-11-30 VITALS
OXYGEN SATURATION: 99 % | HEART RATE: 60 BPM | WEIGHT: 100.4 LBS | BODY MASS INDEX: 17.79 KG/M2 | DIASTOLIC BLOOD PRESSURE: 84 MMHG | SYSTOLIC BLOOD PRESSURE: 142 MMHG | RESPIRATION RATE: 16 BRPM

## 2022-11-30 DIAGNOSIS — G24.3 CERVICAL DYSTONIA: ICD-10-CM

## 2022-11-30 DIAGNOSIS — F41.9 ANXIETY: Primary | ICD-10-CM

## 2022-11-30 DIAGNOSIS — G24.3 SPASMODIC TORTICOLLIS: ICD-10-CM

## 2022-11-30 DIAGNOSIS — G89.4 CHRONIC PAIN SYNDROME: ICD-10-CM

## 2022-11-30 PROCEDURE — 99214 OFFICE O/P EST MOD 30 MIN: CPT | Performed by: FAMILY MEDICINE

## 2022-11-30 RX ORDER — ESCITALOPRAM OXALATE 10 MG/1
10 TABLET ORAL DAILY
Qty: 90 TABLET | Refills: 0 | Status: SHIPPED | OUTPATIENT
Start: 2022-11-30 | End: 2023-05-10

## 2022-11-30 ASSESSMENT — ANXIETY QUESTIONNAIRES
GAD7 TOTAL SCORE: 4
3. WORRYING TOO MUCH ABOUT DIFFERENT THINGS: SEVERAL DAYS
GAD7 TOTAL SCORE: 4
IF YOU CHECKED OFF ANY PROBLEMS ON THIS QUESTIONNAIRE, HOW DIFFICULT HAVE THESE PROBLEMS MADE IT FOR YOU TO DO YOUR WORK, TAKE CARE OF THINGS AT HOME, OR GET ALONG WITH OTHER PEOPLE: NOT DIFFICULT AT ALL
5. BEING SO RESTLESS THAT IT IS HARD TO SIT STILL: NOT AT ALL
6. BECOMING EASILY ANNOYED OR IRRITABLE: NOT AT ALL
7. FEELING AFRAID AS IF SOMETHING AWFUL MIGHT HAPPEN: SEVERAL DAYS
2. NOT BEING ABLE TO STOP OR CONTROL WORRYING: SEVERAL DAYS
1. FEELING NERVOUS, ANXIOUS, OR ON EDGE: NOT AT ALL

## 2022-11-30 ASSESSMENT — PATIENT HEALTH QUESTIONNAIRE - PHQ9
SUM OF ALL RESPONSES TO PHQ QUESTIONS 1-9: 3
5. POOR APPETITE OR OVEREATING: SEVERAL DAYS
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
## 2022-12-02 ENCOUNTER — TELEPHONE (OUTPATIENT)
Dept: FAMILY MEDICINE | Facility: CLINIC | Age: 62
End: 2022-12-02

## 2022-12-02 NOTE — TELEPHONE ENCOUNTER
"Called patient back to discuss medication. Reports diarrhea, nausea, headache after first dose of lexapro. Specifically would like to be prescribed Klonopin again for long term management of anxiety.    Discussed benzodiazepine use is not a good long term solution for anxiety. Offered alternative medications (alternative selective serotonin reuptake inhibitor). Declined stating \"I would rather take nothing\".        "

## 2023-01-23 DIAGNOSIS — I10 HYPERTENSION GOAL BP (BLOOD PRESSURE) < 140/90: ICD-10-CM

## 2023-01-23 RX ORDER — ATENOLOL 50 MG/1
50 TABLET ORAL DAILY
Qty: 90 TABLET | Refills: 0 | Status: SHIPPED | OUTPATIENT
Start: 2023-01-23 | End: 2023-04-24

## 2023-02-19 DIAGNOSIS — R79.89 LOW VITAMIN D LEVEL: ICD-10-CM

## 2023-02-20 RX ORDER — CHOLECALCIFEROL (VITAMIN D3) 50 MCG
TABLET ORAL
Qty: 90 TABLET | Refills: 0 | Status: SHIPPED | OUTPATIENT
Start: 2023-02-20 | End: 2023-10-26

## 2023-03-20 DIAGNOSIS — C50.212 MALIGNANT NEOPLASM OF UPPER-INNER QUADRANT OF LEFT BREAST IN FEMALE, ESTROGEN RECEPTOR POSITIVE (H): ICD-10-CM

## 2023-03-20 DIAGNOSIS — Z17.0 MALIGNANT NEOPLASM OF UPPER-INNER QUADRANT OF LEFT BREAST IN FEMALE, ESTROGEN RECEPTOR POSITIVE (H): ICD-10-CM

## 2023-03-20 RX ORDER — ANASTROZOLE 1 MG/1
1 TABLET ORAL DAILY
Qty: 90 TABLET | Refills: 0 | Status: SHIPPED | OUTPATIENT
Start: 2023-03-20 | End: 2023-06-21

## 2023-03-22 DIAGNOSIS — M85.851 OSTEOPENIA OF BOTH HIPS: ICD-10-CM

## 2023-03-22 DIAGNOSIS — M85.852 OSTEOPENIA OF BOTH HIPS: ICD-10-CM

## 2023-03-22 RX ORDER — ALENDRONATE SODIUM 70 MG/1
70 TABLET ORAL
Qty: 12 TABLET | Refills: 3 | Status: SHIPPED | OUTPATIENT
Start: 2023-03-22 | End: 2023-10-26

## 2023-04-01 ENCOUNTER — HEALTH MAINTENANCE LETTER (OUTPATIENT)
Age: 63
End: 2023-04-01

## 2023-04-27 ENCOUNTER — HOSPITAL ENCOUNTER (OUTPATIENT)
Dept: BONE DENSITY | Facility: CLINIC | Age: 63
Discharge: HOME | End: 2023-04-27
Attending: INTERNAL MEDICINE
Payer: MEDICARE

## 2023-04-27 DIAGNOSIS — M85.851 OSTEOPENIA OF BOTH HIPS: ICD-10-CM

## 2023-04-27 DIAGNOSIS — M85.852 OSTEOPENIA OF BOTH HIPS: ICD-10-CM

## 2023-04-27 PROCEDURE — 77081 DXA BONE DENSITY APPENDICULR: CPT | Mod: XU

## 2023-04-27 PROCEDURE — 77080 DXA BONE DENSITY AXIAL: CPT

## 2023-04-28 NOTE — PROGRESS NOTES
[facility]    Hematology/Oncology Established Patient Follow-up Note      Today's Date: 5/10/2023    Reason for Follow-up: Left breast cancer.    HISTORY OF PRESENT ILLNESS: Muriel Diaz is a 62 year old female with history of hypertension, degenerative joint disease, cervical spine surgery who presents with the following oncologic history:    - 4/16/2018: Left mastectomy under the care of Dr. Umana.  Pathology showed a grade 2 invasive mammary carcinoma with lobular and ductal features, measuring 4.3 x 4 x 2.1 cm, positive margin for invasive carcinoma in the central and lateral posterior surface, DCIS and LCIS present, ER positive at 95%, CO positive at 50%, HER2/nicola negative.  Left axillary dissection showed 3 of 5 lymph nodes positive for metastatic carcinoma (lobular type), ER positive, CO positive, HER2/nicola negative. Stage IIB, [id number].  -5/22/2018 - 10/2/2018: Completed adjuvant chemotherapy with dose dense dense doxorubicin and cyclophosphamide for 4 cycles followed by weekly paclitaxel for 12 cycles.  -12/17/2018: Completed adjuvant radiation therapy to the left chest wall.  - 12/28/2018: Started anastrozole.  - 3/22/2019: Underwent reconstructive surgery with left latissimus flap and left tissue expander placement with Dr. Crook.    INTERIM HISTORY:  Muriel reports feeling well no new pain. However she still has bothersome hot flashes.      REVIEW OF SYSTEMS:   14 point ROS was reviewed and is negative other than as noted above in HPI.       HOME MEDICATIONS:  Current Outpatient Medications   Medication Sig Dispense Refill     alendronate (FOSAMAX) 70 MG tablet Take 1 tablet (70 mg) by mouth every 7 days 12 tablet 3     anastrozole (ARIMIDEX) 1 MG tablet Take 1 tablet (1 mg) by mouth daily 90 tablet 0     atenolol (TENORMIN) 50 MG tablet Take 1 tablet (50 mg) by mouth daily 90 tablet 0     calcium carbonate (OS-HATTIE) 1500 (600 Ca) MG tablet TAKE ONE TABLET BY MOUTH TWICE DAILY  WITH MEALS 180 tablet 3     escitalopram (LEXAPRO) 10 MG tablet Take 1 tablet (10 mg) by mouth daily for 90 days 90 tablet 0     VITAMIN D3 50 MCG (2000 UT) tablet TAKE 1 TABLET BY MOUTH ONCE DAILY 90 tablet 0         ALLERGIES:  Allergies   Allergen Reactions     Amitriptyline Other (See Comments)     nightmares     Oxycontin [Oxycodone] Nausea     Severe headaches         PAST MEDICAL HISTORY:  Past Medical History:   Diagnosis Date     Cancer (H)     BREAST CANCER     Degeneration of cervical intervertebral disc      Degeneration of lumbar or lumbosacral intervertebral disc      Hypertension      PONV (postoperative nausea and vomiting)      Gynecologic history: G0.  Hysterectomy in the 1990s.  Ovaries are intact.  Currently postmenopausal.    PAST SURGICAL HISTORY:  Past Surgical History:   Procedure Laterality Date     BACK SURGERY  2001    lumbar fusion, cervical discectomy     DISSECT LYMPH NODE AXILLA Left 4/16/2018    Procedure: DISSECT LYMPH NODE AXILLA;;  Surgeon: Rodney Umana MD;  Location: [facility]     FUSION CERVICAL ANTERIOR THREE+ LEVELS  5/1/2012    Procedure:FUSION CERVICAL ANTERIOR THREE+ LEVELS; Surgeon:SARAH FRANCO; Location: OR     FUSION CERVICAL POSTERIOR THREE+ LEVELS  5/1/2012    Procedure:FUSION CERVICAL POSTERIOR THREE+ LEVELS; Posterior Cervical decompression and fusion C4-7, Anterior Cervical decompression and fusion C4-7 (c-arm), (herb table with abraham, yina oasis, yina aviator, Vitoss foam packing strip, impulse monitoring,); Surgeon:SARAH FRANCO; Location: OR     GYN SURGERY       HYSTERECTOMY VAGINAL  1990's    Judy Aceves OB Gyn specilist     HYSTERECTOMY, PAP NO LONGER INDICATED       INSERT PORT VASCULAR ACCESS N/A 5/14/2018    Procedure: INSERT PORT VASCULAR ACCESS;  PORT PLACEMENT;  Surgeon: Rodney Umana MD;  Location: [facility]     INSERT TISSUE EXPANDER BREAST Bilateral 3/22/2019    Procedure: INSERT TISSUE EXPANDER BREAST;   Surgeon: Naa Crook MD;  [facility]: [facility]     MASTECTOMY SIMPLE Left 4/16/2018    Procedure: MASTECTOMY SIMPLE;  LEFT SIMPLE MASTECTOMY,LEFT AXILLARY DISSECTION;  Surgeon: Rodney Umana MD;  [facility]     RECONSTRUCT BREAST LATISSIMUS DORSI PEDICLE Left 3/22/2019    Procedure: LEFT LATISSIMUS FLAP RECONSTRUCT WITH TISSUE EXPANDER AND PORT REMOVAL;  Surgeon: Naa Crook MD;  Location:  OR     REMOVE PORT VASCULAR ACCESS N/A 3/22/2019    Procedure: REMOVE PORT VASCULAR ACCESS;  Surgeon: Naa Crook MD;  Location:  OR         SOCIAL HISTORY:  Social History     Socioeconomic History     Marital status: Single     Spouse name: Not on file     Number of children: 0     Years of education: Not on file     Highest education level: Not on file   Occupational History     Employer: [employer]    Tobacco Use     Smoking status: Every Day     Packs/day: 0.50     Types: Cigarettes     Smokeless tobacco: Never     Tobacco comments:     4 cigarettes per day   Vaping Use     Vaping status: Not on file   Substance and Sexual Activity     Alcohol use: Yes     Alcohol/week: 1.0 standard drink of alcohol     Types: 1 Standard drinks or equivalent per week     Comment: 2-3 drinks per week     Drug use: No     Sexual activity: Not Currently     Partners: Male   Other Topics Concern     Parent/sibling w/ CABG, MI or angioplasty before 65F 55M? No   Social History Narrative    Lives with  (Pipo). Retired. On disability for cervical dystonia, stenosis and spasmodic torticollis. Enjoys gardening and cooking.      Social Determinants of Health     Financial Resource Strain: Not on file   Food Insecurity: Not on file   Transportation Needs: Not on file   Physical Activity: Not on file   Stress: Not on file   Social Connections: Not on file   Intimate Partner Violence: Not At Risk (8/1/2022)    Humiliation, Afraid, Rape, and Kick questionnaire      Fear of Current or Ex-Partner: No       "Emotionally Abused: No      Physically Abused: No      Sexually Abused: No   Housing Stability: Not on file   She has smoked half a pack of cigarettes for many years, duration uncertain.  She drinks alcohol occasionally.  Denies illicit drug use.  She is retired.  Previously worked in  at large companies.      FAMILY HISTORY:  Family History   Problem Relation Age of Onset     Pancreatitis Mother      Substance Abuse Mother      Unknown/Adopted Father    She does not know much about her family history and is unaware of any malignancy in her family.      PHYSICAL EXAM:  Vital signs:  BP (!) 161/78   Pulse 56   Resp 16   Ht 1.6 m (5' 3\")   Wt 44.5 kg (98 lb)   SpO2 99%   BMI 17.36 kg/m     GENERAL/CONSTITUTIONAL: No acute distress.  EYES:  No scleral icterus.  ENT/MOUTH: Neck supple.  LYMPH: No anterior cervical, posterior cervical, supraclavicular, axillary adenopathy.   BREAST: Left breast is surgically absent with flap reconstruction changes and implant in place.  No periprosthetic fluid. No evidence of capsular contracture. No left chest wall masses.  Right breast with no masses.  Right nipple everted with no discharge. Some slight darkening of skin at the left inner chest wall skin.  RESPIRATORY: No audible cough or wheezing.   GASTROINTESTINAL: No hepatosplenomegaly, masses, or tenderness. No guarding.  No distention.  MUSCULOSKELETAL: Warm and well-perfused, no cyanosis, clubbing, or edema.  NEUROLOGIC: Alert, oriented, answers questions appropriately.  INTEGUMENTARY: No rashes or jaundice.  GAIT: Steady, does not use assistive device      LABS:  CBC RESULTS:   Recent Labs   Lab Test 03/15/19  1332 12/28/18  1320   WBC 7.6 6.1   RBC 4.42 3.81   HGB 12.9 12.0   HCT 40.3 35.9   MCV 91.2 94   MCH 29.1 31.5   MCHC 31.9* 33.4   RDW  --  13.4    259     Last Comprehensive Metabolic Panel:  Sodium   Date Value Ref Range Status   08/02/2022 143 134 - 144 mmol/L Final     Potassium   Date " Value Ref Range Status   2022 4.4 3.5 - 5.2 mmol/L Final     Chloride   Date Value Ref Range Status   2022 102 96 - 106 mmol/L Final     Carbon Dioxide   Date Value Ref Range Status   2018 28 20 - 32 mmol/L Final     Anion Gap   Date Value Ref Range Status   2018 5 3 - 14 mmol/L Final     Glucose   Date Value Ref Range Status   2022 101 (H) 65 - 99 mg/dL Final     Urea Nitrogen   Date Value Ref Range Status   2022 18 8 - 27 mg/dL Final     BUN/Creatinine Ratio   Date Value Ref Range Status   2022 26 12 - 28 Final     Creatinine   Date Value Ref Range Status   2022 0.70 0.57 - 1.00 mg/dL Final     GFR Estimate   Date Value Ref Range Status   2019 77 >60 mL/min/[1.73_m2] Final     Comment:     Non  GFR Calc  Starting 2018, serum creatinine based estimated GFR (eGFR) will be   calculated using the Chronic Kidney Disease Epidemiology Collaboration   (CKD-EPI) equation.       Calcium   Date Value Ref Range Status   2022 11.0 (H) 8.7 - 10.3 mg/dL Final     Bilirubin Total   Date Value Ref Range Status   2018 0.2 0.2 - 1.3 mg/dL Final     Alkaline Phosphatase   Date Value Ref Range Status   2018 81 40 - 150 U/L Final     ALT   Date Value Ref Range Status   2018 19 0 - 50 U/L Final     AST   Date Value Ref Range Status   2018 16 0 - 45 U/L Final       PATHOLOGY:  None new.    IMAGIN2023: DEXA scan showed osteopenia with 0.6% decrease in bilateral hip BMD and 6.6% decrease in the left radius BMD.    2022: Right-sided mammogram showed benign findings.    ASSESSMENT/PLAN:  Muriel Diaz is a 62 year old female with the following issues:  1.  Stage IIB, [id number], grade 2 invasive mammary carcinoma with lobular and ductal features of the left upper inner breast, ER +95%, DC +50%, HER2 negative  - Muriel is status post left mastectomy 2018 and left axillary dissection with 3 of 5 lymph nodes positive  for metastatic lobular carcinoma.  She completed adjuvant chemotherapy with ddAC-T followed by radiation to the left chest wall completed 12/17/2018.  -She has no clinical evidence for recurrent breast cancer by physical exam from today.  -She has been on adjuvant anastrozole since 12/28/2018, currently tolerating this well with no significant adverse effects.  -Advised up to 10 years of hormone blockade therapy given her extent of lymph node involvement and higher risk for recurrence.  --Offered breast MRI to evaluate the mild pain on her left side but she declines that today.  -Continue with annual right-sided mammograms, next due 7/2023.  --She plans to see Dr. White to explore options to correct breast asymmetry.    2.  Osteopenia  - DEXA scan from 4/27/2023 showed osteopenia with 0.6% decrease in bilateral hip BMD and 6.6% decrease in the left radius BMD despite taking alendronate.  - Advised adequate calcium and vitamin D as well as weightbearing exercise. She already eats a lot of dairy products.  - I advised switching to Prolia. Discussed small risk of osteonecrosis of the jaw.  However, she does not have dental insurance. Will instead refer to endocrinology for further assistance.  - Plan to repeat DEXA scan in 2025.    3.  Tobacco abuse  --She has cut down to about a pack every 3 days.  - Urged smoking cessation and offered assistance with cessation. She declines at this time.    4. Hot flashes  --Offered venlafaxine to help with hot flashes and the anxiety she has with it.  Discussed potential adverse effects and she agrees to try it.    Return in 6 months.    Lisa Maier MD  Hematology/Oncology  [facility]    Total time spent: 30 minutes in patient evaluation, counseling, documentation, and coordination of care.

## 2023-05-10 ENCOUNTER — ONCOLOGY VISIT (OUTPATIENT)
Dept: ONCOLOGY | Facility: CLINIC | Age: 63
End: 2023-05-10
Attending: INTERNAL MEDICINE
Payer: MEDICARE

## 2023-05-10 VITALS
RESPIRATION RATE: 16 BRPM | HEIGHT: 63 IN | BODY MASS INDEX: 17.36 KG/M2 | OXYGEN SATURATION: 99 % | DIASTOLIC BLOOD PRESSURE: 78 MMHG | WEIGHT: 98 LBS | SYSTOLIC BLOOD PRESSURE: 161 MMHG | HEART RATE: 56 BPM

## 2023-05-10 DIAGNOSIS — Z17.0 MALIGNANT NEOPLASM OF UPPER-INNER QUADRANT OF LEFT BREAST IN FEMALE, ESTROGEN RECEPTOR POSITIVE (H): Primary | ICD-10-CM

## 2023-05-10 DIAGNOSIS — F17.200 TOBACCO USE DISORDER: ICD-10-CM

## 2023-05-10 DIAGNOSIS — M85.852 OSTEOPENIA OF BOTH HIPS: ICD-10-CM

## 2023-05-10 DIAGNOSIS — M85.851 OSTEOPENIA OF BOTH HIPS: ICD-10-CM

## 2023-05-10 DIAGNOSIS — N95.1 MENOPAUSAL SYNDROME (HOT FLASHES): ICD-10-CM

## 2023-05-10 DIAGNOSIS — Z12.31 ENCOUNTER FOR SCREENING MAMMOGRAM FOR BREAST CANCER: ICD-10-CM

## 2023-05-10 DIAGNOSIS — C50.212 MALIGNANT NEOPLASM OF UPPER-INNER QUADRANT OF LEFT BREAST IN FEMALE, ESTROGEN RECEPTOR POSITIVE (H): Primary | ICD-10-CM

## 2023-05-10 PROCEDURE — G0463 HOSPITAL OUTPT CLINIC VISIT: HCPCS | Performed by: INTERNAL MEDICINE

## 2023-05-10 PROCEDURE — 99214 OFFICE O/P EST MOD 30 MIN: CPT | Performed by: INTERNAL MEDICINE

## 2023-05-10 RX ORDER — VENLAFAXINE HYDROCHLORIDE 37.5 MG/1
37.5 CAPSULE, EXTENDED RELEASE ORAL DAILY
Qty: 90 CAPSULE | Refills: 3 | Status: SHIPPED | OUTPATIENT
Start: 2023-05-10 | End: 2023-05-17 | Stop reason: SINTOL

## 2023-05-10 ASSESSMENT — PAIN SCALES - GENERAL: PAINLEVEL: NO PAIN (0)

## 2023-05-10 NOTE — LETTER
5/10/2023         RE: Muriel Diaz  [address]  [facility] [address]        Dear Colleague,    Thank you for referring your patient, Muriel Diaz, to the [facility]. Please see a copy of my visit note below.    [facility]    Hematology/Oncology Established Patient Follow-up Note      Today's Date: 5/10/2023    Reason for Follow-up: Left breast cancer.    HISTORY OF PRESENT ILLNESS: Muriel Diaz is a 62 year old female with history of hypertension, degenerative joint disease, cervical spine surgery who presents with the following oncologic history:    - 4/16/2018: Left mastectomy under the care of Dr. Umana.  Pathology showed a grade 2 invasive mammary carcinoma with lobular and ductal features, measuring 4.3 x 4 x 2.1 cm, positive margin for invasive carcinoma in the central and lateral posterior surface, DCIS and LCIS present, ER positive at 95%, TX positive at 50%, HER2/nicola negative.  Left axillary dissection showed 3 of 5 lymph nodes positive for metastatic carcinoma (lobular type), ER positive, TX positive, HER2/nicola negative. Stage IIB, [id number].  -5/22/2018 - 10/2/2018: Completed adjuvant chemotherapy with dose dense dense doxorubicin and cyclophosphamide for 4 cycles followed by weekly paclitaxel for 12 cycles.  -12/17/2018: Completed adjuvant radiation therapy to the left chest wall.  - 12/28/2018: Started anastrozole.  - 3/22/2019: Underwent reconstructive surgery with left latissimus flap and left tissue expander placement with Dr. Crook.    INTERIM HISTORY:  Muriel reports feeling well no new pain. However she still has bothersome hot flashes.      REVIEW OF SYSTEMS:   14 point ROS was reviewed and is negative other than as noted above in HPI.       HOME MEDICATIONS:  Current Outpatient Medications   Medication Sig Dispense Refill     alendronate (FOSAMAX) 70 MG tablet Take 1 tablet (70 mg) by mouth every 7 days 12 tablet 3      anastrozole (ARIMIDEX) 1 MG tablet Take 1 tablet (1 mg) by mouth daily 90 tablet 0     atenolol (TENORMIN) 50 MG tablet Take 1 tablet (50 mg) by mouth daily 90 tablet 0     calcium carbonate (OS-HATTIE) 1500 (600 Ca) MG tablet TAKE ONE TABLET BY MOUTH TWICE DAILY WITH MEALS 180 tablet 3     escitalopram (LEXAPRO) 10 MG tablet Take 1 tablet (10 mg) by mouth daily for 90 days 90 tablet 0     VITAMIN D3 50 MCG (2000 UT) tablet TAKE 1 TABLET BY MOUTH ONCE DAILY 90 tablet 0         ALLERGIES:  Allergies   Allergen Reactions     Amitriptyline Other (See Comments)     nightmares     Oxycontin [Oxycodone] Nausea     Severe headaches         PAST MEDICAL HISTORY:  Past Medical History:   Diagnosis Date     Cancer (H)     BREAST CANCER     Degeneration of cervical intervertebral disc      Degeneration of lumbar or lumbosacral intervertebral disc      Hypertension      PONV (postoperative nausea and vomiting)      Gynecologic history: G0.  Hysterectomy in the 1990s.  Ovaries are intact.  Currently postmenopausal.    PAST SURGICAL HISTORY:  Past Surgical History:   Procedure Laterality Date     BACK SURGERY  2001    lumbar fusion, cervical discectomy     DISSECT LYMPH NODE AXILLA Left 4/16/2018    Procedure: DISSECT LYMPH NODE AXILLA;;  Surgeon: Rodney Umana MD;  [facility]     FUSION CERVICAL ANTERIOR THREE+ LEVELS  5/1/2012    Procedure:FUSION CERVICAL ANTERIOR THREE+ LEVELS; Surgeon:SARAH FRANCO; Location:[facility]     FUSION CERVICAL POSTERIOR THREE+ LEVELS  5/1/2012    Procedure:FUSION CERVICAL POSTERIOR THREE+ LEVELS; Posterior Cervical decompression and fusion C4-7, Anterior Cervical decompression and fusion C4-7 (c-arm), (herb table with abraham, yina oasis, yina aviator, Vitoss foam packing strip, impulse monitoring,); Surgeon:SARAH FRANCO; Location:[facility]     GYN SURGERY       HYSTERECTOMY VAGINAL  1990's    Judy Aceves OB Gyn specilist     HYSTERECTOMY, PAP NO LONGER  INDICATED       INSERT PORT VASCULAR ACCESS N/A 5/14/2018    Procedure: INSERT PORT VASCULAR ACCESS;  PORT PLACEMENT;  Surgeon: Rodney Umana MD;  [facility]     INSERT TISSUE EXPANDER BREAST Bilateral 3/22/2019    Procedure: INSERT TISSUE EXPANDER BREAST;  Surgeon: Naa Crook MD;  Location:  OR     MASTECTOMY SIMPLE Left 4/16/2018    Procedure: MASTECTOMY SIMPLE;  LEFT SIMPLE MASTECTOMY,LEFT AXILLARY DISSECTION;  Surgeon: Rodney Umana MD;  [facility]     RECONSTRUCT BREAST LATISSIMUS DORSI PEDICLE Left 3/22/2019    Procedure: LEFT LATISSIMUS FLAP RECONSTRUCT WITH TISSUE EXPANDER AND PORT REMOVAL;  Surgeon: Naa Crook MD;  Location:  OR     REMOVE PORT VASCULAR ACCESS N/A 3/22/2019    Procedure: REMOVE PORT VASCULAR ACCESS;  Surgeon: Naa Crook MD;  Location:  OR         SOCIAL HISTORY:  Social History     Socioeconomic History     Marital status: Single     Spouse name: Not on file     Number of children: 0     Years of education: Not on file     Highest education level: Not on file   Occupational History     Employer: [employer]    Tobacco Use     Smoking status: Every Day     Packs/day: 0.50     Types: Cigarettes     Smokeless tobacco: Never     Tobacco comments:     4 cigarettes per day   Vaping Use     Vaping status: Not on file   Substance and Sexual Activity     Alcohol use: Yes     Alcohol/week: 1.0 standard drink of alcohol     Types: 1 Standard drinks or equivalent per week     Comment: 2-3 drinks per week     Drug use: No     Sexual activity: Not Currently     Partners: Male   Other Topics Concern     Parent/sibling w/ CABG, MI or angioplasty before 65F 55M? No   Social History Narrative    Lives with  (Pipo). Retired. On disability for cervical dystonia, stenosis and spasmodic torticollis. Enjoys gardening and cooking.      Social Determinants of Health     Financial Resource Strain: Not on file   Food Insecurity: Not on file  "  Transportation Needs: Not on file   Physical Activity: Not on file   Stress: Not on file   Social Connections: Not on file   Intimate Partner Violence: Not At Risk (8/1/2022)    Humiliation, Afraid, Rape, and Kick questionnaire      Fear of Current or Ex-Partner: No      Emotionally Abused: No      Physically Abused: No      Sexually Abused: No   Housing Stability: Not on file   She has smoked half a pack of cigarettes for many years, duration uncertain.  She drinks alcohol occasionally.  Denies illicit drug use.  She is retired.  Previously worked in  at large companies.      FAMILY HISTORY:  Family History   Problem Relation Age of Onset     Pancreatitis Mother      Substance Abuse Mother      Unknown/Adopted Father    She does not know much about her family history and is unaware of any malignancy in her family.      PHYSICAL EXAM:  Vital signs:  BP (!) 161/78   Pulse 56   Resp 16   Ht 1.6 m (5' 3\")   Wt 44.5 kg (98 lb)   SpO2 99%   BMI 17.36 kg/m     GENERAL/CONSTITUTIONAL: No acute distress.  EYES:  No scleral icterus.  ENT/MOUTH: Neck supple.  LYMPH: No anterior cervical, posterior cervical, supraclavicular, axillary adenopathy.   BREAST: Left breast is surgically absent with flap reconstruction changes and implant in place.  No periprosthetic fluid. No evidence of capsular contracture. No left chest wall masses.  Right breast with no masses.  Right nipple everted with no discharge. Some slight darkening of skin at the left inner chest wall skin.  RESPIRATORY: No audible cough or wheezing.   GASTROINTESTINAL: No hepatosplenomegaly, masses, or tenderness. No guarding.  No distention.  MUSCULOSKELETAL: Warm and well-perfused, no cyanosis, clubbing, or edema.  NEUROLOGIC: Alert, oriented, answers questions appropriately.  INTEGUMENTARY: No rashes or jaundice.  GAIT: Steady, does not use assistive device      LABS:  CBC RESULTS:   Recent Labs   Lab Test 03/15/19  1332 12/28/18  1320   WBC " 7.6 6.1   RBC 4.42 3.81   HGB 12.9 12.0   HCT 40.3 35.9   MCV 91.2 94   MCH 29.1 31.5   MCHC 31.9* 33.4   RDW  --  13.4    259     Last Comprehensive Metabolic Panel:  Sodium   Date Value Ref Range Status   2022 143 134 - 144 mmol/L Final     Potassium   Date Value Ref Range Status   2022 4.4 3.5 - 5.2 mmol/L Final     Chloride   Date Value Ref Range Status   2022 102 96 - 106 mmol/L Final     Carbon Dioxide   Date Value Ref Range Status   2018 28 20 - 32 mmol/L Final     Anion Gap   Date Value Ref Range Status   2018 5 3 - 14 mmol/L Final     Glucose   Date Value Ref Range Status   2022 101 (H) 65 - 99 mg/dL Final     Urea Nitrogen   Date Value Ref Range Status   2022 18 8 - 27 mg/dL Final     BUN/Creatinine Ratio   Date Value Ref Range Status   2022 26 12 - 28 Final     Creatinine   Date Value Ref Range Status   2022 0.70 0.57 - 1.00 mg/dL Final     GFR Estimate   Date Value Ref Range Status   2019 77 >60 mL/min/[1.73_m2] Final     Comment:     Non  GFR Calc  Starting 2018, serum creatinine based estimated GFR (eGFR) will be   calculated using the Chronic Kidney Disease Epidemiology Collaboration   (CKD-EPI) equation.       Calcium   Date Value Ref Range Status   2022 11.0 (H) 8.7 - 10.3 mg/dL Final     Bilirubin Total   Date Value Ref Range Status   2018 0.2 0.2 - 1.3 mg/dL Final     Alkaline Phosphatase   Date Value Ref Range Status   2018 81 40 - 150 U/L Final     ALT   Date Value Ref Range Status   2018 19 0 - 50 U/L Final     AST   Date Value Ref Range Status   2018 16 0 - 45 U/L Final       PATHOLOGY:  None new.    IMAGIN2023: DEXA scan showed osteopenia with 0.6% decrease in bilateral hip BMD and 6.6% decrease in the left radius BMD.    2022: Right-sided mammogram showed benign findings.    ASSESSMENT/PLAN:  Muriel Diaz is a 62 year old female with the following  issues:  1.  Stage IIB, [id number], grade 2 invasive mammary carcinoma with lobular and ductal features of the left upper inner breast, ER +95%, KS +50%, HER2 negative  - Muriel is status post left mastectomy 4/16/2018 and left axillary dissection with 3 of 5 lymph nodes positive for metastatic lobular carcinoma.  She completed adjuvant chemotherapy with ddAC-T followed by radiation to the left chest wall completed 12/17/2018.  -She has no clinical evidence for recurrent breast cancer by physical exam from today.  -She has been on adjuvant anastrozole since 12/28/2018, currently tolerating this well with no significant adverse effects.  -Advised up to 10 years of hormone blockade therapy given her extent of lymph node involvement and higher risk for recurrence.  --Offered breast MRI to evaluate the mild pain on her left side but she declines that today.  -Continue with annual right-sided mammograms, next due 7/2023.  --She plans to see Dr. White to explore options to correct breast asymmetry.    2.  Osteopenia  - DEXA scan from 4/27/2023 showed osteopenia with 0.6% decrease in bilateral hip BMD and 6.6% decrease in the left radius BMD despite taking alendronate.  - Advised adequate calcium and vitamin D as well as weightbearing exercise. She already eats a lot of dairy products.  - I advised switching to Prolia. Discussed small risk of osteonecrosis of the jaw.  However, she does not have dental insurance. Will instead refer to endocrinology for further assistance.  - Plan to repeat DEXA scan in 2025.    3.  Tobacco abuse  --She has cut down to about a pack every 3 days.  - Urged smoking cessation and offered assistance with cessation. She declines at this time.    4. Hot flashes  --Offered venlafaxine to help with hot flashes and the anxiety she has with it.  Discussed potential adverse effects and she agrees to try it.    Return in 6 months.    Lisa Maier MD  Hematology/Oncology  [facility]  "[facility]    Total time spent: 30 minutes in patient evaluation, counseling, documentation, and coordination of care.      Oncology Rooming Note    May 10, 2023 10:00 AM   Muriel Diaz is a 62 year old female who presents for:    Chief Complaint   Patient presents with     Oncology Clinic Visit     Initial Vitals: There were no vitals taken for this visit. Estimated body mass index is 17.79 kg/m  as calculated from the following:    Height as of 8/2/22: 1.6 m (5' 3\").    Weight as of 11/30/22: 45.5 kg (100 lb 6.4 oz). There is no height or weight on file to calculate BSA.  Data Unavailable Comment: Data Unavailable   No LMP recorded. Patient has had a hysterectomy.  Allergies reviewed: Yes  Medications reviewed: Yes    Medications: Medication refills not needed today.  Pharmacy name entered into [employer]:    [facility] [place], [place] - [address] [facility] [id number] [place], [place] - [address]    Clinical concerns:  doctor was notified.      Dodie Galeas MA                Again, thank you for allowing me to participate in the care of your patient.        Sincerely,        Lisa Maier MD    "

## 2023-05-10 NOTE — PATIENT INSTRUCTIONS
Referral to endocrinology consult.  Arrange for right mammogram for 7/2023.  RTC MD in 6 months.  Start venlafxine 37.5 mg once daily.

## 2023-05-10 NOTE — PROGRESS NOTES
"Oncology Rooming Note    May 10, 2023 10:00 AM   Muriel Diaz is a 62 year old female who presents for:    Chief Complaint   Patient presents with     Oncology Clinic Visit     Initial Vitals: There were no vitals taken for this visit. Estimated body mass index is 17.79 kg/m  as calculated from the following:    Height as of 8/2/22: 1.6 m (5' 3\").    Weight as of 11/30/22: 45.5 kg (100 lb 6.4 oz). There is no height or weight on file to calculate BSA.  Data Unavailable Comment: Data Unavailable   No LMP recorded. Patient has had a hysterectomy.  Allergies reviewed: Yes  Medications reviewed: Yes    Medications: Medication refills not needed today.  Pharmacy name entered into [facility]:    [facility] [address]  [facility] [place], [place] - [address] [id number] [place], [place] - [address]    Clinical concerns:  doctor was notified.      Dodie Galeas MA            "

## 2023-05-16 ENCOUNTER — TELEPHONE (OUTPATIENT)
Dept: ONCOLOGY | Facility: CLINIC | Age: 63
End: 2023-05-16
Payer: MEDICARE

## 2023-05-17 NOTE — TELEPHONE ENCOUNTER
Spoke to patient about follow up appointments. She mentions Effxor prescribed at last visit made patient sick, seeing stars, had diarrhea.   
Spoke with Muriel rausch: side effects from Effexor. She took one dose and discontinued med. Her sx have resolved. Removed from her med list.    Aurea Irene RN    
23:00

## 2023-06-21 DIAGNOSIS — C50.212 MALIGNANT NEOPLASM OF UPPER-INNER QUADRANT OF LEFT BREAST IN FEMALE, ESTROGEN RECEPTOR POSITIVE (H): ICD-10-CM

## 2023-06-21 DIAGNOSIS — Z17.0 MALIGNANT NEOPLASM OF UPPER-INNER QUADRANT OF LEFT BREAST IN FEMALE, ESTROGEN RECEPTOR POSITIVE (H): ICD-10-CM

## 2023-06-21 RX ORDER — ANASTROZOLE 1 MG/1
1 TABLET ORAL DAILY
Qty: 90 TABLET | Refills: 0 | Status: SHIPPED | OUTPATIENT
Start: 2023-06-21 | End: 2023-09-21

## 2023-07-24 DIAGNOSIS — I10 HYPERTENSION GOAL BP (BLOOD PRESSURE) < 140/90: ICD-10-CM

## 2023-07-24 RX ORDER — ATENOLOL 50 MG/1
50 TABLET ORAL DAILY
Qty: 90 TABLET | Refills: 0 | Status: SHIPPED | OUTPATIENT
Start: 2023-07-24 | End: 2023-10-25

## 2023-07-24 NOTE — TELEPHONE ENCOUNTER
Atenolol  LOV 11/30/22  Last addressed 8/2/22  Essential hypertension  -     Basic Metabolic Panel (8) ([facility])     Inadequately controlled in the office, suspect component of white coat HTN. She declines change in regimen- feels this is well controlled with atenolol. To bring her home cuff with her in 2-4 weeks for recheck and to compare with in office reading.  BP Readings from Last 3 Encounters:   05/10/23 (!) 161/78   11/30/22 (!) 142/84   08/02/22 (!) 148/94

## 2023-07-26 DIAGNOSIS — M85.852 OSTEOPENIA OF BOTH HIPS: ICD-10-CM

## 2023-07-26 DIAGNOSIS — M85.851 OSTEOPENIA OF BOTH HIPS: ICD-10-CM

## 2023-08-01 ENCOUNTER — HOSPITAL ENCOUNTER (OUTPATIENT)
Dept: MAMMOGRAPHY | Facility: CLINIC | Age: 63
Discharge: HOME | End: 2023-08-01
Attending: INTERNAL MEDICINE | Admitting: INTERNAL MEDICINE
Payer: MEDICARE

## 2023-08-01 DIAGNOSIS — C50.212 MALIGNANT NEOPLASM OF UPPER-INNER QUADRANT OF LEFT BREAST IN FEMALE, ESTROGEN RECEPTOR POSITIVE (H): ICD-10-CM

## 2023-08-01 DIAGNOSIS — Z17.0 MALIGNANT NEOPLASM OF UPPER-INNER QUADRANT OF LEFT BREAST IN FEMALE, ESTROGEN RECEPTOR POSITIVE (H): ICD-10-CM

## 2023-08-01 DIAGNOSIS — Z12.31 ENCOUNTER FOR SCREENING MAMMOGRAM FOR BREAST CANCER: ICD-10-CM

## 2023-08-01 PROCEDURE — 77067 SCR MAMMO BI INCL CAD: CPT | Mod: 52

## 2023-09-20 DIAGNOSIS — Z17.0 MALIGNANT NEOPLASM OF UPPER-INNER QUADRANT OF LEFT BREAST IN FEMALE, ESTROGEN RECEPTOR POSITIVE (H): ICD-10-CM

## 2023-09-20 DIAGNOSIS — C50.212 MALIGNANT NEOPLASM OF UPPER-INNER QUADRANT OF LEFT BREAST IN FEMALE, ESTROGEN RECEPTOR POSITIVE (H): ICD-10-CM

## 2023-09-21 RX ORDER — ANASTROZOLE 1 MG/1
1 TABLET ORAL DAILY
Qty: 90 TABLET | Refills: 3 | Status: SHIPPED | OUTPATIENT
Start: 2023-09-21 | End: 2024-09-17

## 2023-10-19 NOTE — PROGRESS NOTES
[facility]    Hematology/Oncology Established Patient Follow-up Note      Today's Date: 10/26/2023    Reason for Follow-up: Left breast cancer.    HISTORY OF PRESENT ILLNESS: Muriel Diaz is a 62 year old female with history of hypertension, degenerative joint disease, cervical spine surgery who presents with the following oncologic history:    - 4/16/2018: Left mastectomy under the care of Dr. Umana.  Pathology showed a grade 2 invasive mammary carcinoma with lobular and ductal features, measuring 4.3 x 4 x 2.1 cm, positive margin for invasive carcinoma in the central and lateral posterior surface, DCIS and LCIS present, ER positive at 95%, TX positive at 50%, HER2/nicola negative.  Left axillary dissection showed 3 of 5 lymph nodes positive for metastatic carcinoma (lobular type), ER positive, TX positive, HER2/nicola negative. Stage IIB, [id number].  -5/22/2018 - 10/2/2018: Completed adjuvant chemotherapy with dose dense dense doxorubicin and cyclophosphamide for 4 cycles followed by weekly paclitaxel for 12 cycles.  -12/17/2018: Completed adjuvant radiation therapy to the left chest wall.  - 12/28/2018: Started anastrozole.  - 3/22/2019: Underwent reconstructive surgery with left latissimus flap and left tissue expander placement with Dr. Crook.    INTERIM HISTORY:  Muriel reports she did not see endocrinology as she does not like to go to more doctor appointments. She has intermittent hot flashes, less with use of clonazepam.      REVIEW OF SYSTEMS:   14 point ROS was reviewed and is negative other than as noted above in HPI.       HOME MEDICATIONS:  Current Outpatient Medications   Medication Sig Dispense Refill    alendronate (FOSAMAX) 70 MG tablet Take 1 tablet (70 mg) by mouth every 7 days 12 tablet 3    anastrozole (ARIMIDEX) 1 MG tablet TAKE 1 TABLET BY MOUTH ONCE DAILY 90 tablet 3    atenolol (TENORMIN) 50 MG tablet Take 1 tablet (50 mg) by mouth daily 90 tablet 0    calcium  carbonate (OS-HATTIE) 1500 (600 Ca) MG tablet Take 1 tablet (600 mg) by mouth 2 times daily (with meals) 180 tablet 3    clonazePAM (KLONOPIN) 0.5 MG tablet Take 1 tablet by mouth twice a day*      tiZANidine (ZANAFLEX) 2 MG tablet TAKE 1 TO 2 TABLETS BY MOUTH 3 TIMES A DAY AS NEEDED FOR SPASTICITY AND 1-3 AT BED AS NEEDED.*      Vitamin D3 50 mcg (2000 units) tablet Take 1 tablet (50 mcg) by mouth daily 90 tablet 3         ALLERGIES:  Allergies   Allergen Reactions    Amitriptyline Other (See Comments)     nightmares    Oxycontin [Oxycodone] Nausea     Severe headaches         PAST MEDICAL HISTORY:  Past Medical History:   Diagnosis Date    Cancer (H)     BREAST CANCER    Degeneration of cervical intervertebral disc     Degeneration of lumbar or lumbosacral intervertebral disc     Hypertension     PONV (postoperative nausea and vomiting)      Gynecologic history: G0.  Hysterectomy in the 1990s.  Ovaries are intact.  Currently postmenopausal.    PAST SURGICAL HISTORY:  Past Surgical History:   Procedure Laterality Date    BACK SURGERY  2001    lumbar fusion, cervical discectomy    DISSECT LYMPH NODE AXILLA Left 4/16/2018    Procedure: DISSECT LYMPH NODE AXILLA;;  Surgeon: Rodney Umana MD;  [facility]:  [facility]    FUSION CERVICAL ANTERIOR THREE+ LEVELS  5/1/2012    Procedure:FUSION CERVICAL ANTERIOR THREE+ LEVELS; Surgeon:SARAH FRANCO; Location:[facility] OR    FUSION CERVICAL POSTERIOR THREE+ LEVELS  5/1/2012    Procedure:FUSION CERVICAL POSTERIOR THREE+ LEVELS; Posterior Cervical decompression and fusion C4-7, Anterior Cervical decompression and fusion C4-7 (c-arm), (herb table with abraham, yina oasis, yina aviator, Vitoss foam packing strip, impulse monitoring,); Surgeon:SARAH FRANCO; Location: OR    GYN SURGERY      HYSTERECTOMY VAGINAL  1990's    Judy Aceves OB Gyn specilist    HYSTERECTOMY, PAP NO LONGER INDICATED      INSERT PORT VASCULAR ACCESS N/A 5/14/2018    Procedure:  INSERT PORT VASCULAR ACCESS;  PORT PLACEMENT;  Surgeon: Rodney Umana MD;  [facility]    INSERT TISSUE EXPANDER BREAST Bilateral 3/22/2019    Procedure: INSERT TISSUE EXPANDER BREAST;  Surgeon: Naa Crook MD;  Location:  OR    MASTECTOMY SIMPLE Left 4/16/2018    Procedure: MASTECTOMY SIMPLE;  LEFT SIMPLE MASTECTOMY,LEFT AXILLARY DISSECTION;  Surgeon: Rodney Umana MD;  [facility]:  [facility]    RECONSTRUCT BREAST LATISSIMUS DORSI PEDICLE Left 3/22/2019    Procedure: LEFT LATISSIMUS FLAP RECONSTRUCT WITH TISSUE EXPANDER AND PORT REMOVAL;  Surgeon: Naa Crook MD;  Location:  OR    REMOVE PORT VASCULAR ACCESS N/A 3/22/2019    Procedure: REMOVE PORT VASCULAR ACCESS;  Surgeon: Naa Crook MD;  Location:  OR         SOCIAL HISTORY:  Social History     Socioeconomic History    Marital status: Single     Spouse name: Not on file    Number of children: 0    Years of education: Not on file    Highest education level: Not on file   Occupational History     Employer: [employer]    Tobacco Use    Smoking status: Every Day     Packs/day: .5     Types: Cigarettes    Smokeless tobacco: Never    Tobacco comments:     4 cigarettes per day   Substance and Sexual Activity    Alcohol use: Yes     Alcohol/week: 1.0 standard drink of alcohol     Types: 1 Standard drinks or equivalent per week     Comment: 2-3 drinks per week    Drug use: No    Sexual activity: Not Currently     Partners: Male   Other Topics Concern    Parent/sibling w/ CABG, MI or angioplasty before 65F 55M? No   Social History Narrative    Lives with BF (Pipo). Retired. On disability for cervical dystonia, stenosis and spasmodic torticollis. Enjoys gardening and cooking.      Social Determinants of Health     Financial Resource Strain: Not on file   Food Insecurity: Not on file   Transportation Needs: Not on file   Physical Activity: Not on file   Stress: Not on file   Social Connections: Not on file   Interpersonal  Safety: Not At Risk (8/1/2022)    Humiliation, Afraid, Rape, and Kick questionnaire     Fear of Current or Ex-Partner: No     Emotionally Abused: No     Physically Abused: No     Sexually Abused: No   Housing Stability: Not on file   She has smoked half a pack of cigarettes for many years, duration uncertain.  She drinks alcohol occasionally.  Denies illicit drug use.  She is retired.  Previously worked in  at large [employer].      FAMILY HISTORY:  Family History   Problem Relation Age of Onset    Pancreatitis Mother     Substance Abuse Mother     Unknown/Adopted Father    She does not know much about her family history and is unaware of any malignancy in her family.      PHYSICAL EXAM:  Vital signs:  BP (!) 155/97   Pulse 71   Temp 98.2  F (36.8  C) (Oral)   Resp 16   Wt 42.9 kg (94 lb 9.6 oz)   SpO2 98%   BMI 16.76 kg/m     GENERAL/CONSTITUTIONAL: No acute distress.  EYES:  No scleral icterus.  ENT/MOUTH: Neck supple.  LYMPH: No anterior cervical, posterior cervical, supraclavicular, axillary adenopathy.   BREAST: Left breast is surgically absent with flap reconstruction changes and implant in place.  No periprosthetic fluid. No evidence of capsular contracture. No left chest wall masses.  Right breast with no masses.  Right nipple everted with no discharge. Some slight darkening of skin at the left inner chest wall skin.  RESPIRATORY: No audible cough or wheezing.   GASTROINTESTINAL: No hepatosplenomegaly, masses, or tenderness. No guarding.  No distention.  MUSCULOSKELETAL: Warm and well-perfused, no cyanosis, clubbing, or edema.  NEUROLOGIC: Alert, oriented, answers questions appropriately.  INTEGUMENTARY: No rashes or jaundice.  GAIT: Steady, does not use assistive device      LABS:  CBC RESULTS:   Recent Labs   Lab Test 03/15/19  1332 12/28/18  1320   WBC 7.6 6.1   RBC 4.42 3.81   HGB 12.9 12.0   HCT 40.3 35.9   MCV 91.2 94   MCH 29.1 31.5   MCHC 31.9* 33.4   RDW  --  13.4    259      Last Comprehensive Metabolic Panel:  Sodium   Date Value Ref Range Status   2022 143 134 - 144 mmol/L Final     Potassium   Date Value Ref Range Status   2022 4.4 3.5 - 5.2 mmol/L Final     Chloride   Date Value Ref Range Status   2022 102 96 - 106 mmol/L Final     Carbon Dioxide   Date Value Ref Range Status   2018 28 20 - 32 mmol/L Final     Anion Gap   Date Value Ref Range Status   2018 5 3 - 14 mmol/L Final     Glucose   Date Value Ref Range Status   2022 101 (H) 65 - 99 mg/dL Final     Urea Nitrogen   Date Value Ref Range Status   2022 18 8 - 27 mg/dL Final     BUN/Creatinine Ratio   Date Value Ref Range Status   2022 26 12 - 28 Final     Creatinine   Date Value Ref Range Status   2022 0.70 0.57 - 1.00 mg/dL Final     GFR Estimate   Date Value Ref Range Status   2019 77 >60 mL/min/[1.73_m2] Final     Comment:     Non  GFR Calc  Starting 2018, serum creatinine based estimated GFR (eGFR) will be   calculated using the Chronic Kidney Disease Epidemiology Collaboration   (CKD-EPI) equation.       Calcium   Date Value Ref Range Status   2022 11.0 (H) 8.7 - 10.3 mg/dL Final     Bilirubin Total   Date Value Ref Range Status   2018 0.2 0.2 - 1.3 mg/dL Final     Alkaline Phosphatase   Date Value Ref Range Status   2018 81 40 - 150 U/L Final     ALT   Date Value Ref Range Status   2018 19 0 - 50 U/L Final     AST   Date Value Ref Range Status   2018 16 0 - 45 U/L Final       PATHOLOGY:  None new.    IMAGIN2023: DEXA scan showed osteopenia with 0.6% decrease in bilateral hip BMD and 6.6% decrease in the left radius BMD.    2022: Right-sided mammogram showed benign findings.    ASSESSMENT/PLAN:  Muriel Diaz is a 62 year old female with the following issues:  1.  Stage IIB, [id number], grade 2 invasive mammary carcinoma with lobular and ductal features of the left upper inner breast, ER  +95%, FL +50%, HER2 negative  - Muriel is status post left mastectomy 4/16/2018 and left axillary dissection with 3 of 5 lymph nodes positive for metastatic lobular carcinoma.  She completed adjuvant chemotherapy with ddAC-T followed by radiation to the left chest wall completed 12/17/2018.  -She has no clinical evidence for recurrent breast cancer by physical exam from today or mammogram reviewed from 8/1/2023.  -She has been on adjuvant anastrozole since 12/28/2018, currently tolerating this well with no significant adverse effects.  -Advised up to 10 years of hormone blockade therapy given her extent of lymph node involvement and higher risk for recurrence. Reiterated today.  -Continue with annual right-sided mammograms, next due 8/2024.    2.  Osteopenia  - DEXA scan from 4/27/2023 showed osteopenia with 0.6% decrease in bilateral hip BMD and 6.6% decrease in the left radius BMD despite taking alendronate.  --I advised discontinuation of alendronate as she is having diarrhea from it and it is not improving her bone density.  - Advised adequate calcium and vitamin D as well as weightbearing exercise. She already eats a lot of dairy products.  - I advised switching to Prolia. Discussed small risk of osteonecrosis of the jaw.  However, she does not have dental insurance. I again recommended referral to endocrinology for further assistance but she has declined this.  - Plan to repeat DEXA scan in 2025.    3.  Tobacco abuse  --She has cut down to about a pack every 3 days.  - Urged smoking cessation and offered assistance with cessation. She declines again at this time.    4. Hot flashes  --Venlafaxine did not help.  --Improved with clonazepam, which she will continue.    Return in 6 months.    Lisa Maier MD  Hematology/Oncology  [facility]    Total time spent today: 30 minutes in chart review, patient evaluation, counseling, documentation, test and/or medication/prescription orders, and  coordination of care.

## 2023-10-25 DIAGNOSIS — I10 HYPERTENSION GOAL BP (BLOOD PRESSURE) < 140/90: ICD-10-CM

## 2023-10-25 NOTE — TELEPHONE ENCOUNTER
Med: atenolol      LOV (related): 8/2/22    Last Lab: 8/2/22      Due for F/U around: 2-4 weeks (11/30/22 not for BP)    Next Appt: 11/1/23 With Nyla          BP Readings from Last 3 Encounters:   05/10/23 (!) 161/78   11/30/22 (!) 142/84   08/02/22 (!) 148/94       Last Comprehensive Metabolic Panel:  Lab Results   Component Value Date     08/02/2022    POTASSIUM 4.4 08/02/2022    CHLORIDE 102 08/02/2022    CO2 28 12/28/2018    ANIONGAP 5 12/28/2018     (H) 08/02/2022    BUN 18 08/02/2022    BUN 26 08/02/2022    CR 0.70 08/02/2022    GFRESTIMATED 77 03/22/2019    HATTIE 11.0 (H) 08/02/2022

## 2023-10-25 NOTE — TELEPHONE ENCOUNTER
Med: atenolol      LOV (related): 8/2/22    Last Lab: 8/2/22      Due for F/U around: 4 weeks     Next Appt: 11/1/23 with Anjana           BP Readings from Last 3 Encounters:   05/10/23 (!) 161/78   11/30/22 (!) 142/84   08/02/22 (!) 148/94       Last Comprehensive Metabolic Panel:  Lab Results   Component Value Date     08/02/2022    POTASSIUM 4.4 08/02/2022    CHLORIDE 102 08/02/2022    CO2 28 12/28/2018    ANIONGAP 5 12/28/2018     (H) 08/02/2022    BUN 18 08/02/2022    BUN 26 08/02/2022    CR 0.70 08/02/2022    GFRESTIMATED 77 03/22/2019    HATTIE 11.0 (H) 08/02/2022

## 2023-10-26 ENCOUNTER — ONCOLOGY VISIT (OUTPATIENT)
Dept: ONCOLOGY | Facility: CLINIC | Age: 63
End: 2023-10-26
Attending: INTERNAL MEDICINE
Payer: MEDICARE

## 2023-10-26 VITALS
HEART RATE: 71 BPM | DIASTOLIC BLOOD PRESSURE: 97 MMHG | WEIGHT: 94.6 LBS | TEMPERATURE: 98.2 F | SYSTOLIC BLOOD PRESSURE: 155 MMHG | BODY MASS INDEX: 16.76 KG/M2 | OXYGEN SATURATION: 98 % | RESPIRATION RATE: 16 BRPM

## 2023-10-26 DIAGNOSIS — C50.212 MALIGNANT NEOPLASM OF UPPER-INNER QUADRANT OF LEFT BREAST IN FEMALE, ESTROGEN RECEPTOR POSITIVE (H): Primary | ICD-10-CM

## 2023-10-26 DIAGNOSIS — Z17.0 MALIGNANT NEOPLASM OF UPPER-INNER QUADRANT OF LEFT BREAST IN FEMALE, ESTROGEN RECEPTOR POSITIVE (H): Primary | ICD-10-CM

## 2023-10-26 DIAGNOSIS — N95.1 MENOPAUSAL SYNDROME (HOT FLASHES): ICD-10-CM

## 2023-10-26 DIAGNOSIS — R79.89 LOW VITAMIN D LEVEL: ICD-10-CM

## 2023-10-26 DIAGNOSIS — M85.852 OSTEOPENIA OF BOTH HIPS: ICD-10-CM

## 2023-10-26 DIAGNOSIS — M85.851 OSTEOPENIA OF BOTH HIPS: ICD-10-CM

## 2023-10-26 PROCEDURE — G0463 HOSPITAL OUTPT CLINIC VISIT: HCPCS | Performed by: INTERNAL MEDICINE

## 2023-10-26 PROCEDURE — 99214 OFFICE O/P EST MOD 30 MIN: CPT | Performed by: INTERNAL MEDICINE

## 2023-10-26 RX ORDER — ATENOLOL 50 MG/1
50 TABLET ORAL DAILY
Qty: 90 TABLET | Refills: 0 | OUTPATIENT
Start: 2023-10-26

## 2023-10-26 RX ORDER — CLONAZEPAM 0.5 MG/1
TABLET ORAL
COMMUNITY

## 2023-10-26 RX ORDER — TIZANIDINE 2 MG/1
TABLET ORAL
COMMUNITY

## 2023-10-26 RX ORDER — CHOLECALCIFEROL (VITAMIN D3) 50 MCG
1 TABLET ORAL DAILY
Qty: 90 TABLET | Refills: 3 | Status: SHIPPED | OUTPATIENT
Start: 2023-10-26

## 2023-10-26 RX ORDER — ATENOLOL 50 MG/1
50 TABLET ORAL DAILY
Qty: 90 TABLET | Refills: 0 | Status: SHIPPED | OUTPATIENT
Start: 2023-10-26 | End: 2023-11-01

## 2023-10-26 ASSESSMENT — PAIN SCALES - GENERAL: PAINLEVEL: NO PAIN (0)

## 2023-10-26 NOTE — NURSING NOTE
"Oncology Rooming Note    October 26, 2023 10:53 AM   Muriel Diaz is a 62 year old female who presents for:    Chief Complaint   Patient presents with    Oncology Clinic Visit     Initial Vitals: BP (!) 155/97   Pulse 71   Temp 98.2  F (36.8  C) (Oral)   Resp 16   Wt 42.9 kg (94 lb 9.6 oz)   SpO2 98%   BMI 16.76 kg/m   Estimated body mass index is 16.76 kg/m  as calculated from the following:    Height as of 5/10/23: 1.6 m (5' 3\").    Weight as of this encounter: 42.9 kg (94 lb 9.6 oz). Body surface area is 1.38 meters squared.  No Pain (0) Comment: Data Unavailable   No LMP recorded. Patient has had a hysterectomy.  Allergies reviewed: Yes  Medications reviewed: Yes    Medications: Medication refills not needed today.  Pharmacy name entered into [facility]:    [facility] [address]  [facility] [place], [place] - [address] [id number] [place], [place] - [address]    Clinical concerns: follow up        Mana Liang            "

## 2023-11-01 ENCOUNTER — OFFICE VISIT (OUTPATIENT)
Dept: FAMILY MEDICINE | Facility: CLINIC | Age: 63
End: 2023-11-01

## 2023-11-01 VITALS
DIASTOLIC BLOOD PRESSURE: 93 MMHG | BODY MASS INDEX: 16.4 KG/M2 | HEART RATE: 60 BPM | WEIGHT: 92.6 LBS | OXYGEN SATURATION: 100 % | SYSTOLIC BLOOD PRESSURE: 155 MMHG

## 2023-11-01 DIAGNOSIS — I10 HYPERTENSION GOAL BP (BLOOD PRESSURE) < 140/90: Primary | ICD-10-CM

## 2023-11-01 DIAGNOSIS — E78.00 HYPERCHOLESTEROLEMIA: ICD-10-CM

## 2023-11-01 DIAGNOSIS — Z13.6 ENCOUNTER FOR LIPID SCREENING FOR CARDIOVASCULAR DISEASE: ICD-10-CM

## 2023-11-01 DIAGNOSIS — Z13.220 ENCOUNTER FOR LIPID SCREENING FOR CARDIOVASCULAR DISEASE: ICD-10-CM

## 2023-11-01 DIAGNOSIS — Z23 NEED FOR INFLUENZA VACCINATION: ICD-10-CM

## 2023-11-01 DIAGNOSIS — E78.1 HYPERTRIGLYCERIDEMIA: ICD-10-CM

## 2023-11-01 LAB
ANION GAP SERPL CALCULATED.3IONS-SCNC: 10 MMOL/L (ref 7–15)
BUN SERPL-MCNC: 18.9 MG/DL (ref 8–23)
CALCIUM SERPL-MCNC: 10.1 MG/DL (ref 8.8–10.2)
CHLORIDE SERPL-SCNC: 101 MMOL/L (ref 98–107)
CHOLESTEROL: 219 MG/DL (ref 100–199)
CREAT SERPL-MCNC: 0.86 MG/DL (ref 0.51–0.95)
DEPRECATED HCO3 PLAS-SCNC: 28 MMOL/L (ref 22–29)
EGFRCR SERPLBLD CKD-EPI 2021: 76 ML/MIN/1.73M2
FASTING?: YES
GLUCOSE SERPL-MCNC: 101 MG/DL (ref 70–99)
HDL (RMG): 68 MG/DL (ref 40–?)
LDL CALCULATED (RMG): 128 MG/DL (ref 0–130)
POTASSIUM SERPL-SCNC: 3.9 MMOL/L (ref 3.4–5.3)
SODIUM SERPL-SCNC: 139 MMOL/L (ref 135–145)
TRIGLYCERIDES (RMG): 446 MG/DL (ref 0–149)

## 2023-11-01 PROCEDURE — 99214 OFFICE O/P EST MOD 30 MIN: CPT | Mod: 25

## 2023-11-01 PROCEDURE — 36415 COLL VENOUS BLD VENIPUNCTURE: CPT

## 2023-11-01 PROCEDURE — G0008 ADMIN INFLUENZA VIRUS VAC: HCPCS | Mod: 59

## 2023-11-01 PROCEDURE — 90674 CCIIV4 VAC NO PRSV 0.5 ML IM: CPT

## 2023-11-01 PROCEDURE — 80061 LIPID PANEL: CPT | Mod: QW

## 2023-11-01 PROCEDURE — 82310 ASSAY OF CALCIUM: CPT | Mod: ORL

## 2023-11-01 RX ORDER — ATENOLOL 50 MG/1
50 TABLET ORAL DAILY
Qty: 90 TABLET | Refills: 1 | Status: SHIPPED | OUTPATIENT
Start: 2023-11-01 | End: 2024-07-15

## 2023-11-01 NOTE — PATIENT INSTRUCTIONS
"Restless leg and leg cramp  Quinine supplement or tonic water   \"Under desk bike pedals\" for at least 15 minutes before bed      Pressurized can of nasal saline rinse  Fluticasone 50mcg spray 1-2 per day     How to take home blood pressure:  Sit for 5 minutes with feet flat on the floor.  Apply cuff to upper arm (bicep area) and have this arm resting at about heart level.  Take blood pressure reading.  Make sure that the machine has a memory bank that records your readings or write readings down  If you remain elevated, greater than 130/80 then please return to clinic for further high blood pressure work up.  Send me the results after 2 weeks    "

## 2023-11-01 NOTE — PROGRESS NOTES
Assessment & Plan     Hypertension goal BP (blood pressure) < 140/90  -patient reports she has lower readings at home that are within range   -Discussed current hypertension treatment guidelines, including indications for treatment and treatment options.  Discussed the importance for aggressive management of HTN to prevent vascular complications later.  Recommended lower fat, lower carbohydrate, and lower sodium (<2000 mg)diet.  Discussed required intervals for follow up on HTN, lab studies.  Recommened pt. occasionally follow their blood pressures outside the clinic to ensure that BPs are remaining within guidelines, and to contact me if the readings are not within guidelines on a regular basis so we can adjust treatment as needed.  -patient to take blood pressure at home for 2 weeks and report readings, educated on consequences if failure to comply with recommendations  -Patient verbalized understanding and is agreeable to the discussed plan of care.    - Basic metabolic panel  - VENOUS COLLECTION  - atenolol (TENORMIN) 50 MG tablet  Dispense: 90 tablet; Refill: 1  - Basic metabolic panel    Hypertriglyceridemia  -given smoker and hypertension with elevated ascvd risk, patient should start on statin  -Education about adverse effects of prescription medication discussed  - atorvastatin (LIPITOR) 20 MG tablet  Dispense: 30 tablet; Refill: 0    Hypercholesterolemia  -see plan above  - atorvastatin (LIPITOR) 20 MG tablet  Dispense: 30 tablet; Refill: 0    Encounter for lipid screening for cardiovascular disease  - Lipid Profile ([facility])  - VENOUS COLLECTION    Need for influenza vaccination  - VACCINE ADMINISTRATION, INITIAL      Ordering of each unique test  Prescription drug management       Nicotine/Tobacco Cessation:  She reports that she has been smoking cigarettes. She has been smoking an average of .5 packs per day. She has never used smokeless tobacco.  Nicotine/Tobacco Cessation Plan:   Information offered:  Patient not interested at this time      Work on weight loss  Regular exercise    Return in about 2 weeks (around 11/15/2023) for Follow up.    CAROLYN Kilpatrick CNP  [facility]    Savannah Llamas is a 62 year old, presenting for the following health issues:  Consult (Needs refill on atenolol)    HPI   Smokes 1 pack of cigarettes in 3 days, not ready to quit  Chuy manages pain - chronic neck pain with dystonia   Dr. Maier - breast cancer management [phone] mastectomy, radiation, chemo treatment     Hypertension Follow-up    Do you check your blood pressure regularly outside of the clinic? Yes   Are you following a low salt diet? No  Are your blood pressures ever more than 140 on the top number (systolic) OR more   than 90 on the bottom number (diastolic), for example 140/90? Yes        Review of Systems   Constitutional, HEENT, cardiovascular, pulmonary, gi and gu systems are negative, except as otherwise noted.      Objective    BP (!) 155/93   Pulse 60   Wt 42 kg (92 lb 9.6 oz)   SpO2 100%   BMI 16.40 kg/m    Body mass index is 16.4 kg/m .  Physical Exam   GENERAL: healthy, alert and no distress  EYES: Eyes grossly normal to inspection, PERRL and conjunctivae and sclerae normal  NECK: no adenopathy, no asymmetry, masses, or scars and thyroid normal to palpation  RESP: decreased breath sounds throughout  CV: regular rate and rhythm, normal S1 S2, no S3 or S4, no murmur, click or rub, no peripheral edema and peripheral pulses strong  MS: dystonia of neck,   SKIN: no suspicious lesions or rashes    Results for orders placed or performed in visit on 11/01/23   Lipid Profile ([facility])     Status: Abnormal   Result Value Ref Range    Cholesterol 219 (A) 100 - 199 mg/dL    HDL 68 40 mg/dL    Triglycerides 446 (A) 0 - 149 mg/dL    LDL CALCULATED ([facility]) 128 0 - 130 mg/dL    Patient Fasting? Yes    Basic metabolic panel     Status: Abnormal   Result Value Ref Range    Sodium 139 135 - 145 mmol/L     Potassium 3.9 3.4 - 5.3 mmol/L    Chloride 101 98 - 107 mmol/L    Carbon Dioxide (CO2) 28 22 - 29 mmol/L    Anion Gap 10 7 - 15 mmol/L    Urea Nitrogen 18.9 8.0 - 23.0 mg/dL    Creatinine 0.86 0.51 - 0.95 mg/dL    GFR Estimate 76 >60 mL/min/1.73m2    Calcium 10.1 8.8 - 10.2 mg/dL    Glucose 101 (H) 70 - 99 mg/dL

## 2023-11-02 RX ORDER — ATORVASTATIN CALCIUM 20 MG/1
20 TABLET, FILM COATED ORAL DAILY
Qty: 30 TABLET | Refills: 0 | Status: SHIPPED | OUTPATIENT
Start: 2023-11-02 | End: 2024-08-12

## 2024-04-18 NOTE — PROGRESS NOTES
[facility]    Hematology/Oncology Established Patient Follow-up Note      Today's Date: 4/24/2024    Reason for Follow-up: Left breast cancer.    HISTORY OF PRESENT ILLNESS: Muriel Diaz is a 63 year old female with history of hypertension, degenerative joint disease, cervical spine surgery who presents with the following oncologic history:    - 4/16/2018: Left mastectomy under the care of Dr. Umana.  Pathology showed a grade 2 invasive mammary carcinoma with lobular and ductal features, measuring 4.3 x 4 x 2.1 cm, positive margin for invasive carcinoma in the central and lateral posterior surface, DCIS and LCIS present, ER positive at 95%, NC positive at 50%, HER2/nicola negative.  Left axillary dissection showed 3 of 5 lymph nodes positive for metastatic carcinoma (lobular type), ER positive, NC positive, HER2/nicola negative. Stage IIB, [id number].  -5/22/2018 - 10/2/2018: Completed adjuvant chemotherapy with dose dense dense doxorubicin and cyclophosphamide for 4 cycles followed by weekly paclitaxel for 12 cycles.  -12/17/2018: Completed adjuvant radiation therapy to the left chest wall.  - 12/28/2018: Started anastrozole.  - 3/22/2019: Underwent reconstructive surgery with left latissimus flap and left tissue expander placement with Dr. Crook.    INTERIM HISTORY:  Muriel reports problems with her jaw and alignment of her bite. She has intermittent hot flashes, less with use of clonazepam.      REVIEW OF SYSTEMS:   14 point ROS was reviewed and is negative other than as noted above in HPI.       HOME MEDICATIONS:  Current Outpatient Medications   Medication Sig Dispense Refill    anastrozole (ARIMIDEX) 1 MG tablet TAKE 1 TABLET BY MOUTH ONCE DAILY 90 tablet 3    atenolol (TENORMIN) 50 MG tablet Take 1 tablet (50 mg) by mouth daily 90 tablet 1    atorvastatin (LIPITOR) 20 MG tablet Take 1 tablet (20 mg) by mouth daily 30 tablet 0    calcium carbonate (OS-HATTIE) 1500 (600 Ca) MG tablet Take 1  tablet (600 mg) by mouth 2 times daily (with meals) 180 tablet 3    clonazePAM (KLONOPIN) 0.5 MG tablet Take 1 tablet by mouth twice a day*      tiZANidine (ZANAFLEX) 2 MG tablet TAKE 1 TO 2 TABLETS BY MOUTH 3 TIMES A DAY AS NEEDED FOR SPASTICITY AND 1-3 AT BED AS NEEDED.*      Vitamin D3 50 mcg (2000 units) tablet Take 1 tablet (50 mcg) by mouth daily 90 tablet 3         ALLERGIES:  Allergies   Allergen Reactions    Other (Do Not Use)      Other Reaction(s): causes HAs    Amitriptyline Other (See Comments)     nightmares    Oxycontin [Oxycodone] Nausea     Severe headaches         PAST MEDICAL HISTORY:  Past Medical History:   Diagnosis Date    Cancer (H)     BREAST CANCER    Degeneration of cervical intervertebral disc     Degeneration of lumbar or lumbosacral intervertebral disc     Hypertension     PONV (postoperative nausea and vomiting)      Gynecologic history: G0.  Hysterectomy in the 1990s.  Ovaries are intact.  Currently postmenopausal.    PAST SURGICAL HISTORY:  Past Surgical History:   Procedure Laterality Date    BACK SURGERY  2001    lumbar fusion, cervical discectomy    DISSECT LYMPH NODE AXILLA Left 4/16/2018    Procedure: DISSECT LYMPH NODE AXILLA;;  Surgeon: Rodney Umana MD;  [facility]:  [facility]    FUSION CERVICAL ANTERIOR THREE+ LEVELS  5/1/2012    Procedure:FUSION CERVICAL ANTERIOR THREE+ LEVELS; Surgeon:SARAH FRANCO; Location:[facility] OR    FUSION CERVICAL POSTERIOR THREE+ LEVELS  5/1/2012    Procedure:FUSION CERVICAL POSTERIOR THREE+ LEVELS; Posterior Cervical decompression and fusion C4-7, Anterior Cervical decompression and fusion C4-7 (c-arm), (herb table with abraham, yina oasis, yina aviator, Vitoss foam packing strip, impulse monitoring,); Surgeon:SARAH FRANCO; Location: OR    GYN SURGERY      HYSTERECTOMY VAGINAL  1990's    Judy Ace[facility] OB Gyn specilist    HYSTERECTOMY, PAP NO LONGER INDICATED      INSERT PORT VASCULAR ACCESS N/A 5/14/2018     Procedure: INSERT PORT VASCULAR ACCESS;  PORT PLACEMENT;  Surgeon: Rodney Umana MD;  [facility]    INSERT TISSUE EXPANDER BREAST Bilateral 3/22/2019    Procedure: INSERT TISSUE EXPANDER BREAST;  Surgeon: Naa Crook MD;  Location:  OR    MASTECTOMY SIMPLE Left 4/16/2018    Procedure: MASTECTOMY SIMPLE;  LEFT SIMPLE MASTECTOMY,LEFT AXILLARY DISSECTION;  Surgeon: Rodney Umana MD;  [facility]    RECONSTRUCT BREAST LATISSIMUS DORSI PEDICLE Left 3/22/2019    Procedure: LEFT LATISSIMUS FLAP RECONSTRUCT WITH TISSUE EXPANDER AND PORT REMOVAL;  Surgeon: Naa Crook MD;  Location:  OR    REMOVE PORT VASCULAR ACCESS N/A 3/22/2019    Procedure: REMOVE PORT VASCULAR ACCESS;  Surgeon: Naa Crook MD;  Location:  OR         SOCIAL HISTORY:  Social History     Socioeconomic History    Marital status: Single     Spouse name: Not on file    Number of children: 0    Years of education: Not on file    Highest education level: Not on file   Occupational History     Employer: [employer]    Tobacco Use    Smoking status: Every Day     Current packs/day: 0.50     Types: Cigarettes    Smokeless tobacco: Never    Tobacco comments:     4 cigarettes per day   Substance and Sexual Activity    Alcohol use: Yes     Alcohol/week: 1.0 standard drink of alcohol     Types: 1 Standard drinks or equivalent per week     Comment: 2-3 drinks per week    Drug use: No    Sexual activity: Not Currently     Partners: Male   Other Topics Concern    Parent/sibling w/ CABG, MI or angioplasty before 65F 55M? No   Social History Narrative    Lives with  (Pipo). Retired. On disability for cervical dystonia, stenosis and spasmodic torticollis. Enjoys gardening and cooking.      Social Determinants of Health     Financial Resource Strain: Unknown (10/31/2023)    Financial Resource Strain     Within the past 12 months, have you or your family members you live with been unable to get utilities (heat,  "electricity) when it was really needed?: Patient refused   Food Insecurity: Low Risk  (10/31/2023)    Food Insecurity     Within the past 12 months, did you worry that your food would run out before you got money to buy more?: No     Within the past 12 months, did the food you bought just not last and you didn t have money to get more?: No   Transportation Needs: Low Risk  (10/31/2023)    Transportation Needs     Within the past 12 months, has lack of transportation kept you from medical appointments, getting your medicines, non-medical meetings or appointments, work, or from getting things that you need?: No   Physical Activity: Not on file   Stress: Not on file   Social Connections: Not on file   Interpersonal Safety: Not At Risk (8/1/2022)    Humiliation, Afraid, Rape, and Kick questionnaire     Fear of Current or Ex-Partner: No     Emotionally Abused: No     Physically Abused: No     Sexually Abused: No   Housing Stability: Low Risk  (10/31/2023)    Housing Stability     Do you have housing? : Yes     Are you worried about losing your housing?: No   She has smoked half a pack of cigarettes for many years, duration uncertain.  She drinks alcohol occasionally.  Denies illicit drug use.  She is retired.  Previously worked in  at large companies.      FAMILY HISTORY:  Family History   Problem Relation Age of Onset    Pancreatitis Mother     Substance Abuse Mother     Unknown/Adopted Father    She does not know much about her family history and is unaware of any malignancy in her family.      PHYSICAL EXAM:  Vital signs:  /89   Pulse 61   Resp 16   Ht 1.6 m (5' 3\")   Wt 40.1 kg (88 lb 6.4 oz)   SpO2 96%   BMI 15.66 kg/m     GENERAL/CONSTITUTIONAL: No acute distress.  EYES:  No scleral icterus.  LYMPH: No cervical, supraclavicular, axillary adenopathy.   BREAST: Left breast is surgically absent with flap reconstruction changes and implant in place.  No periprosthetic fluid. No left chest " wall masses.  Right breast with no masses.  Right nipple everted with no discharge. Some slight darkening of skin at the left inner chest wall skin.  RESPIRATORY: No audible cough or wheezing.   GASTROINTESTINAL: No hepatosplenomegaly, masses, or tenderness. No guarding.  No distention.  MUSCULOSKELETAL: Warm and well-perfused, no cyanosis, clubbing, or edema.  NEUROLOGIC: Alert, oriented, answers questions appropriately.  INTEGUMENTARY: No rashes or jaundice.  GAIT: Steady, does not use assistive device    Wt Readings from Last 4 Encounters:   04/24/24 40.1 kg (88 lb 6.4 oz)   11/01/23 42 kg (92 lb 9.6 oz)   10/26/23 42.9 kg (94 lb 9.6 oz)   05/10/23 44.5 kg (98 lb)        LABS:  CBC RESULTS:   Recent Labs   Lab Test 03/15/19  1332 12/28/18  1320   WBC 7.6 6.1   RBC 4.42 3.81   HGB 12.9 12.0   HCT 40.3 35.9   MCV 91.2 94   MCH 29.1 31.5   MCHC 31.9* 33.4   RDW  --  13.4    259     Last Comprehensive Metabolic Panel:  Sodium   Date Value Ref Range Status   11/01/2023 139 135 - 145 mmol/L Final     Comment:     Reference intervals for this test were updated on 09/26/2023 to more accurately reflect our healthy population. There may be differences in the flagging of prior results with similar values performed with this method. Interpretation of those prior results can be made in the context of the updated reference intervals.    08/02/2022 143 134 - 144 mmol/L Final     Potassium   Date Value Ref Range Status   11/01/2023 3.9 3.4 - 5.3 mmol/L Final   08/02/2022 4.4 3.5 - 5.2 mmol/L Final     Chloride   Date Value Ref Range Status   11/01/2023 101 98 - 107 mmol/L Final   08/02/2022 102 96 - 106 mmol/L Final     Carbon Dioxide   Date Value Ref Range Status   12/28/2018 28 20 - 32 mmol/L Final     Carbon Dioxide (CO2)   Date Value Ref Range Status   11/01/2023 28 22 - 29 mmol/L Final     Anion Gap   Date Value Ref Range Status   11/01/2023 10 7 - 15 mmol/L Final   12/28/2018 5 3 - 14 mmol/L Final     Glucose   Date  Value Ref Range Status   2023 101 (H) 70 - 99 mg/dL Final   2022 101 (H) 65 - 99 mg/dL Final     Urea Nitrogen   Date Value Ref Range Status   2023 18.9 8.0 - 23.0 mg/dL Final   2022 18 8 - 27 mg/dL Final     BUN/Creatinine Ratio   Date Value Ref Range Status   2022 26 12 - 28 Final     Creatinine   Date Value Ref Range Status   2023 0.86 0.51 - 0.95 mg/dL Final   2022 0.70 0.57 - 1.00 mg/dL Final     GFR Estimate   Date Value Ref Range Status   2023 76 >60 mL/min/1.73m2 Final   2019 77 >60 mL/min/[1.73_m2] Final     Comment:     Non  GFR Calc  Starting 2018, serum creatinine based estimated GFR (eGFR) will be   calculated using the Chronic Kidney Disease Epidemiology Collaboration   (CKD-EPI) equation.       Calcium   Date Value Ref Range Status   2023 10.1 8.8 - 10.2 mg/dL Final   2022 11.0 (H) 8.7 - 10.3 mg/dL Final     Bilirubin Total   Date Value Ref Range Status   2018 0.2 0.2 - 1.3 mg/dL Final     Alkaline Phosphatase   Date Value Ref Range Status   2018 81 40 - 150 U/L Final     ALT   Date Value Ref Range Status   2018 19 0 - 50 U/L Final     AST   Date Value Ref Range Status   2018 16 0 - 45 U/L Final       PATHOLOGY:  None new.    IMAGIN2023: DEXA scan showed osteopenia with 0.6% decrease in bilateral hip BMD and 6.6% decrease in the left radius BMD.    2022: Right-sided mammogram showed benign findings.    ASSESSMENT/PLAN:  Muriel Diaz is a 62 year old female with the following issues:  1.  Stage IIB, [id number], grade 2 invasive mammary carcinoma with lobular and ductal features of the left upper inner breast, ER +95%, NH +50%, HER2 negative  - Muriel is status post left mastectomy 2018 and left axillary dissection with 3 of 5 lymph nodes positive for metastatic lobular carcinoma.  She completed adjuvant chemotherapy with ddAC-T followed by radiation to the left chest  thelma completed 12/17/2018.  -She has no clinical evidence for recurrent breast cancer by physical exam from today.  -She has been on adjuvant anastrozole since 12/28/2018, currently tolerating this well with no significant adverse effects.  -Advised up to 10 years of hormone blockade therapy given her extent of lymph node involvement and higher risk for recurrence.  -Continue with annual right-sided mammograms, next due 8/2024.    2.  Osteopenia  - DEXA scan from 4/27/2023 showed osteopenia with 0.6% decrease in bilateral hip BMD and 6.6% decrease in the left radius BMD despite taking alendronate.  --I advised discontinuation of alendronate as she is having diarrhea from it and it is not improving her bone density.  - Advised adequate calcium and vitamin D as well as weightbearing exercise. She already eats a lot of dairy products.  - I had previously advised switching to Prolia. Discussed small risk of osteonecrosis of the jaw.  However, she does not have dental insurance. I had recommended referral to endocrinology for further assistance but she has declined this.  - Plan to repeat DEXA scan in 2025.    3.  Tobacco abuse  --She is still smoking about a pack every 3 days.  - Urged smoking cessation and offered assistance with cessation. She declines QUIT plan referral.    4. Hot flashes  --Venlafaxine did not help.  --Improved with clonazepam, which she will continue.    Return in 6 months.    Lisa Maier MD  Hematology/Oncology  [facility]    Total time spent today: 30 minutes in chart review, patient evaluation, counseling, documentation, test and/or medication/prescription orders, and coordination of care.

## 2024-04-24 ENCOUNTER — ONCOLOGY VISIT (OUTPATIENT)
Dept: ONCOLOGY | Facility: CLINIC | Age: 64
End: 2024-04-24
Attending: INTERNAL MEDICINE
Payer: MEDICARE

## 2024-04-24 VITALS
SYSTOLIC BLOOD PRESSURE: 131 MMHG | OXYGEN SATURATION: 96 % | BODY MASS INDEX: 15.66 KG/M2 | HEART RATE: 61 BPM | RESPIRATION RATE: 16 BRPM | HEIGHT: 63 IN | DIASTOLIC BLOOD PRESSURE: 89 MMHG | WEIGHT: 88.4 LBS

## 2024-04-24 DIAGNOSIS — M85.852 OSTEOPENIA OF BOTH HIPS: ICD-10-CM

## 2024-04-24 DIAGNOSIS — N95.1 MENOPAUSAL SYNDROME (HOT FLASHES): ICD-10-CM

## 2024-04-24 DIAGNOSIS — C50.212 MALIGNANT NEOPLASM OF UPPER-INNER QUADRANT OF LEFT BREAST IN FEMALE, ESTROGEN RECEPTOR POSITIVE (H): Primary | ICD-10-CM

## 2024-04-24 DIAGNOSIS — M85.851 OSTEOPENIA OF BOTH HIPS: ICD-10-CM

## 2024-04-24 DIAGNOSIS — F17.200 TOBACCO USE DISORDER: ICD-10-CM

## 2024-04-24 DIAGNOSIS — Z12.31 ENCOUNTER FOR SCREENING MAMMOGRAM FOR BREAST CANCER: ICD-10-CM

## 2024-04-24 DIAGNOSIS — Z17.0 MALIGNANT NEOPLASM OF UPPER-INNER QUADRANT OF LEFT BREAST IN FEMALE, ESTROGEN RECEPTOR POSITIVE (H): Primary | ICD-10-CM

## 2024-04-24 PROCEDURE — 99214 OFFICE O/P EST MOD 30 MIN: CPT | Performed by: INTERNAL MEDICINE

## 2024-04-24 PROCEDURE — G0463 HOSPITAL OUTPT CLINIC VISIT: HCPCS | Performed by: INTERNAL MEDICINE

## 2024-04-24 ASSESSMENT — PAIN SCALES - GENERAL: PAINLEVEL: NO PAIN (0)

## 2024-04-24 NOTE — LETTER
4/24/2024         RE: Muriel Diaz  [address]  [facility] [address]        Dear Colleague,    Thank you for referring your patient, Muriel Diaz, to the [facility]. Please see a copy of my visit note below.    [facility]    Hematology/Oncology Established Patient Follow-up Note      Today's Date: 4/24/2024    Reason for Follow-up: Left breast cancer.    HISTORY OF PRESENT ILLNESS: Muriel Diaz is a 63 year old female with history of hypertension, degenerative joint disease, cervical spine surgery who presents with the following oncologic history:    - 4/16/2018: Left mastectomy under the care of Dr. Umana.  Pathology showed a grade 2 invasive mammary carcinoma with lobular and ductal features, measuring 4.3 x 4 x 2.1 cm, positive margin for invasive carcinoma in the central and lateral posterior surface, DCIS and LCIS present, ER positive at 95%, CT positive at 50%, HER2/nicola negative.  Left axillary dissection showed 3 of 5 lymph nodes positive for metastatic carcinoma (lobular type), ER positive, CT positive, HER2/nicola negative. Stage IIB, [id number].  -5/22/2018 - 10/2/2018: Completed adjuvant chemotherapy with dose dense dense doxorubicin and cyclophosphamide for 4 cycles followed by weekly paclitaxel for 12 cycles.  -12/17/2018: Completed adjuvant radiation therapy to the left chest wall.  - 12/28/2018: Started anastrozole.  - 3/22/2019: Underwent reconstructive surgery with left latissimus flap and left tissue expander placement with Dr. Crook.    INTERIM HISTORY:  Muriel reports problems with her jaw and alignment of her bite. She has intermittent hot flashes, less with use of clonazepam.      REVIEW OF SYSTEMS:   14 point ROS was reviewed and is negative other than as noted above in HPI.       HOME MEDICATIONS:  Current Outpatient Medications   Medication Sig Dispense Refill     anastrozole (ARIMIDEX) 1 MG tablet TAKE 1 TABLET BY  MOUTH ONCE DAILY 90 tablet 3     atenolol (TENORMIN) 50 MG tablet Take 1 tablet (50 mg) by mouth daily 90 tablet 1     atorvastatin (LIPITOR) 20 MG tablet Take 1 tablet (20 mg) by mouth daily 30 tablet 0     calcium carbonate (OS-HATTIE) 1500 (600 Ca) MG tablet Take 1 tablet (600 mg) by mouth 2 times daily (with meals) 180 tablet 3     clonazePAM (KLONOPIN) 0.5 MG tablet Take 1 tablet by mouth twice a day*       tiZANidine (ZANAFLEX) 2 MG tablet TAKE 1 TO 2 TABLETS BY MOUTH 3 TIMES A DAY AS NEEDED FOR SPASTICITY AND 1-3 AT BED AS NEEDED.*       Vitamin D3 50 mcg (2000 units) tablet Take 1 tablet (50 mcg) by mouth daily 90 tablet 3         ALLERGIES:  Allergies   Allergen Reactions     Other (Do Not Use)      Other Reaction(s): causes HAs     Amitriptyline Other (See Comments)     nightmares     Oxycontin [Oxycodone] Nausea     Severe headaches         PAST MEDICAL HISTORY:  Past Medical History:   Diagnosis Date     Cancer (H)     BREAST CANCER     Degeneration of cervical intervertebral disc      Degeneration of lumbar or lumbosacral intervertebral disc      Hypertension      PONV (postoperative nausea and vomiting)      Gynecologic history: G0.  Hysterectomy in the 1990s.  Ovaries are intact.  Currently postmenopausal.    PAST SURGICAL HISTORY:  Past Surgical History:   Procedure Laterality Date     BACK SURGERY  2001    lumbar fusion, cervical discectomy     DISSECT LYMPH NODE AXILLA Left 4/16/2018    Procedure: DISSECT LYMPH NODE AXILLA;;  Surgeon: Rodney Umana MD;  Location: [facility]     FUSION CERVICAL ANTERIOR THREE+ LEVELS  5/1/2012    Procedure:FUSION CERVICAL ANTERIOR THREE+ LEVELS; Surgeon:SARAH FRANCO; Location:[facility]     FUSION CERVICAL POSTERIOR THREE+ LEVELS  5/1/2012    Procedure:FUSION CERVICAL POSTERIOR THREE+ LEVELS; Posterior Cervical decompression and fusion C4-7, Anterior Cervical decompression and fusion C4-7 (c-arm), (herb table with yina abraham oasis, yina aviator,  Vitoss foam packing strip, impulse monitoring,); Surgeon:SARAH FRANCO; Location: OR     GYN SURGERY       HYSTERECTOMY VAGINAL  1990's    Judy Aceves OB Gyn specilist     HYSTERECTOMY, PAP NO LONGER INDICATED       INSERT PORT VASCULAR ACCESS N/A 5/14/2018    Procedure: INSERT PORT VASCULAR ACCESS;  PORT PLACEMENT;  Surgeon: Rodney Umana MD;  Location: [facility]     INSERT TISSUE EXPANDER BREAST Bilateral 3/22/2019    Procedure: INSERT TISSUE EXPANDER BREAST;  Surgeon: Naa Crook MD;  Location:  OR     MASTECTOMY SIMPLE Left 4/16/2018    Procedure: MASTECTOMY SIMPLE;  LEFT SIMPLE MASTECTOMY,LEFT AXILLARY DISSECTION;  Surgeon: Rodney Umana MD;  Location: [facility]     RECONSTRUCT BREAST LATISSIMUS DORSI PEDICLE Left 3/22/2019    Procedure: LEFT LATISSIMUS FLAP RECONSTRUCT WITH TISSUE EXPANDER AND PORT REMOVAL;  Surgeon: Naa Crook MD;  Location:  OR     REMOVE PORT VASCULAR ACCESS N/A 3/22/2019    Procedure: REMOVE PORT VASCULAR ACCESS;  Surgeon: Naa Crook MD;  Location:  OR         SOCIAL HISTORY:  Social History     Socioeconomic History     Marital status: Single     Spouse name: Not on file     Number of children: 0     Years of education: Not on file     Highest education level: Not on file   Occupational History     Employer: [employer]    Tobacco Use     Smoking status: Every Day     Current packs/day: 0.50     Types: Cigarettes     Smokeless tobacco: Never     Tobacco comments:     4 cigarettes per day   Substance and Sexual Activity     Alcohol use: Yes     Alcohol/week: 1.0 standard drink of alcohol     Types: 1 Standard drinks or equivalent per week     Comment: 2-3 drinks per week     Drug use: No     Sexual activity: Not Currently     Partners: Male   Other Topics Concern     Parent/sibling w/ CABG, MI or angioplasty before 65F 55M? No   Social History Narrative    Lives with BF (Pipo). Retired. On disability for cervical dystonia,  "stenosis and spasmodic torticollis. Enjoys gardening and cooking.      Social Determinants of Health     Financial Resource Strain: Unknown (10/31/2023)    Financial Resource Strain      Within the past 12 months, have you or your family members you live with been unable to get utilities (heat, electricity) when it was really needed?: Patient refused   Food Insecurity: Low Risk  (10/31/2023)    Food Insecurity      Within the past 12 months, did you worry that your food would run out before you got money to buy more?: No      Within the past 12 months, did the food you bought just not last and you didn t have money to get more?: No   Transportation Needs: Low Risk  (10/31/2023)    Transportation Needs      Within the past 12 months, has lack of transportation kept you from medical appointments, getting your medicines, non-medical meetings or appointments, work, or from getting things that you need?: No   Physical Activity: Not on file   Stress: Not on file   Social Connections: Not on file   Interpersonal Safety: Not At Risk (8/1/2022)    Humiliation, Afraid, Rape, and Kick questionnaire      Fear of Current or Ex-Partner: No      Emotionally Abused: No      Physically Abused: No      Sexually Abused: No   Housing Stability: Low Risk  (10/31/2023)    Housing Stability      Do you have housing? : Yes      Are you worried about losing your housing?: No   She has smoked half a pack of cigarettes for many years, duration uncertain.  She drinks alcohol occasionally.  Denies illicit drug use.  She is retired.  Previously worked in  at large companies.      FAMILY HISTORY:  Family History   Problem Relation Age of Onset     Pancreatitis Mother      Substance Abuse Mother      Unknown/Adopted Father    She does not know much about her family history and is unaware of any malignancy in her family.      PHYSICAL EXAM:  Vital signs:  /89   Pulse 61   Resp 16   Ht 1.6 m (5' 3\")   Wt 40.1 kg (88 lb 6.4 " oz)   SpO2 96%   BMI 15.66 kg/m     GENERAL/CONSTITUTIONAL: No acute distress.  EYES:  No scleral icterus.  LYMPH: No cervical, supraclavicular, axillary adenopathy.   BREAST: Left breast is surgically absent with flap reconstruction changes and implant in place.  No periprosthetic fluid. No left chest wall masses.  Right breast with no masses.  Right nipple everted with no discharge. Some slight darkening of skin at the left inner chest wall skin.  RESPIRATORY: No audible cough or wheezing.   GASTROINTESTINAL: No hepatosplenomegaly, masses, or tenderness. No guarding.  No distention.  MUSCULOSKELETAL: Warm and well-perfused, no cyanosis, clubbing, or edema.  NEUROLOGIC: Alert, oriented, answers questions appropriately.  INTEGUMENTARY: No rashes or jaundice.  GAIT: Steady, does not use assistive device    Wt Readings from Last 4 Encounters:   04/24/24 40.1 kg (88 lb 6.4 oz)   11/01/23 42 kg (92 lb 9.6 oz)   10/26/23 42.9 kg (94 lb 9.6 oz)   05/10/23 44.5 kg (98 lb)        LABS:  CBC RESULTS:   Recent Labs   Lab Test 03/15/19  1332 12/28/18  1320   WBC 7.6 6.1   RBC 4.42 3.81   HGB 12.9 12.0   HCT 40.3 35.9   MCV 91.2 94   MCH 29.1 31.5   MCHC 31.9* 33.4   RDW  --  13.4    259     Last Comprehensive Metabolic Panel:  Sodium   Date Value Ref Range Status   11/01/2023 139 135 - 145 mmol/L Final     Comment:     Reference intervals for this test were updated on 09/26/2023 to more accurately reflect our healthy population. There may be differences in the flagging of prior results with similar values performed with this method. Interpretation of those prior results can be made in the context of the updated reference intervals.    08/02/2022 143 134 - 144 mmol/L Final     Potassium   Date Value Ref Range Status   11/01/2023 3.9 3.4 - 5.3 mmol/L Final   08/02/2022 4.4 3.5 - 5.2 mmol/L Final     Chloride   Date Value Ref Range Status   11/01/2023 101 98 - 107 mmol/L Final   08/02/2022 102 96 - 106 mmol/L Final      Carbon Dioxide   Date Value Ref Range Status   2018 28 20 - 32 mmol/L Final     Carbon Dioxide (CO2)   Date Value Ref Range Status   2023 28 22 - 29 mmol/L Final     Anion Gap   Date Value Ref Range Status   2023 10 7 - 15 mmol/L Final   2018 5 3 - 14 mmol/L Final     Glucose   Date Value Ref Range Status   2023 101 (H) 70 - 99 mg/dL Final   2022 101 (H) 65 - 99 mg/dL Final     Urea Nitrogen   Date Value Ref Range Status   2023 18.9 8.0 - 23.0 mg/dL Final   2022 18 8 - 27 mg/dL Final     BUN/Creatinine Ratio   Date Value Ref Range Status   2022 26 12 - 28 Final     Creatinine   Date Value Ref Range Status   2023 0.86 0.51 - 0.95 mg/dL Final   2022 0.70 0.57 - 1.00 mg/dL Final     GFR Estimate   Date Value Ref Range Status   2023 76 >60 mL/min/1.73m2 Final   2019 77 >60 mL/min/[1.73_m2] Final     Comment:     Non  GFR Calc  Starting 2018, serum creatinine based estimated GFR (eGFR) will be   calculated using the Chronic Kidney Disease Epidemiology Collaboration   (CKD-EPI) equation.       Calcium   Date Value Ref Range Status   2023 10.1 8.8 - 10.2 mg/dL Final   2022 11.0 (H) 8.7 - 10.3 mg/dL Final     Bilirubin Total   Date Value Ref Range Status   2018 0.2 0.2 - 1.3 mg/dL Final     Alkaline Phosphatase   Date Value Ref Range Status   2018 81 40 - 150 U/L Final     ALT   Date Value Ref Range Status   2018 19 0 - 50 U/L Final     AST   Date Value Ref Range Status   2018 16 0 - 45 U/L Final       PATHOLOGY:  None new.    IMAGIN2023: DEXA scan showed osteopenia with 0.6% decrease in bilateral hip BMD and 6.6% decrease in the left radius BMD.    2022: Right-sided mammogram showed benign findings.    ASSESSMENT/PLAN:  Muriel Diaz is a 62 year old female with the following issues:  1.  Stage IIB, [id number], grade 2 invasive mammary carcinoma with lobular and  ductal features of the left upper inner breast, ER +95%, IN +50%, HER2 negative  - Muriel is status post left mastectomy 4/16/2018 and left axillary dissection with 3 of 5 lymph nodes positive for metastatic lobular carcinoma.  She completed adjuvant chemotherapy with ddAC-T followed by radiation to the left chest wall completed 12/17/2018.  -She has no clinical evidence for recurrent breast cancer by physical exam from today.  -She has been on adjuvant anastrozole since 12/28/2018, currently tolerating this well with no significant adverse effects.  -Advised up to 10 years of hormone blockade therapy given her extent of lymph node involvement and higher risk for recurrence.  -Continue with annual right-sided mammograms, next due 8/2024.    2.  Osteopenia  - DEXA scan from 4/27/2023 showed osteopenia with 0.6% decrease in bilateral hip BMD and 6.6% decrease in the left radius BMD despite taking alendronate.  --I advised discontinuation of alendronate as she is having diarrhea from it and it is not improving her bone density.  - Advised adequate calcium and vitamin D as well as weightbearing exercise. She already eats a lot of dairy products.  - I had previously advised switching to Prolia. Discussed small risk of osteonecrosis of the jaw.  However, she does not have dental insurance. I had recommended referral to endocrinology for further assistance but she has declined this.  - Plan to repeat DEXA scan in 2025.    3.  Tobacco abuse  --She is still smoking about a pack every 3 days.  - Urged smoking cessation and offered assistance with cessation. She declines QUIT plan referral.    4. Hot flashes  --Venlafaxine did not help.  --Improved with clonazepam, which she will continue.    Return in 6 months.    Lisa Maier MD  Hematology/Oncology  [facility]    Total time spent today: 30 minutes in chart review, patient evaluation, counseling, documentation, test and/or medication/prescription  "orders, and coordination of care.       Oncology Rooming Note    April 24, 2024 1:56 PM   Muriel Diaz is a 63 year old female who presents for:    Chief Complaint   Patient presents with     Oncology Clinic Visit     Initial Vitals: Resp 16   Ht 1.6 m (5' 3\")   Wt 40.1 kg (88 lb 6.4 oz)   BMI 15.66 kg/m   Estimated body mass index is 15.66 kg/m  as calculated from the following:    Height as of this encounter: 1.6 m (5' 3\").    Weight as of this encounter: 40.1 kg (88 lb 6.4 oz). Body surface area is 1.34 meters squared.  No Pain (0) Comment: Data Unavailable   No LMP recorded. Patient has had a hysterectomy.  Allergies reviewed: Yes  Medications reviewed: Yes    Medications: Medication refills not needed today.  Pharmacy name entered into [employer]:    [facility] [address]  [facility] [place], [place] - [address] [place] [address] PHARMACY [id number] [facility] [address]    Frailty Screening:   Is the patient here for a new oncology consult visit in cancer care? 2. No        Dodie Galeas MA              Again, thank you for allowing me to participate in the care of your patient.        Sincerely,        Lisa Maier MD  "

## 2024-04-24 NOTE — PROGRESS NOTES
"Oncology Rooming Note    April 24, 2024 1:56 PM   Muriel Diaz is a 63 year old female who presents for:    Chief Complaint   Patient presents with    Oncology Clinic Visit     Initial Vitals: Resp 16   Ht 1.6 m (5' 3\")   Wt 40.1 kg (88 lb 6.4 oz)   BMI 15.66 kg/m   Estimated body mass index is 15.66 kg/m  as calculated from the following:    Height as of this encounter: 1.6 m (5' 3\").    Weight as of this encounter: 40.1 kg (88 lb 6.4 oz). Body surface area is 1.34 meters squared.  No Pain (0) Comment: Data Unavailable   No LMP recorded. Patient has had a hysterectomy.  Allergies reviewed: Yes  Medications reviewed: Yes    Medications: Medication refills not needed today.  Pharmacy name entered into [employer]:    [facility] [address] [facility]  [facility] [place], [place] - [address] [facility]ARMACY [id number] [place], [place] - [address]    Frailty Screening:   Is the patient here for a new oncology consult visit in cancer care? 2. No        Dodie aGleas MA            "

## 2024-06-02 ENCOUNTER — HEALTH MAINTENANCE LETTER (OUTPATIENT)
Age: 64
End: 2024-06-02

## 2024-07-15 DIAGNOSIS — I10 HYPERTENSION GOAL BP (BLOOD PRESSURE) < 140/90: ICD-10-CM

## 2024-07-15 RX ORDER — ATENOLOL 50 MG/1
50 TABLET ORAL DAILY
Qty: 90 TABLET | Refills: 0 | Status: SHIPPED | OUTPATIENT
Start: 2024-07-15

## 2024-07-15 NOTE — TELEPHONE ENCOUNTER
Med: atenolol          LOV (related): 11/1/23    Last Lab: 11/1/23      Due for F/U around:  due for CPX      Next Appt: No current future appointments scheduled         BP Readings from Last 3 Encounters:   04/24/24 131/89   11/01/23 (!) 155/93   10/26/23 (!) 155/97       Last Comprehensive Metabolic Panel:  Lab Results   Component Value Date     11/01/2023    POTASSIUM 3.9 11/01/2023    CHLORIDE 101 11/01/2023    CO2 28 11/01/2023    ANIONGAP 10 11/01/2023     (H) 11/01/2023    BUN 18.9 11/01/2023    CR 0.86 11/01/2023    GFRESTIMATED 76 11/01/2023    HATTIE 10.1 11/01/2023

## 2024-07-15 NOTE — CONFIDENTIAL NOTE
Med: ATENOLOL    LOV (related): 11/1/23    Last Lab: 11/1/23      Due for F/U around: 11/2023 - OVERDUE FOR MED CHECK    Next Appt: NONE        BP Readings from Last 3 Encounters:   04/24/24 131/89   11/01/23 (!) 155/93   10/26/23 (!) 155/97       Last Comprehensive Metabolic Panel:  Lab Results   Component Value Date     11/01/2023    POTASSIUM 3.9 11/01/2023    CHLORIDE 101 11/01/2023    CO2 28 11/01/2023    ANIONGAP 10 11/01/2023     (H) 11/01/2023    BUN 18.9 11/01/2023    CR 0.86 11/01/2023    GFRESTIMATED 76 11/01/2023    HATTIE 10.1 11/01/2023

## 2024-08-07 ENCOUNTER — HOSPITAL ENCOUNTER (OUTPATIENT)
Dept: MAMMOGRAPHY | Facility: CLINIC | Age: 64
Discharge: HOME | End: 2024-08-07
Attending: INTERNAL MEDICINE | Admitting: INTERNAL MEDICINE
Payer: MEDICARE

## 2024-08-07 DIAGNOSIS — C50.212 MALIGNANT NEOPLASM OF UPPER-INNER QUADRANT OF LEFT BREAST IN FEMALE, ESTROGEN RECEPTOR POSITIVE (H): ICD-10-CM

## 2024-08-07 DIAGNOSIS — Z12.31 ENCOUNTER FOR SCREENING MAMMOGRAM FOR BREAST CANCER: ICD-10-CM

## 2024-08-07 DIAGNOSIS — Z17.0 MALIGNANT NEOPLASM OF UPPER-INNER QUADRANT OF LEFT BREAST IN FEMALE, ESTROGEN RECEPTOR POSITIVE (H): ICD-10-CM

## 2024-08-07 PROCEDURE — 77063 BREAST TOMOSYNTHESIS BI: CPT | Mod: 52

## 2024-08-12 ENCOUNTER — OFFICE VISIT (OUTPATIENT)
Dept: FAMILY MEDICINE | Facility: CLINIC | Age: 64
End: 2024-08-12

## 2024-08-12 VITALS
HEART RATE: 53 BPM | WEIGHT: 85 LBS | BODY MASS INDEX: 15.06 KG/M2 | DIASTOLIC BLOOD PRESSURE: 82 MMHG | OXYGEN SATURATION: 98 % | SYSTOLIC BLOOD PRESSURE: 147 MMHG

## 2024-08-12 DIAGNOSIS — R63.4 WEIGHT LOSS: ICD-10-CM

## 2024-08-12 DIAGNOSIS — Z85.3 PERSONAL HISTORY OF MALIGNANT NEOPLASM OF BREAST: ICD-10-CM

## 2024-08-12 DIAGNOSIS — E78.00 HYPERCHOLESTEROLEMIA: ICD-10-CM

## 2024-08-12 DIAGNOSIS — F17.200 TOBACCO USE DISORDER: ICD-10-CM

## 2024-08-12 DIAGNOSIS — I10 HYPERTENSION GOAL BP (BLOOD PRESSURE) < 140/90: Primary | ICD-10-CM

## 2024-08-12 DIAGNOSIS — E78.1 HYPERTRIGLYCERIDEMIA: ICD-10-CM

## 2024-08-12 PROBLEM — C77.3 SECONDARY AND UNSPECIFIED MALIGNANT NEOPLASM OF AXILLA AND UPPER LIMB LYMPH NODES (H): Status: ACTIVE | Noted: 2024-08-12

## 2024-08-12 PROBLEM — C77.9 SECONDARY AND UNSPECIFIED MALIGNANT NEOPLASM OF LYMPH NODE, UNSPECIFIED (H): Status: ACTIVE | Noted: 2024-08-12

## 2024-08-12 LAB
% GRANULOCYTES: 67.8 % (ref 42.2–75.2)
ALBUMIN SERPL BCG-MCNC: 4.4 G/DL (ref 3.5–5.2)
ALP SERPL-CCNC: 93 U/L (ref 40–150)
ALT SERPL W P-5'-P-CCNC: 12 U/L (ref 0–50)
ANION GAP SERPL CALCULATED.3IONS-SCNC: 10 MMOL/L (ref 7–15)
AST SERPL W P-5'-P-CCNC: 28 U/L (ref 0–45)
BILIRUB SERPL-MCNC: 0.2 MG/DL
BUN SERPL-MCNC: 11.1 MG/DL (ref 8–23)
CALCIUM SERPL-MCNC: 9.6 MG/DL (ref 8.8–10.4)
CHLORIDE SERPL-SCNC: 97 MMOL/L (ref 98–107)
CREAT SERPL-MCNC: 0.78 MG/DL (ref 0.51–0.95)
CRP SERPL-MCNC: <3 MG/L
EGFRCR SERPLBLD CKD-EPI 2021: 85 ML/MIN/1.73M2
ERYTHROCYTE [SEDIMENTATION RATE] IN BLOOD: 6 MM/HR (ref 0–20)
FASTING STATUS PATIENT QL REPORTED: NO
GLUCOSE SERPL-MCNC: 83 MG/DL (ref 70–99)
HCO3 SERPL-SCNC: 26 MMOL/L (ref 22–29)
HCT VFR BLD AUTO: 36.9 % (ref 35–46)
HEMOGLOBIN: 12.7 G/DL (ref 11.8–15.5)
LYMPHOCYTES NFR BLD AUTO: 24.5 % (ref 20.5–51.1)
MCH RBC QN AUTO: 30.2 PG (ref 27–31)
MCHC RBC AUTO-ENTMCNC: 34.4 G/DL (ref 33–37)
MCV RBC AUTO: 87.9 FL (ref 80–100)
MONOCYTES NFR BLD AUTO: 7.7 % (ref 1.7–9.3)
PLATELET # BLD AUTO: 348 K/UL (ref 140–450)
POTASSIUM SERPL-SCNC: 4.3 MMOL/L (ref 3.4–5.3)
PROT SERPL-MCNC: 7 G/DL (ref 6.4–8.3)
RBC # BLD AUTO: 4.2 X10/CMM (ref 3.7–5.2)
SODIUM SERPL-SCNC: 133 MMOL/L (ref 135–145)
TSH SERPL DL<=0.005 MIU/L-ACNC: 1.88 UIU/ML (ref 0.3–4.2)
WBC # BLD AUTO: 7.6 X10/CMM (ref 3.8–11)

## 2024-08-12 PROCEDURE — 84134 ASSAY OF PREALBUMIN: CPT | Mod: ORL

## 2024-08-12 PROCEDURE — G2211 COMPLEX E/M VISIT ADD ON: HCPCS

## 2024-08-12 PROCEDURE — 36415 COLL VENOUS BLD VENIPUNCTURE: CPT

## 2024-08-12 PROCEDURE — 86140 C-REACTIVE PROTEIN: CPT | Mod: ORL

## 2024-08-12 PROCEDURE — 85651 RBC SED RATE NONAUTOMATED: CPT

## 2024-08-12 PROCEDURE — 80053 COMPREHEN METABOLIC PANEL: CPT | Mod: ORL

## 2024-08-12 PROCEDURE — 99214 OFFICE O/P EST MOD 30 MIN: CPT

## 2024-08-12 PROCEDURE — 84443 ASSAY THYROID STIM HORMONE: CPT | Mod: ORL

## 2024-08-12 PROCEDURE — 85025 COMPLETE CBC W/AUTO DIFF WBC: CPT

## 2024-08-12 RX ORDER — TRAZODONE HYDROCHLORIDE 50 MG/1
50 TABLET, FILM COATED ORAL AT BEDTIME
COMMUNITY
Start: 2024-02-23

## 2024-08-12 RX ORDER — ATORVASTATIN CALCIUM 20 MG/1
20 TABLET, FILM COATED ORAL DAILY
Qty: 90 TABLET | Refills: 3 | Status: SHIPPED | OUTPATIENT
Start: 2024-08-12

## 2024-08-12 RX ORDER — CARISOPRODOL 250 MG/1
250 TABLET ORAL 3 TIMES DAILY PRN
COMMUNITY

## 2024-08-12 NOTE — PROGRESS NOTES
"  Assessment & Plan     Hypercholesterolemia  - refill provided, tolerating well   - atorvastatin (LIPITOR) 20 MG tablet  Dispense: 90 tablet; Refill: 3  - VENOUS COLLECTION    Hypertriglyceridemia  - refill provided tolerating well   - atorvastatin (LIPITOR) 20 MG tablet  Dispense: 90 tablet; Refill: 3  - VENOUS COLLECTION    Weight loss  - unsure etiology as she has not changed much with her diet, dystonia and cervicalgia pain keeps her from eating sometimes but has been this way for 20+ years.  If nothing comes from lab work consider CT scan of chest/abdomen - this could be r/t probable lung disease given chronic smoker, will also refer to nutrition for further assistance as well   - CBC with Diff/Plt ([facility])  - TSH with free T4 reflex  - Comprehensive metabolic panel  - Sed Rate Westergren ([facility])  - CRP inflammation  - TSH with free T4 reflex  - Comprehensive metabolic panel  - CRP inflammation  - VENOUS COLLECTION              See Patient Instructions    No follow-ups on file.    Savannah Llamas is a 63 year old, presenting for the following health issues:  Hypertension (Has been checking BP at home weekly. Seems a little high still/Denies chest pain, SOB, headaches, blurred vision/Denies side effects from medications)    HPI     1.) HTN: feels well at current dose of atenolol, no lightheadedness, chest pain, SOB, vision changes.  BP readings at home are \"normal\" per patient. Did not bring in readings today.      2.) weight loss: dystonia, cervicalgia pain makes her not feel hungry.  Eating two times per day.  Goes to [facility] for pain.  [facility] pain clinic tomorrow.   Has felt more anxious.  Has tried amitryptiline, duloxetine without improvement.  Using clonazepam 3-4 times per week.  Has tried numerous anti-anxiety medications without success.    Wt Readings from Last 4 Encounters:   08/12/24 38.6 kg (85 lb)   04/24/24 40.1 kg (88 lb 6.4 oz)   11/01/23 42 kg (92 lb 9.6 oz)   10/26/23 42.9 kg (94 lb 9.6 " oz)          Review of Systems  Constitutional, HEENT, cardiovascular, pulmonary, gi and gu systems are negative, except as otherwise noted.      Objective    BP (!) 147/82   Pulse 53   Wt 38.6 kg (85 lb)   SpO2 98%   BMI 15.06 kg/m    Body mass index is 15.06 kg/m .  Physical Exam   GENERAL: alert, no distress, frail, and cachetic   EYES: Eyes grossly normal to inspection, PERRL and conjunctivae and sclerae normal  NECK: no adenopathy, no asymmetry, masses, or scars  RESP: lungs clear to auscultation - no rales, rhonchi or wheezes  CV: regular rate and rhythm, normal S1 S2, no S3 or S4, no murmur, click or rub, no peripheral edema  ABDOMEN: soft, nontender, no hepatosplenomegaly, no masses and bowel sounds normal  MS: no gross musculoskeletal defects noted, no edema    Results for orders placed or performed in visit on 08/12/24 (from the past 24 hour(s))   CBC with Diff/Plt ([facility])   Result Value Ref Range    WBC x10/cmm 7.6 3.8 - 11.0 x10/cmm    % Lymphocytes 24.5 20.5 - 51.1 %    % Monocytes 7.7 1.7 - 9.3 %    % Granulocytes 67.8 42.2 - 75.2 %    RBC x10/cmm 4.20 3.7 - 5.2 x10/cmm    Hemoglobin 12.7 11.8 - 15.5 g/dl    Hematocrit 36.9 35 - 46 %    MCV 87.9 80 - 100 fL    MCH 30.2 27.0 - 31.0 pg    MCHC 34.4 33.0 - 37.0 g/dL    Platelet Count 348 140 - 450 K/uL   Sed Rate Westergren ([facility])   Result Value Ref Range    Sed Rate 6 0 - 20 mm/hr           Signed Electronically by: CAROLYN Kilpatrick CNP      The longitudinal plan of care for the diagnosis(es)/condition(s) as documented were addressed during this visit. Due to the added complexity in care, I will continue to support Muriel in the subsequent management and with ongoing continuity of care.

## 2024-08-13 ENCOUNTER — TRANSFERRED RECORDS (OUTPATIENT)
Dept: FAMILY MEDICINE | Facility: CLINIC | Age: 64
End: 2024-08-13

## 2024-08-14 DIAGNOSIS — R63.4 WEIGHT LOSS: Primary | ICD-10-CM

## 2024-08-14 LAB — PREALB SERPL-MCNC: 25 MG/DL (ref 20–40)

## 2024-08-14 NOTE — PROGRESS NOTES
Will add on prealbumin to check for dietary deficiency and if this is normal should proceed with CT scan.

## 2024-09-17 DIAGNOSIS — Z17.0 MALIGNANT NEOPLASM OF UPPER-INNER QUADRANT OF LEFT BREAST IN FEMALE, ESTROGEN RECEPTOR POSITIVE (H): ICD-10-CM

## 2024-09-17 DIAGNOSIS — C50.212 MALIGNANT NEOPLASM OF UPPER-INNER QUADRANT OF LEFT BREAST IN FEMALE, ESTROGEN RECEPTOR POSITIVE (H): ICD-10-CM

## 2024-09-17 RX ORDER — ANASTROZOLE 1 MG/1
1 TABLET ORAL DAILY
Qty: 90 TABLET | Refills: 3 | Status: SHIPPED | OUTPATIENT
Start: 2024-09-17

## 2024-10-08 DIAGNOSIS — I10 HYPERTENSION GOAL BP (BLOOD PRESSURE) < 140/90: ICD-10-CM

## 2024-10-08 NOTE — TELEPHONE ENCOUNTER
Med: atenolol      LOV (related): 8/12/24    Last Lab: 8/12/24      Due for F/U around:  due for CPX      Next Appt: No current future appointments [place] [facility]         BP Readings from Last 3 Encounters:   08/12/24 (!) 147/82   04/24/24 131/89   11/01/23 (!) 155/93       Last Comprehensive Metabolic Panel:  Lab Results   Component Value Date     (L) 08/12/2024    POTASSIUM 4.3 08/12/2024    CHLORIDE 97 (L) 08/12/2024    CO2 26 08/12/2024    ANIONGAP 10 08/12/2024    GLC 83 08/12/2024    BUN 11.1 08/12/2024    CR 0.78 08/12/2024    GFRESTIMATED 85 08/12/2024    HATTIE 9.6 08/12/2024

## 2024-10-09 DIAGNOSIS — M85.851 OSTEOPENIA OF BOTH HIPS: ICD-10-CM

## 2024-10-09 DIAGNOSIS — M85.852 OSTEOPENIA OF BOTH HIPS: ICD-10-CM

## 2024-10-09 RX ORDER — ATENOLOL 50 MG/1
50 TABLET ORAL DAILY
Qty: 90 TABLET | Refills: 0 | Status: SHIPPED | OUTPATIENT
Start: 2024-10-09

## 2024-10-17 ENCOUNTER — TRANSFERRED RECORDS (OUTPATIENT)
Dept: FAMILY MEDICINE | Facility: CLINIC | Age: 64
End: 2024-10-17

## 2024-10-17 NOTE — PROGRESS NOTES
[facility]    Hematology/Oncology Established Patient Follow-up Note      Today's Date: 10/23/2024    Reason for Follow-up: Left breast cancer.    HISTORY OF PRESENT ILLNESS: Muriel Diaz is a 63 year old female with history of hypertension, degenerative joint disease, cervical spine surgery who presents with the following oncologic history:    - 4/16/2018: Left mastectomy under the care of Dr. Umana.  Pathology showed a grade 2 invasive mammary carcinoma with lobular and ductal features, measuring 4.3 x 4 x 2.1 cm, positive margin for invasive carcinoma in the central and lateral posterior surface, DCIS and LCIS present, ER positive at 95%, AR positive at 50%, HER2/nicola negative.  Left axillary dissection showed 3 of 5 lymph nodes positive for metastatic carcinoma (lobular type), ER positive, AR positive, HER2/nicola negative. Stage IIB, [id number].  -5/22/2018 - 10/2/2018: Completed adjuvant chemotherapy with dose dense dense doxorubicin and cyclophosphamide for 4 cycles followed by weekly paclitaxel for 12 cycles.  -12/17/2018: Completed adjuvant radiation therapy to the left chest wall.  - 12/28/2018: Started anastrozole.  - 3/22/2019: Underwent reconstructive surgery with left latissimus flap and left tissue expander placement with Dr. Crook.    INTERVAL HISTORY:  Muriel reports she has neck pain and working with [facility] on this.        REVIEW OF SYSTEMS:   14 point ROS was reviewed and is negative other than as noted above in HPI.       HOME MEDICATIONS:  Current Outpatient Medications   Medication Sig Dispense Refill    anastrozole (ARIMIDEX) 1 MG tablet Take 1 tablet (1 mg) by mouth daily. 90 tablet 3    atenolol (TENORMIN) 50 MG tablet Take 1 tablet (50 mg) by mouth daily 90 tablet 0    atorvastatin (LIPITOR) 20 MG tablet Take 1 tablet (20 mg) by mouth daily 90 tablet 3    calcium carbonate (OS-HATTIE) 1500 (600 Ca) MG tablet Take 1 tablet (600 mg) by mouth 2 times daily (with  meals). 180 tablet 3    carisoprodol (SOMA) 250 MG tablet Take 250 mg by mouth 3 times daily as needed      clonazePAM (KLONOPIN) 0.5 MG tablet Take 1 tablet by mouth twice a day*      tiZANidine (ZANAFLEX) 2 MG tablet TAKE 1 TO 2 TABLETS BY MOUTH 3 TIMES A DAY AS NEEDED FOR SPASTICITY AND 1-3 AT BED AS NEEDED.*      traZODone (DESYREL) 50 MG tablet Take 50 mg by mouth at bedtime      Vitamin D3 50 mcg (2000 units) tablet Take 1 tablet (50 mcg) by mouth daily 90 tablet 3         ALLERGIES:  Allergies   Allergen Reactions    Other (Do Not Use)      Other Reaction(s): causes HAs    Amitriptyline Other (See Comments)     nightmares    Oxycontin [Oxycodone] Nausea     Severe headaches         PAST MEDICAL HISTORY:  Past Medical History:   Diagnosis Date    Cancer (H)     BREAST CANCER    Degeneration of cervical intervertebral disc     Degeneration of lumbar or lumbosacral intervertebral disc     Hypertension     PONV (postoperative nausea and vomiting)      Gynecologic history: G0.  Hysterectomy in the 1990s.  Ovaries are intact.  Currently postmenopausal.    PAST SURGICAL HISTORY:  Past Surgical History:   Procedure Laterality Date    BACK SURGERY  2001    lumbar fusion, cervical discectomy    DISSECT LYMPH NODE AXILLA Left 4/16/2018    Procedure: DISSECT LYMPH NODE AXILLA;;  Surgeon: Rodney Umana MD;  Location:  [facility]    FUSION CERVICAL ANTERIOR THREE+ LEVELS  5/1/2012    Procedure:FUSION CERVICAL ANTERIOR THREE+ LEVELS; Surgeon:SARAH FRANCO; Location:[facility] OR    FUSION CERVICAL POSTERIOR THREE+ LEVELS  5/1/2012    Procedure:FUSION CERVICAL POSTERIOR THREE+ LEVELS; Posterior Cervical decompression and fusion C4-7, Anterior Cervical decompression and fusion C4-7 (c-arm), (herb table with abraham, yina oasis, yina aviator, Vitoss foam packing strip, impulse monitoring,); Surgeon:SARAH FRANCO; Location: OR    GYN SURGERY      HYSTERECTOMY VAGINAL  1990's    [facility]  specilist    HYSTERECTOMY, PAP NO LONGER INDICATED      INSERT PORT VASCULAR ACCESS N/A 5/14/2018    Procedure: INSERT PORT VASCULAR ACCESS;  PORT PLACEMENT;  Surgeon: Rodney Umana MD;  Location: [facility]    INSERT TISSUE EXPANDER BREAST Bilateral 3/22/2019    Procedure: INSERT TISSUE EXPANDER BREAST;  Surgeon: Naa Crook MD;  Location:  OR    MASTECTOMY SIMPLE Left 4/16/2018    Procedure: MASTECTOMY SIMPLE;  LEFT SIMPLE MASTECTOMY,LEFT AXILLARY DISSECTION;  Surgeon: Rodney Umana MD;  Location:  [facility]    RECONSTRUCT BREAST LATISSIMUS DORSI PEDICLE Left 3/22/2019    Procedure: LEFT LATISSIMUS FLAP RECONSTRUCT WITH TISSUE EXPANDER AND PORT REMOVAL;  Surgeon: Naa Crook MD;  Location:  OR    REMOVE PORT VASCULAR ACCESS N/A 3/22/2019    Procedure: REMOVE PORT VASCULAR ACCESS;  Surgeon: Naa Crook MD;  Location:  OR         SOCIAL HISTORY:  Social History     Socioeconomic History    Marital status: Single     Spouse name: Not on file    Number of children: 0    Years of education: Not on file    Highest education level: Not on file   Occupational History     Employer: [employer]    Tobacco Use    Smoking status: Every Day     Current packs/day: 0.50     Types: Cigarettes    Smokeless tobacco: Never    Tobacco comments:     4 cigarettes per day   Substance and Sexual Activity    Alcohol use: Yes     Alcohol/week: 1.0 standard drink of alcohol     Types: 1 Standard drinks or equivalent per week     Comment: 2-3 drinks per week    Drug use: No    Sexual activity: Not Currently     Partners: Male   Other Topics Concern    Parent/sibling w/ CABG, MI or angioplasty before 65F 55M? No   Social History Narrative    Lives with  (Pipo). Retired. On disability for cervical dystonia, stenosis and spasmodic torticollis. Enjoys gardening and cooking.      Social Determinants of Health     Financial Resource Strain: Unknown (10/31/2023)    Financial Resource Strain     Within the  "past 12 months, have you or your family members you live with been unable to get utilities (heat, electricity) when it was really needed?: Patient refused   Food Insecurity: Low Risk  (10/31/2023)    Food Insecurity     Within the past 12 months, did you worry that your food would run out before you got money to buy more?: No     Within the past 12 months, did the food you bought just not last and you didn t have money to get more?: No   Transportation Needs: Low Risk  (10/31/2023)    Transportation Needs     Within the past 12 months, has lack of transportation kept you from medical appointments, getting your medicines, non-medical meetings or appointments, work, or from getting things that you need?: No   Physical Activity: Not on file   Stress: Not on file   Social Connections: Not on file   Interpersonal Safety: Not At Risk (8/1/2022)    Humiliation, Afraid, Rape, and Kick questionnaire     Fear of Current or Ex-Partner: No     Emotionally Abused: No     Physically Abused: No     Sexually Abused: No   Housing Stability: Low Risk  (10/31/2023)    Housing Stability     Do you have housing? : Yes     Are you worried about losing your housing?: No   She has smoked half a pack of cigarettes for many years, duration uncertain.  She drinks alcohol occasionally.  Denies illicit drug use.  She is retired.  Previously worked in  at large companies.      FAMILY HISTORY:  Family History   Problem Relation Age of Onset    Pancreatitis Mother     Substance Abuse Mother     Unknown/Adopted Father    She does not know much about her family history and is unaware of any malignancy in her family.      PHYSICAL EXAM:  Vital signs:  /75   Pulse 61   Resp 16   Ht 1.6 m (5' 3\")   Wt 38.5 kg (84 lb 12.8 oz)   SpO2 97%   BMI 15.02 kg/m     GENERAL/CONSTITUTIONAL: No acute distress.  EYES:  No scleral icterus.  LYMPH: No cervical, supraclavicular, axillary adenopathy.   BREAST: Left breast is surgically absent " with flap reconstruction changes and implant in place.  No periprosthetic fluid. No left chest wall masses.  Right breast with no masses.  Right nipple everted with no discharge. No rash..  RESPIRATORY: No audible cough or wheezing.   GASTROINTESTINAL: No hepatosplenomegaly, masses, or tenderness. No guarding.  No distention.  MUSCULOSKELETAL: Warm and well-perfused, no cyanosis, clubbing, or edema.  NEUROLOGIC: Alert, oriented, answers questions appropriately.  INTEGUMENTARY: No rashes or jaundice.  GAIT: Steady, does not use assistive device    LABS:  CBC RESULTS:   Recent Labs   Lab Test 08/12/24  0000 03/15/19  1332 12/28/18  1320   WBC 7.6   < > 6.1   RBC 4.20   < > 3.81   HGB 12.7   < > 12.0   HCT 36.9   < > 35.9   MCV 87.9   < > 94   MCH 30.2   < > 31.5   MCHC 34.4   < > 33.4   RDW  --   --  13.4      < > 259    < > = values in this interval not displayed.        Last Comprehensive Metabolic Panel:  Sodium   Date Value Ref Range Status   08/12/2024 133 (L) 135 - 145 mmol/L Final   08/02/2022 143 134 - 144 mmol/L Final     Potassium   Date Value Ref Range Status   08/12/2024 4.3 3.4 - 5.3 mmol/L Final   08/02/2022 4.4 3.5 - 5.2 mmol/L Final     Chloride   Date Value Ref Range Status   08/12/2024 97 (L) 98 - 107 mmol/L Final   08/02/2022 102 96 - 106 mmol/L Final     Carbon Dioxide   Date Value Ref Range Status   12/28/2018 28 20 - 32 mmol/L Final     Carbon Dioxide (CO2)   Date Value Ref Range Status   08/12/2024 26 22 - 29 mmol/L Final     Anion Gap   Date Value Ref Range Status   08/12/2024 10 7 - 15 mmol/L Final   12/28/2018 5 3 - 14 mmol/L Final     Glucose   Date Value Ref Range Status   08/12/2024 83 70 - 99 mg/dL Final   08/02/2022 101 (H) 65 - 99 mg/dL Final     Urea Nitrogen   Date Value Ref Range Status   08/12/2024 11.1 8.0 - 23.0 mg/dL Final   08/02/2022 18 8 - 27 mg/dL Final     BUN/Creatinine Ratio   Date Value Ref Range Status   08/02/2022 26 12 - 28 Final     Creatinine   Date Value Ref  Range Status   2024 0.78 0.51 - 0.95 mg/dL Final   2022 0.70 0.57 - 1.00 mg/dL Final     GFR Estimate   Date Value Ref Range Status   2024 85 >60 mL/min/1.73m2 Final     Comment:     eGFR calculated using  CKD-EPI equation.   2019 77 >60 mL/min/[1.73_m2] Final     Comment:     Non  GFR Calc  Starting 2018, serum creatinine based estimated GFR (eGFR) will be   calculated using the Chronic Kidney Disease Epidemiology Collaboration   (CKD-EPI) equation.       Calcium   Date Value Ref Range Status   2024 9.6 8.8 - 10.4 mg/dL Final     Comment:     Reference intervals for this test were updated on 2024 to reflect our healthy population more accurately. There may be differences in the flagging of prior results with similar values performed with this method. Those prior results can be interpreted in the context of the updated reference intervals.   2022 11.0 (H) 8.7 - 10.3 mg/dL Final     Bilirubin Total   Date Value Ref Range Status   2024 0.2 <=1.2 mg/dL Final   2018 0.2 0.2 - 1.3 mg/dL Final     Alkaline Phosphatase   Date Value Ref Range Status   2024 93 40 - 150 U/L Final   2018 81 40 - 150 U/L Final     ALT   Date Value Ref Range Status   2024 12 0 - 50 U/L Final   2018 19 0 - 50 U/L Final     AST   Date Value Ref Range Status   2024 28 0 - 45 U/L Final   2018 16 0 - 45 U/L Final       PATHOLOGY:  None new.    IMAGIN2023: DEXA scan showed osteopenia with 0.6% decrease in bilateral hip BMD and 6.6% decrease in the left radius BMD.    2024: Right-sided mammogram showed benign findings.    ASSESSMENT/PLAN:  Muriel Diaz is a 62 year old female with the following issues:  1.  Stage IIB, [id number], grade 2 invasive mammary carcinoma with lobular and ductal features of the left upper inner breast, ER +95%, MA +50%, HER2 negative  - Muriel is status post left mastectomy 2018 and left  axillary dissection with 3 of 5 lymph nodes positive for metastatic lobular carcinoma.  She completed adjuvant chemotherapy with ddAC-T followed by radiation to the left chest wall completed 12/17/2018.  -She has no clinical evidence for recurrent breast cancer by physical exam from today or mammogram reviewed from 8/7/2024.  -She has been on adjuvant anastrozole since 12/28/2018, currently tolerating this well with no significant adverse effects.  -Advised up to 10 years of hormone blockade therapy given her extent of lymph node involvement and higher risk for recurrence.  -Continue with annual right-sided mammograms, next due 8/2025.    2.  Osteopenia  - DEXA scan from 4/27/2023 showed osteopenia with 0.6% decrease in bilateral hip BMD and 6.6% decrease in the left radius BMD despite taking alendronate.  --Discontinued alendronate as she was having diarrhea from it and it did not improve her bone density.  - Advised adequate calcium and vitamin D as well as weightbearing exercise. She already eats a lot of dairy products. Refilled her vitamin D.  --She asked about Evenity.  Discussed it is not clear if insurance will cover this drug.  - Discussed trying once per year Reclast.  Discussed small risk of osteonecrosis of the jaw.  However, she does not have dental insurance. I again recommended referral to endocrinology for further assistance but she again declines this.  --She agrees to proceed with Reclast.  - Plan to repeat DEXA scan in 2025.    3.  Tobacco abuse  --She is still smoking about a pack every 3 days.  - Again urged smoking cessation and offered assistance with cessation. She declines QUIT plan referral.    4. Hot flashes  --Venlafaxine did not help.  --Improved with clonazepam, which she will continue.    5. Weight loss  6. Chronic neck pain related to ruptured disk  --She is attributing her weight loss to her chronic neck pain.  She is working with [facility] for implanting a pain pump.    Return in  6 months.    Lisa Maier MD  [facility] Hematology/Oncology     Total time spent today: 40 minutes in chart review, patient evaluation, counseling, documentation, test and/or medication/prescription orders, and coordination of care.     The longitudinal plan of care for the diagnosis(es)/condition(s) as documented were addressed during this visit. Due to the added complexity in care, I will continue to support Muriel in the subsequent management and with ongoing continuity of care.

## 2024-10-23 ENCOUNTER — ONCOLOGY VISIT (OUTPATIENT)
Dept: ONCOLOGY | Facility: CLINIC | Age: 64
End: 2024-10-23
Attending: INTERNAL MEDICINE
Payer: MEDICARE

## 2024-10-23 VITALS
OXYGEN SATURATION: 97 % | BODY MASS INDEX: 15.02 KG/M2 | DIASTOLIC BLOOD PRESSURE: 75 MMHG | HEIGHT: 63 IN | RESPIRATION RATE: 16 BRPM | HEART RATE: 61 BPM | SYSTOLIC BLOOD PRESSURE: 139 MMHG | WEIGHT: 84.8 LBS

## 2024-10-23 DIAGNOSIS — Z17.0 MALIGNANT NEOPLASM OF UPPER-INNER QUADRANT OF LEFT BREAST IN FEMALE, ESTROGEN RECEPTOR POSITIVE (H): Primary | ICD-10-CM

## 2024-10-23 DIAGNOSIS — C50.212 MALIGNANT NEOPLASM OF UPPER-INNER QUADRANT OF LEFT BREAST IN FEMALE, ESTROGEN RECEPTOR POSITIVE (H): Primary | ICD-10-CM

## 2024-10-23 DIAGNOSIS — M85.89 OSTEOPENIA OF MULTIPLE SITES: ICD-10-CM

## 2024-10-23 DIAGNOSIS — R79.89 LOW VITAMIN D LEVEL: ICD-10-CM

## 2024-10-23 DIAGNOSIS — N95.1 MENOPAUSAL SYNDROME (HOT FLASHES): ICD-10-CM

## 2024-10-23 PROCEDURE — G2211 COMPLEX E/M VISIT ADD ON: HCPCS | Performed by: INTERNAL MEDICINE

## 2024-10-23 PROCEDURE — G0463 HOSPITAL OUTPT CLINIC VISIT: HCPCS | Performed by: INTERNAL MEDICINE

## 2024-10-23 PROCEDURE — 99215 OFFICE O/P EST HI 40 MIN: CPT | Performed by: INTERNAL MEDICINE

## 2024-10-23 RX ORDER — HEPARIN SODIUM,PORCINE 10 UNIT/ML
5-20 VIAL (ML) INTRAVENOUS DAILY PRN
Status: CANCELLED | OUTPATIENT
Start: 2024-10-23

## 2024-10-23 RX ORDER — HEPARIN SODIUM (PORCINE) LOCK FLUSH IV SOLN 100 UNIT/ML 100 UNIT/ML
5 SOLUTION INTRAVENOUS
Status: CANCELLED | OUTPATIENT
Start: 2024-10-23

## 2024-10-23 RX ORDER — CHOLECALCIFEROL (VITAMIN D3) 50 MCG
1 TABLET ORAL DAILY
Qty: 90 TABLET | Refills: 3 | Status: SHIPPED | OUTPATIENT
Start: 2024-10-23

## 2024-10-23 RX ORDER — ZOLEDRONIC ACID 0.05 MG/ML
5 INJECTION, SOLUTION INTRAVENOUS ONCE
Status: CANCELLED
Start: 2024-10-23 | End: 2024-10-23

## 2024-10-23 ASSESSMENT — PAIN SCALES - GENERAL: PAINLEVEL_OUTOF10: NO PAIN (0)

## 2024-10-23 NOTE — PROGRESS NOTES
"Oncology Rooming Note    October 23, 2024 1:38 PM   Muriel Diaz is a 63 year old female who presents for:    Chief Complaint   Patient presents with    Oncology Clinic Visit     Initial Vitals: There were no vitals taken for this visit. Estimated body mass index is 15.06 kg/m  as calculated from the following:    Height as of 4/24/24: 1.6 m (5' 3\").    Weight as of 8/12/24: 38.6 kg (85 lb). There is no height or weight on file to calculate BSA.  Data Unavailable Comment: Data Unavailable   No LMP recorded. Patient has had a hysterectomy.  Allergies reviewed: Yes  Medications reviewed: Yes    Medications: Medication refills not needed today.  Pharmacy name entered into [employer]:    [facility] [place], [place] - [address] [place] [address] [facility]AC[facility][id number] [place], [place] - [address]    Frailty Screening:   Is the patient here for a new oncology consult visit in cancer care? 2. No        Dodie Galeas MA            "

## 2024-10-23 NOTE — LETTER
10/23/2024      Muriel Diaz  [address]  [facility] [address]      Dear Colleague,    Thank you for referring your patient, Muriel Diaz, to the [facility]. Please see a copy of my visit note below.    [facility] [place] [facility]    Hematology/Oncology Established Patient Follow-up Note      Today's Date: 10/23/2024    Reason for Follow-up: Left breast cancer.    HISTORY OF PRESENT ILLNESS: Muriel Diaz is a 63 year old female with history of hypertension, degenerative joint disease, cervical spine surgery who presents with the following oncologic history:    - 4/16/2018: Left mastectomy under the care of Dr. Umana.  Pathology showed a grade 2 invasive mammary carcinoma with lobular and ductal features, measuring 4.3 x 4 x 2.1 cm, positive margin for invasive carcinoma in the central and lateral posterior surface, DCIS and LCIS present, ER positive at 95%, NV positive at 50%, HER2/nicola negative.  Left axillary dissection showed 3 of 5 lymph nodes positive for metastatic carcinoma (lobular type), ER positive, NV positive, HER2/nicola negative. Stage IIB, [id number].  -5/22/2018 - 10/2/2018: Completed adjuvant chemotherapy with dose dense dense doxorubicin and cyclophosphamide for 4 cycles followed by weekly paclitaxel for 12 cycles.  -12/17/2018: Completed adjuvant radiation therapy to the left chest wall.  - 12/28/2018: Started anastrozole.  - 3/22/2019: Underwent reconstructive surgery with left latissimus flap and left tissue expander placement with Dr. Crook.    INTERVAL HISTORY:  Muriel reports she has neck pain and working with [facility] on this.        REVIEW OF SYSTEMS:   14 point ROS was reviewed and is negative other than as noted above in HPI.       HOME MEDICATIONS:  Current Outpatient Medications   Medication Sig Dispense Refill     anastrozole (ARIMIDEX) 1 MG tablet Take 1 tablet (1 mg) by mouth daily. 90 tablet 3     atenolol (TENORMIN) 50 MG  tablet Take 1 tablet (50 mg) by mouth daily 90 tablet 0     atorvastatin (LIPITOR) 20 MG tablet Take 1 tablet (20 mg) by mouth daily 90 tablet 3     calcium carbonate (OS-HATTIE) 1500 (600 Ca) MG tablet Take 1 tablet (600 mg) by mouth 2 times daily (with meals). 180 tablet 3     carisoprodol (SOMA) 250 MG tablet Take 250 mg by mouth 3 times daily as needed       clonazePAM (KLONOPIN) 0.5 MG tablet Take 1 tablet by mouth twice a day*       tiZANidine (ZANAFLEX) 2 MG tablet TAKE 1 TO 2 TABLETS BY MOUTH 3 TIMES A DAY AS NEEDED FOR SPASTICITY AND 1-3 AT BED AS NEEDED.*       traZODone (DESYREL) 50 MG tablet Take 50 mg by mouth at bedtime       Vitamin D3 50 mcg (2000 units) tablet Take 1 tablet (50 mcg) by mouth daily 90 tablet 3         ALLERGIES:  Allergies   Allergen Reactions     Other (Do Not Use)      Other Reaction(s): causes HAs     Amitriptyline Other (See Comments)     nightmares     Oxycontin [Oxycodone] Nausea     Severe headaches         PAST MEDICAL HISTORY:  Past Medical History:   Diagnosis Date     Cancer (H)     BREAST CANCER     Degeneration of cervical intervertebral disc      Degeneration of lumbar or lumbosacral intervertebral disc      Hypertension      PONV (postoperative nausea and vomiting)      Gynecologic history: G0.  Hysterectomy in the 1990s.  Ovaries are intact.  Currently postmenopausal.    PAST SURGICAL HISTORY:  Past Surgical History:   Procedure Laterality Date     BACK SURGERY  2001    lumbar fusion, cervical discectomy     DISSECT LYMPH NODE AXILLA Left 4/16/2018    Procedure: DISSECT LYMPH NODE AXILLA;;  Surgeon: Rodney Umana MD;  Location: [facility]     FUSION CERVICAL ANTERIOR THREE+ LEVELS  5/1/2012    Procedure:FUSION CERVICAL ANTERIOR THREE+ LEVELS; Surgeon:SARAH FRANCO; Location:[facility] OR     FUSION CERVICAL POSTERIOR THREE+ LEVELS  5/1/2012    Procedure:FUSION CERVICAL POSTERIOR THREE+ LEVELS; Posterior Cervical decompression and fusion C4-7, Anterior Cervical  decompression and fusion C4-7 (c-arm), (herb table with abraham, yina oasis, yina aviator, Vitoss foam packing strip, impulse monitoring,); Surgeon:SARAH FRANCO; Location: OR     GYN SURGERY       HYSTERECTOMY VAGINAL  1990's    Judy Aceves OB Gyn specilist     HYSTERECTOMY, PAP NO LONGER INDICATED       INSERT PORT VASCULAR ACCESS N/A 5/14/2018    Procedure: INSERT PORT VASCULAR ACCESS;  PORT PLACEMENT;  Surgeon: Rodney Umana MD;  Location: [facility]     INSERT TISSUE EXPANDER BREAST Bilateral 3/22/2019    Procedure: INSERT TISSUE EXPANDER BREAST;  Surgeon: Naa Crook MD;  Location:  OR     MASTECTOMY SIMPLE Left 4/16/2018    Procedure: MASTECTOMY SIMPLE;  LEFT SIMPLE MASTECTOMY,LEFT AXILLARY DISSECTION;  Surgeon: Rodney Umana MD;  Location: [facility]     RECONSTRUCT BREAST LATISSIMUS DORSI PEDICLE Left 3/22/2019    Procedure: LEFT LATISSIMUS FLAP RECONSTRUCT WITH TISSUE EXPANDER AND PORT REMOVAL;  Surgeon: Naa Crook MD;  Location:  OR     REMOVE PORT VASCULAR ACCESS N/A 3/22/2019    Procedure: REMOVE PORT VASCULAR ACCESS;  Surgeon: Naa Crook MD;  Location:  OR         SOCIAL HISTORY:  Social History     Socioeconomic History     Marital status: Single     Spouse name: Not on file     Number of children: 0     Years of education: Not on file     Highest education level: Not on file   Occupational History     Employer: [employer]    Tobacco Use     Smoking status: Every Day     Current packs/day: 0.50     Types: Cigarettes     Smokeless tobacco: Never     Tobacco comments:     4 cigarettes per day   Substance and Sexual Activity     Alcohol use: Yes     Alcohol/week: 1.0 standard drink of alcohol     Types: 1 Standard drinks or equivalent per week     Comment: 2-3 drinks per week     Drug use: No     Sexual activity: Not Currently     Partners: Male   Other Topics Concern     Parent/sibling w/ CABG, MI or angioplasty before 65F 55M? No    Social History Narrative    Lives with YOLANDA Lopez). Retired. On disability for cervical dystonia, stenosis and spasmodic torticollis. Enjoys gardening and cooking.      Social Determinants of Health     Financial Resource Strain: Unknown (10/31/2023)    Financial Resource Strain      Within the past 12 months, have you or your family members you live with been unable to get utilities (heat, electricity) when it was really needed?: Patient refused   Food Insecurity: Low Risk  (10/31/2023)    Food Insecurity      Within the past 12 months, did you worry that your food would run out before you got money to buy more?: No      Within the past 12 months, did the food you bought just not last and you didn t have money to get more?: No   Transportation Needs: Low Risk  (10/31/2023)    Transportation Needs      Within the past 12 months, has lack of transportation kept you from medical appointments, getting your medicines, non-medical meetings or appointments, work, or from getting things that you need?: No   Physical Activity: Not on file   Stress: Not on file   Social Connections: Not on file   Interpersonal Safety: Not At Risk (8/1/2022)    Humiliation, Afraid, Rape, and Kick questionnaire      Fear of Current or Ex-Partner: No      Emotionally Abused: No      Physically Abused: No      Sexually Abused: No   Housing Stability: Low Risk  (10/31/2023)    Housing Stability      Do you have housing? : Yes      Are you worried about losing your housing?: No   She has smoked half a pack of cigarettes for many years, duration uncertain.  She drinks alcohol occasionally.  Denies illicit drug use.  She is retired.  Previously worked in  at large companies.      FAMILY HISTORY:  Family History   Problem Relation Age of Onset     Pancreatitis Mother      Substance Abuse Mother      Unknown/Adopted Father    She does not know much about her family history and is unaware of any malignancy in her family.      PHYSICAL  "EXAM:  Vital signs:  /75   Pulse 61   Resp 16   Ht 1.6 m (5' 3\")   Wt 38.5 kg (84 lb 12.8 oz)   SpO2 97%   BMI 15.02 kg/m     GENERAL/CONSTITUTIONAL: No acute distress.  EYES:  No scleral icterus.  LYMPH: No cervical, supraclavicular, axillary adenopathy.   BREAST: Left breast is surgically absent with flap reconstruction changes and implant in place.  No periprosthetic fluid. No left chest wall masses.  Right breast with no masses.  Right nipple everted with no discharge. No rash..  RESPIRATORY: No audible cough or wheezing.   GASTROINTESTINAL: No hepatosplenomegaly, masses, or tenderness. No guarding.  No distention.  MUSCULOSKELETAL: Warm and well-perfused, no cyanosis, clubbing, or edema.  NEUROLOGIC: Alert, oriented, answers questions appropriately.  INTEGUMENTARY: No rashes or jaundice.  GAIT: Steady, does not use assistive device    LABS:  CBC RESULTS:   Recent Labs   Lab Test 08/12/24  0000 03/15/19  1332 12/28/18  1320   WBC 7.6   < > 6.1   RBC 4.20   < > 3.81   HGB 12.7   < > 12.0   HCT 36.9   < > 35.9   MCV 87.9   < > 94   MCH 30.2   < > 31.5   MCHC 34.4   < > 33.4   RDW  --   --  13.4      < > 259    < > = values in this interval not displayed.        Last Comprehensive Metabolic Panel:  Sodium   Date Value Ref Range Status   08/12/2024 133 (L) 135 - 145 mmol/L Final   08/02/2022 143 134 - 144 mmol/L Final     Potassium   Date Value Ref Range Status   08/12/2024 4.3 3.4 - 5.3 mmol/L Final   08/02/2022 4.4 3.5 - 5.2 mmol/L Final     Chloride   Date Value Ref Range Status   08/12/2024 97 (L) 98 - 107 mmol/L Final   08/02/2022 102 96 - 106 mmol/L Final     Carbon Dioxide   Date Value Ref Range Status   12/28/2018 28 20 - 32 mmol/L Final     Carbon Dioxide (CO2)   Date Value Ref Range Status   08/12/2024 26 22 - 29 mmol/L Final     Anion Gap   Date Value Ref Range Status   08/12/2024 10 7 - 15 mmol/L Final   12/28/2018 5 3 - 14 mmol/L Final     Glucose   Date Value Ref Range Status "   2024 83 70 - 99 mg/dL Final   2022 101 (H) 65 - 99 mg/dL Final     Urea Nitrogen   Date Value Ref Range Status   2024 11.1 8.0 - 23.0 mg/dL Final   2022 18 8 - 27 mg/dL Final     BUN/Creatinine Ratio   Date Value Ref Range Status   2022 26 12 - 28 Final     Creatinine   Date Value Ref Range Status   2024 0.78 0.51 - 0.95 mg/dL Final   2022 0.70 0.57 - 1.00 mg/dL Final     GFR Estimate   Date Value Ref Range Status   2024 85 >60 mL/min/1.73m2 Final     Comment:     eGFR calculated using  CKD-EPI equation.   2019 77 >60 mL/min/[1.73_m2] Final     Comment:     Non  GFR Calc  Starting 2018, serum creatinine based estimated GFR (eGFR) will be   calculated using the Chronic Kidney Disease Epidemiology Collaboration   (CKD-EPI) equation.       Calcium   Date Value Ref Range Status   2024 9.6 8.8 - 10.4 mg/dL Final     Comment:     Reference intervals for this test were updated on 2024 to reflect our healthy population more accurately. There may be differences in the flagging of prior results with similar values performed with this method. Those prior results can be interpreted in the context of the updated reference intervals.   2022 11.0 (H) 8.7 - 10.3 mg/dL Final     Bilirubin Total   Date Value Ref Range Status   2024 0.2 <=1.2 mg/dL Final   2018 0.2 0.2 - 1.3 mg/dL Final     Alkaline Phosphatase   Date Value Ref Range Status   2024 93 40 - 150 U/L Final   2018 81 40 - 150 U/L Final     ALT   Date Value Ref Range Status   2024 12 0 - 50 U/L Final   2018 19 0 - 50 U/L Final     AST   Date Value Ref Range Status   2024 28 0 - 45 U/L Final   2018 16 0 - 45 U/L Final       PATHOLOGY:  None new.    IMAGIN2023: DEXA scan showed osteopenia with 0.6% decrease in bilateral hip BMD and 6.6% decrease in the left radius BMD.    2024: Right-sided mammogram showed benign  findings.    ASSESSMENT/PLAN:  Muriel Diaz is a 62 year old female with the following issues:  1.  Stage IIB, [id number], grade 2 invasive mammary carcinoma with lobular and ductal features of the left upper inner breast, ER +95%, ND +50%, HER2 negative  - Muriel is status post left mastectomy 4/16/2018 and left axillary dissection with 3 of 5 lymph nodes positive for metastatic lobular carcinoma.  She completed adjuvant chemotherapy with ddAC-T followed by radiation to the left chest wall completed 12/17/2018.  -She has no clinical evidence for recurrent breast cancer by physical exam from today or mammogram reviewed from 8/7/2024.  -She has been on adjuvant anastrozole since 12/28/2018, currently tolerating this well with no significant adverse effects.  -Advised up to 10 years of hormone blockade therapy given her extent of lymph node involvement and higher risk for recurrence.  -Continue with annual right-sided mammograms, next due 8/2025.    2.  Osteopenia  - DEXA scan from 4/27/2023 showed osteopenia with 0.6% decrease in bilateral hip BMD and 6.6% decrease in the left radius BMD despite taking alendronate.  --Discontinued alendronate as she was having diarrhea from it and it did not improve her bone density.  - Advised adequate calcium and vitamin D as well as weightbearing exercise. She already eats a lot of dairy products. Refilled her vitamin D.  --She asked about Evenity.  Discussed it is not clear if insurance will cover this drug.  - Discussed trying once per year Reclast.  Discussed small risk of osteonecrosis of the jaw.  However, she does not have dental insurance. I again recommended referral to endocrinology for further assistance but she again declines this.  --She agrees to proceed with Reclast.  - Plan to repeat DEXA scan in 2025.    3.  Tobacco abuse  --She is still smoking about a pack every 3 days.  - Again urged smoking cessation and offered assistance with cessation. She declines QUIT  "plan referral.    4. Hot flashes  --Venlafaxine did not help.  --Improved with clonazepam, which she will continue.    5. Weight loss  6. Chronic neck pain related to ruptured disk  --She is attributing her weight loss to her chronic neck pain.  She is working with [facility] for implanting a pain pump.    Return in 6 months.    Lisa Maier MD  [facility] Hematology/Oncology     Total time spent today: 40 minutes in chart review, patient evaluation, counseling, documentation, test and/or medication/prescription orders, and coordination of care.     The longitudinal plan of care for the diagnosis(es)/condition(s) as documented were addressed during this visit. Due to the added complexity in care, I will continue to support Muriel in the subsequent management and with ongoing continuity of care.      Oncology Rooming Note    October 23, 2024 1:38 PM   Muriel Diaz is a 63 year old female who presents for:    Chief Complaint   Patient presents with     Oncology Clinic Visit     Initial Vitals: There were no vitals taken for this visit. Estimated body mass index is 15.06 kg/m  as calculated from the following:    Height as of 4/24/24: 1.6 m (5' 3\").    Weight as of 8/12/24: 38.6 kg (85 lb). There is no height or weight on file to calculate BSA.  Data Unavailable Comment: Data Unavailable   No LMP recorded. Patient has had a hysterectomy.  Allergies reviewed: Yes  Medications reviewed: Yes    Medications: Medication refills not needed today.  Pharmacy name entered into [facility]:    [facility] [place], [place] - [address] [place] [address] PHARMACY #[id number] [facility] [address]    Frailty Screening:   Is the patient here for a new oncology consult visit in cancer care? 2. No        Dodie Galeas MA              Again, thank you for allowing me to participate in the care of your patient.        Sincerely,        Lisa Maier MD  "

## 2024-10-25 DIAGNOSIS — Z17.0 MALIGNANT NEOPLASM OF UPPER-INNER QUADRANT OF LEFT BREAST IN FEMALE, ESTROGEN RECEPTOR POSITIVE (H): Primary | ICD-10-CM

## 2024-10-25 DIAGNOSIS — M85.89 OSTEOPENIA OF MULTIPLE SITES: ICD-10-CM

## 2024-10-25 DIAGNOSIS — C50.212 MALIGNANT NEOPLASM OF UPPER-INNER QUADRANT OF LEFT BREAST IN FEMALE, ESTROGEN RECEPTOR POSITIVE (H): Primary | ICD-10-CM

## 2024-10-25 RX ORDER — ALBUTEROL SULFATE 0.83 MG/ML
2.5 SOLUTION RESPIRATORY (INHALATION)
OUTPATIENT
Start: 2024-10-28

## 2024-10-25 RX ORDER — EPINEPHRINE 1 MG/ML
0.3 INJECTION, SOLUTION, CONCENTRATE INTRAVENOUS EVERY 5 MIN PRN
OUTPATIENT
Start: 2024-10-28

## 2024-10-25 RX ORDER — ALBUTEROL SULFATE 90 UG/1
1-2 INHALANT RESPIRATORY (INHALATION)
Start: 2024-10-28

## 2024-10-25 RX ORDER — DIPHENHYDRAMINE HYDROCHLORIDE 50 MG/ML
50 INJECTION INTRAMUSCULAR; INTRAVENOUS
Start: 2024-10-28

## 2024-10-25 RX ORDER — MEPERIDINE HYDROCHLORIDE 25 MG/ML
25 INJECTION INTRAMUSCULAR; INTRAVENOUS; SUBCUTANEOUS
OUTPATIENT
Start: 2024-10-28

## 2024-10-25 RX ORDER — DIPHENHYDRAMINE HYDROCHLORIDE 50 MG/ML
25 INJECTION INTRAMUSCULAR; INTRAVENOUS
Start: 2024-10-28

## 2024-12-22 DIAGNOSIS — I10 HYPERTENSION GOAL BP (BLOOD PRESSURE) < 140/90: ICD-10-CM

## 2024-12-22 ASSESSMENT — ANXIETY QUESTIONNAIRES
6. BECOMING EASILY ANNOYED OR IRRITABLE: NOT AT ALL
2. NOT BEING ABLE TO STOP OR CONTROL WORRYING: NOT AT ALL
GAD7 TOTAL SCORE: 2
GAD7 TOTAL SCORE: 2
3. WORRYING TOO MUCH ABOUT DIFFERENT THINGS: SEVERAL DAYS
7. FEELING AFRAID AS IF SOMETHING AWFUL MIGHT HAPPEN: NOT AT ALL
4. TROUBLE RELAXING: SEVERAL DAYS
8. IF YOU CHECKED OFF ANY PROBLEMS, HOW DIFFICULT HAVE THESE MADE IT FOR YOU TO DO YOUR WORK, TAKE CARE OF THINGS AT HOME, OR GET ALONG WITH OTHER PEOPLE?: SOMEWHAT DIFFICULT
7. FEELING AFRAID AS IF SOMETHING AWFUL MIGHT HAPPEN: NOT AT ALL
GAD7 TOTAL SCORE: 2
IF YOU CHECKED OFF ANY PROBLEMS ON THIS QUESTIONNAIRE, HOW DIFFICULT HAVE THESE PROBLEMS MADE IT FOR YOU TO DO YOUR WORK, TAKE CARE OF THINGS AT HOME, OR GET ALONG WITH OTHER PEOPLE: SOMEWHAT DIFFICULT
1. FEELING NERVOUS, ANXIOUS, OR ON EDGE: NOT AT ALL
5. BEING SO RESTLESS THAT IT IS HARD TO SIT STILL: NOT AT ALL

## 2024-12-22 ASSESSMENT — PATIENT HEALTH QUESTIONNAIRE - PHQ9
SUM OF ALL RESPONSES TO PHQ QUESTIONS 1-9: 5
10. IF YOU CHECKED OFF ANY PROBLEMS, HOW DIFFICULT HAVE THESE PROBLEMS MADE IT FOR YOU TO DO YOUR WORK, TAKE CARE OF THINGS AT HOME, OR GET ALONG WITH OTHER PEOPLE: SOMEWHAT DIFFICULT
SUM OF ALL RESPONSES TO PHQ QUESTIONS 1-9: 5

## 2024-12-23 ENCOUNTER — OFFICE VISIT (OUTPATIENT)
Dept: FAMILY MEDICINE | Facility: CLINIC | Age: 64
End: 2024-12-23

## 2024-12-23 VITALS
OXYGEN SATURATION: 99 % | BODY MASS INDEX: 15.59 KG/M2 | HEART RATE: 55 BPM | SYSTOLIC BLOOD PRESSURE: 179 MMHG | WEIGHT: 88 LBS | DIASTOLIC BLOOD PRESSURE: 68 MMHG

## 2024-12-23 DIAGNOSIS — E78.2 MIXED HYPERLIPIDEMIA: ICD-10-CM

## 2024-12-23 DIAGNOSIS — F17.200 TOBACCO USE DISORDER: ICD-10-CM

## 2024-12-23 DIAGNOSIS — I10 HYPERTENSION GOAL BP (BLOOD PRESSURE) < 140/90: ICD-10-CM

## 2024-12-23 DIAGNOSIS — Z01.818 PREOP GENERAL PHYSICAL EXAM: Primary | ICD-10-CM

## 2024-12-23 DIAGNOSIS — C77.9 SECONDARY AND UNSPECIFIED MALIGNANT NEOPLASM OF LYMPH NODE, UNSPECIFIED (H): ICD-10-CM

## 2024-12-23 DIAGNOSIS — F41.9 ANXIETY: ICD-10-CM

## 2024-12-23 DIAGNOSIS — Z23 NEED FOR VACCINATION: ICD-10-CM

## 2024-12-23 DIAGNOSIS — M54.2 CERVICALGIA: ICD-10-CM

## 2024-12-23 DIAGNOSIS — G44.86 CERVICOGENIC HEADACHE: ICD-10-CM

## 2024-12-23 RX ORDER — ATENOLOL 50 MG/1
50 TABLET ORAL DAILY
Qty: 90 TABLET | Refills: 0 | Status: SHIPPED | OUTPATIENT
Start: 2024-12-23

## 2024-12-23 RX ORDER — CLINDAMYCIN HYDROCHLORIDE 300 MG/1
300 CAPSULE ORAL 3 TIMES DAILY
COMMUNITY
Start: 2024-12-17

## 2024-12-23 RX ORDER — HYDROCODONE BITARTRATE AND ACETAMINOPHEN 5; 325 MG/1; MG/1
1 TABLET ORAL 2 TIMES DAILY
COMMUNITY
Start: 2024-12-02

## 2024-12-23 RX ORDER — ATENOLOL 50 MG/1
50 TABLET ORAL DAILY
Qty: 90 TABLET | Refills: 0 | OUTPATIENT
Start: 2024-12-23

## 2024-12-23 RX ORDER — MUPIROCIN 20 MG/G
OINTMENT TOPICAL 2 TIMES DAILY
COMMUNITY
Start: 2024-12-17

## 2024-12-23 RX ORDER — ROSUVASTATIN CALCIUM 10 MG/1
10 TABLET, COATED ORAL DAILY
Qty: 90 TABLET | Refills: 0 | Status: SHIPPED | OUTPATIENT
Start: 2024-12-23

## 2024-12-23 NOTE — PROGRESS NOTES
Answers submitted by the patient for this visit:  Patient Health Questionnaire (Submitted on 12/22/2024)  If you checked off any problems, how difficult have these problems made it for you to do your work, take care of things at home, or get along with other people?: Somewhat difficult  PHQ9 TOTAL SCORE: 5  Patient Health Questionnaire (G7) (Submitted on 12/22/2024)  ANTHONY 7 TOTAL SCORE: 2    Preoperative Evaluation  [facility] [address] [place] [address]  Phone: [phone]  Fax: [phone]  Primary Provider: CAROLYN Kilpatrick CNP  Pre-op Performing Provider: CAROLYN Kilpatrick CNP  Dec 23, 2024             12/17/2024   Surgical Information   What procedure is being done? Pain Pump   Facility or Hospital where procedure/surgery will be performed: [facility]   Who is doing the procedure / surgery? Dr. Karen Rouse   Date of surgery / procedure: 12/26/24   Time of surgery / procedure: 9:00 am   Where do you plan to recover after surgery? at home with family     Fax number for surgical facility: [phone]    Assessment & Plan     The proposed surgical procedure is considered LOW risk.    Preop general physical exam  Cervicalgia  Cervicogenic headache    Tobacco use disorder  - declines cessation   - rosuvastatin (CRESTOR) 10 MG tablet  Dispense: 90 tablet; Refill: 0    Hypertension goal BP (blood pressure) < 140/90  - elevated today, suspect r/t high level of pain, refill of atenolol provided, she has tried many alternative antihypertensives with side effects and atenolol has been the best for her  - atenolol (TENORMIN) 50 MG tablet  Dispense: 90 tablet; Refill: 0    Anxiety  - takes clonazepam at night only     Need for vaccination  - VACCINE ADMINISTRATION, INITIAL    Secondary and unspecified malignant neoplasm of lymph node, unspecified (H)  - left side - no blood draw, IV, or blood pressure    Mixed hyperlipidemia  - switched to rosuvastatin to lessen risk for MSK  adverse effects, patient to follow up for recheck on cholesterol in 3 months   - Education about adverse effects of prescription medication discussed  -Red flags that warrant emergent evaluation discussed  -Patient verbalized understanding and is agreeable to the discussed plan of care.    - ok to start after pain pump implant   - rosuvastatin (CRESTOR) 10 MG tablet  Dispense: 90 tablet; Refill: 0        Possible Sleep Apnea: present per patient, has never had a work up. Patient will not wear a CPAP           - No identified additional risk factors other than previously addressed        Recommendation  Approval given to proceed with proposed procedure, without further diagnostic evaluation.    Savannah Llamas is a 64 year old, presenting for the following:  Pre-Op Exam (Preop for pain pump placement. Scheduled for 12/26/24 at [facility] with Dr. Rouse.)    1.) cervicalgia history: a lot of pain today, soma not helping much, managed by [facility].   -completed ITPP trial on 11/14/24 with > 50% reduction in pain     2.) Anxiety: takes clonazepam nightly for sleep and anxiety     3.) HTN: suspect elevation r/t high level of pain today, previous readings have been ok  BP Readings from Last 3 Encounters:   12/23/24 (!) 179/68   10/23/24 139/75   08/12/24 (!) 147/82     4.) Diarrhea: started after eating dinner out on Friday - pepto is helping moderately, no fever, body aches, chills, nausea, vomiting     HPI related to upcoming procedure:           12/17/2024   Pre-Op Questionnaire   Have you ever had a heart attack or stroke? No   Have you ever had surgery on your heart or blood vessels, such as a stent placement, a coronary artery bypass, or surgery on an artery in your head, neck, heart, or legs? No   Do you have chest pain with activity? No   Do you have a history of heart failure? No   Do you currently have a cold, bronchitis or symptoms of other infection? No   Do you have a cough, shortness of breath, or  wheezing? No   Do you or anyone in your family have previous history of blood clots? No   Do you or does anyone in your family have a serious bleeding problem such as prolonged bleeding following surgeries or cuts? No   Have you ever had problems with anemia or been told to take iron pills? No   Have you had any abnormal blood loss such as black, tarry or bloody stools, or abnormal vaginal bleeding? No   Have you ever had a blood transfusion? No   Are you willing to have a blood transfusion if it is medically needed before, during, or after your surgery? Yes   Have you or any of your relatives ever had problems with anesthesia? No   Do you have sleep apnea, excessive snoring or daytime drowsiness? (!) YES   Do you have a CPAP machine? (!) NO - does not want to wear one   Do you have any artifical heart valves or other implanted medical devices like a pacemaker, defibrillator, or continuous glucose monitor? No   Do you have artificial joints? No   Are you allergic to latex? No     Health Care Directive  Patient does not have a Health Care Directive: did not discuss today     Preoperative Review of    reviewed - no record of controlled substances prescribed.    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) 08/12/2024     Priority: Medium    Controlled substance agreement signed 08/02/2022     Priority: Medium     8/2/22- clonazepam for spasmodic torticollis and cervical dystonia      Migraines 02/16/2022     Priority: Medium    Restless legs 02/16/2022     Priority: Medium    Lumbar spondylosis 02/16/2022     Priority: Medium    Cervical  spondylosis 02/16/2022     Priority: Medium    Lymphedema of left upper extremity 11/05/2020     Priority: Medium    Personal history of malignant neoplasm of breast 11/05/2020     Priority: Medium    Low vitamin D level 11/05/2020     Priority: Medium    Tension headache 10/02/2020     Priority: Medium    Spasmodic torticollis 10/02/2020     Priority: Medium    Myalgia and myositis 01/30/2020     Priority: Medium    Acquired absence of left breast 03/22/2019     Priority: Medium    Port-A-Cath in place 11/16/2018     Priority: Medium    S/P left mastectomy 04/16/2018     Priority: Medium    Malignant neoplasm of upper-inner quadrant of left breast in female, estrogen receptor positive (H) 03/29/2018     Priority: Medium    Anxiety 12/22/2015     Priority: Medium    DJD (degenerative joint disease) of cervical spine 05/30/2014     Priority: Medium    Dystonia 05/30/2014     Priority: Medium    Degeneration of lumbar or lumbosacral intervertebral disc      Priority: Medium    Strain of hand and finger, right 12/05/2011     Priority: Medium    CARDIOVASCULAR SCREENING; LDL GOAL LESS THAN 160 11/09/2011     Priority: Medium     Per provider        Hypertension goal BP (blood pressure) < 140/90 11/09/2011     Priority: Medium     Per provider        Mixed emotional features as adjustment reaction 07/21/2011     Priority: Medium    Cervicalgia 02/11/2011     Priority: Medium    Displacement of cervical intervertebral disc without myelopathy 02/11/2011     Priority: Medium    Vertigo 07/19/2010     Priority: Medium    Tobacco use disorder 03/06/2006     Priority: Medium    Allergic rhinitis due to other allergen 03/06/2006     Priority: Medium    Sprain of thoracic region 11/21/2005     Priority: Medium    Displacement of lumbar intervertebral disc without myelopathy 08/11/2000     Priority: Medium    Lumbar strain 01/04/2000     Priority: Medium      Past Medical History:   Diagnosis Date    Cancer (H)     BREAST CANCER     Degeneration of cervical intervertebral disc     Degeneration of lumbar or lumbosacral intervertebral disc     Hypertension     PONV (postoperative nausea and vomiting)      Past Surgical History:   Procedure Laterality Date    BACK SURGERY  2001    lumbar fusion, cervical discectomy    DISSECT LYMPH NODE AXILLA Left 4/16/2018    Procedure: DISSECT LYMPH NODE AXILLA;;  Surgeon: Rodney Umana MD;  Location:  [facility]    FUSION CERVICAL ANTERIOR THREE+ LEVELS  5/1/2012    Procedure:FUSION CERVICAL ANTERIOR THREE+ LEVELS; Surgeon:SARAH FRANCO; Location:[facility] OR    FUSION CERVICAL POSTERIOR THREE+ LEVELS  5/1/2012    Procedure:FUSION CERVICAL POSTERIOR THREE+ LEVELS; Posterior Cervical decompression and fusion C4-7, Anterior Cervical decompression and fusion C4-7 (c-arm), (herb table with abraham, yina oasis, yina aviator, Vitoss foam packing strip, impulse monitoring,); Surgeon:SARAH FRANCO; Location: OR    GYN SURGERY      HYSTERECTOMY VAGINAL  1990's    Judy [facility] Gyn specilist    HYSTERECTOMY, PAP NO LONGER INDICATED      INSERT PORT VASCULAR ACCESS N/A 5/14/2018    Procedure: INSERT PORT VASCULAR ACCESS;  PORT PLACEMENT;  Surgeon: Rodney Umana MD;  Location:  [facility]    INSERT TISSUE EXPANDER BREAST Bilateral 3/22/2019    Procedure: INSERT TISSUE EXPANDER BREAST;  Surgeon: Naa Crook MD;  Location:  OR    MASTECTOMY SIMPLE Left 4/16/2018    Procedure: MASTECTOMY SIMPLE;  LEFT SIMPLE MASTECTOMY,LEFT AXILLARY DISSECTION;  Surgeon: Rodney Umana MD;  [facility]:  [facility]    RECONSTRUCT BREAST LATISSIMUS DORSI PEDICLE Left 3/22/2019    Procedure: LEFT LATISSIMUS FLAP RECONSTRUCT WITH TISSUE EXPANDER AND PORT REMOVAL;  Surgeon: Naa Crook MD;  Location:  OR    REMOVE PORT VASCULAR ACCESS N/A 3/22/2019    Procedure: REMOVE PORT VASCULAR ACCESS;  Surgeon: Naa Crook MD;  Location:  OR     Current Outpatient Medications    Medication Sig Dispense Refill    anastrozole (ARIMIDEX) 1 MG tablet Take 1 tablet (1 mg) by mouth daily. 90 tablet 3    atenolol (TENORMIN) 50 MG tablet Take 1 tablet (50 mg) by mouth daily. 90 tablet 0    calcium carbonate (OS-HATTIE) 1500 (600 Ca) MG tablet Take 1 tablet (600 mg) by mouth 2 times daily (with meals). 180 tablet 3    carisoprodol (SOMA) 250 MG tablet Take 250 mg by mouth 3 times daily as needed      clindamycin (CLEOCIN) 300 MG capsule Take 300 mg by mouth 3 times daily.      clonazePAM (KLONOPIN) 0.5 MG tablet Take 1 tablet by mouth twice a day*      HYDROcodone-acetaminophen (NORCO) 5-325 MG tablet Take 1 tablet by mouth 2 times daily.      mupirocin (BACTROBAN) 2 % external ointment Apply topically 2 times daily.      rosuvastatin (CRESTOR) 10 MG tablet Take 1 tablet (10 mg) by mouth daily. 90 tablet 0    tiZANidine (ZANAFLEX) 2 MG tablet TAKE 1 TO 2 TABLETS BY MOUTH 3 TIMES A DAY AS NEEDED FOR SPASTICITY AND 1-3 AT BED AS NEEDED.*      traZODone (DESYREL) 50 MG tablet Take 50 mg by mouth at bedtime      Vitamin D3 50 mcg (2000 units) tablet Take 1 tablet (50 mcg) by mouth daily. 90 tablet 3       Allergies   Allergen Reactions    Other (Do Not Use)      Other Reaction(s): causes HAs    Amitriptyline Other (See Comments)     nightmares    Oxycontin [Oxycodone] Nausea     Severe headaches        Social History     Tobacco Use    Smoking status: Every Day     Current packs/day: 0.50     Types: Cigarettes    Smokeless tobacco: Never    Tobacco comments:     4 cigarettes per day   Substance Use Topics    Alcohol use: Yes     Alcohol/week: 1.0 standard drink of alcohol     Types: 1 Standard drinks or equivalent per week     Comment: 2-3 drinks per week     Family History   Problem Relation Age of Onset    Pancreatitis Mother     Substance Abuse Mother     Unknown/Adopted Father      History   Drug Use No             Review of Systems  Constitutional, neuro, ENT, endocrine, pulmonary, cardiac,  "gastrointestinal, genitourinary, musculoskeletal, integument and psychiatric systems are negative, except as otherwise noted.    Objective    BP (!) 179/68   Pulse 55   Wt 39.9 kg (88 lb)   SpO2 99%   BMI 15.59 kg/m     Estimated body mass index is 15.59 kg/m  as calculated from the following:    Height as of 10/23/24: 1.6 m (5' 3\").    Weight as of this encounter: 39.9 kg (88 lb).  Physical Exam  GENERAL: alert and no distress  EYES: Eyes grossly normal to inspection, PERRL and conjunctivae and sclerae normal  HENT: ear canals and TM's normal, nose and mouth without ulcers or lesions  NECK: no adenopathy, no asymmetry, masses, or scars  RESP: lungs clear to auscultation - no rales, rhonchi or wheezes  CV: regular rate and rhythm, normal S1 S2, no S3 or S4, no murmur, click or rub, no peripheral edema  ABDOMEN: soft, nontender, without hepatosplenomegaly or masses and tenderness diffuse, hyperactive bowel sounds   MS: no gross musculoskeletal defects noted, no edema  SKIN: no suspicious lesions or rashes  NEURO: Normal strength and tone, mentation intact and speech normal  PSYCH: mentation appears normal, affect normal/bright    Recent Labs   Lab Test 08/12/24  1348 08/12/24  0000   HGB  --  12.7   PLT  --  348   *  --    POTASSIUM 4.3  --    CR 0.78  --         Diagnostics  No labs were ordered during this visit.   No EKG required for low risk surgery (cataract, skin procedure, breast biopsy, etc).    Revised Cardiac Risk Index (RCRI)  The patient has the following serious cardiovascular risks for perioperative complications:   - No serious cardiac risks = 0 points     RCRI Interpretation: 0 points: Class I (very low risk - 0.4% complication rate)         Signed Electronically by: CAROLYN Kilpatrick CNP  A copy of this evaluation report is provided to the requesting physician.         "

## 2024-12-23 NOTE — PATIENT INSTRUCTIONS
How to Take Your Medication Before Surgery  Preoperative Medication Instructions   Antiplatelet or Anticoagulation Medication Instructions   - Bleeding risk is low for this procedure (e.g. dental, skin, cataract).    Additional Medication Instructions   - Beta Blockers (atenolol, metoprolol, propranolol) : Continue taking on the day of surgery.   - Statins (atorvastatin, simvastatin, pravastatin) : Continue taking on the day of surgery.    - Opioids: Continue without modification.   956}   - clonazepam: Continue without modification.         Patient Education   Preparing for Your Surgery  For Adults  Getting started  In most cases, a nurse will call to review your health history and instructions. They will give you an arrival time based on your scheduled surgery time. Please be ready to share:  Your doctor's clinic name and phone number  Your medical, surgical, and anesthesia history  A list of allergies and sensitivities  A list of medicines, including herbal treatments and over-the-counter drugs  Whether the patient has a legal guardian (ask how to send us the papers in advance)  Note: You may not receive a call if you were seen at our PAC (Preoperative Assessment Center).  Please tell us if you're pregnant--or if there's any chance you might be pregnant. Some surgeries may injure a fetus (unborn baby), so they require a pregnancy test. Surgeries that are safe for a fetus don't always need a test, and you can choose whether to have one.   Preparing for surgery  Within 10 to 30 days of surgery: Have a pre-op exam (sometimes called an H&P, or History and Physical). This can be done at a clinic or pre-operative center.  If you're having a , you may not need this exam. Talk to your care team.  At your pre-op exam, talk to your care team about all medicines you take. (This includes CBD oil and any drugs, such as THC, marijuana, and other forms of cannabis.) If you need to stop any medicine before surgery, ask  when to start taking it again.  This is for your safety. Many medicines and drugs can make you bleed too much during surgery. Some change how well surgery (anesthesia) drugs work.  Call your insurance company to let them know you're having surgery. (If you don't have insurance, call [phone].)  Call your clinic if there's any change in your health. This includes a scrape or scratch near the surgery site, or any signs of a cold (sore throat, runny nose, cough, rash, fever).  Eating and drinking guidelines  For your safety: Unless your surgeon tells you otherwise, follow the guidelines below.  Eat and drink as normal until 8 hours before you arrive for surgery. After that, no food or milk. You can spit out gum when you arrive.  Drink clear liquids until 2 hours before you arrive. These are liquids you can see through, like water, Gatorade, and Propel Water. They also include plain black coffee and tea (no cream or milk).  No alcohol for 24 hours before you arrive. The night before surgery, stop any drinks that contain THC.  If your care team tells you to take medicine on the morning of surgery, it's okay to take it with a sip of water. No other medicines or drugs are allowed (including CBD oil)--follow your care team's instructions.  If you have questions the day of surgery, call your hospital or surgery center.   Preventing infection  Shower or bathe the night before and the morning of surgery. Follow the instructions your clinic gave you. (If no instructions, use regular soap.)  Don't shave or clip hair near your surgery site. We'll remove the hair if needed.  Don't smoke or vape the morning of surgery. No chewing tobacco for 6 hours before you arrive. A nicotine patch is okay. You may spit out nicotine gum when you arrive.  For some surgeries, the surgeon will tell you to fully quit smoking and nicotine.  We will make every effort to keep you safe from infection. We will:  Clean our hands often with soap and  water (or an alcohol-based hand rub).  Clean the skin at your surgery site with a special soap that kills germs.  Give you a special gown to keep you warm. (Cold raises the risk of infection.)  Wear hair covers, masks, gowns, and gloves during surgery.  Give antibiotic medicine, if prescribed. Not all surgeries need this medicine.  What to bring on the day of surgery  Photo ID and insurance card  Copy of your health care directive, if you have one  Glasses and hearing aids (bring cases)  You can't wear contacts during surgery  Inhaler and eye drops, if you use them (tell us about these when you arrive)  CPAP machine or breathing device, if you use them  A few personal items, if spending the night  If you have . . .  A pacemaker, ICD (cardiac defibrillator), or other implant: Bring the ID card.  An implanted stimulator: Bring the remote control.  A legal guardian: Bring a copy of the certified (court-stamped) guardianship papers.  Please remove any jewelry, including body piercings. Leave jewelry and other valuables at home.  If you're going home the day of surgery  You must have a responsible adult drive you home. They should stay with you overnight as well.  If you don't have someone to stay with you, and you aren't safe to go home alone, we may keep you overnight. Insurance often won't pay for this.  After surgery  If it's hard to control your pain or you need more pain medicine, please call your surgeon's office.  Questions?   If you have any questions for your care team, list them here:   ____________________________________________________________________________________________________________________________________________________________________________________________________________________________________________________________  For informational purposes only. Not to replace the advice of your health care provider. Copyright   2003, 2019 [facility]. All rights reserved. Clinically  reviewed by Cam Martinez MD. [employer] [id number] - REV 08/24.

## 2025-03-24 DIAGNOSIS — E78.2 MIXED HYPERLIPIDEMIA: ICD-10-CM

## 2025-03-24 DIAGNOSIS — I10 HYPERTENSION GOAL BP (BLOOD PRESSURE) < 140/90: ICD-10-CM

## 2025-03-24 DIAGNOSIS — F17.200 TOBACCO USE DISORDER: ICD-10-CM

## 2025-03-24 RX ORDER — ROSUVASTATIN CALCIUM 10 MG/1
10 TABLET, COATED ORAL DAILY
Qty: 90 TABLET | Refills: 0 | Status: SHIPPED | OUTPATIENT
Start: 2025-03-24

## 2025-03-24 RX ORDER — ATENOLOL 50 MG/1
50 TABLET ORAL DAILY
Qty: 90 TABLET | Refills: 0 | Status: SHIPPED | OUTPATIENT
Start: 2025-03-24

## 2025-03-24 NOTE — CONFIDENTIAL NOTE
"Med: ROSUVASTATIN, ATENOLOL    LOV (related): 12/23/24 - PREOP    Last Lab: 8/12/24      Due for F/U around: NEVER HAD CPX - OVERDUE    Next Appt: NONE    MYCHART MESSAGE SENT THAT PATIENT IS DUE FOR APPT        BP Readings from Last 3 Encounters:   12/23/24 (!) 179/68   10/23/24 139/75   08/12/24 (!) 147/82       Last Comprehensive Metabolic Panel:  Lab Results   Component Value Date     (L) 08/12/2024    POTASSIUM 4.3 08/12/2024    CHLORIDE 97 (L) 08/12/2024    CO2 26 08/12/2024    ANIONGAP 10 08/12/2024    GLC 83 08/12/2024    BUN 11.1 08/12/2024    CR 0.78 08/12/2024    GFRESTIMATED 85 08/12/2024    HATTIE 9.6 08/12/2024     Lab Results   Component Value Date    CHOL 219 11/01/2023    CHOL 228 08/02/2022    CHOL 274 04/07/2017     Lab Results   Component Value Date    HDL 68 11/01/2023    HDL 75 08/02/2022     04/07/2017     Lab Results   Component Value Date     11/01/2023     08/02/2022     04/07/2017     Lab Results   Component Value Date    TRIG 446 11/01/2023    TRIG 97 08/02/2022    TRIG 120 04/07/2017     No results found for: \"CHOLHDLRATIO\"    "

## 2025-06-14 ENCOUNTER — HEALTH MAINTENANCE LETTER (OUTPATIENT)
Age: 65
End: 2025-06-14

## 2025-06-17 DIAGNOSIS — F17.200 TOBACCO USE DISORDER: ICD-10-CM

## 2025-06-17 DIAGNOSIS — E78.2 MIXED HYPERLIPIDEMIA: ICD-10-CM

## 2025-06-23 RX ORDER — ROSUVASTATIN CALCIUM 10 MG/1
10 TABLET, COATED ORAL DAILY
Qty: 90 TABLET | Refills: 0 | OUTPATIENT
Start: 2025-06-23

## 2025-07-04 NOTE — PROGRESS NOTES
[facility]    Hematology/Oncology Established Patient Follow-up Note      Today's Date: 7/7/2025    Reason for Follow-up: Left breast cancer.    HISTORY OF PRESENT ILLNESS: Muriel Diaz is a 64 year old female with history of hypertension, degenerative joint disease, cervical spine surgery who presents with the following oncologic history:    - 4/16/2018: Left mastectomy under the care of Dr. Umana.  Pathology showed a grade 2 invasive mammary carcinoma with lobular and ductal features, measuring 4.3 x 4 x 2.1 cm, positive margin for invasive carcinoma in the central and lateral posterior surface, DCIS and LCIS present, ER positive at 95%, MI positive at 50%, HER2/nicola negative.  Left axillary dissection showed 3 of 5 lymph nodes positive for metastatic carcinoma (lobular type), ER positive, MI positive, HER2/nicola negative. Stage IIB, [id number].  -5/22/2018 - 10/2/2018: Completed adjuvant chemotherapy with dose dense dense doxorubicin and cyclophosphamide for 4 cycles followed by weekly paclitaxel for 12 cycles.  -12/17/2018: Completed adjuvant radiation therapy to the left chest wall.  - 12/28/2018: Started anastrozole.  - 3/22/2019: Underwent reconstructive surgery with left latissimus flap and left tissue expander placement with Dr. Crook.    INTERVAL HISTORY:  Muriel reports she elected not to start Evenity due to issues with a C2 rupture and neck pain. She is working with [facility] on this. She is continuing to smoke.      REVIEW OF SYSTEMS:   14 point ROS was reviewed and is negative other than as noted above in HPI.       HOME MEDICATIONS:  Current Outpatient Medications   Medication Sig Dispense Refill    anastrozole (ARIMIDEX) 1 MG tablet Take 1 tablet (1 mg) by mouth daily. 90 tablet 3    atenolol (TENORMIN) 50 MG tablet Take 1 tablet (50 mg) by mouth daily. 90 tablet 0    calcium carbonate (OS-HATTIE) 1500 (600 Ca) MG tablet Take 1 tablet (600 mg) by mouth 2 times daily (with  meals). 180 tablet 3    carisoprodol (SOMA) 250 MG tablet Take 250 mg by mouth 3 times daily as needed      clindamycin (CLEOCIN) 300 MG capsule Take 300 mg by mouth 3 times daily.      clonazePAM (KLONOPIN) 0.5 MG tablet Take 1 tablet by mouth twice a day*      HYDROcodone-acetaminophen (NORCO) 5-325 MG tablet Take 1 tablet by mouth 2 times daily.      mupirocin (BACTROBAN) 2 % external ointment Apply topically 2 times daily.      rosuvastatin (CRESTOR) 10 MG tablet Take 1 tablet (10 mg) by mouth daily. 90 tablet 0    tiZANidine (ZANAFLEX) 2 MG tablet TAKE 1 TO 2 TABLETS BY MOUTH 3 TIMES A DAY AS NEEDED FOR SPASTICITY AND 1-3 AT BED AS NEEDED.*      traZODone (DESYREL) 50 MG tablet Take 50 mg by mouth at bedtime      Vitamin D3 50 mcg (2000 units) tablet Take 1 tablet (50 mcg) by mouth daily. 90 tablet 3         ALLERGIES:  Allergies   Allergen Reactions    Other (Do Not Use)      Other Reaction(s): causes HAs    Amitriptyline Other (See Comments)     nightmares    Oxycontin [Oxycodone] Nausea     Severe headaches         PAST MEDICAL HISTORY:  Past Medical History:   Diagnosis Date    Cancer (H)     BREAST CANCER    Degeneration of cervical intervertebral disc     Degeneration of lumbar or lumbosacral intervertebral disc     Hypertension     PONV (postoperative nausea and vomiting)      Gynecologic history: G0.  Hysterectomy in the 1990s.  Ovaries are intact.  Currently postmenopausal.    PAST SURGICAL HISTORY:  Past Surgical History:   Procedure Laterality Date    BACK SURGERY  2001    lumbar fusion, cervical discectomy    DISSECT LYMPH NODE AXILLA Left 4/16/2018    Procedure: DISSECT LYMPH NODE AXILLA;;  Surgeon: Rodney Umana MD;  Location: [facility]    FUSION CERVICAL ANTERIOR THREE+ LEVELS  5/1/2012    Procedure:FUSION CERVICAL ANTERIOR THREE+ LEVELS; Surgeon:SARAH FRANCO; Location:[facility]    FUSION CERVICAL POSTERIOR THREE+ LEVELS  5/1/2012    Procedure:FUSION CERVICAL POSTERIOR THREE+ LEVELS;  Posterior Cervical decompression and fusion C4-7, Anterior Cervical decompression and fusion C4-7 (c-arm), (herb table with abraham, yina oasis, yina aviator, Vitoss foam packing strip, impulse monitoring,); Surgeon:SARAH FRANCO; Location: OR    GYN SURGERY      HYSTERECTOMY VAGINAL  1990's    Dr. Kriss Aguilar [facility] specilist    HYSTERECTOMY, PAP NO LONGER INDICATED      INSERT PORT VASCULAR ACCESS N/A 5/14/2018    Procedure: INSERT PORT VASCULAR ACCESS;  PORT PLACEMENT;  Surgeon: Rodney Umana MD;  Location: [facility]    INSERT TISSUE EXPANDER BREAST Bilateral 3/22/2019    Procedure: INSERT TISSUE EXPANDER BREAST;  Surgeon: Naa Crook MD;  Location:  OR    MASTECTOMY SIMPLE Left 4/16/2018    Procedure: MASTECTOMY SIMPLE;  LEFT SIMPLE MASTECTOMY,LEFT AXILLARY DISSECTION;  Surgeon: Rodney Umana MD;  Location: [facility]    RECONSTRUCT BREAST LATISSIMUS DORSI PEDICLE Left 3/22/2019    Procedure: LEFT LATISSIMUS FLAP RECONSTRUCT WITH TISSUE EXPANDER AND PORT REMOVAL;  Surgeon: Naa Crook MD;  Location:  OR    REMOVE PORT VASCULAR ACCESS N/A 3/22/2019    Procedure: REMOVE PORT VASCULAR ACCESS;  Surgeon: Naa Crook MD;  Location:  OR         SOCIAL HISTORY:  Social History     Socioeconomic History    Marital status: Single     Spouse name: Not on file    Number of children: 0    Years of education: Not on file    Highest education level: Not on file   Occupational History     Employer: [employer]    Tobacco Use    Smoking status: Every Day     Current packs/day: 0.50     Types: Cigarettes    Smokeless tobacco: Never    Tobacco comments:     4 cigarettes per day   Substance and Sexual Activity    Alcohol use: Yes     Alcohol/week: 1.0 standard drink of alcohol     Types: 1 Standard drinks or equivalent per week     Comment: 2-3 drinks per week    Drug use: No    Sexual activity: Not Currently     Partners: Male   Other Topics Concern     Parent/sibling w/ CABG, MI or angioplasty before 65F 55M? No   Social History Narrative    Lives with  (Pipo). Retired. On disability for cervical dystonia, stenosis and spasmodic torticollis. Enjoys gardening and cooking.      Social Drivers of Health     Financial Resource Strain: Low Risk  (12/17/2024)    Financial Resource Strain     Within the past 12 months, have you or your family members you live with been unable to get utilities (heat, electricity) when it was really needed?: No   Food Insecurity: Low Risk  (12/17/2024)    Food Insecurity     Within the past 12 months, did you worry that your food would run out before you got money to buy more?: No     Within the past 12 months, did the food you bought just not last and you didn t have money to get more?: No   Transportation Needs: Low Risk  (12/17/2024)    Transportation Needs     Within the past 12 months, has lack of transportation kept you from medical appointments, getting your medicines, non-medical meetings or appointments, work, or from getting things that you need?: No   Physical Activity: Not on file   Stress: Not on file   Social Connections: Not on file   Interpersonal Safety: Not At Risk (8/1/2022)    Humiliation, Afraid, Rape, and Kick questionnaire     Fear of Current or Ex-Partner: No     Emotionally Abused: No     Physically Abused: No     Sexually Abused: No   Housing Stability: Low Risk  (12/17/2024)    Housing Stability     Do you have housing? : Yes     Are you worried about losing your housing?: No   She has smoked half a pack of cigarettes for many years, duration uncertain.  She drinks alcohol occasionally.  Denies illicit drug use.  She is retired.  Previously worked in  at large companies.      FAMILY HISTORY:  Family History   Problem Relation Age of Onset    Pancreatitis Mother     Substance Abuse Mother     Unknown/Adopted Father    She does not know much about her family history and is unaware of any malignancy  "in her family.      PHYSICAL EXAM:  Vital signs:  BP (!) 190/92   Pulse 57   Resp 16   Ht 1.588 m (5' 2.5\")   Wt 40.2 kg (88 lb 9.6 oz)   SpO2 97%   BMI 15.95 kg/m     GENERAL/CONSTITUTIONAL: No acute distress.  EYES:  No scleral icterus.  LYMPH: No cervical, supraclavicular, axillary adenopathy.   BREAST: Left breast is surgically absent with flap reconstruction changes and implant in place.  No periprosthetic fluid. No left chest wall masses.  Right breast with no masses.  Right nipple everted with no discharge. No rash..  RESPIRATORY: No audible cough or wheezing.   GASTROINTESTINAL: No hepatosplenomegaly, masses, or tenderness. No guarding.  No distention.  MUSCULOSKELETAL: Warm and well-perfused, no cyanosis, clubbing, or edema.  NEUROLOGIC: Alert, oriented, answers questions appropriately.  INTEGUMENTARY: No rashes or jaundice.  GAIT: Steady, does not use assistive device    LABS:  CBC RESULTS:   Recent Labs   Lab Test 08/12/24  0000 03/15/19  1332 12/28/18  1320   WBC 7.6   < > 6.1   RBC 4.20   < > 3.81   HGB 12.7   < > 12.0   HCT 36.9   < > 35.9   MCV 87.9   < > 94   MCH 30.2   < > 31.5   MCHC 34.4   < > 33.4   RDW  --   --  13.4      < > 259    < > = values in this interval not displayed.        Last Comprehensive Metabolic Panel:  Sodium   Date Value Ref Range Status   08/12/2024 133 (L) 135 - 145 mmol/L Final   08/02/2022 143 134 - 144 mmol/L Final     Potassium   Date Value Ref Range Status   08/12/2024 4.3 3.4 - 5.3 mmol/L Final   08/02/2022 4.4 3.5 - 5.2 mmol/L Final     Chloride   Date Value Ref Range Status   08/12/2024 97 (L) 98 - 107 mmol/L Final   08/02/2022 102 96 - 106 mmol/L Final     Carbon Dioxide   Date Value Ref Range Status   12/28/2018 28 20 - 32 mmol/L Final     Carbon Dioxide (CO2)   Date Value Ref Range Status   08/12/2024 26 22 - 29 mmol/L Final     Anion Gap   Date Value Ref Range Status   08/12/2024 10 7 - 15 mmol/L Final   12/28/2018 5 3 - 14 mmol/L Final     Glucose "   Date Value Ref Range Status   2024 83 70 - 99 mg/dL Final   2022 101 (H) 65 - 99 mg/dL Final     Urea Nitrogen   Date Value Ref Range Status   2024 11.1 8.0 - 23.0 mg/dL Final   2022 18 8 - 27 mg/dL Final     BUN/Creatinine Ratio   Date Value Ref Range Status   2022 26 12 - 28 Final     Creatinine   Date Value Ref Range Status   2024 0.78 0.51 - 0.95 mg/dL Final   2022 0.70 0.57 - 1.00 mg/dL Final     GFR Estimate   Date Value Ref Range Status   2024 85 >60 mL/min/1.73m2 Final     Comment:     eGFR calculated using  CKD-EPI equation.   2019 77 >60 mL/min/[1.73_m2] Final     Comment:     Non  GFR Calc  Starting 2018, serum creatinine based estimated GFR (eGFR) will be   calculated using the Chronic Kidney Disease Epidemiology Collaboration   (CKD-EPI) equation.       Calcium   Date Value Ref Range Status   2024 9.6 8.8 - 10.4 mg/dL Final     Comment:     Reference intervals for this test were updated on 2024 to reflect our healthy population more accurately. There may be differences in the flagging of prior results with similar values performed with this method. Those prior results can be interpreted in the context of the updated reference intervals.   2022 11.0 (H) 8.7 - 10.3 mg/dL Final     Bilirubin Total   Date Value Ref Range Status   2024 0.2 <=1.2 mg/dL Final   2018 0.2 0.2 - 1.3 mg/dL Final     Alkaline Phosphatase   Date Value Ref Range Status   2024 93 40 - 150 U/L Final   2018 81 40 - 150 U/L Final     ALT   Date Value Ref Range Status   2024 12 0 - 50 U/L Final   2018 19 0 - 50 U/L Final     AST   Date Value Ref Range Status   2024 28 0 - 45 U/L Final   2018 16 0 - 45 U/L Final       PATHOLOGY:  None new.    IMAGIN2023: DEXA scan showed osteopenia with 0.6% decrease in bilateral hip BMD and 6.6% decrease in the left radius BMD.    2024: Right-sided  mammogram showed benign findings.    ASSESSMENT/PLAN:  Muriel Diaz is a 64 year old female with the following issues:  1.  Stage IIB, [id number], grade 2 invasive mammary carcinoma with lobular and ductal features of the left upper inner breast, ER +95%, AL +50%, HER2 negative  - Muriel is status post left mastectomy 4/16/2018 and left axillary dissection with 3 of 5 lymph nodes positive for metastatic lobular carcinoma.  She completed adjuvant chemotherapy with ddAC-T followed by radiation to the left chest wall completed 12/17/2018.  -She has no clinical evidence for recurrent breast cancer by physical exam from today.  -She has been on adjuvant anastrozole since 12/28/2018, currently tolerating this well with no significant adverse effects.  -Advised up to 10 years of hormone blockade therapy given her extent of lymph node involvement and higher risk for recurrence.  -Continue with annual right-sided mammograms, next due 8/2025. Will arrange.    2.  Osteopenia  - DEXA scan from 4/27/2023 showed osteopenia with 0.6% decrease in bilateral hip BMD and 6.6% decrease in the left radius BMD despite taking alendronate.  --Discontinued alendronate as she was having diarrhea from it and it did not improve her bone density.  - Advised adequate calcium and vitamin D as well as weightbearing exercise. She already eats a lot of dairy products.   --Advised starting Reclast.  Discussed potential adverse effects and she agrees to proceed but start later, in 9/2025 after she has had time to deal with her neck issues.  - Plan to repeat DEXA scan prior to next visit.    3.  Tobacco abuse  --She is still smoking about a pack every 3 days.  - Again urged smoking cessation and offered assistance with cessation. She declines QUIT plan referral.    4. Hot flashes  --Venlafaxine did not help.  --Improved with clonazepam, which she will continue.    5. Weight loss  6. Chronic neck pain related to ruptured disk  --She is attributing  her weight loss to her chronic neck pain.  She is working with [facility] for implanting a pain pump.    Return in 6 months.    Lisa Maier MD  [facility] Hematology/Oncology     Total time spent today: 30 minutes in chart review, patient evaluation, counseling, documentation, test and/or medication/prescription orders, and coordination of care.     The longitudinal plan of care for the diagnosis(es)/condition(s) as documented were addressed during this visit. Due to the added complexity in care, I will continue to support Muriel in the subsequent management and with ongoing continuity of care.

## 2025-07-07 ENCOUNTER — ONCOLOGY VISIT (OUTPATIENT)
Dept: ONCOLOGY | Facility: CLINIC | Age: 65
End: 2025-07-07
Attending: INTERNAL MEDICINE
Payer: MEDICARE

## 2025-07-07 VITALS
HEART RATE: 57 BPM | BODY MASS INDEX: 15.7 KG/M2 | OXYGEN SATURATION: 97 % | DIASTOLIC BLOOD PRESSURE: 92 MMHG | SYSTOLIC BLOOD PRESSURE: 190 MMHG | RESPIRATION RATE: 16 BRPM | HEIGHT: 63 IN | WEIGHT: 88.6 LBS

## 2025-07-07 DIAGNOSIS — M85.89 OSTEOPENIA OF MULTIPLE SITES: ICD-10-CM

## 2025-07-07 DIAGNOSIS — Z17.0 MALIGNANT NEOPLASM OF UPPER-INNER QUADRANT OF LEFT BREAST IN FEMALE, ESTROGEN RECEPTOR POSITIVE (H): Primary | ICD-10-CM

## 2025-07-07 DIAGNOSIS — N95.1 MENOPAUSAL SYNDROME (HOT FLASHES): ICD-10-CM

## 2025-07-07 DIAGNOSIS — C50.212 MALIGNANT NEOPLASM OF UPPER-INNER QUADRANT OF LEFT BREAST IN FEMALE, ESTROGEN RECEPTOR POSITIVE (H): Primary | ICD-10-CM

## 2025-07-07 DIAGNOSIS — Z12.31 ENCOUNTER FOR SCREENING MAMMOGRAM FOR BREAST CANCER: ICD-10-CM

## 2025-07-07 PROCEDURE — G2211 COMPLEX E/M VISIT ADD ON: HCPCS | Performed by: INTERNAL MEDICINE

## 2025-07-07 PROCEDURE — G0463 HOSPITAL OUTPT CLINIC VISIT: HCPCS | Performed by: INTERNAL MEDICINE

## 2025-07-07 PROCEDURE — 99214 OFFICE O/P EST MOD 30 MIN: CPT | Performed by: INTERNAL MEDICINE

## 2025-07-07 RX ORDER — ANASTROZOLE 1 MG/1
1 TABLET ORAL DAILY
Qty: 90 TABLET | Refills: 3 | Status: SHIPPED | OUTPATIENT
Start: 2025-07-07

## 2025-07-07 RX ORDER — HEPARIN SODIUM (PORCINE) LOCK FLUSH IV SOLN 100 UNIT/ML 100 UNIT/ML
5 SOLUTION INTRAVENOUS
OUTPATIENT
Start: 2025-09-08

## 2025-07-07 RX ORDER — ZOLEDRONIC ACID 0.05 MG/ML
5 INJECTION, SOLUTION INTRAVENOUS ONCE
Start: 2025-09-08 | End: 2025-09-08

## 2025-07-07 RX ORDER — HEPARIN SODIUM,PORCINE 10 UNIT/ML
5-20 VIAL (ML) INTRAVENOUS DAILY PRN
OUTPATIENT
Start: 2025-09-08

## 2025-07-07 ASSESSMENT — PAIN SCALES - GENERAL: PAINLEVEL_OUTOF10: NO PAIN (0)

## 2025-07-07 NOTE — LETTER
7/7/2025      Muriel Diaz  [address]  [facility] [address]      Dear Colleague,    Thank you for referring your patient, Muriel Diaz, to the [facility] CANCER [facility]. Please see a copy of my visit note below.    [facility]    Hematology/Oncology Established Patient Follow-up Note      Today's Date: 7/7/2025    Reason for Follow-up: Left breast cancer.    HISTORY OF PRESENT ILLNESS: Muriel Diaz is a 64 year old female with history of hypertension, degenerative joint disease, cervical spine surgery who presents with the following oncologic history:    - 4/16/2018: Left mastectomy under the care of Dr. Umana.  Pathology showed a grade 2 invasive mammary carcinoma with lobular and ductal features, measuring 4.3 x 4 x 2.1 cm, positive margin for invasive carcinoma in the central and lateral posterior surface, DCIS and LCIS present, ER positive at 95%, NJ positive at 50%, HER2/nicola negative.  Left axillary dissection showed 3 of 5 lymph nodes positive for metastatic carcinoma (lobular type), ER positive, NJ positive, HER2/nicola negative. Stage IIB, [id number].  -5/22/2018 - 10/2/2018: Completed adjuvant chemotherapy with dose dense dense doxorubicin and cyclophosphamide for 4 cycles followed by weekly paclitaxel for 12 cycles.  -12/17/2018: Completed adjuvant radiation therapy to the left chest wall.  - 12/28/2018: Started anastrozole.  - 3/22/2019: Underwent reconstructive surgery with left latissimus flap and left tissue expander placement with Dr. Crook.    INTERVAL HISTORY:  Muriel reports she elected not to start Evenity due to issues with a C2 rupture and neck pain. She is working with [facility] on this. She is continuing to smoke.      REVIEW OF SYSTEMS:   14 point ROS was reviewed and is negative other than as noted above in HPI.       HOME MEDICATIONS:  Current Outpatient Medications   Medication Sig Dispense Refill     anastrozole (ARIMIDEX) 1 MG  tablet Take 1 tablet (1 mg) by mouth daily. 90 tablet 3     atenolol (TENORMIN) 50 MG tablet Take 1 tablet (50 mg) by mouth daily. 90 tablet 0     calcium carbonate (OS-HATTIE) 1500 (600 Ca) MG tablet Take 1 tablet (600 mg) by mouth 2 times daily (with meals). 180 tablet 3     carisoprodol (SOMA) 250 MG tablet Take 250 mg by mouth 3 times daily as needed       clindamycin (CLEOCIN) 300 MG capsule Take 300 mg by mouth 3 times daily.       clonazePAM (KLONOPIN) 0.5 MG tablet Take 1 tablet by mouth twice a day*       HYDROcodone-acetaminophen (NORCO) 5-325 MG tablet Take 1 tablet by mouth 2 times daily.       mupirocin (BACTROBAN) 2 % external ointment Apply topically 2 times daily.       rosuvastatin (CRESTOR) 10 MG tablet Take 1 tablet (10 mg) by mouth daily. 90 tablet 0     tiZANidine (ZANAFLEX) 2 MG tablet TAKE 1 TO 2 TABLETS BY MOUTH 3 TIMES A DAY AS NEEDED FOR SPASTICITY AND 1-3 AT BED AS NEEDED.*       traZODone (DESYREL) 50 MG tablet Take 50 mg by mouth at bedtime       Vitamin D3 50 mcg (2000 units) tablet Take 1 tablet (50 mcg) by mouth daily. 90 tablet 3         ALLERGIES:  Allergies   Allergen Reactions     Other (Do Not Use)      Other Reaction(s): causes HAs     Amitriptyline Other (See Comments)     nightmares     Oxycontin [Oxycodone] Nausea     Severe headaches         PAST MEDICAL HISTORY:  Past Medical History:   Diagnosis Date     Cancer (H)     BREAST CANCER     Degeneration of cervical intervertebral disc      Degeneration of lumbar or lumbosacral intervertebral disc      Hypertension      PONV (postoperative nausea and vomiting)      Gynecologic history: G0.  Hysterectomy in the 1990s.  Ovaries are intact.  Currently postmenopausal.    PAST SURGICAL HISTORY:  Past Surgical History:   Procedure Laterality Date     BACK SURGERY  2001    lumbar fusion, cervical discectomy     DISSECT LYMPH NODE AXILLA Left 4/16/2018    Procedure: DISSECT LYMPH NODE AXILLA;;  Surgeon: Rodney Umana MD;   Location:  [facility]     FUSION CERVICAL ANTERIOR THREE+ LEVELS  5/1/2012    Procedure:FUSION CERVICAL ANTERIOR THREE+ LEVELS; Surgeon:SARAH FRANCO; Location:[facility] OR     FUSION CERVICAL POSTERIOR THREE+ LEVELS  5/1/2012    Procedure:FUSION CERVICAL POSTERIOR THREE+ LEVELS; Posterior Cervical decompression and fusion C4-7, Anterior Cervical decompression and fusion C4-7 (c-arm), (herb table with abraham, yina oasis, yina aviator, Vitoss foam packing strip, impulse monitoring,); Surgeon:SARAH FRANCO; Location: OR     GYN SURGERY       HYSTERECTOMY VAGINAL  1990's    Dr. Kriss Aguilar, [facility] specilist     HYSTERECTOMY, PAP NO LONGER INDICATED       INSERT PORT VASCULAR ACCESS N/A 5/14/2018    Procedure: INSERT PORT VASCULAR ACCESS;  PORT PLACEMENT;  Surgeon: Rodney Umana MD;  Location: [facility]     INSERT TISSUE EXPANDER BREAST Bilateral 3/22/2019    Procedure: INSERT TISSUE EXPANDER BREAST;  Surgeon: Naa Crook MD;  Location:  OR     MASTECTOMY SIMPLE Left 4/16/2018    Procedure: MASTECTOMY SIMPLE;  LEFT SIMPLE MASTECTOMY,LEFT AXILLARY DISSECTION;  Surgeon: Rodney Umana MD;  Location:  [facility]     RECONSTRUCT BREAST LATISSIMUS DORSI PEDICLE Left 3/22/2019    Procedure: LEFT LATISSIMUS FLAP RECONSTRUCT WITH TISSUE EXPANDER AND PORT REMOVAL;  Surgeon: Naa Crook MD;  Location:  OR     REMOVE PORT VASCULAR ACCESS N/A 3/22/2019    Procedure: REMOVE PORT VASCULAR ACCESS;  Surgeon: Naa Crook MD;  Location:  OR         SOCIAL HISTORY:  Social History     Socioeconomic History     Marital status: Single     Spouse name: Not on file     Number of children: 0     Years of education: Not on file     Highest education level: Not on file   Occupational History     Employer: [employer]    Tobacco Use     Smoking status: Every Day     Current packs/day: 0.50     Types: Cigarettes     Smokeless tobacco: Never     Tobacco comments:     4 cigarettes per day    Substance and Sexual Activity     Alcohol use: Yes     Alcohol/week: 1.0 standard drink of alcohol     Types: 1 Standard drinks or equivalent per week     Comment: 2-3 drinks per week     Drug use: No     Sexual activity: Not Currently     Partners: Male   Other Topics Concern     Parent/sibling w/ CABG, MI or angioplasty before 65F 55M? No   Social History Narrative    Lives with BF (Pipo). Retired. On disability for cervical dystonia, stenosis and spasmodic torticollis. Enjoys gardening and cooking.      Social Drivers of Health     Financial Resource Strain: Low Risk  (12/17/2024)    Financial Resource Strain      Within the past 12 months, have you or your family members you live with been unable to get utilities (heat, electricity) when it was really needed?: No   Food Insecurity: Low Risk  (12/17/2024)    Food Insecurity      Within the past 12 months, did you worry that your food would run out before you got money to buy more?: No      Within the past 12 months, did the food you bought just not last and you didn t have money to get more?: No   Transportation Needs: Low Risk  (12/17/2024)    Transportation Needs      Within the past 12 months, has lack of transportation kept you from medical appointments, getting your medicines, non-medical meetings or appointments, work, or from getting things that you need?: No   Physical Activity: Not on file   Stress: Not on file   Social Connections: Not on file   Interpersonal Safety: Not At Risk (8/1/2022)    Humiliation, Afraid, Rape, and Kick questionnaire      Fear of Current or Ex-Partner: No      Emotionally Abused: No      Physically Abused: No      Sexually Abused: No   Housing Stability: Low Risk  (12/17/2024)    Housing Stability      Do you have housing? : Yes      Are you worried about losing your housing?: No   She has smoked half a pack of cigarettes for many years, duration uncertain.  She drinks alcohol occasionally.  Denies illicit drug use.  She is  "retired.  Previously worked in  at large companies.      FAMILY HISTORY:  Family History   Problem Relation Age of Onset     Pancreatitis Mother      Substance Abuse Mother      Unknown/Adopted Father    She does not know much about her family history and is unaware of any malignancy in her family.      PHYSICAL EXAM:  Vital signs:  BP (!) 190/92   Pulse 57   Resp 16   Ht 1.588 m (5' 2.5\")   Wt 40.2 kg (88 lb 9.6 oz)   SpO2 97%   BMI 15.95 kg/m     GENERAL/CONSTITUTIONAL: No acute distress.  EYES:  No scleral icterus.  LYMPH: No cervical, supraclavicular, axillary adenopathy.   BREAST: Left breast is surgically absent with flap reconstruction changes and implant in place.  No periprosthetic fluid. No left chest wall masses.  Right breast with no masses.  Right nipple everted with no discharge. No rash..  RESPIRATORY: No audible cough or wheezing.   GASTROINTESTINAL: No hepatosplenomegaly, masses, or tenderness. No guarding.  No distention.  MUSCULOSKELETAL: Warm and well-perfused, no cyanosis, clubbing, or edema.  NEUROLOGIC: Alert, oriented, answers questions appropriately.  INTEGUMENTARY: No rashes or jaundice.  GAIT: Steady, does not use assistive device    LABS:  CBC RESULTS:   Recent Labs   Lab Test 08/12/24  0000 03/15/19  1332 12/28/18  1320   WBC 7.6   < > 6.1   RBC 4.20   < > 3.81   HGB 12.7   < > 12.0   HCT 36.9   < > 35.9   MCV 87.9   < > 94   MCH 30.2   < > 31.5   MCHC 34.4   < > 33.4   RDW  --   --  13.4      < > 259    < > = values in this interval not displayed.        Last Comprehensive Metabolic Panel:  Sodium   Date Value Ref Range Status   08/12/2024 133 (L) 135 - 145 mmol/L Final   08/02/2022 143 134 - 144 mmol/L Final     Potassium   Date Value Ref Range Status   08/12/2024 4.3 3.4 - 5.3 mmol/L Final   08/02/2022 4.4 3.5 - 5.2 mmol/L Final     Chloride   Date Value Ref Range Status   08/12/2024 97 (L) 98 - 107 mmol/L Final   08/02/2022 102 96 - 106 mmol/L Final "     Carbon Dioxide   Date Value Ref Range Status   12/28/2018 28 20 - 32 mmol/L Final     Carbon Dioxide (CO2)   Date Value Ref Range Status   08/12/2024 26 22 - 29 mmol/L Final     Anion Gap   Date Value Ref Range Status   08/12/2024 10 7 - 15 mmol/L Final   12/28/2018 5 3 - 14 mmol/L Final     Glucose   Date Value Ref Range Status   08/12/2024 83 70 - 99 mg/dL Final   08/02/2022 101 (H) 65 - 99 mg/dL Final     Urea Nitrogen   Date Value Ref Range Status   08/12/2024 11.1 8.0 - 23.0 mg/dL Final   08/02/2022 18 8 - 27 mg/dL Final     BUN/Creatinine Ratio   Date Value Ref Range Status   08/02/2022 26 12 - 28 Final     Creatinine   Date Value Ref Range Status   08/12/2024 0.78 0.51 - 0.95 mg/dL Final   08/02/2022 0.70 0.57 - 1.00 mg/dL Final     GFR Estimate   Date Value Ref Range Status   08/12/2024 85 >60 mL/min/1.73m2 Final     Comment:     eGFR calculated using 2021 CKD-EPI equation.   03/22/2019 77 >60 mL/min/[1.73_m2] Final     Comment:     Non  GFR Calc  Starting 12/18/2018, serum creatinine based estimated GFR (eGFR) will be   calculated using the Chronic Kidney Disease Epidemiology Collaboration   (CKD-EPI) equation.       Calcium   Date Value Ref Range Status   08/12/2024 9.6 8.8 - 10.4 mg/dL Final     Comment:     Reference intervals for this test were updated on 7/16/2024 to reflect our healthy population more accurately. There may be differences in the flagging of prior results with similar values performed with this method. Those prior results can be interpreted in the context of the updated reference intervals.   08/02/2022 11.0 (H) 8.7 - 10.3 mg/dL Final     Bilirubin Total   Date Value Ref Range Status   08/12/2024 0.2 <=1.2 mg/dL Final   12/28/2018 0.2 0.2 - 1.3 mg/dL Final     Alkaline Phosphatase   Date Value Ref Range Status   08/12/2024 93 40 - 150 U/L Final   12/28/2018 81 40 - 150 U/L Final     ALT   Date Value Ref Range Status   08/12/2024 12 0 - 50 U/L Final   12/28/2018 19 0  - 50 U/L Final     AST   Date Value Ref Range Status   2024 28 0 - 45 U/L Final   2018 16 0 - 45 U/L Final       PATHOLOGY:  None new.    IMAGIN2023: DEXA scan showed osteopenia with 0.6% decrease in bilateral hip BMD and 6.6% decrease in the left radius BMD.    2024: Right-sided mammogram showed benign findings.    ASSESSMENT/PLAN:  Muriel Diaz is a 64 year old female with the following issues:  1.  Stage IIB, cK3u-[id number], grade 2 invasive mammary carcinoma with lobular and ductal features of the left upper inner breast, ER +95%, AL +50%, HER2 negative  - Muriel is status post left mastectomy 2018 and left axillary dissection with 3 of 5 lymph nodes positive for metastatic lobular carcinoma.  She completed adjuvant chemotherapy with ddAC-T followed by radiation to the left chest wall completed 2018.  -She has no clinical evidence for recurrent breast cancer by physical exam from today.  -She has been on adjuvant anastrozole since 2018, currently tolerating this well with no significant adverse effects.  -Advised up to 10 years of hormone blockade therapy given her extent of lymph node involvement and higher risk for recurrence.  -Continue with annual right-sided mammograms, next due 2025. Will arrange.    2.  Osteopenia  - DEXA scan from 2023 showed osteopenia with 0.6% decrease in bilateral hip BMD and 6.6% decrease in the left radius BMD despite taking alendronate.  --Discontinued alendronate as she was having diarrhea from it and it did not improve her bone density.  - Advised adequate calcium and vitamin D as well as weightbearing exercise. She already eats a lot of dairy products.   --Advised starting Reclast.  Discussed potential adverse effects and she agrees to proceed but start later, in 2025 after she has had time to deal with her neck issues.  - Plan to repeat DEXA scan prior to next visit.    3.  Tobacco abuse  --She is still smoking about a pack  "every 3 days.  - Again urged smoking cessation and offered assistance with cessation. She declines QUIT plan referral.    4. Hot flashes  --Venlafaxine did not help.  --Improved with clonazepam, which she will continue.    5. Weight loss  6. Chronic neck pain related to ruptured disk  --She is attributing her weight loss to her chronic neck pain.  She is working with [facility] for implanting a pain pump.    Return in 6 months.    Lisa Maier MD  [facility] Hematology/Oncology     Total time spent today: 30 minutes in chart review, patient evaluation, counseling, documentation, test and/or medication/prescription orders, and coordination of care.     The longitudinal plan of care for the diagnosis(es)/condition(s) as documented were addressed during this visit. Due to the added complexity in care, I will continue to support Muriel in the subsequent management and with ongoing continuity of care.      Oncology Rooming Note    July 7, 2025 9:57 AM   Muriel Diaz is a 64 year old female who presents for:    Chief Complaint   Patient presents with     Oncology Clinic Visit     Initial Vitals: BP (!) 190/92   Pulse 57   Resp 16   Ht 1.588 m (5' 2.5\")   Wt 40.2 kg (88 lb 9.6 oz)   SpO2 97%   BMI 15.95 kg/m   Estimated body mass index is 15.95 kg/m  as calculated from the following:    Height as of this encounter: 1.588 m (5' 2.5\").    Weight as of this encounter: 40.2 kg (88 lb 9.6 oz). Body surface area is 1.33 meters squared.  No Pain (0) Comment: Data Unavailable   No LMP recorded. Patient has had a hysterectomy.  Allergies reviewed: Yes  Medications reviewed: Yes    Medications: Medication refills not needed today.  Pharmacy name entered into [facility]:    [facility] [address]  [facility] [place], [place] - [address] [facility] [id number] [place], [place] - [address]    Frailty Screening:   Is the patient here for a new oncology consult visit in cancer " care? 2. No    PHQ9:  Did this patient require a PHQ9?: No          Dodie Galeas MA              Again, thank you for allowing me to participate in the care of your patient.        Sincerely,        Lisa Maier MD    Electronically signed

## 2025-07-07 NOTE — PATIENT INSTRUCTIONS
1. Arrange for Reclast for 9/2025.  2. Arrange for DEXA scan for 9/2025.  3. RTC MD in 6 months.  4. Arrange for right mammogram for 8/2025.

## 2025-07-09 ENCOUNTER — TELEPHONE (OUTPATIENT)
Dept: FAMILY MEDICINE | Facility: CLINIC | Age: 65
End: 2025-07-09

## 2025-07-09 NOTE — TELEPHONE ENCOUNTER
7/9/25 I called and left a message for the patient to call the office to schedule an appointment.  Last lipid appears to be 2023 and she is needing that an at the very least a medication review.  Preferably a physical.    Tre Webber,   [facility]  [phone]        ----- Message from Alison Holcomb sent at 7/9/2025  2:08 PM CDT -----  Regarding: Appointment  Hi. So we have sent this patient a MyChart asking to schedule a follow up, but can we call her?    I have never seen the patient, but was doing refills while Nyla was gone and I now have the insurance company contacting me because we aren't refilling her statin without an appointment.    Thanks!

## 2025-07-10 SDOH — HEALTH STABILITY: PHYSICAL HEALTH
ON AVERAGE, HOW MANY DAYS PER WEEK DO YOU ENGAGE IN MODERATE TO STRENUOUS EXERCISE (LIKE A BRISK WALK)?: PATIENT DECLINED

## 2025-07-10 SDOH — HEALTH STABILITY: PHYSICAL HEALTH: ON AVERAGE, HOW MANY MINUTES DO YOU ENGAGE IN EXERCISE AT THIS LEVEL?: PATIENT DECLINED

## 2025-07-10 ASSESSMENT — SOCIAL DETERMINANTS OF HEALTH (SDOH): HOW OFTEN DO YOU GET TOGETHER WITH FRIENDS OR RELATIVES?: PATIENT DECLINED

## 2025-07-11 DIAGNOSIS — E78.2 MIXED HYPERLIPIDEMIA: ICD-10-CM

## 2025-07-11 DIAGNOSIS — F17.200 TOBACCO USE DISORDER: ICD-10-CM

## 2025-07-11 NOTE — TELEPHONE ENCOUNTER
"Med: rosuvastatin 10 mg    LOV (related): 12/23/24    Last Lab: 11/1/2023      Due for F/U around: due    Next Appt: 7/17/25 with MD        Cholesterol   Date Value Ref Range Status   11/01/2023 219 (A) 100 - 199 mg/dL Final   08/02/2022 228 (A) 100 - 199 mg/dl Final   04/07/2017 274 (H) 100 - 199 mg/dL Final     HDL Cholesterol   Date Value Ref Range Status   04/07/2017 117 >39 mg/dL Final     HDL   Date Value Ref Range Status   11/01/2023 68 >=40 mg/dL Final   08/02/2022 75 >=40 mg/dl Final     LDL Cholesterol Calculated   Date Value Ref Range Status   04/07/2017 133 (H) 0 - 99 mg/dL Final     LDL CALCULATED ([facility])   Date Value Ref Range Status   11/01/2023 128 0 - 130 mg/dL Final   08/02/2022 134 (A) 0 - 130 mg/dl Final     Triglycerides   Date Value Ref Range Status   11/01/2023 446 (A) 0 - 149 mg/dL Final   08/02/2022 97 0 - 149 mg/dl Final   04/07/2017 120 0 - 149 mg/dL Final     No results found for: \"CHOLHDLRATIO\"     "

## 2025-07-12 RX ORDER — ROSUVASTATIN CALCIUM 10 MG/1
10 TABLET, COATED ORAL DAILY
Qty: 90 TABLET | Refills: 0 | Status: SHIPPED | OUTPATIENT
Start: 2025-07-12

## 2025-07-16 NOTE — PATIENT INSTRUCTIONS
How to take home blood pressure:  Sit for 5 minutes with feet flat on the floor.  Apply cuff to upper arm (bicep area) and have this arm resting at about heart level.  Take blood pressure reading.  Make sure that the machine has a memory bank that records your readings or write readings down  If you remain elevated, greater than 140/\90 then please return to clinic for further high blood pressure work up.  Daily for 1 week        Patient Education   Preventive Care Advice   This is general advice given by our system to help you stay healthy. However, your care team may have specific advice just for you. Please talk to your care team about your preventive care needs.  Nutrition  Eat 5 or more servings of fruits and vegetables each day.  Try wheat bread, brown rice and whole grain pasta (instead of white bread, rice, and pasta).  Get enough calcium and vitamin D. Check the label on foods and aim for 100% of the RDA (recommended daily allowance).  Lifestyle  Exercise at least 150 minutes each week  (30 minutes a day, 5 days a week).  Do muscle strengthening activities 2 days a week. These help control your weight and prevent disease.  No smoking.  Wear sunscreen to prevent skin cancer.  Have a dental exam and cleaning every 6 months.  Yearly exams  See your health care team every year to talk about:  Any changes in your health.  Any medicines your care team has prescribed.  Preventive care, family planning, and ways to prevent chronic diseases.  Shots (vaccines)   HPV shots (up to age 26), if you've never had them before.  Hepatitis B shots (up to age 59), if you've never had them before.  COVID-19 shot: Get this shot when it's due.  Flu shot: Get a flu shot every year.  Tetanus shot: Get a tetanus shot every 10 years.  Pneumococcal, hepatitis A, and RSV shots: Ask your care team if you need these based on your risk.  Shingles shot (for age 50 and up)  General health tests  Diabetes screening:  Starting at age 35, Get  screened for diabetes at least every 3 years.  If you are younger than age 35, ask your care team if you should be screened for diabetes.  Cholesterol test: At age 39, start having a cholesterol test every 5 years, or more often if advised.  Bone density scan (DEXA): At age 50, ask your care team if you should have this scan for osteoporosis (brittle bones).  Hepatitis C: Get tested at least once in your life.  STIs (sexually transmitted infections)  Before age 24: Ask your care team if you should be screened for STIs.  After age 24: Get screened for STIs if you're at risk. You are at risk for STIs (including HIV) if:  You are sexually active with more than one person.  You don't use condoms every time.  You or a partner was diagnosed with a sexually transmitted infection.  If you are at risk for HIV, ask about PrEP medicine to prevent HIV.  Get tested for HIV at least once in your life, whether you are at risk for HIV or not.  Cancer screening tests  Cervical cancer screening: If you have a cervix, begin getting regular cervical cancer screening tests starting at age 21.  Breast cancer scan (mammogram): If you've ever had breasts, begin having regular mammograms starting at age 40. This is a scan to check for breast cancer.  Colon cancer screening: It is important to start screening for colon cancer at age 45.  Have a colonoscopy test every 10 years (or more often if you're at risk) Or, ask your provider about stool tests like a FIT test every year or Cologuard test every 3 years.  To learn more about your testing options, visit:   .  For help making a decision, visit:   https://bit.ly/xl69505.  Prostate cancer screening test: If you have a prostate, ask your care team if a prostate cancer screening test (PSA) at age 55 is right for you.  Lung cancer screening: If you are a current or former smoker ages 50 to 80, ask your care team if ongoing lung cancer screenings are right for you.  For informational purposes  only. Not to replace the advice of your health care provider. Copyright   2023 [facility]. All rights reserved. Clinically reviewed by the [facility]. [employer] 135181 - REV 01/24.  Learning About Stress  What is stress?     Stress is your body's response to a hard situation. Your body can have a physical, emotional, or mental response. Stress is a fact of life for most people, and it affects everyone differently. What causes stress for you may not be stressful for someone else.  A lot of things can cause stress. You may feel stress when you go on a job interview, take a test, or run a race. This kind of short-term stress is normal and even useful. It can help you if you need to work hard or react quickly. For example, stress can help you finish an important job on time.  Long-term stress is caused by ongoing stressful situations or events. Examples of long-term stress include long-term health problems, ongoing problems at work, or conflicts in your family. Long-term stress can harm your health.  How does stress affect your health?  When you are stressed, your body responds as though you are in danger. It makes hormones that speed up your heart, make you breathe faster, and give you a burst of energy. This is called the fight-or-flight stress response. If the stress is over quickly, your body goes back to normal and no harm is done.  But if stress happens too often or lasts too long, it can have bad effects. Long-term stress can make you more likely to get sick, and it can make symptoms of some diseases worse. If you tense up when you are stressed, you may develop neck, shoulder, or low back pain. Stress is linked to high blood pressure and heart disease.  Stress also harms your emotional health. It can make you mathias, tense, or depressed. Your relationships may suffer, and you may not do well at work or school.  What can you do to manage stress?  You can try these things to  help manage stress:   Do something active. Exercise or activity can help reduce stress. Walking is a great way to get started. Even everyday activities such as housecleaning or yard work can help.  Try yoga or harshal chi. These techniques combine exercise and meditation. You may need some training at first to learn them.  Do something you enjoy. For example, listen to music or go to a movie. Practice your hobby or do volunteer work.  Meditate. This can help you relax, because you are not worrying about what happened before or what may happen in the future.  Do guided imagery. Imagine yourself in any setting that helps you feel calm. You can use online videos, books, or a teacher to guide you.  Do breathing exercises. For example:  From a standing position, bend forward from the waist with your knees slightly bent. Let your arms dangle close to the floor.  Breathe in slowly and deeply as you return to a standing position. Roll up slowly and lift your head last.  Hold your breath for just a few seconds in the standing position.  Breathe out slowly and bend forward from the waist.  Let your feelings out. Talk, laugh, cry, and express anger when you need to. Talking with supportive friends or family, a counselor, or a pasquale leader about your feelings is a healthy way to relieve stress. Avoid discussing your feelings with people who make you feel worse.  Write. It may help to write about things that are bothering you. This helps you find out how much stress you feel and what is causing it. When you know this, you can find better ways to cope.  What can you do to prevent stress?  You might try some of these things to help prevent stress:  Manage your time. This helps you find time to do the things you want and need to do.  Get enough sleep. Your body recovers from the stresses of the day while you are sleeping.  Get support. Your family, friends, and community can make a difference in how you experience stress.  Limit your  "news feed. Avoid or limit time on social media or news that may make you feel stressed.  Do something active. Exercise or activity can help reduce stress. Walking is a great way to get started.  Where can you learn more?  Go to https://www.[employer].net/patiented  Enter N032 in the search box to learn more about \"Learning About Stress.\"  Current as of: October 24, 2024  Content Version: 14.5 2024-2025 [employer].   Care instructions adapted under license by your healthcare professional. If you have questions about a medical condition or this instruction, always ask your healthcare professional. [employer] disclaims any warranty or liability for your use of this information.    Learning About Sleeping Well  What does sleeping well mean?     Sleeping well means getting enough sleep to feel good and stay healthy. How much sleep is enough varies among people.  The number of hours you sleep and how you feel when you wake up are both important. If you do not feel refreshed, you probably need more sleep. Another sign of not getting enough sleep is feeling tired during the day.  Experts recommend that adults get at least 7 or more hours of sleep per day. Children and older adults need more sleep.  Why is getting enough sleep important?  Getting enough quality sleep is a basic part of good health. When your sleep suffers, your physical health, mood, and your thoughts can suffer too. You may find yourself feeling more grumpy or stressed. Not getting enough sleep also can lead to serious problems, including injury, accidents, anxiety, and depression.  What might cause poor sleeping?  Many things can cause sleep problems, including:  Changes to your sleep schedule.  Stress. Stress can be caused by fear about a single event, such as giving a speech. Or you may have ongoing stress, such as worry about work or school.  Depression, anxiety, and other mental or emotional conditions.  Changes in your sleep " "habits or surroundings. This includes changes that happen where you sleep, such as noise, light, or sleeping in a different bed. It also includes changes in your sleep pattern, such as having jet lag or working a late shift.  Health problems, such as pain, breathing problems, and restless legs syndrome.  Lack of regular exercise.  Using alcohol, nicotine, or caffeine before bed.  How can you help yourself?  Here are some tips that may help you sleep more soundly and wake up feeling more refreshed.  Your sleeping area   Use your bedroom only for sleeping and sex. A bit of light reading may help you fall asleep. But if it doesn't, do your reading elsewhere in the house. Try not to use your TV, computer, smartphone, or tablet while you are in bed.  Be sure your bed is big enough to stretch out comfortably, especially if you have a sleep partner.  Keep your bedroom quiet, dark, and cool. Use curtains, blinds, or a sleep mask to block out light. To block out noise, use earplugs, soothing music, or a \"white noise\" machine.  Your evening and bedtime routine   Create a relaxing bedtime routine. You might want to take a warm shower or bath, or listen to soothing music.  Go to bed at the same time every night. And get up at the same time every morning, even if you feel tired.  What to avoid   Limit caffeine (coffee, tea, caffeinated sodas) during the day, and don't have any for at least 6 hours before bedtime.  Avoid drinking alcohol before bedtime. Alcohol can cause you to wake up more often during the night.  Try not to smoke or use tobacco, especially in the evening. Nicotine can keep you awake.  Limit naps during the day, especially close to bedtime.  Avoid lying in bed awake for too long. If you can't fall asleep or if you wake up in the middle of the night and can't get back to sleep within about 20 minutes, get out of bed and go to another room until you feel sleepy.  Avoid taking medicine right before bed that may " "keep you awake or make you feel hyper or energized. Your doctor can tell you if your medicine may do this and if you can take it earlier in the day.  If you can't sleep   Imagine yourself in a peaceful, pleasant scene. Focus on the details and feelings of being in a place that is relaxing.  Get up and do a quiet or boring activity until you feel sleepy.  Avoid drinking any liquids before going to bed to help prevent waking up often to use the bathroom.  Where can you learn more?  Go to https://www.[employer].net/patiented  Enter J942 in the search box to learn more about \"Learning About Sleeping Well.\"  Current as of: July 31, 2024  Content Version: 14.5 2024-2025 [employer].   Care instructions adapted under license by your healthcare professional. If you have questions about a medical condition or this instruction, always ask your healthcare professional. [employer] disclaims any warranty or liability for your use of this information.       Learning About Benefits of Quitting Smoking  Quitting smoking helps your body in many ways. Quitting lowers your risk for cancer, lung disease, heart attack, stroke, blood vessel disease, and blindness. You'll also get sick less often and heal faster. And after you quit, you may find that your mood is better and you are less stressed.  When and how will you feel healthier after quitting smoking?        In the first hours or days:   Your blood pressure and heart rate go down.  Your oxygen levels increase.        Within weeks or months:   You will cough less and breathe deeper. It may be easier to be active.  Your sense of taste and smell should return.        Over the years:   Your risks of heart disease, heart attack, and stroke are lower.  Your risk of lung cancer is cut by about half after about 10 years. And your risk for other cancers is lower too.  How would quitting help others in your life?    Their heart, lung, and cancer risks may drop, much " "like yours. They will also be sick less.   If you are or will be pregnant someday, quitting smoking means a healthier .   Where can you learn more?  Go to https://www."[employer].net/patiented  Enter O319 in the search box to learn more about \"Learning About Benefits of Quitting Smoking.\"  Current as of: 2024  Content Version: 14. [employer].   Care instructions adapted under license by your healthcare professional. If you have questions about a medical condition or this instruction, always ask your healthcare professional. [employer] disclaims any warranty or liability for your use of this information.    "

## 2025-07-17 ENCOUNTER — OFFICE VISIT (OUTPATIENT)
Dept: FAMILY MEDICINE | Facility: CLINIC | Age: 65
End: 2025-07-17

## 2025-07-17 VITALS
HEART RATE: 89 BPM | BODY MASS INDEX: 16.34 KG/M2 | WEIGHT: 88.8 LBS | DIASTOLIC BLOOD PRESSURE: 85 MMHG | OXYGEN SATURATION: 99 % | HEIGHT: 62 IN | SYSTOLIC BLOOD PRESSURE: 149 MMHG

## 2025-07-17 DIAGNOSIS — M54.2 CERVICALGIA: ICD-10-CM

## 2025-07-17 DIAGNOSIS — I10 HYPERTENSION GOAL BP (BLOOD PRESSURE) < 140/90: ICD-10-CM

## 2025-07-17 DIAGNOSIS — Z85.3 PERSONAL HISTORY OF MALIGNANT NEOPLASM OF BREAST: ICD-10-CM

## 2025-07-17 DIAGNOSIS — Z00.00 ENCOUNTER FOR MEDICARE ANNUAL WELLNESS EXAM: Primary | ICD-10-CM

## 2025-07-17 DIAGNOSIS — E78.2 MIXED HYPERLIPIDEMIA: ICD-10-CM

## 2025-07-17 DIAGNOSIS — F17.200 NICOTINE DEPENDENCE, UNCOMPLICATED, UNSPECIFIED NICOTINE PRODUCT TYPE: ICD-10-CM

## 2025-07-17 PROCEDURE — 99214 OFFICE O/P EST MOD 30 MIN: CPT | Mod: 25

## 2025-07-17 PROCEDURE — 3077F SYST BP >= 140 MM HG: CPT

## 2025-07-17 PROCEDURE — 99406 BEHAV CHNG SMOKING 3-10 MIN: CPT

## 2025-07-17 PROCEDURE — G0439 PPPS, SUBSEQ VISIT: HCPCS

## 2025-07-17 PROCEDURE — 3079F DIAST BP 80-89 MM HG: CPT

## 2025-07-17 RX ORDER — ASPIRIN 81 MG/1
81 TABLET ORAL DAILY
Qty: 90 TABLET | Refills: 3 | Status: SHIPPED | OUTPATIENT
Start: 2025-07-17

## 2025-07-17 ASSESSMENT — PATIENT HEALTH QUESTIONNAIRE - PHQ9
SUM OF ALL RESPONSES TO PHQ QUESTIONS 1-9: 2
SUM OF ALL RESPONSES TO PHQ QUESTIONS 1-9: 2
10. IF YOU CHECKED OFF ANY PROBLEMS, HOW DIFFICULT HAVE THESE PROBLEMS MADE IT FOR YOU TO DO YOUR WORK, TAKE CARE OF THINGS AT HOME, OR GET ALONG WITH OTHER PEOPLE: SOMEWHAT DIFFICULT

## 2025-07-17 NOTE — PROGRESS NOTES
Preventive Care Visit  [facility]  CAROLYN Kilpatrick CNP, Nurse Practitioner Primary Care  Jul 17, 2025      Assessment & Plan     Encounter for Medicare annual wellness exam  -Preventive health topics discussed including adequate exercise and healthy diet. Return to clinic in one year for preventative exam or sooner with any other concerns. Other issues discussed as noted below.       Nicotine dependence, uncomplicated, unspecified nicotine product type  - declines cessation, does not believe she will ever want to quit  - SMOKING CESSATION COUNSELING 3-10 MIN    Mixed hyperlipidemia  - stable, will add in aspirin given current smoker     Hypertension goal BP (blood pressure) < 140/90  - in pain and stressed today, only slightly elevated, she will monitor at home  -Discussed current hypertension treatment guidelines, including indications for treatment and treatment options.  Discussed the importance for aggressive management of HTN to prevent vascular complications later.  Recommended lower fat, lower carbohydrate, and lower sodium (<2000 mg)diet.  Discussed required intervals for follow up on HTN, lab studies.  Recommened pt. occasionally follow their blood pressures outside the clinic to ensure that BPs are remaining within guidelines, and to contact me if the readings are not within guidelines on a regular basis so we can adjust treatment as needed.      Cervicalgia  - managed by pain clinic     Personal history of malignant neoplasm of breast  - actively managed by oncology     Patient has been advised of split billing requirements and indicates understanding: Yes    Nicotine/Tobacco Cessation  She reports that she has been smoking cigarettes. She has never used smokeless tobacco.  Nicotine/Tobacco Cessation Plan  Information offered: Patient not interested at this time      Counseling  Appropriate preventive services were addressed with this patient via screening, questionnaire, or discussion as  appropriate for fall prevention, nutrition, physical activity, Tobacco-use cessation, social engagement, weight loss and cognition.  Checklist reviewing preventive services available has been given to the patient.  Reviewed patient's diet, addressing concerns and/or questions.   The patient was instructed to see the dentist every 6 months.   She is at risk for psychosocial distress and has been provided with information to reduce risk.   Discussed possible causes of fatigue. Reviewed preventive health counseling, as reflected in patient instructions       Regular exercise       Healthy diet/nutrition       Osteoporosis prevention/bone health       Colorectal Cancer Screening    Follow-up  No follow-ups on file.    Savannah Llamas is a 64 year old, presenting for the following:  Physical (Not fasting, no concerns) and Health Maintenance (PAP: s/p hysterectomy. No PAPs/Mammo: UTD - 08/2024/Colonoscopy: UTD - 03/2016/Vaccines: Covid due)           HPI     1.) cervicalgia:  fentanyl pain pump implant managed by pain clinic - helping with pain control    2.) low BMI: eating a diverse diet with emphasis on protein, not losing weight but maintaining.  Patient does not believe she will ever quit smoking - declines cessation information today.       Wt Readings from Last 4 Encounters:   07/17/25 40.3 kg (88 lb 12.8 oz)   07/07/25 40.2 kg (88 lb 9.6 oz)   12/23/24 39.9 kg (88 lb)   10/23/24 38.5 kg (84 lb 12.8 oz)        Advance Care Planning    Did not have time to address today         7/10/2025   General Health   How would you rate your overall physical health? (!) FAIR   Feel stress (tense, anxious, or unable to sleep) Rather much   (!) STRESS CONCERN      7/10/2025   Nutrition   Diet: Regular (no restrictions)         7/10/2025   Exercise   Days per week of moderate/strenous exercise Patient declined   Average minutes spent exercising at this level Patient declined         7/10/2025   Social Factors   Frequency of  gathering with friends or relatives Patient declined   Worry food won't last until get money to buy more No   Food not last or not have enough money for food? No   Do you have housing? (Housing is defined as stable permanent housing and does not include staying outside in a car, in a tent, in an abandoned building, in an overnight shelter, or couch-surfing.) Yes   Are you worried about losing your housing? No   Lack of transportation? No   Unable to get utilities (heat,electricity)? Yes   Want help with housing or utility concern? No   (!) FINANCIAL RESOURCE STRAIN CONCERN      7/10/2025   Fall Risk   Fallen 2 or more times in the past year? No   Trouble with walking or balance? No          7/10/2025   Activities of Daily Living- Home Safety   Needs help with the following daily activites None of the above   Safety concerns in the home None of the above         7/10/2025   Dental   Dentist two times every year? (!) NO         7/10/2025   Hearing Screening   Hearing concerns? None of the above   Left ear:  500Hz  Pass  1000Hz  Pass  2000Hz  Pass  4000Hz  Pass    Right ear:  500Hz  Pass  1000Hz  Pass  2000Hz  Pass  4000Hz  Pass        7/10/2025   Driving Risk Screening   Patient/family members have concerns about driving No         7/10/2025   General Alertness/Fatigue Screening   Have you been more tired than usual lately? (!) YES         7/10/2025   Urinary Incontinence Screening   Bothered by leaking urine in past 6 months No       Today's PHQ-2 Score:       7/20/2021     2:18 PM   PHQ-2 ( 1999 Pfizer)   Q1: Little interest or pleasure in doing things 0   Q2: Feeling down, depressed or hopeless 0   PHQ-2 Score 0   PHQ-2 Total Score (12-17 Years)- Positive if 3 or more points; Administer PHQ-A if positive 0         7/10/2025   Substance Use   If I could quit smoking, I would Neutral   I want to quit somking, worry about health affects Neutral   Willing to make a plan to quit smoking Neutral   Willing to cut down  before quitting Neutral   Alcohol more than 3/day or more than 7/wk No   Do you have a current opioid prescription? No   How severe/bad is pain from 1 to 10? 6/10   Do you use any other substances recreationally? No    (!) DECLINE       Multiple values from one day are sorted in reverse-chronological order     Social History     Tobacco Use    Smoking status: Every Day     Current packs/day: 0.50     Types: Cigarettes    Smokeless tobacco: Never    Tobacco comments:     4 cigarettes per day   Substance Use Topics    Alcohol use: Yes     Alcohol/week: 1.0 standard drink of alcohol     Types: 1 Standard drinks or equivalent per week     Comment: 2-3 drinks per week    Drug use: No           2024   LAST FHS-7 RESULTS   1st degree relative breast or ovarian cancer No   Any relative bilateral breast cancer No   Any male have breast cancer No   Any ONE woman have BOTH breast AND ovarian cancer No   Any woman with breast cancer before 50yrs No   2 or more relatives with breast AND/OR ovarian cancer No   2 or more relatives with breast AND/OR bowel cancer No        Mammogram Screening - Mammogram every 1-2 years updated in Health Maintenance based on mutual decision making      History of abnormal Pap smear: Status post hysterectomy with removal of cervix and no history of CIN2 or greater or cervical cancer. Health Maintenance and Surgical History updated.       ASCVD Risk   The 10-year ASCVD risk score (Dain FARFAN, et al., 2019) is: 14%    Values used to calculate the score:      Age: 64 years      Sex: Female      Is Non- : No      Diabetic: No      Tobacco smoker: Yes      Systolic Blood Pressure: 146 mmHg      Is BP treated: Yes      HDL Cholesterol: 68 mg/dL      Total Cholesterol: 219 mg/dL    Fracture Risk Assessment Tool  Link to Frax Calculator  Use the information below to complete the Frax calculator  : 1960  Sex: female  Weight (kg): 40.3 kg (actual weight)  Height  (cm): 158.1 cm  Previous Fragility Fracture:  No  History of parent with fractured hip:  No  Current Smoking:  Yes  Patient has been on glucocorticoids for more than 3 months (5mg/day or more): No  Rheumatoid Arthritis on Problem List:  No  Secondary Osteoporosis on Problem List:  No  Consumes 3 or more units of alcohol per day: No  Femoral Neck BMD (g/cm2)            Reviewed and updated as needed this visit by Provider                    Past Medical History:   Diagnosis Date    Cancer (H)     BREAST CANCER    Degeneration of cervical intervertebral disc     Degeneration of lumbar or lumbosacral intervertebral disc     Hypertension     PONV (postoperative nausea and vomiting)      Lab work is in process  Current providers sharing in care for this patient include:  Patient Care Team:  Nyla Lubin APRN CNP as PCP - General (Nurse Practitioner Primary Care)  Joshua Fenton MD as MD (Neurology)  Audra George MD as Referring Physician (Neurology)  Lisa Maier MD as Assigned Cancer Care Provider  Nyla Lubin APRN CNP as Assigned PCP    The following health maintenance items are reviewed in Epic and correct as of today:  Health Maintenance   Topic Date Due    MICROALBUMIN  Never done    URINE DRUG SCREEN  Never done    ADVANCE CARE PLANNING  Never done    LUNG CANCER SCREENING  04/09/2019    RSV VACCINE (1 - Risk 60-74 years 1-dose series) Never done    NICOTINE/TOBACCO CESSATION COUNSELING Q 1 YR  11/01/2024    LIPID  11/01/2024    COVID-19 VACCINE (7 - Pfizer risk 2024-25 season) 06/23/2025    MAMMO SCREENING  08/07/2025    BMP  08/12/2025    INFLUENZA VACCINE (1) 09/01/2025    ANTHONY ASSESSMENT  12/23/2025    PHQ-9  01/17/2026    COLORECTAL CANCER SCREENING  03/18/2026    MEDICARE ANNUAL WELLNESS VISIT  07/17/2026    DIABETES SCREENING  08/12/2027    DTAP/TDAP/TD VACCINE (3 - Td or Tdap) 01/29/2030    HEPATITIS C SCREENING  Completed    HIV SCREENING  Completed    PHQ-2 (once per calendar  "year)  Completed    PNEUMOCOCCAL VACCINE 50+ YEARS  Completed    ZOSTER VACCINE  Completed    HPV VACCINE  Aged Out    MENINGITIS VACCINE  Aged Out    PAP  Discontinued         Review of Systems  Constitutional, neuro, ENT, endocrine, pulmonary, cardiac, gastrointestinal, genitourinary, musculoskeletal, integument and psychiatric systems are negative, except as otherwise noted.     Objective    Exam  BP (!) 146/80   Pulse 89   Ht 1.581 m (5' 2.25\")   Wt 40.3 kg (88 lb 12.8 oz)   SpO2 99%   BMI 16.11 kg/m     Estimated body mass index is 16.11 kg/m  as calculated from the following:    Height as of this encounter: 1.581 m (5' 2.25\").    Weight as of this encounter: 40.3 kg (88 lb 12.8 oz).    Physical Exam  GENERAL: alert, no distress, frail, and mildly cachectic   EYES: Eyes grossly normal to inspection, PERRL and conjunctivae and sclerae normal  HENT: ear canals and TM's normal, nose and mouth without ulcers or lesions  NECK: no adenopathy, no asymmetry, masses, or scars  RESP: lungs clear to auscultation - no rales, rhonchi or wheezes  CV: regular rate and rhythm, normal S1 S2, no S3 or S4, no murmur, click or rub, no peripheral edema  ABDOMEN: soft, nontender, no hepatosplenomegaly, no masses and bowel sounds normal  MS: no gross musculoskeletal defects noted, no edema  SKIN: no suspicious lesions or rashes  NEURO: Normal strength and tone, mentation intact and speech normal  PSYCH: mentation appears normal, affect normal/bright         No data to display                       Signed Electronically by: CAROLYN Kilpatrick CNP    "

## (undated) DEVICE — SOL NACL 0.9% IRRIG 1000ML BOTTLE 07138-09

## (undated) DEVICE — ESU ELEC BLADE 2.75" COATED/INSULATED E1455

## (undated) DEVICE — DRAIN JACKSON PRATT 15FR ROUND SU130-1323

## (undated) DEVICE — DRAPE COVER C-ARM SEAMLESS SNAP-KAP 03-KP26 LATEX FREE

## (undated) DEVICE — DRAPE SHEET REV FOLD 3/4 9349

## (undated) DEVICE — SYR 50ML LL W/O NDL 309653

## (undated) DEVICE — DRSG GAUZE 4X4" 3033

## (undated) DEVICE — BLADE KNIFE SURG 10 371110

## (undated) DEVICE — DECANTER BAG 2002S

## (undated) DEVICE — SU VICRYL 3-0 SH 27" J316H

## (undated) DEVICE — SYR 10ML FINGER CONTROL W/O NDL 309695

## (undated) DEVICE — PACK MINOR SBA15MIFSE

## (undated) DEVICE — SUCTION CANISTER MEDIVAC LINER 3000ML W/LID 65651-530

## (undated) DEVICE — SOL NACL 0.9% INJ 250ML BAG 2B1322Q

## (undated) DEVICE — GLOVE PROTEXIS BLUE W/NEU-THERA 6.5  2D73EB65

## (undated) DEVICE — STPL SKIN 35W ROTATING HEAD PRW35

## (undated) DEVICE — SYR 10ML LL W/O NDL

## (undated) DEVICE — PACK MAJOR SBA15MAFSI

## (undated) DEVICE — CLIP APPLIER 11" MED LIGACLIP MCM20

## (undated) DEVICE — PREP CHLORAPREP 26ML TINTED ORANGE  260815

## (undated) DEVICE — SU MONOCRYL 3-0 PS-2 27" Y427H

## (undated) DEVICE — SOL WATER IRRIG 1000ML BOTTLE 2F7114

## (undated) DEVICE — GLOVE PROTEXIS W/NEU-THERA 7.5  2D73TE75

## (undated) DEVICE — DRAPE BREAST/CHEST 29420

## (undated) DEVICE — PAD CHUX UNDERPAD 23X24" 7136

## (undated) DEVICE — SU VICRYL 3-0 TIE 12X18" J904T

## (undated) DEVICE — SYR 03ML LL W/O NDL 309657

## (undated) DEVICE — SU PROLENE 2-0 CT-2 30" 8411H

## (undated) DEVICE — NDL 19GA 1.5"

## (undated) DEVICE — SU MONOCRYL 4-0 PS-2 18" UND Y496G

## (undated) DEVICE — DRSG KERLIX 4 1/2"X4YDS ROLL 6715

## (undated) DEVICE — PREP CHLORAPREP W/ORANGE TINT 10.5ML 260715

## (undated) DEVICE — DRSG STERI STRIP 1/2X4" R1547

## (undated) DEVICE — DRSG XEROFORM 5X9" 8884431605

## (undated) DEVICE — DRAPE LAP TRANSVERSE 29421

## (undated) DEVICE — TUBING INFILTRATION SINGLE SPIKE 188" 24-6001-00

## (undated) DEVICE — GLOVE GAMMEX DERMAPRENE ULTRA SZ 8 LF 8516

## (undated) DEVICE — GOWN IMPERVIOUS SPECIALTY XLG/XLONG 32474

## (undated) DEVICE — PEN MARKING SKIN

## (undated) DEVICE — DRAIN JACKSON PRATT RESERVOIR 100ML SU130-1305

## (undated) DEVICE — SU ETHILON 2-0 FS 18" 664H

## (undated) DEVICE — ESU ELEC BLADE 6" COATED E1450-6

## (undated) DEVICE — ESU GROUND PAD UNIVERSAL W/O CORD

## (undated) DEVICE — SPONGE LAP 18X18" X8435

## (undated) DEVICE — SU ETHILON 3-0 FS-1 18" 663G

## (undated) DEVICE — CLIP APPLIER 09 3/8" SM LIGACLIP MCS20

## (undated) DEVICE — DECANTER VIAL 2006S

## (undated) DEVICE — BNDG ELASTIC 6" DBL LENGTH UNSTERILE 6611-16

## (undated) DEVICE — PEN MARKING SKIN W/LABELS 31145884

## (undated) DEVICE — LINEN TOWEL PACK X5 5464

## (undated) DEVICE — ESU PENCIL W/HOLSTER E2350H

## (undated) DEVICE — BLADE KNIFE SURG 15 371115

## (undated) DEVICE — DRSG TEGADERM 4X10" 1627

## (undated) DEVICE — PREP SKIN SCRUB TRAY 4461A

## (undated) DEVICE — SU PDS II 2-0 CT-2 27"  Z333H

## (undated) DEVICE — BLADE KNIFE SURG 11 371111

## (undated) DEVICE — COVER ULTRASOUND PROBE 6X48" PC1290

## (undated) DEVICE — TUBING SUCTION 12"X1/4" N612

## (undated) DEVICE — ADPT 5 IN 1 360

## (undated) DEVICE — SU ETHILON 3-0 FS-1 18" 669H

## (undated) RX ORDER — ONDANSETRON 2 MG/ML
INJECTION INTRAMUSCULAR; INTRAVENOUS
Status: DISPENSED
Start: 2019-03-22

## (undated) RX ORDER — PROPOFOL 10 MG/ML
INJECTION, EMULSION INTRAVENOUS
Status: DISPENSED
Start: 2018-05-14

## (undated) RX ORDER — DEXAMETHASONE SODIUM PHOSPHATE 4 MG/ML
INJECTION, SOLUTION INTRA-ARTICULAR; INTRALESIONAL; INTRAMUSCULAR; INTRAVENOUS; SOFT TISSUE
Status: DISPENSED
Start: 2018-04-16

## (undated) RX ORDER — CEFAZOLIN SODIUM 2 G/100ML
INJECTION, SOLUTION INTRAVENOUS
Status: DISPENSED
Start: 2018-04-16

## (undated) RX ORDER — IPRATROPIUM BROMIDE AND ALBUTEROL SULFATE 2.5; .5 MG/3ML; MG/3ML
SOLUTION RESPIRATORY (INHALATION)
Status: DISPENSED
Start: 2019-03-22

## (undated) RX ORDER — ONDANSETRON 2 MG/ML
INJECTION INTRAMUSCULAR; INTRAVENOUS
Status: DISPENSED
Start: 2018-04-16

## (undated) RX ORDER — NEOSTIGMINE METHYLSULFATE 1 MG/ML
VIAL (ML) INJECTION
Status: DISPENSED
Start: 2019-03-22

## (undated) RX ORDER — FENTANYL CITRATE 50 UG/ML
INJECTION, SOLUTION INTRAMUSCULAR; INTRAVENOUS
Status: DISPENSED
Start: 2019-03-22

## (undated) RX ORDER — FENTANYL CITRATE 50 UG/ML
INJECTION, SOLUTION INTRAMUSCULAR; INTRAVENOUS
Status: DISPENSED
Start: 2018-05-14

## (undated) RX ORDER — SCOLOPAMINE TRANSDERMAL SYSTEM 1 MG/1
PATCH, EXTENDED RELEASE TRANSDERMAL
Status: DISPENSED
Start: 2019-03-22

## (undated) RX ORDER — LIDOCAINE HYDROCHLORIDE 20 MG/ML
INJECTION, SOLUTION EPIDURAL; INFILTRATION; INTRACAUDAL; PERINEURAL
Status: DISPENSED
Start: 2018-04-16

## (undated) RX ORDER — HYDROMORPHONE HYDROCHLORIDE 1 MG/ML
INJECTION, SOLUTION INTRAMUSCULAR; INTRAVENOUS; SUBCUTANEOUS
Status: DISPENSED
Start: 2019-03-22

## (undated) RX ORDER — CEFAZOLIN SODIUM 2 G/100ML
INJECTION, SOLUTION INTRAVENOUS
Status: DISPENSED
Start: 2018-05-14

## (undated) RX ORDER — KETAMINE HCL IN NACL, ISO-OSM 100MG/10ML
SYRINGE (ML) INJECTION
Status: DISPENSED
Start: 2019-03-22

## (undated) RX ORDER — GLYCOPYRROLATE 0.2 MG/ML
INJECTION, SOLUTION INTRAMUSCULAR; INTRAVENOUS
Status: DISPENSED
Start: 2019-03-22

## (undated) RX ORDER — HYDROMORPHONE HYDROCHLORIDE 1 MG/ML
INJECTION, SOLUTION INTRAMUSCULAR; INTRAVENOUS; SUBCUTANEOUS
Status: DISPENSED
Start: 2018-04-16

## (undated) RX ORDER — DEXAMETHASONE SODIUM PHOSPHATE 4 MG/ML
INJECTION, SOLUTION INTRA-ARTICULAR; INTRALESIONAL; INTRAMUSCULAR; INTRAVENOUS; SOFT TISSUE
Status: DISPENSED
Start: 2019-03-22

## (undated) RX ORDER — HYDROCODONE BITARTRATE AND ACETAMINOPHEN 5; 325 MG/1; MG/1
TABLET ORAL
Status: DISPENSED
Start: 2018-05-14

## (undated) RX ORDER — HYDROMORPHONE HYDROCHLORIDE 1 MG/ML
INJECTION, SOLUTION INTRAMUSCULAR; INTRAVENOUS; SUBCUTANEOUS
Status: DISPENSED
Start: 2018-05-14

## (undated) RX ORDER — ACETAMINOPHEN 500 MG
TABLET ORAL
Status: DISPENSED
Start: 2019-03-22

## (undated) RX ORDER — CEFAZOLIN SODIUM 2 G/100ML
INJECTION, SOLUTION INTRAVENOUS
Status: DISPENSED
Start: 2019-03-22

## (undated) RX ORDER — FENTANYL CITRATE 50 UG/ML
INJECTION, SOLUTION INTRAMUSCULAR; INTRAVENOUS
Status: DISPENSED
Start: 2018-04-16